# Patient Record
Sex: MALE | Race: WHITE | NOT HISPANIC OR LATINO | Employment: OTHER | URBAN - NONMETROPOLITAN AREA
[De-identification: names, ages, dates, MRNs, and addresses within clinical notes are randomized per-mention and may not be internally consistent; named-entity substitution may affect disease eponyms.]

---

## 2017-05-19 ENCOUNTER — OFFICE VISIT (OUTPATIENT)
Dept: SURGERY | Facility: CLINIC | Age: 57
End: 2017-05-19

## 2017-05-19 VITALS
HEIGHT: 68 IN | SYSTOLIC BLOOD PRESSURE: 160 MMHG | BODY MASS INDEX: 30.01 KG/M2 | DIASTOLIC BLOOD PRESSURE: 90 MMHG | WEIGHT: 198 LBS

## 2017-05-19 DIAGNOSIS — K43.2 INCISIONAL HERNIA, WITHOUT OBSTRUCTION OR GANGRENE: Primary | ICD-10-CM

## 2017-05-19 PROCEDURE — 99204 OFFICE O/P NEW MOD 45 MIN: CPT | Performed by: SURGERY

## 2017-05-19 RX ORDER — ATORVASTATIN CALCIUM 20 MG/1
20 TABLET, FILM COATED ORAL NIGHTLY
Status: ON HOLD | COMMUNITY
End: 2021-02-16

## 2017-05-19 RX ORDER — ONDANSETRON 4 MG/1
4 TABLET, ORALLY DISINTEGRATING ORAL EVERY 8 HOURS
COMMUNITY
Start: 2017-03-03 | End: 2017-06-21 | Stop reason: SDUPTHER

## 2017-05-19 RX ORDER — LISINOPRIL 5 MG/1
5 TABLET ORAL DAILY
Status: ON HOLD | COMMUNITY
End: 2021-02-16

## 2017-05-19 RX ORDER — BLOOD-GLUCOSE METER
KIT MISCELLANEOUS
Refills: 0 | COMMUNITY
Start: 2017-02-21 | End: 2017-05-26

## 2017-05-19 RX ORDER — ASPIRIN 81 MG/1
81 TABLET, CHEWABLE ORAL DAILY
Status: ON HOLD | COMMUNITY
End: 2021-02-16

## 2017-05-24 ENCOUNTER — OFFICE VISIT (OUTPATIENT)
Dept: SURGERY | Facility: CLINIC | Age: 57
End: 2017-05-24

## 2017-05-24 VITALS
WEIGHT: 203 LBS | BODY MASS INDEX: 30.77 KG/M2 | SYSTOLIC BLOOD PRESSURE: 130 MMHG | HEIGHT: 68 IN | DIASTOLIC BLOOD PRESSURE: 80 MMHG

## 2017-05-24 DIAGNOSIS — Z79.4 TYPE 2 DIABETES MELLITUS WITHOUT COMPLICATION, WITH LONG-TERM CURRENT USE OF INSULIN (HCC): ICD-10-CM

## 2017-05-24 DIAGNOSIS — K43.9 VENTRAL HERNIA WITHOUT OBSTRUCTION OR GANGRENE: Primary | ICD-10-CM

## 2017-05-24 DIAGNOSIS — E11.9 TYPE 2 DIABETES MELLITUS WITHOUT COMPLICATION, WITH LONG-TERM CURRENT USE OF INSULIN (HCC): ICD-10-CM

## 2017-05-24 PROCEDURE — 99212 OFFICE O/P EST SF 10 MIN: CPT | Performed by: SURGERY

## 2017-05-26 ENCOUNTER — APPOINTMENT (OUTPATIENT)
Dept: PREADMISSION TESTING | Facility: HOSPITAL | Age: 57
End: 2017-05-26

## 2017-05-26 VITALS
HEIGHT: 68 IN | OXYGEN SATURATION: 97 % | BODY MASS INDEX: 31.07 KG/M2 | HEART RATE: 82 BPM | RESPIRATION RATE: 20 BRPM | DIASTOLIC BLOOD PRESSURE: 88 MMHG | SYSTOLIC BLOOD PRESSURE: 126 MMHG | WEIGHT: 205 LBS

## 2017-05-26 DIAGNOSIS — K43.9 VENTRAL HERNIA WITHOUT OBSTRUCTION OR GANGRENE: ICD-10-CM

## 2017-05-26 LAB
ALBUMIN SERPL-MCNC: 4.2 G/DL (ref 3.4–4.8)
ALBUMIN/GLOB SERPL: 1.6 G/DL (ref 1.1–1.8)
ALP SERPL-CCNC: 118 U/L (ref 38–126)
ALT SERPL W P-5'-P-CCNC: 34 U/L (ref 21–72)
ANION GAP SERPL CALCULATED.3IONS-SCNC: 11 MMOL/L (ref 5–15)
AST SERPL-CCNC: 19 U/L (ref 17–59)
BASOPHILS # BLD AUTO: 0.05 10*3/MM3 (ref 0–0.2)
BASOPHILS NFR BLD AUTO: 0.5 % (ref 0–2)
BILIRUB SERPL-MCNC: 0.5 MG/DL (ref 0.2–1.3)
BUN BLD-MCNC: 9 MG/DL (ref 7–21)
BUN/CREAT SERPL: 14.3 (ref 7–25)
CALCIUM SPEC-SCNC: 9.4 MG/DL (ref 8.4–10.2)
CHLORIDE SERPL-SCNC: 98 MMOL/L (ref 95–110)
CO2 SERPL-SCNC: 25 MMOL/L (ref 22–31)
CREAT BLD-MCNC: 0.63 MG/DL (ref 0.7–1.3)
DEPRECATED RDW RBC AUTO: 37.2 FL (ref 35.1–43.9)
EOSINOPHIL # BLD AUTO: 0.13 10*3/MM3 (ref 0–0.7)
EOSINOPHIL NFR BLD AUTO: 1.4 % (ref 0–7)
ERYTHROCYTE [DISTWIDTH] IN BLOOD BY AUTOMATED COUNT: 12.3 % (ref 11.5–14.5)
GFR SERPL CREATININE-BSD FRML MDRD: 133 ML/MIN/1.73 (ref 60–130)
GLOBULIN UR ELPH-MCNC: 2.7 GM/DL (ref 2.3–3.5)
GLUCOSE BLD-MCNC: 348 MG/DL (ref 60–100)
HCT VFR BLD AUTO: 39.2 % (ref 39–49)
HGB BLD-MCNC: 14.4 G/DL (ref 13.7–17.3)
IMM GRANULOCYTES # BLD: 0.05 10*3/MM3 (ref 0–0.02)
IMM GRANULOCYTES NFR BLD: 0.5 % (ref 0–0.5)
LYMPHOCYTES # BLD AUTO: 1.45 10*3/MM3 (ref 0.6–4.2)
LYMPHOCYTES NFR BLD AUTO: 15.8 % (ref 10–50)
MCH RBC QN AUTO: 30.7 PG (ref 26.5–34)
MCHC RBC AUTO-ENTMCNC: 36.7 G/DL (ref 31.5–36.3)
MCV RBC AUTO: 83.6 FL (ref 80–98)
MONOCYTES # BLD AUTO: 0.8 10*3/MM3 (ref 0–0.9)
MONOCYTES NFR BLD AUTO: 8.7 % (ref 0–12)
MRSA DNA SPEC QL NAA+PROBE: POSITIVE
NEUTROPHILS # BLD AUTO: 6.67 10*3/MM3 (ref 2–8.6)
NEUTROPHILS NFR BLD AUTO: 73.1 % (ref 37–80)
PLATELET # BLD AUTO: 209 10*3/MM3 (ref 150–450)
PMV BLD AUTO: 11.9 FL (ref 8–12)
POTASSIUM BLD-SCNC: 4.2 MMOL/L (ref 3.5–5.1)
PROT SERPL-MCNC: 6.9 G/DL (ref 6.3–8.6)
RBC # BLD AUTO: 4.69 10*6/MM3 (ref 4.37–5.74)
SODIUM BLD-SCNC: 134 MMOL/L (ref 137–145)
WBC NRBC COR # BLD: 9.15 10*3/MM3 (ref 3.2–9.8)

## 2017-05-26 PROCEDURE — 36415 COLL VENOUS BLD VENIPUNCTURE: CPT

## 2017-05-26 PROCEDURE — 93005 ELECTROCARDIOGRAM TRACING: CPT

## 2017-05-26 PROCEDURE — 85025 COMPLETE CBC W/AUTO DIFF WBC: CPT | Performed by: SURGERY

## 2017-05-26 PROCEDURE — 80053 COMPREHEN METABOLIC PANEL: CPT | Performed by: SURGERY

## 2017-05-26 PROCEDURE — 87641 MR-STAPH DNA AMP PROBE: CPT | Performed by: SURGERY

## 2017-05-26 PROCEDURE — 93010 ELECTROCARDIOGRAM REPORT: CPT | Performed by: INTERNAL MEDICINE

## 2017-05-26 RX ORDER — SODIUM CHLORIDE 9 MG/ML
1000 INJECTION, SOLUTION INTRAVENOUS CONTINUOUS PRN
Status: CANCELLED | OUTPATIENT
Start: 2017-05-31

## 2017-05-31 ENCOUNTER — ANESTHESIA EVENT (OUTPATIENT)
Dept: PERIOP | Facility: HOSPITAL | Age: 57
End: 2017-05-31

## 2017-05-31 ENCOUNTER — ANESTHESIA (OUTPATIENT)
Dept: PERIOP | Facility: HOSPITAL | Age: 57
End: 2017-05-31

## 2017-05-31 ENCOUNTER — HOSPITAL ENCOUNTER (INPATIENT)
Facility: HOSPITAL | Age: 57
LOS: 8 days | Discharge: HOME | End: 2017-06-08
Attending: SURGERY | Admitting: SURGERY

## 2017-05-31 DIAGNOSIS — K43.9 VENTRAL HERNIA WITHOUT OBSTRUCTION OR GANGRENE: ICD-10-CM

## 2017-05-31 DIAGNOSIS — Z74.09 IMPAIRED MOBILITY AND ADLS: ICD-10-CM

## 2017-05-31 DIAGNOSIS — Z74.09 IMPAIRED PHYSICAL MOBILITY: ICD-10-CM

## 2017-05-31 DIAGNOSIS — Z78.9 IMPAIRED MOBILITY AND ADLS: ICD-10-CM

## 2017-05-31 LAB
ANION GAP SERPL CALCULATED.3IONS-SCNC: 10 MMOL/L (ref 5–15)
ARTERIAL PATENCY WRIST A: ABNORMAL
ATMOSPHERIC PRESS: ABNORMAL MMHG
BASE EXCESS BLDA CALC-SCNC: -0.3 MMOL/L (ref -2.4–2.4)
BASOPHILS # BLD AUTO: 0.02 10*3/MM3 (ref 0–0.2)
BASOPHILS NFR BLD AUTO: 0.1 % (ref 0–2)
BDY SITE: ABNORMAL
BUN BLD-MCNC: 8 MG/DL (ref 7–21)
BUN/CREAT SERPL: 11.4 (ref 7–25)
CA-I BLD-MCNC: 4.4 MG/DL (ref 4.5–4.9)
CALCIUM SPEC-SCNC: 8 MG/DL (ref 8.4–10.2)
CHLORIDE SERPL-SCNC: 98 MMOL/L (ref 95–110)
CO2 BLDA-SCNC: 24.5 MMOL/L (ref 23–27)
CO2 SERPL-SCNC: 25 MMOL/L (ref 22–31)
CREAT BLD-MCNC: 0.7 MG/DL (ref 0.7–1.3)
DEPRECATED RDW RBC AUTO: 38.5 FL (ref 35.1–43.9)
EOSINOPHIL # BLD AUTO: 0.03 10*3/MM3 (ref 0–0.7)
EOSINOPHIL NFR BLD AUTO: 0.2 % (ref 0–7)
ERYTHROCYTE [DISTWIDTH] IN BLOOD BY AUTOMATED COUNT: 12.6 % (ref 11.5–14.5)
GFR SERPL CREATININE-BSD FRML MDRD: 118 ML/MIN/1.73 (ref 60–130)
GLUCOSE BLD-MCNC: 298 MG/DL (ref 60–100)
GLUCOSE BLDA-MCNC: 235 MMOL/L
GLUCOSE BLDC GLUCOMTR-MCNC: 261 MG/DL (ref 70–130)
GLUCOSE BLDC GLUCOMTR-MCNC: 272 MG/DL (ref 70–130)
GLUCOSE BLDC GLUCOMTR-MCNC: 309 MG/DL (ref 70–130)
GLUCOSE BLDC GLUCOMTR-MCNC: 356 MG/DL (ref 70–130)
HCO3 BLDA-SCNC: 23.4 MMOL/L (ref 22–26)
HCT VFR BLD AUTO: 39.6 % (ref 39–49)
HCT VFR BLD CALC: 45 % (ref 40–54)
HGB BLD-MCNC: 14.5 G/DL (ref 13.7–17.3)
HGB BLDA-MCNC: 15.2 G/DL (ref 14–18)
IMM GRANULOCYTES # BLD: 0.11 10*3/MM3 (ref 0–0.02)
IMM GRANULOCYTES NFR BLD: 0.6 % (ref 0–0.5)
LYMPHOCYTES # BLD AUTO: 1.19 10*3/MM3 (ref 0.6–4.2)
LYMPHOCYTES NFR BLD AUTO: 6 % (ref 10–50)
MAGNESIUM SERPL-MCNC: 1.8 MG/DL (ref 1.6–2.3)
MCH RBC QN AUTO: 30.9 PG (ref 26.5–34)
MCHC RBC AUTO-ENTMCNC: 36.6 G/DL (ref 31.5–36.3)
MCV RBC AUTO: 84.3 FL (ref 80–98)
MODALITY: ABNORMAL
MONOCYTES # BLD AUTO: 1.25 10*3/MM3 (ref 0–0.9)
MONOCYTES NFR BLD AUTO: 6.3 % (ref 0–12)
NEUTROPHILS # BLD AUTO: 17.33 10*3/MM3 (ref 2–8.6)
NEUTROPHILS NFR BLD AUTO: 86.8 % (ref 37–80)
PCO2 BLDA: 35.7 MM HG (ref 35–45)
PH BLDA: 7.43 PH UNITS (ref 7.35–7.45)
PHOSPHATE SERPL-MCNC: 2.7 MG/DL (ref 2.4–4.4)
PLATELET # BLD AUTO: 300 10*3/MM3 (ref 150–450)
PMV BLD AUTO: 10.4 FL (ref 8–12)
PO2 BLDA: 130.7 MM HG (ref 80–105)
POTASSIUM BLD-SCNC: 3.7 MMOL/L (ref 3.5–5.1)
POTASSIUM BLDA-SCNC: 3.08 MMOL/L (ref 3.6–4.9)
RBC # BLD AUTO: 4.7 10*6/MM3 (ref 4.37–5.74)
SAO2 % BLDCOA: 98.8 %
SODIUM BLD-SCNC: 133 MMOL/L (ref 137–145)
SODIUM BLDA-SCNC: 134.7 MMOL/L (ref 138–146)
WBC NRBC COR # BLD: 19.93 10*3/MM3 (ref 3.2–9.8)

## 2017-05-31 PROCEDURE — 25010000002 MIDAZOLAM PER 1 MG: Performed by: NURSE ANESTHETIST, CERTIFIED REGISTERED

## 2017-05-31 PROCEDURE — 0WUF0JZ SUPPLEMENT ABDOMINAL WALL WITH SYNTHETIC SUBSTITUTE, OPEN APPROACH: ICD-10-PCS | Performed by: SURGERY

## 2017-05-31 PROCEDURE — 25010000002 CEFOXITIN: Performed by: SURGERY

## 2017-05-31 PROCEDURE — 85025 COMPLETE CBC W/AUTO DIFF WBC: CPT | Performed by: SURGERY

## 2017-05-31 PROCEDURE — 83735 ASSAY OF MAGNESIUM: CPT | Performed by: SURGERY

## 2017-05-31 PROCEDURE — 25010000002 ONDANSETRON PER 1 MG: Performed by: NURSE ANESTHETIST, CERTIFIED REGISTERED

## 2017-05-31 PROCEDURE — 88304 TISSUE EXAM BY PATHOLOGIST: CPT | Performed by: PATHOLOGY

## 2017-05-31 PROCEDURE — 0WJF4ZZ INSPECTION OF ABDOMINAL WALL, PERCUTANEOUS ENDOSCOPIC APPROACH: ICD-10-PCS | Performed by: SURGERY

## 2017-05-31 PROCEDURE — 25010000002 NEOSTIGMINE PER 0.5 MG: Performed by: NURSE ANESTHETIST, CERTIFIED REGISTERED

## 2017-05-31 PROCEDURE — 25010000002 HYDROMORPHONE PER 4 MG: Performed by: NURSE ANESTHETIST, CERTIFIED REGISTERED

## 2017-05-31 PROCEDURE — 25010000003 CEFAZOLIN PER 500 MG: Performed by: SURGERY

## 2017-05-31 PROCEDURE — 84100 ASSAY OF PHOSPHORUS: CPT | Performed by: SURGERY

## 2017-05-31 PROCEDURE — 25010000002 FENTANYL CITRATE (PF) 100 MCG/2ML SOLUTION: Performed by: NURSE ANESTHETIST, CERTIFIED REGISTERED

## 2017-05-31 PROCEDURE — 25010000002 HYDROMORPHONE PER 4 MG: Performed by: SURGERY

## 2017-05-31 PROCEDURE — 25010000002 PROPOFOL 10 MG/ML EMULSION: Performed by: NURSE ANESTHETIST, CERTIFIED REGISTERED

## 2017-05-31 PROCEDURE — 82962 GLUCOSE BLOOD TEST: CPT

## 2017-05-31 PROCEDURE — 80048 BASIC METABOLIC PNL TOTAL CA: CPT | Performed by: SURGERY

## 2017-05-31 PROCEDURE — 0DNE0ZZ RELEASE LARGE INTESTINE, OPEN APPROACH: ICD-10-PCS | Performed by: SURGERY

## 2017-05-31 PROCEDURE — 25010000002 ONDANSETRON PER 1 MG: Performed by: SURGERY

## 2017-05-31 PROCEDURE — 63710000001 INSULIN ASPART PER 5 UNITS: Performed by: ANESTHESIOLOGY

## 2017-05-31 PROCEDURE — 25010000002 PHENYLEPHRINE PER 1 ML: Performed by: NURSE ANESTHETIST, CERTIFIED REGISTERED

## 2017-05-31 PROCEDURE — C1781 MESH (IMPLANTABLE): HCPCS | Performed by: SURGERY

## 2017-05-31 PROCEDURE — 88304 TISSUE EXAM BY PATHOLOGIST: CPT | Performed by: SURGERY

## 2017-05-31 PROCEDURE — 49568 PR IMPLANT MESH HERNIA REPAIR/DEBRIDEMENT CLOSURE: CPT | Performed by: SURGERY

## 2017-05-31 PROCEDURE — 82803 BLOOD GASES ANY COMBINATION: CPT | Performed by: NURSE ANESTHETIST, CERTIFIED REGISTERED

## 2017-05-31 PROCEDURE — 49560 PR REPAIR INCISIONAL HERNIA,REDUCIBLE: CPT | Performed by: SURGERY

## 2017-05-31 PROCEDURE — 25010000002 HYDROMORPHONE PCA 0.2 MG/ML PCA 30 ML (BHMAD/RIC): Performed by: SURGERY

## 2017-05-31 PROCEDURE — 63710000001 INSULIN ASPART PER 5 UNITS: Performed by: SURGERY

## 2017-05-31 DEVICE — PHASIX MESH, 6" X 8" (15.2 CM X 20.3 CM), RECTANGLE
Type: IMPLANTABLE DEVICE | Site: ABDOMEN | Status: FUNCTIONAL
Brand: PHASIX

## 2017-05-31 RX ORDER — PROPOFOL 10 MG/ML
VIAL (ML) INTRAVENOUS AS NEEDED
Status: DISCONTINUED | OUTPATIENT
Start: 2017-05-31 | End: 2017-05-31 | Stop reason: SURG

## 2017-05-31 RX ORDER — GLYCOPYRROLATE 0.2 MG/ML
INJECTION INTRAMUSCULAR; INTRAVENOUS AS NEEDED
Status: DISCONTINUED | OUTPATIENT
Start: 2017-05-31 | End: 2017-05-31 | Stop reason: SURG

## 2017-05-31 RX ORDER — ACETAMINOPHEN 650 MG/1
650 SUPPOSITORY RECTAL ONCE AS NEEDED
Status: DISCONTINUED | OUTPATIENT
Start: 2017-05-31 | End: 2017-05-31 | Stop reason: HOSPADM

## 2017-05-31 RX ORDER — ATORVASTATIN CALCIUM 20 MG/1
20 TABLET, FILM COATED ORAL NIGHTLY
Status: DISCONTINUED | OUTPATIENT
Start: 2017-05-31 | End: 2017-06-08 | Stop reason: HOSPADM

## 2017-05-31 RX ORDER — LIDOCAINE HYDROCHLORIDE 20 MG/ML
INJECTION, SOLUTION INFILTRATION; PERINEURAL AS NEEDED
Status: DISCONTINUED | OUTPATIENT
Start: 2017-05-31 | End: 2017-05-31 | Stop reason: SURG

## 2017-05-31 RX ORDER — SODIUM CHLORIDE 9 MG/ML
1000 INJECTION, SOLUTION INTRAVENOUS CONTINUOUS PRN
Status: DISCONTINUED | OUTPATIENT
Start: 2017-05-31 | End: 2017-05-31 | Stop reason: HOSPADM

## 2017-05-31 RX ORDER — SODIUM CHLORIDE, SODIUM LACTATE, POTASSIUM CHLORIDE, CALCIUM CHLORIDE 600; 310; 30; 20 MG/100ML; MG/100ML; MG/100ML; MG/100ML
100 INJECTION, SOLUTION INTRAVENOUS CONTINUOUS
Status: DISCONTINUED | OUTPATIENT
Start: 2017-05-31 | End: 2017-06-07

## 2017-05-31 RX ORDER — ONDANSETRON 2 MG/ML
4 INJECTION INTRAMUSCULAR; INTRAVENOUS ONCE AS NEEDED
Status: COMPLETED | OUTPATIENT
Start: 2017-05-31 | End: 2017-05-31

## 2017-05-31 RX ORDER — FLUMAZENIL 0.1 MG/ML
0.2 INJECTION INTRAVENOUS AS NEEDED
Status: DISCONTINUED | OUTPATIENT
Start: 2017-05-31 | End: 2017-05-31 | Stop reason: HOSPADM

## 2017-05-31 RX ORDER — NICOTINE POLACRILEX 4 MG
15 LOZENGE BUCCAL
Status: DISCONTINUED | OUTPATIENT
Start: 2017-05-31 | End: 2017-06-03 | Stop reason: SDUPTHER

## 2017-05-31 RX ORDER — ONDANSETRON 2 MG/ML
4 INJECTION INTRAMUSCULAR; INTRAVENOUS EVERY 6 HOURS PRN
Status: DISCONTINUED | OUTPATIENT
Start: 2017-05-31 | End: 2017-06-08 | Stop reason: HOSPADM

## 2017-05-31 RX ORDER — DIPHENHYDRAMINE HYDROCHLORIDE 50 MG/ML
12.5 INJECTION INTRAMUSCULAR; INTRAVENOUS
Status: DISCONTINUED | OUTPATIENT
Start: 2017-05-31 | End: 2017-05-31 | Stop reason: HOSPADM

## 2017-05-31 RX ORDER — DEXTROSE MONOHYDRATE 25 G/50ML
25 INJECTION, SOLUTION INTRAVENOUS
Status: DISCONTINUED | OUTPATIENT
Start: 2017-05-31 | End: 2017-06-03 | Stop reason: SDUPTHER

## 2017-05-31 RX ORDER — HYDROMORPHONE HCL 110MG/55ML
PATIENT CONTROLLED ANALGESIA SYRINGE INTRAVENOUS AS NEEDED
Status: DISCONTINUED | OUTPATIENT
Start: 2017-05-31 | End: 2017-05-31 | Stop reason: SURG

## 2017-05-31 RX ORDER — FENTANYL CITRATE 50 UG/ML
INJECTION, SOLUTION INTRAMUSCULAR; INTRAVENOUS AS NEEDED
Status: DISCONTINUED | OUTPATIENT
Start: 2017-05-31 | End: 2017-05-31 | Stop reason: SURG

## 2017-05-31 RX ORDER — ACETAMINOPHEN 325 MG/1
650 TABLET ORAL ONCE AS NEEDED
Status: DISCONTINUED | OUTPATIENT
Start: 2017-05-31 | End: 2017-05-31 | Stop reason: HOSPADM

## 2017-05-31 RX ORDER — PANTOPRAZOLE SODIUM 40 MG/10ML
40 INJECTION, POWDER, LYOPHILIZED, FOR SOLUTION INTRAVENOUS
Status: DISCONTINUED | OUTPATIENT
Start: 2017-06-01 | End: 2017-06-08 | Stop reason: HOSPADM

## 2017-05-31 RX ORDER — ONDANSETRON 2 MG/ML
INJECTION INTRAMUSCULAR; INTRAVENOUS AS NEEDED
Status: DISCONTINUED | OUTPATIENT
Start: 2017-05-31 | End: 2017-05-31 | Stop reason: SURG

## 2017-05-31 RX ORDER — BUPIVACAINE HYDROCHLORIDE AND EPINEPHRINE 5; 5 MG/ML; UG/ML
INJECTION, SOLUTION EPIDURAL; INTRACAUDAL; PERINEURAL AS NEEDED
Status: DISCONTINUED | OUTPATIENT
Start: 2017-05-31 | End: 2017-06-08 | Stop reason: HOSPADM

## 2017-05-31 RX ORDER — MIDAZOLAM HYDROCHLORIDE 1 MG/ML
INJECTION INTRAMUSCULAR; INTRAVENOUS AS NEEDED
Status: DISCONTINUED | OUTPATIENT
Start: 2017-05-31 | End: 2017-05-31 | Stop reason: SURG

## 2017-05-31 RX ORDER — LABETALOL HYDROCHLORIDE 5 MG/ML
5 INJECTION, SOLUTION INTRAVENOUS
Status: DISCONTINUED | OUTPATIENT
Start: 2017-05-31 | End: 2017-05-31 | Stop reason: HOSPADM

## 2017-05-31 RX ORDER — SODIUM CHLORIDE, SODIUM GLUCONATE, SODIUM ACETATE, POTASSIUM CHLORIDE, AND MAGNESIUM CHLORIDE 526; 502; 368; 37; 30 MG/100ML; MG/100ML; MG/100ML; MG/100ML; MG/100ML
INJECTION, SOLUTION INTRAVENOUS CONTINUOUS PRN
Status: DISCONTINUED | OUTPATIENT
Start: 2017-05-31 | End: 2017-05-31 | Stop reason: SURG

## 2017-05-31 RX ORDER — NALOXONE HCL 0.4 MG/ML
0.1 VIAL (ML) INJECTION
Status: DISCONTINUED | OUTPATIENT
Start: 2017-05-31 | End: 2017-06-07

## 2017-05-31 RX ORDER — NALOXONE HCL 0.4 MG/ML
0.1 VIAL (ML) INJECTION
Status: DISCONTINUED | OUTPATIENT
Start: 2017-05-31 | End: 2017-05-31 | Stop reason: SDUPTHER

## 2017-05-31 RX ORDER — ROCURONIUM BROMIDE 10 MG/ML
INJECTION, SOLUTION INTRAVENOUS AS NEEDED
Status: DISCONTINUED | OUTPATIENT
Start: 2017-05-31 | End: 2017-05-31 | Stop reason: SURG

## 2017-05-31 RX ORDER — NALOXONE HCL 0.4 MG/ML
0.2 VIAL (ML) INJECTION AS NEEDED
Status: DISCONTINUED | OUTPATIENT
Start: 2017-05-31 | End: 2017-05-31 | Stop reason: HOSPADM

## 2017-05-31 RX ADMIN — NEOSTIGMINE METHYLSULFATE 4 MG: 1 INJECTION, SOLUTION INTRAMUSCULAR; INTRAVENOUS; SUBCUTANEOUS at 19:15

## 2017-05-31 RX ADMIN — FENTANYL CITRATE 100 MCG: 50 INJECTION, SOLUTION INTRAMUSCULAR; INTRAVENOUS at 17:03

## 2017-05-31 RX ADMIN — PHENYLEPHRINE HYDROCHLORIDE 100 MCG: 10 INJECTION INTRAVENOUS at 15:47

## 2017-05-31 RX ADMIN — ROCURONIUM BROMIDE 40 MG: 10 INJECTION INTRAVENOUS at 15:34

## 2017-05-31 RX ADMIN — MUPIROCIN: 20 OINTMENT TOPICAL at 21:19

## 2017-05-31 RX ADMIN — INSULIN ASPART 12 UNITS: 100 INJECTION, SOLUTION INTRAVENOUS; SUBCUTANEOUS at 12:59

## 2017-05-31 RX ADMIN — HYDROMORPHONE HYDROCHLORIDE 0.5 MG: 2 INJECTION, SOLUTION INTRAMUSCULAR; INTRAVENOUS; SUBCUTANEOUS at 20:15

## 2017-05-31 RX ADMIN — HYDROMORPHONE HYDROCHLORIDE 0.2 MG: 2 INJECTION, SOLUTION INTRAMUSCULAR; INTRAVENOUS; SUBCUTANEOUS at 19:35

## 2017-05-31 RX ADMIN — ROCURONIUM BROMIDE 20 MG: 10 INJECTION INTRAVENOUS at 17:55

## 2017-05-31 RX ADMIN — ATORVASTATIN CALCIUM 20 MG: 20 TABLET, FILM COATED ORAL at 21:19

## 2017-05-31 RX ADMIN — INSULIN ASPART 7 UNITS: 100 INJECTION, SOLUTION INTRAVENOUS; SUBCUTANEOUS at 21:41

## 2017-05-31 RX ADMIN — PHENYLEPHRINE HYDROCHLORIDE 100 MCG: 10 INJECTION INTRAVENOUS at 15:51

## 2017-05-31 RX ADMIN — ONDANSETRON 4 MG: 2 INJECTION INTRAMUSCULAR; INTRAVENOUS at 21:16

## 2017-05-31 RX ADMIN — GLYCOPYRROLATE 0.8 MG: 0.2 INJECTION, SOLUTION INTRAMUSCULAR; INTRAVENOUS at 19:15

## 2017-05-31 RX ADMIN — SODIUM CHLORIDE, SODIUM GLUCONATE, SODIUM ACETATE, POTASSIUM CHLORIDE, AND MAGNESIUM CHLORIDE: 526; 502; 368; 37; 30 INJECTION, SOLUTION INTRAVENOUS at 18:10

## 2017-05-31 RX ADMIN — PROPOFOL 160 MG: 10 INJECTION, EMULSION INTRAVENOUS at 15:34

## 2017-05-31 RX ADMIN — SODIUM CHLORIDE, SODIUM GLUCONATE, SODIUM ACETATE, POTASSIUM CHLORIDE, AND MAGNESIUM CHLORIDE: 526; 502; 368; 37; 30 INJECTION, SOLUTION INTRAVENOUS at 16:51

## 2017-05-31 RX ADMIN — PHENYLEPHRINE HYDROCHLORIDE 100 MCG: 10 INJECTION INTRAVENOUS at 15:56

## 2017-05-31 RX ADMIN — FENTANYL CITRATE 50 MCG: 50 INJECTION, SOLUTION INTRAMUSCULAR; INTRAVENOUS at 16:17

## 2017-05-31 RX ADMIN — HYDROMORPHONE HYDROCHLORIDE 0.6 MG: 2 INJECTION, SOLUTION INTRAMUSCULAR; INTRAVENOUS; SUBCUTANEOUS at 19:40

## 2017-05-31 RX ADMIN — CEFAZOLIN SODIUM 2 G: 1 INJECTION, POWDER, FOR SOLUTION INTRAMUSCULAR; INTRAVENOUS at 15:40

## 2017-05-31 RX ADMIN — FENTANYL CITRATE 100 MCG: 50 INJECTION, SOLUTION INTRAMUSCULAR; INTRAVENOUS at 15:34

## 2017-05-31 RX ADMIN — HYDROMORPHONE HYDROCHLORIDE 0.4 MG: 2 INJECTION, SOLUTION INTRAMUSCULAR; INTRAVENOUS; SUBCUTANEOUS at 18:49

## 2017-05-31 RX ADMIN — Medication: at 20:38

## 2017-05-31 RX ADMIN — FENTANYL CITRATE 100 MCG: 50 INJECTION, SOLUTION INTRAMUSCULAR; INTRAVENOUS at 17:35

## 2017-05-31 RX ADMIN — NEOSTIGMINE METHYLSULFATE 1 MG: 1 INJECTION, SOLUTION INTRAMUSCULAR; INTRAVENOUS; SUBCUTANEOUS at 19:25

## 2017-05-31 RX ADMIN — HYDROMORPHONE HYDROCHLORIDE 0.5 MG: 2 INJECTION, SOLUTION INTRAMUSCULAR; INTRAVENOUS; SUBCUTANEOUS at 20:10

## 2017-05-31 RX ADMIN — MIDAZOLAM 2 MG: 1 INJECTION INTRAMUSCULAR; INTRAVENOUS at 15:27

## 2017-05-31 RX ADMIN — MUPIROCIN: 20 OINTMENT TOPICAL at 14:57

## 2017-05-31 RX ADMIN — ONDANSETRON 4 MG: 2 INJECTION INTRAMUSCULAR; INTRAVENOUS at 19:00

## 2017-05-31 RX ADMIN — PHENYLEPHRINE HYDROCHLORIDE 100 MCG: 10 INJECTION INTRAVENOUS at 15:52

## 2017-05-31 RX ADMIN — HYDROMORPHONE HYDROCHLORIDE 0.5 MG: 2 INJECTION, SOLUTION INTRAMUSCULAR; INTRAVENOUS; SUBCUTANEOUS at 20:00

## 2017-05-31 RX ADMIN — SODIUM CHLORIDE 1000 ML: 900 INJECTION, SOLUTION INTRAVENOUS at 12:55

## 2017-05-31 RX ADMIN — HYDROMORPHONE HYDROCHLORIDE 0.8 MG: 2 INJECTION, SOLUTION INTRAMUSCULAR; INTRAVENOUS; SUBCUTANEOUS at 19:48

## 2017-05-31 RX ADMIN — ONDANSETRON 4 MG: 2 INJECTION INTRAMUSCULAR; INTRAVENOUS at 20:00

## 2017-05-31 RX ADMIN — HYDROMORPHONE HYDROCHLORIDE 1 MG: 1 INJECTION, SOLUTION INTRAMUSCULAR; INTRAVENOUS; SUBCUTANEOUS at 21:01

## 2017-05-31 RX ADMIN — CEFOXITIN 2 G: 2 INJECTION, POWDER, FOR SOLUTION INTRAVENOUS at 21:41

## 2017-05-31 RX ADMIN — ROCURONIUM BROMIDE 20 MG: 10 INJECTION INTRAVENOUS at 18:35

## 2017-05-31 RX ADMIN — HYDROMORPHONE HYDROCHLORIDE 0.5 MG: 2 INJECTION, SOLUTION INTRAMUSCULAR; INTRAVENOUS; SUBCUTANEOUS at 20:05

## 2017-05-31 RX ADMIN — SODIUM CHLORIDE, POTASSIUM CHLORIDE, SODIUM LACTATE AND CALCIUM CHLORIDE 100 ML/HR: 600; 310; 30; 20 INJECTION, SOLUTION INTRAVENOUS at 20:58

## 2017-05-31 RX ADMIN — GLYCOPYRROLATE 0.2 MG: 0.2 INJECTION, SOLUTION INTRAMUSCULAR; INTRAVENOUS at 19:25

## 2017-05-31 RX ADMIN — FENTANYL CITRATE 100 MCG: 50 INJECTION, SOLUTION INTRAMUSCULAR; INTRAVENOUS at 16:20

## 2017-05-31 RX ADMIN — ROCURONIUM BROMIDE 30 MG: 10 INJECTION INTRAVENOUS at 16:16

## 2017-05-31 RX ADMIN — PHENYLEPHRINE HYDROCHLORIDE 100 MCG: 10 INJECTION INTRAVENOUS at 15:49

## 2017-05-31 RX ADMIN — LIDOCAINE HYDROCHLORIDE 100 MG: 20 INJECTION, SOLUTION INFILTRATION; PERINEURAL at 15:32

## 2017-06-01 PROBLEM — Z79.4 CONTROLLED TYPE 2 DIABETES MELLITUS WITHOUT COMPLICATION, WITH LONG-TERM CURRENT USE OF INSULIN (HCC): Status: ACTIVE | Noted: 2017-05-24

## 2017-06-01 LAB
ANION GAP SERPL CALCULATED.3IONS-SCNC: 11 MMOL/L (ref 5–15)
ANION GAP SERPL CALCULATED.3IONS-SCNC: 11 MMOL/L (ref 5–15)
ANION GAP SERPL CALCULATED.3IONS-SCNC: 6 MMOL/L (ref 5–15)
BASOPHILS # BLD AUTO: 0.01 10*3/MM3 (ref 0–0.2)
BASOPHILS NFR BLD AUTO: 0.1 % (ref 0–2)
BUN BLD-MCNC: 10 MG/DL (ref 7–21)
BUN BLD-MCNC: 11 MG/DL (ref 7–21)
BUN BLD-MCNC: 12 MG/DL (ref 7–21)
BUN/CREAT SERPL: 14.1 (ref 7–25)
BUN/CREAT SERPL: 14.3 (ref 7–25)
BUN/CREAT SERPL: 16.2 (ref 7–25)
CALCIUM SPEC-SCNC: 8 MG/DL (ref 8.4–10.2)
CALCIUM SPEC-SCNC: 8.1 MG/DL (ref 8.4–10.2)
CALCIUM SPEC-SCNC: 8.2 MG/DL (ref 8.4–10.2)
CHLORIDE SERPL-SCNC: 100 MMOL/L (ref 95–110)
CHLORIDE SERPL-SCNC: 100 MMOL/L (ref 95–110)
CHLORIDE SERPL-SCNC: 99 MMOL/L (ref 95–110)
CO2 SERPL-SCNC: 26 MMOL/L (ref 22–31)
CO2 SERPL-SCNC: 27 MMOL/L (ref 22–31)
CO2 SERPL-SCNC: 28 MMOL/L (ref 22–31)
CREAT BLD-MCNC: 0.71 MG/DL (ref 0.7–1.3)
CREAT BLD-MCNC: 0.74 MG/DL (ref 0.7–1.3)
CREAT BLD-MCNC: 0.77 MG/DL (ref 0.7–1.3)
DEPRECATED RDW RBC AUTO: 40.8 FL (ref 35.1–43.9)
EOSINOPHIL # BLD AUTO: 0.12 10*3/MM3 (ref 0–0.7)
EOSINOPHIL NFR BLD AUTO: 0.7 % (ref 0–7)
ERYTHROCYTE [DISTWIDTH] IN BLOOD BY AUTOMATED COUNT: 12.8 % (ref 11.5–14.5)
GFR SERPL CREATININE-BSD FRML MDRD: 105 ML/MIN/1.73 (ref 56–130)
GFR SERPL CREATININE-BSD FRML MDRD: 110 ML/MIN/1.73 (ref 56–130)
GFR SERPL CREATININE-BSD FRML MDRD: 116 ML/MIN/1.73 (ref 60–130)
GLUCOSE BLD-MCNC: 212 MG/DL (ref 60–100)
GLUCOSE BLD-MCNC: 217 MG/DL (ref 60–100)
GLUCOSE BLD-MCNC: 244 MG/DL (ref 60–100)
GLUCOSE BLDC GLUCOMTR-MCNC: 110 MG/DL (ref 70–130)
GLUCOSE BLDC GLUCOMTR-MCNC: 120 MG/DL (ref 70–130)
GLUCOSE BLDC GLUCOMTR-MCNC: 139 MG/DL (ref 70–130)
GLUCOSE BLDC GLUCOMTR-MCNC: 142 MG/DL (ref 70–130)
GLUCOSE BLDC GLUCOMTR-MCNC: 150 MG/DL (ref 70–130)
GLUCOSE BLDC GLUCOMTR-MCNC: 165 MG/DL (ref 70–130)
GLUCOSE BLDC GLUCOMTR-MCNC: 169 MG/DL (ref 70–130)
GLUCOSE BLDC GLUCOMTR-MCNC: 180 MG/DL (ref 70–130)
GLUCOSE BLDC GLUCOMTR-MCNC: 197 MG/DL (ref 70–130)
GLUCOSE BLDC GLUCOMTR-MCNC: 199 MG/DL (ref 70–130)
GLUCOSE BLDC GLUCOMTR-MCNC: 200 MG/DL (ref 70–130)
GLUCOSE BLDC GLUCOMTR-MCNC: 220 MG/DL (ref 70–130)
GLUCOSE BLDC GLUCOMTR-MCNC: 225 MG/DL (ref 70–130)
GLUCOSE BLDC GLUCOMTR-MCNC: 277 MG/DL (ref 70–130)
GLUCOSE BLDC GLUCOMTR-MCNC: 95 MG/DL (ref 70–130)
HCT VFR BLD AUTO: 39.7 % (ref 39–49)
HGB BLD-MCNC: 14 G/DL (ref 13.7–17.3)
IMM GRANULOCYTES # BLD: 0.07 10*3/MM3 (ref 0–0.02)
IMM GRANULOCYTES NFR BLD: 0.4 % (ref 0–0.5)
LYMPHOCYTES # BLD AUTO: 1.15 10*3/MM3 (ref 0.6–4.2)
LYMPHOCYTES NFR BLD AUTO: 6.7 % (ref 10–50)
MAGNESIUM SERPL-MCNC: 2 MG/DL (ref 1.6–2.3)
MCH RBC QN AUTO: 30.8 PG (ref 26.5–34)
MCHC RBC AUTO-ENTMCNC: 35.3 G/DL (ref 31.5–36.3)
MCV RBC AUTO: 87.3 FL (ref 80–98)
MONOCYTES # BLD AUTO: 0.79 10*3/MM3 (ref 0–0.9)
MONOCYTES NFR BLD AUTO: 4.6 % (ref 0–12)
NEUTROPHILS # BLD AUTO: 15.03 10*3/MM3 (ref 2–8.6)
NEUTROPHILS NFR BLD AUTO: 87.5 % (ref 37–80)
PHOSPHATE SERPL-MCNC: 2.5 MG/DL (ref 2.4–4.4)
PLATELET # BLD AUTO: 276 10*3/MM3 (ref 150–450)
PMV BLD AUTO: 11.3 FL (ref 8–12)
POTASSIUM BLD-SCNC: 4.1 MMOL/L (ref 3.5–5.1)
POTASSIUM BLD-SCNC: 4.2 MMOL/L (ref 3.5–5.1)
POTASSIUM BLD-SCNC: 4.3 MMOL/L (ref 3.5–5.1)
RBC # BLD AUTO: 4.55 10*6/MM3 (ref 4.37–5.74)
SODIUM BLD-SCNC: 134 MMOL/L (ref 137–145)
SODIUM BLD-SCNC: 137 MMOL/L (ref 137–145)
SODIUM BLD-SCNC: 137 MMOL/L (ref 137–145)
WBC NRBC COR # BLD: 17.17 10*3/MM3 (ref 3.2–9.8)

## 2017-06-01 PROCEDURE — 83735 ASSAY OF MAGNESIUM: CPT | Performed by: SURGERY

## 2017-06-01 PROCEDURE — 25010000002 CEFOXITIN: Performed by: SURGERY

## 2017-06-01 PROCEDURE — 25010000002 INSULIN REGULAR HUMAN PER 5 UNITS: Performed by: SURGERY

## 2017-06-01 PROCEDURE — 85025 COMPLETE CBC W/AUTO DIFF WBC: CPT | Performed by: SURGERY

## 2017-06-01 PROCEDURE — 99024 POSTOP FOLLOW-UP VISIT: CPT | Performed by: FAMILY MEDICINE

## 2017-06-01 PROCEDURE — 63710000001 INSULIN ASPART PER 5 UNITS: Performed by: SURGERY

## 2017-06-01 PROCEDURE — 94799 UNLISTED PULMONARY SVC/PX: CPT

## 2017-06-01 PROCEDURE — 97116 GAIT TRAINING THERAPY: CPT

## 2017-06-01 PROCEDURE — 25010000002 HYDROMORPHONE PCA 0.2 MG/ML PCA 30 ML (BHMAD/RIC): Performed by: SURGERY

## 2017-06-01 PROCEDURE — G8978 MOBILITY CURRENT STATUS: HCPCS

## 2017-06-01 PROCEDURE — 82962 GLUCOSE BLOOD TEST: CPT

## 2017-06-01 PROCEDURE — 80048 BASIC METABOLIC PNL TOTAL CA: CPT | Performed by: SURGERY

## 2017-06-01 PROCEDURE — G8979 MOBILITY GOAL STATUS: HCPCS

## 2017-06-01 PROCEDURE — 94760 N-INVAS EAR/PLS OXIMETRY 1: CPT

## 2017-06-01 PROCEDURE — 97162 PT EVAL MOD COMPLEX 30 MIN: CPT

## 2017-06-01 PROCEDURE — 25010000002 ONDANSETRON PER 1 MG: Performed by: SURGERY

## 2017-06-01 PROCEDURE — 25010000002 HYDROMORPHONE PER 4 MG: Performed by: SURGERY

## 2017-06-01 PROCEDURE — 84100 ASSAY OF PHOSPHORUS: CPT | Performed by: SURGERY

## 2017-06-01 PROCEDURE — 25010000002 ENOXAPARIN PER 10 MG: Performed by: SURGERY

## 2017-06-01 RX ORDER — SODIUM CHLORIDE, SODIUM LACTATE, POTASSIUM CHLORIDE, CALCIUM CHLORIDE 600; 310; 30; 20 MG/100ML; MG/100ML; MG/100ML; MG/100ML
2000 INJECTION, SOLUTION INTRAVENOUS ONCE
Status: COMPLETED | OUTPATIENT
Start: 2017-06-01 | End: 2017-06-01

## 2017-06-01 RX ORDER — LABETALOL HYDROCHLORIDE 5 MG/ML
20 INJECTION, SOLUTION INTRAVENOUS EVERY 6 HOURS PRN
Status: DISCONTINUED | OUTPATIENT
Start: 2017-06-01 | End: 2017-06-08 | Stop reason: HOSPADM

## 2017-06-01 RX ORDER — HYDROMORPHONE HCL 110MG/55ML
1.5 PATIENT CONTROLLED ANALGESIA SYRINGE INTRAVENOUS
Status: DISCONTINUED | OUTPATIENT
Start: 2017-06-01 | End: 2017-06-07

## 2017-06-01 RX ADMIN — LABETALOL HYDROCHLORIDE 20 MG: 5 INJECTION, SOLUTION INTRAVENOUS at 16:50

## 2017-06-01 RX ADMIN — MUPIROCIN: 20 OINTMENT TOPICAL at 15:19

## 2017-06-01 RX ADMIN — SODIUM CHLORIDE, POTASSIUM CHLORIDE, SODIUM LACTATE AND CALCIUM CHLORIDE 2000 ML/HR: 600; 310; 30; 20 INJECTION, SOLUTION INTRAVENOUS at 09:25

## 2017-06-01 RX ADMIN — HYDROMORPHONE HYDROCHLORIDE 1.5 MG: 2 INJECTION, SOLUTION INTRAMUSCULAR; INTRAVENOUS; SUBCUTANEOUS at 15:39

## 2017-06-01 RX ADMIN — INSULIN ASPART 6 UNITS: 100 INJECTION, SOLUTION INTRAVENOUS; SUBCUTANEOUS at 06:49

## 2017-06-01 RX ADMIN — ATORVASTATIN CALCIUM 20 MG: 20 TABLET, FILM COATED ORAL at 20:53

## 2017-06-01 RX ADMIN — HYDROMORPHONE HYDROCHLORIDE 1.5 MG: 2 INJECTION, SOLUTION INTRAMUSCULAR; INTRAVENOUS; SUBCUTANEOUS at 12:35

## 2017-06-01 RX ADMIN — HYDROMORPHONE HYDROCHLORIDE 1 MG: 1 INJECTION, SOLUTION INTRAMUSCULAR; INTRAVENOUS; SUBCUTANEOUS at 06:26

## 2017-06-01 RX ADMIN — SODIUM CHLORIDE, POTASSIUM CHLORIDE, SODIUM LACTATE AND CALCIUM CHLORIDE 100 ML/HR: 600; 310; 30; 20 INJECTION, SOLUTION INTRAVENOUS at 14:35

## 2017-06-01 RX ADMIN — SODIUM CHLORIDE 2 UNITS/HR: 9 INJECTION, SOLUTION INTRAVENOUS at 10:29

## 2017-06-01 RX ADMIN — HYDROMORPHONE HYDROCHLORIDE 1.5 MG: 2 INJECTION, SOLUTION INTRAMUSCULAR; INTRAVENOUS; SUBCUTANEOUS at 20:54

## 2017-06-01 RX ADMIN — ONDANSETRON 4 MG: 2 INJECTION INTRAMUSCULAR; INTRAVENOUS at 06:42

## 2017-06-01 RX ADMIN — Medication: at 08:09

## 2017-06-01 RX ADMIN — HYDROMORPHONE HYDROCHLORIDE 1.5 MG: 2 INJECTION, SOLUTION INTRAMUSCULAR; INTRAVENOUS; SUBCUTANEOUS at 23:48

## 2017-06-01 RX ADMIN — CEFOXITIN 2 G: 2 INJECTION, POWDER, FOR SOLUTION INTRAVENOUS at 08:32

## 2017-06-01 RX ADMIN — HYDROMORPHONE HYDROCHLORIDE 1 MG: 1 INJECTION, SOLUTION INTRAMUSCULAR; INTRAVENOUS; SUBCUTANEOUS at 00:34

## 2017-06-01 RX ADMIN — HYDROMORPHONE HYDROCHLORIDE 1.5 MG: 2 INJECTION, SOLUTION INTRAMUSCULAR; INTRAVENOUS; SUBCUTANEOUS at 09:25

## 2017-06-01 RX ADMIN — MUPIROCIN 1 APPLICATION: 20 OINTMENT TOPICAL at 21:36

## 2017-06-01 RX ADMIN — PANTOPRAZOLE SODIUM 40 MG: 40 INJECTION, POWDER, FOR SOLUTION INTRAVENOUS at 05:50

## 2017-06-01 RX ADMIN — CEFOXITIN 2 G: 2 INJECTION, POWDER, FOR SOLUTION INTRAVENOUS at 04:36

## 2017-06-01 RX ADMIN — ENOXAPARIN SODIUM 40 MG: 40 INJECTION SUBCUTANEOUS at 08:17

## 2017-06-01 RX ADMIN — MUPIROCIN 1 APPLICATION: 20 OINTMENT TOPICAL at 05:50

## 2017-06-01 RX ADMIN — HYDROMORPHONE HYDROCHLORIDE 1 MG: 1 INJECTION, SOLUTION INTRAMUSCULAR; INTRAVENOUS; SUBCUTANEOUS at 03:38

## 2017-06-01 RX ADMIN — ONDANSETRON 4 MG: 2 INJECTION INTRAMUSCULAR; INTRAVENOUS at 14:43

## 2017-06-01 NOTE — SIGNIFICANT NOTE
06/01/17 1550   Rehab Treatment   Discipline occupational therapist   Rehab Evaluation   Evaluation Not Performed patient/family decline, not feeling well  (RN reported just replacing arevalo & pt c/o pain, requested OT check back tomorrow.)

## 2017-06-01 NOTE — THERAPY EVALUATION
Acute Care - Physical Therapy Initial Evaluation  [facility]     Patient Name: Ventura Boggs  : 1960  MRN: [id number]  Today's Date: 2017   Onset of Illness/Injury or Date of Surgery Date: 17  Date of Referral to PT: 07  Referring Physician: Dr. Rui Shaver      Admit Date: 2017     Visit Dx:    ICD-10-CM ICD-9-CM   1. Ventral hernia without obstruction or gangrene K43.9 553.20   2. Impaired physical mobility Z74.09 781.99     Patient Active Problem List   Diagnosis   • Incisional hernia, without obstruction or gangrene   • Controlled type 2 diabetes mellitus without complication, with long-term current use of insulin   • Ventral hernia without obstruction or gangrene     Past Medical History:   Diagnosis Date   • Arthritis    • Carpal tunnel syndrome    • Depressive disorder    • GERD (gastroesophageal reflux disease)    • Hernia    • History of urinary system disease    • Hypertension    • Migraine    • Rotator cuff syndrome    • Type 2 diabetes mellitus    • Ureteric stone      Past Surgical History:   Procedure Laterality Date   • ABDOMINAL SURGERY  2016   • CHOLECYSTECTOMY     • CIRCUMCISION     • CYSTOSCOPY  2003    Cystoscopy and removal of J stent. Right ureteral stent.   • CYSTOSCOPY  2003    Cystoscopy and right stone extraction and placement of 26x 6 J-stent.   • VENTRAL/INCISIONAL HERNIA REPAIR N/A 2017    Procedure: LAPAROSCOPIC POSSIBLE OPEN ADHESIOLYSIS AND VENTRAL/INCISIONAL HERNIA REPAIR ;  Surgeon: Rui Shaver MD;  Location: [facility];  Service:           PT ASSESSMENT (last 72 hours)      PT Evaluation       17 1007 17 0553    Rehab Evaluation    Document Type evaluation  -GAL     Subjective Information agree to therapy;complains of;pain  -GAL     Patient Effort, Rehab Treatment good  -GAL     General Information    Patient Profile Review yes  -GAL     Onset of Illness/Injury or Date of Surgery Date 17  -GAL     Referring  Physician Dr. Rui Shaver  -GAL     General Observations Pt sitting EOB dtr, grandson present;noted O2, IV/PCA, telemetry  -GAL     Pertinent History Of Current Problem Pt admitted to [facility]  5/31/2017 and underwent open adhesiolyis/ventral hernia repair  -GAL     Precautions/Limitations other (see comments)   contact precautions, VS, watch gait belt placement  -GAL     Prior Level of Function independent:;all household mobility;community mobility;ADL's;work;driving   assist in/out of tubshower, but did bathing without assist  -GAL     Equipment Currently Used at Home none   has QC, tripod cane and SW  -GAL none  -CB    Plans/Goals Discussed With patient and family  -GAL     Risks Reviewed patient and family:;increased discomfort;change in vital signs  -GAL     Benefits Reviewed patient and family:;improve function;increase independence  -GAL     Barriers to Rehab none identified  -GAL     Living Environment    Lives With child(gertrudis), adult;significant other   pt lives with girlfriend,dtr, grandson  -GAL     Living Arrangements mobile home  -GAL     Home Accessibility stairs to enter home;stairs (1 railing present)  -GAL     Number of Stairs to Enter Home 5  -GAL     Stair Railings at Home outside, present on right side  -GAL     Transportation Available family or friend will provide  -GAL family or friend will provide  -CB    Living Environment Comment family shares IADL's  -GAL     Clinical Impression    Date of Referral to PT 05/31/07  -GAL     PT Diagnosis deconditioning, s/p open adhesiolysis/ventral hernia repair  -GAL     Prognosis per MD  -GAL     Patient/Family Goals Statement return to PLOF  -GAL     Criteria for Skilled Therapeutic Interventions Met yes  -GAL     Rehab Potential good, to achieve stated therapy goals  -GAL     Predicted Duration of Therapy Intervention (days/wks) 1-2 weeks  -GAL     Vital Signs    Pre Systolic BP Rehab 123  -GAL     Pre Treatment Diastolic BP 97  -GAL     Intra Systolic BP Rehab 129  -GAL     Intra  Treatment Diastolic   -GAL     Post Systolic BP Rehab 125  -GAL     Post Treatment Diastolic BP 86  -GAL     Pretreatment Heart Rate (beats/min) 127  -GAL     Intratreatment Heart Rate (beats/min) 130  -GAL     Posttreatment Heart Rate (beats/min) 126  -GAL     Pre SpO2 (%) 95  -GAL     O2 Delivery Pre Treatment supplemental O2  -GAL     Intra SpO2 (%) 95  -GAL     O2 Delivery Intra Treatment supplemental O2  -GAL     Post SpO2 (%) 95  -GAL     O2 Delivery Post Treatment supplemental O2  -GAL     Pre Patient Position Sitting  -GAL     Intra Patient Position Sitting  -GAL     Post Patient Position Sitting  -GAL     Pain Assessment    Pain Assessment 0-10  -GAL     Pain Score 8  -GAL     Post Pain Score 7  -GAL     Pain Type Surgical pain  -     Pain Location Abdomen  -     Pain Intervention(s) Medication (See MAR)   pt has PCA and also reports RN recently gave pain med  -     Vision Assessment/Intervention    Visual Impairment WFL with corrective lenses  -     Cognitive Assessment/Intervention    Current Cognitive/Communication Assessment functional  -     Orientation Status oriented x 4  -     Follows Commands/Answers Questions 100% of the time;able to follow multi-step instructions  -     Personal Safety decreased awareness, need for safety  -     Personal Safety Interventions nonskid shoes/slippers when out of bed;supervised activity  -     ROM (Range of Motion)    General ROM no range of motion deficits identified  -     MMT (Manual Muscle Testing)    General MMT Assessment no strength deficits identified  -     General MMT Assessment Detail 5/5 BUE's, 5/5 BLE's  -     Transfer Assessment/Treatment    Transfers, Bed-Chair [place] contact guard assist;1 person + 1 person to manage equipment  -GAL     Transfers, Chair-Bed [place] contact guard assist;1 person + 1 person to manage equipment  -GAL     Transfers, Sit-Stand [place] contact guard assist  -     Transfers, Stand-Sit [place]  contact guard assist  -GAL     Gait Assessment/Treatment    Gait, [place] Level contact guard assist;1 person + 1 person to manage equipment  -GAL     Gait, Distance (Feet) 20  -GAL     Sensory Assessment/Intervention    Light Touch LUE;RUE;LLE;RLE  -GAL     LUE Light Touch WNL  -GAL     RUE Light Touch WNL  -GAL     LLE Light Touch WNL  -GAL     RLE Light Touch WNL  -GAL     Edema Management    Edema Amount none  -GAL     Positioning and Restraints    Pre-Treatment Position in bed  -GAL     Post Treatment Position bed  -GAL     In Bed sitting EOB;call light within reach;encouraged to call for assist;patient within staff view;with family/caregiver  -GAL       06/01/17 0400 06/01/17 0000    Muscle Tone Assessment    Muscle Tone Assessment Left-Side Extremities;Right-Side Extremities  -CB Left-Side Extremities;Right-Side Extremities  -CB    Left-Side Extremities Muscle Tone Assessment associated movements noted  -CB associated movements noted  -CB    Right-Side Extremities Muscle Tone Assessment associated movements noted  -CB associated movements noted  -CB      05/31/17 2126 05/31/17 2123    General Information    Equipment Currently Used at Home  none  -CB    Living Environment    Lives With significant other  -CB     Living Arrangements house  -CB     Home Accessibility no concerns  -CB     Stair Railings at Home outside, present on right side;outside, present on left side  -CB     Type of Financial/Environmental Concern none  -CB     Transportation Available family or friend will provide  -CB       05/31/17 2100       Muscle Tone Assessment    Muscle Tone Assessment Left-Side Extremities;Right-Side Extremities  -CB     Left-Side Extremities Muscle Tone Assessment associated movements noted  -CB     Right-Side Extremities Muscle Tone Assessment associated movements noted  -CB       User Key  (r) = Recorded By, (t) = Taken By, (c) = Cosigned By    Initials Name Provider Type    GAL Dumont, PT Physical Therapist     CB Jimenez Church, RN Registered Nurse          Physical Therapy Education     Title: PT OT SLP Therapies (Active)     Topic: Physical Therapy (Active)     Point: Mobility training (Active)    Learning Progress Summary    Learner Readiness Method Response Comment Documented by Status   Patient Acceptance E NR   06/01/17 1107 Active   Family Acceptance E NR   06/01/17 1107 Active               Point: Precautions (Active)    Learning Progress Summary    Learner Readiness Method Response Comment Documented by Status   Patient Acceptance E NR   06/01/17 1107 Active   Family Acceptance E NR   06/01/17 1107 Active                      User Key     Initials Effective Dates Name Provider Type Discipline     10/17/16 -  Shilpa Dumont, PT Physical Therapist PT                PT Recommendation and Plan  Anticipated Discharge Disposition: home with assist  Planned Therapy Interventions: balance training, bed mobility training, gait training, patient/family education, stair training, transfer training  PT Frequency: other (see comments) (5-14 times per week)  Plan of Care Review  Plan Of Care Reviewed With: patient, daughter  Outcome Summary/Follow up Plan: PT evaluation completed. Pt transferred and ambulated 20 feet with CGA. Diastolic BP increased with movement, but trending down once pt resting again.Function limited primarily by decreased tolerance for functional mobility and activities following abdominal surgery. Pt will benefit from skilled PT to regain lost function.           IP PT Goals       06/01/17 1108          Transfer Training PT LTG    Transfer Training PT LTG, Date Established 06/01/17  -      Transfer Training PT LTG, Time to Achieve by discharge  -      Transfer Training PT LTG, [place] Level independent  -      Transfer Training PT LTG, Outcome goal ongoing  -      Gait Training PT LTG    Gait Training Goal PT LTG, Date Established 06/01/17  -      Gait Training Goal PT LTG,  Time to Achieve by discharge  -      Gait Training Goal PT LTG, [place] Level independent  -      Gait Training Goal PT LTG, Distance to Achieve 1000ft  -      Gait Training Goal PT LTG, Additional Goal Pt will complete 6 minute walk test  -      Gait Training Goal PT LTG, Outcome goal ongoing  -      Stair Training PT LTG    Stair Training Goal PT LTG, Date Established 06/01/17  -      Stair Training Goal PT LTG, Time to Achieve by discharge  -      Stair Training Goal PT LTG, Number of Steps 5  -      Stair Training Goal PT LTG, [place] Level conditional independence  -      Stair Training Goal PT LTG, Assist Device 1 handrail  -      Stair Training Goal PT LTG, Outcome goal ongoing  -      Patient Education PT LTG    Patient Education PT LTG, Date Established 06/01/17  -      Patient Education PT LTG, Time to Achieve by discharge  -      Patient Education PT LTG, Education Type gait;transfers;home safety  -      Patient Education PT LTG Outcome goal ongoing  -        User Key  (r) = Recorded By, (t) = Taken By, (c) = Cosigned By    Initials Name Provider Type    GAL Dumont, PT Physical Therapist                Outcome Measures       06/01/17 1007          How much help from another person do you currently need...    Turning from your back to your side while in flat bed without using bedrails? 3  -GAL      Moving from lying on back to sitting on the side of a flat bed without bedrails? 3  -GAL      Moving to and from a bed to a chair (including a wheelchair)? 3  -GAL      Standing up from a chair using your arms (e.g., wheelchair, bedside chair)? 3  -GAL      Climbing 3-5 steps with a railing? 3  -GAL      To walk in hospital room? 3  -GAL      AM-PAC 6 Clicks Score 18  -      Functional Assessment    Outcome Measure Options AM-PAC 6 Clicks Basic Mobility (PT)  -        User Key  (r) = Recorded By, (t) = Taken By, (c) = Cosigned By    Initials Name Provider Type    GAL  Shilpa Dumont, PT Physical Therapist           Time Calculation:         PT Charges       06/01/17 1116          Time Calculation    Start Time 1007  -GAL      Stop Time 1035  -GAL      Time Calculation (min) 28 min  -GAL      PT Received On 06/01/17  -GAL      PT Goal Re-Cert Due Date 06/14/17  -GAL      Time Calculation- PT    Total Timed Code Minutes- PT 13 minute(s)  -GAL        User Key  (r) = Recorded By, (t) = Taken By, (c) = Cosigned By    Initials Name Provider Type    GAL Shilpa Dumont PT Physical Therapist          Therapy Charges for Today     Code Description Service Date Service Provider Modifiers Qty    25317404102 HC PT MOBILITY CURRENT 6/1/2017 Shilpa Dumont, PT GP, CK 1    53333623176 HC PT MOBILITY PROJECTED 6/1/2017 Shilpa Dumont, PT GP, CH 1    44686523836 HC PT EVAL MOD COMPLEXITY 1 6/1/2017 Shilpa Dumont, PT GP 1    93966062456 HC GAIT TRAINING EA 15 MIN 6/1/2017 Shilpa Dumont, PT GP 1          PT G-Codes  PT Professional Judgement Used?: Yes  Outcome Measure Options: AM-PAC 6 Clicks Basic Mobility (PT)  Score: 18  Functional Limitation: Mobility: Walking and moving around  Mobility: Walking and Moving Around Current Status (): At least 40 percent but less than 60 percent impaired, limited or restricted  Mobility: Walking and Moving Around Goal Status (): 0 percent impaired, limited or restricted      Shilpa Dumont PT  6/1/2017

## 2017-06-01 NOTE — PAYOR COMM NOTE
"Ventura Boggs (56 y.o. Male)     Date of Birth Social Security Number Address Home Phone MRN    1960  [id number] 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                 Active Insurance as of 5/31/2017     Primary Coverage     Payor Plan Insurance Group Employer/Plan Group    [employer] MEDICAID [employer] [facility]      Payor Plan Address Payor Plan Phone Number Effective From Effective To    PO  [phone] 5/19/2017     [place] [address]       Subscriber Name Subscriber Birth Date Member ID       VENTURA BOGGS 1960 [id number]                 Emergency Contacts      (Rel.) Home Phone Work Phone Mobile Phone    Sonia Boggs (Significant Other) [phone] -- --               History & Physical      Rui Shaver MD at 5/19/2017  9:10 AM          Chief Complaint   Patient presents with   • Abdominal Pain     Abdominal pain possible ventral hernia.        Hernia   This is a new problem. The current episode started more than 1 month ago. The problem occurs constantly. The problem has been gradually worsening. Associated symptoms include abdominal pain. Pertinent negatives include no anorexia, arthralgias, change in bowel habit, chest pain, chills, fever, headaches, myalgias, nausea, neck pain, numbness, rash, vomiting or weakness. " "Nothing aggravates the symptoms. Treatments tried: Wearing an abdominal binder. The treatment provided no relief.     This man is 56 years old and underwent unknown operation in [place] for a \" bowel blockage\", perhaps a lysis of adhesions.he had had no operation previous to this. Began having pain in the left side of the abdomen several months ago.  Now has a ventral hernia that is becoming more tender. No history of incarceration or obstruction.        1. Indeterminate low-attenuation area within the subcapsular region medial aspect of the spleen with similar finding reported on prior exam of 2/23/2017 but not identifiable on recent prior exam of 12/11/2016. As previously reported this may simply be   physiologic due to differences in timing of contrast bolus. Other possibilities might include a hemangioma or in the appropriate clinical setting splenic infarction. There is however no adjacent edema or inflammatory change. Neoplastic etiology is   considered less likely due to finding not being identifiable on recent prior exam of 12/11/2016.  2. Postsurgical changes anterior abdominal wall with what likely represents postsurgical scarring and fat containing ventral hernia.. There are noted to be loops of small bowel adjacent to the undersurface of the abdominal wall in this region which could   be indicative of adhesions but with no associated bowel obstruction or dilatation or significant change compared to prior exams..  3. Fatty infiltration liver.  4. Other chronic nonemergent findings as described above.   Result Narrative   EXAM:  CT ABDOMEN AND PELVIS WITH CONTRAST      TECHNIQUE: A multislice scan was obtained of the abdomen and pelvis in the axial plane with IV contrast. Reformatted images were generated in the sagittal and coronal planes. Additional delayed images were obtained through the kidneys    HISTORY:ABDOMINAL PAIN  FLANK PAIN chest tightness. Dyspnea. Left-sided upper abdominal pain into " chest    COMPARISON: 2/23/2017, 12/11/2016, 11/2/2016      FINDINGS:There is minimal atelectatic change the lung bases. Is no pleural effusion.    The liver is of decreased CT attenuation compatible with fatty infiltration. Gallbladder surgically absent. No hepatic lesions are identified. There is no evidence of significant intrahepatic or extra hepatic biliary ductal dilatation. Pancreas is   atrophic in size with no pancreatic lesions or peripancreatic inflammatory changes identified.    There is again demonstrated to be a site of decreased CT attenuation in the subcapsular region medial aspect of the spleen measuring up to 2.8 x 1.8 in AP and transverse dimensions by 3.5 cm craniocaudal dimension with similar finding reported on the   recent CT exam of 2/23/2017. This is not identifiable on the postcontrast CT scan of 12/11/2016 or the unenhanced scan of 11/2/2016. As previously reported this may be physiologic due to differences in timing of contrast bolus. Other possibilities could   be an underlying hemangioma in the liver perhaps better demonstrated on this exam due to timing of contrast bolus or as previously reported could be on the basis of splenic infarction. A neoplastic etiology is probably less likely due to no lesion being   identifiable on recent prior exam of 12/11/2016. There is no evidence of adjacent edema or inflammatory change. Atherosclerotic calcifications are noted within splenic artery. Splenic vein is normal caliber without evidence of splenic or portal vein   thrombosis.    Abdominal aorta is of normal caliber. Is no significant periaortic adenopathy. No significant plaque or stenosis is identified the origin of the renal or mesenteric arteries.    There is no evidence of a renal mass or hydronephrosis. No ureteral calculi are identified.    No pelvic mass or significant lymphadenopathy is identified. Is no ascites. No significantly abnormally distended loops of bowel are identified. The  appendix is normal caliber with no periappendiceal inflammatory changes identified. No extraluminal air   is identified.    There are postsurgical changes anterior abdominal wall in the supraumbilical and periumbilical region with what likely represents postsurgical scarring. There are loops of small bowel adjacent to the undersurface of the anterior abdominal wall at that   level which could be indicative of underlying adhesions but without associated bowel obstruction or distention or herniation. There are small fat-containing areas of ventral herniation or eventration as previously reported unchanged significantly   compared to prior exams.    Sagittal reformatted images demonstrate the lumbar vertebral bodies all be well aligned with no acute appearing osseous abnormalities identified.         History reviewed.  1. Diabetes  2. Coronary artery disease  3. Hypertension    Previous surgery  1. Cholecystectomy  2. CABG x 4  3. Exploratory laparotomy      Current Outpatient Prescriptions:     •  aspirin 81 MG chewable tablet, Chew 81 mg Daily., Disp: , Rfl:   •  atorvastatin (LIPITOR) 20 MG tablet, Take 20 mg by mouth Daily., Disp: , Rfl:   •  FREESTYLE LITE test strip, USE ONE STRIP BID, Disp: , Rfl: 0  •  insulin lispro (humaLOG) 100 UNIT/ML injection, Inject  under the skin 3 (Three) Times a Day Before Meals., Disp: , Rfl:   •  metFORMIN (GLUCOPHAGE) 1000 MG tablet, Take 1,000 mg by mouth 2 (Two) Times a Day With Meals., Disp: , Rfl:   •  metFORMIN (GLUCOPHAGE) 500 MG tablet, Take 500 mg by mouth., Disp: , Rfl:   •  ondansetron ODT (ZOFRAN-ODT) 4 MG disintegrating tablet, Take 4 mg by mouth Every 8 (Eight) Hours., Disp: , Rfl:   •  SITagliptin (JANUVIA) 25 MG tablet, Take 50 mg by mouth 2 (Two) Times a Day., Disp: , Rfl:   •  metFORMIN (GLUCOPHAGE) 500 MG tablet, TK 1 T PO BID, Disp: , Rfl: 5    Allergies   Allergen Reactions   • Naproxen Other (See Comments) and Rash     Blisters in mouth   • Tramadol Rash      Patient states he gets a rash in his mouth.        Family history  1. Diabetes  2. Hypertension  3. Ovarian cancer    Social History     Social History   • Marital status: Unknown     Spouse name: N/A   • Number of children: N/A   • Years of education: N/A     Occupational History   • Not on file.     Social History Main Topics   • Smoking status: Never Smoker   • Smokeless tobacco: Not on file   • Alcohol use No   • Drug use: Not on file   • Sexual activity: Not on file     Other Topics Concern   • Not on file     Social History Narrative   • No narrative on file       Review of Systems   Constitutional: Negative for activity change, appetite change, chills and fever.   HENT: Negative for hearing loss, nosebleeds and trouble swallowing.    Respiratory: Positive for shortness of breath.    Cardiovascular: Negative for chest pain, palpitations and leg swelling.   Gastrointestinal: Positive for abdominal pain. Negative for abdominal distention, anal bleeding, anorexia, blood in stool, change in bowel habit, constipation, diarrhea, nausea, rectal pain and vomiting.   Endocrine: Negative for cold intolerance, heat intolerance, polydipsia and polyuria.   Genitourinary: Positive for difficulty urinating. Negative for decreased urine volume, dysuria, enuresis, frequency, hematuria and urgency.   Musculoskeletal: Negative for arthralgias, back pain, gait problem, myalgias and neck pain.   Skin: Negative for pallor, rash and wound.   Allergic/Immunologic: Negative for immunocompromised state.   Neurological: Negative for dizziness, seizures, weakness, light-headedness, numbness and headaches.   Psychiatric/Behavioral: Negative for agitation and behavioral problems. The patient is not nervous/anxious.        Physical Exam   Constitutional: He appears well-developed and well-nourished. No distress.   HENT:   Head: Normocephalic and atraumatic.   Eyes: Conjunctivae and EOM are normal. Pupils are equal, round, and reactive to  light.   Neck: Normal range of motion. Neck supple. No JVD present. No tracheal deviation present. No thyromegaly present.   Cardiovascular: Normal rate, regular rhythm and normal heart sounds.  Exam reveals no friction rub.    No murmur heard.  Pulmonary/Chest: Effort normal and breath sounds normal. No stridor. No respiratory distress. He has no wheezes. He has no rales.   Abdominal: Soft. Bowel sounds are normal. He exhibits no distension and no mass. There is no tenderness. There is no rebound and no guarding. A hernia is present.       Lymphadenopathy:     He has no cervical adenopathy.     He has no axillary adenopathy.   Skin: Skin is warm, dry and intact. No lesion noted. He is not diaphoretic. No erythema.   Psychiatric: He has a normal mood and affect. His speech is normal and behavior is normal. Judgment and thought content normal. Cognition and memory are normal.   Vitals reviewed.        ASSESSMENT    Ventura was seen today for abdominal pain.    Diagnoses and all orders for this visit:    Incisional hernia, without obstruction or gangrene      PLAN    1. Records from [place]  2. CT scan from [place]  3. Recheck in 1 week          This document has been electronically signed by Rui Shaver MD on May 19, 2017 9:12 AM         Electronically signed by Riu Shavre MD at 5/20/2017  5:32 PM      Rui Shaver MD at 5/24/2017  9:00 AM          Chief Complaint   Patient presents with   • Follow-up     Recheck abdominal pain        HPI    I have reviewed the old reports and xrays. He has a broad bases fat filled hernia of the abdominal wall.      Current Outpatient Prescriptions:   •  aspirin 81 MG chewable tablet, Chew 81 mg Daily., Disp: , Rfl:   •  atorvastatin (LIPITOR) 20 MG tablet, Take 20 mg by mouth Daily., Disp: , Rfl:   •  FREESTYLE LITE test strip, USE ONE STRIP BID, Disp: , Rfl: 0  •  insulin lispro (humaLOG) 100 UNIT/ML injection, Inject  under the skin 3 (Three) Times a Day Before Meals., Disp:  , Rfl:   •  lisinopril (PRINIVIL,ZESTRIL) 5 MG tablet, Take 5 mg by mouth Daily., Disp: , Rfl:   •  metFORMIN (GLUCOPHAGE) 1000 MG tablet, Take 1,000 mg by mouth 2 (Two) Times a Day With Meals., Disp: , Rfl:   •  ondansetron ODT (ZOFRAN-ODT) 4 MG disintegrating tablet, Take 4 mg by mouth Every 8 (Eight) Hours., Disp: , Rfl:   •  SITagliptin (JANUVIA) 25 MG tablet, Take 50 mg by mouth 2 (Two) Times a Day., Disp: , Rfl:   •  metFORMIN (GLUCOPHAGE) 500 MG tablet, TK 1 T PO BID, Disp: , Rfl: 5  •  metFORMIN (GLUCOPHAGE) 500 MG tablet, Take 500 mg by mouth., Disp: , Rfl:     Allergies   Allergen Reactions   • Naproxen Other (See Comments) and Rash     Blisters in mouth   • Tramadol Rash     Patient states he gets a rash in his mouth.    .    Social History     Social History   • Marital status: Unknown     Spouse name: N/A   • Number of children: N/A   • Years of education: N/A     Occupational History   • Not on file.     Social History Main Topics   • Smoking status: Never Smoker   • Smokeless tobacco: Not on file   • Alcohol use No   • Drug use: Not on file   • Sexual activity: Not on file     Other Topics Concern   • Not on file     Social History Narrative       Review of Systems  Nothing to add  Physical Exam   Constitutional: He appears well-developed and well-nourished.   HENT:   Head: Normocephalic and atraumatic.   Neck: Normal range of motion. Neck supple. No tracheal deviation present. No thyromegaly present.   Cardiovascular: Normal rate and regular rhythm.    Pulmonary/Chest: Effort normal and breath sounds normal.   Abdominal: Soft. Bowel sounds are normal. There is no hepatosplenomegaly, splenomegaly or hepatomegaly. A hernia is present. Hernia confirmed positive in the ventral area.       Lymphadenopathy:     He has no cervical adenopathy.     He has no axillary adenopathy.   Psychiatric: He has a normal mood and affect. His speech is normal and behavior is normal. Judgment and thought content normal.  Cognition and memory are normal.   Vitals reviewed.        ASSESSMENT    Ventura was seen today for follow-up.    Diagnoses and all orders for this visit:    Ventral hernia without obstruction or gangrene  -     ceFAZolin (ANCEF) 2 g in sodium chloride 0.9 % 100 mL IVPB; Infuse 2 g into a venous catheter 1 (One) Time.  -     Case Request; Standing  -     CBC & Differential; Future  -     Comprehensive Metabolic Panel; Future  -     Case Request    Type 2 diabetes mellitus without complication, with long-term current use of insulin    Other orders  -     Follow anesthesia standing orders.; Standing  -     Inpatient Admission; Standing  -     Follow anesthesia standing orders.  -     Provide instructions to patient on NPO status  -     JAILENE hose- To be placed on patient in pre-op; Standing  -     SCD (sequential compression device)- to be placed on patient in Pre-op; Standing  -     Obtain Informed Consent; Standing        PLAN    1. Laparoscopic possible open ventral hernia repair ventral repair with mesh    The procedure has been explained to the patient, and the following were discussed:  There are risks of bleeding, infection requiring antibiotics and possibly further surgery, injury to nearby structures, nerve injury, wound complications, recurrence of hernia. The risk of chronic pain may be as high as 10%, although the risk debilitating pain is approximately 2%. Urinary retention requiring catheterization may occur due to spasm of the bladder muscles in 1 in 100, and is more common in elderly males.  Events such as severe bleeding, the need for blood transfusion(s), heart irregularity or stoppage may occur, but are uncommon.  Bleeding is more common if  blood thinning drugs (such as Warfarin, Asprin, Clopidogrel or Dipyridamole)  are taken. Blood clot in the leg (DVT) causing pain and swelling is possible. In rare cases part of the clot may break off and go to the lungs.   Any complication may require a prolonged  "hospitalization, a modified incision or additional surgery.  Alternatives to surgery have been explained including watchful waiting, noting that patients who are asymptomatic or \"minimally symptomatic\" may be managed without surgical intervention.  The patient was given the opportunity to ask questions and raise concerns with the doctor about the proposed  procedure and its risks, and treatment options. All questions were answered.  The patient agrees to operation.           This document has been electronically signed by Rui Shaver MD on May 24, 2017 9:23 AM         Electronically signed by Rui Shaver MD at 5/24/2017  9:23 AM      Rui Shaver MD at 5/31/2017  2:47 PM            H&P reviewed. The patient was examined and there are no changes to the H&P.         Electronically signed by Rui Shaver MD at 5/31/2017  2:47 PM    Source Note             Chief Complaint   Patient presents with   • Follow-up     Recheck abdominal pain        HPI    I have reviewed the old reports and xrays. He has a broad bases fat filled hernia of the abdominal wall.      Current Outpatient Prescriptions:   •  aspirin 81 MG chewable tablet, Chew 81 mg Daily., Disp: , Rfl:   •  atorvastatin (LIPITOR) 20 MG tablet, Take 20 mg by mouth Daily., Disp: , Rfl:   •  FREESTYLE LITE test strip, USE ONE STRIP BID, Disp: , Rfl: 0  •  insulin lispro (humaLOG) 100 UNIT/ML injection, Inject  under the skin 3 (Three) Times a Day Before Meals., Disp: , Rfl:   •  lisinopril (PRINIVIL,ZESTRIL) 5 MG tablet, Take 5 mg by mouth Daily., Disp: , Rfl:   •  metFORMIN (GLUCOPHAGE) 1000 MG tablet, Take 1,000 mg by mouth 2 (Two) Times a Day With Meals., Disp: , Rfl:   •  ondansetron ODT (ZOFRAN-ODT) 4 MG disintegrating tablet, Take 4 mg by mouth Every 8 (Eight) Hours., Disp: , Rfl:   •  SITagliptin (JANUVIA) 25 MG tablet, Take 50 mg by mouth 2 (Two) Times a Day., Disp: , Rfl:   •  metFORMIN (GLUCOPHAGE) 500 MG tablet, TK 1 T PO BID, Disp: , Rfl: 5  •  " metFORMIN (GLUCOPHAGE) 500 MG tablet, Take 500 mg by mouth., Disp: , Rfl:     Allergies   Allergen Reactions   • Naproxen Other (See Comments) and Rash     Blisters in mouth   • Tramadol Rash     Patient states he gets a rash in his mouth.    .    Social History     Social History   • Marital status: Unknown     Spouse name: N/A   • Number of children: N/A   • Years of education: N/A     Occupational History   • Not on file.     Social History Main Topics   • Smoking status: Never Smoker   • Smokeless tobacco: Not on file   • Alcohol use No   • Drug use: Not on file   • Sexual activity: Not on file     Other Topics Concern   • Not on file     Social History Narrative       Review of Systems  Nothing to add  Physical Exam   Constitutional: He appears well-developed and well-nourished.   HENT:   Head: Normocephalic and atraumatic.   Neck: Normal range of motion. Neck supple. No tracheal deviation present. No thyromegaly present.   Cardiovascular: Normal rate and regular rhythm.    Pulmonary/Chest: Effort normal and breath sounds normal.   Abdominal: Soft. Bowel sounds are normal. There is no hepatosplenomegaly, splenomegaly or hepatomegaly. A hernia is present. Hernia confirmed positive in the ventral area.       Lymphadenopathy:     He has no cervical adenopathy.     He has no axillary adenopathy.   Psychiatric: He has a normal mood and affect. His speech is normal and behavior is normal. Judgment and thought content normal. Cognition and memory are normal.   Vitals reviewed.        ASSESSMENT    Ventura was seen today for follow-up.    Diagnoses and all orders for this visit:    Ventral hernia without obstruction or gangrene  -     ceFAZolin (ANCEF) 2 g in sodium chloride 0.9 % 100 mL IVPB; Infuse 2 g into a venous catheter 1 (One) Time.  -     Case Request; Standing  -     CBC & Differential; Future  -     Comprehensive Metabolic Panel; Future  -     Case Request    Type 2 diabetes mellitus without complication, with  "long-term current use of insulin    Other orders  -     Follow anesthesia standing orders.; Standing  -     Inpatient Admission; Standing  -     Follow anesthesia standing orders.  -     Provide instructions to patient on NPO status  -     JAILENE hose- To be placed on patient in pre-op; Standing  -     SCD (sequential compression device)- to be placed on patient in Pre-op; Standing  -     Obtain Informed Consent; Standing        PLAN    1. Laparoscopic possible open ventral hernia repair ventral repair with mesh    The procedure has been explained to the patient, and the following were discussed:  There are risks of bleeding, infection requiring antibiotics and possibly further surgery, injury to nearby structures, nerve injury, wound complications, recurrence of hernia. The risk of chronic pain may be as high as 10%, although the risk debilitating pain is approximately 2%. Urinary retention requiring catheterization may occur due to spasm of the bladder muscles in 1 in 100, and is more common in elderly males.  Events such as severe bleeding, the need for blood transfusion(s), heart irregularity or stoppage may occur, but are uncommon.  Bleeding is more common if  blood thinning drugs (such as Warfarin, Asprin, Clopidogrel or Dipyridamole)  are taken. Blood clot in the leg (DVT) causing pain and swelling is possible. In rare cases part of the clot may break off and go to the lungs.   Any complication may require a prolonged hospitalization, a modified incision or additional surgery.  Alternatives to surgery have been explained including watchful waiting, noting that patients who are asymptomatic or \"minimally symptomatic\" may be managed without surgical intervention.  The patient was given the opportunity to ask questions and raise concerns with the doctor about the proposed  procedure and its risks, and treatment options. All questions were answered.  The patient agrees to operation.           This document has been " "electronically signed by Rui Shaver MD on May 24, 2017 9:23 AM          Electronically signed by Rui Shaver MD at 5/24/2017  9:23 AM            Rui Shaver MD at 5/31/2017  2:47 PM            H&P reviewed. The patient was examined and there are no changes to the H&P.         Electronically signed by Rui Shaver MD at 5/31/2017  2:47 PM    Source Note             Chief Complaint   Patient presents with   • Abdominal Pain     Abdominal pain possible ventral hernia.        Hernia   This is a new problem. The current episode started more than 1 month ago. The problem occurs constantly. The problem has been gradually worsening. Associated symptoms include abdominal pain. Pertinent negatives include no anorexia, arthralgias, change in bowel habit, chest pain, chills, fever, headaches, myalgias, nausea, neck pain, numbness, rash, vomiting or weakness. Nothing aggravates the symptoms. Treatments tried: Wearing an abdominal binder. The treatment provided no relief.     This man is 56 years old and underwent unknown operation in [place] for a \" bowel blockage\", perhaps a lysis of adhesions.he had had no operation previous to this. Began having pain in the left side of the abdomen several months ago.  Now has a ventral hernia that is becoming more tender. No history of incarceration or obstruction.        1. Indeterminate low-attenuation area within the subcapsular region medial aspect of the spleen with similar finding reported on prior exam of 2/23/2017 but not identifiable on recent prior exam of 12/11/2016. As previously reported this may simply be   physiologic due to differences in timing of contrast bolus. Other possibilities might include a hemangioma or in the appropriate clinical setting splenic infarction. There is however no adjacent edema or inflammatory change. Neoplastic etiology is   considered less likely due to finding not being identifiable on recent prior exam of 12/11/2016.  2. Postsurgical " changes anterior abdominal wall with what likely represents postsurgical scarring and fat containing ventral hernia.. There are noted to be loops of small bowel adjacent to the undersurface of the abdominal wall in this region which could   be indicative of adhesions but with no associated bowel obstruction or dilatation or significant change compared to prior exams..  3. Fatty infiltration liver.  4. Other chronic nonemergent findings as described above.   Result Narrative   EXAM:  CT ABDOMEN AND PELVIS WITH CONTRAST      TECHNIQUE: A multislice scan was obtained of the abdomen and pelvis in the axial plane with IV contrast. Reformatted images were generated in the sagittal and coronal planes. Additional delayed images were obtained through the kidneys    HISTORY:ABDOMINAL PAIN  FLANK PAIN chest tightness. Dyspnea. Left-sided upper abdominal pain into chest    COMPARISON: 2/23/2017, 12/11/2016, 11/2/2016      FINDINGS:There is minimal atelectatic change the lung bases. Is no pleural effusion.    The liver is of decreased CT attenuation compatible with fatty infiltration. Gallbladder surgically absent. No hepatic lesions are identified. There is no evidence of significant intrahepatic or extra hepatic biliary ductal dilatation. Pancreas is   atrophic in size with no pancreatic lesions or peripancreatic inflammatory changes identified.    There is again demonstrated to be a site of decreased CT attenuation in the subcapsular region medial aspect of the spleen measuring up to 2.8 x 1.8 in AP and transverse dimensions by 3.5 cm craniocaudal dimension with similar finding reported on the   recent CT exam of 2/23/2017. This is not identifiable on the postcontrast CT scan of 12/11/2016 or the unenhanced scan of 11/2/2016. As previously reported this may be physiologic due to differences in timing of contrast bolus. Other possibilities could   be an underlying hemangioma in the liver perhaps better demonstrated on this  exam due to timing of contrast bolus or as previously reported could be on the basis of splenic infarction. A neoplastic etiology is probably less likely due to no lesion being   identifiable on recent prior exam of 12/11/2016. There is no evidence of adjacent edema or inflammatory change. Atherosclerotic calcifications are noted within splenic artery. Splenic vein is normal caliber without evidence of splenic or portal vein   thrombosis.    Abdominal aorta is of normal caliber. Is no significant periaortic adenopathy. No significant plaque or stenosis is identified the origin of the renal or mesenteric arteries.    There is no evidence of a renal mass or hydronephrosis. No ureteral calculi are identified.    No pelvic mass or significant lymphadenopathy is identified. Is no ascites. No significantly abnormally distended loops of bowel are identified. The appendix is normal caliber with no periappendiceal inflammatory changes identified. No extraluminal air   is identified.    There are postsurgical changes anterior abdominal wall in the supraumbilical and periumbilical region with what likely represents postsurgical scarring. There are loops of small bowel adjacent to the undersurface of the anterior abdominal wall at that   level which could be indicative of underlying adhesions but without associated bowel obstruction or distention or herniation. There are small fat-containing areas of ventral herniation or eventration as previously reported unchanged significantly   compared to prior exams.    Sagittal reformatted images demonstrate the lumbar vertebral bodies all be well aligned with no acute appearing osseous abnormalities identified.         History reviewed.  1. Diabetes  2. Coronary artery disease  3. Hypertension    Previous surgery  1. Cholecystectomy  2. CABG x 4  3. Exploratory laparotomy      Current Outpatient Prescriptions:     •  aspirin 81 MG chewable tablet, Chew 81 mg Daily., Disp: , Rfl:   •   atorvastatin (LIPITOR) 20 MG tablet, Take 20 mg by mouth Daily., Disp: , Rfl:   •  FREESTYLE LITE test strip, USE ONE STRIP BID, Disp: , Rfl: 0  •  insulin lispro (humaLOG) 100 UNIT/ML injection, Inject  under the skin 3 (Three) Times a Day Before Meals., Disp: , Rfl:   •  metFORMIN (GLUCOPHAGE) 1000 MG tablet, Take 1,000 mg by mouth 2 (Two) Times a Day With Meals., Disp: , Rfl:   •  metFORMIN (GLUCOPHAGE) 500 MG tablet, Take 500 mg by mouth., Disp: , Rfl:   •  ondansetron ODT (ZOFRAN-ODT) 4 MG disintegrating tablet, Take 4 mg by mouth Every 8 (Eight) Hours., Disp: , Rfl:   •  SITagliptin (JANUVIA) 25 MG tablet, Take 50 mg by mouth 2 (Two) Times a Day., Disp: , Rfl:   •  metFORMIN (GLUCOPHAGE) 500 MG tablet, TK 1 T PO BID, Disp: , Rfl: 5    Allergies   Allergen Reactions   • Naproxen Other (See Comments) and Rash     Blisters in mouth   • Tramadol Rash     Patient states he gets a rash in his mouth.        Family history  1. Diabetes  2. Hypertension  3. Ovarian cancer    Social History     Social History   • Marital status: Unknown     Spouse name: N/A   • Number of children: N/A   • Years of education: N/A     Occupational History   • Not on file.     Social History Main Topics   • Smoking status: Never Smoker   • Smokeless tobacco: Not on file   • Alcohol use No   • Drug use: Not on file   • Sexual activity: Not on file     Other Topics Concern   • Not on file     Social History Narrative   • No narrative on file       Review of Systems   Constitutional: Negative for activity change, appetite change, chills and fever.   HENT: Negative for hearing loss, nosebleeds and trouble swallowing.    Respiratory: Positive for shortness of breath.    Cardiovascular: Negative for chest pain, palpitations and leg swelling.   Gastrointestinal: Positive for abdominal pain. Negative for abdominal distention, anal bleeding, anorexia, blood in stool, change in bowel habit, constipation, diarrhea, nausea, rectal pain and vomiting.    Endocrine: Negative for cold intolerance, heat intolerance, polydipsia and polyuria.   Genitourinary: Positive for difficulty urinating. Negative for decreased urine volume, dysuria, enuresis, frequency, hematuria and urgency.   Musculoskeletal: Negative for arthralgias, back pain, gait problem, myalgias and neck pain.   Skin: Negative for pallor, rash and wound.   Allergic/Immunologic: Negative for immunocompromised state.   Neurological: Negative for dizziness, seizures, weakness, light-headedness, numbness and headaches.   Psychiatric/Behavioral: Negative for agitation and behavioral problems. The patient is not nervous/anxious.        Physical Exam   Constitutional: He appears well-developed and well-nourished. No distress.   HENT:   Head: Normocephalic and atraumatic.   Eyes: Conjunctivae and EOM are normal. Pupils are equal, round, and reactive to light.   Neck: Normal range of motion. Neck supple. No JVD present. No tracheal deviation present. No thyromegaly present.   Cardiovascular: Normal rate, regular rhythm and normal heart sounds.  Exam reveals no friction rub.    No murmur heard.  Pulmonary/Chest: Effort normal and breath sounds normal. No stridor. No respiratory distress. He has no wheezes. He has no rales.   Abdominal: Soft. Bowel sounds are normal. He exhibits no distension and no mass. There is no tenderness. There is no rebound and no guarding. A hernia is present.       Lymphadenopathy:     He has no cervical adenopathy.     He has no axillary adenopathy.   Skin: Skin is warm, dry and intact. No lesion noted. He is not diaphoretic. No erythema.   Psychiatric: He has a normal mood and affect. His speech is normal and behavior is normal. Judgment and thought content normal. Cognition and memory are normal.   Vitals reviewed.        ASSESSMENT    [place] was seen today for abdominal pain.    Diagnoses and all orders for this visit:    Incisional hernia, without obstruction or gangrene      PLAN    1.  Records from [facility]  2. CT scan from [place]  3. Recheck in 1 week          This document has been electronically signed by Rui Shvaer MD on May 19, 2017 9:12 AM          Electronically signed by Rui Shaver MD at 5/20/2017  5:32 PM              Vital Signs (last 24 hours)       05/31 0700  -  06/01 0659 06/01 0700  -  06/01 0758   Most Recent    Temp (°F) 97 -  99.3       98.4 (36.9)    Heart Rate 86 -  119       112    Resp 16 -  20       16    /96 -  (!) 160/102       148/92    SpO2 (%) 93 -  97       95          Hospital Medications (all)       Dose Frequency Start End    atorvastatin (LIPITOR) tablet 20 mg 20 mg Nightly 5/31/2017     Sig - Route: Take 1 tablet by mouth Every Night. - Oral    bupivacaine-EPINEPHrine PF (MARCAINE w/EPI) 0.5% -[id number] injection  As Needed 5/31/2017     Sig: As Needed.    ceFAZolin (ANCEF) 2 g in sodium chloride 0.9 % 100 mL IVPB 2 g Once 5/31/2017 5/31/2017    Sig - Route: Infuse 2 g into a venous catheter 1 (One) Time. - Intravenous    cefOXitin (MEFOXIN) 2 g/100 mL 0.9% NS VTB (mbp) 2 g Every 6 Hours 5/31/2017 6/1/2017    Sig - Route: Infuse 100 mL into a venous catheter Every 6 (Six) Hours. - Intravenous    dextrose (D50W) solution 25 g 25 g Every 15 Minutes PRN 5/31/2017     Sig - Route: Infuse 50 mL into a venous catheter Every 15 (Fifteen) Minutes As Needed for Low Blood Sugar (Blood Sugar Less Than 70, Patient Has IV Access - Unresponsive, NPO or Unable To Safely Swallow). - Intravenous    dextrose (GLUTOSE) oral gel 15 g 15 g Every 15 Minutes PRN 5/31/2017     Sig - Route: Take 15 g by mouth Every 15 (Fifteen) Minutes As Needed for Low Blood Sugar (Blood Sugar Less Than 70, Patient Alert, Is Not NPO & Can Safely Swallow). - Oral    enoxaparin (LOVENOX) syringe 40 mg 40 mg Daily 6/1/2017     Sig - Route: Inject 0.4 mL under the skin Daily. - Subcutaneous    glucagon (human recombinant) (GLUCAGEN DIAGNOSTIC) injection 1 mg 1 mg Every 15 Minutes PRN 5/31/2017   "   Sig - Route: Inject 1 mg under the skin Every 15 (Fifteen) Minutes As Needed (Blood Glucose Less Than 70 - Patient Without IV Access - Unresponsive, NPO or Unable To Safely Swallow). - Subcutaneous    HYDROmorphone (DILAUDID) injection 1 mg 1 mg Every 3 Hours PRN 5/31/2017 6/10/2017    Sig - Route: Infuse 1 mg into a venous catheter Every 3 (Three) Hours As Needed for Severe Pain (7-10). - Intravenous    Linked Group 1:  \"And\" Linked Group Details        HYDROmorphone (DILAUDID) PCA 0.2 mg/mL 30 mL syringe  Continuous 5/31/2017 6/10/2017    Sig - Route: Infuse  into a venous catheter Continuous. - Intravenous    insulin aspart (novoLOG) injection 0-9 Units 0-9 Units 4 Times Daily Before Meals & Nightly 5/31/2017     Sig - Route: Inject 0-9 Units under the skin 4 (Four) Times a Day Before Meals & at Bedtime. - Subcutaneous    insulin aspart (novoLOG) injection 12 Units 12 Units Once 5/31/2017 5/31/2017    Sig - Route: Inject 12 Units under the skin 1 (One) Time. - Subcutaneous    lactated ringers infusion 100 mL/hr Continuous 5/31/2017     Sig - Route: Infuse 100 mL/hr into a venous catheter Continuous. - Intravenous    LIDOCAINE 1/6% WITH EPINEPHRINE SOLUTION  As Needed 5/31/2017     Sig: As Needed.    mupirocin (BACTROBAN) 2 % ointment  Every 8 Hours Scheduled 5/31/2017     Sig - Route: Apply  topically Every 8 (Eight) Hours. - Topical    naloxone (NARCAN) injection 0.1 mg 0.1 mg Every 5 Minutes PRN 5/31/2017     Sig - Route: Infuse 0.25 mL into a venous catheter Every 5 (Five) Minutes As Needed for Respiratory Depression. - Intravenous    ondansetron (ZOFRAN) injection 4 mg 4 mg Once As Needed 5/31/2017 5/31/2017    Sig - Route: Infuse 2 mL into a venous catheter 1 (One) Time As Needed for Nausea or Vomiting. - Intravenous    ondansetron (ZOFRAN) injection 4 mg 4 mg Every 6 Hours PRN 5/31/2017     Sig - Route: Infuse 2 mL into a venous catheter Every 6 (Six) Hours As Needed for Nausea. - Intravenous    " "pantoprazole (PROTONIX) injection 40 mg 40 mg Every Early Morning 6/1/2017     Sig - Route: Infuse 10 mL into a venous catheter Every Morning. - Intravenous    acetaminophen (TYLENOL) suppository 650 mg (Discontinued) 650 mg Once As Needed 5/31/2017 5/31/2017    Sig - Route: Insert 1 suppository into the rectum 1 (One) Time As Needed for Mild Pain (1-3). - Rectal    Reason for Discontinue: Patient Transfer    Linked Group 2:  \"Or\" Linked Group Details        acetaminophen (TYLENOL) tablet 650 mg (Discontinued) 650 mg Once As Needed 5/31/2017 5/31/2017    Sig - Route: Take 2 tablets by mouth 1 (One) Time As Needed for Mild Pain (1-3). - Oral    Reason for Discontinue: Patient Transfer    Linked Group 2:  \"Or\" Linked Group Details        diphenhydrAMINE (BENADRYL) injection 12.5 mg (Discontinued) 12.5 mg Every 15 Minutes PRN 5/31/2017 5/31/2017    Sig - Route: Infuse 0.25 mL into a venous catheter Every 15 (Fifteen) Minutes As Needed for Itching (May repeat x 1). - Intravenous    Reason for Discontinue: Patient Transfer    ePHEDrine injection 5 mg (Discontinued) 5 mg Once As Needed 5/31/2017 5/31/2017    Sig - Route: Infuse 0.1 mL into a venous catheter 1 (One) Time As Needed (symptomatic hypotension - Notify attending anesthesiologist). - Intravenous    Reason for Discontinue: Patient Transfer    flumazenil (ROMAZICON) injection 0.2 mg (Discontinued) 0.2 mg As Needed 5/31/2017 5/31/2017    Sig - Route: Infuse 2 mL into a venous catheter As Needed (unresponsiveness). - Intravenous    Reason for Discontinue: Patient Transfer    HYDROmorphone (DILAUDID) injection 0.5 mg (Discontinued) 0.5 mg Every 5 Minutes PRN 5/31/2017 5/31/2017    Sig - Route: Infuse 0.5 mg into a venous catheter Every 5 (Five) Minutes As Needed for Moderate Pain (4-6) or Severe Pain (7-10). - Intravenous    Reason for Discontinue: Patient Transfer    labetalol (NORMODYNE,TRANDATE) injection 5 mg (Discontinued) 5 mg Every 5 Minutes PRN 5/31/2017 " "5/31/2017    Sig - Route: Infuse 1 mL into a venous catheter Every 5 (Five) Minutes As Needed for High Blood Pressure (for systolic blood pressure greater than 180 mmHg or diastolic blood pressure greater than 105 mmHg). - Intravenous    Reason for Discontinue: Patient Transfer    meperidine (DEMEROL) injection 12.5 mg (Discontinued) 12.5 mg Every 5 Minutes PRN 5/31/2017 5/31/2017    Sig - Route: Infuse 0.25 mL into a venous catheter Every 5 (Five) Minutes As Needed for Shivering (May repeat x 8). - Intravenous    Reason for Discontinue: Patient Transfer    naloxone (NARCAN) injection 0.1 mg (Discontinued) 0.1 mg Every 5 Minutes PRN 5/31/2017 5/31/2017    Sig - Route: Infuse 0.25 mL into a venous catheter Every 5 (Five) Minutes As Needed for Respiratory Depression. - Intravenous    Reason for Discontinue: Duplicate order    Linked Group 1:  \"And\" Linked Group Details        naloxone (NARCAN) injection 0.2 mg (Discontinued) 0.2 mg As Needed 5/31/2017 5/31/2017    Sig - Route: Infuse 0.5 mL into a venous catheter As Needed for Opioid Reversal or Respiratory Depression (unresponsiveness, decrease oxygen saturation). - Intravenous    Reason for Discontinue: Patient Transfer    sodium chloride 0.9 % infusion 1,000 mL (Discontinued) 1,000 mL Continuous PRN 5/31/2017 5/31/2017    Sig - Route: Infuse 1,000 mL into a venous catheter Continuous As Needed (Start Prior to Surgery). - Intravenous    Reason for Discontinue: Patient Transfer          Orders (last 24 hrs)     Start     Ordered    06/01/17 0900  enoxaparin (LOVENOX) syringe 40 mg  Daily      05/31/17 2053 06/01/17 0657  POC Glucose Fingerstick  Once      06/01/17 0656    06/01/17 0600  Discontinue Indwelling Urinary Catheter in AM  Once      05/31/17 2053 06/01/17 0600  Basic Metabolic Panel  Morning Draw      05/31/17 2053 06/01/17 0600  CBC & Differential  Morning Draw      05/31/17 2053 06/01/17 0600  Magnesium  Morning Draw      05/31/17 2053    " 06/01/17 0600  Phosphorus  Morning Draw      05/31/17 2053 06/01/17 0600  pantoprazole (PROTONIX) injection 40 mg  Every Early Morning      05/31/17 2053 06/01/17 0600  CBC Auto Differential  PROCEDURE ONCE      06/01/17 0001    06/01/17 0000  Strict Intake and Output  Every 4 Hours      05/31/17 2053 05/31/17 2225  POC Glucose Fingerstick  Once      05/31/17 2224 05/31/17 2130  atorvastatin (LIPITOR) tablet 20 mg  Nightly      05/31/17 2053 05/31/17 2130  lactated ringers infusion  Continuous      05/31/17 2053 05/31/17 2130  cefOXitin (MEFOXIN) 2 g/100 mL 0.9% NS VTB (mbp)  Every 6 Hours      05/31/17 2053 05/31/17 2130  insulin aspart (novoLOG) injection 0-9 Units  4 Times Daily Before Meals & Nightly      05/31/17 2053 05/31/17 2100  Turn Cough & Deep Breathe  Every Hour      05/31/17 2053 05/31/17 2100  POC Glucose Fingerstick  4 Times Daily Before Meals & at Bedtime      05/31/17 2053 05/31/17 2054  naloxone (NARCAN) injection 0.1 mg  Every 5 Minutes PRN      05/31/17 2054 05/31/17 2054  Remove & Replace Compression Stockings (TEDS) Daily  Daily      05/31/17 2053 05/31/17 2053  Full Code  Continuous      05/31/17 2053 05/31/17 2053  VTE Risk Assessment - Moderate Risk  Once      05/31/17 2053 05/31/17 2053  Place Sequential Compression Device  Once      05/31/17 2053 05/31/17 2053  Maintain Sequential Compression Device  Continuous      05/31/17 2053 05/31/17 2053  Turn, Cough & Deep Breathe  Once      05/31/17 2053 05/31/17 2053  Ambulate Patient  Every Shift      05/31/17 2053 05/31/17 2053  Dangle Feet Off Bed Tonight  Continuous      05/31/17 2053 05/31/17 2053  Elevate HOB 30 Degrees  Continuous      05/31/17 2053 05/31/17 2053  Place sequential compression device  Once      05/31/17 2053 05/31/17 2053  Do NOT Hold Basal Insulin When Patient is NPO, Hold Bolus Dose if NPO  Continuous      05/31/17 2053 05/31/17 2053  Follow [facility]  Hypoglycemia Protocol For Blood Glucose Less Than 70 mg/dL  Until Discontinued      05/31/17 2053 05/31/17 2053  Hypoglycemia Treatment - Alert Patient That is Not NPO and Can Safely Swallow  Until Discontinued     Comments:  Administer 4 oz Fruit Juice OR 4 oz Regular Soda OR 8 oz Milk OR 15-30 grams (1 tube) of Glucose Gel  Recheck Blood Glucose 15 Minutes After Ingestion, Repeat Treatment & Continue to Recheck Blood Sugar Every 15 Minutes Until Blood Glucose is 70 or Higher  Once Blood Glucose is 70 or Higher, Give Snack (Peanut Butter & Crackers) if Next Meal Will Be Given in More Than 60 Minutes, Provide Meal Tray As Soon As Possible    05/31/17 2053 05/31/17 2053  Hypoglycemia Treatment - Patient Has IV Access - Unresponsive, NPO or Unable To Safely Swallow  Until Discontinued     Comments:  Administer 25g D50W IV Push (1 Whole Vial)  Recheck Blood Glucose 15 Minutes After Administration, if Blood Glucose Remains Less Than 70, Repeat Treatment   Recheck Blood Glucose 15 Minutes After 2nd Administration, if Blood Glucose Remains Less Than 70 After 2nd Dose of D50W Contact Provider for Further Treatment Orders & Consider Adding IVF With D5 for Maintenance    05/31/17 2053 05/31/17 2053  Hypoglycemia Treatment - Patient Without IV Access - Unresponsive, NPO or Unable To Safely Swallow  Until Discontinued     Comments:  Administer 1mg Glucagon SQ & Establish IV Access  Turn Patient on Side - Nausea / Vomiting May Occur  Recheck Blood Glucose 15 Minutes After Administration  If IV Access Has Not Been Established & Blood Glucose Remains Less Than 70, Repeat Treatment With Glucagon  If IV Access Established, Administer 25g D50W IV Push (1 Whole Vial)  Recheck Blood Glucose 15 Minutes After Administration of 2nd Medication, if Blood Glucose Remains Less Than 70, Contact Provider for Further Treatment Orders & Consider Adding IVF With D5 for Maintenance    05/31/17 2053 05/31/17 2053  Continue Indwelling  Urinary Catheter  Once      05/31/17 2053 05/31/17 2053  Assess Need for Indwelling Urinary Catheter - Follow Removal Protocol  Continuous     Comments:  Indwelling Urinary Catheter Removal Criteria  Discontinue Indwelling Urinary Catheter Unless One of the Following is Present  Urinary Retention or Obstruction  Chronic Wiley Catheter Use  End of Life  Critical Illness with Strict I/O   Tract or Abdominal Surgery  Stage 3/4 Sacral / Perineal Wound  Required Activity Restriction: Trauma  Required Activity Restriction: Spine Surgery  If Patient is Being Followed by Urology Contact Them PRIOR to Removal  Do Not Remove Indwelling Urinary Catheter Order is Present with a CLINICAL REASON to Maintain the Catheter. Provider is Required to Include a Clinical Reason to Maintain a Urinary Catheter    Chronic Wiley Catheter Use (Present on Admission)  Assess for Continued Need & Document Medical Necessity  If Infection is Suspected, Contact the Provider        05/31/17 2053 05/31/17 2053  Catheter Care  Every Shift      05/31/17 2053 05/31/17 2053  Continue Indwelling Urinary Catheter Already in Place  Once     Comments:  If Patient Alert & Urine Output Greater Than 25 mL/hr    05/31/17 2053 05/31/17 2053  Notify Provider if Bladder Distention Continues  Until Discontinued      05/31/17 2053 05/31/17 2053  Catheter Care  Every Shift      05/31/17 2053 05/31/17 2053  Incentive Spirometry  Every Hour While Awake      05/31/17 2053 05/31/17 2053  Oxygen Therapy- Nasal Cannula; Titrate for SPO2: 92%  Continuous      05/31/17 2053 05/31/17 2053  NPO Diet Ice Chips, Sips With Meds  Diet Effective Now      05/31/17 2053 05/31/17 2053  PT Consult: Eval & Treat  Once      05/31/17 2053 05/31/17 2053  OT Consult: Eval & Treat Deconditioning  Once      05/31/17 2053 05/31/17 2053  Place Compression Stockings (TEDs)  Once      05/31/17 2053 05/31/17 2052  HYDROmorphone (DILAUDID) injection 1 mg  Every  3 Hours PRN      05/31/17 2053 05/31/17 2052  naloxone (NARCAN) injection 0.1 mg  Every 5 Minutes PRN,   Status:  Discontinued      05/31/17 2053 05/31/17 2052  ondansetron (ZOFRAN) injection 4 mg  Every 6 Hours PRN      05/31/17 2053 05/31/17 2052  dextrose (GLUTOSE) oral gel 15 g  Every 15 Minutes PRN      05/31/17 2053 05/31/17 2052  dextrose (D50W) solution 25 g  Every 15 Minutes PRN      05/31/17 2053 05/31/17 2052  glucagon (human recombinant) (GLUCAGEN DIAGNOSTIC) injection 1 mg  Every 15 Minutes PRN      05/31/17 2053 05/31/17 2045  HYDROmorphone (DILAUDID) PCA 0.2 mg/mL 30 mL syringe  Continuous      05/31/17 2013 05/31/17 2017  POC Glucose Fingerstick  Once      05/31/17 2016 05/31/17 2009  Basic Metabolic Panel  STAT      05/31/17 2008 05/31/17 2009  CBC & Differential  STAT      05/31/17 2008 05/31/17 2009  Magnesium  STAT      05/31/17 2008 05/31/17 2009  Phosphorus  STAT      05/31/17 2008 05/31/17 2009  CBC Auto Differential  PROCEDURE ONCE      05/31/17 2008 05/31/17 1959  Vital signs every 5 minutes for 15 minutes, every 15 minutes thereafter.  Once,   Status:  Canceled      05/31/17 1958 05/31/17 1959  Continue OR fluids  Until Discontinued,   Status:  Canceled      05/31/17 1958 05/31/17 1959  Call Anesthesiologist for additional IV Fluid bolus for Hypotension/Tachycardia  Continuous,   Status:  Canceled      05/31/17 1958 05/31/17 1959  Notify Anesthesia of Any Acute Changes in Patient Condition  Until Discontinued,   Status:  Canceled      05/31/17 1958 05/31/17 1959  Notify Anesthesia for Unrelieved Pain  Until Discontinued,   Status:  Canceled      05/31/17 1958 05/31/17 1959  Oxygen Therapy- Blow by - Humidified; Titrate for SPO2: equal to or greater than, 96%, per policy  Continuous,   Status:  Canceled      05/31/17 1958 05/31/17 1959  Pulse Oximetry, Continuous  Continuous,   Status:  Canceled      05/31/17 1958 05/31/17 1959  POC  Glucose Fingerstick  STAT,   Status:  Canceled     Comments:  On all diabetic patients  Notify Anesthesia if blood sugar is less than 80 mg/dL or greater than 180mg/dL    05/31/17 1958 05/31/17 1959  Once DC criteria to floor met, follow surgeon's orders.  Continuous,   Status:  Canceled      05/31/17 1958 05/31/17 1959  Discharge patient from PACU when discharge criteria is met.  Continuous,   Status:  Canceled      05/31/17 1958 05/31/17 1958  acetaminophen (TYLENOL) tablet 650 mg  Once As Needed,   Status:  Discontinued      05/31/17 1958 05/31/17 1958  acetaminophen (TYLENOL) suppository 650 mg  Once As Needed,   Status:  Discontinued      05/31/17 1958 05/31/17 1958  HYDROmorphone (DILAUDID) injection 0.5 mg  Every 5 Minutes PRN,   Status:  Discontinued      05/31/17 1958 05/31/17 1958  labetalol (NORMODYNE,TRANDATE) injection 5 mg  Every 5 Minutes PRN,   Status:  Discontinued      05/31/17 1958 05/31/17 1958  ePHEDrine injection 5 mg  Once As Needed,   Status:  Discontinued      05/31/17 1958 05/31/17 1958  naloxone (NARCAN) injection 0.2 mg  As Needed,   Status:  Discontinued      05/31/17 1958 05/31/17 1958  flumazenil (ROMAZICON) injection 0.2 mg  As Needed,   Status:  Discontinued      05/31/17 1958 05/31/17 1958  ondansetron (ZOFRAN) injection 4 mg  Once As Needed      05/31/17 1958 05/31/17 1958  diphenhydrAMINE (BENADRYL) injection 12.5 mg  Every 15 Minutes PRN,   Status:  Discontinued      05/31/17 1958 05/31/17 1958  meperidine (DEMEROL) injection 12.5 mg  Every 5 Minutes PRN,   Status:  Discontinued      05/31/17 1958 05/31/17 1935  Inpatient Admission  Once      05/31/17 1935 05/31/17 1935  Nursing Communication TAINA to gregabrielde x 2 - empty and record amount of drainage twice daily  Continuous     Comments:  TAINA to grenade x 2 - empty and record amount of drainage twice daily    05/31/17 1935 05/31/17 1924  Tissue Exam      05/31/17 1924 05/31/17 1755   Blood Gas, Arterial  Once      05/31/17 1751    05/31/17 1530  mupirocin (BACTROBAN) 2 % ointment  Every 8 Hours Scheduled      05/31/17 1449    05/31/17 1525  bupivacaine-EPINEPHrine PF (MARCAINE w/EPI) 0.5% -1:924110 injection  As Needed      05/31/17 1619    05/31/17 1525  LIDOCAINE 1/6% WITH EPINEPHRINE SOLUTION  As Needed      05/31/17 1619    05/31/17 1416  POC Glucose Fingerstick  Once      05/31/17 1415    05/31/17 1400  POC Glucose Fingerstick  Once,   Status:  Canceled      05/31/17 1411    05/31/17 1330  insulin aspart (novoLOG) injection 12 Units  Once      05/31/17 1254    05/31/17 1300  ceFAZolin (ANCEF) 2 g in sodium chloride 0.9 % 100 mL IVPB  Once      05/31/17 1220    05/31/17 1259  POC Glucose Fingerstick  Once      05/31/17 1258    05/31/17 1221  Obtain Informed Consent  Once,   Status:  Canceled      05/31/17 1220    05/31/17 1221  Inpatient Admission  Once      05/31/17 1220    05/31/17 1221  Follow anesthesia standing orders.  Once,   Status:  Canceled      05/31/17 1220    05/31/17 1221  SCD (sequential compression device)- to be placed on patient in Pre-op  Once,   Status:  Canceled      05/31/17 1220    05/31/17 1221  JAILENE hose- To be placed on patient in pre-op  Once,   Status:  Canceled      05/31/17 1220    05/31/17 1221  Insert Peripheral IV  Once,   Status:  Canceled      05/31/17 1220    05/31/17 1221  Maintain IV Access  Continuous,   Status:  Canceled      05/31/17 1220    05/31/17 1221  Notify Anesthesia if Blood Sugar Greater Than 180  Once,   Status:  Canceled      05/31/17 1220    05/31/17 1221  POC Glucose Fingerstick  STAT,   Status:  Canceled      05/31/17 1220    05/31/17 1220  sodium chloride 0.9 % infusion 1,000 mL  Continuous PRN,   Status:  Discontinued      05/31/17 1220    Unscheduled  Vital Signs  As Needed      05/31/17 2053    Unscheduled  Up in Chair  As Needed      05/31/17 2053    Unscheduled  Change Dressing  As Needed      05/31/17 2053    Unscheduled  Bladder  Scan if Patient Unable to Void 4-6 Hours After Catheter Removal  As Needed      05/31/17 2053    Unscheduled  Straight Cath Every 4-6 Hours As Needed If Patient is Unable to Void After 4-6 Hours, Bladder Scan Volume is Greater Than 350-500mL & Patient Has Symptoms of Bladder Discomfort / Distention  As Needed      05/31/17 2053    Unscheduled  Consult Pharmacist For Review of Medications That May Cause Urinary Retention - RN To Place Order for Consult it Needed  As Needed      05/31/17 2053    Unscheduled  Schedule / Prompt Voiding For Patients With Urinary Incontinence  As Needed      05/31/17 2053             Operative/Procedure Notes (last 24 hours) (Notes from 5/31/2017  7:58 AM through 6/1/2017  7:58 AM)      Rui Shaver MD at 5/31/2017  7:36 PM  Version 1 of 1         VENTRAL/INCISIONAL HERNIA REPAIR LAPAROSCOPIC  Procedure Note    Ventura Bob Ambrose  5/31/2017    Pre-op Diagnosis:   Ventral hernia without obstruction or gangrene [K43.9]    Post-op Diagnosis:     Post-Op Diagnosis Codes:     * Ventral hernia without obstruction or gangrene [K43.9]      Procedure(s):  OPEN ADHESIOLYSIS AND VENTRAL/INCISIONAL HERNIA REPAIR WITH PHASIX MESH      Surgeon(s):  MD Rusty Leonard MD    Anesthesia: General    Staff:   Circulator: Shavonne Mcgowan RN; Chapo Flores RN  Scrub Person: Dayanna Carrillo  Assistant: Cammie Ash CSA    Estimated Blood Loss: 100 mL    Specimens:                  ID Type Source Tests Collected by Time Destination   A : Hernia sac and adominal debridement  Tissue Hernia, Sac TISSUE EXAM Rui Shaver MD 5/31/2017 1924          Drains:   Y Collapsible Closed Device 1 and 2 05/31/17 1900 Left abdomen Right abdomen (Active)       Urethral Catheter 05/31/17 1536 100% silicone 16 5 10 (Active)           Findings: Multiple abdominal wall hernias with incarcerated loops of densely adhesed small bowel     Complications: None    Indications for procedure: 56-year-old man with  laparotomies performed at [facility] in [facility].  He presented today for repair of multiple abdominal wall hernias demonstrated clinically and with CT scanning.  By history, seems that he was having intermittent obstruction.    Description of procedure: The patient was brought to the operating room and placed supine on the operating table.  After adequate general endotracheal anesthesia the abdominal area was prepped and draped in a sterile manner.  Briefing and timeout were performed and all parties were in agreement.    I attempted to perform this procedure initially laparoscopically.  Small incision was made at the tip of the 12th rib on the left side and a 5 mm XCEL trocar was uneventfully passed into the abdomen under direct vision with no visceral injury.  The abdomen was insufflated to a total of 15 mmHg 4.1 L of CO2.  A 5 mm laparoscope revealed an excellent view of the abdominal cavity.  A second 5 mm trocar was placed in the left lower quadrant.  I attempted to take down the dense adhesions in the midline of the abdomen.  This proved impossible as there were numerous densely adhesed loops of small bowel within multiple abdominal wall hernias.    I therefore felt safest option was to try to free up these loops through an open procedure.  Midline laparotomy was performed and carried subcutaneous tissue.  Linea alba was divided and the abdominal cavity was easily entered with no visceral injury.  In the process of taking down numerous densely adhered loops of small intestine, 2 loops became very thin.  The antimesenteric side was so thin that it was opened for a small distance.  This was closed with 2 fires of a TA-30 stapler 3.5 height staples to staple line oversewn with interrupted 3-0 Vicryl's.  Numerous other adhesions were taken down mostly the entire abdominal wall was free of its attached intestine.  The only way to successfully close this midline wound was to dissected  the posterior and anterior sheaths away from their surrounding scar tissue.  The posterior sheath was dissected free of the rectus muscle easily was brought to the midline.  This was accomplished bilaterally.  This allowed the posterior sheath to be closed in the midline in a tension-free manner with running 2-0 Vicryl suture.  Because of poorly of contamination, I chose to use a piece of Phasix mesh for the repair.  The mesh was cut to the proper configuration and sutured with trans-fascial interrupted stitches of 0 PDS suture.  Ultimately the anterior sheath was  from running scar and could be easily brought to the midline for closure.  2 19 round Michael-Leon drains were placed-the  left sided drain came to lie over the mesh.  Following closure of the anterior sheath with running #1 PDS sutures, a right-sided 19 round Michael-Leon drain was left under the skin.  Both drains were sutured in place with 2-0 silk suture.    The skin was brought together with running segments of 2-0 Vicryl subcutaneously, and running 4-0 subcuticular Vicryl suture on the skin.    Procedure was terminated.  Tolerated it well.  Sponge and needle counts were correct.  He was returned to recovery in satisfactory condition.            This document has been electronically signed by Rui Shaver MD on May 31, 2017 7:36 PM      Date: 2017  Time: 7:36 PM         Electronically signed by Rui Shaver MD at 2017  7:49 PM           Physician Progress Notes (last 24 hours) (Notes from 2017  7:58 AM through 2017  7:58 AM)      Mary Dickson MD at 2017  6:18 AM  Version 1 of 1         CRITICAL CARE DAILY PROGRESS NOTE  NAME: Ventura Boggs  : 1960  MRN: [id number]     LOS: 1 day     PROVIDER OF SERVICE: Mary Dickson MD    Chief Complaint: Ventral hernia without obstruction or gangrene    Subjective:     Interval History:  s/p Open adhesiolysis and ventral/incisional hernia repair with  Phasix mesh POD#1    Patient complains of some nausea this morning, no vomiting.  He has incisional pain mostly controlled with dilaudid but is still having some break through pain.  He has flatus, no bowel movement, denies shortness of breath or chest pain.   RN reports no issues overnight.    Review of Systems:   Review of Systems   Constitutional: Negative for activity change, chills and fever.   HENT: Negative for congestion, sinus pressure and sore throat.    Eyes: Negative for photophobia and visual disturbance.   Respiratory: Negative for cough, shortness of breath and wheezing.    Cardiovascular: Negative for chest pain, palpitations and leg swelling.   Gastrointestinal: Positive for abdominal pain (incisional) and nausea. Negative for abdominal distention, blood in stool and diarrhea.   Endocrine: Negative for polydipsia, polyphagia and polyuria.   Genitourinary: Negative for decreased urine volume, difficulty urinating and dysuria.   Musculoskeletal: Negative for arthralgias and myalgias.   Skin: Negative for color change and rash.   Neurological: Negative for dizziness and light-headedness.   Psychiatric/Behavioral: Negative for confusion and decreased concentration.       Objective:     Vital Signs  Temp:  [97 °F (36.1 °C)-99.3 °F (37.4 °C)] 98.4 °F (36.9 °C)  Heart Rate:  [] 112  Resp:  [16-20] 16  BP: (113-160)/() 148/92  Arterial Line BP: (101-192)/(77-91) 127/77   Body mass index is 32.72 kg/(m^2).      Intake/Output Summary (Last 24 hours) at 06/01/17 0628  Last data filed at 06/01/17 0612   Gross per 24 hour   Intake          4651.67 ml   Output             2240 ml   Net          2411.67 ml     I/O last 3 completed shifts:  In: 3500 [I.V.:2000; IV Piggyback:1500]  Out: 765 [Urine:665; Blood:100]  I/O this shift:  In: 1151.7 [I.V.:851.7; IV Piggyback:300]  Out: 1475 [Urine:1325; Other:150]    Physical Exam  Physical Exam   Constitutional: He is oriented to person, place, and time. He  appears well-developed and well-nourished. No distress.   HENT:   Head: Normocephalic and atraumatic.   Nose: Nose normal.   Mouth/Throat: Oropharynx is clear and moist.   Eyes: Conjunctivae and EOM are normal. Pupils are equal, round, and reactive to light.   Neck: Normal range of motion. Neck supple. No tracheal deviation present. No thyromegaly present.   Cardiovascular: Normal rate, regular rhythm, normal heart sounds and intact distal pulses.    Pulmonary/Chest: Effort normal and breath sounds normal. No respiratory distress. He has no wheezes.   Abdominal: Soft. Bowel sounds are normal. He exhibits no distension. There is tenderness (near incision site; appropriate). There is no rebound and no guarding.       Musculoskeletal: Normal range of motion. He exhibits no edema or tenderness.   Neurological: He is alert and oriented to person, place, and time. He has normal strength. GCS eye subscore is 4. GCS verbal subscore is 5. GCS motor subscore is 6.   Skin: Skin is warm and dry.   Psychiatric: He has a normal mood and affect. His behavior is normal.   Vitals reviewed.      Medication Review    Current Facility-Administered Medications:   •  atorvastatin (LIPITOR) tablet 20 mg, 20 mg, Oral, Nightly, Rui Shaver MD, 20 mg at 05/31/17 2119  •  bupivacaine-EPINEPHrine PF (MARCAINE w/EPI) 0.5% -1:397084 injection, , , PRN, Rui Shaver MD, 30 mL at 05/31/17 1525  •  cefOXitin (MEFOXIN) 2 g/100 mL 0.9% NS VTB (mbp), 2 g, Intravenous, Q6H, Rui Shaver MD, 2 g at 06/01/17 0436  •  dextrose (D50W) solution 25 g, 25 g, Intravenous, Q15 Min PRN, Rui Shaver MD  •  dextrose (GLUTOSE) oral gel 15 g, 15 g, Oral, Q15 Min PRN, Rui Shaver MD  •  enoxaparin (LOVENOX) syringe 40 mg, 40 mg, Subcutaneous, Daily, Rui Shaver MD  •  glucagon (human recombinant) (GLUCAGEN DIAGNOSTIC) injection 1 mg, 1 mg, Subcutaneous, Q15 Min PRN, Rui Shaver MD  •  HYDROmorphone (DILAUDID) injection 1 mg, 1 mg, Intravenous, Q3H PRN, 1 mg at  06/01/17 0338 **AND** [DISCONTINUED] naloxone (NARCAN) injection 0.1 mg, 0.1 mg, Intravenous, Q5 Min PRN, Rui Shaver MD  •  HYDROmorphone (DILAUDID) PCA 0.2 mg/mL 30 mL syringe, , Intravenous, Continuous, Rui Shaver MD  •  insulin aspart (novoLOG) injection 0-9 Units, 0-9 Units, Subcutaneous, 4x Daily AC & at Bedtime, Rui Shaver MD, 7 Units at 05/31/17 2141  •  lactated ringers infusion, 100 mL/hr, Intravenous, Continuous, Rui Shaver MD, Last Rate: 100 mL/hr at 05/31/17 2058, 100 mL/hr at 05/31/17 2058  •  LIDOCAINE 1/6% WITH EPINEPHRINE SOLUTION, , , PRN, Rui Shaver MD, 150 mL at 05/31/17 1525  •  mupirocin (BACTROBAN) 2 % ointment, , Topical, Q8H, Rui Shaver MD, 1 application at 06/01/17 0550  •  naloxone (NARCAN) injection 0.1 mg, 0.1 mg, Intravenous, Q5 Min PRN, Rui Shaver MD  •  ondansetron (ZOFRAN) injection 4 mg, 4 mg, Intravenous, Q6H PRN, Rui Shaver MD, 4 mg at 05/31/17 2116  •  pantoprazole (PROTONIX) injection 40 mg, 40 mg, Intravenous, Q AM, Rui Shaver MD, 40 mg at 06/01/17 0550     Diagnostic Data    Lab Results (last 24 hours)     Procedure Component Value Units Date/Time    POC Glucose Fingerstick [[id number]]  (Abnormal) Collected:  05/31/17 1247    Specimen:  Blood Updated:  05/31/17 1258     Glucose 356 (H) mg/dL       RN NotifiedMeter: [id number]: [id number] SOPHIA ARELLANO       POC Glucose Fingerstick [[id number]]  (Abnormal) Collected:  05/31/17 1403    Specimen:  Blood Updated:  05/31/17 1415     Glucose 272 (H) mg/dL       RN NotifiedMeter: [id number]: [id number] ARNEL CRUZ       Blood Gas, Arterial [[id number]]  (Abnormal) Collected:  05/31/17 1752    Specimen:  Arterial Blood Updated:  05/31/17 1758     Site --      Not performed at this site.        Vlad's Test --      Not performed at this site.        pH, Arterial 7.434 pH units      pCO2, Arterial 35.7 mm Hg      pO2, Arterial 130.7 (H) mm Hg      HCO3, Arterial 23.4 mmol/L      Base Excess,  Arterial -0.3 mmol/L      O2 Saturation, Arterial 98.8 %      Hemoglobin, Blood Gas 15.2 g/dL      Hematocrit, Blood Gas 45.0 %      CO2 Content 24.5     Sodium, Arterial 134.7 (L) mmol/L      Potassium, Arterial 3.08 (L) mmol/L      Glucose, Arterial 235 mmol/L      Barometric Pressure for Blood Gas -- mmHg       Not performed at this site.        Modality --      Not performed at this site.        Ionized Calcium 4.4 (L) mg/dL     POC Glucose Fingerstick [[id number]]  (Abnormal) Collected:  05/31/17 2005    Specimen:  Blood Updated:  05/31/17 2016     Glucose 261 (H) mg/dL       RN NotifiedMeter: [id number]: [id number] BAO BOTELLO       CBC & Differential [[id number]] Collected:  05/31/17 2013    Specimen:  Blood Updated:  05/31/17 2020    Narrative:       The following orders were created for panel order CBC & Differential.  Procedure                               Abnormality         Status                     ---------                               -----------         ------                     CBC Auto Differential[[id number]]        Abnormal            Final result                 Please view results for these tests on the individual orders.    CBC Auto Differential [[id number]]  (Abnormal) Collected:  05/31/17 2013    Specimen:  Blood Updated:  05/31/17 2020     WBC 19.93 (H) 10*3/mm3      RBC 4.70 10*6/mm3      Hemoglobin 14.5 g/dL      Hematocrit 39.6 %      MCV 84.3 fL      MCH 30.9 pg      MCHC 36.6 (H) g/dL      RDW 12.6 %      RDW-SD 38.5 fl      MPV 10.4 fL      Platelets 300 10*3/mm3      Neutrophil % 86.8 (H) %      Lymphocyte % 6.0 (L) %      Monocyte % 6.3 %      Eosinophil % 0.2 %      Basophil % 0.1 %      Immature Grans % 0.6 (H) %      Neutrophils, Absolute 17.33 (H) 10*3/mm3      Lymphocytes, Absolute 1.19 10*3/mm3      Monocytes, Absolute 1.25 (H) 10*3/mm3      Eosinophils, Absolute 0.03 10*3/mm3      Basophils, Absolute 0.02 10*3/mm3      Immature Grans, Absolute 0.11 (H) 10*3/mm3      Basic Metabolic Panel [[id number]]  (Abnormal) Collected:  05/31/17 2013    Specimen:  Blood Updated:  05/31/17 2035     Glucose 298 (H) mg/dL      BUN 8 mg/dL      Creatinine 0.70 mg/dL      Sodium 133 (L) mmol/L      Potassium 3.7 mmol/L      Chloride 98 mmol/L      CO2 25.0 mmol/L      Calcium 8.0 (L) mg/dL      eGFR Non African Amer 118 mL/min/1.73      BUN/Creatinine Ratio 11.4     Anion Gap 10.0 mmol/L     Magnesium [[id number]]  (Normal) Collected:  05/31/17 2013    Specimen:  Blood Updated:  05/31/17 2035     Magnesium 1.8 mg/dL     Phosphorus [[id number]]  (Normal) Collected:  05/31/17 2013    Specimen:  Blood Updated:  05/31/17 2035     Phosphorus 2.7 mg/dL     POC Glucose Fingerstick [[id number]]  (Abnormal) Collected:  05/31/17 2141    Specimen:  Blood Updated:  05/31/17 2224     Glucose 309 (H) mg/dL       Sliding Scale AdminMeter: [id number]: [id number] [facility]       CBC & Differential [[id number]] Collected:  06/01/17 0452    Specimen:  Blood Updated:  06/01/17 0457    Narrative:       The following orders were created for panel order CBC & Differential.  Procedure                               Abnormality         Status                     ---------                               -----------         ------                     CBC Auto Differential[[id number]]        Abnormal            Final result                 Please view results for these tests on the individual orders.    CBC Auto Differential [[id number]]  (Abnormal) Collected:  06/01/17 0452    Specimen:  Blood Updated:  06/01/17 0457     WBC 17.17 (H) 10*3/mm3      RBC 4.55 10*6/mm3      Hemoglobin 14.0 g/dL      Hematocrit 39.7 %      MCV 87.3 fL      MCH 30.8 pg      MCHC 35.3 g/dL      RDW 12.8 %      RDW-SD 40.8 fl      MPV 11.3 fL      Platelets 276 10*3/mm3      Neutrophil % 87.5 (H) %      Lymphocyte % 6.7 (L) %      Monocyte % 4.6 %      Eosinophil % 0.7 %      Basophil % 0.1 %      Immature Grans % 0.4 %      Neutrophils,  Absolute 15.03 (H) 10*3/mm3      Lymphocytes, Absolute 1.15 10*3/mm3      Monocytes, Absolute 0.79 10*3/mm3      Eosinophils, Absolute 0.12 10*3/mm3      Basophils, Absolute 0.01 10*3/mm3      Immature Grans, Absolute 0.07 (H) 10*3/mm3     Basic Metabolic Panel [[id number]]  (Abnormal) Collected:  06/01/17 0452    Specimen:  Blood Updated:  06/01/17 0513     Glucose 244 (H) mg/dL      BUN 10 mg/dL      Creatinine 0.71 mg/dL      Sodium 134 (L) mmol/L      Potassium 4.3 mmol/L      Chloride 100 mmol/L      CO2 28.0 mmol/L      Calcium 8.0 (L) mg/dL      eGFR Non African Amer 116 mL/min/1.73      BUN/Creatinine Ratio 14.1     Anion Gap 6.0 mmol/L     Magnesium [[id number]]  (Normal) Collected:  06/01/17 0452    Specimen:  Blood Updated:  06/01/17 0513     Magnesium 2.0 mg/dL     Phosphorus [[id number]]  (Normal) Collected:  06/01/17 0452    Specimen:  Blood Updated:  06/01/17 0513     Phosphorus 2.5 mg/dL         I reviewed the patient's new clinical results.    Assessment/Plan:     Active Hospital Problems (** Indicates Principal Problem)    Diagnosis Date Noted   • **Ventral hernia without obstruction or gangrene [K43.9] 05/31/2017      POD#1: Open adhesiolysis and ventral/incisional hernia repair with Phasix mesh  - NPO except ice chips  - OOB to chair today  - Monitor drain output  - Continue Cefoxitin 2g IV q6H  - Pain control: Dilaudid  - Continue with incentive spirometry     • Controlled type 2 diabetes mellitus without complication, with long-term current use of insulin [E11.9, Z79.4] 05/24/2017     - currently NPO  - fingersticks, SSI  - recommend restarting basal insulin  - hold metformin during hospital stay  - Continue lipitor        Resolved Hospital Problems    Diagnosis Date Noted Date Resolved   No resolved problems to display.     DVT prophylaxis: Lovenox  GI Prophylaxis: Protonix  Code status is Full Code    Plan for disposition:Home when medically stable    Case d/w Dr. Shaver who is aware of the patient  status and agrees with the above note.     Signature  Mary Dickson MD PGY2  Family Practice Residency  [address], [place] [address]  Office: [phone]    This document has been electronically signed by Mary Dickson MD on June 1, 2017 6:35 AM         Electronically signed by Mary Dickson MD at 6/1/2017  6:53 AM        Nat Madrigal RN [facility]  [phone]  Fax [phone]

## 2017-06-01 NOTE — OP NOTE
VENTRAL/INCISIONAL HERNIA REPAIR LAPAROSCOPIC  Procedure Note    Ventura Boggs  5/31/2017    Pre-op Diagnosis:   Ventral hernia without obstruction or gangrene [K43.9]    Post-op Diagnosis:     Post-Op Diagnosis Codes:     * Ventral hernia without obstruction or gangrene [K43.9]      Procedure(s):  OPEN ADHESIOLYSIS AND VENTRAL/INCISIONAL HERNIA REPAIR WITH PHASIX MESH      Surgeon(s):  MD Rusty Leonard MD    Anesthesia: General    Staff:   Circulator: Shavonne Mcgowan RN; Chapo Flores RN  Scrub Person: Dayanna Carrillo  Assistant: Cammie Ash CSA    Estimated Blood Loss: 100 mL    Specimens:                  ID Type Source Tests Collected by Time Destination   A : Hernia sac and adominal debridement  Tissue Hernia, Sac TISSUE EXAM Rui Shaver MD 5/31/2017 1924          Drains:   Y Collapsible Closed Device 1 and 2 05/31/17 1900 Left abdomen Right abdomen (Active)       Urethral Catheter 05/31/17 1536 100% silicone 16 5 10 (Active)           Findings: Multiple abdominal wall hernias with incarcerated loops of densely adhesed small bowel     Complications: None    Indications for procedure: 56-year-old man with laparotomies performed at [facility] in [facility].  He presented today for repair of multiple abdominal wall hernias demonstrated clinically and with CT scanning.  By history, seems that he was having intermittent obstruction.    Description of procedure: The patient was brought to the operating room and placed supine on the operating table.  After adequate general endotracheal anesthesia the abdominal area was prepped and draped in a sterile manner.  Briefing and timeout were performed and all parties were in agreement.    I attempted to perform this procedure initially laparoscopically.  Small incision was made at the tip of the 12th rib on the left side and a 5 mm XCEL trocar was uneventfully passed into the abdomen under direct vision with no  visceral injury.  The abdomen was insufflated to a total of 15 mmHg 4.1 L of CO2.  A 5 mm laparoscope revealed an excellent view of the abdominal cavity.  A second 5 mm trocar was placed in the left lower quadrant.  I attempted to take down the dense adhesions in the midline of the abdomen.  This proved impossible as there were numerous densely adhesed loops of small bowel within multiple abdominal wall hernias.    I therefore felt safest option was to try to free up these loops through an open procedure.  Midline laparotomy was performed and carried subcutaneous tissue.  Linea alba was divided and the abdominal cavity was easily entered with no visceral injury.  In the process of taking down numerous densely adhered loops of small intestine, 2 loops became very thin.  The antimesenteric side was so thin that it was opened for a small distance.  This was closed with 2 fires of a TA-30 stapler 3.5 height staples to staple line oversewn with interrupted 3-0 Vicryl's.  Numerous other adhesions were taken down mostly the entire abdominal wall was free of its attached intestine.  The only way to successfully close this midline wound was to dissected the posterior and anterior sheaths away from their surrounding scar tissue.  The posterior sheath was dissected free of the rectus muscle easily was brought to the midline.  This was accomplished bilaterally.  This allowed the posterior sheath to be closed in the midline in a tension-free manner with running 2-0 Vicryl suture.  Because of poorly of contamination, I chose to use a piece of Phasix mesh for the repair.  The mesh was cut to the proper configuration and sutured with trans-fascial interrupted stitches of 0 PDS suture.  Ultimately the anterior sheath was  from running scar and could be easily brought to the midline for closure.  2 19 round Michael-Leon drains were placed-the  left sided drain came to lie over the mesh.  Following closure of the anterior  sheath with running #1 PDS sutures, a right-sided 19 round Michael-Leon drain was left under the skin.  Both drains were sutured in place with 2-0 silk suture.    The skin was brought together with running segments of 2-0 Vicryl subcutaneously, and running 4-0 subcuticular Vicryl suture on the skin.    Procedure was terminated.  Tolerated it well.  Sponge and needle counts were correct.  He was returned to recovery in satisfactory condition.            This document has been electronically signed by Rui Shaver MD on May 31, 2017 7:36 PM      Date: 5/31/2017  Time: 7:36 PM

## 2017-06-01 NOTE — PLAN OF CARE
Problem: Patient Care Overview (Adult)  Goal: Plan of Care Review  Outcome: Ongoing (interventions implemented as appropriate)  Goal: Adult Individualization and Mutuality  Outcome: Ongoing (interventions implemented as appropriate)    Problem: Perioperative Period (Adult)  Goal: Signs and Symptoms of Listed Potential Problems Will be Absent or Manageable (Perioperative Period)  Outcome: Ongoing (interventions implemented as appropriate)    Problem: Fall Risk (Adult)  Goal: Identify Related Risk Factors and Signs and Symptoms  Outcome: Ongoing (interventions implemented as appropriate)  Goal: Absence of Falls  Outcome: Ongoing (interventions implemented as appropriate)    Problem: Skin Integrity Impairment, Risk/Actual (Adult)  Goal: Identify Related Risk Factors and Signs and Symptoms  Outcome: Ongoing (interventions implemented as appropriate)  Goal: Skin Integrity/Wound Healing  Outcome: Ongoing (interventions implemented as appropriate)

## 2017-06-01 NOTE — PLAN OF CARE
Problem: Patient Care Overview (Adult)  Goal: Plan of Care Review  Outcome: Ongoing (interventions implemented as appropriate)    05/31/17 2051   Coping/Psychosocial Response Interventions   Plan Of Care Reviewed With patient   Patient Care Overview   Progress progress toward functional goals as expected   Outcome Evaluation   Outcome Summary/Follow up Plan patient drowsy; vitals stable; pain control improving; ready for transfer to ICU 7         Problem: Perioperative Period (Adult)  Goal: Signs and Symptoms of Listed Potential Problems Will be Absent or Manageable (Perioperative Period)  Outcome: Ongoing (interventions implemented as appropriate)    05/31/17 2040   Perioperative Period   Problems Assessed (Perioperative Period) all   Problems Present (Perioperative Period) pain

## 2017-06-01 NOTE — PLAN OF CARE
Problem: Patient Care Overview (Adult)  Goal: Plan of Care Review  Outcome: Ongoing (interventions implemented as appropriate)    06/01/17 1108   Coping/Psychosocial Response Interventions   Plan Of Care Reviewed With patient;daughter   Outcome Evaluation   Outcome Summary/Follow up Plan PT evaluation completed. Pt transferred and ambulated 20 feet with CGA. Diastolic BP increased with movement, but trending down once pt resting again.Function limited primarily by decreased tolerance for functional mobility and activities following abdominal surgery. Pt will benefit from skilled PT to regain lost function.          Problem: Inpatient Physical Therapy  Goal: Transfer Training Goal 1 LTG- PT  Outcome: Ongoing (interventions implemented as appropriate)    06/01/17 1108   Transfer Training PT LTG   Transfer Training PT LTG, Date Established 06/01/17   Transfer Training PT LTG, Time to Achieve by discharge   Transfer Training PT LTG, [place] Level independent   Transfer Training PT LTG, Outcome goal ongoing       Goal: Gait Training Goal LTG- PT  Outcome: Ongoing (interventions implemented as appropriate)    06/01/17 1108   Gait Training PT LTG   Gait Training Goal PT LTG, Date Established 06/01/17   Gait Training Goal PT LTG, Time to Achieve by discharge   Gait Training Goal PT LTG, [place] Level independent   Gait Training Goal PT LTG, Distance to Achieve 1000ft   Gait Training Goal PT LTG, Additional Goal Pt will complete 6 minute walk test   Gait Training Goal PT LTG, Outcome goal ongoing       Goal: Stair Training Goal LTG- PT  Outcome: Ongoing (interventions implemented as appropriate)    06/01/17 1108   Stair Training PT LTG   Stair Training Goal PT LTG, Date Established 06/01/17   Stair Training Goal PT LTG, Time to Achieve by discharge   Stair Training Goal PT LTG, Number of Steps 5   Stair Training Goal PT LTG, [place] Level conditional independence   Stair Training Goal PT LTG, Assist Device 1  handrail   Stair Training Goal PT LTG, Outcome goal ongoing       Goal: Patient Education Goal LTG- PT  Outcome: Ongoing (interventions implemented as appropriate)    06/01/17 1108   Patient Education PT LTG   Patient Education PT LTG, Date Established 06/01/17   Patient Education PT LTG, Time to Achieve by discharge   Patient Education PT LTG, Education Type gait;transfers;home safety   Patient Education PT LTG Outcome goal ongoing

## 2017-06-01 NOTE — PLAN OF CARE
Problem: Patient Care Overview (Adult)  Goal: Plan of Care Review  Outcome: Ongoing (interventions implemented as appropriate)    05/31/17 2051 06/01/17 0553   Coping/Psychosocial Response Interventions   Plan Of Care Reviewed With patient --    Patient Care Overview   Progress --  improving   Outcome Evaluation   Outcome Summary/Follow up Plan --  pt's VS have remained stable throughout the night. The patient's pain has been able to be controlled with the PRN doses of Diladed as well as the PCA       Goal: Adult Individualization and Mutuality  Outcome: Ongoing (interventions implemented as appropriate)  Goal: Discharge Needs Assessment  Outcome: Ongoing (interventions implemented as appropriate)    Problem: Perioperative Period (Adult)  Goal: Signs and Symptoms of Listed Potential Problems Will be Absent or Manageable (Perioperative Period)  Outcome: Ongoing (interventions implemented as appropriate)    Problem: Fall Risk (Adult)  Goal: Identify Related Risk Factors and Signs and Symptoms  Outcome: Ongoing (interventions implemented as appropriate)  Goal: Absence of Falls  Outcome: Ongoing (interventions implemented as appropriate)    Problem: Skin Integrity Impairment, Risk/Actual (Adult)  Goal: Identify Related Risk Factors and Signs and Symptoms  Outcome: Ongoing (interventions implemented as appropriate)  Goal: Skin Integrity/Wound Healing  Outcome: Ongoing (interventions implemented as appropriate)

## 2017-06-01 NOTE — CONSULTS
"Adult Nutrition  Assessment    Patient Name:  Ventura Boggs  YOB: 1960  MRN: [id number]  Admit Date:  5/31/2017    Assessment Date:  6/1/2017          Reason for Assessment       06/01/17 1454    Reason for Assessment    Reason For Assessment/Visit identified at risk by screening criteria    Identified At Risk By Screening Criteria MST SCORE 2+                Nutrition/Diet History       06/01/17 1455    Nutrition/Diet History    Typical Food/Fluid Intake Pt claims that he was eating well pta.  He does report that he has lost some wt but mostly it was intentionally.  He has some chewing difficulties.                Labs/Tests/Procedures/Meds       06/01/17 1455    Labs/Tests/Procedures/Meds    Labs/Tests Review Reviewed    Medication Review Reviewed, pertinent            Physical Findings       06/01/17 1456    Physical Findings/Assessment    Additional Documentation Physical Appearance (Group)    Physical Appearance    Overall Physical Appearance on oxygen therapy            Estimated/Assessed Needs       06/01/17 1456    Calculation Measurements    Weight Used For Calculations 69.9 kg (154 lb)    Height Used for Calculations 1.727 m (5' 8\")    Estimated/Assessed Energy Needs    Energy Need Method Kcal/kg    kcal/kg 30    30 Kcal/Kg (kcal) 2095.62    Estimated Kcal Range  2100    Estimated/Assessed Protein Needs    Weight Used for Protein Calculation 69.9 kg (154 lb)    Protein (gm/kg) 1.2    1.2 Gm Protein (gm) 83.82    Estimated Protein Range 84    Estimated/Assessed Fluid Needs    Fluid Need Method RDA method    RDA Method (mL)  2100            Nutrition Prescription Ordered       06/01/17 1457    Nutrition Prescription PO    Current PO Diet NPO              Comments:  Pt is s/p surgery for Ventral Hernia Repair.  Currently NPO awaiting the return of bowel function.  Pt reports a good appetite pta and notes that his wt loss mostly intentionally with more exercising and changing food habits.  I " did encouraged him to eat well when po is started--discussed the importance of nutrient dense foods and protein for healing and strengthening.  Pt will need a mechanically altered diet when po is started due to being edentulous.  RD will monitor diet initiation.          Electronically signed by:  Jenna Olivo RD  06/01/17 3:01 PM

## 2017-06-01 NOTE — PROGRESS NOTES
CRITICAL CARE DAILY PROGRESS NOTE  NAME: Ventura Boggs  : 1960  MRN: [id number]     LOS: 1 day     PROVIDER OF SERVICE: Mary Dickson MD    Chief Complaint: Ventral hernia without obstruction or gangrene    Subjective:     Interval History:  s/p Open adhesiolysis and ventral/incisional hernia repair with Phasix mesh POD#1    Patient complains of some nausea this morning, no vomiting.  He has incisional pain mostly controlled with dilaudid but is still having some break through pain.  He has flatus, no bowel movement, denies shortness of breath or chest pain.   RN reports no issues overnight.    Review of Systems:   Review of Systems   Constitutional: Negative for activity change, chills and fever.   HENT: Negative for congestion, sinus pressure and sore throat.    Eyes: Negative for photophobia and visual disturbance.   Respiratory: Negative for cough, shortness of breath and wheezing.    Cardiovascular: Negative for chest pain, palpitations and leg swelling.   Gastrointestinal: Positive for abdominal pain (incisional) and nausea. Negative for abdominal distention, blood in stool and diarrhea.   Endocrine: Negative for polydipsia, polyphagia and polyuria.   Genitourinary: Negative for decreased urine volume, difficulty urinating and dysuria.   Musculoskeletal: Negative for arthralgias and myalgias.   Skin: Negative for color change and rash.   Neurological: Negative for dizziness and light-headedness.   Psychiatric/Behavioral: Negative for confusion and decreased concentration.       Objective:     Vital Signs  Temp:  [97 °F (36.1 °C)-99.3 °F (37.4 °C)] 98.4 °F (36.9 °C)  Heart Rate:  [] 112  Resp:  [16-20] 16  BP: (113-160)/() 148/92  Arterial Line BP: (101-192)/(77-91) 127/77   Body mass index is 32.72 kg/(m^2).      Intake/Output Summary (Last 24 hours) at 17  Last data filed at 17 06   Gross per 24 hour   Intake          4651.67 ml   Output             2240 ml    Net          2411.67 ml     I/O last 3 completed shifts:  In: 3500 [I.V.:2000; IV Piggyback:1500]  Out: 765 [Urine:665; Blood:100]  I/O this shift:  In: 1151.7 [I.V.:851.7; IV Piggyback:300]  Out: 1475 [Urine:1325; Other:150]    Physical Exam  Physical Exam   Constitutional: He is oriented to person, place, and time. He appears well-developed and well-nourished. No distress.   HENT:   Head: Normocephalic and atraumatic.   Nose: Nose normal.   Mouth/Throat: Oropharynx is clear and moist.   Eyes: Conjunctivae and EOM are normal. Pupils are equal, round, and reactive to light.   Neck: Normal range of motion. Neck supple. No tracheal deviation present. No thyromegaly present.   Cardiovascular: Normal rate, regular rhythm, normal heart sounds and intact distal pulses.    Pulmonary/Chest: Effort normal and breath sounds normal. No respiratory distress. He has no wheezes.   Abdominal: Soft. Bowel sounds are normal. He exhibits no distension. There is tenderness (near incision site; appropriate). There is no rebound and no guarding.       Musculoskeletal: Normal range of motion. He exhibits no edema or tenderness.   Neurological: He is alert and oriented to person, place, and time. He has normal strength. GCS eye subscore is 4. GCS verbal subscore is 5. GCS motor subscore is 6.   Skin: Skin is warm and dry.   Psychiatric: He has a normal mood and affect. His behavior is normal.   Vitals reviewed.      Medication Review    Current Facility-Administered Medications:   •  atorvastatin (LIPITOR) tablet 20 mg, 20 mg, Oral, Nightly, Rui Shaver MD, 20 mg at 05/31/17 2119  •  bupivacaine-EPINEPHrine PF (MARCAINE w/EPI) 0.5% -1:057205 injection, , , PRN, Rui Shaver MD, 30 mL at 05/31/17 1525  •  cefOXitin (MEFOXIN) 2 g/100 mL 0.9% NS VTB (mbp), 2 g, Intravenous, Q6H, Rui Shaver MD, 2 g at 06/01/17 0436  •  dextrose (D50W) solution 25 g, 25 g, Intravenous, Q15 Min PRN, Rui Shaver MD  •  dextrose (GLUTOSE) oral gel 15 g, 15  g, Oral, Q15 Min PRN, Rui Shaver MD  •  enoxaparin (LOVENOX) syringe 40 mg, 40 mg, Subcutaneous, Daily, Rui Shaver MD  •  glucagon (human recombinant) (GLUCAGEN DIAGNOSTIC) injection 1 mg, 1 mg, Subcutaneous, Q15 Min PRN, Rui Shaver MD  •  HYDROmorphone (DILAUDID) injection 1 mg, 1 mg, Intravenous, Q3H PRN, 1 mg at 06/01/17 0338 **AND** [DISCONTINUED] naloxone (NARCAN) injection 0.1 mg, 0.1 mg, Intravenous, Q5 Min PRN, Rui Shaver MD  •  HYDROmorphone (DILAUDID) PCA 0.2 mg/mL 30 mL syringe, , Intravenous, Continuous, Rui Shaver MD  •  insulin aspart (novoLOG) injection 0-9 Units, 0-9 Units, Subcutaneous, 4x Daily AC & at Bedtime, Rui Shaver MD, 7 Units at 05/31/17 2141  •  lactated ringers infusion, 100 mL/hr, Intravenous, Continuous, Rui Shaver MD, Last Rate: 100 mL/hr at 05/31/17 2058, 100 mL/hr at 05/31/17 2058  •  LIDOCAINE 1/6% WITH EPINEPHRINE SOLUTION, , , PRN, Rui Shaver MD, 150 mL at 05/31/17 1525  •  mupirocin (BACTROBAN) 2 % ointment, , Topical, Q8H, Rui Shaver MD, 1 application at 06/01/17 0550  •  naloxone (NARCAN) injection 0.1 mg, 0.1 mg, Intravenous, Q5 Min PRN, Rui Shaver MD  •  ondansetron (ZOFRAN) injection 4 mg, 4 mg, Intravenous, Q6H PRN, Rui Shaver MD, 4 mg at 05/31/17 2116  •  pantoprazole (PROTONIX) injection 40 mg, 40 mg, Intravenous, Q AM, Rui Shaver MD, 40 mg at 06/01/17 0550     Diagnostic Data    Lab Results (last 24 hours)     Procedure Component Value Units Date/Time    POC Glucose Fingerstick [[id number]]  (Abnormal) Collected:  05/31/17 1247    Specimen:  Blood Updated:  05/31/17 1258     Glucose 356 (H) mg/dL       RN NotifiedMeter: [id number]: [id number] SOPHIA ARELLANO       POC Glucose Fingerstick [[id number]]  (Abnormal) Collected:  05/31/17 1403    Specimen:  Blood Updated:  05/31/17 1415     Glucose 272 (H) mg/dL       RN NotifiedMeter: [id number]: [id number] ARNEL CRUZ       Blood Gas, Arterial [[id number]]  (Abnormal) Collected:   05/31/17 1752    Specimen:  Arterial Blood Updated:  05/31/17 1758     Site --      Not performed at this site.        Vlad's Test --      Not performed at this site.        pH, Arterial 7.434 pH units      pCO2, Arterial 35.7 mm Hg      pO2, Arterial 130.7 (H) mm Hg      HCO3, Arterial 23.4 mmol/L      Base Excess, Arterial -0.3 mmol/L      O2 Saturation, Arterial 98.8 %      Hemoglobin, Blood Gas 15.2 g/dL      Hematocrit, Blood Gas 45.0 %      CO2 Content 24.5     Sodium, Arterial 134.7 (L) mmol/L      Potassium, Arterial 3.08 (L) mmol/L      Glucose, Arterial 235 mmol/L      Barometric Pressure for Blood Gas -- mmHg       Not performed at this site.        Modality --      Not performed at this site.        Ionized Calcium 4.4 (L) mg/dL     POC Glucose Fingerstick [[id number]]  (Abnormal) Collected:  05/31/17 2005    Specimen:  Blood Updated:  05/31/17 2016     Glucose 261 (H) mg/dL       RN NotifiedMeter: [id number]: [id number] BAO BOTELLO       CBC & Differential [[id number]] Collected:  05/31/17 2013    Specimen:  Blood Updated:  05/31/17 2020    Narrative:       The following orders were created for panel order CBC & Differential.  Procedure                               Abnormality         Status                     ---------                               -----------         ------                     CBC Auto Differential[[id number]]        Abnormal            Final result                 Please view results for these tests on the individual orders.    CBC Auto Differential [[id number]]  (Abnormal) Collected:  05/31/17 2013    Specimen:  Blood Updated:  05/31/17 2020     WBC 19.93 (H) 10*3/mm3      RBC 4.70 10*6/mm3      Hemoglobin 14.5 g/dL      Hematocrit 39.6 %      MCV 84.3 fL      MCH 30.9 pg      MCHC 36.6 (H) g/dL      RDW 12.6 %      RDW-SD 38.5 fl      MPV 10.4 fL      Platelets 300 10*3/mm3      Neutrophil % 86.8 (H) %      Lymphocyte % 6.0 (L) %      Monocyte % 6.3 %      Eosinophil %  0.2 %      Basophil % 0.1 %      Immature Grans % 0.6 (H) %      Neutrophils, Absolute 17.33 (H) 10*3/mm3      Lymphocytes, Absolute 1.19 10*3/mm3      Monocytes, Absolute 1.25 (H) 10*3/mm3      Eosinophils, Absolute 0.03 10*3/mm3      Basophils, Absolute 0.02 10*3/mm3      Immature Grans, Absolute 0.11 (H) 10*3/mm3     Basic Metabolic Panel [[id number]]  (Abnormal) Collected:  05/31/17 2013    Specimen:  Blood Updated:  05/31/17 2035     Glucose 298 (H) mg/dL      BUN 8 mg/dL      Creatinine 0.70 mg/dL      Sodium 133 (L) mmol/L      Potassium 3.7 mmol/L      Chloride 98 mmol/L      CO2 25.0 mmol/L      Calcium 8.0 (L) mg/dL      eGFR Non African Amer 118 mL/min/1.73      BUN/Creatinine Ratio 11.4     Anion Gap 10.0 mmol/L     Magnesium [[id number]]  (Normal) Collected:  05/31/17 2013    Specimen:  Blood Updated:  05/31/17 2035     Magnesium 1.8 mg/dL     Phosphorus [[id number]]  (Normal) Collected:  05/31/17 2013    Specimen:  Blood Updated:  05/31/17 2035     Phosphorus 2.7 mg/dL     POC Glucose Fingerstick [[id number]]  (Abnormal) Collected:  05/31/17 2141    Specimen:  Blood Updated:  05/31/17 2224     Glucose 309 (H) mg/dL       Sliding Scale AdminMeter: [id number]: [id number] GRIS STEVENSON       CBC & Differential [[id number]] Collected:  06/01/17 0452    Specimen:  Blood Updated:  06/01/17 0457    Narrative:       The following orders were created for panel order CBC & Differential.  Procedure                               Abnormality         Status                     ---------                               -----------         ------                     CBC Auto Differential[[id number]]        Abnormal            Final result                 Please view results for these tests on the individual orders.    CBC Auto Differential [[id number]]  (Abnormal) Collected:  06/01/17 0452    Specimen:  Blood Updated:  06/01/17 0457     WBC 17.17 (H) 10*3/mm3      RBC 4.55 10*6/mm3      Hemoglobin 14.0 g/dL       Hematocrit 39.7 %      MCV 87.3 fL      MCH 30.8 pg      MCHC 35.3 g/dL      RDW 12.8 %      RDW-SD 40.8 fl      MPV 11.3 fL      Platelets 276 10*3/mm3      Neutrophil % 87.5 (H) %      Lymphocyte % 6.7 (L) %      Monocyte % 4.6 %      Eosinophil % 0.7 %      Basophil % 0.1 %      Immature Grans % 0.4 %      Neutrophils, Absolute 15.03 (H) 10*3/mm3      Lymphocytes, Absolute 1.15 10*3/mm3      Monocytes, Absolute 0.79 10*3/mm3      Eosinophils, Absolute 0.12 10*3/mm3      Basophils, Absolute 0.01 10*3/mm3      Immature Grans, Absolute 0.07 (H) 10*3/mm3     Basic Metabolic Panel [[id number]]  (Abnormal) Collected:  06/01/17 0452    Specimen:  Blood Updated:  06/01/17 0513     Glucose 244 (H) mg/dL      BUN 10 mg/dL      Creatinine 0.71 mg/dL      Sodium 134 (L) mmol/L      Potassium 4.3 mmol/L      Chloride 100 mmol/L      CO2 28.0 mmol/L      Calcium 8.0 (L) mg/dL      eGFR Non African Amer 116 mL/min/1.73      BUN/Creatinine Ratio 14.1     Anion Gap 6.0 mmol/L     Magnesium [[id number]]  (Normal) Collected:  06/01/17 0452    Specimen:  Blood Updated:  06/01/17 0513     Magnesium 2.0 mg/dL     Phosphorus [[id number]]  (Normal) Collected:  06/01/17 0452    Specimen:  Blood Updated:  06/01/17 0513     Phosphorus 2.5 mg/dL         I reviewed the patient's new clinical results.    Assessment/Plan:     Active Hospital Problems (** Indicates Principal Problem)    Diagnosis Date Noted   • **Ventral hernia without obstruction or gangrene [K43.9] 05/31/2017      POD#1: Open adhesiolysis and ventral/incisional hernia repair with Phasix mesh  - NPO except ice chips  - OOB to chair today  - Monitor drain output  - Continue Cefoxitin 2g IV q6H  - Pain control: Dilaudid  - Continue with incentive spirometry     • Controlled type 2 diabetes mellitus without complication, with long-term current use of insulin [E11.9, Z79.4] 05/24/2017     - currently NPO  - fingersticks, SSI  - recommend restarting basal insulin  - hold metformin  during hospital stay  - Continue lipitor        Resolved Hospital Problems    Diagnosis Date Noted Date Resolved   No resolved problems to display.     DVT prophylaxis: Lovenox  GI Prophylaxis: Protonix  Code status is Full Code    Plan for disposition:Home when medically stable    Case d/w Dr. Shaver who is aware of the patient status and agrees with the above note.     Signature  Mary Dickson MD PGY2  [place] [address]  Office: [phone]    This document has been electronically signed by Mary Dickson MD on June 1, 2017 6:35 AM

## 2017-06-01 NOTE — MEDICAL STUDENT
"Rui Shaver MD [facility]  General Surgery    6/1/2017    Chief Complaint:     Subjective      LOS: 1 day      Patient is 56 y.o. male presented with ventral hernia and intermittent obstruction diagnosed clinically is POD#1 for  OPEN ADHESIOLYSIS AND VENTRAL/INCISIONAL HERNIA REPAIR WITH PHASIX MESH.    Has no complaints other than pain which is mostly controlled on his current regime but would like \"something more\" for break through pain which he rates as a 10. He has passed flatus. No blood per rectum, leg pain,dizziness, trouble breathing, palpitations.  Some nausea and no vomiting.           Current Medications:     Current Facility-Administered Medications   Medication Dose Route Frequency Provider Last Rate Last Dose   • atorvastatin (LIPITOR) tablet 20 mg  20 mg Oral Nightly Rui Shaver MD   20 mg at 05/31/17 2119   • bupivacaine-EPINEPHrine PF (MARCAINE w/EPI) 0.5% -1:928916 injection    PRN Rui Shaver MD   30 mL at 05/31/17 1525   • cefOXitin (MEFOXIN) 2 g/100 mL 0.9% NS VTB (mbp)  2 g Intravenous Q6H Rui Shaver MD   2 g at 05/31/17 2141   • dextrose (D50W) solution 25 g  25 g Intravenous Q15 Min PRN uRi Shaver MD       • dextrose (GLUTOSE) oral gel 15 g  15 g Oral Q15 Min PRN Rui Shaver MD       • enoxaparin (LOVENOX) syringe 40 mg  40 mg Subcutaneous Daily Rui Shaver MD       • glucagon (human recombinant) (GLUCAGEN DIAGNOSTIC) injection 1 mg  1 mg Subcutaneous Q15 Min PRN Rui Shaver MD       • HYDROmorphone (DILAUDID) injection 1 mg  1 mg Intravenous Q3H PRN Rui Shaver MD   1 mg at 06/01/17 0338   • HYDROmorphone (DILAUDID) PCA 0.2 mg/mL 30 mL syringe   Intravenous Continuous Rui Shaver MD       • insulin aspart (novoLOG) injection 0-9 Units  0-9 Units Subcutaneous 4x Daily AC & at Bedtime Rui Shaver MD   7 Units at 05/31/17 2141   • lactated ringers infusion  100 mL/hr Intravenous Continuous Rui Shaver  mL/hr at 05/31/17 2058 100 mL/hr at 05/31/17 2058   • LIDOCAINE 1/6% WITH " EPINEPHRINE SOLUTION    PRN Rui Shaver MD   150 mL at 05/31/17 1525   • mupirocin (BACTROBAN) 2 % ointment   Topical Q8H Rui Shaver MD       • naloxone (NARCAN) injection 0.1 mg  0.1 mg Intravenous Q5 Min PRN Rui Shaver MD       • ondansetron (ZOFRAN) injection 4 mg  4 mg Intravenous Q6H PRN Rui Shaver MD   4 mg at 05/31/17 2116   • pantoprazole (PROTONIX) injection 40 mg  40 mg Intravenous Q AM Rui Shaver MD           Objective     Physical Exam:   Temp:  [97 °F (36.1 °C)-99.3 °F (37.4 °C)] 98.4 °F (36.9 °C)  Heart Rate:  [] 116  Resp:  [16-20] 16  BP: (113-160)/() 157/91  Arterial Line BP: (101-192)/(80-91) 123/90     Intake/Output       05/31/17 0700 - 06/01/17 0659    Intake (ml) 3700    Output (ml) 955    Net (ml) 2745    Last Weight 214 lb 8.1 oz (97.3 kg)          Physical Exam   Constitutional: He is oriented to person, place, and time. He appears well-developed and well-nourished.   Cardiovascular: Regular rhythm, normal heart sounds and intact distal pulses.  Exam reveals no gallop and no friction rub.    No murmur heard.  Slightly tachycardic at 116.    Pulmonary/Chest: Effort normal and breath sounds normal. No respiratory distress. He has no wheezes. He has no rales.   Abdominal: There is tenderness.   Bowel sounds present but distant   Musculoskeletal:   Calves not tender to palpation bilaterally  '   Neurological: He is alert and oriented to person, place, and time.   Skin: Skin is warm. No rash noted. No erythema.        Dressing is dry and intact   Vitals reviewed.        Labs:  Lab Results (last 24 hours)     Procedure Component Value Units Date/Time    POC Glucose Fingerstick [[id number]]  (Abnormal) Collected:  05/31/17 1247    Specimen:  Blood Updated:  05/31/17 1258     Glucose 356 (H) mg/dL       RN NotifiedMeter: [id number]: [id number] SOPHIA ARELLANO       POC Glucose Fingerstick [[id number]]  (Abnormal) Collected:  05/31/17 1403    Specimen:  Blood Updated:   05/31/17 1415     Glucose 272 (H) mg/dL       RN NotifiedMeter: [id number]: [id number] ARNEL CRUZ       Blood Gas, Arterial [[id number]]  (Abnormal) Collected:  05/31/17 1752    Specimen:  Arterial Blood Updated:  05/31/17 1758     Site --      Not performed at this site.        Vlad's Test --      Not performed at this site.        pH, Arterial 7.434 pH units      pCO2, Arterial 35.7 mm Hg      pO2, Arterial 130.7 (H) mm Hg      HCO3, Arterial 23.4 mmol/L      Base Excess, Arterial -0.3 mmol/L      O2 Saturation, Arterial 98.8 %      Hemoglobin, Blood Gas 15.2 g/dL      Hematocrit, Blood Gas 45.0 %      CO2 Content 24.5     Sodium, Arterial 134.7 (L) mmol/L      Potassium, Arterial 3.08 (L) mmol/L      Glucose, Arterial 235 mmol/L      Barometric Pressure for Blood Gas -- mmHg       Not performed at this site.        Modality --      Not performed at this site.        Ionized Calcium 4.4 (L) mg/dL     POC Glucose Fingerstick [[id number]]  (Abnormal) Collected:  05/31/17 2005    Specimen:  Blood Updated:  05/31/17 2016     Glucose 261 (H) mg/dL       RN NotifiedMeter: [id number]: [id number] BAO BOTELLO       CBC & Differential [[id number]] Collected:  05/31/17 2013    Specimen:  Blood Updated:  05/31/17 2020    Narrative:       The following orders were created for panel order CBC & Differential.  Procedure                               Abnormality         Status                     ---------                               -----------         ------                     CBC Auto Differential[[id number]]        Abnormal            Final result                 Please view results for these tests on the individual orders.    CBC Auto Differential [[id number]]  (Abnormal) Collected:  05/31/17 2013    Specimen:  Blood Updated:  05/31/17 2020     WBC 19.93 (H) 10*3/mm3      RBC 4.70 10*6/mm3      Hemoglobin 14.5 g/dL      Hematocrit 39.6 %      MCV 84.3 fL      MCH 30.9 pg      MCHC 36.6 (H) g/dL       RDW 12.6 %      RDW-SD 38.5 fl      MPV 10.4 fL      Platelets 300 10*3/mm3      Neutrophil % 86.8 (H) %      Lymphocyte % 6.0 (L) %      Monocyte % 6.3 %      Eosinophil % 0.2 %      Basophil % 0.1 %      Immature Grans % 0.6 (H) %      Neutrophils, Absolute 17.33 (H) 10*3/mm3      Lymphocytes, Absolute 1.19 10*3/mm3      Monocytes, Absolute 1.25 (H) 10*3/mm3      Eosinophils, Absolute 0.03 10*3/mm3      Basophils, Absolute 0.02 10*3/mm3      Immature Grans, Absolute 0.11 (H) 10*3/mm3     Basic Metabolic Panel [[id number]]  (Abnormal) Collected:  05/31/17 2013    Specimen:  Blood Updated:  05/31/17 2035     Glucose 298 (H) mg/dL      BUN 8 mg/dL      Creatinine 0.70 mg/dL      Sodium 133 (L) mmol/L      Potassium 3.7 mmol/L      Chloride 98 mmol/L      CO2 25.0 mmol/L      Calcium 8.0 (L) mg/dL      eGFR Non African Amer 118 mL/min/1.73      BUN/Creatinine Ratio 11.4     Anion Gap 10.0 mmol/L     Magnesium [[id number]]  (Normal) Collected:  05/31/17 2013    Specimen:  Blood Updated:  05/31/17 2035     Magnesium 1.8 mg/dL     Phosphorus [[id number]]  (Normal) Collected:  05/31/17 2013    Specimen:  Blood Updated:  05/31/17 2035     Phosphorus 2.7 mg/dL     POC Glucose Fingerstick [[id number]]  (Abnormal) Collected:  05/31/17 2141    Specimen:  Blood Updated:  05/31/17 2224     Glucose 309 (H) mg/dL       Sliding Scale AdminMeter: [id number]: [id number] GRIS ELVA             Images:   Imaging Results (last 24 hours)     ** No results found for the last 24 hours. **          (I reviewed the patient's results and images.)                                                                                    ASSESSMENT/PLAN    56 year old male POD#1 s/p OPEN ADHESIOLYSIS AND VENTRAL/INCISIONAL HERNIA REPAIR WITH PHASIX MESH.    1.dvt prophylaxis-lovenox  2.arevalo out today  3.arterial line out today  4.consider increasing PRN pain dose  5.OOB today to chair  6.NPO until return of bowel function  7.incentive  spirometry  8. Tachycardia- likely fluid related- will give RL bolus  9. Hyperglycemia- will start and insulin drip      Kirt Villarreal, Medical Student           This document has been electronically signed by Rui Shaver MD on June 1, 2017 8:52 AM

## 2017-06-02 ENCOUNTER — APPOINTMENT (OUTPATIENT)
Dept: GENERAL RADIOLOGY | Facility: HOSPITAL | Age: 57
End: 2017-06-02

## 2017-06-02 PROBLEM — R33.8 POSTOPERATIVE URINARY RETENTION: Status: ACTIVE | Noted: 2017-06-02

## 2017-06-02 PROBLEM — N99.89 POSTOPERATIVE URINARY RETENTION: Status: ACTIVE | Noted: 2017-06-02

## 2017-06-02 LAB
ALBUMIN SERPL-MCNC: 3.2 G/DL (ref 3.4–4.8)
ALBUMIN/GLOB SERPL: 1.1 G/DL (ref 1.1–1.8)
ALP SERPL-CCNC: 86 U/L (ref 38–126)
ALT SERPL W P-5'-P-CCNC: 63 U/L (ref 21–72)
ANION GAP SERPL CALCULATED.3IONS-SCNC: 7 MMOL/L (ref 5–15)
ANION GAP SERPL CALCULATED.3IONS-SCNC: 8 MMOL/L (ref 5–15)
AST SERPL-CCNC: 35 U/L (ref 17–59)
BACTERIA UR QL AUTO: ABNORMAL /HPF
BASOPHILS # BLD AUTO: 0.04 10*3/MM3 (ref 0–0.2)
BASOPHILS NFR BLD AUTO: 0.2 % (ref 0–2)
BILIRUB SERPL-MCNC: 1 MG/DL (ref 0.2–1.3)
BILIRUB UR QL STRIP: ABNORMAL
BUN BLD-MCNC: 17 MG/DL (ref 7–21)
BUN BLD-MCNC: 17 MG/DL (ref 7–21)
BUN/CREAT SERPL: 23.3 (ref 7–25)
BUN/CREAT SERPL: 24.6 (ref 7–25)
CALCIUM SPEC-SCNC: 8.1 MG/DL (ref 8.4–10.2)
CALCIUM SPEC-SCNC: 8.1 MG/DL (ref 8.4–10.2)
CHLORIDE SERPL-SCNC: 98 MMOL/L (ref 95–110)
CHLORIDE SERPL-SCNC: 99 MMOL/L (ref 95–110)
CLARITY UR: ABNORMAL
CO2 SERPL-SCNC: 28 MMOL/L (ref 22–31)
CO2 SERPL-SCNC: 28 MMOL/L (ref 22–31)
COLOR UR: ABNORMAL
CREAT BLD-MCNC: 0.69 MG/DL (ref 0.7–1.3)
CREAT BLD-MCNC: 0.73 MG/DL (ref 0.7–1.3)
DEPRECATED RDW RBC AUTO: 43.8 FL (ref 35.1–43.9)
EOSINOPHIL # BLD AUTO: 0.11 10*3/MM3 (ref 0–0.7)
EOSINOPHIL NFR BLD AUTO: 0.5 % (ref 0–7)
ERYTHROCYTE [DISTWIDTH] IN BLOOD BY AUTOMATED COUNT: 13.3 % (ref 11.5–14.5)
GFR SERPL CREATININE-BSD FRML MDRD: 112 ML/MIN/1.73 (ref 60–130)
GFR SERPL CREATININE-BSD FRML MDRD: 119 ML/MIN/1.73 (ref 60–130)
GLOBULIN UR ELPH-MCNC: 2.8 GM/DL (ref 2.3–3.5)
GLUCOSE BLD-MCNC: 135 MG/DL (ref 60–100)
GLUCOSE BLD-MCNC: 136 MG/DL (ref 60–100)
GLUCOSE BLDC GLUCOMTR-MCNC: 101 MG/DL (ref 70–130)
GLUCOSE BLDC GLUCOMTR-MCNC: 101 MG/DL (ref 70–130)
GLUCOSE BLDC GLUCOMTR-MCNC: 112 MG/DL (ref 70–130)
GLUCOSE BLDC GLUCOMTR-MCNC: 112 MG/DL (ref 70–130)
GLUCOSE BLDC GLUCOMTR-MCNC: 121 MG/DL (ref 70–130)
GLUCOSE BLDC GLUCOMTR-MCNC: 126 MG/DL (ref 70–130)
GLUCOSE BLDC GLUCOMTR-MCNC: 126 MG/DL (ref 70–130)
GLUCOSE BLDC GLUCOMTR-MCNC: 129 MG/DL (ref 70–130)
GLUCOSE BLDC GLUCOMTR-MCNC: 131 MG/DL (ref 70–130)
GLUCOSE BLDC GLUCOMTR-MCNC: 134 MG/DL (ref 70–130)
GLUCOSE BLDC GLUCOMTR-MCNC: 140 MG/DL (ref 70–130)
GLUCOSE BLDC GLUCOMTR-MCNC: 159 MG/DL (ref 70–130)
GLUCOSE BLDC GLUCOMTR-MCNC: 161 MG/DL (ref 70–130)
GLUCOSE BLDC GLUCOMTR-MCNC: 163 MG/DL (ref 70–130)
GLUCOSE BLDC GLUCOMTR-MCNC: 98 MG/DL (ref 70–130)
GLUCOSE UR STRIP-MCNC: ABNORMAL MG/DL
HCT VFR BLD AUTO: 32.9 % (ref 39–49)
HGB BLD-MCNC: 11.2 G/DL (ref 13.7–17.3)
HGB UR QL STRIP.AUTO: ABNORMAL
HYALINE CASTS UR QL AUTO: ABNORMAL /LPF
IMM GRANULOCYTES # BLD: 0.08 10*3/MM3 (ref 0–0.02)
IMM GRANULOCYTES NFR BLD: 0.4 % (ref 0–0.5)
KETONES UR QL STRIP: ABNORMAL
LEUKOCYTE ESTERASE UR QL STRIP.AUTO: NEGATIVE
LYMPHOCYTES # BLD AUTO: 1.13 10*3/MM3 (ref 0.6–4.2)
LYMPHOCYTES NFR BLD AUTO: 5.3 % (ref 10–50)
MCH RBC QN AUTO: 30.2 PG (ref 26.5–34)
MCHC RBC AUTO-ENTMCNC: 34 G/DL (ref 31.5–36.3)
MCV RBC AUTO: 88.7 FL (ref 80–98)
MONOCYTES # BLD AUTO: 2.8 10*3/MM3 (ref 0–0.9)
MONOCYTES NFR BLD AUTO: 13.2 % (ref 0–12)
NEUTROPHILS # BLD AUTO: 17.12 10*3/MM3 (ref 2–8.6)
NEUTROPHILS NFR BLD AUTO: 80.4 % (ref 37–80)
NITRITE UR QL STRIP: NEGATIVE
NRBC BLD MANUAL-RTO: 0 /100 WBC (ref 0–0)
PH UR STRIP.AUTO: 5.5 [PH] (ref 5–9)
PLATELET # BLD AUTO: 275 10*3/MM3 (ref 150–450)
PMV BLD AUTO: 11.5 FL (ref 8–12)
POTASSIUM BLD-SCNC: 4.1 MMOL/L (ref 3.5–5.1)
POTASSIUM BLD-SCNC: 4.2 MMOL/L (ref 3.5–5.1)
PROT SERPL-MCNC: 6 G/DL (ref 6.3–8.6)
PROT UR QL STRIP: ABNORMAL
RBC # BLD AUTO: 3.71 10*6/MM3 (ref 4.37–5.74)
RBC # UR: ABNORMAL /HPF
REF LAB TEST METHOD: ABNORMAL
SODIUM BLD-SCNC: 134 MMOL/L (ref 137–145)
SODIUM BLD-SCNC: 134 MMOL/L (ref 137–145)
SP GR UR STRIP: 1.03 (ref 1–1.03)
SQUAMOUS #/AREA URNS HPF: ABNORMAL /HPF
UROBILINOGEN UR QL STRIP: ABNORMAL
WBC NRBC COR # BLD: 21.28 10*3/MM3 (ref 3.2–9.8)
WBC UR QL AUTO: ABNORMAL /HPF

## 2017-06-02 PROCEDURE — G8988 SELF CARE GOAL STATUS: HCPCS

## 2017-06-02 PROCEDURE — 85025 COMPLETE CBC W/AUTO DIFF WBC: CPT | Performed by: SURGERY

## 2017-06-02 PROCEDURE — 25010000002 ONDANSETRON PER 1 MG: Performed by: SURGERY

## 2017-06-02 PROCEDURE — 82962 GLUCOSE BLOOD TEST: CPT

## 2017-06-02 PROCEDURE — 80053 COMPREHEN METABOLIC PANEL: CPT | Performed by: SURGERY

## 2017-06-02 PROCEDURE — 87086 URINE CULTURE/COLONY COUNT: CPT | Performed by: FAMILY MEDICINE

## 2017-06-02 PROCEDURE — 97530 THERAPEUTIC ACTIVITIES: CPT

## 2017-06-02 PROCEDURE — G8987 SELF CARE CURRENT STATUS: HCPCS

## 2017-06-02 PROCEDURE — 25010000002 HYDROMORPHONE PCA 0.2 MG/ML PCA 30 ML (BHMAD/RIC): Performed by: SURGERY

## 2017-06-02 PROCEDURE — 25010000002 HYDROMORPHONE PER 4 MG: Performed by: SURGERY

## 2017-06-02 PROCEDURE — 97535 SELF CARE MNGMENT TRAINING: CPT

## 2017-06-02 PROCEDURE — 71020 HC CHEST PA AND LATERAL: CPT

## 2017-06-02 PROCEDURE — 99024 POSTOP FOLLOW-UP VISIT: CPT | Performed by: SURGERY

## 2017-06-02 PROCEDURE — 81001 URINALYSIS AUTO W/SCOPE: CPT | Performed by: FAMILY MEDICINE

## 2017-06-02 PROCEDURE — 97110 THERAPEUTIC EXERCISES: CPT

## 2017-06-02 PROCEDURE — 25010000002 ENOXAPARIN PER 10 MG: Performed by: SURGERY

## 2017-06-02 PROCEDURE — 97166 OT EVAL MOD COMPLEX 45 MIN: CPT

## 2017-06-02 RX ADMIN — PANTOPRAZOLE SODIUM 40 MG: 40 INJECTION, POWDER, FOR SOLUTION INTRAVENOUS at 05:46

## 2017-06-02 RX ADMIN — MUPIROCIN: 20 OINTMENT TOPICAL at 14:00

## 2017-06-02 RX ADMIN — HYDROMORPHONE HYDROCHLORIDE 1 MG: 1 INJECTION, SOLUTION INTRAMUSCULAR; INTRAVENOUS; SUBCUTANEOUS at 23:55

## 2017-06-02 RX ADMIN — ONDANSETRON 4 MG: 2 INJECTION INTRAMUSCULAR; INTRAVENOUS at 05:47

## 2017-06-02 RX ADMIN — ATORVASTATIN CALCIUM 20 MG: 20 TABLET, FILM COATED ORAL at 20:37

## 2017-06-02 RX ADMIN — HYDROMORPHONE HYDROCHLORIDE 1.5 MG: 2 INJECTION, SOLUTION INTRAMUSCULAR; INTRAVENOUS; SUBCUTANEOUS at 05:47

## 2017-06-02 RX ADMIN — SODIUM CHLORIDE, POTASSIUM CHLORIDE, SODIUM LACTATE AND CALCIUM CHLORIDE 100 ML/HR: 600; 310; 30; 20 INJECTION, SOLUTION INTRAVENOUS at 11:00

## 2017-06-02 RX ADMIN — MUPIROCIN: 20 OINTMENT TOPICAL at 05:47

## 2017-06-02 RX ADMIN — MUPIROCIN: 20 OINTMENT TOPICAL at 22:00

## 2017-06-02 RX ADMIN — SODIUM CHLORIDE, POTASSIUM CHLORIDE, SODIUM LACTATE AND CALCIUM CHLORIDE 100 ML/HR: 600; 310; 30; 20 INJECTION, SOLUTION INTRAVENOUS at 21:25

## 2017-06-02 RX ADMIN — Medication: at 06:50

## 2017-06-02 RX ADMIN — SODIUM CHLORIDE, POTASSIUM CHLORIDE, SODIUM LACTATE AND CALCIUM CHLORIDE 100 ML/HR: 600; 310; 30; 20 INJECTION, SOLUTION INTRAVENOUS at 01:04

## 2017-06-02 RX ADMIN — HYDROMORPHONE HYDROCHLORIDE 1 MG: 1 INJECTION, SOLUTION INTRAMUSCULAR; INTRAVENOUS; SUBCUTANEOUS at 20:38

## 2017-06-02 RX ADMIN — ENOXAPARIN SODIUM 40 MG: 40 INJECTION SUBCUTANEOUS at 09:57

## 2017-06-02 RX ADMIN — HYDROMORPHONE HYDROCHLORIDE 1.5 MG: 2 INJECTION, SOLUTION INTRAMUSCULAR; INTRAVENOUS; SUBCUTANEOUS at 13:25

## 2017-06-02 NOTE — PROGRESS NOTES
CRITICAL CARE DAILY PROGRESS NOTE  NAME: Ventura Boggs  : 1960  MRN: [id number]     LOS: 2 days     PROVIDER OF SERVICE: Mary Dickson MD    Chief Complaint: Ventral hernia without obstruction or gangrene    Subjective:     Interval History:  History obtained from patient and RN  POD#2: Open adhesiolysis and ventral/incisional hernia repair with Phasix mesh    Patient complains of generalized abdominal pain, worse in right lower quadrant.  He has had some flatus but no bowel movement.  He reports some nausea but no vomiting. He denies chest pain, shortness of breath. RN reports he has been tachycardic overnight, abdomen appears distended and he is complaining of more pain.  Wiley back in place due to urinary retention. He is febrile to 100.7 degrees.     Review of Systems:   Review of Systems   Constitutional: Negative for activity change, chills and fever.   HENT: Negative for congestion, sinus pressure and sore throat.    Eyes: Negative for photophobia and visual disturbance.   Respiratory: Negative for cough, shortness of breath and wheezing.    Cardiovascular: Negative for chest pain, palpitations and leg swelling.   Gastrointestinal: Positive for abdominal distention, abdominal pain (generalized but worse in right lower quadrant) and nausea. Negative for vomiting.   Endocrine: Negative for polydipsia, polyphagia and polyuria.   Genitourinary: Positive for difficulty urinating and hematuria.        Wiley in place   Musculoskeletal: Negative for arthralgias and myalgias.   Skin: Positive for wound (surgical).   Neurological: Negative for dizziness, facial asymmetry, speech difficulty and light-headedness.   Psychiatric/Behavioral: Negative for agitation, confusion and decreased concentration.       Objective:     Vital Signs  Temp:  [98.1 °F (36.7 °C)-100.7 °F (38.2 °C)] 100.7 °F (38.2 °C)  Heart Rate:  [104-139] 125  Resp:  [12-20] 16  BP: ()/() 147/81  Body mass index is 32.72  kg/(m^2).      Intake/Output Summary (Last 24 hours) at 06/02/17 0613  Last data filed at 06/02/17 0536   Gross per 24 hour   Intake           4168.8 ml   Output             1685 ml   Net           2483.8 ml       I/O last 3 completed shifts:  In: 7982.7 [I.V.:6182.7; IV Piggyback:1800]  Out: 3080 [Urine:2590; Other:390; Blood:100]  I/O this shift:  In: 837.8 [I.V.:837.8]  Out: 845 [Urine:750; Other:95]    Physical Exam  Physical Exam   Constitutional: He is oriented to person, place, and time. He appears well-developed and well-nourished.   HENT:   Head: Normocephalic and atraumatic.   Nose: Nose normal.   Mouth/Throat: Oropharynx is clear and moist.   Eyes: Conjunctivae and EOM are normal. Pupils are equal, round, and reactive to light. No scleral icterus.   Neck: Normal range of motion. Neck supple. No JVD present. No tracheal deviation present.   Cardiovascular: Regular rhythm, normal heart sounds and intact distal pulses.  Tachycardia present.  Exam reveals no gallop and no friction rub.    No murmur heard.  Pulmonary/Chest: Effort normal and breath sounds normal. No respiratory distress. He has no wheezes. He has no rales.   Abdominal: Soft. He exhibits distension. Bowel sounds are decreased. There is generalized tenderness. There is guarding (voluntary). There is no rigidity.       Musculoskeletal: Normal range of motion. He exhibits no edema or tenderness (no calf tenderness bilaterally).   Neurological: He is alert and oriented to person, place, and time. No cranial nerve deficit or sensory deficit. GCS eye subscore is 4. GCS verbal subscore is 5. GCS motor subscore is 6.   Skin: Skin is warm. There is erythema (right lower quadrant with increased warmth).   Psychiatric: He has a normal mood and affect.   Vitals reviewed.      Medication Review    Current Facility-Administered Medications:   •  atorvastatin (LIPITOR) tablet 20 mg, 20 mg, Oral, Nightly, Rui Shaver MD, 20 mg at 06/01/17 5423  •   bupivacaine-EPINEPHrine PF (MARCAINE w/EPI) 0.5% -1:[id number] injection, , , PRN, Rui Shaver MD, 30 mL at 05/31/17 1525  •  dextrose (D50W) solution 25 g, 25 g, Intravenous, Q15 Min PRN, Rui Shaver MD  •  dextrose (GLUTOSE) oral gel 15 g, 15 g, Oral, Q15 Min PRN, Rui Shaver MD  •  enoxaparin (LOVENOX) syringe 40 mg, 40 mg, Subcutaneous, Daily, Rui Shaver MD, 40 mg at 06/01/17 0817  •  glucagon (human recombinant) (GLUCAGEN DIAGNOSTIC) injection 1 mg, 1 mg, Subcutaneous, Q15 Min PRN, Rui Shaver MD  •  HYDROmorphone (DILAUDID) injection 1 mg, 1 mg, Intravenous, Q3H PRN, 1 mg at 06/01/17 0626 **AND** [DISCONTINUED] naloxone (NARCAN) injection 0.1 mg, 0.1 mg, Intravenous, Q5 Min PRN, Rui Shaver MD  •  HYDROmorphone (DILAUDID) injection 1.5 mg, 1.5 mg, Intravenous, Q3H PRN, Rui Shaver MD, 1.5 mg at 06/02/17 0547  •  HYDROmorphone (DILAUDID) PCA 0.2 mg/mL 30 mL syringe, , Intravenous, Continuous, Rui Shaver MD  •  insulin aspart (novoLOG) injection 0-9 Units, 0-9 Units, Subcutaneous, 4x Daily AC & at Bedtime, Rui Shaver MD, 6 Units at 06/01/17 0649  •  insulin regular (HumuLIN R,NovoLIN R) 100 Units in sodium chloride 0.9 % 100 mL (1 Units/mL) infusion, 0-50 Units/hr, Intravenous, Titrated, Rui Shaver MD, Last Rate: 0.7 mL/hr at 06/02/17 0500, 0.7 Units/hr at 06/02/17 0500  •  labetalol (NORMODYNE,TRANDATE) injection 20 mg, 20 mg, Intravenous, Q6H PRN, Rui Shaver MD, 20 mg at 06/01/17 1650  •  lactated ringers infusion, 100 mL/hr, Intravenous, Continuous, Rui Shaver MD, Last Rate: 100 mL/hr at 06/02/17 0104, 100 mL/hr at 06/02/17 0104  •  LIDOCAINE 1/6% WITH EPINEPHRINE SOLUTION, , , PRN, Rui Shaver MD, 150 mL at 05/31/17 1525  •  mupirocin (BACTROBAN) 2 % ointment, , Topical, Q8H, Rui Shaver MD  •  naloxone (NARCAN) injection 0.1 mg, 0.1 mg, Intravenous, Q5 Min PRN, Rui Shaver MD  •  ondansetron (ZOFRAN) injection 4 mg, 4 mg, Intravenous, Q6H PRN, Rui Shaver MD, 4 mg at 06/02/17 0505  •   pantoprazole (PROTONIX) injection 40 mg, 40 mg, Intravenous, Q AM, Rui Shaver MD, 40 mg at 06/02/17 0546     Diagnostic Data    Lab Results (last 24 hours)     Procedure Component Value Units Date/Time    POC Glucose Fingerstick [[id number]]  (Abnormal) Collected:  06/01/17 0644    Specimen:  Blood Updated:  06/01/17 0656     Glucose 277 (H) mg/dL       Sliding Scale AdminMeter: [id number]: [id number] [facility]       Tissue Exam [[id number]] Collected:  05/31/17 1924    Specimen:  Tissue from Hernia, Sac Updated:  06/01/17 0828    Basic Metabolic Panel [[id number]]  (Abnormal) Collected:  06/01/17 0902    Specimen:  Blood Updated:  06/01/17 0936     Glucose 217 (H) mg/dL      BUN 11 mg/dL      Creatinine 0.77 mg/dL      Sodium 137 mmol/L      Potassium 4.2 mmol/L      Chloride 99 mmol/L      CO2 27.0 mmol/L      Calcium 8.1 (L) mg/dL      eGFR Non African Amer 105 mL/min/1.73      BUN/Creatinine Ratio 14.3     Anion Gap 11.0 mmol/L     POC Glucose Fingerstick [[id number]]  (Abnormal) Collected:  06/01/17 0940    Specimen:  Blood Updated:  06/01/17 0951     Glucose 225 (H) mg/dL       Sliding Scale AdminMeter: [id number]: [id number] JOHN STEVEN       Basic Metabolic Panel [[id number]]  (Abnormal) Collected:  06/01/17 1142    Specimen:  Blood Updated:  06/01/17 1201     Glucose 212 (H) mg/dL      BUN 12 mg/dL      Creatinine 0.74 mg/dL      Sodium 137 mmol/L      Potassium 4.1 mmol/L      Chloride 100 mmol/L      CO2 26.0 mmol/L      Calcium 8.2 (L) mg/dL      eGFR Non African Amer 110 mL/min/1.73      BUN/Creatinine Ratio 16.2     Anion Gap 11.0 mmol/L     POC Glucose Fingerstick [[id number]]  (Abnormal) Collected:  06/01/17 1124    Specimen:  Blood Updated:  06/01/17 1627     Glucose 220 (H) mg/dL       Sliding Scale AdminMeter: [id number]: [id number] JOHN STEVEN       POC Glucose Fingerstick [[id number]]  (Abnormal) Collected:  06/01/17 1226    Specimen:  Blood Updated:   06/01/17 1627     Glucose 197 (H) mg/dL       Meter: [id number]: [id number] JOHN TENISHA       POC Glucose Fingerstick [[id number]]  (Abnormal) Collected:  06/01/17 1331    Specimen:  Blood Updated:  06/01/17 1627     Glucose 180 (H) mg/dL       Sliding Scale AdminMeter: [id number]: [id number] JOHN TENISHA       POC Glucose Fingerstick [[id number]]  (Abnormal) Collected:  06/01/17 1437    Specimen:  Blood Updated:  06/01/17 1627     Glucose 169 (H) mg/dL       Sliding Scale AdminMeter: [id number]: [id number] JOHN TENISHA       POC Glucose Fingerstick [[id number]]  (Abnormal) Collected:  06/01/17 1542    Specimen:  Blood Updated:  06/01/17 1628     Glucose 199 (H) mg/dL       Sliding Scale AdminMeter: [id number]: [id number] JOHN TENISHA       POC Glucose Fingerstick [[id number]]  (Abnormal) Collected:  06/01/17 1638    Specimen:  Blood Updated:  06/01/17 1651     Glucose 200 (H) mg/dL       Sliding Scale AdminMeter: [id number]: [id number] JOHN TENISHA       POC Glucose Fingerstick [[id number]]  (Abnormal) Collected:  06/01/17 1746    Specimen:  Blood Updated:  06/01/17 1808     Glucose 150 (H) mg/dL       Sliding Scale AdminMeter: [id number]: [id number] JOHN TENISHA       POC Glucose Fingerstick [[id number]]  (Abnormal) Collected:  06/01/17 1824    Specimen:  Blood Updated:  06/01/17 1835     Glucose 165 (H) mg/dL       Sliding Scale AdminMeter: [id number]: [id number] JOHN TENISHA       POC Glucose Fingerstick [[id number]]  (Abnormal) Collected:  06/01/17 1934    Specimen:  Blood Updated:  06/01/17 2108     Glucose 142 (H) mg/dL       Meter: [id number]: [id number] GRIS STEVENSON       POC Glucose Fingerstick [[id number]]  (Normal) Collected:  06/01/17 2034    Specimen:  Blood Updated:  06/01/17 2108     Glucose 120 mg/dL       Meter: [id number]: [id number] GRIS STEVENSON       POC Glucose Fingerstick [[id number]]   (Abnormal) Collected:  06/01/17 2133    Specimen:  Blood Updated:  06/01/17 2148     Glucose 139 (H) mg/dL       Meter: [id number]: [id number] GRIS CAHLE       POC Glucose Fingerstick [[id number]]  (Normal) Collected:  06/01/17 2241    Specimen:  Blood Updated:  06/01/17 2252     Glucose 110 mg/dL       Meter: [id number]: [id number] GRIS CAHLE       POC Glucose Fingerstick [[id number]]  (Normal) Collected:  06/01/17 2345    Specimen:  Blood Updated:  06/01/17 2356     Glucose 95 mg/dL       Meter: [id number]: [id number] GRIS CAHLE       POC Glucose Fingerstick [[id number]]  (Normal) Collected:  06/02/17 0039    Specimen:  Blood Updated:  06/02/17 0052     Glucose 121 mg/dL       Meter: [id number]: [id number] GRIS CAHLE       POC Glucose Fingerstick [[id number]]  (Normal) Collected:  06/02/17 0134    Specimen:  Blood Updated:  06/02/17 0453     Glucose 112 mg/dL       Meter: [id number]: [id number] GRIS CAHLE       POC Glucose Fingerstick [[id number]]  (Normal) Collected:  06/02/17 0231    Specimen:  Blood Updated:  06/02/17 0453     Glucose 101 mg/dL       Meter: [id number]: [id number] GRIS CAHLE       POC Glucose Fingerstick [[id number]]  (Normal) Collected:  06/02/17 0333    Specimen:  Blood Updated:  06/02/17 0454     Glucose 101 mg/dL       Meter: [id number]: [id number] GRIS CAHLE       POC Glucose Fingerstick [[id number]]  (Normal) Collected:  06/02/17 0438    Specimen:  Blood Updated:  06/02/17 0454     Glucose 98 mg/dL       Meter: [id number]: [id number] GRIS CAHLE       Basic Metabolic Panel [[id number]] Collected:  06/02/17 0621    Specimen:  Blood Updated:  06/02/17 0625    CBC & Differential [[id number]] Collected:  06/02/17 0621    Specimen:  Blood Updated:  06/02/17 0628    Narrative:       The following orders were created for panel order CBC & Differential.  Procedure                                Abnormality         Status                     ---------                               -----------         ------                     CBC Auto Differential[[id number]]        Abnormal            Final result                 Please view results for these tests on the individual orders.    CBC Auto Differential [[id number]]  (Abnormal) Collected:  06/02/17 0621    Specimen:  Blood Updated:  06/02/17 0628     WBC 21.28 (H) 10*3/mm3      RBC 3.71 (L) 10*6/mm3      Hemoglobin 11.2 (L) g/dL      Hematocrit 32.9 (L) %      MCV 88.7 fL      MCH 30.2 pg      MCHC 34.0 g/dL      RDW 13.3 %      RDW-SD 43.8 fl      MPV 11.5 fL      Platelets 275 10*3/mm3      Neutrophil % 80.4 (H) %      Lymphocyte % 5.3 (L) %      Monocyte % 13.2 (H) %      Eosinophil % 0.5 %      Basophil % 0.2 %      Immature Grans % 0.4 %      Neutrophils, Absolute 17.12 (H) 10*3/mm3      Lymphocytes, Absolute 1.13 10*3/mm3      Monocytes, Absolute 2.80 (H) 10*3/mm3      Eosinophils, Absolute 0.11 10*3/mm3      Basophils, Absolute 0.04 10*3/mm3      Immature Grans, Absolute 0.08 (H) 10*3/mm3      nRBC 0.0 /100 WBC         I reviewed the patient's new clinical results.    Assessment/Plan:     Active Hospital Problems (** Indicates Principal Problem)    Diagnosis Date Noted   • **Ventral hernia without obstruction or gangrene [K43.9] 05/31/2017     Priority: High      POD#2: Open adhesiolysis and ventral/incisional hernia repair with Phasix mesh  - NPO except ice chips  - OOB to chair   - Monitor drain output  - Pain control: Dilaudid  - Continue with incentive spirometry  - H/H stable     • Postoperative urinary retention [N99.89, R33.8] 06/02/2017     - arevalo removed post-op but patient unable to void  - arevalo was replaced 6/1/17, blood tinged urine in arevalo bag   - recommend urinalysis  - bladder training  - monitor urine output     • Controlled type 2 diabetes mellitus without complication, with long-term current use of insulin [E11.9, Z79.4]  05/24/2017     - currently NPO  - fingersticks, SSI, on insulin drip at 0.8h/hr titrated  - hold metformin during hospital stay  - Continue lipitor  - blood glucose at goal        Resolved Hospital Problems    Diagnosis Date Noted Date Resolved   No resolved problems to display.     Fever:  recommend urinalysis with culture and sensitivities if indicated  Tachycardia: likely due to pain      DVT prophylaxis: Lovenox  GI prophylaxis: Protonix    Code status is Full Code    Plan for disposition:Home when medically stable    Signature  Mary Dickson MD PGY2  [place] [address]  Office: [phone]    This document has been electronically signed by Mary Dickson MD on June 2, 2017 6:38 AM      Agree with above  Will check a cxr and UA today. Continue ICU care    .      This document has been electronically signed by Rui Shaver MD on June 2, 2017 9:01 AM

## 2017-06-02 NOTE — PLAN OF CARE
Problem: Patient Care Overview (Adult)  Goal: Plan of Care Review  Outcome: Ongoing (interventions implemented as appropriate)    06/01/17 1751 06/02/17 0000 06/02/17 0516   Coping/Psychosocial Response Interventions   Plan Of Care Reviewed With --  patient --    Patient Care Overview   Progress progress toward functional goals as expected --  --    Outcome Evaluation   Outcome Summary/Follow up Plan --  --  Pt has been stable throughout the night. HR continues to be high. Urine is cloudy and blood tinged but with good output       Goal: Adult Individualization and Mutuality  Outcome: Ongoing (interventions implemented as appropriate)  Goal: Discharge Needs Assessment  Outcome: Ongoing (interventions implemented as appropriate)    Problem: Perioperative Period (Adult)  Goal: Signs and Symptoms of Listed Potential Problems Will be Absent or Manageable (Perioperative Period)  Outcome: Ongoing (interventions implemented as appropriate)    Problem: Fall Risk (Adult)  Goal: Identify Related Risk Factors and Signs and Symptoms  Outcome: Ongoing (interventions implemented as appropriate)  Goal: Absence of Falls  Outcome: Ongoing (interventions implemented as appropriate)    Problem: Skin Integrity Impairment, Risk/Actual (Adult)  Goal: Identify Related Risk Factors and Signs and Symptoms  Outcome: Ongoing (interventions implemented as appropriate)  Goal: Skin Integrity/Wound Healing  Outcome: Ongoing (interventions implemented as appropriate)

## 2017-06-02 NOTE — THERAPY TREATMENT NOTE
Acute Care - Physical Therapy Treatment Note  [facility]     Patient Name: Ventura Boggs  : 1960  MRN: [id number]  Today's Date: 2017  Onset of Illness/Injury or Date of Surgery Date: 17  Date of Referral to PT: 07  Referring Physician: Dr. Shaver    Admit Date: 2017    Visit Dx:    ICD-10-CM ICD-9-CM   1. Ventral hernia without obstruction or gangrene K43.9 553.20   2. Impaired physical mobility Z74.09 781.99     Patient Active Problem List   Diagnosis   • Incisional hernia, without obstruction or gangrene   • Controlled type 2 diabetes mellitus without complication, with long-term current use of insulin   • Ventral hernia without obstruction or gangrene   • Postoperative urinary retention               Adult Rehabilitation Note       17 1000          Rehab Assessment/Intervention    Discipline physical therapy assistant  -TW      Document Type therapy note (daily note)  -TW      Subjective Information agree to therapy  -TW      Patient Effort, Rehab Treatment good  -TW      Precautions/Limitations fall precautions  -TW      Recorded by [TW] Brant Palacios PTA      Vital Signs    Pre Systolic BP Rehab 111  -TW      Pre Treatment Diastolic BP 90  -TW      Post Systolic BP Rehab 124  -TW      Post Treatment Diastolic BP 87  -TW      Pretreatment Heart Rate (beats/min) 128  -TW      Intratreatment Heart Rate (beats/min) 136  -TW      Posttreatment Heart Rate (beats/min) 128  -TW      Pre SpO2 (%) 95  -TW      O2 Delivery Pre Treatment supplemental O2  -TW      Post SpO2 (%) 95  -TW      Pre Patient Position Sitting  -TW      Intra Patient Position Standing  -TW      Post Patient Position Sitting  -TW      Recorded by [TW] Brant Palacios PTA      Pain Assessment    Pain Assessment 0-10  -TW      Pain Score 7  -TW      Post Pain Score 8  -TW      Pain Type Acute pain;Surgical pain  -TW      Pain Location Incision  -TW      Recorded by [TW] Brant Palacios PTA       Cognitive Assessment/Intervention    Current Cognitive/Communication Assessment functional  -TW      Orientation Status oriented x 4  -TW      Follows Commands/Answers Questions 100% of the time  -TW      Personal Safety Interventions nonskid shoes/slippers when out of bed  -TW      Recorded by [TW] Brant Palacios PTA      Transfer Assessment/Treatment    Transfers, Sit-Stand [place] contact guard assist  -TW      Transfers, Stand-Sit [place] contact guard assist  -TW      Recorded by [TW] Brant Palacios PTA      Gait Assessment/Treatment    Gait, [place] Level contact guard assist  -TW      Gait, Assistive Device rolling walker  -TW      Gait, Distance (Feet) 3   fwd and bwd  -TW      Recorded by [TW] Brant Palacios PTA      Therapy Exercises    Bilateral Lower Extremities AROM:;15 reps;sitting;ankle pumps/circles;glut sets;hip abduction/adduction;hip flexion;LAQ   x2 sets  -TW      Recorded by [TW] Brant Palacios PTA      Positioning and Restraints    Pre-Treatment Position sitting in chair/recliner  -TW      Post Treatment Position chair  -TW      In Chair reclined  -TW      Recorded by [TW] Brant Palacios PTA        User Key  (r) = Recorded By, (t) = Taken By, (c) = Cosigned By    Initials Name Effective Dates    TW Brant Palacios PTA 10/17/16 -                 IP PT Goals       06/02/17 1000 06/01/17 1108       Transfer Training PT LTG    Transfer Training PT LTG, Date Established  06/01/17  -GAL     Transfer Training PT LTG, Time to Achieve  by discharge  -GAL     Transfer Training PT LTG, [place] Level  independent  -GAL     Transfer Training PT  LTG, Date Goal Reviewed 06/02/17  -TW      Transfer Training PT LTG, Outcome goal ongoing  -TW goal ongoing  -GAL     Gait Training PT LTG    Gait Training Goal PT LTG, Date Established  06/01/17  -GAL     Gait Training Goal PT LTG, Time to Achieve  by discharge  -GAL     Gait Training Goal PT LTG, [place] Level   independent  -     Gait Training Goal PT LTG, Distance to Achieve  1000ft  -     Gait Training Goal PT LTG, Additional Goal  Pt will complete 6 minute walk test  -     Gait Training Goal PT LTG, Date Goal Reviewed 06/02/17  -      Gait Training Goal PT LTG, Outcome goal ongoing  - goal ongoing  -     Stair Training PT LTG    Stair Training Goal PT LTG, Date Established  06/01/17  -     Stair Training Goal PT LTG, Time to Achieve  by discharge  -     Stair Training Goal PT LTG, Number of Steps  5  -     Stair Training Goal PT LTG, [place] Level  conditional independence  -     Stair Training Goal PT LTG, Assist Device  1 handrail  -     Stair Training Goal PT LTG, Date Goal Reviewed 06/02/17  -      Stair Training Goal PT LTG, Outcome goal ongoing  - goal ongoing  -     Patient Education PT LTG    Patient Education PT LTG, Date Established  06/01/17  -     Patient Education PT LTG, Time to Achieve  by discharge  -     Patient Education PT LTG, Education Type  gait;transfers;home safety  -     Patient Education PT LTG, Date Goal Reviewed 06/02/17  -      Patient Education PT LTG Outcome goal ongoing  - goal ongoing  -       User Key  (r) = Recorded By, (t) = Taken By, (c) = Cosigned By    Initials Name Provider Type    GAL Dumont, PT Physical Therapist    DERREK Palacios, PTA Physical Therapy Assistant          Physical Therapy Education     Title: PT OT SLP Therapies (Active)     Topic: Physical Therapy (Active)     Point: Mobility training (Active)    Learning Progress Summary    Learner Readiness Method Response Comment Documented by Status   Patient Acceptance E NR  GAL 06/01/17 1107 Active   Family Acceptance E NR  GAL 06/01/17 1107 Active               Point: Precautions (Active)    Learning Progress Summary    Learner Readiness Method Response Comment Documented by Status   Patient Acceptance E NR  GAL 06/01/17 1107 Active   Family Acceptance E NR  GAL  06/01/17 1107 Active                      User Key     Initials Effective Dates Name Provider Type Discipline     10/17/16 -  Shilpa Dumont, PT Physical Therapist PT                    PT Recommendation and Plan  Anticipated Discharge Disposition: home with assist  Planned Therapy Interventions: balance training, bed mobility training, gait training, patient/family education, stair training, transfer training  PT Frequency: other (see comments) (5-14 times per week)  Plan of Care Review  Plan Of Care Reviewed With: patient  Progress: improving  Outcome Summary/Follow up Plan: Pt with elevated HR but nsg okayed tx. Pt able to tolerate short distance gait and LE ther ex without difficulty.          Outcome Measures       06/02/17 1000 06/01/17 1007       How much help from another person do you currently need...    Turning from your back to your side while in flat bed without using bedrails? 3  -TW 3  -GAL     Moving from lying on back to sitting on the side of a flat bed without bedrails? 3  -TW 3  -GAL     Moving to and from a bed to a chair (including a wheelchair)? 3  -TW 3  -GAL     Standing up from a chair using your arms (e.g., wheelchair, bedside chair)? 3  -TW 3  -GAL     Climbing 3-5 steps with a railing? 3  -TW 3  -GAL     To walk in hospital room? 3  -TW 3  -GAL     AM-PAC 6 Clicks Score 18  -TW 18  -GAL     Functional Assessment    Outcome Measure Options AM-PAC 6 Clicks Basic Mobility (PT)  -TW AM-PAC 6 Clicks Basic Mobility (PT)  -GAL       User Key  (r) = Recorded By, (t) = Taken By, (c) = Cosigned By    Initials Name Provider Type     Shilpa Dumont, PT Physical Therapist    TW Brant Palacios PTA Physical Therapy Assistant           Time Calculation:         PT Charges       06/02/17 1315          Time Calculation    Start Time 1000  -TW      Stop Time 1038  -TW      Time Calculation (min) 38 min  -TW      PT Received On 06/02/17  -TW      Time Calculation- PT    Total Timed Code Minutes- PT 38  minute(s)  -TW        User Key  (r) = Recorded By, (t) = Taken By, (c) = Cosigned By    Initials Name Provider Type    TW Brant Palacios PTA Physical Therapy Assistant          Therapy Charges for Today     Code Description Service Date Service Provider Modifiers Qty    [id number]  PT THER PROC EA 15 MIN 6/2/2017 Brant Palacios PTA GP 2    [id number] HC PT THERAPEUTIC ACT EA 15 MIN 6/2/2017 Brant Palacios PTA GP 1          PT G-Codes  PT Professional Judgement Used?: Yes  Outcome Measure Options: AM-PAC 6 Clicks Basic Mobility (PT)  Score: 18  Functional Limitation: Mobility: Walking and moving around  Mobility: Walking and Moving Around Current Status (): At least 40 percent but less than 60 percent impaired, limited or restricted  Mobility: Walking and Moving Around Goal Status (): 0 percent impaired, limited or restricted    Brant Palacios PTA  6/2/2017

## 2017-06-02 NOTE — THERAPY EVALUATION
Acute Care - Occupational Therapy Initial Evaluation  [facility]     Patient Name: Ventura Boggs  : 1960  MRN: [id number]  Today's Date: 2017  Onset of Illness/Injury or Date of Surgery Date: 17  Date of Referral to OT: 17  Referring Physician: Dr. Shaver    Admit Date: 2017       ICD-10-CM ICD-9-CM   1. Ventral hernia without obstruction or gangrene K43.9 553.20   2. Impaired physical mobility Z74.09 781.99   3. Impaired mobility and ADLs Z74.09 799.89     Patient Active Problem List   Diagnosis   • Incisional hernia, without obstruction or gangrene   • Controlled type 2 diabetes mellitus without complication, with long-term current use of insulin   • Ventral hernia without obstruction or gangrene   • Postoperative urinary retention     Past Medical History:   Diagnosis Date   • Arthritis    • Carpal tunnel syndrome    • Depressive disorder    • GERD (gastroesophageal reflux disease)    • Hernia    • History of urinary system disease    • Hypertension    • Migraine    • Rotator cuff syndrome    • Type 2 diabetes mellitus    • Ureteric stone      Past Surgical History:   Procedure Laterality Date   • ABDOMINAL SURGERY  2016   • CHOLECYSTECTOMY     • CIRCUMCISION     • CYSTOSCOPY  2003    Cystoscopy and removal of J stent. Right ureteral stent.   • CYSTOSCOPY  2003    Cystoscopy and right stone extraction and placement of 26x 6 J-stent.   • VENTRAL/INCISIONAL HERNIA REPAIR N/A 2017    Procedure: LAPAROSCOPIC POSSIBLE OPEN ADHESIOLYSIS AND VENTRAL/INCISIONAL HERNIA REPAIR ;  Surgeon: Rui Shaver MD;  Location: [facility] OR;  Service:           OT ASSESSMENT FLOWSHEET (last 72 hours)      OT Evaluation       17 1053 17 1000 17 1550 17 1007 17 0553    Rehab Evaluation    Document Type evaluation  -RW therapy note (daily note)  -TW  evaluation  -GAL     Subjective Information agree to therapy  -RW agree to therapy  -TW  agree to  therapy;complains of;pain  -GAL     Evaluation Not Performed   patient/family decline, not feeling well   RN reported just replacing arevalo & pt c/o pain, requested OT check back tomorrow.  -RW      Patient Effort, Rehab Treatment good  -RW good  -TW  good  -GLA     Symptoms Noted During/After Treatment none  -RW        General Information    Patient Profile Review yes  -RW   yes  -GAL     Onset of Illness/Injury or Date of Surgery Date 05/31/17  -RW   05/31/17  -GAL     Referring Physician Dr. Shaver  -RW   Dr. Rui Shaver  -GAL     General Observations    Pt sitting EOB dtr, grandson present;noted O2, IV/PCA, telemetry  -GAL     Pertinent History Of Current Problem    Pt admitted to [facility]  5/31/2017 and underwent open adhesiolyis/ventral hernia repair  -GAL     Precautions/Limitations fall precautions  -RW fall precautions  -TW  other (see comments)   contact precautions, VS, watch gait belt placement  -GAL     Prior Level of Function independent:;ADL's;all household mobility;community mobility;home management;driving;shopping;yard work  -RW   independent:;all household mobility;community mobility;ADL's;work;driving   assist in/out of tubshower, but did bathing without assist  -GAL     Equipment Currently Used at Home none  -RW   none   has QC, tripod cane and SW  -GAL none  -CB    Plans/Goals Discussed With patient  -RW   patient and family  -GAL     Risks Reviewed patient:;increased discomfort;change in vital signs  -RW   patient and family:;increased discomfort;change in vital signs  -GAL     Benefits Reviewed patient:;increase independence  -RW   patient and family:;improve function;increase independence  -GAL     Barriers to Rehab none identified  -RW   none identified  -GAL     Living Environment    Lives With    child(gertrudis), adult;significant other   pt lives with girlfriend,dtr, grandson  -GAL     Living Arrangements    mobile home  -GAL     Home Accessibility stairs to enter home;tub/shower is not walk in  -RW   stairs to enter  home;stairs (1 railing present)  -GAL     Number of Stairs to Enter Home 3  -RW   5  -GAL     Stair Railings at Home outside, present at both sides  -RW   outside, present on right side  -GAL     Transportation Available    family or friend will provide  -GAL family or friend will provide  -CB    Living Environment Comment    family shares IADL's  -GAL     Clinical Impression    Date of Referral to OT 05/31/17  -RW        OT Diagnosis impaired mobility and ADLs  -RW        Impairments Found (describe specific impairments) aerobic capacity/endurance;gait, locomotion, and balance;other (see comments)   ADLs, functional mobility/transfers, safety, ax tolerance  -RW        Patient/Family Goals Statement home  -RW        Criteria for Skilled Therapeutic Interventions Met yes;treatment indicated  -RW        Rehab Potential good, to achieve stated therapy goals  -RW        Therapy Frequency other (see comments)   3-14 times/wk  -RW        Predicted Duration of Therapy Intervention (days/wks) until discharge  -RW        Anticipated Discharge Disposition home with / care;home with home health  -RW        Vital Signs    Pre Systolic BP Rehab 124  -  -TW  123  -GAL     Pre Treatment Diastolic BP 87  -RW 90  -TW  97  -GAL     Intra Systolic BP Rehab    129  -GAL     Intra Treatment Diastolic BP    100  -GAL     Post Systolic BP Rehab 112  -  -TW  125  -GAL     Post Treatment Diastolic BP 82  -RW 87  -TW  86  -GAL     Pretreatment Heart Rate (beats/min) 127  -  -TW  127  -GAL     Intratreatment Heart Rate (beats/min) 140  -  -TW  130  -GAL     Posttreatment Heart Rate (beats/min) 128  -  -TW  126  -GAL     Pre SpO2 (%) 95  -RW 95  -TW  95  -GAL     O2 Delivery Pre Treatment supplemental O2  -RW supplemental O2  -TW  supplemental O2  -GAL     Intra SpO2 (%) 90  -RW   95  -GAL     O2 Delivery Intra Treatment supplemental O2  -RW   supplemental O2  -GAL     Post SpO2 (%) 92  -RW 95  -TW  95  -GAL     O2 Delivery Post  Treatment supplemental O2  -RW   supplemental O2  -GAL     Pre Patient Position Sitting  -RW Sitting  -TW  Sitting  -GAL     Intra Patient Position Sitting  -RW Standing  -TW  Sitting  -GAL     Post Patient Position Supine  -RW Sitting  -TW  Sitting  -GAL     Pain Assessment    Pain Assessment 0-10  -RW 0-10  -TW  0-10  -GAL     Pain Score 8  -RW 7  -TW  8  -GAL     Post Pain Score 6  -RW 8  -TW  7  -GAL     Pain Type Acute pain;Surgical pain  -RW Acute pain;Surgical pain  -TW  Surgical pain  -GAL     Pain Location Incision  -RW Incision  -TW  Abdomen  -GAL     Pain Intervention(s) Repositioned;Rest   PCA per pt  -RW   Medication (See MAR)   pt has PCA and also reports RN recently gave pain med  -GAL     Vision Assessment/Intervention    Visual Impairment    WFL with corrective lenses  -     Cognitive Assessment/Intervention    Current Cognitive/Communication Assessment functional  -RW functional  -TW  functional  -GAL     Orientation Status oriented x 4  -RW oriented x 4  -TW  oriented x 4  -GAL     Follows Commands/Answers Questions 100% of the time  -% of the time  -TW  100% of the time;able to follow multi-step instructions  -     Personal Safety mild impairment;decreased awareness, need for assist;decreased awareness, need for safety  -RW   decreased awareness, need for safety  -     Personal Safety Interventions gait belt;nonskid shoes/slippers when out of bed;supervised activity  -RW nonskid shoes/slippers when out of bed  -TW  nonskid shoes/slippers when out of bed;supervised activity  -GAL     ROM (Range of Motion)    General ROM no range of motion deficits identified  -RW   no range of motion deficits identified  -GAL     MMT (Manual Muscle Testing)    General MMT Assessment no strength deficits identified  -RW   no strength deficits identified  -GAL     General MMT Assessment Detail    5/5 BUE's, 5/5 BLE's  -     Bed Mobility, Assessment/Treatment    Bed Mobility, Assistive Device bed rails;head of bed  elevated  -RW        Bed Mob, Sit to Supine, [place] contact guard assist  -RW        Transfer Assessment/Treatment    Transfers, Bed-Chair [place]    contact guard assist;1 person + 1 person to manage equipment  -GAL     Transfers, Chair-Bed [place] contact guard assist  -RW   contact guard assist;1 person + 1 person to manage equipment  -GAL     Transfers, Bed-Chair-Bed, Assist Device rolling walker  -RW        Transfers, Sit-Stand [place] contact guard assist  -RW contact guard assist  -TW  contact guard assist  -GAL     Transfers, Stand-Sit [place] contact guard assist  -RW contact guard assist  -TW  contact guard assist  -GAL     Transfers, Sit-Stand-Sit, Assist Device rolling walker  -RW        Transfer, Impairments impaired balance;pain  -RW        Functional Mobility    Functional Mobility- Ind. Level contact guard assist  -RW        Functional Mobility- Device rolling walker  -RW        Functional Mobility-Distance (Feet) 5  -RW        Functional Mobility- Safety Issues supplemental O2  -RW        Upper Body Bathing Assessment/Training    UB Bathing Assess/Train, Position sitting  -RW        UB Bathing Assess/Train, [place] Level minimum assist (75% patient effort)  -RW        UB Bathing Assess/Train, Impairments pain  -RW        Lower Body Bathing Assessment/Training    LB Bathing Assess/Train, Position sitting;standing  -RW        LB Bathing Assess/Train, [place] Level minimum assist (75% patient effort)  -RW        LB Bathing Assess/Train, Impairments impaired balance;pain  -RW        Upper Body Dressing Assessment/Training    UB Dressing Assess/Train, Clothing Type doffing:;donning:;hospital gown   abdominal binder  -RW        UB Dressing Assess/Train, Position sitting  -RW        UB Dressing Assess/Train, [place] minimum assist (75% patient effort)  -RW        UB Dressing Assess/Train, Impairments pain  -RW        Grooming Assessment/Training    Grooming  Assess/Train, Position sitting  -RW        Grooming Assess/Train, Indepen Level set up required  -RW        Grooming Assess/Train, Comment washed hair with shower cap & combed hair, washed face & hands  -RW        Therapy Exercises    Bilateral Lower Extremities  AROM:;15 reps;sitting;ankle pumps/circles;glut sets;hip abduction/adduction;hip flexion;LAQ   x2 sets  -TW       Sensory Assessment/Intervention    Light Touch LUE;RUE  -RW   LUE;RUE;LLE;RLE  -GAL     LUE Light Touch WNL  -RW   WNL  -GAL     RUE Light Touch WNL  -RW   WNL  -GAL     LLE Light Touch    WNL  -GAL     RLE Light Touch    WNL  -GAL     Edema Management    Edema Amount none  -RW   none  -GAL     General Therapy Interventions    Planned Therapy Interventions activity intolerance;adaptive equipment training;ADL retraining;balance training;bed mobility training;energy conservation;home exercise program;strengthening;transfer training  -RW        Positioning and Restraints    Pre-Treatment Position sitting in chair/recliner  -RW sitting in chair/recliner  -TW  in bed  -GAL     Post Treatment Position bed  -RW chair  -TW  bed  -GAL     In Bed notified nsg;supine;call light within reach;encouraged to call for assist  -RW   sitting EOB;call light within reach;encouraged to call for assist;patient within staff view;with family/caregiver  -GAL     In Chair  reclined  -TW         06/01/17 0400 06/01/17 0000 05/31/17 2130 05/31/17 2126 05/31/17 2123    General Information    Equipment Currently Used at Home     none  -CB    Living Environment    Lives With    significant other  -CB     Living Arrangements    house  -CB     Home Accessibility    no concerns  -CB     Stair Railings at Home    outside, present on right side;outside, present on left side  -CB     Type of Financial/Environmental Concern    none  -CB     Transportation Available    family or friend will provide  -CB     Functional Level Prior    Ambulation   0-->independent  -CB  0-->independent  -CB     Transferring   0-->independent  -CB  0-->independent  -CB    Toileting   0-->independent  -CB  0-->independent  -CB    Bathing   0-->independent  -CB  0-->independent  -CB    Dressing   0-->independent  -CB  0-->independent  -CB    Eating   0-->independent  -CB  0-->independent  -CB    Communication   0-->understands/communicates without difficulty  -CB  0-->understands/communicates without difficulty  -CB    Swallowing   0-->swallows foods/liquids without difficulty  -CB  0-->swallows foods/liquids without difficulty  -CB    Muscle Tone Assessment    Muscle Tone Assessment Left-Side Extremities;Right-Side Extremities  -CB Left-Side Extremities;Right-Side Extremities  -CB       Left-Side Extremities Muscle Tone Assessment associated movements noted  -CB associated movements noted  -CB       Right-Side Extremities Muscle Tone Assessment associated movements noted  -CB associated movements noted  -CB         05/31/17 2100                Muscle Tone Assessment    Muscle Tone Assessment Left-Side Extremities;Right-Side Extremities  -CB        Left-Side Extremities Muscle Tone Assessment associated movements noted  -CB        Right-Side Extremities Muscle Tone Assessment associated movements noted  -CB          User Key  (r) = Recorded By, (t) = Taken By, (c) = Cosigned By    Initials Name Effective Dates    RW Nicky Blanchard, OTR/L 10/17/16 -     GAL Shilpa Dumont, PT 10/17/16 -     CULLEN Church, RN 10/17/16 -     TW Brant Palacios, PTA 10/17/16 -            Occupational Therapy Education     Title: PT OT SLP Therapies (Active)     Topic: Occupational Therapy (Active)     Point: ADL training (Active)    Description: Instruct learner(s) on proper safety adaptation and remediation techniques during self care or transfers.   Instruct in proper use of assistive devices.    Learning Progress Summary    Learner Readiness Method Response Comment Documented by Status   Patient Acceptance E NR fall precautions,  transfer safety, ADL safety  06/02/17 1349 Active               Point: Precautions (Active)    Description: Instruct learner(s) on prescribed precautions during self-care and functional transfers.    Learning Progress Summary    Learner Readiness Method Response Comment Documented by Status   Patient Acceptance E NR fall precautions, transfer safety, ADL safety  06/02/17 1349 Active                      User Key     Initials Effective Dates Name Provider Type Discipline     10/17/16 -  Nicky Blanchard, OTR/L Occupational Therapist OT                  OT Recommendation and Plan  Anticipated Discharge Disposition: home with /7 care, home with home health  Planned Therapy Interventions: activity intolerance, adaptive equipment training, ADL retraining, balance training, bed mobility training, energy conservation, home exercise program, strengthening, transfer training  Therapy Frequency: other (see comments) (3-14 times/wk)  Plan of Care Review  Plan Of Care Reviewed With: patient  Outcome Summary/Follow up Plan: OT evaluation completed. Pt required CGA for sit-stand, fxl mobility from recliner-chair with RW, & sit-supine. He performed UB/LB bathing with min(A) & grooming with set up. Pt with elevated HR 130s & up to 140 during ADL but returned to 120s post tx. RN made aware. Pt would benefit from skilled OT services to increase independence & safety prior to discharge.           OT Goals       06/02/17 1351          Transfer Training OT LTG    Transfer Training OT LTG, Date Established 06/02/17  -RW      Transfer Training OT LTG, Time to Achieve 2 wks  -RW      Transfer Training OT LTG, Activity Type toilet;walk-in shower  -RW      Transfer Training OT LTG, [place] Level supervision required  -RW      ADL OT LTG    ADL OT LTG, Date Established 06/02/17  -RW      ADL OT LTG, Time to Achieve 2 wks  -RW      ADL OT LTG, Activity Type ADL skills  -RW      ADL OT LTG, [place] Level standby assist  -RW       Functional Mobility OT LTG    Functional Mobility Goal OT LTG, Date Established 06/02/17  -RW      Functional Mobility Goal OT LTG, Time to Achieve 2 wks  -RW      Functional Mobility Goal OT LTG, [place] Level supervision  -RW      Functional Mobility Goal OT LTG, Distance to Achieve to the bathroom  -RW        User Key  (r) = Recorded By, (t) = Taken By, (c) = Cosigned By    Initials Name Provider Type    RW Nicky Blanchard OTR/L Occupational Therapist                Outcome Measures       06/02/17 1053 06/02/17 1000 06/01/17 1007    How much help from another person do you currently need...    Turning from your back to your side while in flat bed without using bedrails?  3  -TW 3  -GAL    Moving from lying on back to sitting on the side of a flat bed without bedrails?  3  -TW 3  -GAL    Moving to and from a bed to a chair (including a wheelchair)?  3  -TW 3  -GAL    Standing up from a chair using your arms (e.g., wheelchair, bedside chair)?  3  -TW 3  -GAL    Climbing 3-5 steps with a railing?  3  -TW 3  -GAL    To walk in hospital room?  3  -TW 3  -GAL    AM-PAC 6 Clicks Score  18  -TW 18  -GAL    How much help from another is currently needed...    Putting on and taking off regular lower body clothing? 2  -RW      Bathing (including washing, rinsing, and drying) 3  -RW      Toileting (which includes using toilet bed pan or urinal) 3  -RW      Putting on and taking off regular upper body clothing 3  -RW      Taking care of personal grooming (such as brushing teeth) 3  -RW      Eating meals 4  -RW      Score 18  -RW      Functional Assessment    Outcome Measure Options AM-PAC 6 Clicks Daily Activity (OT)  -RW AM-PAC 6 Clicks Basic Mobility (PT)  -TW AM-PAC 6 Clicks Basic Mobility (PT)  -GAL      User Key  (r) = Recorded By, (t) = Taken By, (c) = Cosigned By    Initials Name Provider Type    KATHI Blanchard OTR/L Occupational Therapist    GAL Dumont, PT Physical Therapist    DERREK HURLEY  KAREN Palacios Physical Therapy Assistant          Time Calculation:   OT Start Time: 1053  OT Stop Time: 1157  OT Time Calculation (min): 64 min    Therapy Charges for Today     Code Description Service Date Service Provider Modifiers Qty    [id number]  OT SELFCARE CURRENT 6/2/2017 Nicky E Wieling, OTR/L GO, CK 1    [id number]  OT SELFCARE PROJECTED 6/2/2017 Nicky E Wieling, OTR/L GO, CJ 1    [id number]  OT EVAL MOD COMPLEXITY 1 6/2/2017 Nicky E Wieling, OTR/L GO 1    [id number]  OT SELF CARE/MGMT/TRAIN EA 15 MIN 6/2/2017 Nicky E Wieling, OTR/L GO 3          OT G-codes  OT Professional Judgement Used?: Yes  OT Functional Scales Options: AM-PAC 6 Clicks Daily Activity (OT)  Score: 18  Functional Limitation: Self care  Self Care Current Status (): At least 40 percent but less than 60 percent impaired, limited or restricted  Self Care Goal Status (): At least 20 percent but less than 40 percent impaired, limited or restricted    Nicky E Wieling, OTR/L  6/2/2017

## 2017-06-02 NOTE — MEDICAL STUDENT
Rui Shaver MD [facility]  General Surgery    6/2/2017    Chief Complaint:     Subjective      LOS: 2 days       Patient is 56 y.o. male presented with ventral hernia and intermittent obstruction diagnosed clinically is POD#2  for  OPEN ADHESIOLYSIS AND VENTRAL/INCISIONAL HERNIA REPAIR WITH PHASIX MESH.    Complains of nausea. Still has pain. Moderately controlled on medication. No calf pain. Has passed some flatus. No trouble breathing. Catheter back in place.         Current Medications:     Current Facility-Administered Medications   Medication Dose Route Frequency Provider Last Rate Last Dose   • atorvastatin (LIPITOR) tablet 20 mg  20 mg Oral Nightly Rui Shaver MD   20 mg at 06/01/17 2053   • bupivacaine-EPINEPHrine PF (MARCAINE w/EPI) 0.5% -1:[id number] injection    PRN Rui Shaver MD   30 mL at 05/31/17 1525   • dextrose (D50W) solution 25 g  25 g Intravenous Q15 Min PRN Rui Shaver MD       • dextrose (GLUTOSE) oral gel 15 g  15 g Oral Q15 Min PRN Rui Shaver MD       • enoxaparin (LOVENOX) syringe 40 mg  40 mg Subcutaneous Daily Rui Shaver MD   40 mg at 06/01/17 0817   • glucagon (human recombinant) (GLUCAGEN DIAGNOSTIC) injection 1 mg  1 mg Subcutaneous Q15 Min PRN Rui Shaver MD       • HYDROmorphone (DILAUDID) injection 1 mg  1 mg Intravenous Q3H PRN Rui Shaver MD   1 mg at 06/01/17 0626   • HYDROmorphone (DILAUDID) injection 1.5 mg  1.5 mg Intravenous Q3H PRN Rui Shaver MD   1.5 mg at 06/01/17 2348   • HYDROmorphone (DILAUDID) PCA 0.2 mg/mL 30 mL syringe   Intravenous Continuous Rui Shaver MD       • insulin aspart (novoLOG) injection 0-9 Units  0-9 Units Subcutaneous 4x Daily AC & at Bedtime Rui Shaver MD   6 Units at 06/01/17 0649   • insulin regular (HumuLIN R,NovoLIN R) 100 Units in sodium chloride 0.9 % 100 mL (1 Units/mL) infusion  0-50 Units/hr Intravenous Titrated Rui Shaver MD 0.9 mL/hr at 06/02/17 0334 0.9 Units/hr at 06/02/17 0334   • labetalol (NORMODYNE,TRANDATE) injection 20 mg   20 mg Intravenous Q6H PRN Rui Shaver MD   20 mg at 06/01/17 1650   • lactated ringers infusion  100 mL/hr Intravenous Continuous Rui Shaver  mL/hr at 06/02/17 0104 100 mL/hr at 06/02/17 0104   • LIDOCAINE 1/6% WITH EPINEPHRINE SOLUTION    PRN Rui Shaver MD   150 mL at 05/31/17 1525   • mupirocin (BACTROBAN) 2 % ointment   Topical Q8H Rui Shaver MD   1 application at 06/01/17 2136   • naloxone (NARCAN) injection 0.1 mg  0.1 mg Intravenous Q5 Min PRN Rui Shaver MD       • ondansetron (ZOFRAN) injection 4 mg  4 mg Intravenous Q6H PRN Rui Shaver MD   4 mg at 06/01/17 1443   • pantoprazole (PROTONIX) injection 40 mg  40 mg Intravenous Q AM Rui Shaver MD   40 mg at 06/01/17 0550       Objective     Physical Exam:   Temp:  [98.1 °F (36.7 °C)-98.5 °F (36.9 °C)] 98.5 °F (36.9 °C)  Heart Rate:  [104-139] 121  Resp:  [12-20] 14  BP: ()/() 132/86  Arterial Line BP: (112-127)/(77-85) 127/77     Intake/Output       06/01/17 0700 - 06/02/17 0659    Intake (ml) 4131    Output (ml) 840    Net (ml) 3291          Physical Exam   Constitutional: He is oriented to person, place, and time. He appears well-developed.   Cardiovascular: Regular rhythm and intact distal pulses.  Exam reveals no gallop and no friction rub.    No murmur heard.  Tachycardic    Pulmonary/Chest: Effort normal and breath sounds normal. No respiratory distress. He has no wheezes.   Abdominal: He exhibits distension. There is tenderness.   No bowel sound audible. Exam limited by abdominal fullness.   Genitourinary:   Genitourinary Comments: Urinary catheter back in place. Urinary sediment present with blood tinged urine in bag.   Neurological: He is alert and oriented to person, place, and time.   Skin: Skin is warm. No rash noted.   Incision sites dry and no drainage, purulent drainage, or signs of dehiscence. Wound drains in place.     Vitals reviewed.        Labs:  Lab Results (last 24 hours)     Procedure Component Value Units  Date/Time    CBC & Differential [[id number]] Collected:  06/01/17 0452    Specimen:  Blood Updated:  06/01/17 0457    Narrative:       The following orders were created for panel order CBC & Differential.  Procedure                               Abnormality         Status                     ---------                               -----------         ------                     CBC Auto Differential[[id number]]        Abnormal            Final result                 Please view results for these tests on the individual orders.    CBC Auto Differential [[id number]]  (Abnormal) Collected:  06/01/17 0452    Specimen:  Blood Updated:  06/01/17 0457     WBC 17.17 (H) 10*3/mm3      RBC 4.55 10*6/mm3      Hemoglobin 14.0 g/dL      Hematocrit 39.7 %      MCV 87.3 fL      MCH 30.8 pg      MCHC 35.3 g/dL      RDW 12.8 %      RDW-SD 40.8 fl      MPV 11.3 fL      Platelets 276 10*3/mm3      Neutrophil % 87.5 (H) %      Lymphocyte % 6.7 (L) %      Monocyte % 4.6 %      Eosinophil % 0.7 %      Basophil % 0.1 %      Immature Grans % 0.4 %      Neutrophils, Absolute 15.03 (H) 10*3/mm3      Lymphocytes, Absolute 1.15 10*3/mm3      Monocytes, Absolute 0.79 10*3/mm3      Eosinophils, Absolute 0.12 10*3/mm3      Basophils, Absolute 0.01 10*3/mm3      Immature Grans, Absolute 0.07 (H) 10*3/mm3     Basic Metabolic Panel [[id number]]  (Abnormal) Collected:  06/01/17 0452    Specimen:  Blood Updated:  06/01/17 0513     Glucose 244 (H) mg/dL      BUN 10 mg/dL      Creatinine 0.71 mg/dL      Sodium 134 (L) mmol/L      Potassium 4.3 mmol/L      Chloride 100 mmol/L      CO2 28.0 mmol/L      Calcium 8.0 (L) mg/dL      eGFR Non African Amer 116 mL/min/1.73      BUN/Creatinine Ratio 14.1     Anion Gap 6.0 mmol/L     Magnesium [[id number]]  (Normal) Collected:  06/01/17 0452    Specimen:  Blood Updated:  06/01/17 0513     Magnesium 2.0 mg/dL     Phosphorus [[id number]]  (Normal) Collected:  06/01/17 0452    Specimen:  Blood Updated:  06/01/17 0513      Phosphorus 2.5 mg/dL     POC Glucose Fingerstick [[id number]]  (Abnormal) Collected:  06/01/17 0644    Specimen:  Blood Updated:  06/01/17 0656     Glucose 277 (H) mg/dL       Sliding Scale AdminMeter: [id number]: [id number] [facility]       Tissue Exam [[id number]] Collected:  05/31/17 1924    Specimen:  Tissue from Hernia, Sac Updated:  06/01/17 0828    Basic Metabolic Panel [[id number]]  (Abnormal) Collected:  06/01/17 0902    Specimen:  Blood Updated:  06/01/17 0936     Glucose 217 (H) mg/dL      BUN 11 mg/dL      Creatinine 0.77 mg/dL      Sodium 137 mmol/L      Potassium 4.2 mmol/L      Chloride 99 mmol/L      CO2 27.0 mmol/L      Calcium 8.1 (L) mg/dL      eGFR Non African Amer 105 mL/min/1.73      BUN/Creatinine Ratio 14.3     Anion Gap 11.0 mmol/L     POC Glucose Fingerstick [[id number]]  (Abnormal) Collected:  06/01/17 0940    Specimen:  Blood Updated:  06/01/17 0951     Glucose 225 (H) mg/dL       Sliding Scale AdminMeter: [id number]: [id number] JOHN STEVEN       Basic Metabolic Panel [[id number]]  (Abnormal) Collected:  06/01/17 1142    Specimen:  Blood Updated:  06/01/17 1201     Glucose 212 (H) mg/dL      BUN 12 mg/dL      Creatinine 0.74 mg/dL      Sodium 137 mmol/L      Potassium 4.1 mmol/L      Chloride 100 mmol/L      CO2 26.0 mmol/L      Calcium 8.2 (L) mg/dL      eGFR Non African Amer 110 mL/min/1.73      BUN/Creatinine Ratio 16.2     Anion Gap 11.0 mmol/L     POC Glucose Fingerstick [[id number]]  (Abnormal) Collected:  06/01/17 1124    Specimen:  Blood Updated:  06/01/17 1627     Glucose 220 (H) mg/dL       Sliding Scale AdminMeter: [id number]: [id number] JOHN STEVEN       POC Glucose Fingerstick [[id number]]  (Abnormal) Collected:  06/01/17 1226    Specimen:  Blood Updated:  06/01/17 1627     Glucose 197 (H) mg/dL       Meter: [id number]: [id number] JOHN STEVEN       POC Glucose Fingerstick [[id number]]  (Abnormal) Collected:  06/01/17 1331     Specimen:  Blood Updated:  06/01/17 1627     Glucose 180 (H) mg/dL       Sliding Scale AdminMeter: [id number]: [id number] JOHN TENISHA       POC Glucose Fingerstick [[id number]]  (Abnormal) Collected:  06/01/17 1437    Specimen:  Blood Updated:  06/01/17 1627     Glucose 169 (H) mg/dL       Sliding Scale AdminMeter: [id number]: [id number] JOHN TENISHA       POC Glucose Fingerstick [[id number]]  (Abnormal) Collected:  06/01/17 1542    Specimen:  Blood Updated:  06/01/17 1628     Glucose 199 (H) mg/dL       Sliding Scale AdminMeter: [id number]: [id number] JOHN TENISHA       POC Glucose Fingerstick [[id number]]  (Abnormal) Collected:  06/01/17 1638    Specimen:  Blood Updated:  06/01/17 1651     Glucose 200 (H) mg/dL       Sliding Scale AdminMeter: [id number]: [id number] JOHN TENISHA       POC Glucose Fingerstick [[id number]]  (Abnormal) Collected:  06/01/17 1746    Specimen:  Blood Updated:  06/01/17 1808     Glucose 150 (H) mg/dL       Sliding Scale AdminMeter: [id number]: [id number] JOHN TENISHA       POC Glucose Fingerstick [[id number]]  (Abnormal) Collected:  06/01/17 1824    Specimen:  Blood Updated:  06/01/17 1835     Glucose 165 (H) mg/dL       Sliding Scale AdminMeter: [id number]: [id number] JOHN TENISHA       POC Glucose Fingerstick [[id number]]  (Abnormal) Collected:  06/01/17 1934    Specimen:  Blood Updated:  06/01/17 2108     Glucose 142 (H) mg/dL       Meter: [id number]: [id number] GRIS CAHLE       POC Glucose Fingerstick [[id number]]  (Normal) Collected:  06/01/17 2034    Specimen:  Blood Updated:  06/01/17 2108     Glucose 120 mg/dL       Meter: [id number]: [id number] GRIS CAHLE       POC Glucose Fingerstick [[id number]]  (Abnormal) Collected:  06/01/17 2133    Specimen:  Blood Updated:  06/01/17 2148     Glucose 139 (H) mg/dL       Meter: [id number]: [id number] [facility]       [facility] Glucose  Fingerstick [[id number]]  (Normal) Collected:  06/01/17 2241    Specimen:  Blood Updated:  06/01/17 2252     Glucose 110 mg/dL       Meter: [id number]: [id number] [facility] [employer]       POC Glucose Fingerstick [[id number]]  (Normal) Collected:  06/01/17 2345    Specimen:  Blood Updated:  06/01/17 2356     Glucose 95 mg/dL       Meter: [id number]: [id number] [facility]       POC Glucose Fingerstick [[id number]]  (Normal) Collected:  06/02/17 0039    Specimen:  Blood Updated:  06/02/17 0052     Glucose 121 mg/dL       Meter: [id number]: [id number] [facility] [employer]             Images:   Imaging Results (last 24 hours)     ** No results found for the last 24 hours. **          (I reviewed the patient's results and images.)                                                                                    ASSESSMENT/PLAN    56 year old male POD#2 s/p OPEN ADHESIOLYSIS AND VENTRAL/INCISIONAL HERNIA REPAIR WITH PHASIX MESH.  1.abdomen slightly more distending than yesterday with pain increased and radiating to the back.  2.dvt prophylaxis-lovenox  3.arevalo back in place-blood tinged contents with sediment in bag-likely due to re-introduction of catheter. 750 out over last 10 hours  4.pain moderately controlled.continue current regime.  5.OOB today to chair yesterday. Try for short walk today as tolerated.   6.NPO until return of bowel function  7.incentive spirometry  8. Tachycardia- still present. Likely due to pain but monitor for progression and consider ekg tomorrow if not resolved to assess conduction status. No new murmur on exam.  9. Hyperglycemia- blood sugar within target today at 121 today. Insulin drip at 0.9.  10.nausea-zofran.     Kirt Villarreal, Medical Student    Agree  CXR and UA today          This document has been electronically signed by Rui Shaver MD on June 2, 2017 9:02 AM

## 2017-06-02 NOTE — PLAN OF CARE
Problem: Patient Care Overview (Adult)  Goal: Plan of Care Review  Outcome: Ongoing (interventions implemented as appropriate)    06/02/17 1351   Coping/Psychosocial Response Interventions   Plan Of Care Reviewed With patient   Outcome Evaluation   Outcome Summary/Follow up Plan OT evaluation completed. Pt required CGA for sit-stand, fxl mobility from recliner-chair with RW, & sit-supine. He performed UB/LB bathing with min(A) & grooming with set up. Pt with elevated HR 130s & up to 140 during ADL but returned to 120s post tx. RN made aware. Pt would benefit from skilled OT services to increase independence & safety prior to discharge.          Problem: Inpatient Occupational Therapy  Goal: Transfer Training Goal 1 LTG- OT  Outcome: Ongoing (interventions implemented as appropriate)    06/02/17 1351   Transfer Training OT LTG   Transfer Training OT LTG, Date Established 06/02/17   Transfer Training OT LTG, Time to Achieve 2 wks   Transfer Training OT LTG, Activity Type toilet;walk-in shower   Transfer Training OT LTG, [place] Level supervision required       Goal: ADL Goal LTG- OT  Outcome: Ongoing (interventions implemented as appropriate)    06/02/17 1351   ADL OT LTG   ADL OT LTG, Date Established 06/02/17   ADL OT LTG, Time to Achieve 2 wks   ADL OT LTG, Activity Type ADL skills   ADL OT LTG, [place] Level standby assist       Goal: Functional Mobility Goal LTG- OT  Outcome: Ongoing (interventions implemented as appropriate)    06/02/17 1351   Functional Mobility OT LTG   Functional Mobility Goal OT LTG, Date Established 06/02/17   Functional Mobility Goal OT LTG, Time to Achieve 2 wks   Functional Mobility Goal OT LTG, [place] Level supervision   Functional Mobility Goal OT LTG, Distance to Achieve to the bathroom

## 2017-06-02 NOTE — PLAN OF CARE
Problem: Patient Care Overview (Adult)  Goal: Plan of Care Review  Outcome: Ongoing (interventions implemented as appropriate)    06/02/17 1000   Coping/Psychosocial Response Interventions   Plan Of Care Reviewed With patient   Patient Care Overview   Progress improving   Outcome Evaluation   Outcome Summary/Follow up Plan Pt with elevated HR but nsg okayed tx. Pt able to tolerate short distance gait and LE ther ex without difficulty.       Goal: Discharge Needs Assessment  Outcome: Ongoing (interventions implemented as appropriate)    06/01/17 0553 06/01/17 1007 06/02/17 1053   Discharge Needs Assessment   Concerns To Be Addressed no discharge needs identified --  --    Readmission Within The Last 30 Days no previous admission in last 30 days --  --    Equipment Needed After Discharge none --  --    Current Health   Anticipated Changes Related to Illness none --  --    Self-Care   Equipment Currently Used at Home --  --  none   Living Environment   Transportation Available --  family or friend will provide --          Problem: Inpatient Physical Therapy  Goal: Transfer Training Goal 1 LTG- PT  Outcome: Ongoing (interventions implemented as appropriate)    06/01/17 1108 06/02/17 1000   Transfer Training PT LTG   Transfer Training PT LTG, Date Established 06/01/17 --    Transfer Training PT LTG, Time to Achieve by discharge --    Transfer Training PT LTG, [place] Level independent --    Transfer Training PT LTG, Date Goal Reviewed --  06/02/17   Transfer Training PT LTG, Outcome --  goal ongoing       Goal: Gait Training Goal LTG- PT  Outcome: Ongoing (interventions implemented as appropriate)    06/01/17 1108 06/02/17 1000   Gait Training PT LTG   Gait Training Goal PT LTG, Date Established 06/01/17 --    Gait Training Goal PT LTG, Time to Achieve by discharge --    Gait Training Goal PT LTG, [place] Level independent --    Gait Training Goal PT LTG, Distance to Achieve 1000ft --    Gait Training Goal PT  LTG, Additional Goal Pt will complete 6 minute walk test --    Gait Training Goal PT LTG, Date Goal Reviewed --  06/02/17   Gait Training Goal PT LTG, Outcome --  goal ongoing       Goal: Stair Training Goal LTG- PT  Outcome: Ongoing (interventions implemented as appropriate)    06/01/17 1108 06/02/17 1000   Stair Training PT LTG   Stair Training Goal PT LTG, Date Established 06/01/17 --    Stair Training Goal PT LTG, Time to Achieve by discharge --    Stair Training Goal PT LTG, Number of Steps 5 --    Stair Training Goal PT LTG, [place] Level conditional independence --    Stair Training Goal PT LTG, Assist Device 1 handrail --    Stair Training Goal PT LTG, Date Goal Reviewed --  06/02/17   Stair Training Goal PT LTG, Outcome --  goal ongoing       Goal: Patient Education Goal LTG- PT  Outcome: Ongoing (interventions implemented as appropriate)    06/01/17 1108 06/02/17 1000   Patient Education PT LTG   Patient Education PT LTG, Date Established 06/01/17 --    Patient Education PT LTG, Time to Achieve by discharge --    Patient Education PT LTG, Education Type gait;transfers;home safety --    Patient Education PT LTG, Date Goal Reviewed --  06/02/17   Patient Education PT LTG Outcome --  goal ongoing

## 2017-06-03 LAB
ALBUMIN SERPL-MCNC: 2.8 G/DL (ref 3.4–4.8)
ALBUMIN/GLOB SERPL: 1 G/DL (ref 1.1–1.8)
ALP SERPL-CCNC: 73 U/L (ref 38–126)
ALT SERPL W P-5'-P-CCNC: 44 U/L (ref 21–72)
ANION GAP SERPL CALCULATED.3IONS-SCNC: 6 MMOL/L (ref 5–15)
AST SERPL-CCNC: 24 U/L (ref 17–59)
BACTERIA SPEC AEROBE CULT: NORMAL
BASOPHILS # BLD AUTO: 0.03 10*3/MM3 (ref 0–0.2)
BASOPHILS NFR BLD AUTO: 0.2 % (ref 0–2)
BILIRUB SERPL-MCNC: 0.6 MG/DL (ref 0.2–1.3)
BUN BLD-MCNC: 19 MG/DL (ref 7–21)
BUN/CREAT SERPL: 27.5 (ref 7–25)
CALCIUM SPEC-SCNC: 7.9 MG/DL (ref 8.4–10.2)
CHLORIDE SERPL-SCNC: 97 MMOL/L (ref 95–110)
CO2 SERPL-SCNC: 30 MMOL/L (ref 22–31)
CREAT BLD-MCNC: 0.69 MG/DL (ref 0.7–1.3)
DEPRECATED RDW RBC AUTO: 42.6 FL (ref 35.1–43.9)
EOSINOPHIL # BLD AUTO: 0.29 10*3/MM3 (ref 0–0.7)
EOSINOPHIL NFR BLD AUTO: 2.1 % (ref 0–7)
ERYTHROCYTE [DISTWIDTH] IN BLOOD BY AUTOMATED COUNT: 13 % (ref 11.5–14.5)
GFR SERPL CREATININE-BSD FRML MDRD: 119 ML/MIN/1.73 (ref 60–130)
GLOBULIN UR ELPH-MCNC: 2.8 GM/DL (ref 2.3–3.5)
GLUCOSE BLD-MCNC: 100 MG/DL (ref 60–100)
GLUCOSE BLDC GLUCOMTR-MCNC: 107 MG/DL (ref 70–130)
GLUCOSE BLDC GLUCOMTR-MCNC: 109 MG/DL (ref 70–130)
GLUCOSE BLDC GLUCOMTR-MCNC: 113 MG/DL (ref 70–130)
GLUCOSE BLDC GLUCOMTR-MCNC: 116 MG/DL (ref 70–130)
GLUCOSE BLDC GLUCOMTR-MCNC: 133 MG/DL (ref 70–130)
GLUCOSE BLDC GLUCOMTR-MCNC: 137 MG/DL (ref 70–130)
GLUCOSE BLDC GLUCOMTR-MCNC: 142 MG/DL (ref 70–130)
HCT VFR BLD AUTO: 26.9 % (ref 39–49)
HGB BLD-MCNC: 9 G/DL (ref 13.7–17.3)
IMM GRANULOCYTES # BLD: 0.03 10*3/MM3 (ref 0–0.02)
IMM GRANULOCYTES NFR BLD: 0.2 % (ref 0–0.5)
LYMPHOCYTES # BLD AUTO: 1.41 10*3/MM3 (ref 0.6–4.2)
LYMPHOCYTES NFR BLD AUTO: 10.2 % (ref 10–50)
MCH RBC QN AUTO: 30.1 PG (ref 26.5–34)
MCHC RBC AUTO-ENTMCNC: 33.5 G/DL (ref 31.5–36.3)
MCV RBC AUTO: 90 FL (ref 80–98)
MONOCYTES # BLD AUTO: 1.92 10*3/MM3 (ref 0–0.9)
MONOCYTES NFR BLD AUTO: 13.9 % (ref 0–12)
NEUTROPHILS # BLD AUTO: 10.11 10*3/MM3 (ref 2–8.6)
NEUTROPHILS NFR BLD AUTO: 73.4 % (ref 37–80)
PLATELET # BLD AUTO: 227 10*3/MM3 (ref 150–450)
PMV BLD AUTO: 11.5 FL (ref 8–12)
POTASSIUM BLD-SCNC: 3.8 MMOL/L (ref 3.5–5.1)
PROT SERPL-MCNC: 5.6 G/DL (ref 6.3–8.6)
RBC # BLD AUTO: 2.99 10*6/MM3 (ref 4.37–5.74)
SODIUM BLD-SCNC: 133 MMOL/L (ref 137–145)
WBC NRBC COR # BLD: 13.79 10*3/MM3 (ref 3.2–9.8)

## 2017-06-03 PROCEDURE — 82962 GLUCOSE BLOOD TEST: CPT

## 2017-06-03 PROCEDURE — 25010000002 HYDROMORPHONE PCA 0.2 MG/ML PCA 30 ML (BHMAD/RIC): Performed by: SURGERY

## 2017-06-03 PROCEDURE — 80053 COMPREHEN METABOLIC PANEL: CPT | Performed by: SURGERY

## 2017-06-03 PROCEDURE — 97535 SELF CARE MNGMENT TRAINING: CPT

## 2017-06-03 PROCEDURE — 25010000002 ENOXAPARIN PER 10 MG: Performed by: SURGERY

## 2017-06-03 PROCEDURE — 97110 THERAPEUTIC EXERCISES: CPT

## 2017-06-03 PROCEDURE — 25010000002 HYDROMORPHONE PER 4 MG: Performed by: SURGERY

## 2017-06-03 PROCEDURE — 85025 COMPLETE CBC W/AUTO DIFF WBC: CPT | Performed by: SURGERY

## 2017-06-03 PROCEDURE — 97150 GROUP THERAPEUTIC PROCEDURES: CPT

## 2017-06-03 PROCEDURE — 97116 GAIT TRAINING THERAPY: CPT

## 2017-06-03 PROCEDURE — 25010000002 ONDANSETRON PER 1 MG: Performed by: SURGERY

## 2017-06-03 RX ORDER — DEXTROSE MONOHYDRATE 25 G/50ML
25 INJECTION, SOLUTION INTRAVENOUS
Status: DISCONTINUED | OUTPATIENT
Start: 2017-06-03 | End: 2017-06-08 | Stop reason: HOSPADM

## 2017-06-03 RX ORDER — NICOTINE POLACRILEX 4 MG
15 LOZENGE BUCCAL
Status: DISCONTINUED | OUTPATIENT
Start: 2017-06-03 | End: 2017-06-08 | Stop reason: HOSPADM

## 2017-06-03 RX ADMIN — HYDROMORPHONE HYDROCHLORIDE 1 MG: 1 INJECTION, SOLUTION INTRAMUSCULAR; INTRAVENOUS; SUBCUTANEOUS at 18:02

## 2017-06-03 RX ADMIN — MUPIROCIN: 20 OINTMENT TOPICAL at 20:49

## 2017-06-03 RX ADMIN — HYDROMORPHONE HYDROCHLORIDE 1 MG: 1 INJECTION, SOLUTION INTRAMUSCULAR; INTRAVENOUS; SUBCUTANEOUS at 06:02

## 2017-06-03 RX ADMIN — PANTOPRAZOLE SODIUM 40 MG: 40 INJECTION, POWDER, FOR SOLUTION INTRAVENOUS at 06:06

## 2017-06-03 RX ADMIN — ONDANSETRON 4 MG: 2 INJECTION INTRAMUSCULAR; INTRAVENOUS at 06:03

## 2017-06-03 RX ADMIN — HYDROMORPHONE HYDROCHLORIDE 1.5 MG: 2 INJECTION, SOLUTION INTRAMUSCULAR; INTRAVENOUS; SUBCUTANEOUS at 09:41

## 2017-06-03 RX ADMIN — HYDROMORPHONE HYDROCHLORIDE 1 MG: 1 INJECTION, SOLUTION INTRAMUSCULAR; INTRAVENOUS; SUBCUTANEOUS at 20:44

## 2017-06-03 RX ADMIN — SODIUM CHLORIDE, POTASSIUM CHLORIDE, SODIUM LACTATE AND CALCIUM CHLORIDE 100 ML/HR: 600; 310; 30; 20 INJECTION, SOLUTION INTRAVENOUS at 19:14

## 2017-06-03 RX ADMIN — HYDROMORPHONE HYDROCHLORIDE 1 MG: 1 INJECTION, SOLUTION INTRAMUSCULAR; INTRAVENOUS; SUBCUTANEOUS at 13:18

## 2017-06-03 RX ADMIN — MUPIROCIN: 20 OINTMENT TOPICAL at 15:32

## 2017-06-03 RX ADMIN — MUPIROCIN: 20 OINTMENT TOPICAL at 06:06

## 2017-06-03 RX ADMIN — ATORVASTATIN CALCIUM 20 MG: 20 TABLET, FILM COATED ORAL at 20:44

## 2017-06-03 RX ADMIN — SODIUM CHLORIDE, POTASSIUM CHLORIDE, SODIUM LACTATE AND CALCIUM CHLORIDE 100 ML/HR: 600; 310; 30; 20 INJECTION, SOLUTION INTRAVENOUS at 09:42

## 2017-06-03 RX ADMIN — ONDANSETRON 4 MG: 2 INJECTION INTRAMUSCULAR; INTRAVENOUS at 18:02

## 2017-06-03 RX ADMIN — ENOXAPARIN SODIUM 40 MG: 40 INJECTION SUBCUTANEOUS at 09:48

## 2017-06-03 RX ADMIN — Medication: at 15:32

## 2017-06-03 RX ADMIN — Medication: at 03:02

## 2017-06-03 NOTE — PLAN OF CARE
Problem: Patient Care Overview (Adult)  Goal: Plan of Care Review  Outcome: Ongoing (interventions implemented as appropriate)    06/03/17 0855   Coping/Psychosocial Response Interventions   Plan Of Care Reviewed With patient   Patient Care Overview   Progress progress toward functional goals as expected   Outcome Evaluation   Outcome Summary/Follow up Plan Pt did not meet any PT goals this tx. Pt able to complete ther ex - AROM. Pt able to amb 300 ft w/RW CGA. Pt would benefit from HH PT once discharged from this facility.        Goal: Discharge Needs Assessment  Outcome: Ongoing (interventions implemented as appropriate)    06/03/17 0855   Discharge Needs Assessment   Concerns To Be Addressed discharge planning concerns   Readmission Within The Last 30 Days no previous admission in last 30 days   Equipment Needed After Discharge none   Discharge Facility/Level Of Care Needs home with home health   Current Discharge Risk physical impairment   Current Health   Anticipated Changes Related to Illness inability to care for self   Self-Care   Equipment Currently Used at Home walker, standard;cane, straight;cane, quad   Living Environment   Transportation Available family or friend will provide         Problem: Inpatient Physical Therapy  Goal: Transfer Training Goal 1 LTG- PT  Outcome: Ongoing (interventions implemented as appropriate)    06/01/17 1108 06/03/17 0855   Transfer Training PT LTG   Transfer Training PT LTG, Date Established 06/01/17 --    Transfer Training PT LTG, Time to Achieve by discharge --    Transfer Training PT LTG, [place] Level independent --    Transfer Training PT LTG, Date Goal Reviewed --  06/03/17   Transfer Training PT LTG, Outcome --  goal not met       Goal: Gait Training Goal LTG- PT  Outcome: Ongoing (interventions implemented as appropriate)    06/01/17 1108 06/03/17 0855   Gait Training PT LTG   Gait Training Goal PT LTG, Date Established 06/01/17 --    Gait Training Goal PT LTG,  Time to Achieve by discharge --    Gait Training Goal PT LTG, [place] Level independent --    Gait Training Goal PT LTG, Distance to Achieve 1000ft --    Gait Training Goal PT LTG, Additional Goal Pt will complete 6 minute walk test --    Gait Training Goal PT LTG, Date Goal Reviewed --  06/03/17   Gait Training Goal PT LTG, Outcome --  goal not met       Goal: Stair Training Goal LTG- PT  Outcome: Ongoing (interventions implemented as appropriate)    06/01/17 1108 06/03/17 0855   Stair Training PT LTG   Stair Training Goal PT LTG, Date Established 06/01/17 --    Stair Training Goal PT LTG, Time to Achieve by discharge --    Stair Training Goal PT LTG, Number of Steps 5 --    Stair Training Goal PT LTG, [place] Level conditional independence --    Stair Training Goal PT LTG, Assist Device 1 handrail --    Stair Training Goal PT LTG, Date Goal Reviewed --  06/03/17   Stair Training Goal PT LTG, Outcome --  goal not met       Goal: Patient Education Goal LTG- PT  Outcome: Ongoing (interventions implemented as appropriate)    06/01/17 1108 06/03/17 0855   Patient Education PT LTG   Patient Education PT LTG, Date Established 06/01/17 --    Patient Education PT LTG, Time to Achieve by discharge --    Patient Education PT LTG, Education Type gait;transfers;home safety --    Patient Education PT LTG, Date Goal Reviewed --  06/03/17   Patient Education PT LTG Outcome --  goal not met

## 2017-06-03 NOTE — THERAPY TREATMENT NOTE
Acute Care - Physical Therapy Treatment Note  [facility]     Patient Name: Ventura Boggs  : 1960  MRN: [id number]  Today's Date: 6/3/2017  Onset of Illness/Injury or Date of Surgery Date: 17  Date of Referral to PT: 07  Referring Physician: Dr. Shaver    Admit Date: 2017    Visit Dx:    ICD-10-CM ICD-9-CM   1. Ventral hernia without obstruction or gangrene K43.9 553.20   2. Impaired physical mobility Z74.09 781.99   3. Impaired mobility and ADLs Z74.09 799.89     Patient Active Problem List   Diagnosis   • Incisional hernia, without obstruction or gangrene   • Controlled type 2 diabetes mellitus without complication, with long-term current use of insulin   • Ventral hernia without obstruction or gangrene   • Postoperative urinary retention               Adult Rehabilitation Note       17 0855 17 1000       Rehab Assessment/Intervention    Discipline physical therapy assistant  -AM physical therapy assistant  -TW     Document Type therapy note (daily note)  -AM therapy note (daily note)  -TW     Subjective Information agree to therapy;complains of;pain  -AM agree to therapy  -TW     Patient Effort, Rehab Treatment good  -AM good  -TW     Symptoms Noted During/After Treatment fatigue  -AM      Precautions/Limitations fall precautions;oxygen therapy device and L/min  -AM fall precautions  -TW     Equipment Issued to Patient gait belt  -AM      Recorded by [AM] Ulysses Gardner PTA [TW] Brant Palacios PTA     Vital Signs    Pre Systolic BP Rehab 108  -  -TW     Pre Treatment Diastolic BP 82  -AM 90  -TW     Post Systolic BP Rehab 145  -  -TW     Post Treatment Diastolic BP 85  -AM 87  -TW     Pretreatment Heart Rate (beats/min) 113  -  -TW     Intratreatment Heart Rate (beats/min)  136  -TW     Posttreatment Heart Rate (beats/min) 118  -  -TW     Pre SpO2 (%) 97  -AM 95  -TW     O2 Delivery Pre Treatment supplemental O2   3L  -AM supplemental O2  -TW      Post SpO2 (%) 97  -AM 95  -TW     O2 Delivery Post Treatment supplemental O2   3L  -AM      Pre Patient Position Supine  -AM Sitting  -TW     Intra Patient Position Standing  -AM Standing  -TW     Post Patient Position Supine  -AM Sitting  -TW     Recorded by [AM] Ulysses Gardner PTA [TW] Brant Palacios PTA     Pain Assessment    Pain Assessment 0-10  -AM 0-10  -TW     Pain Score 9  -AM 7  -TW     Post Pain Score 9  -AM 8  -TW     Pain Type Acute pain;Surgical pain  -AM Acute pain;Surgical pain  -TW     Pain Location Abdomen  -AM Incision  -TW     Pain Descriptors Sore  -AM      Pain Frequency Constant/continuous  -AM      Date Pain First Started 05/31/17  -AM      Clinical Progression Not changed  -AM      Patient's Stated Pain Goal No pain  -AM      Pain Intervention(s) Medication (See MAR);Ambulation/increased activity  -AM      Result of Injury No  -AM      Work-Related Injury No  -AM      Multiple Pain Sites No  -AM      Recorded by [AM] Ulysses Gardner PTA [TW] Brant Palacios PTA     Cognitive Assessment/Intervention    Current Cognitive/Communication Assessment functional  -AM functional  -TW     Orientation Status oriented x 4  -AM oriented x 4  -TW     Follows Commands/Answers Questions 100% of the time  -% of the time  -TW     Personal Safety Interventions gait belt;nonskid shoes/slippers when out of bed;supervised activity  -AM nonskid shoes/slippers when out of bed  -TW     Recorded by [AM] Ulysses Gardner PTA [TW] Brant Palacios PTA     ROM (Range of Motion)    General ROM no range of motion deficits identified  -AM      Recorded by [AM] Ulysses Gardner PTA      Bed Mobility, Assessment/Treatment    Bed Mobility, Assistive Device bed rails;head of bed elevated  -AM      Bed Mobility, Roll Left, [place] not tested  -AM      Bed Mobility, Roll Right, [place] not tested  -AM      Bed Mobility, Scoot/Bridge, [place] not tested  -AM      Bed Mob, Supine to Sit,  [place] supervision required  -AM      Bed Mob, Sit to Supine, [place] supervision required  -AM      Bed Mob, Sidelying to Sit, [place] not tested  -AM      Bed Mob, Sit to Sidelying, [place] not tested  -AM      Bed Mobility, Safety Issues decreased use of arms for pushing/pulling;decreased use of legs for bridging/pushing  -AM      Bed Mobility, Impairments strength decreased;pain  -AM      Recorded by [AM] Ulysses Gardner PTA      Transfer Assessment/Treatment    Transfers, Bed-Chair [place] contact guard assist  -AM      Transfers, Chair-Bed [place] contact guard assist  -AM      Transfers, Bed-Chair-Bed, Assist Device rolling walker  -AM      Transfers, Sit-Stand [place] contact guard assist  -AM contact guard assist  -TW     Transfers, Stand-Sit [place] contact guard assist  -AM contact guard assist  -TW     Transfers, Sit-Stand-Sit, Assist Device rolling walker  -AM      Toilet Transfer, [place] not tested  -AM      Walk-In Shower Transfer, [place] not tested  -AM      Bathtub Transfer, [place] not tested  -AM      Transfer, Maintain Weight Bearing Status able to maintain weight bearing status  -AM      Transfer, Safety Issues step length decreased  -AM      Transfer, Impairments strength decreased;pain  -AM      Recorded by [AM] Ulysses Gardner PTA [TW] Brant Palacios PTA     Gait Assessment/Treatment    Gait, [place] Level contact guard assist  -AM contact guard assist  -TW     Gait, Assistive Device rolling walker  -AM rolling walker  -TW     Gait, Distance (Feet) 300  -AM 3   fwd and bwd  -TW     Gait, Gait Pattern Analysis swing-through gait  -AM      Gait, Gait Deviations bilateral:;[place] decreased  -AM      Gait, Maintain Weight Bearing Status able to maintain weight bearing status  -AM      Gait, Safety Issues step length decreased;supplemental O2  -AM      Gait, Impairments strength decreased;pain  -AM      Recorded by [AM] Ulysses  CHRIS Gardner PTA [TW] Brant Palacios PTA     Stairs Assessment/Treatment    Stairs, [place] Level not tested  -AM      Recorded by [AM] Ulysses Gardner PTA      Therapy Exercises    Bilateral Lower Extremities AROM:;20 reps;supine;ankle pumps/circles;glut sets;heel slides;quad sets;SLR  -AM AROM:;15 reps;sitting;ankle pumps/circles;glut sets;hip abduction/adduction;hip flexion;LAQ   x2 sets  -TW     Recorded by [AM] Ulysses Gardner PTA [TW] Brant Palacios PTA     Positioning and Restraints    Pre-Treatment Position in bed  -AM sitting in chair/recliner  -TW     Post Treatment Position bed  -AM chair  -TW     In Bed supine;call light within reach;encouraged to call for assist;with nsg  -AM      In Chair  reclined  -TW     Recorded by [AM] Ulysses Gardner PTA [TW] Brant Palacios PTA       User Key  (r) = Recorded By, (t) = Taken By, (c) = Cosigned By    Initials Name Effective Dates    AM Ulysses Gardner PTA 10/17/16 -     TW Brant Palacios PTA 10/17/16 -                 IP PT Goals       06/03/17 0855 06/02/17 1000 06/01/17 1108    Transfer Training PT LTG    Transfer Training PT LTG, Date Established   06/01/17  -    Transfer Training PT LTG, Time to Achieve   by discharge  -    Transfer Training PT LTG, [place] Level   independent  -GAL    Transfer Training PT  LTG, Date Goal Reviewed 06/03/17  -AM 06/02/17  -TW     Transfer Training PT LTG, Outcome goal not met  -AM goal ongoing  -TW goal ongoing  -GAL    Gait Training PT LTG    Gait Training Goal PT LTG, Date Established   06/01/17  -GAL    Gait Training Goal PT LTG, Time to Achieve   by discharge  -GAL    Gait Training Goal PT LTG, [place] Level   independent  -GAL    Gait Training Goal PT LTG, Distance to Achieve   1000ft  -AGL    Gait Training Goal PT LTG, Additional Goal   Pt will complete 6 minute walk test  -GAL    Gait Training Goal PT LTG, Date Goal Reviewed 06/03/17  -AM 06/02/17  -TW     Gait Training Goal PT LTG,  Outcome goal not met  -AM goal ongoing  - goal ongoing  -    Stair Training PT LTG    Stair Training Goal PT LTG, Date Established   06/01/17  -    Stair Training Goal PT LTG, Time to Achieve   by discharge  -    Stair Training Goal PT LTG, Number of Steps   5  -    Stair Training Goal PT LTG, [place] Level   conditional independence  -    Stair Training Goal PT LTG, Assist Device   1 handrail  -    Stair Training Goal PT LTG, Date Goal Reviewed 06/03/17  -AM 06/02/17  -     Stair Training Goal PT LTG, Outcome goal not met  -AM goal ongoing  - goal ongoing  -    Patient Education PT LTG    Patient Education PT LTG, Date Established   06/01/17  -    Patient Education PT LTG, Time to Achieve   by discharge  -    Patient Education PT LTG, Education Type   gait;transfers;home safety  -    Patient Education PT LTG, Date Goal Reviewed 06/03/17  -AM 06/02/17  -     Patient Education PT LTG Outcome goal not met  -AM goal ongoing  - goal ongoing  -      User Key  (r) = Recorded By, (t) = Taken By, (c) = Cosigned By    Initials Name Provider Type    GAL Dumont, PT Physical Therapist    AM Ulysses Gardner, PTA Physical Therapy Assistant    TW Brant Palacios, PTA Physical Therapy Assistant          Physical Therapy Education     Title: PT OT SLP Therapies (Active)     Topic: Physical Therapy (Active)     Point: Mobility training (Active)    Learning Progress Summary    Learner Readiness Method Response Comment Documented by Status   Patient Acceptance E NR  GAL 06/01/17 1107 Active   Family Acceptance E NR  GAL 06/01/17 1107 Active               Point: Precautions (Active)    Learning Progress Summary    Learner Readiness Method Response Comment Documented by Status   Patient Acceptance E NR  GAL 06/01/17 1107 Active   Family Acceptance E NR  GAL 06/01/17 1107 Active                      User Key     Initials Effective Dates Name Provider Type Annabella SANTO 10/17/16 -  Shilpa RITCHIE  Tim PT Physical Therapist PT                    PT Recommendation and Plan  Anticipated Discharge Disposition: home with home health  Planned Therapy Interventions: balance training, bed mobility training, gait training, patient/family education, stair training, transfer training  PT Frequency: other (see comments) (5-14 times per week)  Plan of Care Review  Plan Of Care Reviewed With: patient  Progress: progress toward functional goals as expected  Outcome Summary/Follow up Plan: Pt did not meet any PT goals this tx. Pt able to complete ther ex - AROM. Pt able to amb 300 ft w/RW CGA. Pt would benefit from HH PT once discharged from this facility.           Outcome Measures       06/03/17 0855 06/02/17 1053 06/02/17 1000    How much help from another person do you currently need...    Turning from your back to your side while in flat bed without using bedrails? 3  -AM  3  -TW    Moving from lying on back to sitting on the side of a flat bed without bedrails? 3  -AM  3  -TW    Moving to and from a bed to a chair (including a wheelchair)? 3  -AM  3  -TW    Standing up from a chair using your arms (e.g., wheelchair, bedside chair)? 3  -AM  3  -TW    Climbing 3-5 steps with a railing? 1  -AM  3  -TW    To walk in hospital room? 3  -AM  3  -TW    AM-PAC 6 Clicks Score 16  -AM  18  -TW    How much help from another is currently needed...    Putting on and taking off regular lower body clothing?  2  -RW     Bathing (including washing, rinsing, and drying)  3  -RW     Toileting (which includes using toilet bed pan or urinal)  3  -RW     Putting on and taking off regular upper body clothing  3  -RW     Taking care of personal grooming (such as brushing teeth)  3  -RW     Eating meals  4  -RW     Score  18  -RW     Functional Assessment    Outcome Measure Options AM-PAC 6 Clicks Basic Mobility (PT)  -AM AM-PAC 6 Clicks Daily Activity (OT)  -RW AM-PAC 6 Clicks Basic Mobility (PT)  -TW      06/01/17 1007          How much  help from another person do you currently need...    Turning from your back to your side while in flat bed without using bedrails? 3  -GAL      Moving from lying on back to sitting on the side of a flat bed without bedrails? 3  -GAL      Moving to and from a bed to a chair (including a wheelchair)? 3  -GAL      Standing up from a chair using your arms (e.g., wheelchair, bedside chair)? 3  -GAL      Climbing 3-5 steps with a railing? 3  -GAL      To walk in hospital room? 3  -GAL      AM-PAC 6 Clicks Score 18  -GAL      Functional Assessment    Outcome Measure Options AM-PAC 6 Clicks Basic Mobility (PT)  -GAL        User Key  (r) = Recorded By, (t) = Taken By, (c) = Cosigned By    Initials Name Provider Type    RW Nicky Blanchard, OTR/L Occupational Therapist    GAL Shilpa Dumont, PT Physical Therapist    AM Ulysses Gardner PTA Physical Therapy Assistant     Brant Palacios PTA Physical Therapy Assistant           Time Calculation:         PT Charges       06/03/17 0855          Time Calculation    Start Time 0855  -AM      Stop Time 0950  -AM      Time Calculation (min) 55 min  -AM      PT Received On 06/03/17  -AM      PT - Next Appointment 06/05/17  -AM      Time Calculation- PT    Total Timed Code Minutes- PT 55 minute(s)  -AM        User Key  (r) = Recorded By, (t) = Taken By, (c) = Cosigned By    Initials Name Provider Type    AM Ulysses Gardner PTA Physical Therapy Assistant          Therapy Charges for Today     Code Description Service Date Service Provider Modifiers Qty    35103510157 HC GAIT TRAINING EA 15 MIN 6/3/2017 Ulysses Gardner PTA GP 1    86892121013 HC PT THER PROC GROUP 6/3/2017 Ulysses Gardner PTA GP 2    04532594504 HC PT SELF CARE/MGMT/TRAIN EA 15 MIN 6/3/2017 Ulysses Gardner PTA GP 1          PT G-Codes  PT Professional Judgement Used?: Yes  Outcome Measure Options: AM-PAC 6 Clicks Basic Mobility (PT)  Score: 18  Functional Limitation: Mobility: Walking and moving around  Mobility:  Walking and Moving Around Current Status (): At least 40 percent but less than 60 percent impaired, limited or restricted  Mobility: Walking and Moving Around Goal Status (): 0 percent impaired, limited or restricted    Ulysses Gardner, PTA  6/3/2017

## 2017-06-03 NOTE — PLAN OF CARE
Problem: Patient Care Overview (Adult)  Goal: Plan of Care Review  Outcome: Ongoing (interventions implemented as appropriate)    06/03/17 1543   Coping/Psychosocial Response Interventions   Plan Of Care Reviewed With patient   Patient Care Overview   Progress no change   Outcome Evaluation   Outcome Summary/Follow up Plan pt vss. Pt BUE are swollen. Working on pain management       Goal: Adult Individualization and Mutuality  Outcome: Ongoing (interventions implemented as appropriate)  Goal: Discharge Needs Assessment  Outcome: Ongoing (interventions implemented as appropriate)    Problem: Perioperative Period (Adult)  Goal: Signs and Symptoms of Listed Potential Problems Will be Absent or Manageable (Perioperative Period)  Outcome: Ongoing (interventions implemented as appropriate)    Problem: Fall Risk (Adult)  Goal: Identify Related Risk Factors and Signs and Symptoms  Outcome: Ongoing (interventions implemented as appropriate)  Goal: Absence of Falls  Outcome: Ongoing (interventions implemented as appropriate)    Problem: Skin Integrity Impairment, Risk/Actual (Adult)  Goal: Identify Related Risk Factors and Signs and Symptoms  Outcome: Outcome(s) achieved Date Met:  06/03/17  Goal: Skin Integrity/Wound Healing  Outcome: Ongoing (interventions implemented as appropriate)

## 2017-06-03 NOTE — PLAN OF CARE
Problem: Patient Care Overview (Adult)  Goal: Plan of Care Review  Outcome: Ongoing (interventions implemented as appropriate)    06/03/17 0636   Coping/Psychosocial Response Interventions   Plan Of Care Reviewed With patient   Patient Care Overview   Progress improving   Outcome Evaluation   Outcome Summary/Follow up Plan Pt done well this shift. Was able to get out of bed this am with minimal assistance.       Goal: Adult Individualization and Mutuality  Outcome: Ongoing (interventions implemented as appropriate)    Problem: Perioperative Period (Adult)  Goal: Signs and Symptoms of Listed Potential Problems Will be Absent or Manageable (Perioperative Period)  Outcome: Ongoing (interventions implemented as appropriate)    Problem: Fall Risk (Adult)  Goal: Identify Related Risk Factors and Signs and Symptoms  Outcome: Ongoing (interventions implemented as appropriate)  Goal: Absence of Falls  Outcome: Ongoing (interventions implemented as appropriate)    Problem: Skin Integrity Impairment, Risk/Actual (Adult)  Goal: Identify Related Risk Factors and Signs and Symptoms  Outcome: Ongoing (interventions implemented as appropriate)  Goal: Skin Integrity/Wound Healing  Outcome: Ongoing (interventions implemented as appropriate)

## 2017-06-04 LAB
ANION GAP SERPL CALCULATED.3IONS-SCNC: 16 MMOL/L (ref 5–15)
BUN BLD-MCNC: 17 MG/DL (ref 7–21)
BUN/CREAT SERPL: 26.6 (ref 7–25)
CALCIUM SPEC-SCNC: 8.2 MG/DL (ref 8.4–10.2)
CHLORIDE SERPL-SCNC: 94 MMOL/L (ref 95–110)
CO2 SERPL-SCNC: 25 MMOL/L (ref 22–31)
CREAT BLD-MCNC: 0.64 MG/DL (ref 0.7–1.3)
DEPRECATED RDW RBC AUTO: 42.1 FL (ref 35.1–43.9)
ERYTHROCYTE [DISTWIDTH] IN BLOOD BY AUTOMATED COUNT: 12.8 % (ref 11.5–14.5)
GFR SERPL CREATININE-BSD FRML MDRD: 130 ML/MIN/1.73 (ref 56–130)
GLUCOSE BLD-MCNC: 149 MG/DL (ref 60–100)
GLUCOSE BLDC GLUCOMTR-MCNC: 153 MG/DL (ref 70–130)
GLUCOSE BLDC GLUCOMTR-MCNC: 154 MG/DL (ref 70–130)
GLUCOSE BLDC GLUCOMTR-MCNC: 169 MG/DL (ref 70–130)
HCT VFR BLD AUTO: 28 % (ref 39–49)
HGB BLD-MCNC: 9.2 G/DL (ref 13.7–17.3)
MCH RBC QN AUTO: 29.8 PG (ref 26.5–34)
MCHC RBC AUTO-ENTMCNC: 32.9 G/DL (ref 31.5–36.3)
MCV RBC AUTO: 90.6 FL (ref 80–98)
PLATELET # BLD AUTO: 298 10*3/MM3 (ref 150–450)
PMV BLD AUTO: 11.7 FL (ref 8–12)
POTASSIUM BLD-SCNC: 4 MMOL/L (ref 3.5–5.1)
RBC # BLD AUTO: 3.09 10*6/MM3 (ref 4.37–5.74)
SODIUM BLD-SCNC: 135 MMOL/L (ref 137–145)
WBC NRBC COR # BLD: 11.25 10*3/MM3 (ref 3.2–9.8)

## 2017-06-04 PROCEDURE — 97110 THERAPEUTIC EXERCISES: CPT

## 2017-06-04 PROCEDURE — 97116 GAIT TRAINING THERAPY: CPT

## 2017-06-04 PROCEDURE — 85027 COMPLETE CBC AUTOMATED: CPT | Performed by: SURGERY

## 2017-06-04 PROCEDURE — 25010000002 HYDROMORPHONE PCA 0.2 MG/ML PCA 30 ML (BHMAD/RIC): Performed by: SURGERY

## 2017-06-04 PROCEDURE — 94760 N-INVAS EAR/PLS OXIMETRY 1: CPT

## 2017-06-04 PROCEDURE — 82962 GLUCOSE BLOOD TEST: CPT

## 2017-06-04 PROCEDURE — 97150 GROUP THERAPEUTIC PROCEDURES: CPT

## 2017-06-04 PROCEDURE — 25010000002 HYDROMORPHONE PER 4 MG: Performed by: SURGERY

## 2017-06-04 PROCEDURE — 25010000002 ENOXAPARIN PER 10 MG: Performed by: SURGERY

## 2017-06-04 PROCEDURE — 94799 UNLISTED PULMONARY SVC/PX: CPT

## 2017-06-04 PROCEDURE — 80048 BASIC METABOLIC PNL TOTAL CA: CPT | Performed by: SURGERY

## 2017-06-04 RX ADMIN — Medication: at 09:52

## 2017-06-04 RX ADMIN — ENOXAPARIN SODIUM 40 MG: 40 INJECTION SUBCUTANEOUS at 10:42

## 2017-06-04 RX ADMIN — HYDROMORPHONE HYDROCHLORIDE 1 MG: 1 INJECTION, SOLUTION INTRAMUSCULAR; INTRAVENOUS; SUBCUTANEOUS at 07:43

## 2017-06-04 RX ADMIN — MUPIROCIN: 20 OINTMENT TOPICAL at 05:39

## 2017-06-04 RX ADMIN — SODIUM CHLORIDE, POTASSIUM CHLORIDE, SODIUM LACTATE AND CALCIUM CHLORIDE 100 ML/HR: 600; 310; 30; 20 INJECTION, SOLUTION INTRAVENOUS at 17:45

## 2017-06-04 RX ADMIN — HYDROMORPHONE HYDROCHLORIDE 1 MG: 1 INJECTION, SOLUTION INTRAMUSCULAR; INTRAVENOUS; SUBCUTANEOUS at 01:21

## 2017-06-04 RX ADMIN — SODIUM CHLORIDE, POTASSIUM CHLORIDE, SODIUM LACTATE AND CALCIUM CHLORIDE 100 ML/HR: 600; 310; 30; 20 INJECTION, SOLUTION INTRAVENOUS at 05:39

## 2017-06-04 RX ADMIN — MUPIROCIN: 20 OINTMENT TOPICAL at 14:00

## 2017-06-04 RX ADMIN — HYDROMORPHONE HYDROCHLORIDE 1 MG: 1 INJECTION, SOLUTION INTRAMUSCULAR; INTRAVENOUS; SUBCUTANEOUS at 21:24

## 2017-06-04 RX ADMIN — HYDROMORPHONE HYDROCHLORIDE 1 MG: 1 INJECTION, SOLUTION INTRAMUSCULAR; INTRAVENOUS; SUBCUTANEOUS at 04:31

## 2017-06-04 RX ADMIN — Medication: at 21:53

## 2017-06-04 RX ADMIN — MUPIROCIN: 20 OINTMENT TOPICAL at 21:24

## 2017-06-04 RX ADMIN — ATORVASTATIN CALCIUM 20 MG: 20 TABLET, FILM COATED ORAL at 21:24

## 2017-06-04 RX ADMIN — PANTOPRAZOLE SODIUM 40 MG: 40 INJECTION, POWDER, FOR SOLUTION INTRAVENOUS at 05:39

## 2017-06-04 NOTE — PLAN OF CARE
Problem: Patient Care Overview (Adult)  Goal: Plan of Care Review  Outcome: Ongoing (interventions implemented as appropriate)    06/04/17 1330   Coping/Psychosocial Response Interventions   Plan Of Care Reviewed With patient   Patient Care Overview   Progress progress toward functional goals as expected   Outcome Evaluation   Outcome Summary/Follow up Plan Pt did not meet any PT goals this tx. Pt able to get in/out bed SBA. Pt able to amb 400 ft w/RW CGA. Pt would benefit from HH PT once discharged from this facility.        Goal: Discharge Needs Assessment  Outcome: Ongoing (interventions implemented as appropriate)    06/04/17 1330   Discharge Needs Assessment   Concerns To Be Addressed discharge planning concerns   Readmission Within The Last 30 Days no previous admission in last 30 days   Equipment Needed After Discharge none   Discharge Facility/Level Of Care Needs home with home health   Current Discharge Risk physical impairment   Current Health   Anticipated Changes Related to Illness inability to care for self   Self-Care   Equipment Currently Used at Home walker, standard;cane, straight;cane, quad   Living Environment   Transportation Available family or friend will provide         Problem: Inpatient Physical Therapy  Goal: Transfer Training Goal 1 LTG- PT  Outcome: Ongoing (interventions implemented as appropriate)    06/01/17 1108 06/04/17 1330   Transfer Training PT LTG   Transfer Training PT LTG, Date Established 06/01/17 --    Transfer Training PT LTG, Time to Achieve by discharge --    Transfer Training PT LTG, [place] Level independent --    Transfer Training PT LTG, Date Goal Reviewed --  06/04/17   Transfer Training PT LTG, Outcome --  goal not met       Goal: Gait Training Goal LTG- PT  Outcome: Ongoing (interventions implemented as appropriate)    06/01/17 1108 06/04/17 1330   Gait Training PT LTG   Gait Training Goal PT LTG, Date Established 06/01/17 --    Gait Training Goal PT LTG, Time  to Achieve by discharge --    Gait Training Goal PT LTG, [place] Level independent --    Gait Training Goal PT LTG, Distance to Achieve 1000ft --    Gait Training Goal PT LTG, Additional Goal Pt will complete 6 minute walk test --    Gait Training Goal PT LTG, Date Goal Reviewed --  06/04/17   Gait Training Goal PT LTG, Outcome --  goal not met       Goal: Stair Training Goal LTG- PT  Outcome: Ongoing (interventions implemented as appropriate)    06/01/17 1108 06/04/17 1330   Stair Training PT LTG   Stair Training Goal PT LTG, Date Established 06/01/17 --    Stair Training Goal PT LTG, Time to Achieve by discharge --    Stair Training Goal PT LTG, Number of Steps 5 --    Stair Training Goal PT LTG, [place] Level conditional independence --    Stair Training Goal PT LTG, Assist Device 1 handrail --    Stair Training Goal PT LTG, Date Goal Reviewed --  06/04/17   Stair Training Goal PT LTG, Outcome --  goal not met       Goal: Patient Education Goal LTG- PT  Outcome: Ongoing (interventions implemented as appropriate)    06/01/17 1108 06/04/17 1330   Patient Education PT LTG   Patient Education PT LTG, Date Established 06/01/17 --    Patient Education PT LTG, Time to Achieve by discharge --    Patient Education PT LTG, Education Type gait;transfers;home safety --    Patient Education PT LTG, Date Goal Reviewed --  06/04/17   Patient Education PT LTG Outcome --  goal not met

## 2017-06-04 NOTE — THERAPY TREATMENT NOTE
Inpatient Rehabilitation - Occupational Therapy Treatment Note  [facility]     Patient Name: Ventura Boggs  : 1960  MRN: [id number]  Today's Date: 2017  Onset of Illness/Injury or Date of Surgery Date: 17  Date of Referral to OT: 17  Referring Physician: Dr. Shaver      Admit Date: 2017    Visit Dx:     ICD-10-CM ICD-9-CM   1. Ventral hernia without obstruction or gangrene K43.9 553.20   2. Impaired physical mobility Z74.09 781.99   3. Impaired mobility and ADLs Z74.09 799.89     Patient Active Problem List   Diagnosis   • Incisional hernia, without obstruction or gangrene   • Controlled type 2 diabetes mellitus without complication, with long-term current use of insulin   • Ventral hernia without obstruction or gangrene   • Postoperative urinary retention             Adult Rehabilitation Note       17 1045 17 0855 17 1000    Rehab Assessment/Intervention    Discipline occupational therapy assistant  -LM physical therapy assistant  -AM physical therapy assistant  -TW    Document Type therapy note (daily note)  -LM therapy note (daily note)  -AM therapy note (daily note)  -TW    Subjective Information agree to therapy  -LM agree to therapy;complains of;pain  -AM agree to therapy  -TW    Patient Effort, Rehab Treatment  good  -AM good  -TW    Symptoms Noted During/After Treatment  fatigue  -AM     Precautions/Limitations  fall precautions;oxygen therapy device and L/min  -AM fall precautions  -TW    Equipment Issued to Patient  gait belt  -AM     Recorded by [LM] MARYLIN Novak/MEI [AM] Ulysses Gardner PTA [TW] Brant Palacios PTA    Vital Signs    Pre Systolic BP Rehab 135  -  -  -TW    Pre Treatment Diastolic BP 90  -LM 82  -AM 90  -TW    Post Systolic BP Rehab  145  -  -TW    Post Treatment Diastolic BP  85  -AM 87  -TW    Pretreatment Heart Rate (beats/min) 116  -  -  -TW    Intratreatment Heart Rate (beats/min)   136   -TW    Posttreatment Heart Rate (beats/min) 117  -  -  -TW    Pre SpO2 (%) 94  -LM 97  -AM 95  -TW    O2 Delivery Pre Treatment  supplemental O2   3L  -AM supplemental O2  -TW    Intra SpO2 (%) 95  -LM      Post SpO2 (%)  97  -AM 95  -TW    O2 Delivery Post Treatment  supplemental O2   3L  -AM     Pre Patient Position  Supine  -AM Sitting  -TW    Intra Patient Position  Standing  -AM Standing  -TW    Post Patient Position  Supine  -AM Sitting  -TW    Recorded by [LM] MARYLIN Novak/MEI [AM] Ulysses Gardner PTA [TW] Brant Palacios PTA    Pain Assessment    Pain Assessment --  -LM 0-10  -AM 0-10  -TW    Pain Score 8  -LM 9  -AM 7  -TW    Post Pain Score 7  -LM 9  -AM 8  -TW    Pain Type  Acute pain;Surgical pain  -AM Acute pain;Surgical pain  -TW    Pain Location  Abdomen  -AM Incision  -TW    Pain Descriptors  Sore  -AM     Pain Frequency  Constant/continuous  -AM     Date Pain First Started  05/31/17  -AM     Clinical Progression  Not changed  -AM     Patient's Stated Pain Goal  No pain  -AM     Pain Intervention(s)  Medication (See MAR);Ambulation/increased activity  -AM     Result of Injury  No  -AM     Work-Related Injury  No  -AM     Multiple Pain Sites  No  -AM     Recorded by [LM] MARYLIN Novak/MEI [AM] Ulysses Gardner PTA [TW] Brant Palacios PTA    Cognitive Assessment/Intervention    Current Cognitive/Communication Assessment functional  -LM functional  -AM functional  -TW    Orientation Status oriented x 4  -LM oriented x 4  -AM oriented x 4  -TW    Follows Commands/Answers Questions 100% of the time  -% of the time  -% of the time  -TW    Personal Safety Interventions  gait belt;nonskid shoes/slippers when out of bed;supervised activity  -AM nonskid shoes/slippers when out of bed  -TW    Recorded by [LM] MARYLIN Novak/MEI [AM] Ulysses Gardner PTA [TW] Brant Palacios PTA    ROM (Range of Motion)    General ROM  no range of motion deficits  identified  -AM     Recorded by  [AM] Ulysses Gardner PTA     Bed Mobility, Assessment/Treatment    Bed Mobility, Assistive Device  bed rails;head of bed elevated  -AM     Bed Mobility, Roll Left, [place]  not tested  -AM     Bed Mobility, Roll Right, [place]  not tested  -AM     Bed Mobility, Scoot/Bridge, [place]  not tested  -AM     Bed Mob, Supine to Sit, [place] minimum assist (75% patient effort)  -LM supervision required  -AM     Bed Mob, Sit to Supine, [place]  supervision required  -AM     Bed Mob, Sidelying to Sit, [place]  not tested  -AM     Bed Mob, Sit to Sidelying, [place]  not tested  -AM     Bed Mobility, Safety Issues  decreased use of arms for pushing/pulling;decreased use of legs for bridging/pushing  -AM     Bed Mobility, Impairments  strength decreased;pain  -AM     Recorded by [LM] MARYLIN Novak/L [AM] Ulysses Gardner PTA     Transfer Assessment/Treatment    Transfers, Bed-Chair [place] minimum assist (75% patient effort)  -LM contact guard assist  -AM     Transfers, Chair-Bed [place]  contact guard assist  -AM     Transfers, Bed-Chair-Bed, Assist Device  rolling walker  -AM     Transfers, Sit-Stand [place] minimum assist (75% patient effort)  -LM contact guard assist  -AM contact guard assist  -TW    Transfers, Stand-Sit [place] minimum assist (75% patient effort)  -LM contact guard assist  -AM contact guard assist  -TW    Transfers, Sit-Stand-Sit, Assist Device  rolling walker  -AM     Toilet Transfer, [place]  not tested  -AM     Walk-In Shower Transfer, [place]  not tested  -AM     Bathtub Transfer, [place]  not tested  -AM     Transfer, Maintain Weight Bearing Status  able to maintain weight bearing status  -AM     Transfer, Safety Issues  step length decreased  -AM     Transfer, Impairments  strength decreased;pain  -AM     Recorded by [LM] MARYLIN Novak/MEI [AM] Ulsyses Gardner PTA [TW] Brant HURLEY  KAREN Palacios    Gait Assessment/Treatment    Gait, [place] Level  contact guard assist  -AM contact guard assist  -TW    Gait, Assistive Device  rolling walker  -AM rolling walker  -TW    Gait, Distance (Feet)  300  -AM 3   fwd and bwd  -TW    Gait, Gait Pattern Analysis  swing-through gait  -AM     Gait, Gait Deviations  bilateral:;carolyne decreased  -AM     Gait, Maintain Weight Bearing Status  able to maintain weight bearing status  -AM     Gait, Safety Issues  step length decreased;supplemental O2  -AM     Gait, Impairments  strength decreased;pain  -AM     Recorded by  [AM] Ulysses Gardner PTA [TW] Brant Palacios PTA    Stairs Assessment/Treatment    Stairs, [place] Level  not tested  -AM     Recorded by  [AM] Ulysses Gardner PTA     Motor Skills/Interventions    Motor Response Observations --   sitting balance chandrika x 10 min eob  -LM      Recorded by [LM] JOLIE Novak      Therapy Exercises    Bilateral Lower Extremities  AROM:;20 reps;supine;ankle pumps/circles;glut sets;heel slides;quad sets;SLR  -AM AROM:;15 reps;sitting;ankle pumps/circles;glut sets;hip abduction/adduction;hip flexion;LAQ   x2 sets  -TW    Recorded by  [AM] Ulysses Gardner PTA [TW] Brant Palacios PTA    Positioning and Restraints    Pre-Treatment Position in bed  -LM in bed  -AM sitting in chair/recliner  -TW    Post Treatment Position --   ortho chair  -LM bed  -AM chair  -TW    In Bed  supine;call light within reach;encouraged to call for assist;with nsg  -AM     In Chair   reclined  -TW    Recorded by [LM] JOLIE Novak [AM] Ulysses Gardner PTA [TW] Brant Palacios PTA      User Key  (r) = Recorded By, (t) = Taken By, (c) = Cosigned By    Initials Name Effective Dates    AM Ulysses Gardner PTA 10/17/16 -     TW Brant Palacios PTA 10/17/16 -     LM JOLIE Novak 10/17/16 -                 OT Goals       06/04/17 1220 06/02/17 1351       Transfer Training OT LTG     Transfer Training OT LTG, Date Established  06/02/17  -RW     Transfer Training OT LTG, Time to Achieve  2 wks  -RW     Transfer Training OT LTG, Activity Type  toilet;walk-in shower  -RW     Transfer Training OT LTG, [place] Level  supervision required  -RW     Transfer Training OT LTG, Date Goal Reviewed 06/04/17  -LM      Transfer Training OT LTG, Outcome goal ongoing  -LM      ADL OT LTG    ADL OT LTG, Date Established  06/02/17  -RW     ADL OT LTG, Time to Achieve  2 wks  -RW     ADL OT LTG, Activity Type  ADL skills  -RW     ADL OT LTG, [place] Level  standby assist  -RW     ADL OT LTG, Date Goal Reviewed 06/04/17  -LM      ADL OT LTG, Outcome goal ongoing  -LM      Functional Mobility OT LTG    Functional Mobility Goal OT LTG, Date Established  06/02/17  -RW     Functional Mobility Goal OT LTG, Time to Achieve  2 wks  -RW     Functional Mobility Goal OT LTG, [place] Level  supervision  -RW     Functional Mobility Goal OT LTG, Distance to Achieve  to the bathroom  -RW     Functional Mobility Goal OT LTG, Date Goal Reviewed 06/04/17  -LM      Functional Mobility Goal OT LTG, Outcome goal ongoing  -LM        User Key  (r) = Recorded By, (t) = Taken By, (c) = Cosigned By    Initials Name Provider Type    RW Nicky Blanchard OTR/L Occupational Therapist    MARYLIN Calixto/L Occupational Therapy Assistant          Occupational Therapy Education     Title: PT OT SLP Therapies (Active)     Topic: Occupational Therapy (Done)     Point: ADL training (Done)    Description: Instruct learner(s) on proper safety adaptation and remediation techniques during self care or transfers.   Instruct in proper use of assistive devices.    Learning Progress Summary    Learner Readiness Method Response Comment Documented by Status   Patient Acceptance E VU  LM 06/04/17 1221 Done    Acceptance E NR fall precautions, transfer safety, ADL safety RW 06/02/17 1349 Active               Point: Home exercise  program (Done)    Description: Instruct learner(s) on appropriate technique for monitoring, assisting and/or progressing therapeutic exercises/activities.    Learning Progress Summary    Learner Readiness Method Response Comment Documented by Status   Patient Acceptance E [facility] 06/04/17 1221 Done               Point: Precautions (Done)    Description: Instruct learner(s) on prescribed precautions during self-care and functional transfers.    Learning Progress Summary    Learner Readiness Method Response Comment Documented by Status   Patient Acceptance E VU   06/04/17 1221 Done    Acceptance E NR fall precautions, transfer safety, ADL safety  06/02/17 1349 Active               Point: Body mechanics (Done)    Description: Instruct learner(s) on proper positioning and spine alignment during self-care, functional mobility activities and/or exercises.    Learning Progress Summary    Learner Readiness Method Response Comment Documented by Status   Patient Acceptance E [facility] 06/04/17 1221 Done                      User Key     Initials Effective Dates Name Provider Type Discipline     10/17/16 -  Nicky Blanchard OTR/L Occupational Therapist OT     10/17/16 -  Ese Dodson COULTER/L Occupational Therapy Assistant OT                  OT Recommendation and Plan  Anticipated Discharge Disposition: home with 24/7 care, home with home health  Planned Therapy Interventions: activity intolerance, adaptive equipment training, ADL retraining, balance training, bed mobility training, energy conservation, home exercise program, strengthening, transfer training  Therapy Frequency: other (see comments) (3-14 times/wk)  Plan of Care Review  Plan Of Care Reviewed With: patient  Progress: improving  Outcome Summary/Follow up Plan: improving toward all goals        Outcome Measures       06/04/17 1200 06/03/17 0855 06/02/17 1053    How much help from another person do you currently need...    Turning from your back to your  side while in flat bed without using bedrails?  3  -AM     Moving from lying on back to sitting on the side of a flat bed without bedrails?  3  -AM     Moving to and from a bed to a chair (including a wheelchair)?  3  -AM     Standing up from a chair using your arms (e.g., wheelchair, bedside chair)?  3  -AM     Climbing 3-5 steps with a railing?  1  -AM     To walk in hospital room?  3  -AM     AM-PAC 6 Clicks Score  16  -AM     How much help from another is currently needed...    Putting on and taking off regular lower body clothing? 2  -LM  2  -RW    Bathing (including washing, rinsing, and drying) 3  -LM  3  -RW    Toileting (which includes using toilet bed pan or urinal) 3  -LM  3  -RW    Putting on and taking off regular upper body clothing 3  -LM  3  -RW    Taking care of personal grooming (such as brushing teeth) 3  -LM  3  -RW    Eating meals 4  -LM  4  -RW    Score 18  -LM  18  -RW    Functional Assessment    Outcome Measure Options  AM-PAC 6 Clicks Basic Mobility (PT)  -AM AM-PAC 6 Clicks Daily Activity (OT)  -RW      06/02/17 1000          How much help from another person do you currently need...    Turning from your back to your side while in flat bed without using bedrails? 3  -TW      Moving from lying on back to sitting on the side of a flat bed without bedrails? 3  -TW      Moving to and from a bed to a chair (including a wheelchair)? 3  -TW      Standing up from a chair using your arms (e.g., wheelchair, bedside chair)? 3  -TW      Climbing 3-5 steps with a railing? 3  -TW      To walk in hospital room? 3  -TW      AM-PAC 6 Clicks Score 18  -TW      Functional Assessment    Outcome Measure Options AM-PAC 6 Clicks Basic Mobility (PT)  -TW        User Key  (r) = Recorded By, (t) = Taken By, (c) = Cosigned By    Initials Name Provider Type    KATHI Blanchard, OTR/L Occupational Therapist    AM Ulysses Gardner, PTA Physical Therapy Assistant    TW Brant Palacios, PTA Physical Therapy  Assistant    LM JOLIE Novak Occupational Therapy Assistant           Time Calculation:         Time Calculation- OT       06/04/17 1222          Time Calculation- OT    OT Start Time 1045  -LM      OT Stop Time 1125  -LM      OT Time Calculation (min) 40 min  -LM      Total Timed Code Minutes- OT 40 minute(s)  -LM        User Key  (r) = Recorded By, (t) = Taken By, (c) = Cosigned By    Initials Name Provider Type     JOLIE Novak Occupational Therapy Assistant           Therapy Charges for Today     Code Description Service Date Service Provider Modifiers Qty    [id number]  OT THER PROC EA 15 MIN 6/4/2017 JOLIE Novak GO 3          OT G-codes  OT Professional Judgement Used?: Yes  OT Functional Scales Options: AM-PAC 6 Clicks Daily Activity (OT)  Score: 18  Functional Limitation: Self care  Self Care Current Status (): At least 40 percent but less than 60 percent impaired, limited or restricted  Self Care Goal Status (): At least 20 percent but less than 40 percent impaired, limited or restricted    JOLIE Novak  6/4/2017

## 2017-06-04 NOTE — PLAN OF CARE
Problem: Patient Care Overview (Adult)  Goal: Plan of Care Review  Outcome: Ongoing (interventions implemented as appropriate)    06/04/17 1220   Coping/Psychosocial Response Interventions   Plan Of Care Reviewed With patient   Patient Care Overview   Progress improving   Outcome Evaluation   Outcome Summary/Follow up Plan improving toward all goals         Problem: Inpatient Occupational Therapy  Goal: Transfer Training Goal 1 LTG- OT  Outcome: Ongoing (interventions implemented as appropriate)    06/04/17 1220   Transfer Training OT LTG   Transfer Training OT LTG, Date Goal Reviewed 06/04/17   Transfer Training OT LTG, Outcome goal ongoing       Goal: ADL Goal LTG- OT  Outcome: Ongoing (interventions implemented as appropriate)    06/04/17 1220   ADL OT LTG   ADL OT LTG, Date Goal Reviewed 06/04/17   ADL OT LTG, Outcome goal ongoing       Goal: Functional Mobility Goal LTG- OT  Outcome: Ongoing (interventions implemented as appropriate)    06/04/17 1220   Functional Mobility OT LTG   Functional Mobility Goal OT LTG, Date Goal Reviewed 06/04/17   Functional Mobility Goal OT LTG, Outcome goal ongoing

## 2017-06-04 NOTE — THERAPY TREATMENT NOTE
Acute Care - Physical Therapy Treatment Note  [facility]     Patient Name: Ventura Boggs  : 1960  MRN: [id number]  Today's Date: 2017  Onset of Illness/Injury or Date of Surgery Date: 17  Date of Referral to PT: 07  Referring Physician: Dr. Shaver    Admit Date: 2017    Visit Dx:    ICD-10-CM ICD-9-CM   1. Ventral hernia without obstruction or gangrene K43.9 553.20   2. Impaired physical mobility Z74.09 781.99   3. Impaired mobility and ADLs Z74.09 799.89     Patient Active Problem List   Diagnosis   • Incisional hernia, without obstruction or gangrene   • Controlled type 2 diabetes mellitus without complication, with long-term current use of insulin   • Ventral hernia without obstruction or gangrene   • Postoperative urinary retention               Adult Rehabilitation Note       17 1330 17 1045 17 0855    Rehab Assessment/Intervention    Discipline physical therapy assistant  -AM occupational therapy assistant  -LM physical therapy assistant  -AM    Document Type therapy note (daily note)  -AM therapy note (daily note)  -LM therapy note (daily note)  -AM    Subjective Information agree to therapy;complains of;pain  -AM agree to therapy  -LM agree to therapy;complains of;pain  -AM    Patient Effort, Rehab Treatment good  -AM  good  -AM    Symptoms Noted During/After Treatment fatigue;increased pain  -AM  fatigue  -AM    Precautions/Limitations oxygen therapy device and L/min  -AM  fall precautions;oxygen therapy device and L/min  -AM    Equipment Issued to Patient gait belt  -AM  gait belt  -AM    Recorded by [AM] Ulysses Gardner PTA [LM] MARYLIN Novak/MEI [AM] Ulysses Gardner PTA    Vital Signs    Pre Systolic BP Rehab 138  -  -  -AM    Pre Treatment Diastolic BP 88  -AM 90  -LM 82  -AM    Post Systolic BP Rehab 140  -AM  145  -AM    Post Treatment Diastolic BP 88  -AM  85  -AM    Pretreatment Heart Rate (beats/min) 112  -  -   -AM    Posttreatment Heart Rate (beats/min) 115  -  -  -AM    Pre SpO2 (%) 94  -AM 94  -LM 97  -AM    O2 Delivery Pre Treatment supplemental O2  -AM  supplemental O2   3L  -AM    Intra SpO2 (%)  95  -LM     Post SpO2 (%) 95  -AM  97  -AM    O2 Delivery Post Treatment supplemental O2  -AM  supplemental O2   3L  -AM    Pre Patient Position   Supine  -AM    Intra Patient Position   Standing  -AM    Post Patient Position   Supine  -AM    Recorded by [AM] Ulysses Gardner PTA [LM] MARYLIN Novak/MEI [AM] Ulysses Gardner PTA    Pain Assessment    Pain Assessment 0-10  -AM --  -LM 0-10  -AM    Pain Score 6  -AM 8  -LM 9  -AM    Post Pain Score 8  -AM 7  -LM 9  -AM    Pain Type Acute pain;Surgical pain  -AM  Acute pain;Surgical pain  -AM    Pain Location Abdomen  -AM  Abdomen  -AM    Pain Orientation Mid  -AM      Pain Descriptors Sore  -AM  Sore  -AM    Pain Frequency Constant/continuous  -AM  Constant/continuous  -AM    Date Pain First Started 05/31/17  -AM  05/31/17  -AM    Clinical Progression Not changed  -AM  Not changed  -AM    Patient's Stated Pain Goal No pain  -AM  No pain  -AM    Pain Intervention(s) Medication (See MAR);Ambulation/increased activity  -AM  Medication (See MAR);Ambulation/increased activity  -AM    Result of Injury No  -AM  No  -AM    Work-Related Injury No  -AM  No  -AM    Multiple Pain Sites No  -AM  No  -AM    Recorded by [AM] Ulysses Gardner PTA [LM] MARYLIN Novak/MEI [AM] Ulysses Gardner PTA    Cognitive Assessment/Intervention    Current Cognitive/Communication Assessment functional  -AM functional  -LM functional  -AM    Orientation Status oriented x 4  -AM oriented x 4  -LM oriented x 4  -AM    Follows Commands/Answers Questions 100% of the time  -% of the time  -% of the time  -AM    Personal Safety Interventions gait belt;nonskid shoes/slippers when out of bed;supervised activity  -AM  gait belt;nonskid shoes/slippers when out of bed;supervised  activity  -AM    Recorded by [AM] Ulysses Gardner PTA [LM] MARYLIN Novak/MEI [AM] Ulysses Gardner PTA    ROM (Range of Motion)    General ROM no range of motion deficits identified  -AM  no range of motion deficits identified  -AM    Recorded by [AM] Ulysses Gardner PTA  [AM] Ulysses Gardner PTA    Bed Mobility, Assessment/Treatment    Bed Mobility, Assistive Device bed rails;head of bed elevated  -AM  bed rails;head of bed elevated  -AM    Bed Mobility, Roll Left, [place] not tested  -AM  not tested  -AM    Bed Mobility, Roll Right, [place] not tested  -AM  not tested  -AM    Bed Mobility, Scoot/Bridge, [place] not tested  -AM  not tested  -AM    Bed Mob, Supine to Sit, [place] supervision required  -AM minimum assist (75% patient effort)  -LM supervision required  -AM    Bed Mob, Sit to Supine, [place] supervision required  -AM  supervision required  -AM    Bed Mob, Sidelying to Sit, [place] not tested  -AM  not tested  -AM    Bed Mob, Sit to Sidelying, [place] not tested  -AM  not tested  -AM    Bed Mobility, Safety Issues decreased use of arms for pushing/pulling;decreased use of legs for bridging/pushing  -AM  decreased use of arms for pushing/pulling;decreased use of legs for bridging/pushing  -AM    Bed Mobility, Impairments strength decreased;pain  -AM  strength decreased;pain  -AM    Recorded by [AM] Ulysses Gardner PTA [LM] MARYLIN Novak/L [AM] Ulysses Gardner PTA    Transfer Assessment/Treatment    Transfers, Bed-Chair [place] contact guard assist  -AM minimum assist (75% patient effort)  -LM contact guard assist  -AM    Transfers, Chair-Bed [place] contact guard assist  -AM  contact guard assist  -AM    Transfers, Bed-Chair-Bed, Assist Device rolling walker  -AM  rolling walker  -AM    Transfers, Sit-Stand [place] contact guard assist  -AM minimum assist (75% patient effort)  -LM contact guard assist  -AM    Transfers, Stand-Sit  [place] contact guard assist  -AM minimum assist (75% patient effort)  -LM contact guard assist  -AM    Transfers, Sit-Stand-Sit, Assist Device rolling walker  -AM  rolling walker  -AM    Toilet Transfer, [place] not tested  -AM  not tested  -AM    Walk-In Shower Transfer, [place] not tested  -AM  not tested  -AM    Bathtub Transfer, [place] not tested  -AM  not tested  -AM    Transfer, Maintain Weight Bearing Status able to maintain weight bearing status  -AM  able to maintain weight bearing status  -AM    Transfer, Safety Issues step length decreased  -AM  step length decreased  -AM    Transfer, Impairments strength decreased;pain  -AM  strength decreased;pain  -AM    Recorded by [AM] Ulysses Gardner PTA [LM] MARYLIN Novak/MEI [AM] Ulysses Gardner PTA    Gait Assessment/Treatment    Gait, [place] Level contact guard assist  -AM  contact guard assist  -AM    Gait, Assistive Device rolling walker  -AM  rolling walker  -AM    Gait, Distance (Feet) 400  -AM  300  -AM    Gait, Gait Pattern Analysis swing-through gait  -AM  swing-through gait  -AM    Gait, Gait Deviations bilateral:;[place] decreased  -AM  bilateral:;[place] decreased  -AM    Gait, Maintain Weight Bearing Status able to maintain weight bearing status  -AM  able to maintain weight bearing status  -AM    Gait, Safety Issues step length decreased;supplemental O2  -AM  step length decreased;supplemental O2  -AM    Gait, Impairments strength decreased;pain  -AM  strength decreased;pain  -AM    Recorded by [AM] Ulysses Gardner PTA  [AM] Ulysses Gardner PTA    Stairs Assessment/Treatment    Stairs, [place] Level not tested  -AM  not tested  -AM    Recorded by [AM] Ulysses Gardner PTA  [AM] Ulysses Gardner PTA    Motor Skills/Interventions    Motor Response Observations  --   sitting balance chandrika x 10 min eob  -LM     Recorded by  [LM] SACHI NovakA/L     Therapy Exercises    Bilateral Lower Extremities --   -AM  AROM:;20 reps;supine;ankle pumps/circles;glut sets;heel slides;quad sets;SLR  -AM    Recorded by [AM] Ulysses Gardner PTA  [AM] Ulysses Gardner PTA    Positioning and Restraints    Pre-Treatment Position in bed  -AM in bed  -LM in bed  -AM    Post Treatment Position bed  -AM --   ortho chair  -LM bed  -AM    In Bed supine;call light within reach;encouraged to call for assist;exit alarm on  -AM  supine;call light within reach;encouraged to call for assist;with nsg  -AM    Recorded by [AM] Ulysses Gardner PTA [LM] MARYLIN Novak/MEI [AM] Ulysses Gardner PTA      06/02/17 1000          Rehab Assessment/Intervention    Discipline physical therapy assistant  -TW      Document Type therapy note (daily note)  -TW      Subjective Information agree to therapy  -TW      Patient Effort, Rehab Treatment good  -TW      Precautions/Limitations fall precautions  -TW      Recorded by [TW] Brant Palacios PTA      Vital Signs    Pre Systolic BP Rehab 111  -TW      Pre Treatment Diastolic BP 90  -TW      Post Systolic BP Rehab 124  -TW      Post Treatment Diastolic BP 87  -TW      Pretreatment Heart Rate (beats/min) 128  -TW      Intratreatment Heart Rate (beats/min) 136  -TW      Posttreatment Heart Rate (beats/min) 128  -TW      Pre SpO2 (%) 95  -TW      O2 Delivery Pre Treatment supplemental O2  -TW      Post SpO2 (%) 95  -TW      Pre Patient Position Sitting  -TW      Intra Patient Position Standing  -TW      Post Patient Position Sitting  -TW      Recorded by [TW] Brant Palacios PTA      Pain Assessment    Pain Assessment 0-10  -TW      Pain Score 7  -TW      Post Pain Score 8  -TW      Pain Type Acute pain;Surgical pain  -TW      Pain Location Incision  -TW      Recorded by [TW] Brant Palacios PTA      Cognitive Assessment/Intervention    Current Cognitive/Communication Assessment functional  -TW      Orientation Status oriented x 4  -TW      Follows Commands/Answers Questions 100% of the time  -TW       Personal Safety Interventions nonskid shoes/slippers when out of bed  -TW      Recorded by [TW] Brant Palacios PTA      Transfer Assessment/Treatment    Transfers, Sit-Stand [place] contact guard assist  -TW      Transfers, Stand-Sit [place] contact guard assist  -TW      Recorded by [TW] Brant Palacios PTA      Gait Assessment/Treatment    Gait, [place] Level contact guard assist  -TW      Gait, Assistive Device rolling walker  -TW      Gait, Distance (Feet) 3   fwd and bwd  -TW      Recorded by [TW] Brant Palacios PTA      Therapy Exercises    Bilateral Lower Extremities AROM:;15 reps;sitting;ankle pumps/circles;glut sets;hip abduction/adduction;hip flexion;LAQ   x2 sets  -TW      Recorded by [TW] Brant Palacios PTA      Positioning and Restraints    Pre-Treatment Position sitting in chair/recliner  -TW      Post Treatment Position chair  -TW      In Chair reclined  -TW      Recorded by [TW] Brant Palacios PTA        User Key  (r) = Recorded By, (t) = Taken By, (c) = Cosigned By    Initials Name Effective Dates    AM Ulysses Gardner, PTA 10/17/16 -     TW Brant Palacios, KAREN 10/17/16 -     LM Ese Dodson, COULTER/MEI 10/17/16 -                 IP PT Goals       06/04/17 1330 06/03/17 0855 06/02/17 1000    Transfer Training PT LTG    Transfer Training PT  LTG, Date Goal Reviewed 06/04/17  -AM 06/03/17  -AM 06/02/17  -TW    Transfer Training PT LTG, Outcome goal not met  -AM goal not met  -AM goal ongoing  -TW    Gait Training PT LTG    Gait Training Goal PT LTG, Date Goal Reviewed 06/04/17  -AM 06/03/17  -AM 06/02/17  -TW    Gait Training Goal PT LTG, Outcome goal not met  -AM goal not met  -AM goal ongoing  -TW    Stair Training PT LTG    Stair Training Goal PT LTG, Date Goal Reviewed 06/04/17  -AM 06/03/17  -AM 06/02/17  -TW    Stair Training Goal PT LTG, Outcome goal not met  -AM goal not met  -AM goal ongoing  -TW    Patient Education PT LTG    Patient Education  PT LTG, Date Goal Reviewed 06/04/17  -AM 06/03/17  -AM 06/02/17  -    Patient Education PT LTG Outcome goal not met  -AM goal not met  -AM goal ongoing  -      06/01/17 1108          Transfer Training PT LTG    Transfer Training PT LTG, Date Established 06/01/17  -      Transfer Training PT LTG, Time to Achieve by discharge  -      Transfer Training PT LTG, [place] Level independent  -      Transfer Training PT LTG, Outcome goal ongoing  -      Gait Training PT LTG    Gait Training Goal PT LTG, Date Established 06/01/17  -      Gait Training Goal PT LTG, Time to Achieve by discharge  -      Gait Training Goal PT LTG, [place] Level independent  -      Gait Training Goal PT LTG, Distance to Achieve 1000ft  -      Gait Training Goal PT LTG, Additional Goal Pt will complete 6 minute walk test  -      Gait Training Goal PT LTG, Outcome goal ongoing  -      Stair Training PT LTG    Stair Training Goal PT LTG, Date Established 06/01/17  -      Stair Training Goal PT LTG, Time to Achieve by discharge  -      Stair Training Goal PT LTG, Number of Steps 5  -      Stair Training Goal PT LTG, [place] Level conditional independence  -      Stair Training Goal PT LTG, Assist Device 1 handrail  -      Stair Training Goal PT LTG, Outcome goal ongoing  -      Patient Education PT LTG    Patient Education PT LTG, Date Established 06/01/17  -      Patient Education PT LTG, Time to Achieve by discharge  -      Patient Education PT LTG, Education Type gait;transfers;home safety  -      Patient Education PT LTG Outcome goal ongoing  -        User Key  (r) = Recorded By, (t) = Taken By, (c) = Cosigned By    Initials Name Provider Type    GAL Dumont, PT Physical Therapist    ERNIE Gardner, PTA Physical Therapy Assistant    DERREK Palacios, PTA Physical Therapy Assistant          Physical Therapy Education     Title: PT OT SLP Therapies (Active)     Topic: Physical  Therapy (Active)     Point: Mobility training (Active)    Learning Progress Summary    Learner Readiness Method Response Comment Documented by Status   Patient Acceptance E NR  GAL 06/01/17 1107 Active   Family Acceptance E NR   06/01/17 1107 Active               Point: Precautions (Active)    Learning Progress Summary    Learner Readiness Method Response Comment Documented by Status   Patient Acceptance E NR  GAL 06/01/17 1107 Active   Family Acceptance E NR   06/01/17 1107 Active                      User Key     Initials Effective Dates Name Provider Type Discipline     10/17/16 -  Shilpa Dumont, PT Physical Therapist PT                    PT Recommendation and Plan  Anticipated Discharge Disposition: home with home health  Planned Therapy Interventions: balance training, bed mobility training, gait training, patient/family education, stair training, transfer training  PT Frequency: other (see comments) (5-14 times per week)  Plan of Care Review  Plan Of Care Reviewed With: patient  Progress: progress toward functional goals as expected  Outcome Summary/Follow up Plan: Pt did not meet any PT goals this tx. Pt able to get in/out bed SBA. Pt able to amb 400 ft w/RW CGA. Pt would benefit from  PT once discharged from this facility.           Outcome Measures       06/04/17 1330 06/04/17 1200 06/03/17 0855    How much help from another person do you currently need...    Turning from your back to your side while in flat bed without using bedrails? 3  -AM  3  -AM    Moving from lying on back to sitting on the side of a flat bed without bedrails? 3  -AM  3  -AM    Moving to and from a bed to a chair (including a wheelchair)? 3  -AM  3  -AM    Standing up from a chair using your arms (e.g., wheelchair, bedside chair)? 3  -AM  3  -AM    Climbing 3-5 steps with a railing? 1  -AM  1  -AM    To walk in hospital room? 3  -AM  3  -AM    AM-PAC 6 Clicks Score 16  -AM  16  -AM    How much help from another is currently  needed...    Putting on and taking off regular lower body clothing?  2  -LM     Bathing (including washing, rinsing, and drying)  3  -LM     Toileting (which includes using toilet bed pan or urinal)  3  -LM     Putting on and taking off regular upper body clothing  3  -LM     Taking care of personal grooming (such as brushing teeth)  3  -LM     Eating meals  4  -LM     Score  18  -LM     Functional Assessment    Outcome Measure Options AM-PAC 6 Clicks Basic Mobility (PT)  -AM  AM-PAC 6 Clicks Basic Mobility (PT)  -AM      06/02/17 1053 06/02/17 1000       How much help from another person do you currently need...    Turning from your back to your side while in flat bed without using bedrails?  3  -TW     Moving from lying on back to sitting on the side of a flat bed without bedrails?  3  -TW     Moving to and from a bed to a chair (including a wheelchair)?  3  -TW     Standing up from a chair using your arms (e.g., wheelchair, bedside chair)?  3  -TW     Climbing 3-5 steps with a railing?  3  -TW     To walk in hospital room?  3  -TW     AM-PAC 6 Clicks Score  18  -TW     How much help from another is currently needed...    Putting on and taking off regular lower body clothing? 2  -RW      Bathing (including washing, rinsing, and drying) 3  -RW      Toileting (which includes using toilet bed pan or urinal) 3  -RW      Putting on and taking off regular upper body clothing 3  -RW      Taking care of personal grooming (such as brushing teeth) 3  -RW      Eating meals 4  -RW      Score 18  -RW      Functional Assessment    Outcome Measure Options AM-PAC 6 Clicks Daily Activity (OT)  -RW AM-PAC 6 Clicks Basic Mobility (PT)  -TW       User Key  (r) = Recorded By, (t) = Taken By, (c) = Cosigned By    Initials Name Provider Type    RW Nicky Blanchard, OTR/L Occupational Therapist    AM Ulysses Gardner, PTA Physical Therapy Assistant    DERREK Palacios, KAREN Physical Therapy Assistant    GWYN Dodson COULTER/L  Occupational Therapy Assistant           Time Calculation:         PT Charges       06/04/17 1330          Time Calculation    Start Time 1330  -AM      Stop Time 1410  -AM      Time Calculation (min) 40 min  -AM      PT Received On 06/04/17  -AM      PT - Next Appointment 06/05/17  -AM      Time Calculation- PT    Total Timed Code Minutes- PT 40 minute(s)  -AM        User Key  (r) = Recorded By, (t) = Taken By, (c) = Cosigned By    Initials Name Provider Type    AM Ulysses Gardner PTA Physical Therapy Assistant          Therapy Charges for Today     Code Description Service Date Service Provider Modifiers Qty    70286190460 HC GAIT TRAINING EA 15 MIN 6/3/2017 Ulysses Gardner PTA GP 1    39093623237 HC PT THER PROC GROUP 6/3/2017 Ulysses Gardner PTA GP 2    33694563810 HC PT SELF CARE/MGMT/TRAIN EA 15 MIN 6/3/2017 Ulysses Gardner PTA GP 1    56875140831 HC GAIT TRAINING EA 15 MIN 6/4/2017 Ulysses Gardner PTA GP 2    27950144867 HC PT THER PROC GROUP 6/4/2017 Ulysses Gardner PTA GP 1          PT G-Codes  PT Professional Judgement Used?: Yes  Outcome Measure Options: AM-PAC 6 Clicks Basic Mobility (PT)  Score: 18  Functional Limitation: Mobility: Walking and moving around  Mobility: Walking and Moving Around Current Status (): At least 40 percent but less than 60 percent impaired, limited or restricted  Mobility: Walking and Moving Around Goal Status (): 0 percent impaired, limited or restricted    Ulysses Gardner PTA  6/4/2017

## 2017-06-05 LAB
ANION GAP SERPL CALCULATED.3IONS-SCNC: 11 MMOL/L (ref 5–15)
BUN BLD-MCNC: 12 MG/DL (ref 7–21)
BUN/CREAT SERPL: 19 (ref 7–25)
CALCIUM SPEC-SCNC: 8.2 MG/DL (ref 8.4–10.2)
CHLORIDE SERPL-SCNC: 95 MMOL/L (ref 95–110)
CO2 SERPL-SCNC: 28 MMOL/L (ref 22–31)
CREAT BLD-MCNC: 0.63 MG/DL (ref 0.7–1.3)
GFR SERPL CREATININE-BSD FRML MDRD: 133 ML/MIN/1.73 (ref 60–130)
GLUCOSE BLD-MCNC: 154 MG/DL (ref 60–100)
GLUCOSE BLDC GLUCOMTR-MCNC: 137 MG/DL (ref 70–130)
GLUCOSE BLDC GLUCOMTR-MCNC: 148 MG/DL (ref 70–130)
GLUCOSE BLDC GLUCOMTR-MCNC: 155 MG/DL (ref 70–130)
GLUCOSE BLDC GLUCOMTR-MCNC: 157 MG/DL (ref 70–130)
LAB AP CASE REPORT: NORMAL
Lab: NORMAL
PATH REPORT.FINAL DX SPEC: NORMAL
PATH REPORT.GROSS SPEC: NORMAL
POTASSIUM BLD-SCNC: 4.1 MMOL/L (ref 3.5–5.1)
SODIUM BLD-SCNC: 134 MMOL/L (ref 137–145)

## 2017-06-05 PROCEDURE — 25010000002 HYDROMORPHONE PER 4 MG: Performed by: SURGERY

## 2017-06-05 PROCEDURE — 97530 THERAPEUTIC ACTIVITIES: CPT

## 2017-06-05 PROCEDURE — 63710000001 INSULIN ASPART PER 5 UNITS: Performed by: SURGERY

## 2017-06-05 PROCEDURE — 82962 GLUCOSE BLOOD TEST: CPT

## 2017-06-05 PROCEDURE — 97535 SELF CARE MNGMENT TRAINING: CPT

## 2017-06-05 PROCEDURE — 25010000002 ENOXAPARIN PER 10 MG: Performed by: SURGERY

## 2017-06-05 PROCEDURE — 97110 THERAPEUTIC EXERCISES: CPT

## 2017-06-05 PROCEDURE — 80048 BASIC METABOLIC PNL TOTAL CA: CPT | Performed by: SURGERY

## 2017-06-05 PROCEDURE — 94760 N-INVAS EAR/PLS OXIMETRY 1: CPT

## 2017-06-05 PROCEDURE — 25010000002 ONDANSETRON PER 1 MG: Performed by: SURGERY

## 2017-06-05 PROCEDURE — 25010000002 HYDROMORPHONE PCA 0.2 MG/ML PCA 30 ML (BHMAD/RIC): Performed by: SURGERY

## 2017-06-05 PROCEDURE — 94799 UNLISTED PULMONARY SVC/PX: CPT

## 2017-06-05 PROCEDURE — 97116 GAIT TRAINING THERAPY: CPT

## 2017-06-05 RX ORDER — BISACODYL 10 MG
10 SUPPOSITORY, RECTAL RECTAL DAILY
Status: DISCONTINUED | OUTPATIENT
Start: 2017-06-05 | End: 2017-06-08 | Stop reason: HOSPADM

## 2017-06-05 RX ORDER — TAMSULOSIN HYDROCHLORIDE 0.4 MG/1
0.4 CAPSULE ORAL DAILY
Status: DISCONTINUED | OUTPATIENT
Start: 2017-06-05 | End: 2017-06-08 | Stop reason: HOSPADM

## 2017-06-05 RX ADMIN — BISACODYL 10 MG: 10 SUPPOSITORY RECTAL at 09:35

## 2017-06-05 RX ADMIN — SODIUM CHLORIDE, POTASSIUM CHLORIDE, SODIUM LACTATE AND CALCIUM CHLORIDE 100 ML/HR: 600; 310; 30; 20 INJECTION, SOLUTION INTRAVENOUS at 05:01

## 2017-06-05 RX ADMIN — Medication: at 12:11

## 2017-06-05 RX ADMIN — HYDROMORPHONE HYDROCHLORIDE 1.5 MG: 2 INJECTION, SOLUTION INTRAMUSCULAR; INTRAVENOUS; SUBCUTANEOUS at 03:02

## 2017-06-05 RX ADMIN — TAMSULOSIN HYDROCHLORIDE 0.4 MG: 0.4 CAPSULE ORAL at 12:13

## 2017-06-05 RX ADMIN — ATORVASTATIN CALCIUM 20 MG: 20 TABLET, FILM COATED ORAL at 21:06

## 2017-06-05 RX ADMIN — SODIUM CHLORIDE, POTASSIUM CHLORIDE, SODIUM LACTATE AND CALCIUM CHLORIDE 100 ML/HR: 600; 310; 30; 20 INJECTION, SOLUTION INTRAVENOUS at 15:05

## 2017-06-05 RX ADMIN — INSULIN ASPART 3 UNITS: 100 INJECTION, SOLUTION INTRAVENOUS; SUBCUTANEOUS at 08:43

## 2017-06-05 RX ADMIN — HYDROMORPHONE HYDROCHLORIDE 1.5 MG: 2 INJECTION, SOLUTION INTRAMUSCULAR; INTRAVENOUS; SUBCUTANEOUS at 23:40

## 2017-06-05 RX ADMIN — MUPIROCIN: 20 OINTMENT TOPICAL at 21:06

## 2017-06-05 RX ADMIN — INSULIN ASPART 3 UNITS: 100 INJECTION, SOLUTION INTRAVENOUS; SUBCUTANEOUS at 17:12

## 2017-06-05 RX ADMIN — PANTOPRAZOLE SODIUM 40 MG: 40 INJECTION, POWDER, FOR SOLUTION INTRAVENOUS at 05:53

## 2017-06-05 RX ADMIN — MUPIROCIN: 20 OINTMENT TOPICAL at 05:54

## 2017-06-05 RX ADMIN — MUPIROCIN: 20 OINTMENT TOPICAL at 14:00

## 2017-06-05 RX ADMIN — ONDANSETRON 4 MG: 2 INJECTION INTRAMUSCULAR; INTRAVENOUS at 23:13

## 2017-06-05 RX ADMIN — ENOXAPARIN SODIUM 40 MG: 40 INJECTION SUBCUTANEOUS at 08:42

## 2017-06-05 NOTE — MEDICAL STUDENT
Rui Shaver MD [facility]  General Surgery    6/5/2017    Chief Complaint:     Subjective      LOS: 5 days      Patient is 56 y.o. male presented with ventral hernia and intermittent obstruction diagnosed clinically is POD#5  for OPEN ADHESIOLYSIS AND VENTRAL/INCISIONAL HERNIA REPAIR WITH PHASIX MESH.    Still having abdominal pain and back pain moderately controlled on his medication. Has had no flatus or bowel movement. No trouble breathing. Some leg pain.         Current Medications:     Current Facility-Administered Medications   Medication Dose Route Frequency Provider Last Rate Last Dose   • atorvastatin (LIPITOR) tablet 20 mg  20 mg Oral Nightly Rui Shaver MD   20 mg at 06/04/17 2124   • bupivacaine-EPINEPHrine PF (MARCAINE w/EPI) 0.5% -[id number] injection    PRN Rui Shaver MD   30 mL at 05/31/17 1525   • dextrose (D50W) solution 25 g  25 g Intravenous Q15 Min PRN Rui Shaver MD       • dextrose (GLUTOSE) oral gel 15 g  15 g Oral Q15 Min PRN Rui Shaver MD       • enoxaparin (LOVENOX) syringe 40 mg  40 mg Subcutaneous Daily Rui Shaver MD   40 mg at 06/04/17 1042   • glucagon (human recombinant) (GLUCAGEN DIAGNOSTIC) injection 1 mg  1 mg Subcutaneous Q15 Min PRN Rui Shaver MD       • glucagon (human recombinant) (GLUCAGEN DIAGNOSTIC) injection 1 mg  1 mg Subcutaneous Q15 Min PRN Rui Shaver MD       • HYDROmorphone (DILAUDID) injection 1 mg  1 mg Intravenous Q3H PRN Rui Shaver MD   1 mg at 06/04/17 2124   • HYDROmorphone (DILAUDID) injection 1.5 mg  1.5 mg Intravenous Q3H PRN Rui Shaver MD   1.5 mg at 06/05/17 0302   • HYDROmorphone (DILAUDID) PCA 0.2 mg/mL 30 mL syringe   Intravenous Continuous Rui Shaver MD       • insulin aspart (novoLOG) injection 0-14 Units  0-14 Units Subcutaneous 4x Daily AC & at Bedtime Rui Shaver MD       • labetalol (NORMODYNE,TRANDATE) injection 20 mg  20 mg Intravenous Q6H PRN Rui Shaver MD   20 mg at 06/01/17 1650   • lactated ringers infusion  100 mL/hr Intravenous  Continuous Rui Shaver  mL/hr at 06/04/17 1745 100 mL/hr at 06/04/17 1745   • LIDOCAINE 1/6% WITH EPINEPHRINE SOLUTION    PRN Rui Shaver MD   150 mL at 05/31/17 1525   • mupirocin (BACTROBAN) 2 % ointment   Topical Q8H Rui Shaver MD       • naloxone (NARCAN) injection 0.1 mg  0.1 mg Intravenous Q5 Min PRN Rui Shaver MD       • ondansetron (ZOFRAN) injection 4 mg  4 mg Intravenous Q6H PRN Rui Shaver MD   4 mg at 06/03/17 1802   • pantoprazole (PROTONIX) injection 40 mg  40 mg Intravenous Q AM Rui Shaver MD   40 mg at 06/04/17 0539       Objective     Physical Exam:   Temp:  [97.1 °F (36.2 °C)-99.4 °F (37.4 °C)] 98.9 °F (37.2 °C)  Heart Rate:  [] 109  Resp:  [18-20] 18  BP: (126-156)/(66-88) 132/69     Intake/Output       06/04/17 0700 - 06/05/17 0659    Intake (ml) 1060    Output (ml) 3600    Net (ml) -2540          Physical Exam   Constitutional: He is oriented to person, place, and time.   Abdominal: Bowel sounds are normal. He exhibits distension.   Genitourinary:   Genitourinary Comments: Catheter in place   Musculoskeletal:   No calf tenderness or swelling or erythema   Neurological: He is oriented to person, place, and time.   Skin: Skin is warm.   Incision site is dry with no signs of infection or dehiscence. Right drain is in place with 25 cc of partially coagulated blood.   superficially ecchymosis spreading from drain sites around the back and superior thigh bilaterally with left spreading further posteriorly     Vitals reviewed.        Labs:  Lab Results (last 24 hours)     Procedure Component Value Units Date/Time    POC Glucose Fingerstick [[id number]]  (Abnormal) Collected:  06/04/17 0620    Specimen:  Blood Updated:  06/04/17 0634     Glucose 169 (H) mg/dL       RN NotifiedMeter: [id number]: [id number] KINSEY WHELAN       Basic Metabolic Panel [[id number]]  (Abnormal) Collected:  06/04/17 0648    Specimen:  Blood Updated:  06/04/17 0826     Glucose 149 (H) mg/dL      BUN  17 mg/dL      Creatinine 0.64 (L) mg/dL      Sodium 135 (L) mmol/L      Potassium 4.0 mmol/L      Chloride 94 (L) mmol/L      CO2 25.0 mmol/L      Calcium 8.2 (L) mg/dL      eGFR Non African Amer 130 mL/min/1.73      BUN/Creatinine Ratio 26.6 (H)     Anion Gap 16.0 (H) mmol/L     CBC (No Diff) [[id number]]  (Abnormal) Collected:  06/04/17 0648    Specimen:  Blood Updated:  06/04/17 0831     WBC 11.25 (H) 10*3/mm3      RBC 3.09 (L) 10*6/mm3      Hemoglobin 9.2 (L) g/dL      Hematocrit 28.0 (L) %      MCV 90.6 fL      MCH 29.8 pg      MCHC 32.9 g/dL      RDW 12.8 %      RDW-SD 42.1 fl      MPV 11.7 fL      Platelets 298 10*3/mm3     POC Glucose Fingerstick [[id number]]  (Abnormal) Collected:  06/04/17 1116    Specimen:  Blood Updated:  06/04/17 1128     Glucose 154 (H) mg/dL       RN NotifiedMeter: [id number]: [id number] SHAWN PARKER       POC Glucose Fingerstick [[id number]]  (Abnormal) Collected:  06/04/17 1648    Specimen:  Blood Updated:  06/04/17 1704     Glucose 153 (H) mg/dL       RN NotifiedMeter: [id number]: [id number] [facility]             Images:   Imaging Results (last 24 hours)     ** No results found for the last 24 hours. **          (I reviewed the patient's results and images.)                                                                                    ASSESSMENT/PLAN    POD#5  for OPEN ADHESIOLYSIS AND VENTRAL/INCISIONAL HERNIA REPAIR WITH PHASIX MESH.      Kirt Villarreal, Medical Student  1.tachycardia better at 109  2.keep NPO until resumption of bowel function   2.dvt prophylaxis-lovenox  3.arevalo in place   4.pain moderately controlled.continue current regime.  5.continue ambulating  6 incentive spirometry  7. Consult urology for arevalo assistance  8. Dulcolax

## 2017-06-05 NOTE — CONSULTS
"Inpatient Consult to Urology  Consult performed by: FABIOLA NOVA  Consult ordered by: PINO THOMPSON          Patient Care Team:  GIA Salamanca as PCP - General (Family Medicine)    Chief complaint: retention of urine    Subjective     HPI Comments: Mr. Boggs is a 56-year-old  male consulted to  for retention of urine postoperative (5/31/17) open adhesiolysis, ventral hernia repair with mesh placement. His retention of urine presented after surgery and a Wiley was anchored on POD #1.  He states he had the same problem after his last surgery. He also complains of nocturia and hesitancy for the last year and is aggravated by constipation, he is due for Dulcolax today to help stimulate a bowel movement. States he recently had a  work up in [place] with his PCP and \"his blood test and exam was fine.\" He takes no  medications at home. His father had prostate problems and his grandfather had prostate cancer.    Urinary Retention   This is a new problem. The current episode started in the past 7 days. The problem occurs constantly. Progression since onset: Wiley in place. Associated symptoms include abdominal pain, a change in bowel habit and urinary symptoms. Pertinent negatives include no chills, fever, nausea, vomiting or weakness. Exacerbated by: postoperative. Treatments tried: Wiley anchored.   Benign Prostatic Hypertrophy   This is a chronic problem. The current episode started more than 1 year ago. The problem is unchanged. Irritative symptoms include nocturia. Irritative symptoms do not include frequency or urgency. Obstructive symptoms do not include dribbling, incomplete emptying, a slower stream, straining or a weak stream. Associated symptoms include hesitancy. Pertinent negatives include no chills, dysuria, hematuria, nausea or vomiting. The symptoms are aggravated by constipation. Past treatments include nothing.       Review of Systems   Constitutional: Negative for chills and " fever.   Gastrointestinal: Positive for abdominal distention, abdominal pain, change in bowel habit and constipation. Negative for diarrhea, nausea and vomiting.   Genitourinary: Positive for difficulty urinating, hesitancy and nocturia. Negative for decreased urine volume, dysuria, frequency, hematuria, incomplete emptying, penile pain, penile swelling, scrotal swelling, testicular pain and urgency.   Skin: Negative for color change.   Neurological: Negative for weakness.   All other systems reviewed and are negative.       Past Medical History:   Diagnosis Date   • Arthritis    • Carpal tunnel syndrome    • Depressive disorder    • GERD (gastroesophageal reflux disease)    • Hernia    • History of urinary system disease    • Hypertension    • Migraine    • Rotator cuff syndrome    • Type 2 diabetes mellitus    • Ureteric stone    ,   Past Surgical History:   Procedure Laterality Date   • ABDOMINAL SURGERY  08/2016   • CHOLECYSTECTOMY     • CIRCUMCISION     • CYSTOSCOPY  08/13/2003    Cystoscopy and removal of J stent. Right ureteral stent.   • CYSTOSCOPY  08/13/2003    Cystoscopy and right stone extraction and placement of 26x 6 J-stent.   • VENTRAL/INCISIONAL HERNIA REPAIR N/A 5/31/2017    Procedure: LAPAROSCOPIC POSSIBLE OPEN ADHESIOLYSIS AND VENTRAL/INCISIONAL HERNIA REPAIR ;  Surgeon: Rui Shaver MD;  Location: [facility];  Service:    , History reviewed. No pertinent family history.,   Social History   Substance Use Topics   • Smoking status: Never Smoker   • Smokeless tobacco: Never Used   • Alcohol use No   ,   Prescriptions Prior to Admission   Medication Sig Dispense Refill Last Dose   • insulin lispro (humaLOG) 100 UNIT/ML injection Inject 8-12 Units under the skin 3 (Three) Times a Day As Needed (sliding scale). As needed per sliding scale   5/30/2017 at 1200   • aspirin 81 MG chewable tablet Chew 81 mg Daily.   More than a month at Unknown time   • atorvastatin (LIPITOR) 20 MG tablet Take 20 mg by  mouth Every Night.   5/29/2017   • lisinopril (PRINIVIL,ZESTRIL) 5 MG tablet Take 5 mg by mouth Daily.   5/29/2017   • metFORMIN (GLUCOPHAGE) 500 MG tablet Take 500 mg by mouth 2 (Two) Times a Day With Meals.   5/29/2017   • ondansetron ODT (ZOFRAN-ODT) 4 MG disintegrating tablet Take 4 mg by mouth Every 8 (Eight) Hours.   More than a month at Unknown time   • SITagliptin (JANUVIA) 50 MG tablet Take 50 mg by mouth 2 (Two) Times a Day.   not started        ALLERGIES: Naproxen and Tramadol    Objective      Vital Signs  Temp:  [97.1 °F (36.2 °C)-98.9 °F (37.2 °C)] 98.5 °F (36.9 °C)  Heart Rate:  [] 106  Resp:  [18-20] 18  BP: (126-141)/(66-88) 141/70    Physical Exam   Constitutional: Vital signs are normal. He appears well-developed and well-nourished. He does not appear ill.   HENT:   Head: Normocephalic and atraumatic.   Eyes: Lids are normal. Pupils are equal, round, and reactive to light.   Neck: No edema and no erythema present.   Cardiovascular: S1 normal and S2 normal.  Tachycardia present.  Exam reveals no gallop and no friction rub.    No murmur heard.  Pulmonary/Chest: Effort normal and breath sounds normal. No respiratory distress.   Abdominal: Soft. Bowel sounds are decreased. There is generalized tenderness.   Abdominal binder, TAINA drain, bruising   Genitourinary: Testes normal and penis normal. Circumcised. No penile tenderness.   Neurological: He is alert. GCS eye subscore is 4. GCS verbal subscore is 5. GCS motor subscore is 6.   Skin: Skin is warm, dry and intact. No cyanosis or erythema. No pallor. Nails show no clubbing.   Psychiatric: He has a normal mood and affect. His speech is normal and behavior is normal. He is attentive.   Vitals reviewed.      Results Review:   Lab Results (last 24 hours)     Procedure Component Value Units Date/Time    POC Glucose Fingerstick [[id number]]  (Abnormal) Collected:  06/04/17 1116    Specimen:  Blood Updated:  06/04/17 1128     Glucose 154 (H) mg/dL        "RN NotifiedMeter: [id number]: [id number] SHAWN PARKER       POC Glucose Fingerstick [[id number]]  (Abnormal) Collected:  06/04/17 1648    Specimen:  Blood Updated:  06/04/17 1704     Glucose 153 (H) mg/dL       RN NotifiedMeter: [id number]: [id number] ANN MARIE BLAKE       Basic Metabolic Panel [[id number]]  (Abnormal) Collected:  06/05/17 0548    Specimen:  Blood Updated:  06/05/17 0640     Glucose 154 (H) mg/dL      BUN 12 mg/dL      Creatinine 0.63 (L) mg/dL      Sodium 134 (L) mmol/L      Potassium 4.1 mmol/L      Chloride 95 mmol/L      CO2 28.0 mmol/L      Calcium 8.2 (L) mg/dL      eGFR Non African Amer 133 mL/min/1.73      BUN/Creatinine Ratio 19.0     Anion Gap 11.0 mmol/L     POC Glucose Fingerstick [[id number]]  (Abnormal) Collected:  06/05/17 0642    Specimen:  Blood Updated:  06/05/17 0700     Glucose 155 (H) mg/dL       RN NotifiedMeter: [id number]: [id number] CY NUNEZ       Tissue Exam [[id number]] Collected:  05/31/17 1924    Specimen:  Tissue from Hernia, Sac Updated:  06/05/17 0949     Case Report --     Surgical Pathology Report                         Case: [id number]                                  Authorizing Provider:  Rui Shaver MD            Collected:           05/31/2017 07:24 PM          Ordering Location:     [facility]             Received:            06/01/2017 08:28 AM                                 [place]                                                              Pathologist:           Daniel Powell MD                                                            Specimen:    Hernia, Sac, Hernia sac and adominal debridement                                            Final Diagnosis --     SKIN AND SOFT TISSUE, ABDOMINAL WALL:  HERNIA SAC.  SKIN, CONNECTIVE TISSUE, AND SKELETAL MUSCLE FROM ABDOMINAL WALL.       Gross Description --     The specimen is labeled \"hernia sac and abdominal debridement\".  Multiple fragments of skin and soft " tissue measure 14.0 x 15.0 x 3.0 cm together.  The skin surface measures 13.0 x 3.5 cm.  A well healed scar measures 12.5 cm in length.  Skeletal muscle is included with the specimen.  Sections are submitted in three blocks.        Embedded Images --         Imaging Results (last 24 hours)     ** No results found for the last 24 hours. **           I reviewed the patient's new clinical results.  I reviewed the patient's new imaging results and agree with the interpretation.  I reviewed the patient's other test results and agree with the interpretation        Assessment/Plan     Principal Problem:    Ventral hernia without obstruction or gangrene  Active Problems:    Controlled type 2 diabetes mellitus without complication, with long-term current use of insulin    Postoperative urinary retention      Assessment:  (Retention of urine postoperatively, Wiley anchored 4 days ago, 6/2/17 urine culture negative, complains of obstructive urinary symptoms but no diagnosis of BPH, no home  meds. ).     Plan:   (Start Flomax, anticipate voiding trial in the next 1-2 days.     Thank you Dr. Shaver for allowing [facility] to participate in the care of your patient.).       I discussed the patients findings and my recommendations with patient and Dr. GIA Rubio  06/05/17  11:16 AM

## 2017-06-05 NOTE — PLAN OF CARE
Problem: Perioperative Period (Adult)  Goal: Signs and Symptoms of Listed Potential Problems Will be Absent or Manageable (Perioperative Period)  Outcome: Ongoing (interventions implemented as appropriate)    Problem: Fall Risk (Adult)  Goal: Identify Related Risk Factors and Signs and Symptoms  Outcome: Outcome(s) achieved Date Met:  06/05/17  Goal: Absence of Falls  Outcome: Ongoing (interventions implemented as appropriate)    Problem: Skin Integrity Impairment, Risk/Actual (Adult)  Goal: Skin Integrity/Wound Healing  Outcome: Ongoing (interventions implemented as appropriate)

## 2017-06-05 NOTE — PLAN OF CARE
Problem: Patient Care Overview (Adult)  Goal: Plan of Care Review  Outcome: Ongoing (interventions implemented as appropriate)    06/05/17 0929   Coping/Psychosocial Response Interventions   Plan Of Care Reviewed With patient   Patient Care Overview   Progress progress toward functional goals as expected   Outcome Evaluation   Outcome Summary/Follow up Plan No change in goals this tx. Pt SBA for bed mobility. Pt amb 350ft w/ RW and SBA. Pt also completed step training SBA. using 2 HR for ascending and 1 HR when descending.  PT recommended once discharged home.          Problem: Inpatient Physical Therapy  Goal: Transfer Training Goal 1 LTG- PT  Outcome: Ongoing (interventions implemented as appropriate)    06/01/17 1108 06/05/17 0929   Transfer Training PT LTG   Transfer Training PT LTG, Date Established 06/01/17 --    Transfer Training PT LTG, Time to Achieve by discharge --    Transfer Training PT LTG, [place] Level independent --    Transfer Training PT LTG, Date Goal Reviewed --  06/05/17   Transfer Training PT LTG, Outcome --  goal ongoing       Goal: Gait Training Goal LTG- PT  Outcome: Ongoing (interventions implemented as appropriate)    06/01/17 1108 06/05/17 0929   Gait Training PT LTG   Gait Training Goal PT LTG, Date Established 06/01/17 --    Gait Training Goal PT LTG, Time to Achieve by discharge --    Gait Training Goal PT LTG, [place] Level independent --    Gait Training Goal PT LTG, Distance to Achieve 1000ft --    Gait Training Goal PT LTG, Additional Goal Pt will complete 6 minute walk test --    Gait Training Goal PT LTG, Date Goal Reviewed --  06/05/17   Gait Training Goal PT LTG, Outcome --  goal ongoing       Goal: Stair Training Goal LTG- PT  Outcome: Ongoing (interventions implemented as appropriate)    06/01/17 1108 06/05/17 0929   Stair Training PT LTG   Stair Training Goal PT LTG, Date Established 06/01/17 --    Stair Training Goal PT LTG, Time to Achieve by discharge --     Stair Training Goal PT LTG, Number of Steps 5 --    Stair Training Goal PT LTG, [place] Level conditional independence --    Stair Training Goal PT LTG, Assist Device 1 handrail --    Stair Training Goal PT LTG, Date Goal Reviewed --  06/05/17   Stair Training Goal PT LTG, Outcome --  goal ongoing       Goal: Patient Education Goal LTG- PT  Outcome: Ongoing (interventions implemented as appropriate)    06/01/17 1108 06/05/17 0929   Patient Education PT LTG   Patient Education PT LTG, Date Established 06/01/17 --    Patient Education PT LTG, Time to Achieve by discharge --    Patient Education PT LTG, Education Type gait;transfers;home safety --    Patient Education PT LTG, Date Goal Reviewed --  06/05/17   Patient Education PT LTG Outcome --  goal ongoing

## 2017-06-05 NOTE — PLAN OF CARE
Problem: Patient Care Overview (Adult)  Goal: Plan of Care Review  Outcome: Ongoing (interventions implemented as appropriate)    06/05/17 0929 06/05/17 1310   Coping/Psychosocial Response Interventions   Plan Of Care Reviewed With patient --    Patient Care Overview   Progress progress toward functional goals as expected --    Outcome Evaluation   Outcome Summary/Follow up Plan --  Pt tolerated OT well, making steady progress toward goals, advise pt to obtain reacher /hip kit , hh shower head and shower chair        Goal: Discharge Needs Assessment  Outcome: Ongoing (interventions implemented as appropriate)    06/04/17 1330   Discharge Needs Assessment   Concerns To Be Addressed discharge planning concerns   Readmission Within The Last 30 Days no previous admission in last 30 days   Equipment Needed After Discharge none   Discharge Facility/Level Of Care Needs home with home health   Current Discharge Risk physical impairment   Current Health   Anticipated Changes Related to Illness inability to care for self   Self-Care   Equipment Currently Used at Home walker, standard;cane, straight;cane, quad   Living Environment   Transportation Available family or friend will provide         Problem: Inpatient Occupational Therapy  Goal: Transfer Training Goal 1 LTG- OT  Outcome: Ongoing (interventions implemented as appropriate)    06/02/17 1351 06/04/17 1220 06/05/17 1310   Transfer Training OT LTG   Transfer Training OT LTG, Date Established 06/02/17 --  --    Transfer Training OT LTG, Time to Achieve 2 wks --  --    Transfer Training OT LTG, Activity Type toilet;walk-in shower --  --    Transfer Training OT LTG, [place] Level supervision required --  --    Transfer Training OT LTG, Date Goal Reviewed --  --  06/05/17   Transfer Training OT LTG, Outcome --  goal ongoing --        Goal: ADL Goal LTG- OT  Outcome: Ongoing (interventions implemented as appropriate)    06/02/17 1351 06/04/17 1220 06/05/17 1310   ADL OT  LTG   ADL OT LTG, Date Established 06/02/17 --  --    ADL OT LTG, Time to Achieve 2 wks --  --    ADL OT LTG, Activity Type ADL skills --  --    ADL OT LTG, [place] Level standby assist --  --    ADL OT LTG, Date Goal Reviewed --  --  06/05/17   ADL OT LTG, Outcome --  goal ongoing --        Goal: Functional Mobility Goal LTG- OT  Outcome: Ongoing (interventions implemented as appropriate)    06/02/17 1351 06/04/17 1220 06/05/17 1310   Functional Mobility OT LTG   Functional Mobility Goal OT LTG, Date Established 06/02/17 --  --    Functional Mobility Goal OT LTG, Time to Achieve 2 wks --  --    Functional Mobility Goal OT LTG, [place] Level supervision --  --    Functional Mobility Goal OT LTG, Distance to Achieve to the bathroom --  --    Functional Mobility Goal OT LTG, Date Goal Reviewed --  --  06/05/17   Functional Mobility Goal OT LTG, Outcome --  goal ongoing --

## 2017-06-05 NOTE — THERAPY TREATMENT NOTE
Acute Care - Physical Therapy Treatment Note  [facility]     Patient Name: Ventura Boggs  : 1960  MRN: [id number]  Today's Date: 2017  Onset of Illness/Injury or Date of Surgery Date: 17  Date of Referral to PT: 07  Referring Physician: Dr. Shaver    Admit Date: 2017    Visit Dx:    ICD-10-CM ICD-9-CM   1. Ventral hernia without obstruction or gangrene K43.9 553.20   2. Impaired physical mobility Z74.09 781.99   3. Impaired mobility and ADLs Z74.09 799.89     Patient Active Problem List   Diagnosis   • Incisional hernia, without obstruction or gangrene   • Controlled type 2 diabetes mellitus without complication, with long-term current use of insulin   • Ventral hernia without obstruction or gangrene   • Postoperative urinary retention               Adult Rehabilitation Note       17 0850 17 1330 17 1045    Rehab Assessment/Intervention    Discipline physical therapy assistant  -SS physical therapy assistant  -AM occupational therapy assistant  -LM    Document Type therapy note (daily note)  -SS therapy note (daily note)  -AM therapy note (daily note)  -LM    Subjective Information agree to therapy;complains of;pain  -SS agree to therapy;complains of;pain  -AM agree to therapy  -LM    Patient Effort, Rehab Treatment good  -SS good  -AM     Symptoms Noted During/After Treatment none  -SS fatigue;increased pain  -AM     Precautions/Limitations oxygen therapy device and L/min;other (see comments)   contact preautions, gait belt placement  -SS oxygen therapy device and L/min  -AM     Equipment Issued to Patient  gait belt  -AM     Recorded by [SS] Fartun Angela PTA [AM] Ulysses Gardner PTA [LM] SACHI NovakA/L    Vital Signs    Pre Systolic BP Rehab 167  -  -  -LM    Pre Treatment Diastolic    RN made aware  -SS 88  -AM 90  -LM    Post Systolic BP Rehab 164  -  -AM     Post Treatment Diastolic   -SS 88  -AM     Pretreatment  Heart Rate (beats/min) 122  -  -  -LM    Intratreatment Heart Rate (beats/min) 125  -SS      Posttreatment Heart Rate (beats/min) 1200  -  -  -LM    Pre SpO2 (%) 98  -SS 94  -AM 94  -LM    O2 Delivery Pre Treatment supplemental O2  -SS supplemental O2  -AM     Intra SpO2 (%) 97  -SS  95  -LM    O2 Delivery Intra Treatment supplemental O2  -SS      Post SpO2 (%) 99  -SS 95  -AM     O2 Delivery Post Treatment supplemental O2  -SS supplemental O2  -AM     Pre Patient Position Supine  -SS      Intra Patient Position Standing  -SS      Post Patient Position Sitting  -SS      Recorded by [SS] Fartun Angela PTA [AM] Ulysses Gardner PTA [LM] MARYLIN Novak/L    Pain Assessment    Pain Assessment 0-10  -SS 0-10  -AM --  -LM    Pain Score 7  -SS 6  -AM 8  -LM    Post Pain Score 6  -SS 8  -AM 7  -LM    Pain Type Acute pain;Surgical pain  -SS Acute pain;Surgical pain  -AM     Pain Location Abdomen  -SS Abdomen  -AM     Pain Orientation Mid  -SS Mid  -AM     Pain Descriptors  Sore  -AM     Pain Frequency  Constant/continuous  -AM     Date Pain First Started  05/31/17  -AM     Clinical Progression  Not changed  -AM     Patient's Stated Pain Goal No pain  -SS No pain  -AM     Pain Intervention(s) Medication (See MAR);Ambulation/increased activity  -SS Medication (See MAR);Ambulation/increased activity  -AM     Result of Injury  No  -AM     Work-Related Injury  No  -AM     Multiple Pain Sites  No  -AM     Recorded by [SS] Fartun Angela PTA [AM] Ulysses Gardner PTA [LM] SACHI NovakA/L    Cognitive Assessment/Intervention    Current Cognitive/Communication Assessment functional  -SS functional  -AM functional  -LM    Orientation Status oriented x 4  -SS oriented x 4  -AM oriented x 4  -LM    Follows Commands/Answers Questions 100% of the time  -% of the time  -% of the time  -LM    Personal Safety Interventions  gait belt;nonskid shoes/slippers when out of  bed;supervised activity  -AM     Recorded by [SS] Fartun Angela PTA [AM] Ulysses Gardner PTA [LM] MARYLIN Novak/L    ROM (Range of Motion)    General ROM  no range of motion deficits identified  -AM     Recorded by  [AM] Ulysses Gardner PTA     Bed Mobility, Assessment/Treatment    Bed Mobility, Assistive Device bed rails;head of bed elevated  -SS bed rails;head of bed elevated  -AM     Bed Mobility, Roll Left, [place] not tested  -SS not tested  -AM     Bed Mobility, Roll Right, [place] not tested  -SS not tested  -AM     Bed Mobility, Scoot/Bridge, [place] not tested  -SS not tested  -AM     Bed Mob, Supine to Sit, [place] supervision required  -SS supervision required  -AM minimum assist (75% patient effort)  -LM    Bed Mob, Sit to Supine, [place] not tested  -SS supervision required  -AM     Bed Mob, Sidelying to Sit, [place]  not tested  -AM     Bed Mob, Sit to Sidelying, [place]  not tested  -AM     Bed Mobility, Safety Issues  decreased use of arms for pushing/pulling;decreased use of legs for bridging/pushing  -AM     Bed Mobility, Impairments pain  -SS strength decreased;pain  -AM     Recorded by [SS] Fartun Angela PTA [AM] Ulysses Gardner PTA [LM] MARYLIN Novak/L    Transfer Assessment/Treatment    Transfers, Bed-Chair [place] stand by assist  - contact guard assist  -AM minimum assist (75% patient effort)  -LM    Transfers, Chair-Bed [place] stand by assist  - contact guard assist  -AM     Transfers, Bed-Chair-Bed, Assist Device rolling walker  -SS rolling walker  -AM     Transfers, Sit-Stand [place] stand by assist  - contact guard assist  -AM minimum assist (75% patient effort)  -LM    Transfers, Stand-Sit [place] stand by assist  -SS contact guard assist  -AM minimum assist (75% patient effort)  -LM    Transfers, Sit-Stand-Sit, Assist Device rolling walker  -SS rolling walker  -AM     Toilet Transfer,  [place]  not tested  -AM     Walk-In Shower Transfer, [place]  not tested  -AM     Bathtub Transfer, [place]  not tested  -AM     Transfer, Maintain Weight Bearing Status  able to maintain weight bearing status  -AM     Transfer, Safety Issues step length decreased  -SS step length decreased  -AM     Transfer, Impairments pain  -SS strength decreased;pain  -AM     Transfer, Comment pt able to static stand ~ 2 min with UE support and without LOB  -SS      Recorded by [SS] Fartun Angela PTA [AM] Ulysses Gardner PTA [LM] Ese Dodson, COULTER/L    Gait Assessment/Treatment    Gait, [place] Level stand by assist  -SS contact guard assist  -AM     Gait, Assistive Device rolling walker  -SS rolling walker  -AM     Gait, Distance (Feet) 350  -  -AM     Gait, Gait Pattern Analysis  swing-through gait  -AM     Gait, Gait Deviations bilateral:;carolyne decreased  -SS bilateral:;carolyne decreased  -AM     Gait, Maintain Weight Bearing Status  able to maintain weight bearing status  -AM     Gait, Safety Issues supplemental O2;step length decreased  -SS step length decreased;supplemental O2  -AM     Gait, Impairments pain  -SS strength decreased;pain  -AM     Recorded by [SS] Fartun Angela PTA [AM] Ulysses Gardner PTA     Stairs Assessment/Treatment    Number of Stairs 4  -SS      Stairs, Handrail Location other (see comments)   Both rails used when ascending, left side used for descendin  -SS      Stairs, [place] Level contact guard assist;supervision required  -SS not tested  -AM     Stairs, Comment pt steady throughout step training, no LOB occured  -SS      Recorded by [SS] Fartun Angela PTA [AM] Ulysses Gardner PTA     Motor Skills/Interventions    Motor Response Observations   --   sitting balance chandrika x 10 min eob  -LM    Recorded by   [LM] SACHI NovakA/L    Therapy Exercises    Bilateral Lower Extremities  --  -AM     Recorded by  [AM] Ulysses Gardner PTA      Positioning and Restraints    Pre-Treatment Position in bed  -SS in bed  -AM in bed  -LM    Post Treatment Position chair  -SS bed  -AM --   ortho chair  -LM    In Bed  supine;call light within reach;encouraged to call for assist;exit alarm on  -AM     In Chair notified nsg;reclined;call light within reach;encouraged to call for assist  -SS      Recorded by [SS] Fartun Angela PTA [AM] Ulysses Gardner PTA [LM] MARYLIN Novak/MEI      06/03/17 0855 06/02/17 1000       Rehab Assessment/Intervention    Discipline physical therapy assistant  -AM physical therapy assistant  -TW     Document Type therapy note (daily note)  -AM therapy note (daily note)  -TW     Subjective Information agree to therapy;complains of;pain  -AM agree to therapy  -TW     Patient Effort, Rehab Treatment good  -AM good  -TW     Symptoms Noted During/After Treatment fatigue  -AM      Precautions/Limitations fall precautions;oxygen therapy device and L/min  -AM fall precautions  -TW     Equipment Issued to Patient gait belt  -AM      Recorded by [AM] Ulysses Gardner PTA [TW] Brant Palacios PTA     Vital Signs    Pre Systolic BP Rehab 108  -  -TW     Pre Treatment Diastolic BP 82  -AM 90  -TW     Post Systolic BP Rehab 145  -  -TW     Post Treatment Diastolic BP 85  -AM 87  -TW     Pretreatment Heart Rate (beats/min) 113  -  -TW     Intratreatment Heart Rate (beats/min)  136  -TW     Posttreatment Heart Rate (beats/min) 118  -  -TW     Pre SpO2 (%) 97  -AM 95  -TW     O2 Delivery Pre Treatment supplemental O2   3L  -AM supplemental O2  -TW     Post SpO2 (%) 97  -AM 95  -TW     O2 Delivery Post Treatment supplemental O2   3L  -AM      Pre Patient Position Supine  -AM Sitting  -TW     Intra Patient Position Standing  -AM Standing  -TW     Post Patient Position Supine  -AM Sitting  -TW     Recorded by [AM] Ulysses Gardner PTA [TW] Brant Palacios PTA     Pain Assessment    Pain Assessment 0-10  -AM 0-10   -TW     Pain Score 9  -AM 7  -TW     Post Pain Score 9  -AM 8  -TW     Pain Type Acute pain;Surgical pain  -AM Acute pain;Surgical pain  -TW     Pain Location Abdomen  -AM Incision  -TW     Pain Descriptors Sore  -AM      Pain Frequency Constant/continuous  -AM      Date Pain First Started 05/31/17  -AM      Clinical Progression Not changed  -AM      Patient's Stated Pain Goal No pain  -AM      Pain Intervention(s) Medication (See MAR);Ambulation/increased activity  -AM      Result of Injury No  -AM      Work-Related Injury No  -AM      Multiple Pain Sites No  -AM      Recorded by [AM] Ulysses Gardner PTA [TW] Brant Palacios PTA     Cognitive Assessment/Intervention    Current Cognitive/Communication Assessment functional  -AM functional  -TW     Orientation Status oriented x 4  -AM oriented x 4  -TW     Follows Commands/Answers Questions 100% of the time  -% of the time  -TW     Personal Safety Interventions gait belt;nonskid shoes/slippers when out of bed;supervised activity  -AM nonskid shoes/slippers when out of bed  -TW     Recorded by [AM] Ulysses Gardner PTA [TW] Brant Palacios PTA     ROM (Range of Motion)    General ROM no range of motion deficits identified  -AM      Recorded by [AM] Ulysses Gardner PTA      Bed Mobility, Assessment/Treatment    Bed Mobility, Assistive Device bed rails;head of bed elevated  -AM      Bed Mobility, Roll Left, [place] not tested  -AM      Bed Mobility, Roll Right, [place] not tested  -AM      Bed Mobility, Scoot/Bridge, [place] not tested  -AM      Bed Mob, Supine to Sit, [place] supervision required  -AM      Bed Mob, Sit to Supine, [place] supervision required  -AM      Bed Mob, Sidelying to Sit, [place] not tested  -AM      Bed Mob, Sit to Sidelying, [place] not tested  -AM      Bed Mobility, Safety Issues decreased use of arms for pushing/pulling;decreased use of legs for bridging/pushing  -AM      Bed Mobility,  Impairments strength decreased;pain  -AM      Recorded by [AM] Ulysses Gardner PTA      Transfer Assessment/Treatment    Transfers, Bed-Chair [place] contact guard assist  -AM      Transfers, Chair-Bed [place] contact guard assist  -AM      Transfers, Bed-Chair-Bed, Assist Device rolling walker  -AM      Transfers, Sit-Stand [place] contact guard assist  -AM contact guard assist  -TW     Transfers, Stand-Sit [place] contact guard assist  -AM contact guard assist  -TW     Transfers, Sit-Stand-Sit, Assist Device rolling walker  -AM      Toilet Transfer, [place] not tested  -AM      Walk-In Shower Transfer, [place] not tested  -AM      Bathtub Transfer, [place] not tested  -AM      Transfer, Maintain Weight Bearing Status able to maintain weight bearing status  -AM      Transfer, Safety Issues step length decreased  -AM      Transfer, Impairments strength decreased;pain  -AM      Recorded by [AM] Ulysses Gardner PTA [TW] Brant Palacios PTA     Gait Assessment/Treatment    Gait, [place] Level contact guard assist  -AM contact guard assist  -TW     Gait, Assistive Device rolling walker  -AM rolling walker  -TW     Gait, Distance (Feet) 300  -AM 3   fwd and bwd  -TW     Gait, Gait Pattern Analysis swing-through gait  -AM      Gait, Gait Deviations bilateral:;carolyne decreased  -AM      Gait, Maintain Weight Bearing Status able to maintain weight bearing status  -AM      Gait, Safety Issues step length decreased;supplemental O2  -AM      Gait, Impairments strength decreased;pain  -AM      Recorded by [AM] Ulysses Gardner PTA [TW] Brant Palacios PTA     Stairs Assessment/Treatment    Stairs, [place] Level not tested  -AM      Recorded by [AM] Ulysses Gardner PTA      Therapy Exercises    Bilateral Lower Extremities AROM:;20 reps;supine;ankle pumps/circles;glut sets;heel slides;quad sets;SLR  -AM AROM:;15 reps;sitting;ankle pumps/circles;glut sets;hip  abduction/adduction;hip flexion;LAQ   x2 sets  -TW     Recorded by [AM] Ulysses Gardner, PTA [TW] Brant Palacios PTA     Positioning and Restraints    Pre-Treatment Position in bed  -AM sitting in chair/recliner  -TW     Post Treatment Position bed  -AM chair  -TW     In Bed supine;call light within reach;encouraged to call for assist;with nsg  -AM      In Chair  reclined  -TW     Recorded by [AM] Ulysses Gardner PTA [TW] Brant Palacios PTA       User Key  (r) = Recorded By, (t) = Taken By, (c) = Cosigned By    Initials Name Effective Dates    AM Ulysess Gardner, PTA 10/17/16 -     SS Fartun Angela, PTA 10/17/16 -     TW Brant Palacios, PTA 10/17/16 -     LM Ese Dodson, COULTER/L 10/17/16 -                 IP PT Goals       06/05/17 0929 06/04/17 1330 06/03/17 0855    Transfer Training PT LTG    Transfer Training PT  LTG, Date Goal Reviewed 06/05/17  -SS 06/04/17  -AM 06/03/17  -AM    Transfer Training PT LTG, Outcome goal ongoing  -SS goal not met  -AM goal not met  -AM    Gait Training PT LTG    Gait Training Goal PT LTG, Date Goal Reviewed 06/05/17  -SS 06/04/17  -AM 06/03/17  -AM    Gait Training Goal PT LTG, Outcome goal ongoing  -SS goal not met  -AM goal not met  -AM    Stair Training PT LTG    Stair Training Goal PT LTG, Date Goal Reviewed 06/05/17  -SS 06/04/17  -AM 06/03/17  -AM    Stair Training Goal PT LTG, Outcome goal ongoing  -SS goal not met  -AM goal not met  -AM    Patient Education PT LTG    Patient Education PT LTG, Date Goal Reviewed 06/05/17  -SS 06/04/17  -AM 06/03/17  -AM    Patient Education PT LTG Outcome goal ongoing  -SS goal not met  -AM goal not met  -AM      06/02/17 1000 06/01/17 1108       Transfer Training PT LTG    Transfer Training PT LTG, Date Established  06/01/17  -GAL     Transfer Training PT LTG, Time to Achieve  by discharge  -GAL     Transfer Training PT LTG, [place] Level  independent  -GAL     Transfer Training PT  LTG, Date Goal Reviewed 06/02/17   -      Transfer Training PT LTG, Outcome goal ongoing  - goal ongoing  -     Gait Training PT LTG    Gait Training Goal PT LTG, Date Established  06/01/17  -     Gait Training Goal PT LTG, Time to Achieve  by discharge  -     Gait Training Goal PT LTG, [place] Level  independent  -     Gait Training Goal PT LTG, Distance to Achieve  1000ft  -     Gait Training Goal PT LTG, Additional Goal  Pt will complete 6 minute walk test  -     Gait Training Goal PT LTG, Date Goal Reviewed 06/02/17  -      Gait Training Goal PT LTG, Outcome goal ongoing  - goal ongoing  -     Stair Training PT LTG    Stair Training Goal PT LTG, Date Established  06/01/17  -     Stair Training Goal PT LTG, Time to Achieve  by discharge  -     Stair Training Goal PT LTG, Number of Steps  5  -     Stair Training Goal PT LTG, [place] Level  conditional independence  -     Stair Training Goal PT LTG, Assist Device  1 handrail  -     Stair Training Goal PT LTG, Date Goal Reviewed 06/02/17  -      Stair Training Goal PT LTG, Outcome goal ongoing - goal ongoing  -     Patient Education PT LTG    Patient Education PT LTG, Date Established  06/01/17  -     Patient Education PT LTG, Time to Achieve  by discharge  -     Patient Education PT LTG, Education Type  gait;transfers;home safety  -     Patient Education PT LTG, Date Goal Reviewed 06/02/17  -      Patient Education PT LTG Outcome goal ongoing - goal ongoing  [facility]       User Key  (r) = Recorded By, (t) = Taken By, (c) = Cosigned By    Initials Name Provider Type     Shilpa Dumont, PT Physical Therapist    ERNIE Gardner, PTA Physical Therapy Assistant    SS Fartun Angela, PTA Physical Therapy Assistant    TW Brant Palacios, PTA Physical Therapy Assistant          Physical Therapy Education     Title: PT OT SLP Therapies (Active)     Topic: Physical Therapy (Active)     Point: Mobility training (Active)    Learning Progress  Summary    Learner Readiness Method Response Comment Documented by Status   Patient Acceptance E,D,TB NR   06/05/17 0942 Active    Acceptance E NR   06/01/17 1107 Active   Family Acceptance E NR   06/01/17 1107 Active               Point: Precautions (Active)    Learning Progress Summary    Learner Readiness Method Response Comment Documented by Status   Patient Acceptance E,D,TB NR   06/05/17 0942 Active    Acceptance E NR   06/01/17 1107 Active   Family Acceptance E NR   06/01/17 1107 Active                      User Key     Initials Effective Dates Name Provider Type Discipline     10/17/16 -  Shilpa Dumont, PT Physical Therapist PT     10/17/16 -  Fartun Angela, PTA Physical Therapy Assistant PT                    PT Recommendation and Plan  Anticipated Discharge Disposition: home with home health  Planned Therapy Interventions: balance training, bed mobility training, gait training, patient/family education, stair training, transfer training  PT Frequency: other (see comments) (5-14 times per week)  Plan of Care Review  Plan Of Care Reviewed With: patient  Progress: progress toward functional goals as expected  Outcome Summary/Follow up Plan: No change in goals this tx. Pt SBA for bed mobility. Pt amb 350ft w/ RW and SBA. Pt also completed step training SBA. using 2 HR for ascending and 1 HR when descending.  PT recommended once discharged home.           Outcome Measures       06/05/17 0850 06/04/17 1330 06/04/17 1200    How much help from another person do you currently need...    Turning from your back to your side while in flat bed without using bedrails? 3  -SS 3  -AM     Moving from lying on back to sitting on the side of a flat bed without bedrails? 3  -SS 3  -AM     Moving to and from a bed to a chair (including a wheelchair)? 3  -SS 3  -AM     Standing up from a chair using your arms (e.g., wheelchair, bedside chair)? 3  -SS 3  -AM     Climbing 3-5 steps with a railing? 3  -SS 1   -AM     To walk in hospital room? 3  -SS 3  -AM     AM-PAC 6 Clicks Score 18  -SS 16  -AM     How much help from another is currently needed...    Putting on and taking off regular lower body clothing?   2  -LM    Bathing (including washing, rinsing, and drying)   3  -LM    Toileting (which includes using toilet bed pan or urinal)   3  -LM    Putting on and taking off regular upper body clothing   3  -LM    Taking care of personal grooming (such as brushing teeth)   3  -LM    Eating meals   4  -LM    Score   18  -LM    Functional Assessment    Outcome Measure Options AM-PAC 6 Clicks Basic Mobility (PT)  -SS AM-PAC 6 Clicks Basic Mobility (PT)  -AM       06/03/17 0855 06/02/17 1053 06/02/17 1000    How much help from another person do you currently need...    Turning from your back to your side while in flat bed without using bedrails? 3  -AM  3  -TW    Moving from lying on back to sitting on the side of a flat bed without bedrails? 3  -AM  3  -TW    Moving to and from a bed to a chair (including a wheelchair)? 3  -AM  3  -TW    Standing up from a chair using your arms (e.g., wheelchair, bedside chair)? 3  -AM  3  -TW    Climbing 3-5 steps with a railing? 1  -AM  3  -TW    To walk in hospital room? 3  -AM  3  -TW    AM-PAC 6 Clicks Score 16  -AM  18  -TW    How much help from another is currently needed...    Putting on and taking off regular lower body clothing?  2  -RW     Bathing (including washing, rinsing, and drying)  3  -RW     Toileting (which includes using toilet bed pan or urinal)  3  -RW     Putting on and taking off regular upper body clothing  3  -RW     Taking care of personal grooming (such as brushing teeth)  3  -RW     Eating meals  4  -RW     Score  18  -RW     Functional Assessment    Outcome Measure Options AM-PAC 6 Clicks Basic Mobility (PT)  -AM AM-PAC 6 Clicks Daily Activity (OT)  -RW AM-PAC 6 Clicks Basic Mobility (PT)  -TW      User Key  (r) = Recorded By, (t) = Taken By, (c) = Cosigned By     Initials Name Provider Type    RW Nicky Blanchard, OTR/L Occupational Therapist    AM Ulysses Gardner, KAREN Physical Therapy Assistant    SS Fartun Angela, KAREN Physical Therapy Assistant    TW Brant Palacios, KAREN Physical Therapy Assistant    GWYN Dodson, COULTER/L Occupational Therapy Assistant           Time Calculation:         PT Charges       06/05/17 0929          Time Calculation    Start Time 0850  -SS      Stop Time 0929  -SS      Time Calculation (min) 39 min  -SS      PT Received On 06/05/17  -      Time Calculation- PT    Total Timed Code Minutes- PT 39 minute(s)  -SS        User Key  (r) = Recorded By, (t) = Taken By, (c) = Cosigned By    Initials Name Provider Type     Fartun Angela PTA Physical Therapy Assistant          Therapy Charges for Today     Code Description Service Date Service Provider Modifiers Qty    22946691797 HC GAIT TRAINING EA 15 MIN 6/5/2017 Fartun Angela PTA GP 2    11190566543 HC PT THERAPEUTIC ACT EA 15 MIN 6/5/2017 Fartun Angela PTA GP 1          PT G-Codes  PT Professional Judgement Used?: Yes  Outcome Measure Options: AM-PAC 6 Clicks Basic Mobility (PT)  Score: 18  Functional Limitation: Mobility: Walking and moving around  Mobility: Walking and Moving Around Current Status (): At least 40 percent but less than 60 percent impaired, limited or restricted  Mobility: Walking and Moving Around Goal Status (): 0 percent impaired, limited or restricted    Fartun Angela PTA  6/5/2017

## 2017-06-05 NOTE — PLAN OF CARE
Problem: Fall Risk (Adult)  Goal: Identify Related Risk Factors and Signs and Symptoms  Outcome: Ongoing (interventions implemented as appropriate)  Goal: Absence of Falls  Outcome: Ongoing (interventions implemented as appropriate)    Problem: Skin Integrity Impairment, Risk/Actual (Adult)  Goal: Skin Integrity/Wound Healing  Outcome: Ongoing (interventions implemented as appropriate)

## 2017-06-05 NOTE — THERAPY TREATMENT NOTE
Acute Care - Occupational Therapy Treatment Note  [facility]     Patient Name: Ventura Boggs  : 1960  MRN: [id number]  Today's Date: 2017  Onset of Illness/Injury or Date of Surgery Date: 17  Date of Referral to OT: 17  Referring Physician: Dr. Shaver      Admit Date: 2017    Visit Dx:     ICD-10-CM ICD-9-CM   1. Ventral hernia without obstruction or gangrene K43.9 553.20   2. Impaired physical mobility Z74.09 781.99   3. Impaired mobility and ADLs Z74.09 799.89     Patient Active Problem List   Diagnosis   • Incisional hernia, without obstruction or gangrene   • Controlled type 2 diabetes mellitus without complication, with long-term current use of insulin   • Ventral hernia without obstruction or gangrene   • Postoperative urinary retention             Adult Rehabilitation Note       17 1310 17 0850 17 1330    Rehab Assessment/Intervention    Discipline occupational therapy assistant  - physical therapy assistant  -SS physical therapy assistant  -AM    Document Type therapy note (daily note)  - therapy note (daily note)  - therapy note (daily note)  -AM    Subjective Information agree to therapy  - agree to therapy;complains of;pain  - agree to therapy;complains of;pain  -AM    Patient Effort, Rehab Treatment good  - good  - good  -AM    Symptoms Noted During/After Treatment  none  -SS fatigue;increased pain  -AM    Precautions/Limitations  oxygen therapy device and L/min;other (see comments)   contact preautions, gait belt placement  - oxygen therapy device and L/min  -AM    Equipment Issued to Patient   gait belt  -AM    Recorded by [] MARYLIN Mauricio/MEI [] Fartun Angela PTA [AM] Ulysses Gardner, KAREN    Vital Signs    Pre Systolic BP Rehab  167  -  -AM    Pre Treatment Diastolic BP  112   RN made aware  -SS 88  -AM    Post Systolic BP Rehab  164  -  -AM    Post Treatment Diastolic BP  104  -SS 88  -AM    Pretreatment  Heart Rate (beats/min)  122  -  -AM    Intratreatment Heart Rate (beats/min)  125  -SS     Posttreatment Heart Rate (beats/min)  1200  -  -AM    Pre SpO2 (%) 96  -JH 98  -SS 94  -AM    O2 Delivery Pre Treatment room air   pt's NC off upon entering pt room,sats stable but pt reapply  -JH supplemental O2  -SS supplemental O2  -AM    Intra SpO2 (%)  97  -SS     O2 Delivery Intra Treatment  supplemental O2  -SS     Post SpO2 (%)  99  -SS 95  -AM    O2 Delivery Post Treatment  supplemental O2  -SS supplemental O2  -AM    Pre Patient Position  Supine  -SS     Intra Patient Position  Standing  -SS     Post Patient Position  Sitting  -SS     Recorded by [] MARYLIN Mauricio/MEI [SS] Fartun Angela PTA [AM] Ulysses Gardner PTA    Pain Assessment    Pain Assessment No/denies pain  - 0-10  -SS 0-10  -AM    Pain Score  7  -SS 6  -AM    Post Pain Score  6  -SS 8  -AM    Pain Type  Acute pain;Surgical pain  -SS Acute pain;Surgical pain  -AM    Pain Location  Abdomen  -SS Abdomen  -AM    Pain Orientation  Mid  -SS Mid  -AM    Pain Descriptors   Sore  -AM    Pain Frequency   Constant/continuous  -AM    Date Pain First Started   05/31/17  -AM    Clinical Progression   Not changed  -AM    Patient's Stated Pain Goal  No pain  -SS No pain  -AM    Pain Intervention(s)  Medication (See MAR);Ambulation/increased activity  -SS Medication (See MAR);Ambulation/increased activity  -AM    Result of Injury   No  -AM    Work-Related Injury   No  -AM    Multiple Pain Sites   No  -AM    Recorded by [] MARYLIN Mauricio/MEI [SS] Fartun Angela PTA [AM] Ulysses Gardner PTA    Cognitive Assessment/Intervention    Current Cognitive/Communication Assessment functional  -JH functional  -SS functional  -AM    Orientation Status oriented x 4  -JH oriented x 4  -SS oriented x 4  -AM    Follows Commands/Answers Questions 100% of the time  -% of the time  -% of the time  -AM    Personal Safety Interventions   gait  belt;nonskid shoes/slippers when out of bed;supervised activity  -AM    Recorded by [JH] MARYLIN Mauricio/MEI [SS] Fartun Angela, PTA [AM] Ulysses Gardner PTA    ROM (Range of Motion)    General ROM   no range of motion deficits identified  -AM    Recorded by   [AM] Ulysses Gardner PTA    Bed Mobility, Assessment/Treatment    Bed Mobility, Assistive Device  bed rails;head of bed elevated  -SS bed rails;head of bed elevated  -AM    Bed Mobility, Roll Left, [place]  not tested  -SS not tested  -AM    Bed Mobility, Roll Right, [place]  not tested  -SS not tested  -AM    Bed Mobility, Scoot/Bridge, [place]  not tested  -SS not tested  -AM    Bed Mob, Supine to Sit, [place]  supervision required  - supervision required  -AM    Bed Mob, Sit to Supine, [place]  not tested  -SS supervision required  -AM    Bed Mob, Sidelying to Sit, [place]   not tested  -AM    Bed Mob, Sit to Sidelying, [place]   not tested  -AM    Bed Mobility, Safety Issues   decreased use of arms for pushing/pulling;decreased use of legs for bridging/pushing  -AM    Bed Mobility, Impairments  pain  -SS strength decreased;pain  -AM    Recorded by  [SS] Fartun Angela, PTA [AM] Ulysses Gardner PTA    Transfer Assessment/Treatment    Transfers, Bed-Chair [place]  stand by assist  - contact guard assist  -AM    Transfers, Chair-Bed [place]  stand by assist  - contact guard assist  -AM    Transfers, Bed-Chair-Bed, Assist Device  rolling walker  -SS rolling walker  -AM    Transfers, Sit-Stand [place]  stand by assist  - contact guard assist  -AM    Transfers, Stand-Sit [place]  stand by assist  -SS contact guard assist  -AM    Transfers, Sit-Stand-Sit, Assist Device  rolling walker  -SS rolling walker  -AM    Toilet Transfer, [place]   not tested  -AM    Walk-In Shower Transfer, [place]   not tested  -AM    Bathtub Transfer, [place]   not tested  -AM    Transfer,  Maintain Weight Bearing Status   able to maintain weight bearing status  -AM    Transfer, Safety Issues  step length decreased  -SS step length decreased  -AM    Transfer, Impairments  pain  -SS strength decreased;pain  -AM    Transfer, Comment  pt able to static stand ~ 2 min with UE support and without LOB  -SS     Recorded by  [SS] Fartun Angela PTA [AM] Ulysses Gardner PTA    Gait Assessment/Treatment    Gait, [place] Level  stand by assist  -SS contact guard assist  -AM    Gait, Assistive Device  rolling walker  -SS rolling walker  -AM    Gait, Distance (Feet)  350  -  -AM    Gait, Gait Pattern Analysis   swing-through gait  -AM    Gait, Gait Deviations  bilateral:;[place] decreased  -SS bilateral:;[place] decreased  -AM    Gait, Maintain Weight Bearing Status   able to maintain weight bearing status  -AM    Gait, Safety Issues  supplemental O2;step length decreased  -SS step length decreased;supplemental O2  -AM    Gait, Impairments  pain  -SS strength decreased;pain  -AM    Recorded by  [SS] Fartun Angela PTA [AM] Ulysses Gardner PTA    Stairs Assessment/Treatment    Number of Stairs  4  -SS     Stairs, Handrail Location  other (see comments)   Both rails used when ascending, left side used for descendin  -SS     Stairs, [place] Level  contact guard assist;supervision required  -SS not tested  -AM    Stairs, Comment  pt steady throughout step training, no LOB occured  -SS     Recorded by  [SS] Fartun Angela PTA [AM] Ulysses Gardner PTA    Therapy Exercises    Bilateral Lower Extremities   --  -AM    Bilateral Upper Extremity AROM:;20 reps;25 reps;supine;elbow flexion/extension;hand pumps;pronation/supination;shoulder abduction/adduction;shoulder extension/flexion;shoulder ER/IR  -      BUE Resistance manual resistance- minimal  -      Recorded by [JH] MARYLIN Mauricio/MEI  [AM] Ulysses Gardner PTA    Positioning and Restraints    Pre-Treatment Position in bed  - in  bed  -SS in bed  -AM    Post Treatment Position chair  - chair  -SS bed  -AM    In Bed supine;call light within reach;encouraged to call for assist  -  supine;call light within reach;encouraged to call for assist;exit alarm on  -AM    In Chair  notified nsg;reclined;call light within reach;encouraged to call for assist  -SS     Recorded by [JH] MARYLIN Mauricio/MEI [SS] Fartun Angela PTA [AM] Ulysses Gardner PTA      06/04/17 1045 06/03/17 0855       Rehab Assessment/Intervention    Discipline occupational therapy assistant  - physical therapy assistant  -AM     Document Type therapy note (daily note)  -LM therapy note (daily note)  -AM     Subjective Information agree to therapy  -LM agree to therapy;complains of;pain  -AM     Patient Effort, Rehab Treatment  good  -AM     Symptoms Noted During/After Treatment  fatigue  -AM     Precautions/Limitations  fall precautions;oxygen therapy device and L/min  -AM     Equipment Issued to Patient  gait belt  -AM     Recorded by [LM] MARYLIN Novak/MEI [AM] Ulysses Gardner PTA     Vital Signs    Pre Systolic BP Rehab 135  -  -AM     Pre Treatment Diastolic BP 90  -LM 82  -AM     Post Systolic BP Rehab  145  -AM     Post Treatment Diastolic BP  85  -AM     Pretreatment Heart Rate (beats/min) 116  -  -AM     Posttreatment Heart Rate (beats/min) 117  -  -AM     Pre SpO2 (%) 94  -LM 97  -AM     O2 Delivery Pre Treatment  supplemental O2   3L  -AM     Intra SpO2 (%) 95  -LM      Post SpO2 (%)  97  -AM     O2 Delivery Post Treatment  supplemental O2   3L  -AM     Pre Patient Position  Supine  -AM     Intra Patient Position  Standing  -AM     Post Patient Position  Supine  -AM     Recorded by [LM] MARYLIN Novak/MEI [AM] Ulysses Gardner PTA     Pain Assessment    Pain Assessment --  -LM 0-10  -AM     Pain Score 8  -LM 9  -AM     Post Pain Score 7  -LM 9  -AM     Pain Type  Acute pain;Surgical pain  -AM     Pain Location  Abdomen  -AM      Pain Descriptors  Sore  -AM     Pain Frequency  Constant/continuous  -AM     Date Pain First Started  05/31/17  -AM     Clinical Progression  Not changed  -AM     Patient's Stated Pain Goal  No pain  -AM     Pain Intervention(s)  Medication (See MAR);Ambulation/increased activity  -AM     Result of Injury  No  -AM     Work-Related Injury  No  -AM     Multiple Pain Sites  No  -AM     Recorded by [LM] SACHI NovakA/L [AM] Ulysses aGrdner PTA     Cognitive Assessment/Intervention    Current Cognitive/Communication Assessment functional  -LM functional  -AM     Orientation Status oriented x 4  -LM oriented x 4  -AM     Follows Commands/Answers Questions 100% of the time  -% of the time  -AM     Personal Safety Interventions  gait belt;nonskid shoes/slippers when out of bed;supervised activity  -AM     Recorded by [LM] MARYLIN Novak/L [AM] Ulysses Gardner PTA     ROM (Range of Motion)    General ROM  no range of motion deficits identified  -AM     Recorded by  [AM] Ulysses Gardner PTA     Bed Mobility, Assessment/Treatment    Bed Mobility, Assistive Device  bed rails;head of bed elevated  -AM     Bed Mobility, Roll Left, [place]  not tested  -AM     Bed Mobility, Roll Right, [place]  not tested  -AM     Bed Mobility, Scoot/Bridge, [place]  not tested  -AM     Bed Mob, Supine to Sit, [place] minimum assist (75% patient effort)  -LM supervision required  -AM     Bed Mob, Sit to Supine, [place]  supervision required  -AM     Bed Mob, Sidelying to Sit, [place]  not tested  -AM     Bed Mob, Sit to Sidelying, [place]  not tested  -AM     Bed Mobility, Safety Issues  decreased use of arms for pushing/pulling;decreased use of legs for bridging/pushing  -AM     Bed Mobility, Impairments  strength decreased;pain  -AM     Recorded by [LM] MARYLIN Novak/L [AM] Ulysses Gardner PTA     Transfer Assessment/Treatment    Transfers, Bed-Chair [place]  minimum assist (75% patient effort)  -LM contact guard assist  -AM     Transfers, Chair-Bed [place]  contact guard assist  -AM     Transfers, Bed-Chair-Bed, Assist Device  rolling walker  -AM     Transfers, Sit-Stand [place] minimum assist (75% patient effort)  -LM contact guard assist  -AM     Transfers, Stand-Sit [place] minimum assist (75% patient effort)  -LM contact guard assist  -AM     Transfers, Sit-Stand-Sit, Assist Device  rolling walker  -AM     Toilet Transfer, [place]  not tested  -AM     Walk-In Shower Transfer, [place]  not tested  -AM     Bathtub Transfer, [place]  not tested  -AM     Transfer, Maintain Weight Bearing Status  able to maintain weight bearing status  -AM     Transfer, Safety Issues  step length decreased  -AM     Transfer, Impairments  strength decreased;pain  -AM     Recorded by [LM] MARYLIN Novak/MEI [AM] Ulysses Gardner PTA     Gait Assessment/Treatment    Gait, [place] Level  contact guard assist  -AM     Gait, Assistive Device  rolling walker  -AM     Gait, Distance (Feet)  300  -AM     Gait, Gait Pattern Analysis  swing-through gait  -AM     Gait, Gait Deviations  bilateral:;[place] decreased  -AM     Gait, Maintain Weight Bearing Status  able to maintain weight bearing status  -AM     Gait, Safety Issues  step length decreased;supplemental O2  -AM     Gait, Impairments  strength decreased;pain  -AM     Recorded by  [AM] Ulysses Gardner PTA     Stairs Assessment/Treatment    Stairs, [place] Level  not tested  -AM     Recorded by  [AM] Ulysses Gardner PTA     Motor Skills/Interventions    Motor Response Observations --   sitting balance chandrika x 10 min eob  -LM      Recorded by [LM] MARYLIN Novak/L      Therapy Exercises    Bilateral Lower Extremities  AROM:;20 reps;supine;ankle pumps/circles;glut sets;heel slides;quad sets;SLR  -AM     Recorded by  [AM] Ulysses Gardner PTA     Positioning and Restraints    Pre-Treatment  Position in bed  -LM in bed  -AM     Post Treatment Position --   ortho chair  -LM bed  -AM     In Bed  supine;call light within reach;encouraged to call for assist;with nsg  -AM     Recorded by [LM] Ese Dodson, COULTER/L [AM] Ulysses Gardner, PTA       User Key  (r) = Recorded By, (t) = Taken By, (c) = Cosigned By    Initials Name Effective Dates    AM Ulysses Gardner, PTA 10/17/16 -     SS Fartun Angela, PTA 10/17/16 -      Abril Pugh, COULTER/L 10/17/16 -     LM Ese Dodson, COULTER/L 10/17/16 -                 OT Goals       06/05/17 1310 06/04/17 1220 06/02/17 1351    Transfer Training OT LTG    Transfer Training OT LTG, Date Established   06/02/17  -RW    Transfer Training OT LTG, Time to Achieve   2 wks  -RW    Transfer Training OT LTG, Activity Type   toilet;walk-in shower  -RW    Transfer Training OT LTG, [place] Level   supervision required  -RW    Transfer Training OT LTG, Date Goal Reviewed 06/05/17  - 06/04/17  -LM     Transfer Training OT LTG, Outcome  goal ongoing  -LM     ADL OT LTG    ADL OT LTG, Date Established   06/02/17  -RW    ADL OT LTG, Time to Achieve   2 wks  -RW    ADL OT LTG, Activity Type   ADL skills  -RW    ADL OT LTG, [place] Level   standby assist  -RW    ADL OT LTG, Date Goal Reviewed 06/05/17  - 06/04/17  -LM     ADL OT LTG, Outcome  goal ongoing  -LM     Functional Mobility OT LTG    Functional Mobility Goal OT LTG, Date Established   06/02/17  -RW    Functional Mobility Goal OT LTG, Time to Achieve   2 wks  -RW    Functional Mobility Goal OT LTG, [place] Level   supervision  -RW    Functional Mobility Goal OT LTG, Distance to Achieve   to the bathroom  -RW    Functional Mobility Goal OT LTG, Date Goal Reviewed 06/05/17  - 06/04/17  -LM     Functional Mobility Goal OT LTG, Outcome  goal ongoing  -LM       User Key  (r) = Recorded By, (t) = Taken By, (c) = Cosigned By    Initials Name Provider Type    RW Nicky Blanchard, OTR/L Occupational  Therapist    MARYLIN Urias/L Occupational Therapy Assistant    MARYLIN Calixto/L Occupational Therapy Assistant          Occupational Therapy Education     Title: PT OT SLP Therapies (Active)     Topic: Occupational Therapy (Done)     Point: ADL training (Done)    Description: Instruct learner(s) on proper safety adaptation and remediation techniques during self care or transfers.   Instruct in proper use of assistive devices.    Learning Progress Summary    Learner Readiness Method Response Comment Documented by Status   Patient Acceptance E [facility] 06/05/17 1508 Done    Acceptance E [facility] 06/04/17 1221 Done    Acceptance E NR fall precautions, transfer safety, ADL safety RW 06/02/17 1349 Active               Point: Home exercise program (Done)    Description: Instruct learner(s) on appropriate technique for monitoring, assisting and/or progressing therapeutic exercises/activities.    Learning Progress Summary    Learner Readiness Method Response Comment Documented by Status   Patient Acceptance E [facility] 06/05/17 1508 Done    Acceptance E [facility] 06/04/17 1221 Done               Point: Precautions (Done)    Description: Instruct learner(s) on prescribed precautions during self-care and functional transfers.    Learning Progress Summary    Learner Readiness Method Response Comment Documented by Status   Patient Acceptance E [facility] 06/05/17 1508 Done    Acceptance E [facility] 06/04/17 1221 Done    Acceptance E NR fall precautions, transfer safety, ADL safety RW 06/02/17 1349 Active               Point: Body mechanics (Done)    Description: Instruct learner(s) on proper positioning and spine alignment during self-care, functional mobility activities and/or exercises.    Learning Progress Summary    Learner Readiness Method Response Comment Documented by Status   Patient Acceptance E [facility] 06/05/17 1508 Done    Acceptance E [facility] 06/04/17 1221 Done                      User Key     Initials Effective  Dates Name Provider Type Discipline     10/17/16 -  LINDSEY Rivera/L Occupational Therapist OT    JH 10/17/16 -  JOLIE Mauricio Occupational Therapy Assistant BONNIE PASCAL 10/17/16 -  MARYLIN Novak/L Occupational Therapy Assistant BONNIE NICOLE Recommendation and Plan  Anticipated Discharge Disposition: home with 24/7 care, home with home health  Planned Therapy Interventions: activity intolerance, adaptive equipment training, ADL retraining, balance training, bed mobility training, energy conservation, home exercise program, strengthening, transfer training  Therapy Frequency: other (see comments) (3-14 times/wk)  Plan of Care Review  Outcome Summary/Follow up Plan: Pt tolerated OT well, making steady progress toward goals, advise pt to obtain reacher /hip kit ,  shower head and shower chair         Outcome Measures       06/05/17 1310 06/05/17 0850 06/04/17 1330    How much help from another person do you currently need...    Turning from your back to your side while in flat bed without using bedrails?  3  -SS 3  -AM    Moving from lying on back to sitting on the side of a flat bed without bedrails?  3  -SS 3  -AM    Moving to and from a bed to a chair (including a wheelchair)?  3  -SS 3  -AM    Standing up from a chair using your arms (e.g., wheelchair, bedside chair)?  3  -SS 3  -AM    Climbing 3-5 steps with a railing?  3  -SS 1  -AM    To walk in hospital room?  3  -SS 3  -AM    AM-PAC 6 Clicks Score  18  -SS 16  -AM    How much help from another is currently needed...    Putting on and taking off regular lower body clothing? 2  -JH      Bathing (including washing, rinsing, and drying) 3  -JH      Toileting (which includes using toilet bed pan or urinal) 3  -JH      Putting on and taking off regular upper body clothing 3  -JH      Taking care of personal grooming (such as brushing teeth) 3  -JH      Eating meals 4  -JH      Score 18  -JH      Functional Assessment    Outcome  Measure Options  AM-PAC 6 Clicks Basic Mobility (PT)  - AM-PAC 6 Clicks Basic Mobility (PT)  -AM      06/04/17 1200 06/03/17 0855       How much help from another person do you currently need...    Turning from your back to your side while in flat bed without using bedrails?  3  -AM     Moving from lying on back to sitting on the side of a flat bed without bedrails?  3  -AM     Moving to and from a bed to a chair (including a wheelchair)?  3  -AM     Standing up from a chair using your arms (e.g., wheelchair, bedside chair)?  3  -AM     Climbing 3-5 steps with a railing?  1  -AM     To walk in hospital room?  3  -AM     AM-PAC 6 Clicks Score  16  -AM     How much help from another is currently needed...    Putting on and taking off regular lower body clothing? 2  -LM      Bathing (including washing, rinsing, and drying) 3  -LM      Toileting (which includes using toilet bed pan or urinal) 3  -LM      Putting on and taking off regular upper body clothing 3  -LM      Taking care of personal grooming (such as brushing teeth) 3  -LM      Eating meals 4  -LM      Score 18  -LM      Functional Assessment    Outcome Measure Options  AM-PAC 6 Clicks Basic Mobility (PT)  -AM       User Key  (r) = Recorded By, (t) = Taken By, (c) = Cosigned By    Initials Name Provider Type    AM Ulysses Gardner, KAREN Physical Therapy Assistant     Fartun Angela PTA Physical Therapy Assistant     MARYLIN Mauricio/L Occupational Therapy Assistant     MARYLIN Novak/L Occupational Therapy Assistant           Time Calculation:         Time Calculation- OT       06/05/17 1513          Time Calculation- OT    OT Start Time 1310  -      OT Stop Time 1340  -      OT Time Calculation (min) 30 min  -      Total Timed Code Minutes- OT 40 minute(s)  -      OT Received On 06/05/17  -        User Key  (r) = Recorded By, (t) = Taken By, (c) = Cosigned By    Initials Name Provider Type     Abril Pugh, MARYLIN/L  Occupational Therapy Assistant           Therapy Charges for Today     Code Description Service Date Service Provider Modifiers Qty    96856396179 HC OT SELF CARE/MGMT/TRAIN EA 15 MIN 6/5/2017 JOLIE Mauricio GO 2    [id number] HC OT THER PROC EA 15 MIN 6/5/2017 JOLIE Mauricio GO 1          OT G-codes  OT Professional Judgement Used?: Yes  OT Functional Scales Options: AM-PAC 6 Clicks Daily Activity (OT)  Score: 18  Functional Limitation: Self care  Self Care Current Status (): At least 40 percent but less than 60 percent impaired, limited or restricted  Self Care Goal Status (): At least 20 percent but less than 40 percent impaired, limited or restricted    JOLIE Mauricio  6/5/2017

## 2017-06-05 NOTE — CONSULTS
Adult Nutrition  Assessment    Patient Name:  Ventura Boggs  YOB: 1960  MRN: [id number]  Admit Date:  5/31/2017    Assessment Date:  6/5/2017          Reason for Assessment       06/05/17 1237    Reason for Assessment    Reason For Assessment/Visit follow up protocol              Nutrition/Diet History       06/05/17 1237    Nutrition/Diet History    Typical Food/Fluid Intake Pt denies any Gi distress.  He just had a BM.  Nsg reports that diet may be started today              Labs/Tests/Procedures/Meds       06/05/17 1238    Labs/Tests/Procedures/Meds    Labs/Tests Review Reviewed    Medication Review Reviewed, pertinent            Physical Findings       06/05/17 1238    Physical Appearance    Overall Physical Appearance on oxygen therapy              Nutrition Prescription Ordered       06/05/17 1238    Nutrition Prescription PO    Current PO Diet Sips/ice chips              Comments:  Pt remains NPO awaiting bowel funciton--but he just had a BM.  Staff reports that po should begin today.  NO Gi distress.  RD will continue to monitor po tolerance.          Electronically signed by:  Jenna Olivo RD  06/05/17 12:40 PM

## 2017-06-06 LAB
ANION GAP SERPL CALCULATED.3IONS-SCNC: 14 MMOL/L (ref 5–15)
BUN BLD-MCNC: 10 MG/DL (ref 7–21)
BUN/CREAT SERPL: 16.4 (ref 7–25)
CALCIUM SPEC-SCNC: 8.4 MG/DL (ref 8.4–10.2)
CHLORIDE SERPL-SCNC: 95 MMOL/L (ref 95–110)
CO2 SERPL-SCNC: 25 MMOL/L (ref 22–31)
CREAT BLD-MCNC: 0.61 MG/DL (ref 0.7–1.3)
GFR SERPL CREATININE-BSD FRML MDRD: 138 ML/MIN/1.73 (ref 56–130)
GLUCOSE BLD-MCNC: 142 MG/DL (ref 60–100)
GLUCOSE BLDC GLUCOMTR-MCNC: 133 MG/DL (ref 70–130)
GLUCOSE BLDC GLUCOMTR-MCNC: 153 MG/DL (ref 70–130)
GLUCOSE BLDC GLUCOMTR-MCNC: 156 MG/DL (ref 70–130)
GLUCOSE BLDC GLUCOMTR-MCNC: 161 MG/DL (ref 70–130)
GLUCOSE BLDC GLUCOMTR-MCNC: 169 MG/DL (ref 70–130)
POTASSIUM BLD-SCNC: 3.4 MMOL/L (ref 3.5–5.1)
SODIUM BLD-SCNC: 134 MMOL/L (ref 137–145)

## 2017-06-06 PROCEDURE — 25010000002 ENOXAPARIN PER 10 MG: Performed by: SURGERY

## 2017-06-06 PROCEDURE — 97110 THERAPEUTIC EXERCISES: CPT

## 2017-06-06 PROCEDURE — 25010000002 HYDROMORPHONE PER 4 MG: Performed by: SURGERY

## 2017-06-06 PROCEDURE — 25010000002 ONDANSETRON PER 1 MG: Performed by: SURGERY

## 2017-06-06 PROCEDURE — 82962 GLUCOSE BLOOD TEST: CPT

## 2017-06-06 PROCEDURE — 97116 GAIT TRAINING THERAPY: CPT

## 2017-06-06 PROCEDURE — 97535 SELF CARE MNGMENT TRAINING: CPT

## 2017-06-06 PROCEDURE — 25010000002 HYDROMORPHONE PCA 0.2 MG/ML PCA 30 ML (BHMAD/RIC): Performed by: SURGERY

## 2017-06-06 PROCEDURE — 94799 UNLISTED PULMONARY SVC/PX: CPT

## 2017-06-06 PROCEDURE — 63710000001 INSULIN ASPART PER 5 UNITS: Performed by: SURGERY

## 2017-06-06 PROCEDURE — 80048 BASIC METABOLIC PNL TOTAL CA: CPT | Performed by: SURGERY

## 2017-06-06 PROCEDURE — 97150 GROUP THERAPEUTIC PROCEDURES: CPT

## 2017-06-06 RX ADMIN — Medication: at 03:49

## 2017-06-06 RX ADMIN — INSULIN ASPART 3 UNITS: 100 INJECTION, SOLUTION INTRAVENOUS; SUBCUTANEOUS at 08:28

## 2017-06-06 RX ADMIN — ENOXAPARIN SODIUM 40 MG: 40 INJECTION SUBCUTANEOUS at 08:28

## 2017-06-06 RX ADMIN — PANTOPRAZOLE SODIUM 40 MG: 40 INJECTION, POWDER, FOR SOLUTION INTRAVENOUS at 05:42

## 2017-06-06 RX ADMIN — SODIUM CHLORIDE, POTASSIUM CHLORIDE, SODIUM LACTATE AND CALCIUM CHLORIDE 100 ML/HR: 600; 310; 30; 20 INJECTION, SOLUTION INTRAVENOUS at 00:01

## 2017-06-06 RX ADMIN — ONDANSETRON 4 MG: 2 INJECTION INTRAMUSCULAR; INTRAVENOUS at 10:24

## 2017-06-06 RX ADMIN — MUPIROCIN: 20 OINTMENT TOPICAL at 05:42

## 2017-06-06 RX ADMIN — ONDANSETRON 4 MG: 2 INJECTION INTRAMUSCULAR; INTRAVENOUS at 20:57

## 2017-06-06 RX ADMIN — TAMSULOSIN HYDROCHLORIDE 0.4 MG: 0.4 CAPSULE ORAL at 08:28

## 2017-06-06 RX ADMIN — SODIUM CHLORIDE, POTASSIUM CHLORIDE, SODIUM LACTATE AND CALCIUM CHLORIDE 100 ML/HR: 600; 310; 30; 20 INJECTION, SOLUTION INTRAVENOUS at 19:27

## 2017-06-06 RX ADMIN — INSULIN ASPART 3 UNITS: 100 INJECTION, SOLUTION INTRAVENOUS; SUBCUTANEOUS at 11:10

## 2017-06-06 RX ADMIN — Medication: at 19:07

## 2017-06-06 RX ADMIN — MUPIROCIN: 20 OINTMENT TOPICAL at 16:08

## 2017-06-06 RX ADMIN — MUPIROCIN: 20 OINTMENT TOPICAL at 21:40

## 2017-06-06 RX ADMIN — ATORVASTATIN CALCIUM 20 MG: 20 TABLET, FILM COATED ORAL at 21:40

## 2017-06-06 RX ADMIN — HYDROMORPHONE HYDROCHLORIDE 1.5 MG: 2 INJECTION, SOLUTION INTRAMUSCULAR; INTRAVENOUS; SUBCUTANEOUS at 21:40

## 2017-06-06 RX ADMIN — INSULIN ASPART 3 UNITS: 100 INJECTION, SOLUTION INTRAVENOUS; SUBCUTANEOUS at 21:41

## 2017-06-06 RX ADMIN — SODIUM CHLORIDE, POTASSIUM CHLORIDE, SODIUM LACTATE AND CALCIUM CHLORIDE 100 ML/HR: 600; 310; 30; 20 INJECTION, SOLUTION INTRAVENOUS at 10:09

## 2017-06-06 NOTE — PLAN OF CARE
Problem: Patient Care Overview (Adult)  Goal: Plan of Care Review  Outcome: Ongoing (interventions implemented as appropriate)    06/06/17 0417   Coping/Psychosocial Response Interventions   Plan Of Care Reviewed With patient   Patient Care Overview   Progress no change   Outcome Evaluation   Outcome Summary/Follow up Plan Pt working on pain control.       Goal: Adult Individualization and Mutuality  Outcome: Ongoing (interventions implemented as appropriate)  Goal: Discharge Needs Assessment  Outcome: Ongoing (interventions implemented as appropriate)    Problem: Perioperative Period (Adult)  Goal: Signs and Symptoms of Listed Potential Problems Will be Absent or Manageable (Perioperative Period)  Outcome: Ongoing (interventions implemented as appropriate)    Problem: Fall Risk (Adult)  Goal: Absence of Falls  Outcome: Ongoing (interventions implemented as appropriate)    Problem: Skin Integrity Impairment, Risk/Actual (Adult)  Goal: Skin Integrity/Wound Healing  Outcome: Ongoing (interventions implemented as appropriate)

## 2017-06-06 NOTE — THERAPY TREATMENT NOTE
Acute Care - Physical Therapy Treatment Note  [facility]     Patient Name: Ventura Boggs  : 1960  MRN: [id number]  Today's Date: 2017  Onset of Illness/Injury or Date of Surgery Date: 17  Date of Referral to PT: 07  Referring Physician: Dr. Shaver    Admit Date: 2017    Visit Dx:    ICD-10-CM ICD-9-CM   1. Ventral hernia without obstruction or gangrene K43.9 553.20   2. Impaired physical mobility Z74.09 781.99   3. Impaired mobility and ADLs Z74.09 799.89     Patient Active Problem List   Diagnosis   • Incisional hernia, without obstruction or gangrene   • Controlled type 2 diabetes mellitus without complication, with long-term current use of insulin   • Ventral hernia without obstruction or gangrene   • Postoperative urinary retention               Adult Rehabilitation Note       17 1135 17 1040 17 1310    Rehab Assessment/Intervention    Discipline physical therapy assistant  -AM occupational therapy assistant  -CS occupational therapy assistant  -    Document Type therapy note (daily note)  -AM therapy note (daily note)  -CS therapy note (daily note)  -    Subjective Information agree to therapy;complains of;pain  -AM agree to therapy  -CS agree to therapy  -    Patient Effort, Rehab Treatment good  -AM  good  -    Symptoms Noted During/After Treatment none  -AM      Precautions/Limitations no known precautions/limitations  -AM      Equipment Issued to Patient gait belt  -AM      Recorded by [AM] Ulysses Gardner PTA [CS] MARYLIN Rosado/MEI [] MARYLIN Mauricio/L    Vital Signs    Pre Systolic BP Rehab 141  -AM      Pre Treatment Diastolic BP 85  -AM      Post Systolic BP Rehab 162  -AM      Post Treatment Diastolic BP 97  -AM      Pretreatment Heart Rate (beats/min) 114  -AM      Posttreatment Heart Rate (beats/min) 91  -  -CS     Pre SpO2 (%) 90  -AM  96  -JH    O2 Delivery Pre Treatment room air  -AM  room air   pt's NC off upon  entering pt room,sats stable but pt reapply  -    Post SpO2 (%) 90  -AM 94  -CS     O2 Delivery Post Treatment room air  -AM room air  -CS     Pre Patient Position Supine  -AM Supine  -CS     Intra Patient Position Standing  -AM Sitting  -CS     Post Patient Position Supine  -AM Sitting  -CS     Recorded by [AM] Ulysses Gardner PTA [CS] MARYLIN Rosado/MEI [JH] SACHI MauricioA/L    Pain Assessment    Pain Assessment 0-10  -AM 0-10  -CS No/denies pain  -    Pain Score 7  -AM 7  -CS     Post Pain Score 7  -AM 7  -CS     Pain Type Acute pain;Surgical pain  -AM Surgical pain  -CS     Pain Location Abdomen  -AM Abdomen  -CS     Pain Orientation Mid  -AM      Pain Descriptors Sore  -AM      Pain Frequency Constant/continuous  -AM      Date Pain First Started 05/31/17  -AM      Clinical Progression Not changed  -AM      Patient's Stated Pain Goal No pain  -AM      Pain Intervention(s) Medication (See MAR);Ambulation/increased activity  -AM      Result of Injury No  -AM      Work-Related Injury No  -AM      Multiple Pain Sites No  -AM      Recorded by [AM] Ulysses Gardner PTA [CS] MARYLIN Rosado/MEI [JH] SACHI MauricioA/L    Cognitive Assessment/Intervention    Current Cognitive/Communication Assessment functional  -AM functional  -CS functional  -    Orientation Status oriented x 4  -AM oriented x 4  -CS oriented x 4  -JH    Follows Commands/Answers Questions 100% of the time  -% of the time  -% of the time  -JH    Personal Safety Interventions gait belt;nonskid shoes/slippers when out of bed;supervised activity  -AM      Recorded by [AM] Ulysses Gardner PTA [CS] MARYLIN Rosado/EMI [JH] SACHI MauricioA/L    ROM (Range of Motion)    General ROM no range of motion deficits identified  -AM      Recorded by [AM] Ulysses Gardner PTA      Bed Mobility, Assessment/Treatment    Bed Mobility, Assistive Device bed rails;head of bed elevated  -AM      Bed Mobility, Roll Left,  [place] not tested  -AM      Bed Mobility, Roll Right, [place] not tested  -AM      Bed Mobility, Scoot/Bridge, [place] not tested  -AM      Bed Mob, Supine to Sit, [place] supervision required  -AM      Bed Mob, Sit to Supine, [place] supervision required  -AM      Bed Mob, Sidelying to Sit, [place] not tested  -AM      Bed Mob, Sit to Sidelying, [place] not tested  -AM      Bed Mobility, Safety Issues decreased use of arms for pushing/pulling;decreased use of legs for bridging/pushing  -AM      Bed Mobility, Impairments pain  -AM      Recorded by [AM] Ulysses Gardner PTA      Transfer Assessment/Treatment    Transfers, Bed-Chair [place] stand by assist  -AM      Transfers, Chair-Bed [place] stand by assist  -AM      Transfers, Bed-Chair-Bed, Assist Device rolling walker  -AM      Transfers, Sit-Stand [place] stand by assist  -AM      Transfers, Stand-Sit [place] stand by assist  -AM      Transfers, Sit-Stand-Sit, Assist Device rolling walker  -AM      Toilet Transfer, [place] not tested  -AM      Walk-In Shower Transfer, [place] not tested  -AM      Bathtub Transfer, [place] not tested  -AM      Transfer, Maintain Weight Bearing Status able to maintain weight bearing status  -AM      Transfer, Safety Issues step length decreased  -AM      Transfer, Impairments pain  -AM      Recorded by [AM] Ulysses Gardner PTA      Gait Assessment/Treatment    Gait, [place] Level stand by assist  -AM      Gait, Assistive Device rolling walker  -AM      Gait, Distance (Feet) 700  -AM      Gait, Gait Pattern Analysis swing-through gait  -AM      Gait, Gait Deviations bilateral:;carolyne decreased  -AM      Gait, Maintain Weight Bearing Status able to maintain weight bearing status  -AM      Gait, Safety Issues step length decreased  -AM      Gait, Impairments pain  -AM      Recorded by [AM] Ulysses Gardner PTA      Stairs Assessment/Treatment    Number of  Stairs 3  -AM      Stairs, Handrail Location both sides  -AM      Stairs, [place] Level supervision required;contact guard assist  -AM      Stairs, Technique Used step over step (ascending);step over step (descending)  -AM      Stairs, Maintain Weight Bearing Status able to maintain weight bearing status  -AM      Stairs, Impairments strength decreased  -AM      Recorded by [AM] Ulysses Gardner PTA      Grooming Assessment/Training    Grooming Assess/Train, Position  long sitting  -     Grooming Assess/Train, Indepen Level  set up required  -CS     Grooming Assess/Train, Comment  oral care, wash face/hands, comb hair  -CS     Recorded by  [CS] JOLIE Rosado     Therapy Exercises    Bilateral Upper Extremity  AROM:;20 reps;sitting;elbow flexion/extension;hand pumps;pronation/supination;shoulder extension/flexion;shoulder ER/IR  -CS AROM:;20 reps;25 reps;supine;elbow flexion/extension;hand pumps;pronation/supination;shoulder abduction/adduction;shoulder extension/flexion;shoulder ER/IR  -JH    BUE Resistance  manual resistance- minimal  -CS manual resistance- minimal  -JH    Recorded by  [CS] JOLIE Rosado [JH] MARYLIN Mauricio/L    Positioning and Restraints    Pre-Treatment Position in bed  -AM in bed  -CS in bed  -JH    Post Treatment Position bed  -AM bed  -CS chair  -    In Bed supine;call light within reach;encouraged to call for assist;exit alarm on  -AM sitting;call light within reach  -CS supine;call light within reach;encouraged to call for assist  -    Recorded by [AM] Ulysses Gardner PTA [CS] JOLIE Rosado [JH] JOLIE Mauricio      06/05/17 0850 06/04/17 1330 06/04/17 1045    Rehab Assessment/Intervention    Discipline physical therapy assistant  -SS physical therapy assistant  -AM occupational therapy assistant  -LM    Document Type therapy note (daily note)  -SS therapy note (daily note)  -AM therapy note (daily note)  -LM    Subjective Information  agree to therapy;complains of;pain  -SS agree to therapy;complains of;pain  -AM agree to therapy  -LM    Patient Effort, Rehab Treatment good  -SS good  -AM     Symptoms Noted During/After Treatment none  -SS fatigue;increased pain  -AM     Precautions/Limitations oxygen therapy device and L/min;other (see comments)   contact preautions, gait belt placement  -SS oxygen therapy device and L/min  -AM     Equipment Issued to Patient  gait belt  -AM     Recorded by [SS] Fartun Angela PTA [AM] Ulysses Gardner PTA [LM] SACHI NovakA/L    Vital Signs    Pre Systolic BP Rehab 167  -  -  -LM    Pre Treatment Diastolic    RN made aware  -SS 88  -AM 90  -LM    Post Systolic BP Rehab 164  -  -AM     Post Treatment Diastolic   -SS 88  -AM     Pretreatment Heart Rate (beats/min) 122  -  -  -LM    Intratreatment Heart Rate (beats/min) 125  -SS      Posttreatment Heart Rate (beats/min) 1200  -  -  -LM    Pre SpO2 (%) 98  -SS 94  -AM 94  -LM    O2 Delivery Pre Treatment supplemental O2  -SS supplemental O2  -AM     Intra SpO2 (%) 97  -SS  95  -LM    O2 Delivery Intra Treatment supplemental O2  -SS      Post SpO2 (%) 99  -SS 95  -AM     O2 Delivery Post Treatment supplemental O2  -SS supplemental O2  -AM     Pre Patient Position Supine  -SS      Intra Patient Position Standing  -SS      Post Patient Position Sitting  -SS      Recorded by [SS] Fartun Angela PTA [AM] Ulysses Gardner PTA [LM] SACHI NovakA/L    Pain Assessment    Pain Assessment 0-10  -SS 0-10  -AM --  -LM    Pain Score 7  -SS 6  -AM 8  -LM    Post Pain Score 6  -SS 8  -AM 7  -LM    Pain Type Acute pain;Surgical pain  -SS Acute pain;Surgical pain  -AM     Pain Location Abdomen  -SS Abdomen  -AM     Pain Orientation Mid  -SS Mid  -AM     Pain Descriptors  Sore  -AM     Pain Frequency  Constant/continuous  -AM     Date Pain First Started  05/31/17  -AM     Clinical Progression  Not  changed  -AM     Patient's Stated Pain Goal No pain  -SS No pain  -AM     Pain Intervention(s) Medication (See MAR);Ambulation/increased activity  -SS Medication (See MAR);Ambulation/increased activity  -AM     Result of Injury  No  -AM     Work-Related Injury  No  -AM     Multiple Pain Sites  No  -AM     Recorded by [SS] Fartun Angela PTA [AM] Ulysses Gardner PTA [LM] Ese Dodson, COULTER/L    Cognitive Assessment/Intervention    Current Cognitive/Communication Assessment functional  -SS functional  -AM functional  -LM    Orientation Status oriented x 4  -SS oriented x 4  -AM oriented x 4  -LM    Follows Commands/Answers Questions 100% of the time  -% of the time  -% of the time  -LM    Personal Safety Interventions  gait belt;nonskid shoes/slippers when out of bed;supervised activity  -AM     Recorded by [SS] Fartun Angela PTA [AM] Ulysses Gardner PTA [LM] Ese Dodson, COULTER/L    ROM (Range of Motion)    General ROM  no range of motion deficits identified  -AM     Recorded by  [AM] Ulysses Gardner PTA     Bed Mobility, Assessment/Treatment    Bed Mobility, Assistive Device bed rails;head of bed elevated  -SS bed rails;head of bed elevated  -AM     Bed Mobility, Roll Left, [place] not tested  -SS not tested  -AM     Bed Mobility, Roll Right, [place] not tested  -SS not tested  -AM     Bed Mobility, Scoot/Bridge, [place] not tested  -SS not tested  -AM     Bed Mob, Supine to Sit, [place] supervision required  -SS supervision required  -AM minimum assist (75% patient effort)  -LM    Bed Mob, Sit to Supine, [place] not tested  -SS supervision required  -AM     Bed Mob, Sidelying to Sit, [place]  not tested  -AM     Bed Mob, Sit to Sidelying, [place]  not tested  -AM     Bed Mobility, Safety Issues  decreased use of arms for pushing/pulling;decreased use of legs for bridging/pushing  -AM     Bed Mobility, Impairments pain  -SS strength decreased;pain   -AM     Recorded by [SS] Fartun Angela, PTA [AM] Ulysses Gardner PTA [LM] SACHI NovakA/L    Transfer Assessment/Treatment    Transfers, Bed-Chair [place] stand by assist  -SS contact guard assist  -AM minimum assist (75% patient effort)  -LM    Transfers, Chair-Bed [place] stand by assist  -SS contact guard assist  -AM     Transfers, Bed-Chair-Bed, Assist Device rolling walker  -SS rolling walker  -AM     Transfers, Sit-Stand [place] stand by assist  -SS contact guard assist  -AM minimum assist (75% patient effort)  -LM    Transfers, Stand-Sit [place] stand by assist  -SS contact guard assist  -AM minimum assist (75% patient effort)  -LM    Transfers, Sit-Stand-Sit, Assist Device rolling walker  -SS rolling walker  -AM     Toilet Transfer, [place]  not tested  -AM     Walk-In Shower Transfer, [place]  not tested  -AM     Bathtub Transfer, [place]  not tested  -AM     Transfer, Maintain Weight Bearing Status  able to maintain weight bearing status  -AM     Transfer, Safety Issues step length decreased  -SS step length decreased  -AM     Transfer, Impairments pain  -SS strength decreased;pain  -AM     Transfer, Comment pt able to static stand ~ 2 min with UE support and without LOB  -SS      Recorded by [SS] Fartun Angela PTA [AM] Ulysses Gardner PTA [LM] SACHI NovakA/L    Gait Assessment/Treatment    Gait, [place] Level stand by assist  -SS contact guard assist  -AM     Gait, Assistive Device rolling walker  -SS rolling walker  -AM     Gait, Distance (Feet) 350  -  -AM     Gait, Gait Pattern Analysis  swing-through gait  -AM     Gait, Gait Deviations bilateral:;carolyne decreased  -SS bilateral:;carolyne decreased  -AM     Gait, Maintain Weight Bearing Status  able to maintain weight bearing status  -AM     Gait, Safety Issues supplemental O2;step length decreased  -SS step length decreased;supplemental O2  -AM     Gait, Impairments pain   -SS strength decreased;pain  -AM     Recorded by [SS] Fartun Angela PTA [AM] Ulysses Gardner PTA     Stairs Assessment/Treatment    Number of Stairs 4  -SS      Stairs, Handrail Location other (see comments)   Both rails used when ascending, left side used for descendin  -SS      Stairs, [place] Level contact guard assist;supervision required  -SS not tested  -AM     Stairs, Comment pt steady throughout step training, no LOB occured  -SS      Recorded by [SS] Fartun Angela PTA [AM] Ulysses Gardner PTA     Motor Skills/Interventions    Motor Response Observations   --   sitting balance chandrika x 10 min eob  -LM    Recorded by   [LM] SACHI NovakA/L    Therapy Exercises    Bilateral Lower Extremities  --  -AM     Recorded by  [AM] Ulysses Gardner PTA     Positioning and Restraints    Pre-Treatment Position in bed  -SS in bed  -AM in bed  -LM    Post Treatment Position chair  -SS bed  -AM --   ortho chair  -LM    In Bed  supine;call light within reach;encouraged to call for assist;exit alarm on  -AM     In Chair notified nsg;reclined;call light within reach;encouraged to call for assist  -SS      Recorded by [SS] Fartun Angela PTA [AM] Ulysses Gardner PTA [LM] Ese Dodson COULTER/L      User Key  (r) = Recorded By, (t) = Taken By, (c) = Cosigned By    Initials Name Effective Dates    AM Ulysses Gardner PTA 10/17/16 -      Fartun Angela PTA 10/17/16 -      Abril Pugh COULTER/L 10/17/16 -     LM Ese Dodson COULTER/L 10/17/16 -     CS Divya Ash COULTER/L 10/17/16 -                 IP PT Goals       06/06/17 1325 06/05/17 0929 06/04/17 1330    Transfer Training PT LTG    Transfer Training PT  LTG, Date Goal Reviewed 06/06/17  -AM 06/05/17  -SS 06/04/17  -AM    Transfer Training PT LTG, Outcome goal not met  -AM goal ongoing  -SS goal not met  -AM    Gait Training PT LTG    Gait Training Goal PT LTG, Date Goal Reviewed 06/06/17  -AM 06/05/17  -SS 06/04/17  -AM    Gait  Training Goal PT LTG, Outcome goal not met  -AM goal ongoing  -SS goal not met  -AM    Stair Training PT LTG    Stair Training Goal PT LTG, Date Goal Reviewed 06/06/17  -AM 06/05/17  -SS 06/04/17  -AM    Stair Training Goal PT LTG, Outcome goal not met  -AM goal ongoing  -SS goal not met  -AM    Patient Education PT LTG    Patient Education PT LTG, Date Goal Reviewed 06/06/17  -AM 06/05/17  -SS 06/04/17  -AM    Patient Education PT LTG Outcome goal met  -AM goal ongoing  -SS goal not met  -AM      06/03/17 0855 06/02/17 1000 06/01/17 1108    Transfer Training PT LTG    Transfer Training PT LTG, Date Established   06/01/17  -GAL    Transfer Training PT LTG, Time to Achieve   by discharge  -GAL    Transfer Training PT LTG, [place] Level   independent  -GAL    Transfer Training PT  LTG, Date Goal Reviewed 06/03/17  -AM 06/02/17  -TW     Transfer Training PT LTG, Outcome goal not met  -AM goal ongoing  -TW goal ongoing  -GAL    Gait Training PT LTG    Gait Training Goal PT LTG, Date Established   06/01/17  -GAL    Gait Training Goal PT LTG, Time to Achieve   by discharge  -GAL    Gait Training Goal PT LTG, [place] Level   independent  -GAL    Gait Training Goal PT LTG, Distance to Achieve   1000ft  -GAL    Gait Training Goal PT LTG, Additional Goal   Pt will complete 6 minute walk test  -GAL    Gait Training Goal PT LTG, Date Goal Reviewed 06/03/17  -AM 06/02/17  -TW     Gait Training Goal PT LTG, Outcome goal not met  -AM goal ongoing  -TW goal ongoing  -GAL    Stair Training PT LTG    Stair Training Goal PT LTG, Date Established   06/01/17  -GAL    Stair Training Goal PT LTG, Time to Achieve   by discharge  -GAL    Stair Training Goal PT LTG, Number of Steps   5  -GAL    Stair Training Goal PT LTG, [place] Level   conditional independence  -GAL    Stair Training Goal PT LTG, Assist Device   1 handrail  -GAL    Stair Training Goal PT LTG, Date Goal Reviewed 06/03/17  -AM 06/02/17  -TW     Stair Training Goal PT LTG,  Outcome goal not met  -AM goal ongoing  - goal ongoing  -    Patient Education PT LTG    Patient Education PT LTG, Date Established   06/01/17  -    Patient Education PT LTG, Time to Achieve   by discharge  -    Patient Education PT LTG, Education Type   gait;transfers;home safety  -    Patient Education PT LTG, Date Goal Reviewed 06/03/17  -AM 06/02/17  -     Patient Education PT LTG Outcome goal not met  -AM goal ongoing  - goal ongoing  -      User Key  (r) = Recorded By, (t) = Taken By, (c) = Cosigned By    Initials Name Provider Type    GAL Shilpa Dumont, PT Physical Therapist    AM Ulysses Gardner, PTA Physical Therapy Assistant     Fartun Angela, PTA Physical Therapy Assistant     Brant Palacios, PTA Physical Therapy Assistant          Physical Therapy Education     Title: PT OT SLP Therapies (Active)     Topic: Physical Therapy (Active)     Point: Mobility training (Active)    Learning Progress Summary    Learner Readiness Method Response Comment Documented by Status   Patient Acceptance E,D,TB NR   06/05/17 0942 Active    Acceptance E NR   06/01/17 1107 Active   Family Acceptance E NR   06/01/17 1107 Active               Point: Precautions (Active)    Learning Progress Summary    Learner Readiness Method Response Comment Documented by Status   Patient Acceptance E,D,TB NR   06/05/17 0942 Active    Acceptance E NR   06/01/17 1107 Active   Family Acceptance E NR   06/01/17 1107 Active                      User Key     Initials Effective Dates Name Provider Type [facility] 10/17/16 -  Shilpa Dumont, PT Physical Therapist PT     10/17/16 -  Fartun Angela, [facility] [place] Physical Therapy Assistant PT                    PT Recommendation and Plan  Anticipated Discharge Disposition: home with home health  Planned Therapy Interventions: balance training, bed mobility training, gait training, patient/family education, stair training, transfer training  PT Frequency: other (see  comments) (5-14 times per week)  Plan of Care Review  Plan Of Care Reviewed With: patient  Progress: progress toward functional goals as expected  Outcome Summary/Follow up Plan: Pt met 1 PT goals this tx. Pt amb 700 ft w/RW SBA. Pt would benefit from HH PT Once discharged from this facility.           Outcome Measures       06/06/17 1135 06/06/17 1100 06/05/17 1310    How much help from another person do you currently need...    Turning from your back to your side while in flat bed without using bedrails? 3  -AM      Moving from lying on back to sitting on the side of a flat bed without bedrails? 3  -AM      Moving to and from a bed to a chair (including a wheelchair)? 3  -AM      Standing up from a chair using your arms (e.g., wheelchair, bedside chair)? 3  -AM      Climbing 3-5 steps with a railing? 3  -AM      To walk in hospital room? 3  -AM      AM-PAC 6 Clicks Score 18  -AM      How much help from another is currently needed...    Putting on and taking off regular lower body clothing?  2  -CS 2  -JH    Bathing (including washing, rinsing, and drying)  3  -CS 3  -JH    Toileting (which includes using toilet bed pan or urinal)  3  -CS 3  -JH    Putting on and taking off regular upper body clothing  3  -CS 3  -JH    Taking care of personal grooming (such as brushing teeth)  3  -CS 3  -JH    Eating meals  4  -CS 4  -JH    Score  18  -CS 18  -JH    Functional Assessment    Outcome Measure Options AM-PAC 6 Clicks Basic Mobility (PT)  -AM AM-PAC 6 Clicks Daily Activity (OT)  -CS       06/05/17 0850 06/04/17 1330 06/04/17 1200    How much help from another person do you currently need...    Turning from your back to your side while in flat bed without using bedrails? 3  -SS 3  -AM     Moving from lying on back to sitting on the side of a flat bed without bedrails? 3  -SS 3  -AM     Moving to and from a bed to a chair (including a wheelchair)? 3  -SS 3  -AM     Standing up from a chair using your arms (e.g.,  wheelchair, bedside chair)? 3  -SS 3  -AM     Climbing 3-5 steps with a railing? 3  -SS 1  -AM     To walk in hospital room? 3  -SS 3  -AM     AM-PAC 6 Clicks Score 18  -SS 16  -AM     How much help from another is currently needed...    Putting on and taking off regular lower body clothing?   2  -LM    Bathing (including washing, rinsing, and drying)   3  -LM    Toileting (which includes using toilet bed pan or urinal)   3  -LM    Putting on and taking off regular upper body clothing   3  -LM    Taking care of personal grooming (such as brushing teeth)   3  -LM    Eating meals   4  -LM    Score   18  -LM    Functional Assessment    Outcome Measure Options AM-PAC 6 Clicks Basic Mobility (PT)  -SS AM-PAC 6 Clicks Basic Mobility (PT)  -AM       User Key  (r) = Recorded By, (t) = Taken By, (c) = Cosigned By    Initials Name Provider Type    AM Ulysses Gardner PTA Physical Therapy Assistant     Fartun Angela PTA Physical Therapy Assistant    ДМИТРИЙ Pugh, COULTER/L Occupational Therapy Assistant     Ese Dodson, COULTER/L Occupational Therapy Assistant     Divya Ash, COULTER/L Occupational Therapy Assistant           Time Calculation:         PT Charges       06/06/17 1325          Time Calculation    Start Time 1325  -AM      Stop Time 1350  -AM      Time Calculation (min) 25 min  -AM      PT Received On 06/06/17  -AM      PT - Next Appointment 06/07/17  -AM      Time Calculation- PT    Total Timed Code Minutes- PT 25 minute(s)  -AM        User Key  (r) = Recorded By, (t) = Taken By, (c) = Cosigned By    Initials Name Provider Type    AM Ulysses Gardner PTA Physical Therapy Assistant          Therapy Charges for Today     Code Description Service Date Service Provider Modifiers Qty    42439940589 HC GAIT TRAINING EA 15 MIN 6/6/2017 Ulysses Gardner PTA GP 1    80940337983 HC PT THER PROC GROUP 6/6/2017 Ulysses Gardner PTA GP 1          PT G-Codes  PT Professional Judgement Used?: Yes  Outcome  Measure Options: AM-PAC 6 Clicks Basic Mobility (PT)  Score: 18  Functional Limitation: Mobility: Walking and moving around  Mobility: Walking and Moving Around Current Status (): At least 40 percent but less than 60 percent impaired, limited or restricted  Mobility: Walking and Moving Around Goal Status (): 0 percent impaired, limited or restricted    Ulysses Gardner, PTA  6/6/2017

## 2017-06-06 NOTE — PLAN OF CARE
Problem: Patient Care Overview (Adult)  Goal: Plan of Care Review  Outcome: Ongoing (interventions implemented as appropriate)    06/06/17 1139   Coping/Psychosocial Response Interventions   Plan Of Care Reviewed With patient   Patient Care Overview   Progress improving   Outcome Evaluation   Outcome Summary/Follow up Plan Pt tolerated tx well this date. Pt completed an grooming task. Pt gave good effort with UE strengthening. No goals met this tx. Continue POC.         Problem: Inpatient Occupational Therapy  Goal: Transfer Training Goal 1 LTG- OT  Outcome: Ongoing (interventions implemented as appropriate)    06/02/17 1351 06/04/17 1220 06/06/17 1139   Transfer Training OT LTG   Transfer Training OT LTG, Date Established 06/02/17 --  --    Transfer Training OT LTG, Time to Achieve 2 wks --  --    Transfer Training OT LTG, Activity Type toilet;walk-in shower --  --    Transfer Training OT LTG, Florence Level supervision required --  --    Transfer Training OT LTG, Date Goal Reviewed --  --  06/06/17   Transfer Training OT LTG, Outcome --  goal ongoing --        Goal: ADL Goal LTG- OT  Outcome: Ongoing (interventions implemented as appropriate)    06/02/17 1351 06/04/17 1220 06/06/17 1139   ADL OT LTG   ADL OT LTG, Date Established 06/02/17 --  --    ADL OT LTG, Time to Achieve 2 wks --  --    ADL OT LTG, Activity Type ADL skills --  --    ADL OT LTG, Florence Level standby assist --  --    ADL OT LTG, Date Goal Reviewed --  --  06/06/17   ADL OT LTG, Outcome --  goal ongoing --        Goal: Functional Mobility Goal LTG- OT  Outcome: Ongoing (interventions implemented as appropriate)    06/02/17 1351 06/04/17 1220 06/06/17 1139   Functional Mobility OT LTG   Functional Mobility Goal OT LTG, Date Established 06/02/17 --  --    Functional Mobility Goal OT LTG, Time to Achieve 2 wks --  --    Functional Mobility Goal OT LTG, Florence Level supervision --  --    Functional Mobility Goal OT LTG, Distance to  Achieve to the bathroom --  --    Functional Mobility Goal OT LTG, Date Goal Reviewed --  --  06/06/17   Functional Mobility Goal OT LTG, Outcome --  goal ongoing --

## 2017-06-06 NOTE — PLAN OF CARE
Problem: Patient Care Overview (Adult)  Goal: Plan of Care Review  Outcome: Ongoing (interventions implemented as appropriate)    06/06/17 1611   Coping/Psychosocial Response Interventions   Plan Of Care Reviewed With patient   Patient Care Overview   Progress improving   Outcome Evaluation   Outcome Summary/Follow up Plan up in chair today no problems noted resting quietly, no falls, no skin problems dressing changed noted small amount of drainage, resting quietly in bed pain control with medications notee       Goal: Adult Individualization and Mutuality  Outcome: Ongoing (interventions implemented as appropriate)    Problem: Perioperative Period (Adult)  Goal: Signs and Symptoms of Listed Potential Problems Will be Absent or Manageable (Perioperative Period)  Outcome: Ongoing (interventions implemented as appropriate)    Problem: Fall Risk (Adult)  Goal: Absence of Falls  Outcome: Ongoing (interventions implemented as appropriate)    Problem: Skin Integrity Impairment, Risk/Actual (Adult)  Goal: Skin Integrity/Wound Healing  Outcome: Ongoing (interventions implemented as appropriate)

## 2017-06-06 NOTE — PROGRESS NOTES
"   LOS: 6 days   Patient Care Team:  GIA Salamanca as PCP - General (Family Medicine)    Subjective     Subjective:  Symptoms:  No shortness of breath or chest pain.  (Wiley removed this morning and Mr. Boggs just voided 300 mL urine without hematuria, dysuria, and he feels he fully emptied. ).    Diet:  No nausea or vomiting.    Activity level: Normal.        History taken from: patient chart RN    Objective     Vital Signs  Temp:  [96.4 °F (35.8 °C)-98.8 °F (37.1 °C)] 97.3 °F (36.3 °C)  Heart Rate:  [108-122] 113  Resp:  [18-20] 20  BP: (130-146)/(62-86) 146/70    Objective:  General Appearance:  In no acute distress.    Vital signs: (most recent): Blood pressure 146/70, pulse 113, temperature 97.3 °F (36.3 °C), temperature source Oral, resp. rate 20, height 68\" (172.7 cm), weight 220 lb 10.9 oz (100 kg), SpO2 94 %.  (Noted).    Output: Producing urine and producing stool (BM 6/5/17).    HEENT: Normal HEENT exam.    Lungs:  Normal respiratory rate and normal effort.  He is not in respiratory distress.  Breath sounds clear to auscultation.    Heart: Tachycardia.  S1 normal and S2 normal.  No murmur.   Chest: Symmetric chest wall expansion.   Abdomen: Abdomen is soft and non-distended.  Bowel sounds are normal.   There is incisional tenderness.  (Dressing covering abdominal incision, TAINA x 2 with sanguinous drainage, abdominal binder).   Extremities: Normal range of motion.  There is no deformity.    Pulses: Distal pulses are intact.    Neurological: Patient is alert and oriented to person, place and time.  GCS score is 15.    Pupils:  Pupils are equal, round, and reactive to light.    Skin:  Warm and dry.  No ecchymosis or cyanosis.             Results Review:    Lab Results (last 24 hours)     Procedure Component Value Units Date/Time    POC Glucose Fingerstick [[id number]]  (Abnormal) Collected:  06/05/17 1711    Specimen:  Blood Updated:  06/05/17 1741     Glucose 157 (H) mg/dL       Sliding Scale " AdminMeter: [id number]: [id number] DARYN SAENZ       POC Glucose Fingerstick [[id number]]  (Abnormal) Collected:  06/05/17 2108    Specimen:  Blood Updated:  06/05/17 2140     Glucose 148 (H) mg/dL       Meter: [id number]: [id number] ELLEN JENNINGS       POC Glucose Fingerstick [[id number]]  (Abnormal) Collected:  06/06/17 0652    Specimen:  Blood Updated:  06/06/17 0703     Glucose 161 (H) mg/dL       RN NotifiedMeter: [id number]: [id number] MICHELLE MACHADO       POC Glucose Fingerstick [[id number]]  (Abnormal) Collected:  06/04/17 2127    Specimen:  Blood Updated:  06/06/17 0816     Glucose 156 (H) mg/dL       Sliding Scale AdminMeter: [id number]: [id number] [facility]       Basic Metabolic Panel [[id number]]  (Abnormal) Collected:  06/06/17 0749    Specimen:  Blood Updated:  06/06/17 0838     Glucose 142 (H) mg/dL      BUN 10 mg/dL      Creatinine 0.61 (L) mg/dL      Sodium 134 (L) mmol/L      Potassium 3.4 (L) mmol/L      Chloride 95 mmol/L      CO2 25.0 mmol/L      Calcium 8.4 mg/dL      eGFR Non African Amer 138 (H) mL/min/1.73      BUN/Creatinine Ratio 16.4     Anion Gap 14.0 mmol/L     POC Glucose Fingerstick [[id number]]  (Abnormal) Collected:  06/06/17 1025    Specimen:  Blood Updated:  06/06/17 1043     Glucose 153 (H) mg/dL       RN NotifiedMeter: [id number]: [id number] PLATA LACY            Imaging Results (last 24 hours)     ** No results found for the last 24 hours. **           I reviewed the patient's new clinical results.  I reviewed the patient's new imaging results and agree with the interpretation.  I reviewed the patient's other test results and agree with the interpretation      Assessment/Plan     Principal Problem:    Ventral hernia without obstruction or gangrene  Active Problems:    Controlled type 2 diabetes mellitus without complication, with long-term current use of insulin    Postoperative urinary  retention      Assessment:  (Postoperative urinary retention with successful voiding trial today after Wiley removed this morning (6/6/17). Tolerating Flomax. Reports history of LUTS prior to surgery. ).     Plan:   (Monitor for recurrence of urinary retention, symptoms of UTI. Keep Flomax upon discharge and follow up with Dr. Thrasher for outpatient workup of prostate.     Thank you Dr. Shaver for allowing [facility] to participate in Mr. Boggs's care.).       GIA Amaral  06/06/17  2:08 PM

## 2017-06-06 NOTE — PAYOR COMM NOTE
"[facility]  [facility] [facility]  [phone]  [phone]    auth number  [id number]    Ventura Boggs (56 y.o. 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                 Active Insurance as of 5/31/2017     Primary Coverage     Payor Plan Insurance Group Employer/Plan Group    [place] [facility] [facility] [place] [facility] [place]      [place]or Plan Address Payor Plan Phone Number Effective From Effective To    PO  [phone] 5/19/2017     [place] [address]       Subscriber Name Subscriber Birth Date Member ID       VENTURA BOGGS 1960 [id number]                 Emergency Contacts      (Rel.) Home Phone Work Phone Mobile Phone    Sonia Boggs (Significant Other) [phone] -- --            Physician Progress Notes (last 24 hours) (Notes from 6/5/2017 10:58 AM through 6/6/2017 10:58 AM)     No notes of this type exist for this encounter.        Physical Therapy Notes (last 24 hours) (Notes from 6/5/2017 10:58 AM through 6/6/2017 10:58 AM)     No notes of this type exist for this encounter.           Occupational Therapy Notes (last 24 hours) (Notes from 6/5/2017 10:58 AM through 6/6/2017 10:58 AM)      Abril E Keaton, COULTER/L at 6/5/2017  3:13 PM  " Version 1 of 1         Problem: Patient Care Overview (Adult)  Goal: Plan of Care Review  Outcome: Ongoing (interventions implemented as appropriate)    06/05/17 0929 06/05/17 1310   Coping/Psychosocial Response Interventions   Plan Of Care Reviewed With patient --    Patient Care Overview   Progress progress toward functional goals as expected --    Outcome Evaluation   Outcome Summary/Follow up Plan --  Pt tolerated OT well, making steady progress toward goals, advise pt to obtain reacher /hip kit , hh shower head and shower chair        Goal: Discharge Needs Assessment  Outcome: Ongoing (interventions implemented as appropriate)    06/04/17 1330   Discharge Needs Assessment   Concerns To Be Addressed discharge planning concerns   Readmission Within The Last 30 Days no previous admission in last 30 days   Equipment Needed After Discharge none   Discharge Facility/Level Of Care Needs home with home health   Current Discharge Risk physical impairment   Current Health   Anticipated Changes Related to Illness inability to care for self   Self-Care   Equipment Currently Used at Home walker, standard;cane, straight;cane, quad   Living Environment   Transportation Available family or friend will provide         Problem: Inpatient Occupational Therapy  Goal: Transfer Training Goal 1 LTG- OT  Outcome: Ongoing (interventions implemented as appropriate)    06/02/17 1351 06/04/17 1220 06/05/17 1310   Transfer Training OT LTG   Transfer Training OT LTG, Date Established 06/02/17 --  --    Transfer Training OT LTG, Time to Achieve 2 wks --  --    Transfer Training OT LTG, Activity Type toilet;walk-in shower --  --    Transfer Training OT LTG, [place] Level supervision required --  --    Transfer Training OT LTG, Date Goal Reviewed --  --  06/05/17   Transfer Training OT LTG, Outcome --  goal ongoing --        Goal: ADL Goal LTG- OT  Outcome: Ongoing (interventions implemented as appropriate)    06/02/17 1351 06/04/17 1220  17 1310   ADL OT LTG   ADL OT LTG, Date Established 17 --  --    ADL OT LTG, Time to Achieve 2 wks --  --    ADL OT LTG, Activity Type ADL skills --  --    ADL OT LTG, [place] Level standby assist --  --    ADL OT LTG, Date Goal Reviewed --  --  17   ADL OT LTG, Outcome --  goal ongoing --        Goal: Functional Mobility Goal LTG- OT  Outcome: Ongoing (interventions implemented as appropriate)    17 1351 17 1220 17 1310   Functional Mobility OT LTG   Functional Mobility Goal OT LTG, Date Established 17 --  --    Functional Mobility Goal OT LTG, Time to Achieve 2 wks --  --    Functional Mobility Goal OT LTG, [place] Level supervision --  --    Functional Mobility Goal OT LTG, Distance to Achieve to the bathroom --  --    Functional Mobility Goal OT LTG, Date Goal Reviewed --  --  17   Functional Mobility Goal OT LTG, Outcome --  goal ongoing --               Electronically signed by JOLIE Mauricio at 2017  3:13 PM      JOLIE Mauricio at 2017  3:15 PM  Version 1 of 1         Acute Care - Occupational Therapy Treatment Note  [facility]     Patient Name: Ventura Boggs  : 1960  MRN: [id number]  Today's Date: 2017  Onset of Illness/Injury or Date of Surgery Date: 17  Date of Referral to OT: 17  Referring Physician: Dr. Shaver      Admit Date: 2017    Visit Dx:     ICD-10-CM ICD-9-CM   1. Ventral hernia without obstruction or gangrene K43.9 553.20   2. Impaired physical mobility Z74.09 781.99   3. Impaired mobility and ADLs Z74.09 799.89     Patient Active Problem List   Diagnosis   • Incisional hernia, without obstruction or gangrene   • Controlled type 2 diabetes mellitus without complication, with long-term current use of insulin   • Ventral hernia without obstruction or gangrene   • Postoperative urinary retention             Adult Rehabilitation Note       17 1310 17 0850 17 1330     Rehab Assessment/Intervention    Discipline occupational therapy assistant  - physical therapy assistant  -SS physical therapy assistant  -AM    Document Type therapy note (daily note)  - therapy note (daily note)  - therapy note (daily note)  -AM    Subjective Information agree to therapy  - agree to therapy;complains of;pain  - agree to therapy;complains of;pain  -AM    Patient Effort, Rehab Treatment good  - good  - good  -AM    Symptoms Noted During/After Treatment  none  -SS fatigue;increased pain  -AM    Precautions/Limitations  oxygen therapy device and L/min;other (see comments)   contact preautions, gait belt placement  - oxygen therapy device and L/min  -AM    Equipment Issued to Patient   gait belt  -AM    Recorded by [] MARYLIN Mauricio/MEI [] Fartun Angela PTA [AM] Ulysses Gardner PTA    Vital Signs    Pre Systolic BP Rehab  167  -  -AM    Pre Treatment Diastolic BP  112   RN made aware  -SS 88  -AM    Post Systolic BP Rehab  164  -  -AM    Post Treatment Diastolic BP  104  -SS 88  -AM    Pretreatment Heart Rate (beats/min)  122  -  -AM    Intratreatment Heart Rate (beats/min)  125  -SS     Posttreatment Heart Rate (beats/min)  1200  -  -AM    Pre SpO2 (%) 96  - 98  -SS 94  -AM    O2 Delivery Pre Treatment room air   pt's NC off upon entering pt room,sats stable but pt reapply  - supplemental O2  -SS supplemental O2  -AM    Intra SpO2 (%)  97  -SS     O2 Delivery Intra Treatment  supplemental O2  -SS     Post SpO2 (%)  99  -SS 95  -AM    O2 Delivery Post Treatment  supplemental O2  -SS supplemental O2  -AM    Pre Patient Position  Supine  -SS     Intra Patient Position  Standing  -SS     Post Patient Position  Sitting  -SS     Recorded by [] MARYLIN Mauricio/MEI [] Fartun Angela PTA [AM] Ulysses Gardner PTA    Pain Assessment    Pain Assessment No/denies pain  - 0-10  -SS 0-10  -AM    Pain Score  7  -SS 6  -AM    Post Pain Score  6   -SS 8  -AM    Pain Type  Acute pain;Surgical pain  -SS Acute pain;Surgical pain  -AM    Pain Location  Abdomen  -SS Abdomen  -AM    Pain Orientation  Mid  -SS Mid  -AM    Pain Descriptors   Sore  -AM    Pain Frequency   Constant/continuous  -AM    Date Pain First Started   05/31/17  -AM    Clinical Progression   Not changed  -AM    Patient's Stated Pain Goal  No pain  -SS No pain  -AM    Pain Intervention(s)  Medication (See MAR);Ambulation/increased activity  -SS Medication (See MAR);Ambulation/increased activity  -AM    Result of Injury   No  -AM    Work-Related Injury   No  -AM    Multiple Pain Sites   No  -AM    Recorded by [JH] MARYLIN Mauricio/L [SS] Fartun Angela, KAREN [AM] Ulysses Gardner PTA    Cognitive Assessment/Intervention    Current Cognitive/Communication Assessment functional  - functional  -SS functional  -AM    Orientation Status oriented x 4  -JH oriented x 4  -SS oriented x 4  -AM    Follows Commands/Answers Questions 100% of the time  - 100% of the time  -% of the time  -AM    Personal Safety Interventions   gait belt;nonskid shoes/slippers when out of bed;supervised activity  -AM    Recorded by [JH] MARYLIN Mauricio/MEI [SS] Fartun Angela, KAREN [AM] Ulysses Gardner PTA    ROM (Range of Motion)    General ROM   no range of motion deficits identified  -AM    Recorded by   [AM] Ulysses Gardner PTA    Bed Mobility, Assessment/Treatment    Bed Mobility, Assistive Device  bed rails;head of bed elevated  -SS bed rails;head of bed elevated  -AM    Bed Mobility, Roll Left, [place]  not tested  -SS not tested  -AM    Bed Mobility, Roll Right, [place]  not tested  - not tested  -AM    Bed Mobility, Scoot/Bridge, [place]  not tested  -SS not tested  -AM    Bed Mob, Supine to Sit, [place]  supervision required  - supervision required  -AM    Bed Mob, Sit to Supine, [place]  not tested  - supervision required  -AM    Bed Mob, Sidelying to Sit,  [place]   not tested  -AM    Bed Mob, Sit to Sidelying, [place]   not tested  -AM    Bed Mobility, Safety Issues   decreased use of arms for pushing/pulling;decreased use of legs for bridging/pushing  -AM    Bed Mobility, Impairments  pain  -SS strength decreased;pain  -AM    Recorded by  [SS] Fartun Angela, PTA [AM] Ulysses Gardner PTA    Transfer Assessment/Treatment    Transfers, Bed-Chair [place]  stand by assist  -SS contact guard assist  -AM    Transfers, Chair-Bed [place]  stand by assist  -SS contact guard assist  -AM    Transfers, Bed-Chair-Bed, Assist Device  rolling walker  -SS rolling walker  -AM    Transfers, Sit-Stand [place]  stand by assist  -SS contact guard assist  -AM    Transfers, Stand-Sit [place]  stand by assist  -SS contact guard assist  -AM    Transfers, Sit-Stand-Sit, Assist Device  rolling walker  -SS rolling walker  -AM    Toilet Transfer, [place]   not tested  -AM    Walk-In Shower Transfer, [place]   not tested  -AM    Bathtub Transfer, [place]   not tested  -AM    Transfer, Maintain Weight Bearing Status   able to maintain weight bearing status  -AM    Transfer, Safety Issues  step length decreased  -SS step length decreased  -AM    Transfer, Impairments  pain  -SS strength decreased;pain  -AM    Transfer, Comment  pt able to static stand ~ 2 min with UE support and without LOB  -SS     Recorded by  [SS] Fartun Angela, PTA [AM] Ulysses Gardner PTA    Gait Assessment/Treatment    Gait, [place] Level  stand by assist  -SS contact guard assist  -AM    Gait, Assistive Device  rolling walker  -SS rolling walker  -AM    Gait, Distance (Feet)  350  -  -AM    Gait, Gait Pattern Analysis   swing-through gait  -AM    Gait, Gait Deviations  bilateral:;caroly[place] decreased  -SS bilateral:;car[place] decreased  -AM    Gait, Maintain Weight Bearing Status   able to maintain weight bearing status  -AM    Gait, Safety Issues  supplemental  O2;step length decreased  -SS step length decreased;supplemental O2  -AM    Gait, Impairments  pain  -SS strength decreased;pain  -AM    Recorded by  [SS] Fartun Angela PTA [AM] Ulysses Gardner PTA    Stairs Assessment/Treatment    Number of Stairs  4  -SS     Stairs, Handrail Location  other (see comments)   Both rails used when ascending, left side used for descendin  -SS     Stairs, [place] Level  contact guard assist;supervision required  -SS not tested  -AM    Stairs, Comment  pt steady throughout step training, no LOB occured  -SS     Recorded by  [SS] Fartun Angela PTA [AM] Ulysses Gardner PTA    Therapy Exercises    Bilateral Lower Extremities   --  -AM    Bilateral Upper Extremity AROM:;20 reps;25 reps;supine;elbow flexion/extension;hand pumps;pronation/supination;shoulder abduction/adduction;shoulder extension/flexion;shoulder ER/IR  -      BUE Resistance manual resistance- minimal  -      Recorded by [] MARYLIN Mauricio/MEI  [AM] Ulysses Gardner PTA    Positioning and Restraints    Pre-Treatment Position in bed  - in bed  - in bed  -AM    Post Treatment Position chair  - chair  - bed  -AM    In Bed supine;call light within reach;encouraged to call for assist  -  supine;call light within reach;encouraged to call for assist;exit alarm on  -AM    In Chair  notified nsg;reclined;call light within reach;encouraged to call for assist  -     Recorded by [] MARYLIN Mauricio/MEI [SS] Fartun Angela PTA [AM] Ulysses Gardner PTA      06/04/17 1045 06/03/17 0855       Rehab Assessment/Intervention    Discipline occupational therapy assistant  -LM physical therapy assistant  -AM     Document Type therapy note (daily note)  -LM therapy note (daily note)  -AM     Subjective Information agree to therapy  -LM agree to therapy;complains of;pain  -AM     Patient Effort, Rehab Treatment  good  -AM     Symptoms Noted During/After Treatment  fatigue  -AM      Precautions/Limitations  fall precautions;oxygen therapy device and L/min  -AM     Equipment Issued to Patient  gait belt  -AM     Recorded by [LM] MARYLIN Novak/L [AM] Ulysses Gardner PTA     Vital Signs    Pre Systolic BP Rehab 135  -  -AM     Pre Treatment Diastolic BP 90  -LM 82  -AM     Post Systolic BP Rehab  145  -AM     Post Treatment Diastolic BP  85  -AM     Pretreatment Heart Rate (beats/min) 116  -  -AM     Posttreatment Heart Rate (beats/min) 117  -  -AM     Pre SpO2 (%) 94  -LM 97  -AM     O2 Delivery Pre Treatment  supplemental O2   3L  -AM     Intra SpO2 (%) 95  -LM      Post SpO2 (%)  97  -AM     O2 Delivery Post Treatment  supplemental O2   3L  -AM     Pre Patient Position  Supine  -AM     Intra Patient Position  Standing  -AM     Post Patient Position  Supine  -AM     Recorded by [LM] MARYLIN Novak/L [AM] Ulysses Gardner PTA     Pain Assessment    Pain Assessment --  -LM 0-10  -AM     Pain Score 8  -LM 9  -AM     Post Pain Score 7  -LM 9  -AM     Pain Type  Acute pain;Surgical pain  -AM     Pain Location  Abdomen  -AM     Pain Descriptors  Sore  -AM     Pain Frequency  Constant/continuous  -AM     Date Pain First Started  05/31/17  -AM     Clinical Progression  Not changed  -AM     Patient's Stated Pain Goal  No pain  -AM     Pain Intervention(s)  Medication (See MAR);Ambulation/increased activity  -AM     Result of Injury  No  -AM     Work-Related Injury  No  -AM     Multiple Pain Sites  No  -AM     Recorded by [LM] MARYLIN Novak/L [AM] Ulysses Gardner PTA     Cognitive Assessment/Intervention    Current Cognitive/Communication Assessment functional  -LM functional  -AM     Orientation Status oriented x 4  -LM oriented x 4  -AM     Follows Commands/Answers Questions 100% of the time  -% of the time  -AM     Personal Safety Interventions  gait belt;nonskid shoes/slippers when out of bed;supervised activity  -AM     Recorded by [LM] Ese BOND  MARYLIN Dodson/MEI [AM] Ulysses Gardner PTA     ROM (Range of Motion)    General ROM  no range of motion deficits identified  -AM     Recorded by  [AM] Ulysses Gardner PTA     Bed Mobility, Assessment/Treatment    Bed Mobility, Assistive Device  bed rails;head of bed elevated  -AM     Bed Mobility, Roll Left, [place]  not tested  -AM     Bed Mobility, Roll Right, [place]  not tested  -AM     Bed Mobility, Scoot/Bridge, [place]  not tested  -AM     Bed Mob, Supine to Sit, [place] minimum assist (75% patient effort)  -LM supervision required  -AM     Bed Mob, Sit to Supine, [place]  supervision required  -AM     Bed Mob, Sidelying to Sit, [place]  not tested  -AM     Bed Mob, Sit to Sidelying, [place]  not tested  -AM     Bed Mobility, Safety Issues  decreased use of arms for pushing/pulling;decreased use of legs for bridging/pushing  -AM     Bed Mobility, Impairments  strength decreased;pain  -AM     Recorded by [LM] MARYLIN Novak/MEI [AM] Ulysses aGrdner PTA     Transfer Assessment/Treatment    Transfers, Bed-Chair [place] minimum assist (75% patient effort)  -LM contact guard assist  -AM     Transfers, Chair-Bed [place]  contact guard assist  -AM     Transfers, Bed-Chair-Bed, Assist Device  rolling walker  -AM     Transfers, Sit-Stand [place] minimum assist (75% patient effort)  -LM contact guard assist  -AM     Transfers, Stand-Sit [place] minimum assist (75% patient effort)  -LM contact guard assist  -AM     Transfers, Sit-Stand-Sit, Assist Device  rolling walker  -AM     Toilet Transfer, [place]  not tested  -AM     Walk-In Shower Transfer, [place]  not tested  -AM     Bathtub Transfer, [place]  not tested  -AM     Transfer, Maintain Weight Bearing Status  able to maintain weight bearing status  -AM     Transfer, Safety Issues  step length decreased  -AM     Transfer, Impairments  strength decreased;pain  -AM     Recorded by [LM]  MARYLIN Novak/MEI [AM] Ulysses Gardner PTA     Gait Assessment/Treatment    Gait, [place] Level  contact guard assist  -AM     Gait, Assistive Device  rolling walker  -AM     Gait, Distance (Feet)  300  -AM     Gait, Gait Pattern Analysis  swing-through gait  -AM     Gait, Gait Deviations  bilateral:;[place] decreased  -AM     Gait, Maintain Weight Bearing Status  able to maintain weight bearing status  -AM     Gait, Safety Issues  step length decreased;supplemental O2  -AM     Gait, Impairments  strength decreased;pain  -AM     Recorded by  [AM] Ulysses Gardner PTA     Stairs Assessment/Treatment    Stairs, [place] Level  not tested  -AM     Recorded by  [AM] Ulysses Gardner PTA     Motor Skills/Interventions    Motor Response Observations --   sitting balance chandrika x 10 min eob  -LM      Recorded by [LM] MARYLIN Novak/L      Therapy Exercises    Bilateral Lower Extremities  AROM:;20 reps;supine;ankle pumps/circles;glut sets;heel slides;quad sets;SLR  -AM     Recorded by  [AM] Ulysses Gardner PTA     Positioning and Restraints    Pre-Treatment Position in bed  -LM in bed  -AM     Post Treatment Position --   ortho chair  -LM bed  -AM     In Bed  supine;call light within reach;encouraged to call for assist;with nsg  -AM     Recorded by [LM] MARYLIN Novak/MEI [AM] Ulysses Gardner PTA       User Key  (r) = Recorded By, (t) = Taken By, (c) = Cosigned By    Initials Name Effective Dates    AM Ulysses Gardner PTA 10/17/16 -     SS Fartun Angela PTA 10/17/16 -      SACHI MauricioA/L 10/17/16 -     LM MARYLIN Novak/MEI 10/17/16 -                 OT Goals       06/05/17 1310 06/04/17 1220 06/02/17 1351    Transfer Training OT LTG    Transfer Training OT LTG, Date Established   06/02/17  -RW    Transfer Training OT LTG, Time to Achieve   2 wks  -RW    Transfer Training OT LTG, Activity Type   toilet;walk-in shower  -RW    Transfer Training OT LTG, [place] Level    supervision required  -RW    Transfer Training OT LTG, Date Goal Reviewed 06/05/17  -JH 06/04/17  -LM     Transfer Training OT LTG, Outcome  goal ongoing  -LM     ADL OT LTG    ADL OT LTG, Date Established   06/02/17  -RW    ADL OT LTG, Time to Achieve   2 wks  -RW    ADL OT LTG, Activity Type   ADL skills  -RW    ADL OT LTG, [place] Level   standby assist  -RW    ADL OT LTG, Date Goal Reviewed 06/05/17  -JH 06/04/17  -LM     ADL OT LTG, Outcome  goal ongoing  -LM     Functional Mobility OT LTG    Functional Mobility Goal OT LTG, Date Established   06/02/17  -RW    Functional Mobility Goal OT LTG, Time to Achieve   2 wks  -RW    Functional Mobility Goal OT LTG, [place] Level   supervision  -RW    Functional Mobility Goal OT LTG, Distance to Achieve   to the bathroom  -RW    Functional Mobility Goal OT LTG, Date Goal Reviewed 06/05/17  -JH 06/04/17  -LM     Functional Mobility Goal OT LTG, Outcome  goal ongoing  -LM       User Key  (r) = Recorded By, (t) = Taken By, (c) = Cosigned By    Initials Name Provider Type    RW Nicky Blanchard OTR/L Occupational Therapist     Abril Pugh COULTER/L Occupational Therapy Assistant     Ese Dodson COULTER/L Occupational Therapy Assistant          Occupational Therapy Education     Title: PT OT SLP Therapies (Active)     Topic: Occupational Therapy (Done)     Point: ADL training (Done)    Description: Instruct learner(s) on proper safety adaptation and remediation techniques during self care or transfers.   Instruct in proper use of assistive devices.    Learning Progress Summary    Learner Readiness Method Response Comment Documented by Status   Patient Acceptance E VU  JH 06/05/17 1508 Done    Acceptance E VU  LM 06/04/17 1221 Done    Acceptance E NR fall precautions, transfer safety, ADL safety RW 06/02/17 1349 Active               Point: Home exercise program (Done)    Description: Instruct learner(s) on appropriate technique for monitoring, assisting  and/or progressing therapeutic exercises/activities.    Learning Progress Summary    Learner Readiness Method Response Comment Documented by Status   Patient Acceptance E VU   06/05/17 1508 Done    Acceptance E VU   06/04/17 1221 Done               Point: Precautions (Done)    Description: Instruct learner(s) on prescribed precautions during self-care and functional transfers.    Learning Progress Summary    Learner Readiness Method Response Comment Documented by Status   Patient Acceptance E [facility] 06/05/17 1508 Done    Acceptance E VU   06/04/17 1221 Done    Acceptance E NR fall precautions, transfer safety, ADL safety  06/02/17 1349 Active               Point: Body mechanics (Done)    Description: Instruct learner(s) on proper positioning and spine alignment during self-care, functional mobility activities and/or exercises.    Learning Progress Summary    Learner Readiness Method Response Comment Documented by Status   Patient Acceptance E [facility] 06/05/17 1508 Done    Acceptance E VU   06/04/17 1221 Done                      User Key     Initials Effective Dates Name Provider Type Discipline     10/17/16 -  Nicky Blanchard OTR/L Occupational Therapist OT     10/17/16 -  Abril Pugh COULTER/L Occupational Therapy Assistant OT     10/17/16 -  Ese Dodson COULTER/L Occupational Therapy Assistant OT                  OT Recommendation and Plan  Anticipated Discharge Disposition: home with /7 care, home with home health  Planned Therapy Interventions: activity intolerance, adaptive equipment training, ADL retraining, balance training, bed mobility training, energy conservation, home exercise program, strengthening, transfer training  Therapy Frequency: other (see comments) (3-14 times/wk)  Plan of Care Review  Outcome Summary/Follow up Plan: Pt tolerated OT well, making steady progress toward goals, advise pt to obtain reacher /hip kit , hh shower head and shower chair         Outcome  Measures       06/05/17 1310 06/05/17 0850 06/04/17 1330    How much help from another person do you currently need...    Turning from your back to your side while in flat bed without using bedrails?  3  -SS 3  -AM    Moving from lying on back to sitting on the side of a flat bed without bedrails?  3  -SS 3  -AM    Moving to and from a bed to a chair (including a wheelchair)?  3  -SS 3  -AM    Standing up from a chair using your arms (e.g., wheelchair, bedside chair)?  3  -SS 3  -AM    Climbing 3-5 steps with a railing?  3  -SS 1  -AM    To walk in hospital room?  3  -SS 3  -AM    AM-PAC 6 Clicks Score  18  -SS 16  -AM    How much help from another is currently needed...    Putting on and taking off regular lower body clothing? 2  -JH      Bathing (including washing, rinsing, and drying) 3  -JH      Toileting (which includes using toilet bed pan or urinal) 3  -JH      Putting on and taking off regular upper body clothing 3  -JH      Taking care of personal grooming (such as brushing teeth) 3  -JH      Eating meals 4  -JH      Score 18  -JH      Functional Assessment    Outcome Measure Options  AM-PAC 6 Clicks Basic Mobility (PT)  -SS AM-PAC 6 Clicks Basic Mobility (PT)  -AM      06/04/17 1200 06/03/17 0855       How much help from another person do you currently need...    Turning from your back to your side while in flat bed without using bedrails?  3  -AM     Moving from lying on back to sitting on the side of a flat bed without bedrails?  3  -AM     Moving to and from a bed to a chair (including a wheelchair)?  3  -AM     Standing up from a chair using your arms (e.g., wheelchair, bedside chair)?  3  -AM     Climbing 3-5 steps with a railing?  1  -AM     To walk in hospital room?  3  -AM     AM-PAC 6 Clicks Score  16  -AM     How much help from another is currently needed...    Putting on and taking off regular lower body clothing? 2  -LM      Bathing (including washing, rinsing, and drying) 3  -LM      Toileting  (which includes using toilet bed pan or urinal) 3  -LM      Putting on and taking off regular upper body clothing 3  -LM      Taking care of personal grooming (such as brushing teeth) 3  -LM      Eating meals 4  -LM      Score 18  -LM      Functional Assessment    Outcome Measure Options  AM-PAC 6 Clicks Basic Mobility (PT)  -AM       User Key  (r) = Recorded By, (t) = Taken By, (c) = Cosigned By    Initials Name Provider Type    AM Ulysses Gardner, PTA Physical Therapy Assistant     Fartun Angela, KAREN Physical Therapy Assistant     MARYLIN Mauricio/L Occupational Therapy Assistant     MARYLIN Novak/L Occupational Therapy Assistant           Time Calculation:         Time Calculation- OT       06/05/17 1513          Time Calculation-     OT Start Time 1310  -      OT Stop Time 1340  -      OT Time Calculation (min) 30 min  -      Total Timed Code Minutes- OT 40 minute(s)  -      OT Received On 06/05/17  -        User Key  (r) = Recorded By, (t) = Taken By, (c) = Cosigned By    Initials Name Provider Type     JOLIE Mauricio Occupational Therapy Assistant           Therapy Charges for Today     Code Description Service Date Service Provider Modifiers Qty    62218347713 HC OT SELF CARE/MGMT/TRAIN EA 15 MIN 6/5/2017 JOLIE Mauricio GO 2    26667657145 HC OT THER PROC EA 15 MIN 6/5/2017 JOLIE Mauricio GO 1          OT G-codes  OT Professional Judgement Used?: Yes  OT Functional Scales Options: AM-PAC 6 Clicks Daily Activity (OT)  Score: 18  Functional Limitation: Self care  Self Care Current Status (): At least 40 percent but less than 60 percent impaired, limited or restricted  Self Care Goal Status (): At least 20 percent but less than 40 percent impaired, limited or restricted    JOLIE Mauricio  6/5/2017     Electronically signed by JOLIE Mauricio at 6/5/2017  3:15 PM

## 2017-06-06 NOTE — PLAN OF CARE
Problem: Patient Care Overview (Adult)  Goal: Plan of Care Review  Outcome: Ongoing (interventions implemented as appropriate)    06/06/17 1325   Coping/Psychosocial Response Interventions   Plan Of Care Reviewed With patient   Patient Care Overview   Progress progress toward functional goals as expected   Outcome Evaluation   Outcome Summary/Follow up Plan Pt met 1 PT goals this tx. Pt amb 700 ft w/RW SBA. Pt would benefit from HH PT Once discharged from this facility.        Goal: Discharge Needs Assessment  Outcome: Ongoing (interventions implemented as appropriate)    06/06/17 1325   Discharge Needs Assessment   Concerns To Be Addressed discharge planning concerns   Readmission Within The Last 30 Days no previous admission in last 30 days   Equipment Needed After Discharge walker, rolling   Discharge Facility/Level Of Care Needs home with home health   Current Discharge Risk physical impairment   Current Health   Anticipated Changes Related to Illness inability to care for self   Self-Care   Equipment Currently Used at Home cane, quad;cane, straight;walker, standard   Living Environment   Transportation Available family or friend will provide         Problem: Inpatient Physical Therapy  Goal: Transfer Training Goal 1 LTG- PT  Outcome: Ongoing (interventions implemented as appropriate)    06/01/17 1108 06/06/17 1325   Transfer Training PT LTG   Transfer Training PT LTG, Date Established 06/01/17 --    Transfer Training PT LTG, Time to Achieve by discharge --    Transfer Training PT LTG, [place] Level independent --    Transfer Training PT LTG, Date Goal Reviewed --  06/06/17   Transfer Training PT LTG, Outcome --  goal not met       Goal: Gait Training Goal LTG- PT  Outcome: Ongoing (interventions implemented as appropriate)    06/01/17 1108 06/06/17 1325   Gait Training PT LTG   Gait Training Goal PT LTG, Date Established 06/01/17 --    Gait Training Goal PT LTG, Time to Achieve by discharge --    Gait  Training Goal PT LTG, [place] Level independent --    Gait Training Goal PT LTG, Distance to Achieve 1000ft --    Gait Training Goal PT LTG, Additional Goal Pt will complete 6 minute walk test --    Gait Training Goal PT LTG, Date Goal Reviewed --  06/06/17   Gait Training Goal PT LTG, Outcome --  goal not met       Goal: Stair Training Goal LTG- PT  Outcome: Ongoing (interventions implemented as appropriate)    06/01/17 1108 06/06/17 1325   Stair Training PT LTG   Stair Training Goal PT LTG, Date Established 06/01/17 --    Stair Training Goal PT LTG, Time to Achieve by discharge --    Stair Training Goal PT LTG, Number of Steps 5 --    Stair Training Goal PT LTG, [place] Level conditional independence --    Stair Training Goal PT LTG, Assist Device 1 handrail --    Stair Training Goal PT LTG, Date Goal Reviewed --  06/06/17   Stair Training Goal PT LTG, Outcome --  goal not met       Goal: Patient Education Goal LTG- PT  Outcome: Outcome(s) achieved Date Met:  06/06/17 06/01/17 1108 06/06/17 1325   Patient Education PT LTG   Patient Education PT LTG, Date Established 06/01/17 --    Patient Education PT LTG, Time to Achieve by discharge --    Patient Education PT LTG, Education Type gait;transfers;home safety --    Patient Education PT LTG, Date Goal Reviewed --  06/06/17   Patient Education PT LTG Outcome --  goal met

## 2017-06-06 NOTE — MEDICAL STUDENT
Rui Shaver MD [facility]  General Surgery    6/6/2017    Chief Complaint:     Subjective      LOS: 6 days      Patient is 56 y.o. male presented with ventral hernia and intermittent obstruction diagnosed clinically is POD#6 for OPEN ADHESIOLYSIS AND VENTRAL/INCISIONAL HERNIA REPAIR WITH PHASIX MESH.    Still sharp pain in RUQ intermittent in nature. Pain controlled on medication. Ambulating well. Catheter still in. Has had a bowel movement and flatus and non bloody. Some nausea no vomiting. No calf pain. No problem breathing or palpitations.           Current Medications:     Current Facility-Administered Medications   Medication Dose Route Frequency Provider Last Rate Last Dose   • atorvastatin (LIPITOR) tablet 20 mg  20 mg Oral Nightly Rui Shaver MD   20 mg at 06/05/17 2106   • bisacodyl (DULCOLAX) suppository 10 mg  10 mg Rectal Daily Rui Shaver MD   10 mg at 06/05/17 0935   • bupivacaine-EPINEPHrine PF (MARCAINE w/EPI) 0.5% -1:[id number] injection    PRN Rui Shaver MD   30 mL at 05/31/17 1525   • dextrose (D50W) solution 25 g  25 g Intravenous Q15 Min PRN Rui Shaver MD       • dextrose (GLUTOSE) oral gel 15 g  15 g Oral Q15 Min PRN Rui Shaver MD       • enoxaparin (LOVENOX) syringe 40 mg  40 mg Subcutaneous Daily Rui Shaver MD   40 mg at 06/05/17 0842   • glucagon (human recombinant) (GLUCAGEN DIAGNOSTIC) injection 1 mg  1 mg Subcutaneous Q15 Min PRN Rui Shaver MD       • glucagon (human recombinant) (GLUCAGEN DIAGNOSTIC) injection 1 mg  1 mg Subcutaneous Q15 Min PRN Rui Shaver MD       • HYDROmorphone (DILAUDID) injection 1 mg  1 mg Intravenous Q3H PRN Rui Shaver MD   1 mg at 06/04/17 2124   • HYDROmorphone (DILAUDID) injection 1.5 mg  1.5 mg Intravenous Q3H PRN Rui Shaver MD   1.5 mg at 06/05/17 2340   • HYDROmorphone (DILAUDID) PCA 0.2 mg/mL 30 mL syringe   Intravenous Continuous Rui Shaver MD       • insulin aspart (novoLOG) injection 0-14 Units  0-14 Units Subcutaneous 4x Daily AC & at Bedtime  Rui Shaver MD   3 Units at 06/05/17 1712   • labetalol (NORMODYNE,TRANDATE) injection 20 mg  20 mg Intravenous Q6H PRN Rui Shaver MD   20 mg at 06/01/17 1650   • lactated ringers infusion  100 mL/hr Intravenous Continuous Rui Shaver  mL/hr at 06/06/17 0001 100 mL/hr at 06/06/17 0001   • LIDOCAINE 1/6% WITH EPINEPHRINE SOLUTION    PRN Rui Shaver MD   150 mL at 05/31/17 1525   • mupirocin (BACTROBAN) 2 % ointment   Topical Q8H Rui Shaver MD       • naloxone (NARCAN) injection 0.1 mg  0.1 mg Intravenous Q5 Min PRN Rui Shaver MD       • ondansetron (ZOFRAN) injection 4 mg  4 mg Intravenous Q6H PRN Rui Shaver MD   4 mg at 06/05/17 2313   • pantoprazole (PROTONIX) injection 40 mg  40 mg Intravenous Q AM Rui Shaver MD   40 mg at 06/05/17 0553   • tamsulosin (FLOMAX) 24 hr capsule 0.4 mg  0.4 mg Oral Daily Cydney GIA Pena   0.4 mg at 06/05/17 1213       Objective     Physical Exam:   Temp:  [96.4 °F (35.8 °C)-98.8 °F (37.1 °C)] 98.4 °F (36.9 °C)  Heart Rate:  [106-122] 122  Resp:  [18] 18  BP: (130-146)/(62-86) 134/86     Intake/Output       06/05/17 0700 - 06/06/17 0659    Intake (ml) 30    Output (ml) 1936    Net (ml) -1906          Physical Exam      Labs:  Lab Results (last 24 hours)     Procedure Component Value Units Date/Time    Basic Metabolic Panel [[id number]]  (Abnormal) Collected:  06/05/17 0548    Specimen:  Blood Updated:  06/05/17 0640     Glucose 154 (H) mg/dL      BUN 12 mg/dL      Creatinine 0.63 (L) mg/dL      Sodium 134 (L) mmol/L      Potassium 4.1 mmol/L      Chloride 95 mmol/L      CO2 28.0 mmol/L      Calcium 8.2 (L) mg/dL      eGFR Non African Amer 133 mL/min/1.73      BUN/Creatinine Ratio 19.0     Anion Gap 11.0 mmol/L     POC Glucose Fingerstick [[id number]]  (Abnormal) Collected:  06/05/17 0642    Specimen:  Blood Updated:  06/05/17 0700     Glucose 155 (H) mg/dL       RN NotifiedMeter: [id number]: [id number] CY NUNEZ       Tissue Exam [[id number]] Collected:   "05/31/17 1924    Specimen:  Tissue from Hernia, Sac Updated:  06/05/17 0949     Case Report --     Surgical Pathology Report                         Case: [id number]                                  Authorizing Provider:  Rui Shaver MD            Collected:           05/31/2017 07:24 PM          Ordering Location:     [facility]             Received:            06/01/2017 08:28 AM                                 [place]                                                              Pathologist:           Daniel Powell MD                                                            Specimen:    Hernia, Sac, Hernia sac and adominal debridement                                            Final Diagnosis --     SKIN AND SOFT TISSUE, ABDOMINAL WALL:  HERNIA SAC.  SKIN, CONNECTIVE TISSUE, AND SKELETAL MUSCLE FROM ABDOMINAL WALL.       Gross Description --     The specimen is labeled \"hernia sac and abdominal debridement\".  Multiple fragments of skin and soft tissue measure 14.0 x 15.0 x 3.0 cm together.  The skin surface measures 13.0 x 3.5 cm.  A well healed scar measures 12.5 cm in length.  Skeletal muscle is included with the specimen.  Sections are submitted in three blocks.        Embedded Images --    POC Glucose Fingerstick [[id number]]  (Abnormal) Collected:  06/05/17 1138    Specimen:  Blood Updated:  06/05/17 1150     Glucose 137 (H) mg/dL       Meter: [id number]: [id number] DARYN KING       POC Glucose Fingerstick [[id number]]  (Abnormal) Collected:  06/05/17 1711    Specimen:  Blood Updated:  06/05/17 1741     Glucose 157 (H) mg/dL       Sliding Scale AdminMeter: [id number]: [id number] DARYN SAENZ       POC Glucose Fingerstick [[id number]]  (Abnormal) Collected:  06/05/17 2108    Specimen:  Blood Updated:  06/05/17 2140     Glucose 148 (H) mg/dL       Meter: [id number]: [id number] ELLEN JENNINGS             Images:   Imaging Results (last 24 hours)     ** No " results found for the last 24 hours. **          (I reviewed the patient's results and images.)                                                                                    ASSESSMENT/PLAN  POD#6 for OPEN ADHESIOLYSIS AND VENTRAL/INCISIONAL HERNIA REPAIR WITH PHASIX MESH.        Kirt Villarreal, Medical Student  1.tachycardia mid 120s  2.full liquid diet since resumption of bowel function  2.dvt prophylaxis-lovenox  3.arevalo in place- remove today for trial of spontaneous void per urology consult   4.pain controlled-begin deescalating   5.continue ambulating  6 incentive spirometry  7. Dulcolax-continue for today        Kirt Villarreal, Medical Student

## 2017-06-06 NOTE — THERAPY TREATMENT NOTE
Acute Care - Occupational Therapy Treatment Note  [facility]     Patient Name: Ventura Boggs  : 1960  MRN: [id number]  Today's Date: 2017  Onset of Illness/Injury or Date of Surgery Date: 17  Date of Referral to OT: 17  Referring Physician: Dr. Shaver      Admit Date: 2017    Visit Dx:     ICD-10-CM ICD-9-CM   1. Ventral hernia without obstruction or gangrene K43.9 553.20   2. Impaired physical mobility Z74.09 781.99   3. Impaired mobility and ADLs Z74.09 799.89     Patient Active Problem List   Diagnosis   • Incisional hernia, without obstruction or gangrene   • Controlled type 2 diabetes mellitus without complication, with long-term current use of insulin   • Ventral hernia without obstruction or gangrene   • Postoperative urinary retention             Adult Rehabilitation Note       17 1040 17 1310 17 0850    Rehab Assessment/Intervention    Discipline occupational therapy assistant  -CS occupational therapy assistant  -JH physical therapy assistant  -SS    Document Type therapy note (daily note)  -CS therapy note (daily note)  - therapy note (daily note)  -    Subjective Information agree to therapy  -CS agree to therapy  - agree to therapy;complains of;pain  -SS    Patient Effort, Rehab Treatment  good  - good  -    Symptoms Noted During/After Treatment   none  -SS    Precautions/Limitations   oxygen therapy device and L/min;other (see comments)   contact preautions, gait belt placement  -SS    Recorded by [CS] MARYLIN Rosado/MEI [] JOLIE Mauricio [] Fartun Angela, KAREN    Vital Signs    Pre Systolic BP Rehab   167  -SS    Pre Treatment Diastolic BP   112   RN made aware  -SS    Post Systolic BP Rehab   164  -SS    Post Treatment Diastolic BP   104  -SS    Pretreatment Heart Rate (beats/min)   122  -SS    Intratreatment Heart Rate (beats/min)   125  -SS    Posttreatment Heart Rate (beats/min) 113  -CS  1200  -SS    Pre SpO2 (%)   96  -JH 98  -SS    O2 Delivery Pre Treatment  room air   pt's NC off upon entering pt room,sats stable but pt reapply  -JH supplemental O2  -SS    Intra SpO2 (%)   97  -SS    O2 Delivery Intra Treatment   supplemental O2  -SS    Post SpO2 (%) 94  -CS  99  -SS    O2 Delivery Post Treatment room air  -CS  supplemental O2  -SS    Pre Patient Position Supine  -CS  Supine  -SS    Intra Patient Position Sitting  -CS  Standing  -SS    Post Patient Position Sitting  -CS  Sitting  -SS    Recorded by [CS] MARYLIN Rosado/MEI [] MARYLIN Mauricio/MEI [SS] Fartun Angela PTA    Pain Assessment    Pain Assessment 0-10  -CS No/denies pain  - 0-10  -SS    Pain Score 7  -CS  7  -SS    Post Pain Score 7  -CS  6  -SS    Pain Type Surgical pain  -CS  Acute pain;Surgical pain  -SS    Pain Location Abdomen  -CS  Abdomen  -SS    Pain Orientation   Mid  -SS    Patient's Stated Pain Goal   No pain  -SS    Pain Intervention(s)   Medication (See MAR);Ambulation/increased activity  -SS    Recorded by [CS] MARYLIN Rosado/MEI [] MARYLIN Mauricio/MEI [SS] Fartun Angela PTA    Cognitive Assessment/Intervention    Current Cognitive/Communication Assessment functional  -CS functional  -JH functional  -SS    Orientation Status oriented x 4  -CS oriented x 4  -JH oriented x 4  -SS    Follows Commands/Answers Questions 100% of the time  -% of the time  -% of the time  -SS    Recorded by [CS] JOLIE Rosado [JH] MARYLIN Mauricio/MEI [] Fartun Angela PTA    Bed Mobility, Assessment/Treatment    Bed Mobility, Assistive Device   bed rails;head of bed elevated  -SS    Bed Mobility, Roll Left, [place]   not tested  -SS    Bed Mobility, Roll Right, [place]   not tested  -SS    Bed Mobility, Scoot/Bridge, [place]   not tested  -SS    Bed Mob, Supine to Sit, [place]   supervision required  -SS    Bed Mob, Sit to Supine, [place]   not tested  -SS    Bed Mobility, Impairments    pain  -SS    Recorded by   [SS] Fartun Angela PTA    Transfer Assessment/Treatment    Transfers, Bed-Chair [place]   stand by assist  -SS    Transfers, Chair-Bed [place]   stand by assist  -SS    Transfers, Bed-Chair-Bed, Assist Device   rolling walker  -SS    Transfers, Sit-Stand [place]   stand by assist  -SS    Transfers, Stand-Sit [place]   stand by assist  -SS    Transfers, Sit-Stand-Sit, Assist Device   rolling walker  -SS    Transfer, Safety Issues   step length decreased  -SS    Transfer, Impairments   pain  -SS    Transfer, Comment   pt able to static stand ~ 2 min with UE support and without LOB  -SS    Recorded by   [SS] Fartun Angela PTA    Gait Assessment/Treatment    Gait, [place] Level   stand by assist  -SS    Gait, Assistive Device   rolling walker  -SS    Gait, Distance (Feet)   350  -SS    Gait, Gait Deviations   bilateral:;car[place] decreased  -SS    Gait, Safety Issues   supplemental O2;step length decreased  -SS    Gait, Impairments   pain  -SS    Recorded by   [SS] Fartun Angela PTA    Stairs Assessment/Treatment    Number of Stairs   4  -SS    Stairs, Handrail Location   other (see comments)   Both rails used when ascending, left side used for descendin  -SS    Stairs, [place] Level   contact guard assist;supervision required  -SS    Stairs, Comment   pt steady throughout step training, no LOB occured  -SS    Recorded by   [SS] Fartun Angela PTA    Grooming Assessment/Training    Grooming Assess/Train, Position long sitting  -CS      Grooming Assess/Train, Indepen Level set up required  -CS      Grooming Assess/Train, Comment oral care, wash face/hands, comb hair  -CS      Recorded by [CS] MARYLIN Rosado/L      Therapy Exercises    Bilateral Upper Extremity AROM:;20 reps;sitting;elbow flexion/extension;hand pumps;pronation/supination;shoulder extension/flexion;shoulder ER/IR  -CS AROM:;20 reps;25 reps;supine;elbow flexion/extension;hand  pumps;pronation/supination;shoulder abduction/adduction;shoulder extension/flexion;shoulder ER/IR  -JH     BUE Resistance manual resistance- minimal  -CS manual resistance- minimal  -JH     Recorded by [CS] JOLIE Rosado [JH] JOLIE Mauricio     Positioning and Restraints    Pre-Treatment Position in bed  -CS in bed  -JH in bed  -SS    Post Treatment Position bed  -CS chair  -JH chair  -SS    In Bed sitting;call light within reach  -CS supine;call light within reach;encouraged to call for assist  -JH     In Chair   notified nsg;reclined;call light within reach;encouraged to call for assist  -SS    Recorded by [CS] JOLIE Rosado [JH] JOLIE Mauricio [SS] Fartun Angela PTA      06/04/17 1330 06/04/17 1045       Rehab Assessment/Intervention    Discipline physical therapy assistant  -AM occupational therapy assistant  -LM     Document Type therapy note (daily note)  -AM therapy note (daily note)  -LM     Subjective Information agree to therapy;complains of;pain  -AM agree to therapy  -LM     Patient Effort, Rehab Treatment good  -AM      Symptoms Noted During/After Treatment fatigue;increased pain  -AM      Precautions/Limitations oxygen therapy device and L/min  -AM      Equipment Issued to Patient gait belt  -AM      Recorded by [AM] Ulysses Gardner PTA [LM] MARYLIN Novak/L     Vital Signs    Pre Systolic BP Rehab 138  -  -LM     Pre Treatment Diastolic BP 88  -AM 90  -LM     Post Systolic BP Rehab 140  -AM      Post Treatment Diastolic BP 88  -AM      Pretreatment Heart Rate (beats/min) 112  -  -LM     Posttreatment Heart Rate (beats/min) 115  -  -LM     Pre SpO2 (%) 94  -AM 94  -LM     O2 Delivery Pre Treatment supplemental O2  -AM      Intra SpO2 (%)  95  -LM     Post SpO2 (%) 95  -AM      O2 Delivery Post Treatment supplemental O2  -AM      Recorded by [AM] Ulysses Gardner PTA [LM] MARYLIN Novak/L     Pain Assessment    Pain  Assessment 0-10  -AM --  -LM     Pain Score 6  -AM 8  -LM     Post Pain Score 8  -AM 7  -LM     Pain Type Acute pain;Surgical pain  -AM      Pain Location Abdomen  -AM      Pain Orientation Mid  -AM      Pain Descriptors Sore  -AM      Pain Frequency Constant/continuous  -AM      Date Pain First Started 05/31/17  -AM      Clinical Progression Not changed  -AM      Patient's Stated Pain Goal No pain  -AM      Pain Intervention(s) Medication (See MAR);Ambulation/increased activity  -AM      Result of Injury No  -AM      Work-Related Injury No  -AM      Multiple Pain Sites No  -AM      Recorded by [AM] Ulysses Gardner PTA [LM] MARYLIN Novak/L     Cognitive Assessment/Intervention    Current Cognitive/Communication Assessment functional  -AM functional  -LM     Orientation Status oriented x 4  -AM oriented x 4  -LM     Follows Commands/Answers Questions 100% of the time  -% of the time  -LM     Personal Safety Interventions gait belt;nonskid shoes/slippers when out of bed;supervised activity  -AM      Recorded by [AM] Ulysses Gardner PTA [LM] SACHI NovakA/L     ROM (Range of Motion)    General ROM no range of motion deficits identified  -AM      Recorded by [AM] Ulysses Gardner PTA      Bed Mobility, Assessment/Treatment    Bed Mobility, Assistive Device bed rails;head of bed elevated  -AM      Bed Mobility, Roll Left, [place] not tested  -AM      Bed Mobility, Roll Right, [place] not tested  -AM      Bed Mobility, Scoot/Bridge, [place] not tested  -AM      Bed Mob, Supine to Sit, [place] supervision required  -AM minimum assist (75% patient effort)  -LM     Bed Mob, Sit to Supine, [place] supervision required  -AM      Bed Mob, Sidelying to Sit, [place] not tested  -AM      Bed Mob, Sit to Sidelying, [place] not tested  -AM      Bed Mobility, Safety Issues decreased use of arms for pushing/pulling;decreased use of legs for bridging/pushing  -AM      Bed  Mobility, Impairments strength decreased;pain  -AM      Recorded by [AM] Ulysses Gardner PTA [LM] MARYLIN Novak/L     Transfer Assessment/Treatment    Transfers, Bed-Chair [place] contact guard assist  -AM minimum assist (75% patient effort)  -LM     Transfers, Chair-Bed [place] contact guard assist  -AM      Transfers, Bed-Chair-Bed, Assist Device rolling walker  -AM      Transfers, Sit-Stand [place] contact guard assist  -AM minimum assist (75% patient effort)  -LM     Transfers, Stand-Sit [place] contact guard assist  -AM minimum assist (75% patient effort)  -LM     Transfers, Sit-Stand-Sit, Assist Device rolling walker  -AM      Toilet Transfer, [place] not tested  -AM      Walk-In Shower Transfer, [place] not tested  -AM      Bathtub Transfer, [place] not tested  -AM      Transfer, Maintain Weight Bearing Status able to maintain weight bearing status  -AM      Transfer, Safety Issues step length decreased  -AM      Transfer, Impairments strength decreased;pain  -AM      Recorded by [AM] Ulysses Gardner PTA [LM] MARYLIN Novak/L     Gait Assessment/Treatment    Gait, [place] Level contact guard assist  -AM      Gait, Assistive Device rolling walker  -AM      Gait, Distance (Feet) 400  -AM      Gait, Gait Pattern Analysis swing-through gait  -AM      Gait, Gait Deviations bilateral:;car[place] decreased  -AM      Gait, Maintain Weight Bearing Status able to maintain weight bearing status  -AM      Gait, Safety Issues step length decreased;supplemental O2  -AM      Gait, Impairments strength decreased;pain  -AM      Recorded by [AM] Ulysses Gardner PTA      Stairs Assessment/Treatment    Stairs, [place] Level not tested  -AM      Recorded by [AM] Ulysses Gardner PTA      Motor Skills/Interventions    Motor Response Observations  --   sitting balance chandrika x 10 min eob  -LM     Recorded by  [LM] MARYLIN Novak/L     Therapy Exercises     Bilateral Lower Extremities --  -AM      Recorded by [AM] Ulysses Gardner PTA      Positioning and Restraints    Pre-Treatment Position in bed  -AM in bed  -LM     Post Treatment Position bed  -AM --   ortho chair  -LM     In Bed supine;call light within reach;encouraged to call for assist;exit alarm on  -AM      Recorded by [AM] Ulysses Gardner PTA [LM] Ese Dodson, COULTER/L       User Key  (r) = Recorded By, (t) = Taken By, (c) = Cosigned By    Initials Name Effective Dates    AM Ulysses Gardner, PTA 10/17/16 -     SS Fartun Angela, PTA 10/17/16 -     JH Abril Pugh, COULTER/L 10/17/16 -     LM Ese Dodson, COULTER/L 10/17/16 -     CS Divya Ash, COULTER/L 10/17/16 -                 OT Goals       06/06/17 1139 06/05/17 1310 06/04/17 1220    Transfer Training OT LTG    Transfer Training OT LTG, Date Goal Reviewed 06/06/17  -CS 06/05/17  - 06/04/17  -LM    Transfer Training OT LTG, Outcome   goal ongoing  -LM    ADL OT LTG    ADL OT LTG, Date Goal Reviewed 06/06/17  -CS 06/05/17  - 06/04/17  -LM    ADL OT LTG, Outcome   goal ongoing  -LM    Functional Mobility OT LTG    Functional Mobility Goal OT LTG, Date Goal Reviewed 06/06/17  -CS 06/05/17  - 06/04/17  -LM    Functional Mobility Goal OT LTG, Outcome   goal ongoing  -LM      06/02/17 1351          Transfer Training OT LTG    Transfer Training OT LTG, Date Established 06/02/17  -RW      Transfer Training OT LTG, Time to Achieve 2 wks  -RW      Transfer Training OT LTG, Activity Type toilet;walk-in shower  -RW      Transfer Training OT LTG, [place] Level supervision required  -RW      ADL OT LTG    ADL OT LTG, Date Established 06/02/17  -RW      ADL OT LTG, Time to Achieve 2 wks  -RW      ADL OT LTG, Activity Type ADL skills  -RW      ADL OT LTG, [place] Level standby assist  -RW      Functional Mobility OT LTG    Functional Mobility Goal OT LTG, Date Established 06/02/17  -RW      Functional Mobility Goal OT LTG, Time to Achieve 2  wks  -RW      Functional Mobility Goal OT LTG, [place] Level supervision  -RW      Functional Mobility Goal OT LTG, Distance to Achieve to the bathroom  -RW        User Key  (r) = Recorded By, (t) = Taken By, (c) = Cosigned By    Initials Name Provider Type    RW Nicky Blanchard, OTR/L Occupational Therapist     Abril Pugh, COULTER/L Occupational Therapy Assistant     Ese Dodson, COULTER/L Occupational Therapy Assistant     Divya Ash, COULTER/L Occupational Therapy Assistant          Occupational Therapy Education     Title: PT OT SLP Therapies (Active)     Topic: Occupational Therapy (Active)     Point: ADL training (Active)    Description: Instruct learner(s) on proper safety adaptation and remediation techniques during self care or transfers.   Instruct in proper use of assistive devices.    Learning Progress Summary    Learner Readiness Method Response Comment Documented by Status   Patient Acceptance E,TB,D NR   06/06/17 1139 Active    Acceptance E [facility] 06/05/17 1508 Done    Acceptance E [facility] 06/04/17 1221 Done    Acceptance E NR fall precautions, transfer safety, ADL safety  06/02/17 1349 Active               Point: Home exercise program (Active)    Description: Instruct learner(s) on appropriate technique for monitoring, assisting and/or progressing therapeutic exercises/activities.    Learning Progress Summary    Learner Readiness Method Response Comment Documented by Status   Patient Acceptance E,TB,D NR   06/06/17 1139 Active    Acceptance E [facility] 06/05/17 1508 Done    Acceptance E [facility] 06/04/17 1221 Done               Point: Precautions (Active)    Description: Instruct learner(s) on prescribed precautions during self-care and functional transfers.    Learning Progress Summary    Learner Readiness Method Response Comment Documented by Status   Patient Acceptance E,TB,D NR   06/06/17 1139 Active    Acceptance E [facility] 06/05/17 1508 Done    Acceptance E [facility] 06/04/17  1221 Done    Acceptance E NR fall precautions, transfer safety, ADL safety RW 06/02/17 1349 Active               Point: Body mechanics (Active)    Description: Instruct learner(s) on proper positioning and spine alignment during self-care, functional mobility activities and/or exercises.    Learning Progress Summary    Learner Readiness Method Response Comment Documented by Status   Patient Acceptance E,TB,D NR  CS 06/06/17 1139 Active    Acceptance E VU   06/05/17 1508 Done    Acceptance E VU   06/04/17 1221 Done                      User Key     Initials Effective Dates Name Provider Type Discipline     10/17/16 -  Nicky Blanchard OTVARUN/L Occupational Therapist OT     10/17/16 -  MARYLIN Mauricio/L Occupational Therapy Assistant OT     10/17/16 -  MARYLIN Novak/L Occupational Therapy Assistant OT     10/17/16 -  MARYLIN Rosado/MEI Occupational Therapy Assistant OT                  OT Recommendation and Plan  Anticipated Discharge Disposition: home with 24/7 care, home with home health  Planned Therapy Interventions: activity intolerance, adaptive equipment training, ADL retraining, balance training, bed mobility training, energy conservation, home exercise program, strengthening, transfer training  Therapy Frequency: other (see comments) (3-14 times/wk)  Plan of Care Review  Plan Of Care Reviewed With: patient  Progress: improving  Outcome Summary/Follow up Plan: Pt tolerated tx well this date. Pt completed an grooming task. Pt gave good effort with UE strengthening. No goals met this tx. Continue POC.        Outcome Measures       06/06/17 1100 06/05/17 1310 06/05/17 0850    How much help from another person do you currently need...    Turning from your back to your side while in flat bed without using bedrails?   3  -SS    Moving from lying on back to sitting on the side of a flat bed without bedrails?   3  -SS    Moving to and from a bed to a chair (including a wheelchair)?   3   -SS    Standing up from a chair using your arms (e.g., wheelchair, bedside chair)?   3  -SS    Climbing 3-5 steps with a railing?   3  -SS    To walk in hospital room?   3  -SS    AM-PAC 6 Clicks Score   18  -SS    How much help from another is currently needed...    Putting on and taking off regular lower body clothing? 2  -CS 2  -JH     Bathing (including washing, rinsing, and drying) 3  -CS 3  -JH     Toileting (which includes using toilet bed pan or urinal) 3  -CS 3  -JH     Putting on and taking off regular upper body clothing 3  -CS 3  -JH     Taking care of personal grooming (such as brushing teeth) 3  -CS 3  -JH     Eating meals 4  -CS 4  -JH     Score 18  -CS 18  -JH     Functional Assessment    Outcome Measure Options AM-PAC 6 Clicks Daily Activity (OT)  -CS  AM-PAC 6 Clicks Basic Mobility (PT)  -SS      06/04/17 1330 06/04/17 1200       How much help from another person do you currently need...    Turning from your back to your side while in flat bed without using bedrails? 3  -AM      Moving from lying on back to sitting on the side of a flat bed without bedrails? 3  -AM      Moving to and from a bed to a chair (including a wheelchair)? 3  -AM      Standing up from a chair using your arms (e.g., wheelchair, bedside chair)? 3  -AM      Climbing 3-5 steps with a railing? 1  -AM      To walk in hospital room? 3  -AM      AM-PAC 6 Clicks Score 16  -AM      How much help from another is currently needed...    Putting on and taking off regular lower body clothing?  2  -LM     Bathing (including washing, rinsing, and drying)  3  -LM     Toileting (which includes using toilet bed pan or urinal)  3  -LM     Putting on and taking off regular upper body clothing  3  -LM     Taking care of personal grooming (such as brushing teeth)  3  -LM     Eating meals  4  -LM     Score  18  -LM     Functional Assessment    Outcome Measure Options AM-PAC 6 Clicks Basic Mobility (PT)  -AM        User Key  (r) = Recorded By, (t)  = Taken By, (c) = Cosigned By    Initials Name Provider Type    AM Ulysses Gardner PTA Physical Therapy Assistant    SS Fartun Angeal PTA Physical Therapy Assistant    ДМИТРИЙ Pugh, COULTER/L Occupational Therapy Assistant    SACHI CalixtoA/L Occupational Therapy Assistant    MARYLIN Stewart/MEI Occupational Therapy Assistant           Time Calculation:         Time Calculation- OT       06/06/17 1141          Time Calculation- OT    OT Start Time 1040  -CS      OT Stop Time 1120  -CS      OT Time Calculation (min) 40 min  -CS      Total Timed Code Minutes- OT 40 minute(s)  -CS      OT Received On 06/06/17  -CS        User Key  (r) = Recorded By, (t) = Taken By, (c) = Cosigned By    Initials Name Provider Type    CS MARYLIN Rosado/MEI Occupational Therapy Assistant           Therapy Charges for Today     Code Description Service Date Service Provider Modifiers Qty    13222545880 HC OT SELF CARE/MGMT/TRAIN EA 15 MIN 6/6/2017 MARYLIN Rosado/L GO 1    02120980402 HC OT THER PROC EA 15 MIN 6/6/2017 MARYLIN Rosado/L GO 2          OT G-codes  OT Professional Judgement Used?: Yes  OT Functional Scales Options: AM-PAC 6 Clicks Daily Activity (OT)  Score: 18  Functional Limitation: Self care  Self Care Current Status (): At least 40 percent but less than 60 percent impaired, limited or restricted  Self Care Goal Status (): At least 20 percent but less than 40 percent impaired, limited or restricted    JOLIE Rosado  6/6/2017

## 2017-06-07 LAB
ANION GAP SERPL CALCULATED.3IONS-SCNC: 6 MMOL/L (ref 5–15)
BUN BLD-MCNC: 9 MG/DL (ref 7–21)
BUN/CREAT SERPL: 16.7 (ref 7–25)
CALCIUM SPEC-SCNC: 8.6 MG/DL (ref 8.4–10.2)
CHLORIDE SERPL-SCNC: 94 MMOL/L (ref 95–110)
CO2 SERPL-SCNC: 33 MMOL/L (ref 22–31)
CREAT BLD-MCNC: 0.54 MG/DL (ref 0.7–1.3)
GFR SERPL CREATININE-BSD FRML MDRD: >150 ML/MIN/1.73 (ref 60–130)
GLUCOSE BLD-MCNC: 154 MG/DL (ref 60–100)
GLUCOSE BLDC GLUCOMTR-MCNC: 168 MG/DL (ref 70–130)
GLUCOSE BLDC GLUCOMTR-MCNC: 174 MG/DL (ref 70–130)
GLUCOSE BLDC GLUCOMTR-MCNC: 175 MG/DL (ref 70–130)
POTASSIUM BLD-SCNC: 3.3 MMOL/L (ref 3.5–5.1)
SODIUM BLD-SCNC: 133 MMOL/L (ref 137–145)

## 2017-06-07 PROCEDURE — 97110 THERAPEUTIC EXERCISES: CPT

## 2017-06-07 PROCEDURE — 63710000001 INSULIN ASPART PER 5 UNITS: Performed by: SURGERY

## 2017-06-07 PROCEDURE — 82962 GLUCOSE BLOOD TEST: CPT

## 2017-06-07 PROCEDURE — 97530 THERAPEUTIC ACTIVITIES: CPT

## 2017-06-07 PROCEDURE — 97116 GAIT TRAINING THERAPY: CPT

## 2017-06-07 PROCEDURE — 25010000002 ONDANSETRON PER 1 MG: Performed by: SURGERY

## 2017-06-07 PROCEDURE — 80048 BASIC METABOLIC PNL TOTAL CA: CPT | Performed by: SURGERY

## 2017-06-07 PROCEDURE — 25010000002 ENOXAPARIN PER 10 MG: Performed by: SURGERY

## 2017-06-07 RX ORDER — OXYCODONE HYDROCHLORIDE AND ACETAMINOPHEN 5; 325 MG/1; MG/1
1 TABLET ORAL EVERY 4 HOURS PRN
Status: DISCONTINUED | OUTPATIENT
Start: 2017-06-07 | End: 2017-06-08 | Stop reason: HOSPADM

## 2017-06-07 RX ADMIN — ATORVASTATIN CALCIUM 20 MG: 20 TABLET, FILM COATED ORAL at 21:18

## 2017-06-07 RX ADMIN — OXYCODONE HYDROCHLORIDE AND ACETAMINOPHEN 1 TABLET: 5; 325 TABLET ORAL at 08:45

## 2017-06-07 RX ADMIN — INSULIN ASPART 3 UNITS: 100 INJECTION, SOLUTION INTRAVENOUS; SUBCUTANEOUS at 11:21

## 2017-06-07 RX ADMIN — PANTOPRAZOLE SODIUM 40 MG: 40 INJECTION, POWDER, FOR SOLUTION INTRAVENOUS at 05:55

## 2017-06-07 RX ADMIN — OXYCODONE HYDROCHLORIDE AND ACETAMINOPHEN 1 TABLET: 5; 325 TABLET ORAL at 12:52

## 2017-06-07 RX ADMIN — SODIUM CHLORIDE, POTASSIUM CHLORIDE, SODIUM LACTATE AND CALCIUM CHLORIDE 100 ML/HR: 600; 310; 30; 20 INJECTION, SOLUTION INTRAVENOUS at 05:55

## 2017-06-07 RX ADMIN — OXYCODONE HYDROCHLORIDE AND ACETAMINOPHEN 1 TABLET: 5; 325 TABLET ORAL at 16:50

## 2017-06-07 RX ADMIN — ENOXAPARIN SODIUM 40 MG: 40 INJECTION SUBCUTANEOUS at 08:38

## 2017-06-07 RX ADMIN — ONDANSETRON 4 MG: 2 INJECTION INTRAMUSCULAR; INTRAVENOUS at 10:25

## 2017-06-07 RX ADMIN — MUPIROCIN: 20 OINTMENT TOPICAL at 15:04

## 2017-06-07 RX ADMIN — MUPIROCIN: 20 OINTMENT TOPICAL at 05:55

## 2017-06-07 RX ADMIN — TAMSULOSIN HYDROCHLORIDE 0.4 MG: 0.4 CAPSULE ORAL at 08:38

## 2017-06-07 RX ADMIN — INSULIN ASPART 3 UNITS: 100 INJECTION, SOLUTION INTRAVENOUS; SUBCUTANEOUS at 08:36

## 2017-06-07 RX ADMIN — MUPIROCIN: 20 OINTMENT TOPICAL at 21:18

## 2017-06-07 RX ADMIN — INSULIN ASPART 8 UNITS: 100 INJECTION, SOLUTION INTRAVENOUS; SUBCUTANEOUS at 21:22

## 2017-06-07 RX ADMIN — OXYCODONE HYDROCHLORIDE AND ACETAMINOPHEN 1 TABLET: 5; 325 TABLET ORAL at 21:18

## 2017-06-07 RX ADMIN — INSULIN ASPART 3 UNITS: 100 INJECTION, SOLUTION INTRAVENOUS; SUBCUTANEOUS at 16:51

## 2017-06-07 NOTE — PLAN OF CARE
Problem: Patient Care Overview (Adult)  Goal: Plan of Care Review  Outcome: Ongoing (interventions implemented as appropriate)    06/07/17 1543   Coping/Psychosocial Response Interventions   Plan Of Care Reviewed With patient   Patient Care Overview   Progress improving   Outcome Evaluation   Outcome Summary/Follow up Plan Pt tolerated tx well this date. Pt was SBA with t/fs. Static standing x 5 mins with no LOB. Pt gave good effort with UE strengthening. Continue POC.         Problem: Inpatient Occupational Therapy  Goal: Transfer Training Goal 1 LTG- OT  Outcome: Ongoing (interventions implemented as appropriate)    06/02/17 1351 06/04/17 1220 06/07/17 1543   Transfer Training OT LTG   Transfer Training OT LTG, Date Established 06/02/17 --  --    Transfer Training OT LTG, Time to Achieve 2 wks --  --    Transfer Training OT LTG, Activity Type toilet;walk-in shower --  --    Transfer Training OT LTG, [place] Level supervision required --  --    Transfer Training OT LTG, Date Goal Reviewed --  --  06/07/17   Transfer Training OT LTG, Outcome --  goal ongoing --        Goal: ADL Goal LTG- OT  Outcome: Ongoing (interventions implemented as appropriate)    06/02/17 1351 06/04/17 1220 06/07/17 1543   ADL OT LTG   ADL OT LTG, Date Established 06/02/17 --  --    ADL OT LTG, Time to Achieve 2 wks --  --    ADL OT LTG, Activity Type ADL skills --  --    ADL OT LTG, [place] Level standby assist --  --    ADL OT LTG, Date Goal Reviewed --  --  06/07/17   ADL OT LTG, Outcome --  goal ongoing --        Goal: Functional Mobility Goal LTG- OT  Outcome: Ongoing (interventions implemented as appropriate)    06/02/17 1351 06/04/17 1220 06/07/17 1543   Functional Mobility OT LTG   Functional Mobility Goal OT LTG, Date Established 06/02/17 --  --    Functional Mobility Goal OT LTG, Time to Achieve 2 wks --  --    Functional Mobility Goal OT LTG, [place] Level supervision --  --    Functional Mobility Goal OT LTG,  Distance to Achieve to the bathroom --  --    Functional Mobility Goal OT LTG, Date Goal Reviewed --  --  06/07/17   Functional Mobility Goal OT LTG, Outcome --  goal ongoing --

## 2017-06-07 NOTE — PLAN OF CARE
Problem: Patient Care Overview (Adult)  Goal: Plan of Care Review  Outcome: Ongoing (interventions implemented as appropriate)    06/07/17 5077   Coping/Psychosocial Response Interventions   Plan Of Care Reviewed With patient   Patient Care Overview   Progress improving   Outcome Evaluation   Outcome Summary/Follow up Plan pain med changed to po up ambulating today and up in chair noted, no new skin issues noted, no falls this shift resting queitly with no problems       Goal: Adult Individualization and Mutuality  Outcome: Ongoing (interventions implemented as appropriate)  Goal: Discharge Needs Assessment  Outcome: Ongoing (interventions implemented as appropriate)    Problem: Perioperative Period (Adult)  Goal: Signs and Symptoms of Listed Potential Problems Will be Absent or Manageable (Perioperative Period)  Outcome: Unable to achieve outcome(s) by discharge Date Met:  06/07/17    Problem: Fall Risk (Adult)  Goal: Absence of Falls  Outcome: Ongoing (interventions implemented as appropriate)    Problem: Skin Integrity Impairment, Risk/Actual (Adult)  Goal: Skin Integrity/Wound Healing  Outcome: Ongoing (interventions implemented as appropriate)

## 2017-06-07 NOTE — PAYOR COMM NOTE
"[facility]   Lashay Gregorio [facility]  [phone]  Fax [phone]      auth number [id number]    Clinical update      Ventura Boggs (56 y.o. 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                 Active Insurance as of 5/31/2017     Primary Coverage     Payor Plan Insurance Group Employer/Plan Group    [employer] MEDICAID [employer]      Payor Plan Address Payor Plan Phone Number Effective From Effective To    PO  [phone] 5/19/2017     [place] [address]       Subscriber Name Subscriber Birth Date Member ID       VENTURA BOGGS 1960 [id number]                 Emergency Contacts      (Rel.) Home Phone Work Phone Mobile Phone    Sonia Boggs (Significant Other) [phone] -- --               Physician Progress Notes (last 24 hours) (Notes from 6/6/2017  9:49 AM through 6/7/2017  9:49 AM)      GIA Amaral at 6/6/2017 12:45 PM  Version 1 of 1    Attestation signed by Rusty Thrasher MD at 6/6/2017  7:40 PM        I have reviewed the documentation above and agree.                                    LOS: 6 days   Patient Care Team:  GIA Salamanca as PCP - General (Family Medicine)    Subjective " "    Subjective:  Symptoms:  No shortness of breath or chest pain.  (Wiley removed this morning and Mr. Boggs just voided 300 mL urine without hematuria, dysuria, and he feels he fully emptied. ).    Diet:  No nausea or vomiting.    Activity level: Normal.        History taken from: patient chart RN    Objective     Vital Signs  Temp:  [96.4 °F (35.8 °C)-98.8 °F (37.1 °C)] 97.3 °F (36.3 °C)  Heart Rate:  [108-122] 113  Resp:  [18-20] 20  BP: (130-146)/(62-86) 146/70    Objective:  General Appearance:  In no acute distress.    Vital signs: (most recent): Blood pressure 146/70, pulse 113, temperature 97.3 °F (36.3 °C), temperature source Oral, resp. rate 20, height 68\" (172.7 cm), weight 220 lb 10.9 oz (100 kg), SpO2 94 %.  (Noted).    Output: Producing urine and producing stool ( 6/5/17).    HEENT: Normal HEENT exam.    Lungs:  Normal respiratory rate and normal effort.  He is not in respiratory distress.  Breath sounds clear to auscultation.    Heart: Tachycardia.  S1 normal and S2 normal.  No murmur.   Chest: Symmetric chest wall expansion.   Abdomen: Abdomen is soft and non-distended.  Bowel sounds are normal.   There is incisional tenderness.  (Dressing covering abdominal incision, TAINA x 2 with sanguinous drainage, abdominal binder).   Extremities: Normal range of motion.  There is no deformity.    Pulses: Distal pulses are intact.    Neurological: Patient is alert and oriented to person, place and time.  GCS score is 15.    Pupils:  Pupils are equal, round, and reactive to light.    Skin:  Warm and dry.  No ecchymosis or cyanosis.             Results Review:    Lab Results (last 24 hours)     Procedure Component Value Units Date/Time    POC Glucose Fingerstick [[id number]]  (Abnormal) Collected:  06/05/17 1711    Specimen:  Blood Updated:  06/05/17 1741     Glucose 157 (H) mg/dL       Sliding Scale AdminMeter: [id number]: [id number] DARYNJB SALINAS LETTY       POC Glucose Fingerstick [[id number]]  " (Abnormal) Collected:  06/05/17 2108    Specimen:  Blood Updated:  06/05/17 2140     Glucose 148 (H) mg/dL       Meter: [id number]: [id number] ELLEN JENNINGS       POC Glucose Fingerstick [[id number]]  (Abnormal) Collected:  06/06/17 0652    Specimen:  Blood Updated:  06/06/17 0703     Glucose 161 (H) mg/dL       RN NotifiedMeter: [id number]: [id number] MICHELLE MACHADO       POC Glucose Fingerstick [[id number]]  (Abnormal) Collected:  06/04/17 2127    Specimen:  Blood Updated:  06/06/17 0816     Glucose 156 (H) mg/dL       Sliding Scale AdminMeter: [id number]: [id number] [facility]       Basic Metabolic Panel [[id number]]  (Abnormal) Collected:  06/06/17 0749    Specimen:  Blood Updated:  06/06/17 0838     Glucose 142 (H) mg/dL      BUN 10 mg/dL      Creatinine 0.61 (L) mg/dL      Sodium 134 (L) mmol/L      Potassium 3.4 (L) mmol/L      Chloride 95 mmol/L      CO2 25.0 mmol/L      Calcium 8.4 mg/dL      eGFR Non African Amer 138 (H) mL/min/1.73      BUN/Creatinine Ratio 16.4     Anion Gap 14.0 mmol/L     POC Glucose Fingerstick [[id number]]  (Abnormal) Collected:  06/06/17 1025    Specimen:  Blood Updated:  06/06/17 1043     Glucose 153 (H) mg/dL       RN NotifiedMeter: [id number]: [id number] PLATA LACY            Imaging Results (last 24 hours)     ** No results found for the last 24 hours. **           I reviewed the patient's new clinical results.  I reviewed the patient's new imaging results and agree with the interpretation.  I reviewed the patient's other test results and agree with the interpretation      Assessment/Plan     Principal Problem:    Ventral hernia without obstruction or gangrene  Active Problems:    Controlled type 2 diabetes mellitus without complication, with long-term current use of insulin    Postoperative urinary retention      Assessment:  (Postoperative urinary retention with successful voiding trial today after Wiley removed this morning  (6/6/17). Tolerating Flomax. Reports history of LUTS prior to surgery. ).     Plan:   (Monitor for recurrence of urinary retention, symptoms of UTI. Keep Flomax upon discharge and follow up with Dr. Thrasher for outpatient workup of prostate.     Thank you Dr. Shaver for allowing [facility] to participate in Mr. Boggs's care.).       GIA Amaral  06/06/17  2:08 PM         Electronically signed by Rusty Thrasher MD at 6/6/2017  7:40 PM

## 2017-06-07 NOTE — CONSULTS
Adult Nutrition  Assessment    Patient Name:  Ventura Boggs  YOB: 1960  MRN: [id number]  Admit Date:  5/31/2017    Assessment Date:  6/7/2017          Reason for Assessment       06/07/17 1213    Reason for Assessment    Reason For Assessment/Visit follow up protocol              Nutrition/Diet History       06/07/17 1213    Nutrition/Diet History    Typical Food/Fluid Intake Pt claims that he did eat some of his breakfast.  But he had a pain this morning in her abd and it hasn't gone away.  Nsg aware.  Tolerating the Soft diet with grd meats .  Drinking milk              Labs/Tests/Procedures/Meds       06/07/17 1214    Labs/Tests/Procedures/Meds    Labs/Tests Review Reviewed    Medication Review Reviewed, pertinent            Physical Findings       06/07/17 1215    Physical Appearance    Overall Physical Appearance on oxygen therapy    Gastrointestinal abdominal tenderness              Nutrition Prescription Ordered       06/07/17 1215    Nutrition Prescription PO    Current PO Diet Soft Texture    Texture Ground    Fluid Consistency Thin            Evaluation of Received Nutrient/Fluid Intake       06/07/17 1215    PO Evaluation    Number of Days PO Intake Evaluated Insufficient Data    Number of Meals 1    % PO Intake 25%            Comments:    Pt  started on Po diet but with abd pain and a decreased appetite at this time. RD will add milk and Glucerna to optizmize his intake. Good tolerance to mechanically altered diet. RD will follow.                Electronically signed by:  Jenna Olivo RD  06/07/17 12:23 PM

## 2017-06-07 NOTE — NURSING NOTE
notfied Dr THOMPSON of pain control patient states still hurting said continue meds as ordered no change at this time.  Have patient up and walking

## 2017-06-07 NOTE — PROGRESS NOTES
"   LOS: 7 days   Patient Care Team:  GIA Salamanca as PCP - General (Family Medicine)    Subjective     Subjective:  Symptoms:  Resolved.  No cough or weakness.  (Mr. Boggs states he is voiding without difficulty, no dysuria, no hematuria. Feels he fully empties his bladder and is satisfied with Flomax and its effects. States he is voiding \"stronger\" than he had in months. ).    Diet:  Adequate intake.  No nausea or vomiting.    Activity level: Returning to normal.    Pain:  He reports pain is improving.  Pain is requiring pain medication.        History taken from: patient chart    Objective     Vital Signs  Temp:  [96.7 °F (35.9 °C)-99.8 °F (37.7 °C)] 99.2 °F (37.3 °C)  Heart Rate:  [109-115] 111  Resp:  [16-18] 18  BP: (126-157)/(83-92) 126/83    Objective:  General Appearance:  In no acute distress.    Vital signs: (most recent): Blood pressure 126/83, pulse 111, temperature 99.2 °F (37.3 °C), temperature source Oral, resp. rate 18, height 68\" (172.7 cm), weight 220 lb 10.9 oz (100 kg), SpO2 93 %.  No fever.  (Noted).    Output: Producing urine.  Stool output assessment: last BM 2 days ago.    HEENT: Normal HEENT exam.    Lungs:  Normal respiratory rate and normal effort.  He is not in respiratory distress.  Breath sounds clear to auscultation.    Heart: Tachycardia.  S1 normal and S2 normal.  No murmur, gallop or friction rub.   Chest: Symmetric chest wall expansion.   Abdomen: Abdomen is soft and non-distended.  Bowel sounds are normal.   There is incisional tenderness.  (JPs with minimal sanguinous drainage, abdominal binder).   Extremities: Normal range of motion.  There is no dependent edema.    Pulses: Distal pulses are intact.    Neurological: Patient is alert and oriented to person, place and time.  GCS score is 15.    Pupils:  Pupils are equal, round, and reactive to light.    Skin:  Warm and dry.  No rash, ecchymosis or cyanosis.             Results Review:    Lab Results (last 24 hours)     " Procedure Component Value Units Date/Time    POC Glucose Fingerstick [[id number]]  (Abnormal) Collected:  06/06/17 1710    Specimen:  Blood Updated:  06/06/17 1821     Glucose 133 (H) mg/dL       RN NotifiedMeter: [id number]: [id number] ALESSANDRO ARECHIGA       POC Glucose Fingerstick [[id number]]  (Abnormal) Collected:  06/06/17 2056    Specimen:  Blood Updated:  06/06/17 2140     Glucose 169 (H) mg/dL       RN NotifiedMeter: [id number]: [id number] ETHAN [facility]       Basic Metabolic Panel [[id number]]  (Abnormal) Collected:  06/07/17 0559    Specimen:  Blood Updated:  06/07/17 0643     Glucose 154 (H) mg/dL      BUN 9 mg/dL      Creatinine 0.54 (L) mg/dL      Sodium 133 (L) mmol/L      Potassium 3.3 (L) mmol/L      Chloride 94 (L) mmol/L      CO2 33.0 (H) mmol/L      Calcium 8.6 mg/dL      eGFR Non African Amer >150 mL/min/1.73      BUN/Creatinine Ratio 16.7     Anion Gap 6.0 mmol/L     POC Glucose Fingerstick [[id number]]  (Abnormal) Collected:  06/07/17 0657    Specimen:  Blood Updated:  06/07/17 0716     Glucose 174 (H) mg/dL       RN NotifiedMeter: [id number]: [id number] HALIE BOSCH       POC Glucose Fingerstick [[id number]]  (Abnormal) Collected:  06/07/17 1119    Specimen:  Blood Updated:  06/07/17 1140     Glucose 175 (H) mg/dL       RN NotifiedMeter: [id number]: [id number] PLATA LACY            Imaging Results (last 24 hours)     ** No results found for the last 24 hours. **           I reviewed the patient's new clinical results.  I reviewed the patient's new imaging results and agree with the interpretation.  I reviewed the patient's other test results and agree with the interpretation      Assessment/Plan     Principal Problem:    Ventral hernia without obstruction or gangrene  Active Problems:    Controlled type 2 diabetes mellitus without complication, with long-term current use of insulin    Postoperative urinary retention      Assessment:  (Postoperative urinary  retention with successful voiding trial today after Wiley removed 6/6/17. Tolerating Flomax. Reports history of LUTS prior to surgery.).     Plan:   (Monitor for recurrence of urinary retention, symptoms of UTI. Keep Flomax upon discharge and follow up with Dr. Thrasher for outpatient workup of prostate.     Will sign off. ).       GIA Amaral  06/07/17  3:18 PM

## 2017-06-07 NOTE — PLAN OF CARE
Problem: Patient Care Overview (Adult)  Goal: Plan of Care Review  Outcome: Ongoing (interventions implemented as appropriate)    06/07/17 1221   Coping/Psychosocial Response Interventions   Plan Of Care Reviewed With patient;caregiver   Patient Care Overview   Progress improving   Outcome Evaluation   Outcome Summary/Follow up Plan Pt has been started on Po diet but with abd pain and a decreased appetite at this time. RD will add milk and Glucerna to optizmize his intake. Good tolerance to mechanically altered diet. RD will follow.

## 2017-06-07 NOTE — PLAN OF CARE
Problem: Patient Care Overview (Adult)  Goal: Plan of Care Review  Outcome: Ongoing (interventions implemented as appropriate)  Goal: Adult Individualization and Mutuality  Outcome: Ongoing (interventions implemented as appropriate)  Goal: Discharge Needs Assessment  Outcome: Ongoing (interventions implemented as appropriate)    Problem: Perioperative Period (Adult)  Goal: Signs and Symptoms of Listed Potential Problems Will be Absent or Manageable (Perioperative Period)  Outcome: Ongoing (interventions implemented as appropriate)    Problem: Fall Risk (Adult)  Goal: Absence of Falls  Outcome: Ongoing (interventions implemented as appropriate)    Problem: Skin Integrity Impairment, Risk/Actual (Adult)  Goal: Skin Integrity/Wound Healing  Outcome: Ongoing (interventions implemented as appropriate)

## 2017-06-07 NOTE — THERAPY TREATMENT NOTE
Acute Care - Physical Therapy Treatment Note  [facility]     Patient Name: Ventura Boggs  : 1960  MRN: [id number]  Today's Date: 2017  Onset of Illness/Injury or Date of Surgery Date: 17  Date of Referral to PT: 07  Referring Physician: Dr. Shaver    Admit Date: 2017    Visit Dx:    ICD-10-CM ICD-9-CM   1. Ventral hernia without obstruction or gangrene K43.9 553.20   2. Impaired physical mobility Z74.09 781.99   3. Impaired mobility and ADLs Z74.09 799.89     Patient Active Problem List   Diagnosis   • Incisional hernia, without obstruction or gangrene   • Controlled type 2 diabetes mellitus without complication, with long-term current use of insulin   • Ventral hernia without obstruction or gangrene   • Postoperative urinary retention               Adult Rehabilitation Note       17 1025 17 1135 17 1040    Rehab Assessment/Intervention    Discipline physical therapy assistant  -AM physical therapy assistant  -AM occupational therapy assistant  -CS    Document Type therapy note (daily note)  -AM therapy note (daily note)  -AM therapy note (daily note)  -CS    Subjective Information agree to therapy;complains of;pain  -AM agree to therapy;complains of;pain  -AM agree to therapy  -CS    Patient Effort, Rehab Treatment good  -AM good  -AM     Symptoms Noted During/After Treatment none  -AM none  -AM     Precautions/Limitations no known precautions/limitations  -AM no known precautions/limitations  -AM     Equipment Issued to Patient gait belt  -AM gait belt  -AM     Recorded by [AM] Ulysses Gardner PTA [AM] Ulysses Gardner PTA [CS] MARYLIN Rosado/L    Vital Signs    Pre Systolic BP Rehab 151  -  -AM     Pre Treatment Diastolic BP 82  -AM 85  -AM     Post Systolic BP Rehab 148  -  -AM     Post Treatment Diastolic BP 97  -AM 97  -AM     Pretreatment Heart Rate (beats/min) 116  -  -AM     Posttreatment Heart Rate (beats/min) 117  -AM 91  -AM  113  -CS    Pre SpO2 (%) 94  -AM 90  -AM     O2 Delivery Pre Treatment room air  -AM room air  -AM     Post SpO2 (%) 92  -AM 90  -AM 94  -CS    O2 Delivery Post Treatment room air  -AM room air  -AM room air  -CS    Pre Patient Position Sitting  -AM Supine  -AM Supine  -CS    Intra Patient Position Standing  -AM Standing  -AM Sitting  -CS    Post Patient Position Sitting  -AM Supine  -AM Sitting  -CS    Recorded by [AM] Ulysses Gardner PTA [AM] Ulysses Gardner PTA [CS] MARYLIN Rosado/MEI    Pain Assessment    Pain Assessment 0-10  -AM 0-10  -AM 0-10  -CS    Pain Score 8  -AM 7  -AM 7  -CS    Post Pain Score 8  -AM 7  -AM 7  -CS    Pain Type Acute pain;Surgical pain  -AM Acute pain;Surgical pain  -AM Surgical pain  -CS    Pain Location Abdomen  -AM Abdomen  -AM Abdomen  -CS    Pain Orientation Mid  -AM Mid  -AM     Pain Descriptors Sore  -AM Sore  -AM     Pain Frequency Constant/continuous  -AM Constant/continuous  -AM     Date Pain First Started 05/31/17  -AM 05/31/17  -AM     Clinical Progression Not changed  -AM Not changed  -AM     Patient's Stated Pain Goal No pain  -AM No pain  -AM     Pain Intervention(s) Medication (See MAR);Ambulation/increased activity  -AM Medication (See MAR);Ambulation/increased activity  -AM     Result of Injury No  -AM No  -AM     Work-Related Injury No  -AM No  -AM     Multiple Pain Sites No  -AM No  -AM     Recorded by [AM] Ulysses Gardner PTA [AM] Ulysses Gardner PTA [CS] MARYLIN Rosado/L    Cognitive Assessment/Intervention    Current Cognitive/Communication Assessment functional  -AM functional  -AM functional  -CS    Orientation Status oriented x 4  -AM oriented x 4  -AM oriented x 4  -CS    Follows Commands/Answers Questions 100% of the time  -% of the time  -% of the time  -CS    Personal Safety Interventions gait belt;nonskid shoes/slippers when out of bed;supervised activity  -AM gait belt;nonskid shoes/slippers when out of bed;supervised  activity  -AM     Recorded by [AM] Ulysses Gardner PTA [AM] Ulysses Gardner PTA [CS] Divya Ash, COULTER/L    ROM (Range of Motion)    General ROM no range of motion deficits identified  -AM no range of motion deficits identified  -AM     Recorded by [AM] Ulysses Gardner PTA [AM] Ulysses Gardner PTA     Bed Mobility, Assessment/Treatment    Bed Mobility, Assistive Device  bed rails;head of bed elevated  -AM     Bed Mobility, Roll Left, [place] not tested  -AM not tested  -AM     Bed Mobility, Roll Right, [place] not tested  -AM not tested  -AM     Bed Mobility, Scoot/Bridge, [place] not tested  -AM not tested  -AM     Bed Mob, Supine to Sit, [place] not tested  -AM supervision required  -AM     Bed Mob, Sit to Supine, [place] not tested  -AM supervision required  -AM     Bed Mob, Sidelying to Sit, [place] not tested  -AM not tested  -AM     Bed Mob, Sit to Sidelying, [place] not tested  -AM not tested  -AM     Bed Mobility, Safety Issues  decreased use of arms for pushing/pulling;decreased use of legs for bridging/pushing  -AM     Bed Mobility, Impairments  pain  -AM     Recorded by [AM] Ulysses Gardner PTA [AM] Ulysses Gardner PTA     Transfer Assessment/Treatment    Transfers, Bed-Chair [place] conditional independence  -AM stand by assist  -AM     Transfers, Chair-Bed [place] conditional independence  -AM stand by assist  -AM     Transfers, Bed-Chair-Bed, Assist Device rolling walker  -AM rolling walker  -AM     Transfers, Sit-Stand [place] independent  -AM stand by assist  -AM     Transfers, Stand-Sit [place] independent  -AM stand by assist  -AM     Transfers, Sit-Stand-Sit, Assist Device rolling walker  -AM rolling walker  -AM     Toilet Transfer, [place] not tested  -AM not tested  -AM     Walk-In Shower Transfer, [place] not tested  -AM not tested  -AM     Bathtub Transfer, [place] not tested  -AM not tested  -AM     Transfer,  Maintain Weight Bearing Status able to maintain weight bearing status  -AM able to maintain weight bearing status  -AM     Transfer, Safety Issues  step length decreased  -AM     Transfer, Impairments pain  -AM pain  -AM     Recorded by [AM] Ulysses Gardner PTA [AM] Ulysses Gardner PTA     Gait Assessment/Treatment    Gait, [place] Level conditional independence  -AM stand by assist  -AM     Gait, Assistive Device rolling walker  -AM rolling walker  -AM     Gait, Distance (Feet) 1440   700+740  -  -AM     Gait, Gait Pattern Analysis swing-through gait  -AM swing-through gait  -AM     Gait, Gait Deviations  bilateral:;carolyne decreased  -AM     Gait, Maintain Weight Bearing Status able to maintain weight bearing status  -AM able to maintain weight bearing status  -AM     Gait, Safety Issues  step length decreased  -AM     Gait, Impairments pain  -AM pain  -AM     Recorded by [AM] Ulyssse Gardner PTA [AM] Ulysses Gardner PTA     Stairs Assessment/Treatment    Number of Stairs 3  -AM 3  -AM     Stairs, Handrail Location right side (ascending)  -AM both sides  -AM     Stairs, [place] Level independent  -AM supervision required;contact guard assist  -AM     Stairs, Technique Used step over step (ascending);step over step (descending)  -AM step over step (ascending);step over step (descending)  -AM     Stairs, Maintain Weight Bearing Status able to maintain weight bearing status  -AM able to maintain weight bearing status  -AM     Stairs, Impairments pain  -AM strength decreased  -AM     Recorded by [AM] Ulysses Gardner PTA [AM] Ulysses Gardner PTA     Grooming Assessment/Training    Grooming Assess/Train, Position   long sitting  -CS    Grooming Assess/Train, Indepen Level   set up required  -CS    Grooming Assess/Train, Comment   oral care, wash face/hands, comb hair  -CS    Recorded by   [CS] MARYLIN Rosado/L    Therapy Exercises    Bilateral Upper Extremity   AROM:;20 reps;sitting;elbow  flexion/extension;hand pumps;pronation/supination;shoulder extension/flexion;shoulder ER/IR  -CS    BUE Resistance   manual resistance- minimal  -CS    Recorded by   [CS] JOLIE Rosado    Positioning and Restraints    Pre-Treatment Position sitting in chair/recliner  -AM in bed  -AM in bed  -CS    Post Treatment Position chair  -AM bed  -AM bed  -CS    In Bed  supine;call light within reach;encouraged to call for assist;exit alarm on  -AM sitting;call light within reach  -CS    In Chair reclined;call light within reach;encouraged to call for assist;legs elevated  -AM      Recorded by [AM] Ulysses Gardner PTA [AM] Ulysses Gardner PTA [CS] JOLIE Rosado      06/05/17 1310 06/05/17 0850 06/04/17 1330    Rehab Assessment/Intervention    Discipline occupational therapy assistant  - physical therapy assistant  -SS physical therapy assistant  -AM    Document Type therapy note (daily note)  - therapy note (daily note)  - therapy note (daily note)  -AM    Subjective Information agree to therapy  - agree to therapy;complains of;pain  - agree to therapy;complains of;pain  -AM    Patient Effort, Rehab Treatment good  - good  - good  -AM    Symptoms Noted During/After Treatment  none  -SS fatigue;increased pain  -AM    Precautions/Limitations  oxygen therapy device and L/min;other (see comments)   contact preautions, gait belt placement  - oxygen therapy device and L/min  -AM    Equipment Issued to Patient   gait belt  -AM    Recorded by [] JOLIE Mauricio [SS] Fartun Angela PTA [AM] Ulysses Gardner PTA    Vital Signs    Pre Systolic BP Rehab  167  -  -AM    Pre Treatment Diastolic BP  112   RN made aware  -SS 88  -AM    Post Systolic BP Rehab  164  -  -AM    Post Treatment Diastolic BP  104  -SS 88  -AM    Pretreatment Heart Rate (beats/min)  122  -  -AM    Intratreatment Heart Rate (beats/min)  125  -SS     Posttreatment Heart Rate (beats/min)  1200  -SS  115  -AM    Pre SpO2 (%) 96  -JH 98  -SS 94  -AM    O2 Delivery Pre Treatment room air   pt's NC off upon entering pt room,sats stable but pt reapply  -JH supplemental O2  -SS supplemental O2  -AM    Intra SpO2 (%)  97  -SS     O2 Delivery Intra Treatment  supplemental O2  -SS     Post SpO2 (%)  99  -SS 95  -AM    O2 Delivery Post Treatment  supplemental O2  -SS supplemental O2  -AM    Pre Patient Position  Supine  -SS     Intra Patient Position  Standing  -SS     Post Patient Position  Sitting  -SS     Recorded by [] MARYLIN Mauricio/MEI [SS] Fartun Angela, PTA [AM] Ulysses Gardner PTA    Pain Assessment    Pain Assessment No/denies pain  - 0-10  -SS 0-10  -AM    Pain Score  7  -SS 6  -AM    Post Pain Score  6  -SS 8  -AM    Pain Type  Acute pain;Surgical pain  -SS Acute pain;Surgical pain  -AM    Pain Location  Abdomen  -SS Abdomen  -AM    Pain Orientation  Mid  -SS Mid  -AM    Pain Descriptors   Sore  -AM    Pain Frequency   Constant/continuous  -AM    Date Pain First Started   05/31/17  -AM    Clinical Progression   Not changed  -AM    Patient's Stated Pain Goal  No pain  -SS No pain  -AM    Pain Intervention(s)  Medication (See MAR);Ambulation/increased activity  -SS Medication (See MAR);Ambulation/increased activity  -AM    Result of Injury   No  -AM    Work-Related Injury   No  -AM    Multiple Pain Sites   No  -AM    Recorded by [] JOLIE Mauricio [SS] Fartun Angela, PTA [AM] Ulysses Gardner PTA    Cognitive Assessment/Intervention    Current Cognitive/Communication Assessment functional  -JH functional  -SS functional  -AM    Orientation Status oriented x 4  -JH oriented x 4  -SS oriented x 4  -AM    Follows Commands/Answers Questions 100% of the time  -% of the time  -% of the time  -AM    Personal Safety Interventions   gait belt;nonskid shoes/slippers when out of bed;supervised activity  -AM    Recorded by [] JOLIE Mauricio [SS] Fartun Angela PTA [AM]  Ulysses Gardner PTA    ROM (Range of Motion)    General ROM   no range of motion deficits identified  -AM    Recorded by   [AM] Ulysses Gardner PTA    Bed Mobility, Assessment/Treatment    Bed Mobility, Assistive Device  bed rails;head of bed elevated  -SS bed rails;head of bed elevated  -AM    Bed Mobility, Roll Left, [place]  not tested  -SS not tested  -AM    Bed Mobility, Roll Right, [place]  not tested  -SS not tested  -AM    Bed Mobility, Scoot/Bridge, [place]  not tested  -SS not tested  -AM    Bed Mob, Supine to Sit, [place]  supervision required  -SS supervision required  -AM    Bed Mob, Sit to Supine, [place]  not tested  -SS supervision required  -AM    Bed Mob, Sidelying to Sit, [place]   not tested  -AM    Bed Mob, Sit to Sidelying, [place]   not tested  -AM    Bed Mobility, Safety Issues   decreased use of arms for pushing/pulling;decreased use of legs for bridging/pushing  -AM    Bed Mobility, Impairments  pain  -SS strength decreased;pain  -AM    Recorded by  [SS] Fartun Angela, PTA [AM] Ulysses Gardner PTA    Transfer Assessment/Treatment    Transfers, Bed-Chair [place]  stand by assist  -SS contact guard assist  -AM    Transfers, Chair-Bed [place]  stand by assist  -SS contact guard assist  -AM    Transfers, Bed-Chair-Bed, Assist Device  rolling walker  -SS rolling walker  -AM    Transfers, Sit-Stand [place]  stand by assist  -SS contact guard assist  -AM    Transfers, Stand-Sit [place]  stand by assist  -SS contact guard assist  -AM    Transfers, Sit-Stand-Sit, Assist Device  rolling walker  -SS rolling walker  -AM    Toilet Transfer, [place]   not tested  -AM    Walk-In Shower Transfer, [place]   not tested  -AM    Bathtub Transfer, [place]   not tested  -AM    Transfer, Maintain Weight Bearing Status   able to maintain weight bearing status  -AM    Transfer, Safety Issues  step length decreased  -SS step length  decreased  -AM    Transfer, Impairments  pain  -SS strength decreased;pain  -AM    Transfer, Comment  pt able to static stand ~ 2 min with UE support and without LOB  -SS     Recorded by  [SS] Fartun Angela PTA [AM] Ulysses Gardner PTA    Gait Assessment/Treatment    Gait, [place] Level  stand by assist  -SS contact guard assist  -AM    Gait, Assistive Device  rolling walker  -SS rolling walker  -AM    Gait, Distance (Feet)  350  -  -AM    Gait, Gait Pattern Analysis   swing-through gait  -AM    Gait, Gait Deviations  bilateral:;car[place] decreased  -SS bilateral:;car[place] decreased  -AM    Gait, Maintain Weight Bearing Status   able to maintain weight bearing status  -AM    Gait, Safety Issues  supplemental O2;step length decreased  -SS step length decreased;supplemental O2  -AM    Gait, Impairments  pain  -SS strength decreased;pain  -AM    Recorded by  [SS] Fartun Angela PTA [AM] Ulysses Gardner PTA    Stairs Assessment/Treatment    Number of Stairs  4  -SS     Stairs, Handrail Location  other (see comments)   Both rails used when ascending, left side used for descendin  -SS     Stairs, [place] Level  contact guard assist;supervision required  -SS not tested  -AM    Stairs, Comment  pt steady throughout step training, no LOB occured  -SS     Recorded by  [SS] Fartun Angela PTA [AM] Ulysses Gardner PTA    Therapy Exercises    Bilateral Lower Extremities   --  -AM    Bilateral Upper Extremity AROM:;20 reps;25 reps;supine;elbow flexion/extension;hand pumps;pronation/supination;shoulder abduction/adduction;shoulder extension/flexion;shoulder ER/IR  -      BUE Resistance manual resistance- minimal  -      Recorded by [JH] MARYLIN Mauricio/MEI  [AM] Ulysses Gardner PTA    Positioning and Restraints    Pre-Treatment Position in bed  -JH in bed  -SS in bed  -AM    Post Treatment Position chair  - chair  - bed  -AM    In Bed supine;call light within reach;encouraged to call for  assist  -  supine;call light within reach;encouraged to call for assist;exit alarm on  -AM    In Chair  notified nsg;reclined;call light within reach;encouraged to call for assist  -     Recorded by [JH] Abril Pugh, COULTER/L [SS] Fartun Angela, PTA [AM] Ulysses Gardner, KAREN      User Key  (r) = Recorded By, (t) = Taken By, (c) = Cosigned By    Initials Name Effective Dates    AM Ulysses Gardner, PTA 10/17/16 -     SS Fartun Angela, PTA 10/17/16 -     JH Abril Pugh, COULTER/L 10/17/16 -     CS Divya Ash, COULTER/L 10/17/16 -                 IP PT Goals       06/07/17 1025 06/06/17 1325 06/05/17 0929    Transfer Training PT LTG    Transfer Training PT  LTG, Date Goal Reviewed 06/07/17  -AM 06/06/17  -AM 06/05/17  -SS    Transfer Training PT LTG, Outcome goal not met  -AM goal not met  -AM goal ongoing  -SS    Gait Training PT LTG    Gait Training Goal PT LTG, Date Goal Reviewed 06/07/17  -AM 06/06/17  -AM 06/05/17  -SS    Gait Training Goal PT LTG, Outcome goal partially met  -AM goal not met  -AM goal ongoing  -SS    Stair Training PT LTG    Stair Training Goal PT LTG, Date Goal Reviewed 06/07/17  -AM 06/06/17  -AM 06/05/17  -SS    Stair Training Goal PT LTG, Outcome goal not met  -AM goal not met  -AM goal ongoing  -SS    Patient Education PT LTG    Patient Education PT LTG, Date Goal Reviewed  06/06/17  -AM 06/05/17  -SS    Patient Education PT LTG Outcome  goal met  -AM goal ongoing  -SS      06/04/17 1330 06/03/17 0855 06/02/17 1000    Transfer Training PT LTG    Transfer Training PT  LTG, Date Goal Reviewed 06/04/17  -AM 06/03/17  -AM 06/02/17  -TW    Transfer Training PT LTG, Outcome goal not met  -AM goal not met  -AM goal ongoing  -TW    Gait Training PT LTG    Gait Training Goal PT LTG, Date Goal Reviewed 06/04/17  -AM 06/03/17  -AM 06/02/17  -TW    Gait Training Goal PT LTG, Outcome goal not met  -AM goal not met  -AM goal ongoing  -TW    Stair Training PT LTG    Stair Training Goal PT  LTG, Date Goal Reviewed 06/04/17  -AM 06/03/17  -AM 06/02/17  -    Stair Training Goal PT LTG, Outcome goal not met  -AM goal not met  -AM goal ongoing  -    Patient Education PT LTG    Patient Education PT LTG, Date Goal Reviewed 06/04/17  -AM 06/03/17  -AM 06/02/17  -TW    Patient Education PT LTG Outcome goal not met  -AM goal not met  -AM goal ongoing  -      06/01/17 1108          Transfer Training PT LTG    Transfer Training PT LTG, Date Established 06/01/17  -      Transfer Training PT LTG, Time to Achieve by discharge  -      Transfer Training PT LTG, [place] Level independent  -      Transfer Training PT LTG, Outcome goal ongoing  -      Gait Training PT LTG    Gait Training Goal PT LTG, Date Established 06/01/17  -      Gait Training Goal PT LTG, Time to Achieve by discharge  -      Gait Training Goal PT LTG, [place] Level independent  -      Gait Training Goal PT LTG, Distance to Achieve 1000ft  -      Gait Training Goal PT LTG, Additional Goal Pt will complete 6 minute walk test  -      Gait Training Goal PT LTG, Outcome goal ongoing  -      Stair Training PT LTG    Stair Training Goal PT LTG, Date Established 06/01/17  -      Stair Training Goal PT LTG, Time to Achieve by discharge  -      Stair Training Goal PT LTG, Number of Steps 5  -      Stair Training Goal PT LTG, [place] Level conditional independence  -      Stair Training Goal PT LTG, Assist Device 1 handrail  -      Stair Training Goal PT LTG, Outcome goal ongoing  -      Patient Education PT LTG    Patient Education PT LTG, Date Established 06/01/17  -      Patient Education PT LTG, Time to Achieve by discharge  -      Patient Education PT LTG, Education Type gait;transfers;home safety  -      Patient Education PT LTG Outcome goal ongoing  [facility]        User Key  (r) = Recorded By, (t) = Taken By, (c) = Cosigned By    Initials Name Provider Type    GAL Dumont, PT Physical Therapist     AM Ulysses Gardner, PTA Physical Therapy Assistant     Fartun Angela, PTA Physical Therapy Assistant     Brant Palacios, PTA Physical Therapy Assistant          Physical Therapy Education     Title: PT OT SLP Therapies (Active)     Topic: Physical Therapy (Active)     Point: Mobility training (Active)    Learning Progress Summary    Learner Readiness Method Response Comment Documented by Status   Patient Acceptance E,D,TB NR   06/05/17 0942 Active    Acceptance E NR   06/01/17 1107 Active   Family Acceptance E NR   06/01/17 1107 Active               Point: Precautions (Active)    Learning Progress Summary    Learner Readiness Method Response Comment Documented by Status   Patient Acceptance E,D,TB NR   06/05/17 0942 Active    Acceptance E NR   06/01/17 1107 Active   Family Acceptance E NR   06/01/17 1107 Active                      User Key     Initials Effective Dates Name Provider Type Discipline     10/17/16 -  Shilpa Dumont, PT Physical Therapist PT     10/17/16 -  Fartun Angela, [facility] Physical Therapy Assistant PT                    PT Recommendation and Plan  Anticipated Discharge Disposition: home with home health  Planned Therapy Interventions: balance training, bed mobility training, gait training, patient/family education, stair training, transfer training  PT Frequency: other (see comments) (5-14 times per week)  Plan of Care Review  Plan Of Care Reviewed With: patient  Progress: progress toward functional goals as expected  Outcome Summary/Follow up Plan: Pt did not meet any PT goals this tx. Pt able to amb 700+740 ft w/RW Conditional [place]. DGI 19, TInetti 25 and 6 minute walk test 740 ft. Pt would benefit from  PT once discharged from this facility.           Outcome Measures       06/07/17 1025 06/06/17 1135 06/06/17 1100    6 Minute Walk Test    Distance 740  -AM      Device Used Yes;rolling walker  -AM      SpO2 92  -AM      Supplemental O2 Used? No  -AM    "   # of Rest Breaks 0  -AM      How much help from another person do you currently need...    Turning from your back to your side while in flat bed without using bedrails? 3  -AM 3  -AM     Moving from lying on back to sitting on the side of a flat bed without bedrails? 3  -AM 3  -AM     Moving to and from a bed to a chair (including a wheelchair)? 4  -AM 3  -AM     Standing up from a chair using your arms (e.g., wheelchair, bedside chair)? 4  -AM 3  -AM     Climbing 3-5 steps with a railing? 4  -AM 3  -AM     To walk in hospital room? 4  -AM 3  -AM     AM-PAC 6 Clicks Score 22  -AM 18  -AM     How much help from another is currently needed...    Putting on and taking off regular lower body clothing?   2  -CS    Bathing (including washing, rinsing, and drying)   3  -CS    Toileting (which includes using toilet bed pan or urinal)   3  -CS    Putting on and taking off regular upper body clothing   3  -CS    Taking care of personal grooming (such as brushing teeth)   3  -CS    Eating meals   4  -CS    Score   18  -CS    Dynamic Gait Index (DGI)    Gait Level Surface 2  -AM      Change in Gait Speed 2  -AM      Gait with Horizontal Head Turns 3  -AM      Gait with Vertical Head Turns 3  -AM      Gait and Pivot Turn 2  -AM      Step Over Obstacle 2  -AM      Step Around Obstacles 3  -AM      Steps 2  -AM      Dynamic Gait Index Score 19  -AM      T[place]etti Assessment    T[place]etti Assessment yes  -AM      Sitting Balance 1  -AM      Arises 2  -AM      Attempts to Rise 2  -AM      Immediate Standing Balance (first 5 sec) 2  -AM      Standing Balance 2  -AM      Sternal Nudge (feet close together) 2  -AM      Eyes Closed (feet close together) 1  -AM      Turning 360 Degrees- Steps 1  -AM      Turning 360 Degrees- Steadiness 1  -AM      Sitting Down 2  -AM      Tinetti Balance Score 16  -AM      Gait Initiation (immediate after told \"go\") 1  -AM      Step Length- Right Swing 1  -AM      Step Length- Left Swing 1  -AM      Foot " Clearance- Right Foot 1  -AM      Foot Clearance- Left Foot 1  -AM      Step Symmetry 1  -AM      Step Continuity 1  -AM      Path (excursion) 1  -AM      Trunk 0  -AM      Base of Support 1  -AM      Gait Score 9  -AM      Tinetti Total Score 25  -AM      Tinetti Assistive Device rolling walker  -AM      Functional Assessment    Outcome Measure Options 6 Minute Walk Test;AM-PAC 6 Clicks Basic Mobility (PT);Dynamic Gait Index;Tinetti  -AM AM-PAC 6 Clicks Basic Mobility (PT)  -AM AM-PAC 6 Clicks Daily Activity (OT)  -CS      06/05/17 1310 06/05/17 0850 06/04/17 1330    How much help from another person do you currently need...    Turning from your back to your side while in flat bed without using bedrails?  3  -SS 3  -AM    Moving from lying on back to sitting on the side of a flat bed without bedrails?  3  -SS 3  -AM    Moving to and from a bed to a chair (including a wheelchair)?  3  -SS 3  -AM    Standing up from a chair using your arms (e.g., wheelchair, bedside chair)?  3  -SS 3  -AM    Climbing 3-5 steps with a railing?  3  -SS 1  -AM    To walk in hospital room?  3  -SS 3  -AM    AM-PAC 6 Clicks Score  18  -SS 16  -AM    How much help from another is currently needed...    Putting on and taking off regular lower body clothing? 2  -JH      Bathing (including washing, rinsing, and drying) 3  -JH      Toileting (which includes using toilet bed pan or urinal) 3  -JH      Putting on and taking off regular upper body clothing 3  -JH      Taking care of personal grooming (such as brushing teeth) 3  -JH      Eating meals 4  -JH      Score 18  -JH      Functional Assessment    Outcome Measure Options  AM-PAC 6 Clicks Basic Mobility (PT)  -SS AM-PAC 6 Clicks Basic Mobility (PT)  -AM      06/04/17 1200          How much help from another is currently needed...    Putting on and taking off regular lower body clothing? 2  -LM      Bathing (including washing, rinsing, and drying) 3  -LM      Toileting (which includes using  toilet bed pan or urinal) 3  -LM      Putting on and taking off regular upper body clothing 3  -LM      Taking care of personal grooming (such as brushing teeth) 3  -LM      Eating meals 4  -LM      Score 18  -LM        User Key  (r) = Recorded By, (t) = Taken By, (c) = Cosigned By    Initials Name Provider Type    AM Ulysses Gardner PTA Physical Therapy Assistant    SS Fartun Angela PTA Physical Therapy Assistant    ДМИТРИЙ Pugh, COULTER/L Occupational Therapy Assistant    GWYN Dodson, COULTER/L Occupational Therapy Assistant    TRISTON Ash, COULTER/L Occupational Therapy Assistant           Time Calculation:         PT Charges       06/07/17 1025          Time Calculation    Start Time 1025  -AM      Stop Time 1050  -AM      Time Calculation (min) 25 min  -AM      PT Received On 06/07/17  -AM      PT - Next Appointment 06/08/17  -AM      Time Calculation- PT    Total Timed Code Minutes- PT 25 minute(s)  -AM        User Key  (r) = Recorded By, (t) = Taken By, (c) = Cosigned By    Initials Name Provider Type    AM Ulysses Gardner PTA Physical Therapy Assistant          Therapy Charges for Today     Code Description Service Date Service Provider Modifiers Qty    32472841199 HC GAIT TRAINING EA 15 MIN 6/6/2017 Ulysses Gardner PTA GP 1    05306446648 HC PT THER PROC GROUP 6/6/2017 Ulysses Gardner PTA GP 1    06577130864 HC GAIT TRAINING EA 15 MIN 6/7/2017 Ulysses Gardner PTA GP 1    50686129678 HC PT THER PROC EA 15 MIN 6/7/2017 Ulysses Gardner PTA GP 1          PT G-Codes  PT Professional Judgement Used?: Yes  Outcome Measure Options: 6 Minute Walk Test, AM-PAC 6 Clicks Basic Mobility (PT), Dynamic Gait Index, Tinetti  Score: 18  Functional Limitation: Mobility: Walking and moving around  Mobility: Walking and Moving Around Current Status (): At least 40 percent but less than 60 percent impaired, limited or restricted  Mobility: Walking and Moving Around Goal Status (): 0 percent  impaired, limited or restricted    Ulysses Gardner, PTA  6/7/2017

## 2017-06-07 NOTE — PLAN OF CARE
Problem: Patient Care Overview (Adult)  Goal: Plan of Care Review  Outcome: Ongoing (interventions implemented as appropriate)    06/07/17 1025   Coping/Psychosocial Response Interventions   Plan Of Care Reviewed With patient   Patient Care Overview   Progress progress toward functional goals as expected   Outcome Evaluation   Outcome Summary/Follow up Plan Pt did not meet any PT goals this tx. Pt able to amb 700+740 ft w/RW Conditional [place]. DGI 19, TInetti 25 and 6 minute walk test 740 ft. Pt would benefit from  PT once discharged from this facility.        Goal: Discharge Needs Assessment  Outcome: Ongoing (interventions implemented as appropriate)    06/07/17 1025   Discharge Needs Assessment   Concerns To Be Addressed discharge planning concerns   Readmission Within The Last 30 Days no previous admission in last 30 days   Equipment Needed After Discharge walker, rolling   Discharge Facility/Level Of Care Needs home with home health   Current Discharge Risk physical impairment   Current Health   Anticipated Changes Related to Illness inability to care for self   Self-Care   Equipment Currently Used at Home cane, quad;cane, straight;walker, standard   Living Environment   Transportation Available family or friend will provide         Problem: Inpatient Physical Therapy  Goal: Transfer Training Goal 1 LTG- PT  Outcome: Ongoing (interventions implemented as appropriate)    06/01/17 1108 06/07/17 1025   Transfer Training PT LTG   Transfer Training PT LTG, Date Established 06/01/17 --    Transfer Training PT LTG, Time to Achieve by discharge --    Transfer Training PT LTG, [place]itchie Level independent --    Transfer Training PT LTG, Date Goal Reviewed --  06/07/17   Transfer Training PT LTG, Outcome --  goal not met       Goal: Gait Training Goal LTG- PT  Outcome: Ongoing (interventions implemented as appropriate)    06/01/17 1108 06/07/17 1025   Gait Training PT LTG   Gait Training Goal PT LTG, Date  Established 06/01/17 --    Gait Training Goal PT LTG, Time to Achieve by discharge --    Gait Training Goal PT LTG, [place] Level independent --    Gait Training Goal PT LTG, Distance to Achieve 1000ft --    Gait Training Goal PT LTG, Additional Goal Pt will complete 6 minute walk test --    Gait Training Goal PT LTG, Date Goal Reviewed --  06/07/17   Gait Training Goal PT LTG, Outcome --  goal partially met       Goal: Stair Training Goal LTG- PT  Outcome: Ongoing (interventions implemented as appropriate)    06/01/17 1108 06/07/17 1025   Stair Training PT LTG   Stair Training Goal PT LTG, Date Established 06/01/17 --    Stair Training Goal PT LTG, Time to Achieve by discharge --    Stair Training Goal PT LTG, Number of Steps 5 --    Stair Training Goal PT LTG, [place] Level conditional independence --    Stair Training Goal PT LTG, Assist Device 1 handrail --    Stair Training Goal PT LTG, Date Goal Reviewed --  06/07/17   Stair Training Goal PT LTG, Outcome --  goal not met

## 2017-06-07 NOTE — THERAPY TREATMENT NOTE
Acute Care - Occupational Therapy Treatment Note  [facility]     Patient Name: Ventura Boggs  : 1960  MRN: [id number]  Today's Date: 2017  Onset of Illness/Injury or Date of Surgery Date: 17  Date of Referral to OT: 17  Referring Physician: Dr. Shaver      Admit Date: 2017    Visit Dx:     ICD-10-CM ICD-9-CM   1. Ventral hernia without obstruction or gangrene K43.9 553.20   2. Impaired physical mobility Z74.09 781.99   3. Impaired mobility and ADLs Z74.09 799.89     Patient Active Problem List   Diagnosis   • Incisional hernia, without obstruction or gangrene   • Controlled type 2 diabetes mellitus without complication, with long-term current use of insulin   • Ventral hernia without obstruction or gangrene   • Postoperative urinary retention             Adult Rehabilitation Note       17 1324 17 1025 17 1135    Rehab Assessment/Intervention    Discipline occupational therapy assistant  -CS physical therapy assistant  -AM physical therapy assistant  -AM    Document Type therapy note (daily note)  -CS therapy note (daily note)  -AM therapy note (daily note)  -AM    Subjective Information agree to therapy  -CS agree to therapy;complains of;pain  -AM agree to therapy;complains of;pain  -AM    Patient Effort, Rehab Treatment  good  -AM good  -AM    Symptoms Noted During/After Treatment  none  -AM none  -AM    Precautions/Limitations  no known precautions/limitations  -AM no known precautions/limitations  -AM    Equipment Issued to Patient  gait belt  -AM gait belt  -AM    Recorded by [CS] MARYLIN Rosado/MEI [AM] Ulysses Gardner PTA [AM] Ulysses Gardner PTA    Vital Signs    Pre Systolic BP Rehab 147  -  -  -AM    Pre Treatment Diastolic BP 81  -CS 82  -AM 85  -AM    Post Systolic BP Rehab  148  -  -AM    Post Treatment Diastolic BP  97  -AM 97  -AM    Pretreatment Heart Rate (beats/min) 111  -  -  -AM    Posttreatment Heart Rate  (beats/min)  117  -AM 91  -AM    Pre SpO2 (%) 91  -CS 94  -AM 90  -AM    O2 Delivery Pre Treatment room air  -CS room air  -AM room air  -AM    Post SpO2 (%)  92  -AM 90  -AM    O2 Delivery Post Treatment  room air  -AM room air  -AM    Pre Patient Position Sitting  -CS Sitting  -AM Supine  -AM    Intra Patient Position Standing  -CS Standing  -AM Standing  -AM    Post Patient Position Sitting  -CS Sitting  -AM Supine  -AM    Recorded by [CS] MARYLIN Rosado/MEI [AM] Ulysses Gardner PTA [AM] Ulysses Gardner PTA    Pain Assessment    Pain Assessment 0-10  -CS 0-10  -AM 0-10  -AM    Pain Score 6  -CS 8  -AM 7  -AM    Post Pain Score 6  -CS 8  -AM 7  -AM    Pain Type  Acute pain;Surgical pain  -AM Acute pain;Surgical pain  -AM    Pain Location Abdomen  -CS Abdomen  -AM Abdomen  -AM    Pain Orientation  Mid  -AM Mid  -AM    Pain Descriptors  Sore  -AM Sore  -AM    Pain Frequency  Constant/continuous  -AM Constant/continuous  -AM    Date Pain First Started  05/31/17  -AM 05/31/17  -AM    Clinical Progression  Not changed  -AM Not changed  -AM    Patient's Stated Pain Goal  No pain  -AM No pain  -AM    Pain Intervention(s)  Medication (See MAR);Ambulation/increased activity  -AM Medication (See MAR);Ambulation/increased activity  -AM    Result of Injury  No  -AM No  -AM    Work-Related Injury  No  -AM No  -AM    Multiple Pain Sites  No  -AM No  -AM    Recorded by [CS] MARYLIN Rosado/MEI [AM] Ulysses Gardner PTA [AM] Ulysses Gardner PTA    Cognitive Assessment/Intervention    Current Cognitive/Communication Assessment functional  -CS functional  -AM functional  -AM    Orientation Status oriented x 4  -CS oriented x 4  -AM oriented x 4  -AM    Follows Commands/Answers Questions 100% of the time  -% of the time  -% of the time  -AM    Personal Safety Interventions gait belt;nonskid shoes/slippers when out of bed  -CS gait belt;nonskid shoes/slippers when out of bed;supervised activity  -AM gait  belt;nonskid shoes/slippers when out of bed;supervised activity  -AM    Recorded by [CS] MARYLIN Rosado/MEI [AM] Ulysses Gardner PTA [AM] Ulysses Gardner PTA    ROM (Range of Motion)    General ROM  no range of motion deficits identified  -AM no range of motion deficits identified  -AM    Recorded by  [AM] Ulysses Gardner PTA [AM] Ulysses Gardner PTA    Bed Mobility, Assessment/Treatment    Bed Mobility, Assistive Device   bed rails;head of bed elevated  -AM    Bed Mobility, Roll Left, [place]  not tested  -AM not tested  -AM    Bed Mobility, Roll Right, [place]  not tested  -AM not tested  -AM    Bed Mobility, Scoot/Bridge, [place]  not tested  -AM not tested  -AM    Bed Mob, Supine to Sit, [place] supervision required  -CS not tested  -AM supervision required  -AM    Bed Mob, Sit to Supine, [place] supervision required  -CS not tested  -AM supervision required  -AM    Bed Mob, Sidelying to Sit, [place]  not tested  -AM not tested  -AM    Bed Mob, Sit to Sidelying, [place]  not tested  -AM not tested  -AM    Bed Mobility, Safety Issues   decreased use of arms for pushing/pulling;decreased use of legs for bridging/pushing  -AM    Bed Mobility, Impairments   pain  -AM    Recorded by [CS] MARYLIN Rosado/MEI [AM] Ulysses Gardner PTA [AM] Ulysses Gardner PTA    Transfer Assessment/Treatment    Transfers, Bed-Chair [place]  conditional independence  -AM stand by assist  -AM    Transfers, Chair-Bed [place]  conditional independence  -AM stand by assist  -AM    Transfers, Bed-Chair-Bed, Assist Device  rolling walker  -AM rolling walker  -AM    Transfers, Sit-Stand [place] supervision required  -CS independent  -AM stand by assist  -AM    Transfers, Stand-Sit [place] supervision required  -CS independent  -AM stand by assist  -AM    Transfers, Sit-Stand-Sit, Assist Device rolling walker  -CS rolling walker  -AM rolling walker  -AM    Toilet Transfer,  [place]  not tested  -AM not tested  -AM    Walk-In Shower Transfer, [place]  not tested  -AM not tested  -AM    Bathtub Transfer, [place]  not tested  -AM not tested  -AM    Transfer, Maintain Weight Bearing Status  able to maintain weight bearing status  -AM able to maintain weight bearing status  -AM    Transfer, Safety Issues   step length decreased  -AM    Transfer, Impairments  pain  -AM pain  -AM    Recorded by [CS] JOLIE Rosado [AM] Ulysses Gardner PTA [AM] Ulysses Gardner PTA    Gait Assessment/Treatment    Gait, [place] Level  conditional independence  -AM stand by assist  -AM    Gait, Assistive Device  rolling walker  -AM rolling walker  -AM    Gait, Distance (Feet)  1440   700+740  -  -AM    Gait, Gait Pattern Analysis  swing-through gait  -AM swing-through gait  -AM    Gait, Gait Deviations   bilateral:;carolyne decreased  -AM    Gait, Maintain Weight Bearing Status  able to maintain weight bearing status  -AM able to maintain weight bearing status  -AM    Gait, Safety Issues   step length decreased  -AM    Gait, Impairments  pain  -AM pain  -AM    Recorded by  [AM] Ulysses Gardner PTA [AM] Ulysses Gardner PTA    Stairs Assessment/Treatment    Number of Stairs  3  -AM 3  -AM    Stairs, Handrail Location  right side (ascending)  -AM both sides  -AM    Stairs, [place] Level  independent  -AM supervision required;contact guard assist  -AM    Stairs, Technique Used  step over step (ascending);step over step (descending)  -AM step over step (ascending);step over step (descending)  -AM    Stairs, Maintain Weight Bearing Status  able to maintain weight bearing status  -AM able to maintain weight bearing status  -AM    Stairs, Impairments  pain  -AM strength decreased  -AM    Recorded by  [AM] Ulysses Gardner PTA [AM] Ulysses Gardner PTA    Balance Skills Training    Standing-Level of Assistance Close supervision  -      Static Standing Balance Support assistive  device  -CS      Standing Balance # of Minutes 5  -CS      Recorded by [CS] JOLIE Rosado      Therapy Exercises    Bilateral Upper Extremity AROM:;20 reps;sitting;elbow flexion/extension;hand pumps;pronation/supination;shoulder extension/flexion;shoulder ER/IR  -CS      BUE Resistance manual resistance- minimal  -CS      Recorded by [CS] JOLIE Rosado      Positioning and Restraints    Pre-Treatment Position in bed  -CS sitting in chair/recliner  -AM in bed  -AM    Post Treatment Position bed  -CS chair  -AM bed  -AM    In Bed supine;call light within reach  -CS  supine;call light within reach;encouraged to call for assist;exit alarm on  -AM    In Chair  reclined;call light within reach;encouraged to call for assist;legs elevated  -AM     Recorded by [CS] JOLIE Rosado [AM] Ulysses Gardner PTA [AM] Ulysses Gardner PTA      06/06/17 1040 06/05/17 1310 06/05/17 0850    Rehab Assessment/Intervention    Discipline occupational therapy assistant  - occupational therapy assistant  - physical therapy assistant  -    Document Type therapy note (daily note)  - therapy note (daily note)  - therapy note (daily note)  -    Subjective Information agree to therapy  -CS agree to therapy  - agree to therapy;complains of;pain  -    Patient Effort, Rehab Treatment  good  - good  -    Symptoms Noted During/After Treatment   none  -SS    Precautions/Limitations   oxygen therapy device and L/min;other (see comments)   contact preautions, gait belt placement  -    Recorded by [CS] JOLIE Rosado [] JOLIE Mauricio [] Fartun Angela PTA    Vital Signs    Pre Systolic BP Rehab   167  -SS    Pre Treatment Diastolic BP   112   RN made aware  -SS    Post Systolic BP Rehab   164  -SS    Post Treatment Diastolic BP   104  -SS    Pretreatment Heart Rate (beats/min)   122  -SS    Intratreatment Heart Rate (beats/min)   125  -SS    Posttreatment Heart Rate  (beats/min) 113  -CS  1200  -SS    Pre SpO2 (%)  96  -JH 98  -SS    O2 Delivery Pre Treatment  room air   pt's NC off upon entering pt room,sats stable but pt reapply  -JH supplemental O2  -SS    Intra SpO2 (%)   97  -SS    O2 Delivery Intra Treatment   supplemental O2  -SS    Post SpO2 (%) 94  -CS  99  -SS    O2 Delivery Post Treatment room air  -CS  supplemental O2  -SS    Pre Patient Position Supine  -CS  Supine  -SS    Intra Patient Position Sitting  -CS  Standing  -SS    Post Patient Position Sitting  -CS  Sitting  -SS    Recorded by [CS] MARYLIN Rosado/MEI [JH] SACHI MauricioA/MEI [] Fartun Angela PTA    Pain Assessment    Pain Assessment 0-10  -CS No/denies pain  - 0-10  -SS    Pain Score 7  -CS  7  -SS    Post Pain Score 7  -CS  6  -SS    Pain Type Surgical pain  -CS  Acute pain;Surgical pain  -SS    Pain Location Abdomen  -CS  Abdomen  -SS    Pain Orientation   Mid  -SS    Patient's Stated Pain Goal   No pain  -SS    Pain Intervention(s)   Medication (See MAR);Ambulation/increased activity  -SS    Recorded by [CS] MARYLIN Rosado/MEI [JH] SACHI MuaricioA/MEI [] Fartun Angela PTA    Cognitive Assessment/Intervention    Current Cognitive/Communication Assessment functional  -CS functional  - functional  -SS    Orientation Status oriented x 4  -CS oriented x 4  -JH oriented x 4  -SS    Follows Commands/Answers Questions 100% of the time  -% of the time  -% of the time  -SS    Recorded by [CS] MARYLIN Rosado/MEI [] SACHI MauricioA/L [] Fartun Angela PTA    Bed Mobility, Assessment/Treatment    Bed Mobility, Assistive Device   bed rails;head of bed elevated  -SS    Bed Mobility, Roll Left, [place]   not tested  -SS    Bed Mobility, Roll Right, [place]   not tested  -SS    Bed Mobility, Scoot/Bridge, [place]   not tested  -SS    Bed Mob, Supine to Sit, [place]   supervision required  -SS    Bed Mob, Sit to Supine, [place]    not tested  -SS    Bed Mobility, Impairments   pain  -SS    Recorded by   [SS] Fartun Angela PTA    Transfer Assessment/Treatment    Transfers, Bed-Chair [place]   stand by assist  -SS    Transfers, Chair-Bed [place]   stand by assist  -SS    Transfers, Bed-Chair-Bed, Assist Device   rolling walker  -SS    Transfers, Sit-Stand [place]   stand by assist  -SS    Transfers, Stand-Sit [place]   stand by assist  -SS    Transfers, Sit-Stand-Sit, Assist Device   rolling walker  -SS    Transfer, Safety Issues   step length decreased  -SS    Transfer, Impairments   pain  -SS    Transfer, Comment   pt able to static stand ~ 2 min with UE support and without LOB  -SS    Recorded by   [SS] Fartun Angela PTA    Gait Assessment/Treatment    Gait, [place] Level   stand by assist  -SS    Gait, Assistive Device   rolling walker  -SS    Gait, Distance (Feet)   350  -SS    Gait, Gait Deviations   bilateral:;[place] decreased  -SS    Gait, Safety Issues   supplemental O2;step length decreased  -SS    Gait, Impairments   pain  -SS    Recorded by   [SS] Fartun Angela PTA    Stairs Assessment/Treatment    Number of Stairs   4  -SS    Stairs, Handrail Location   other (see comments)   Both rails used when ascending, left side used for descendin  -SS    Stairs, [place] Level   contact guard assist;supervision required  -SS    Stairs, Comment   pt steady throughout step training, no LOB occured  -SS    Recorded by   [SS] Fartun Angela PTA    Grooming Assessment/Training    Grooming Assess/Train, Position long sitting  -CS      Grooming Assess/Train, Indepen Level set up required  -CS      Grooming Assess/Train, Comment oral care, wash face/hands, comb hair  -CS      Recorded by [CS] MARYLIN Rosado/L      Therapy Exercises    Bilateral Upper Extremity AROM:;20 reps;sitting;elbow flexion/extension;hand pumps;pronation/supination;shoulder extension/flexion;shoulder ER/IR  -CS AROM:;20  reps;25 reps;supine;elbow flexion/extension;hand pumps;pronation/supination;shoulder abduction/adduction;shoulder extension/flexion;shoulder ER/IR  -JH     BUE Resistance manual resistance- minimal  -CS manual resistance- minimal  -JH     Recorded by [CS] MARYLIN Rosado/MEI [] SACHI MauricioA/L     Positioning and Restraints    Pre-Treatment Position in bed  -CS in bed  -JH in bed  -SS    Post Treatment Position bed  -CS chair  - chair  -SS    In Bed sitting;call light within reach  -CS supine;call light within reach;encouraged to call for assist  -JH     In Chair   notified nsg;reclined;call light within reach;encouraged to call for assist  -SS    Recorded by [CS] MARYLIN Rosado/MEI [JH] SACHI MauricioA/MEI [] Fartun Angela, PTA      User Key  (r) = Recorded By, (t) = Taken By, (c) = Cosigned By    Initials Name Effective Dates    AM Ulysses Gardner, PTA 10/17/16 -     SS Fartun Angela, PTA 10/17/16 -     JH Abril Pugh COULTER/L 10/17/16 -     CS Divya Ash COULTER/L 10/17/16 -                 OT Goals       06/07/17 1543 06/06/17 1139 06/05/17 1310    Transfer Training OT LTG    Transfer Training OT LTG, Date Goal Reviewed 06/07/17  -CS 06/06/17  -CS 06/05/17  -    ADL OT LTG    ADL OT LTG, Date Goal Reviewed 06/07/17  -CS 06/06/17  - 06/05/17  -    Functional Mobility OT LTG    Functional Mobility Goal OT LTG, Date Goal Reviewed 06/07/17  -CS 06/06/17  -CS 06/05/17  -JH      06/04/17 1220 06/02/17 1351       Transfer Training OT LTG    Transfer Training OT LTG, Date Established  06/02/17  -RW     Transfer Training OT LTG, Time to Achieve  2 wks  -RW     Transfer Training OT LTG, Activity Type  toilet;walk-in shower  -RW     Transfer Training OT LTG, [place] Level  supervision required  -RW     Transfer Training OT LTG, Date Goal Reviewed 06/04/17  -LM      Transfer Training OT LTG, Outcome goal ongoing  -LM      ADL OT LTG    ADL OT LTG, Date Established   06/02/17  -RW     ADL OT LTG, Time to Achieve  2 wks  -RW     ADL OT LTG, Activity Type  ADL skills  -RW     ADL OT LTG, [place] Level  standby assist  -RW     ADL OT LTG, Date Goal Reviewed 06/04/17  -LM      ADL OT LTG, Outcome goal ongoing  -LM      Functional Mobility OT LTG    Functional Mobility Goal OT LTG, Date Established  06/02/17  -RW     Functional Mobility Goal OT LTG, Time to Achieve  2 wks  -RW     Functional Mobility Goal OT LTG, [place] Level  supervision  -RW     Functional Mobility Goal OT LTG, Distance to Achieve  to the bathroom  -RW     Functional Mobility Goal OT LTG, Date Goal Reviewed 06/04/17  -LM      Functional Mobility Goal OT LTG, Outcome goal ongoing  -LM        User Key  (r) = Recorded By, (t) = Taken By, (c) = Cosigned By    Initials Name Provider Type    RW Nicky Blanchard, OTR/L Occupational Therapist     Abril Pugh, COULTER/L Occupational Therapy Assistant     Ese Dodson COULTER/L Occupational Therapy Assistant     Divya Ash COULTER/L Occupational Therapy Assistant          Occupational Therapy Education     Title: PT OT SLP Therapies (Active)     Topic: Occupational Therapy (Done)     Point: ADL training (Done)    Description: Instruct learner(s) on proper safety adaptation and remediation techniques during self care or transfers.   Instruct in proper use of assistive devices.    Learning Progress Summary    Learner Readiness Method Response Comment Documented by Status   Patient Acceptance E,TB,D VU,NR   06/07/17 1542 Done    Acceptance E,TB,D NR   06/06/17 1139 Active    Acceptance E VU   06/05/17 1508 Done    Acceptance E VU   06/04/17 1221 Done    Acceptance E NR fall precautions, transfer safety, ADL safety  06/02/17 1349 Active               Point: Home exercise program (Done)    Description: Instruct learner(s) on appropriate technique for monitoring, assisting and/or progressing therapeutic exercises/activities.    Learning Progress  Summary    Learner Readiness Method Response Comment Documented by Status   Patient Acceptance E,TB,D VU,NR   06/07/17 1542 Done    Acceptance E,TB,D NR   06/06/17 1139 Active    Acceptance E VU   06/05/17 1508 Done    Acceptance E VU   06/04/17 1221 Done               Point: Precautions (Done)    Description: Instruct learner(s) on prescribed precautions during self-care and functional transfers.    Learning Progress Summary    Learner Readiness Method Response Comment Documented by Status   Patient Acceptance E,TB,D VU,NR   06/07/17 1542 Done    Acceptance E,TB,D NR   06/06/17 1139 Active    Acceptance E VU   06/05/17 1508 Done    Acceptance E VU   06/04/17 1221 Done    Acceptance E NR fall precautions, transfer safety, ADL safety  06/02/17 1349 Active               Point: Body mechanics (Done)    Description: Instruct learner(s) on proper positioning and spine alignment during self-care, functional mobility activities and/or exercises.    Learning Progress Summary    Learner Readiness Method Response Comment Documented by Status   Patient Acceptance E,TB,D VU,NR   06/07/17 1542 Done    Acceptance E,TB,D NR   06/06/17 1139 Active    Acceptance E VU   06/05/17 1508 Done    Acceptance E VU   06/04/17 1221 Done                      User Key     Initials Effective Dates Name Provider Type Discipline     10/17/16 -  Nicky Blanchard OTR/L Occupational Therapist OT     10/17/16 -  SACHI MauricioA/L Occupational Therapy Assistant OT     10/17/16 -  SACHI NovakA/L Occupational Therapy Assistant OT     10/17/16 -  SACHI RosadoA/MEI Occupational Therapy Assistant OT                  OT Recommendation and Plan  Anticipated Discharge Disposition: home with 24/7 care, home with home health  Planned Therapy Interventions: activity intolerance, adaptive equipment training, ADL retraining, balance training, bed mobility training, energy conservation, home exercise  program, strengthening, transfer training  Therapy Frequency: other (see comments) (3-14 times/wk)  Plan of Care Review  Plan Of Care Reviewed With: patient  Progress: improving  Outcome Summary/Follow up Plan: Pt tolerated tx well this date. Pt was SBA with t/fs. Static standing x 5 mins with no LOB. Pt gave good effort with UE strengthening. Continue POC.        Outcome Measures       06/07/17 1500 06/07/17 1025 06/06/17 1135    6 Minute Walk Test    Distance  740  -AM     Device Used  Yes;rolling walker  -AM     SpO2  92  -AM     Supplemental O2 Used?  No  -AM     # of Rest Breaks  0  -AM     How much help from another person do you currently need...    Turning from your back to your side while in flat bed without using bedrails?  3  -AM 3  -AM    Moving from lying on back to sitting on the side of a flat bed without bedrails?  3  -AM 3  -AM    Moving to and from a bed to a chair (including a wheelchair)?  4  -AM 3  -AM    Standing up from a chair using your arms (e.g., wheelchair, bedside chair)?  4  -AM 3  -AM    Climbing 3-5 steps with a railing?  4  -AM 3  -AM    To walk in hospital room?  4  -AM 3  -AM    AM-PAC 6 Clicks Score  22  -AM 18  -AM    How much help from another is currently needed...    Putting on and taking off regular lower body clothing? 2  -CS      Bathing (including washing, rinsing, and drying) 3  -CS      Toileting (which includes using toilet bed pan or urinal) 3  -CS      Putting on and taking off regular upper body clothing 3  -CS      Taking care of personal grooming (such as brushing teeth) 3  -CS      Eating meals 4  -CS      Score 18  -CS      Dynamic Gait Index (DGI)    Gait Level Surface  2  -AM     Change in Gait Speed  2  -AM     Gait with Horizontal Head Turns  3  -AM     Gait with Vertical Head Turns  3  -AM     Gait and Pivot Turn  2  -AM     Step Over Obstacle  2  -AM     Step Around Obstacles  3  -AM     Steps  2  -AM     Dynamic Gait Index Score  19  -AM     Tinetti  "Assessment    Tinetti Assessment  yes  -AM     Sitting Balance  1  -AM     Arises  2  -AM     Attempts to Rise  2  -AM     Immediate Standing Balance (first 5 sec)  2  -AM     Standing Balance  2  -AM     Sternal Nudge (feet close together)  2  -AM     Eyes Closed (feet close together)  1  -AM     Turning 360 Degrees- Steps  1  -AM     Turning 360 Degrees- Steadiness  1  -AM     Sitting Down  2  -AM     Tinetti Balance Score  16  -AM     Gait Initiation (immediate after told \"go\")  1  -AM     Step Length- Right Swing  1  -AM     Step Length- Left Swing  1  -AM     Foot Clearance- Right Foot  1  -AM     Foot Clearance- Left Foot  1  -AM     Step Symmetry  1  -AM     Step Continuity  1  -AM     Path (excursion)  1  -AM     Trunk  0  -AM     Base of Support  1  -AM     Gait Score  9  -AM     Tinetti Total Score  25  -AM     Tinetti Assistive Device  rolling walker  -AM     Functional Assessment    Outcome Measure Options AM-PAC 6 Clicks Daily Activity (OT)  -CS 6 Minute Walk Test;AM-PAC 6 Clicks Basic Mobility (PT);Dynamic Gait Index;Tinetti  -AM AM-PAC 6 Clicks Basic Mobility (PT)  -AM      06/06/17 1100 06/05/17 1310 06/05/17 0850    How much help from another person do you currently need...    Turning from your back to your side while in flat bed without using bedrails?   3  -SS    Moving from lying on back to sitting on the side of a flat bed without bedrails?   3  -SS    Moving to and from a bed to a chair (including a wheelchair)?   3  -SS    Standing up from a chair using your arms (e.g., wheelchair, bedside chair)?   3  -SS    Climbing 3-5 steps with a railing?   3  -SS    To walk in hospital room?   3  -SS    AM-PAC 6 Clicks Score   18  -SS    How much help from another is currently needed...    Putting on and taking off regular lower body clothing? 2  -CS 2  -JH     Bathing (including washing, rinsing, and drying) 3  -CS 3  -JH     Toileting (which includes using toilet bed pan or urinal) 3  -CS 3  -JH     " Putting on and taking off regular upper body clothing 3  -CS 3  -JH     Taking care of personal grooming (such as brushing teeth) 3  -CS 3  -JH     Eating meals 4  -CS 4  -JH     Score 18  -CS 18  -JH     Functional Assessment    Outcome Measure Options AM-PAC 6 Clicks Daily Activity (OT)  -CS  AM-PAC 6 Clicks Basic Mobility (PT)  -SS      User Key  (r) = Recorded By, (t) = Taken By, (c) = Cosigned By    Initials Name Provider Type    AM Ulysses Gardner, PTA Physical Therapy Assistant     Fartun Angela, KAREN Physical Therapy Assistant     Abril Pugh, COULTER/L Occupational Therapy Assistant     MARYLIN Rosado/MEI Occupational Therapy Assistant           Time Calculation:         Time Calculation- OT       06/07/17 1546          Time Calculation- OT    OT Start Time 1324  -CS      OT Stop Time 1415  -CS      OT Time Calculation (min) 51 min  -CS      Total Timed Code Minutes- OT 51 minute(s)  -CS      OT Received On 06/07/17  -        User Key  (r) = Recorded By, (t) = Taken By, (c) = Cosigned By    Initials Name Provider Type     MARYLIN Rosado/MEI Occupational Therapy Assistant           Therapy Charges for Today     Code Description Service Date Service Provider Modifiers Qty    52305409620 HC OT SELF CARE/MGMT/TRAIN EA 15 MIN 6/6/2017 SACHI RosadoA/L GO 1    68023138324 HC OT THER PROC EA 15 MIN 6/6/2017 SACHI RosadoA/L GO 2    74168388146 HC OT THER PROC EA 15 MIN 6/7/2017 SACHI RosadoA/L GO 2    14414505255 HC OT THERAPEUTIC ACT EA 15 MIN 6/7/2017 SACHI RosadoA/L GO 1          OT G-codes  OT Professional Judgement Used?: Yes  OT Functional Scales Options: AM-PAC 6 Clicks Daily Activity (OT)  Score: 18  Functional Limitation: Self care  Self Care Current Status (): At least 40 percent but less than 60 percent impaired, limited or restricted  Self Care Goal Status (): At least 20 percent but less than 40 percent impaired, limited or  restricted    MARYLIN Rosado/MEI  6/7/2017

## 2017-06-07 NOTE — MEDICAL STUDENT
Rui Shaver MD [facility]  General Surgery    6/7/2017    Chief Complaint:     Subjective      LOS: 7 days      Patient is 56 y.o. male presented with ventral hernia and intermittent obstruction diagnosed clinically is POD#7 for OPEN ADHESIOLYSIS AND VENTRAL/INCISIONAL HERNIA REPAIR WITH PHASIX MESH.    Voiding without strain and frequently since catheter removal. No blood in urine. No nausea no vomiting. Abdominal pain is improving. Has had cough and that is when he uses his pain pump. Cough is not productive. Has been ambulatory with PT and increased walk time. No calf pain. No trouble breathing. No chest pain or palpitations,.        Current Medications:     Current Facility-Administered Medications   Medication Dose Route Frequency Provider Last Rate Last Dose   • atorvastatin (LIPITOR) tablet 20 mg  20 mg Oral Nightly Rui Shaver MD   20 mg at 06/06/17 2140   • bisacodyl (DULCOLAX) suppository 10 mg  10 mg Rectal Daily Rui Shaver MD   10 mg at 06/05/17 0935   • bupivacaine-EPINEPHrine PF (MARCAINE w/EPI) 0.5% -1:974189 injection    PRN Rui Shaver MD   30 mL at 05/31/17 1525   • dextrose (D50W) solution 25 g  25 g Intravenous Q15 Min PRN Rui Shaver MD       • dextrose (GLUTOSE) oral gel 15 g  15 g Oral Q15 Min PRN Rui Shaver MD       • enoxaparin (LOVENOX) syringe 40 mg  40 mg Subcutaneous Daily Rui Shaver MD   40 mg at 06/06/17 0828   • glucagon (human recombinant) (GLUCAGEN DIAGNOSTIC) injection 1 mg  1 mg Subcutaneous Q15 Min PRN Rui Shaver MD       • glucagon (human recombinant) (GLUCAGEN DIAGNOSTIC) injection 1 mg  1 mg Subcutaneous Q15 Min PRN Rui Shaver MD       • HYDROmorphone (DILAUDID) injection 1 mg  1 mg Intravenous Q3H PRN Rui Shaver MD   1 mg at 06/04/17 2124   • HYDROmorphone (DILAUDID) injection 1.5 mg  1.5 mg Intravenous Q3H PRN Rui Shaver MD   1.5 mg at 06/06/17 2140   • HYDROmorphone (DILAUDID) PCA 0.2 mg/mL 30 mL syringe   Intravenous Continuous Rui Shaver MD       • insulin  aspart (novoLOG) injection 0-14 Units  0-14 Units Subcutaneous 4x Daily AC & at Bedtime Rui Shaver MD   3 Units at 06/06/17 2141   • labetalol (NORMODYNE,TRANDATE) injection 20 mg  20 mg Intravenous Q6H PRN Rui Shaver MD   20 mg at 06/01/17 1650   • lactated ringers infusion  100 mL/hr Intravenous Continuous Rui Shaver  mL/hr at 06/06/17 1927 100 mL/hr at 06/06/17 1927   • LIDOCAINE 1/6% WITH EPINEPHRINE SOLUTION    PRN Rui Shaver MD   150 mL at 05/31/17 1525   • mupirocin (BACTROBAN) 2 % ointment   Topical Q8H Rui Shaver MD       • naloxone (NARCAN) injection 0.1 mg  0.1 mg Intravenous Q5 Min PRN Rui Shaver MD       • ondansetron (ZOFRAN) injection 4 mg  4 mg Intravenous Q6H PRN Rui Shaver MD   4 mg at 06/06/17 2057   • pantoprazole (PROTONIX) injection 40 mg  40 mg Intravenous Q AM Rui Shaver MD   40 mg at 06/06/17 0542   • tamsulosin (FLOMAX) 24 hr capsule 0.4 mg  0.4 mg Oral Daily Estradateri GIA Pena   0.4 mg at 06/06/17 0828       Objective     Physical Exam:   Temp:  [96.7 °F (35.9 °C)-99.8 °F (37.7 °C)] 99.8 °F (37.7 °C)  Heart Rate:  [108-115] 111  Resp:  [18-20] 18  BP: (133-149)/(70-92) 149/89     Intake/Output       06/06/17 0700 - 06/07/17 0659    Intake (ml) 988    Output (ml) 1630    Net (ml) -642          Physical Exam   Constitutional: He is oriented to person, place, and time. He appears well-developed.   Cardiovascular:   Mild tachycardia    Pulmonary/Chest: Effort normal. No respiratory distress.   Abdominal: He exhibits distension (minor). There is tenderness (minor).   Musculoskeletal: He exhibits no edema.   No calf pain or thigh pain to palpation    Neurological: He is alert and oriented to person, place, and time.   Skin: Skin is warm.   Incision is dry and shows no signs of infection or dehiscence. Drains in place with less than 10 cc in both together. Ecchymosis clearing at flanks and lower abdomen.     Vitals reviewed.        Labs:  Lab Results (last 24 hours)      Procedure Component Value Units Date/Time    POC Glucose Fingerstick [[id number]]  (Abnormal) Collected:  06/06/17 0652    Specimen:  Blood Updated:  06/06/17 0703     Glucose 161 (H) mg/dL       RN NotifiedMeter: [id number]: [id number] MICHELLE MACHADO       POC Glucose Fingerstick [[id number]]  (Abnormal) Collected:  06/04/17 2127    Specimen:  Blood Updated:  06/06/17 0816     Glucose 156 (H) mg/dL       Sliding Scale AdminMeter: [id number]: [id number] [facility]       Basic Metabolic Panel [[id number]]  (Abnormal) Collected:  06/06/17 0749    Specimen:  Blood Updated:  06/06/17 0838     Glucose 142 (H) mg/dL      BUN 10 mg/dL      Creatinine 0.61 (L) mg/dL      Sodium 134 (L) mmol/L      Potassium 3.4 (L) mmol/L      Chloride 95 mmol/L      CO2 25.0 mmol/L      Calcium 8.4 mg/dL      eGFR Non African Amer 138 (H) mL/min/1.73      BUN/Creatinine Ratio 16.4     Anion Gap 14.0 mmol/L     POC Glucose Fingerstick [[id number]]  (Abnormal) Collected:  06/06/17 1025    Specimen:  Blood Updated:  06/06/17 1043     Glucose 153 (H) mg/dL       RN NotifiedMeter: [id number]: [id number] PLATA LACY       POC Glucose Fingerstick [[id number]]  (Abnormal) Collected:  06/06/17 1710    Specimen:  Blood Updated:  06/06/17 1821     Glucose 133 (H) mg/dL       RN NotifiedMeter: [id number]: [id number] FRANCOIS GENI       POC Glucose Fingerstick [[id number]]  (Abnormal) Collected:  06/06/17 2056    Specimen:  Blood Updated:  06/06/17 2140     Glucose 169 (H) mg/dL       RN NotifiedMeter: [id number]: [id number] ETHAN ELENA             Images:   Imaging Results (last 24 hours)     ** No results found for the last 24 hours. **          (I reviewed the patient's results and images.)                                                                                    ASSESSMENT/PLAN    POD#7 for OPEN ADHESIOLYSIS AND VENTRAL/INCISIONAL HERNIA REPAIR WITH PHASIX MESH.  1.tachycardia  mild around 110  2.continue full liquid diet since resumption of bowel function  2.dvt prophylaxis-lovenox  3.arevalo removed and now voiding appropriately   4.pain controlled-begin de-escalating   5.continue ambulating  6 incentive spirometry-continue. This will help coughing spells  7. Dulcolax-continue for today    Kirt Villarreal, Medical Student    Will advance diet.          This document has been electronically signed by Rui Shaver MD on June 7, 2017 7:43 AM

## 2017-06-08 VITALS
RESPIRATION RATE: 20 BRPM | SYSTOLIC BLOOD PRESSURE: 124 MMHG | OXYGEN SATURATION: 94 % | HEIGHT: 68 IN | TEMPERATURE: 97.5 F | BODY MASS INDEX: 33.45 KG/M2 | WEIGHT: 220.68 LBS | HEART RATE: 100 BPM | DIASTOLIC BLOOD PRESSURE: 87 MMHG

## 2017-06-08 PROBLEM — K43.2 INCISIONAL HERNIA, WITHOUT OBSTRUCTION OR GANGRENE: Status: RESOLVED | Noted: 2017-05-19 | Resolved: 2017-06-08

## 2017-06-08 PROBLEM — K43.9 VENTRAL HERNIA WITHOUT OBSTRUCTION OR GANGRENE: Status: RESOLVED | Noted: 2017-05-31 | Resolved: 2017-06-08

## 2017-06-08 LAB
ANION GAP SERPL CALCULATED.3IONS-SCNC: 9 MMOL/L (ref 5–15)
BUN BLD-MCNC: 8 MG/DL (ref 7–21)
BUN/CREAT SERPL: 12.3 (ref 7–25)
CALCIUM SPEC-SCNC: 8.4 MG/DL (ref 8.4–10.2)
CHLORIDE SERPL-SCNC: 94 MMOL/L (ref 95–110)
CO2 SERPL-SCNC: 34 MMOL/L (ref 22–31)
CREAT BLD-MCNC: 0.65 MG/DL (ref 0.7–1.3)
GFR SERPL CREATININE-BSD FRML MDRD: 128 ML/MIN/1.73 (ref 60–130)
GLUCOSE BLD-MCNC: 186 MG/DL (ref 60–100)
GLUCOSE BLDC GLUCOMTR-MCNC: 196 MG/DL (ref 70–130)
GLUCOSE BLDC GLUCOMTR-MCNC: 212 MG/DL (ref 70–130)
GLUCOSE BLDC GLUCOMTR-MCNC: 255 MG/DL (ref 70–130)
POTASSIUM BLD-SCNC: 3 MMOL/L (ref 3.5–5.1)
SODIUM BLD-SCNC: 137 MMOL/L (ref 137–145)

## 2017-06-08 PROCEDURE — 97530 THERAPEUTIC ACTIVITIES: CPT

## 2017-06-08 PROCEDURE — 80048 BASIC METABOLIC PNL TOTAL CA: CPT | Performed by: SURGERY

## 2017-06-08 PROCEDURE — 25010000002 ENOXAPARIN PER 10 MG: Performed by: SURGERY

## 2017-06-08 PROCEDURE — 82962 GLUCOSE BLOOD TEST: CPT

## 2017-06-08 PROCEDURE — 97110 THERAPEUTIC EXERCISES: CPT

## 2017-06-08 PROCEDURE — 97535 SELF CARE MNGMENT TRAINING: CPT

## 2017-06-08 PROCEDURE — 63710000001 INSULIN ASPART PER 5 UNITS: Performed by: SURGERY

## 2017-06-08 PROCEDURE — 97116 GAIT TRAINING THERAPY: CPT

## 2017-06-08 RX ORDER — OXYCODONE HYDROCHLORIDE AND ACETAMINOPHEN 5; 325 MG/1; MG/1
1 TABLET ORAL EVERY 4 HOURS PRN
Qty: 20 TABLET | Refills: 0 | Status: SHIPPED | OUTPATIENT
Start: 2017-06-08 | End: 2017-06-17

## 2017-06-08 RX ADMIN — MUPIROCIN: 20 OINTMENT TOPICAL at 14:00

## 2017-06-08 RX ADMIN — INSULIN ASPART 3 UNITS: 100 INJECTION, SOLUTION INTRAVENOUS; SUBCUTANEOUS at 13:27

## 2017-06-08 RX ADMIN — OXYCODONE HYDROCHLORIDE AND ACETAMINOPHEN 1 TABLET: 5; 325 TABLET ORAL at 01:53

## 2017-06-08 RX ADMIN — TAMSULOSIN HYDROCHLORIDE 0.4 MG: 0.4 CAPSULE ORAL at 08:42

## 2017-06-08 RX ADMIN — PANTOPRAZOLE SODIUM 40 MG: 40 INJECTION, POWDER, FOR SOLUTION INTRAVENOUS at 06:10

## 2017-06-08 RX ADMIN — OXYCODONE HYDROCHLORIDE AND ACETAMINOPHEN 1 TABLET: 5; 325 TABLET ORAL at 06:18

## 2017-06-08 RX ADMIN — ENOXAPARIN SODIUM 40 MG: 40 INJECTION SUBCUTANEOUS at 08:42

## 2017-06-08 RX ADMIN — OXYCODONE HYDROCHLORIDE AND ACETAMINOPHEN 1 TABLET: 5; 325 TABLET ORAL at 11:09

## 2017-06-08 RX ADMIN — OXYCODONE HYDROCHLORIDE AND ACETAMINOPHEN 1 TABLET: 5; 325 TABLET ORAL at 15:38

## 2017-06-08 RX ADMIN — MUPIROCIN: 20 OINTMENT TOPICAL at 06:10

## 2017-06-08 RX ADMIN — INSULIN ASPART 5 UNITS: 100 INJECTION, SOLUTION INTRAVENOUS; SUBCUTANEOUS at 08:41

## 2017-06-08 NOTE — THERAPY DISCHARGE NOTE
Acute Care - Physical Therapy Discharge Summary  [facility]       Patient Name: Ventura Boggs  : 1960  MRN: [id number]    Today's Date: 2017  Onset of Illness/Injury or Date of Surgery Date: 17    Date of Referral to PT: 07  Referring Physician: Dr. Shaver      Admit Date: 2017      PT Recommendation and Plan    Visit Dx:    ICD-10-CM ICD-9-CM   1. Ventral hernia without obstruction or gangrene K43.9 553.20   2. Impaired physical mobility Z74.09 781.99   3. Impaired mobility and ADLs Z74.09 799.89             Outcome Measures       17 0936 17 0750 17 1500    6 Minute Walk Test    Distance  1000  -AM     Device Used  Yes;rolling walker  -AM     SpO2  92  -AM     Supplemental O2 Used?  No  -AM     # of Rest Breaks  0  -AM     How much help from another person do you currently need...    Turning from your back to your side while in flat bed without using bedrails?  4  -AM     Moving from lying on back to sitting on the side of a flat bed without bedrails?  4  -AM     Moving to and from a bed to a chair (including a wheelchair)?  4  -AM     Standing up from a chair using your arms (e.g., wheelchair, bedside chair)?  4  -AM     Climbing 3-5 steps with a railing?  4  -AM     To walk in hospital room?  4  -AM     AM-PAC 6 Clicks Score  24  -AM     How much help from another is currently needed...    Putting on and taking off regular lower body clothing? 4  -KD  2  -CS    Bathing (including washing, rinsing, and drying) 4  -KD  3  -CS    Toileting (which includes using toilet bed pan or urinal) 3  -KD  3  -CS    Putting on and taking off regular upper body clothing 4  -KD  3  -CS    Taking care of personal grooming (such as brushing teeth) 4  -KD  3  -CS    Eating meals 4  -KD  4  -CS    Score 23  -KD  18  -CS    Functional Assessment    Outcome Measure Options  6 Minute Walk Test;AM-PAC 6 Clicks Basic Mobility (PT)  -AM AM-PAC 6 Clicks Daily Activity (OT)  -CS       06/07/17 1025 06/06/17 1135 06/06/17 1100    6 Minute Walk Test    Distance 740  -AM      Device Used Yes;rolling walker  -AM      SpO2 92  -AM      Supplemental O2 Used? No  -AM      # of Rest Breaks 0  -AM      How much help from another person do you currently need...    Turning from your back to your side while in flat bed without using bedrails? 3  -AM 3  -AM     Moving from lying on back to sitting on the side of a flat bed without bedrails? 3  -AM 3  -AM     Moving to and from a bed to a chair (including a wheelchair)? 4  -AM 3  -AM     Standing up from a chair using your arms (e.g., wheelchair, bedside chair)? 4  -AM 3  -AM     Climbing 3-5 steps with a railing? 4  -AM 3  -AM     To walk in hospital room? 4  -AM 3  -AM     AM-PAC 6 Clicks Score 22  -AM 18  -AM     How much help from another is currently needed...    Putting on and taking off regular lower body clothing?   2  -CS    Bathing (including washing, rinsing, and drying)   3  -CS    Toileting (which includes using toilet bed pan or urinal)   3  -CS    Putting on and taking off regular upper body clothing   3  -CS    Taking care of personal grooming (such as brushing teeth)   3  -CS    Eating meals   4  -CS    Score   18  -CS    Dynamic Gait Index (DGI)    Gait Level Surface 2  -AM      Change in Gait Speed 2  -AM      Gait with Horizontal Head Turns 3  -AM      Gait with Vertical Head Turns 3  -AM      Gait and Pivot Turn 2  -AM      Step Over Obstacle 2  -AM      Step Around Obstacles 3  -AM      Steps 2  -AM      Dynamic Gait Index Score 19  -AM      Tinetti Assessment    Tinetti Assessment yes  -AM      Sitting Balance 1  -AM      Arises 2  -AM      Attempts to Rise 2  -AM      Immediate Standing Balance (first 5 sec) 2  -AM      Standing Balance 2  -AM      Sternal Nudge (feet close together) 2  -AM      Eyes Closed (feet close together) 1  -AM      Turning 360 Degrees- Steps 1  -AM      Turning 360 Degrees- Steadiness 1  -AM      Sitting Down 2  " -AM      Tinetti Balance Score 16  -AM      Gait Initiation (immediate after told \"go\") 1  -AM      Step Length- Right Swing 1  -AM      Step Length- Left Swing 1  -AM      Foot Clearance- Right Foot 1  -AM      Foot Clearance- Left Foot 1  -AM      Step Symmetry 1  -AM      Step Continuity 1  -AM      Path (excursion) 1  -AM      Trunk 0  -AM      Base of Support 1  -AM      Gait Score 9  -AM      Tinetti Total Score 25  -AM      Tinetti Assistive Device rolling walker  -AM      Functional Assessment    Outcome Measure Options 6 Minute Walk Test;AM-PAC 6 Clicks Basic Mobility (PT);Dynamic Gait Index;Tinetti  -AM AM-PAC 6 Clicks Basic Mobility (PT)  -AM AM-PAC 6 Clicks Daily Activity (OT)  -CS      User Key  (r) = Recorded By, (t) = Taken By, (c) = Cosigned By    Initials Name Provider Type    AM Ulysses Gardner PTA Physical Therapy Assistant    KD MARYLIN Jha/L Occupational Therapy Assistant    MARYLIN Stewart/MEI Occupational Therapy Assistant                PT Charges       06/08/17 0750          Time Calculation    Start Time 0750  -AM      Stop Time 0815  -AM      Time Calculation (min) 25 min  -AM      PT Received On 06/08/17  -AM      PT - Next Appointment 06/09/17  -AM      Time Calculation- PT    Total Timed Code Minutes- PT 25 minute(s)  -AM        User Key  (r) = Recorded By, (t) = Taken By, (c) = Cosigned By    Initials Name Provider Type    AM Ulysses Gardner PTA Physical Therapy Assistant                  IP PT Goals       06/08/17 0750 06/07/17 1025 06/06/17 1325    Transfer Training PT LTG    Transfer Training PT  LTG, Date Goal Reviewed 06/08/17  -AM 06/07/17  -AM 06/06/17  -AM    Transfer Training PT LTG, Outcome goal met  -AM goal not met  -AM goal not met  -AM    Gait Training PT LTG    Gait Training Goal PT LTG, Date Goal Reviewed 06/08/17  -AM 06/07/17  -AM 06/06/17  -AM    Gait Training Goal PT LTG, Outcome goal partially met  -AM goal partially met  -AM goal not met  -AM "    Stair Training PT LTG    Stair Training Goal PT LTG, Date Goal Reviewed 06/08/17  -AM 06/07/17  -AM 06/06/17  -AM    Stair Training Goal PT LTG, Outcome goal met  -AM goal not met  -AM goal not met  -AM    Patient Education PT LTG    Patient Education PT LTG, Date Goal Reviewed   06/06/17  -AM    Patient Education PT LTG Outcome   goal met  -AM      06/05/17 0929 06/04/17 1330 06/03/17 0855    Transfer Training PT LTG    Transfer Training PT  LTG, Date Goal Reviewed 06/05/17  -SS 06/04/17  -AM 06/03/17  -AM    Transfer Training PT LTG, Outcome goal ongoing  -SS goal not met  -AM goal not met  -AM    Gait Training PT LTG    Gait Training Goal PT LTG, Date Goal Reviewed 06/05/17  -SS 06/04/17  -AM 06/03/17  -AM    Gait Training Goal PT LTG, Outcome goal ongoing  -SS goal not met  -AM goal not met  -AM    Stair Training PT LTG    Stair Training Goal PT LTG, Date Goal Reviewed 06/05/17  -SS 06/04/17  -AM 06/03/17  -AM    Stair Training Goal PT LTG, Outcome goal ongoing  -SS goal not met  -AM goal not met  -AM    Patient Education PT LTG    Patient Education PT LTG, Date Goal Reviewed 06/05/17  -SS 06/04/17  -AM 06/03/17  -AM    Patient Education PT LTG Outcome goal ongoing  -SS goal not met  -AM goal not met  -AM      06/02/17 1000 06/01/17 1108       Transfer Training PT LTG    Transfer Training PT LTG, Date Established  06/01/17  -GAL     Transfer Training PT LTG, Time to Achieve  by discharge  -GAL     Transfer Training PT LTG, [place] Level  independent  -GAL     Transfer Training PT  LTG, Date Goal Reviewed 06/02/17  -TW      Transfer Training PT LTG, Outcome goal ongoing  -TW goal ongoing  -GAL     Gait Training PT LTG    Gait Training Goal PT LTG, Date Established  06/01/17  -GAL     Gait Training Goal PT LTG, Time to Achieve  by discharge  -GAL     Gait Training Goal PT LTG, [place] Level  independent  -GAL     Gait Training Goal PT LTG, Distance to Achieve  1000ft  -GAL     Gait Training Goal PT LTG,  Additional Goal  Pt will complete 6 minute walk test  -     Gait Training Goal PT LTG, Date Goal Reviewed 06/02/17  -      Gait Training Goal PT LTG, Outcome goal ongoing  - goal ongoing  -     Stair Training PT LTG    Stair Training Goal PT LTG, Date Established  06/01/17  -     Stair Training Goal PT LTG, Time to Achieve  by discharge  -     Stair Training Goal PT LTG, Number of Steps  5  -     Stair Training Goal PT LTG, [place] Level  conditional independence  -     Stair Training Goal PT LTG, Assist Device  1 handrail  -     Stair Training Goal PT LTG, Date Goal Reviewed 06/02/17  -      Stair Training Goal PT LTG, Outcome goal ongoing  - goal ongoing  -     Patient Education PT LTG    Patient Education PT LTG, Date Established  06/01/17  -     Patient Education PT LTG, Time to Achieve  by discharge  -     Patient Education PT LTG, Education Type  gait;transfers;home safety  -     Patient Education PT LTG, Date Goal Reviewed 06/02/17  -      Patient Education PT LTG Outcome goal ongoing  - goal ongoing  -       User Key  (r) = Recorded By, (t) = Taken By, (c) = Cosigned By    Initials Name Provider Type    GAL Shilpa Dumont, PT Physical Therapist    AM Ulysses Gardner, PTA Physical Therapy Assistant    SS Fartun Angela, PTA Physical Therapy Assistant    TW Brant Palacios PTA Physical Therapy Assistant          Therapy Charges for Today     Code Description Service Date Service Provider Modifiers Qty    96184779070 HC GAIT TRAINING EA 15 MIN 6/7/2017 Ulysses Gardner, PTA GP 1    35637256419 HC PT THER PROC EA 15 MIN 6/7/2017 Ulysses Gardner PTA GP 1    47680752222 HC GAIT TRAINING EA 15 MIN 6/8/2017 Ulysses Gardner, PTA GP 1    25504643342 HC PT THER PROC EA 15 MIN 6/8/2017 Ulysses Gardner, PTA GP 1          PT Discharge Summary  Anticipated Discharge Disposition: home with home health  Reason for Discharge: Discharge from facility, Per MD order  Outcomes  Achieved: Patient able to partially acheive established goals  Discharge Destination: Home with home health      Ulysses Gardner, PTA   6/8/2017

## 2017-06-08 NOTE — DISCHARGE PLACEMENT REQUEST
"Ventura Boggs (56 y.o. Male)     Date of Birth Social Security Number Address Home Phone MRN    1960  [address] [facility] [phone] [id number]    Denominational Marital Status          None        Admission Date Admission Type Admitting Provider Attending Provider Department, Room/Bed    5/31/17 Elective Rui Shaver MD Rao, Mohan K, MD [address], 364/1    Discharge Date Discharge Disposition Discharge Destination         Home or Self Care             Attending Provider: Rui Shaver MD     Allergies:  Naproxen, Tramadol    Isolation:  Contact   Infection:  None   Code Status:  FULL    Ht:  68\" (172.7 cm)   Wt:  220 lb 10.9 oz (100 kg)    Admission Cmt:  None   Principal Problem:  Ventral hernia without obstruction or gangrene [K43.9] More...                 Active Insurance as of 5/31/2017     Primary Coverage     Payor Plan Insurance Group Employer/Plan Group    [employer] MEDICAID [employer]      Payor Plan Address Payor Plan Phone Number Effective From Effective To    PO  [phone] 5/19/2017     [place] [address]       Subscriber Name Subscriber Birth Date Member ID       VENTURA BOGGS 1960 [id number]                 Emergency Contacts      (Rel.) Home Phone Work Phone Mobile Phone    Sonia Boggs (Significant Other) [phone] -- --            Insurance Information                [employer]ICA[employer] Phone: [phone]    Subscriber: Ventura Boggs Subscriber#: [id number]    Group#:  Precert#:              History & Physical      Rui Shaver MD at 5/19/2017  9:10 AM          Chief Complaint   Patient presents with   • Abdominal Pain     Abdominal pain possible ventral hernia.        Hernia   This is a new problem. The current episode started more than 1 month ago. The problem occurs constantly. The problem has been gradually worsening. Associated symptoms include abdominal pain. " "Pertinent negatives include no anorexia, arthralgias, change in bowel habit, chest pain, chills, fever, headaches, myalgias, nausea, neck pain, numbness, rash, vomiting or weakness. Nothing aggravates the symptoms. Treatments tried: Wearing an abdominal binder. The treatment provided no relief.     This man is 56 years old and underwent unknown operation in [place] for a \" bowel blockage\", perhaps a lysis of adhesions.he had had no operation previous to this. Began having pain in the left side of the abdomen several months ago.  Now has a ventral hernia that is becoming more tender. No history of incarceration or obstruction.        1. Indeterminate low-attenuation area within the subcapsular region medial aspect of the spleen with similar finding reported on prior exam of 2/23/2017 but not identifiable on recent prior exam of 12/11/2016. As previously reported this may simply be   physiologic due to differences in timing of contrast bolus. Other possibilities might include a hemangioma or in the appropriate clinical setting splenic infarction. There is however no adjacent edema or inflammatory change. Neoplastic etiology is   considered less likely due to finding not being identifiable on recent prior exam of 12/11/2016.  2. Postsurgical changes anterior abdominal wall with what likely represents postsurgical scarring and fat containing ventral hernia.. There are noted to be loops of small bowel adjacent to the undersurface of the abdominal wall in this region which could   be indicative of adhesions but with no associated bowel obstruction or dilatation or significant change compared to prior exams..  3. Fatty infiltration liver.  4. Other chronic nonemergent findings as described above.   Result Narrative   EXAM:  CT ABDOMEN AND PELVIS WITH CONTRAST      TECHNIQUE: A multislice scan was obtained of the abdomen and pelvis in the axial plane with IV contrast. Reformatted images were generated in the sagittal and " coronal planes. Additional delayed images were obtained through the kidneys    HISTORY:ABDOMINAL PAIN  FLANK PAIN chest tightness. Dyspnea. Left-sided upper abdominal pain into chest    COMPARISON: 2/23/2017, 12/11/2016, 11/2/2016      FINDINGS:There is minimal atelectatic change the lung bases. Is no pleural effusion.    The liver is of decreased CT attenuation compatible with fatty infiltration. Gallbladder surgically absent. No hepatic lesions are identified. There is no evidence of significant intrahepatic or extra hepatic biliary ductal dilatation. Pancreas is   atrophic in size with no pancreatic lesions or peripancreatic inflammatory changes identified.    There is again demonstrated to be a site of decreased CT attenuation in the subcapsular region medial aspect of the spleen measuring up to 2.8 x 1.8 in AP and transverse dimensions by 3.5 cm craniocaudal dimension with similar finding reported on the   recent CT exam of 2/23/2017. This is not identifiable on the postcontrast CT scan of 12/11/2016 or the unenhanced scan of 11/2/2016. As previously reported this may be physiologic due to differences in timing of contrast bolus. Other possibilities could   be an underlying hemangioma in the liver perhaps better demonstrated on this exam due to timing of contrast bolus or as previously reported could be on the basis of splenic infarction. A neoplastic etiology is probably less likely due to no lesion being   identifiable on recent prior exam of 12/11/2016. There is no evidence of adjacent edema or inflammatory change. Atherosclerotic calcifications are noted within splenic artery. Splenic vein is normal caliber without evidence of splenic or portal vein   thrombosis.    Abdominal aorta is of normal caliber. Is no significant periaortic adenopathy. No significant plaque or stenosis is identified the origin of the renal or mesenteric arteries.    There is no evidence of a renal mass or hydronephrosis. No ureteral  calculi are identified.    No pelvic mass or significant lymphadenopathy is identified. Is no ascites. No significantly abnormally distended loops of bowel are identified. The appendix is normal caliber with no periappendiceal inflammatory changes identified. No extraluminal air   is identified.    There are postsurgical changes anterior abdominal wall in the supraumbilical and periumbilical region with what likely represents postsurgical scarring. There are loops of small bowel adjacent to the undersurface of the anterior abdominal wall at that   level which could be indicative of underlying adhesions but without associated bowel obstruction or distention or herniation. There are small fat-containing areas of ventral herniation or eventration as previously reported unchanged significantly   compared to prior exams.    Sagittal reformatted images demonstrate the lumbar vertebral bodies all be well aligned with no acute appearing osseous abnormalities identified.         History reviewed.  1. Diabetes  2. Coronary artery disease  3. Hypertension    Previous surgery  1. Cholecystectomy  2. CABG x 4  3. Exploratory laparotomy      Current Outpatient Prescriptions:     •  aspirin 81 MG chewable tablet, Chew 81 mg Daily., Disp: , Rfl:   •  atorvastatin (LIPITOR) 20 MG tablet, Take 20 mg by mouth Daily., Disp: , Rfl:   •  FREESTYLE LITE test strip, USE ONE STRIP BID, Disp: , Rfl: 0  •  insulin lispro (humaLOG) 100 UNIT/ML injection, Inject  under the skin 3 (Three) Times a Day Before Meals., Disp: , Rfl:   •  metFORMIN (GLUCOPHAGE) 1000 MG tablet, Take 1,000 mg by mouth 2 (Two) Times a Day With Meals., Disp: , Rfl:   •  metFORMIN (GLUCOPHAGE) 500 MG tablet, Take 500 mg by mouth., Disp: , Rfl:   •  ondansetron ODT (ZOFRAN-ODT) 4 MG disintegrating tablet, Take 4 mg by mouth Every 8 (Eight) Hours., Disp: , Rfl:   •  SITagliptin (JANUVIA) 25 MG tablet, Take 50 mg by mouth 2 (Two) Times a Day., Disp: , Rfl:   •  metFORMIN  (GLUCOPHAGE) 500 MG tablet, TK 1 T PO BID, Disp: , Rfl: 5    Allergies   Allergen Reactions   • Naproxen Other (See Comments) and Rash     Blisters in mouth   • Tramadol Rash     Patient states he gets a rash in his mouth.        Family history  1. Diabetes  2. Hypertension  3. Ovarian cancer    Social History     Social History   • Marital status: Unknown     Spouse name: N/A   • Number of children: N/A   • Years of education: N/A     Occupational History   • Not on file.     Social History Main Topics   • Smoking status: Never Smoker   • Smokeless tobacco: Not on file   • Alcohol use No   • Drug use: Not on file   • Sexual activity: Not on file     Other Topics Concern   • Not on file     Social History Narrative   • No narrative on file       Review of Systems   Constitutional: Negative for activity change, appetite change, chills and fever.   HENT: Negative for hearing loss, nosebleeds and trouble swallowing.    Respiratory: Positive for shortness of breath.    Cardiovascular: Negative for chest pain, palpitations and leg swelling.   Gastrointestinal: Positive for abdominal pain. Negative for abdominal distention, anal bleeding, anorexia, blood in stool, change in bowel habit, constipation, diarrhea, nausea, rectal pain and vomiting.   Endocrine: Negative for cold intolerance, heat intolerance, polydipsia and polyuria.   Genitourinary: Positive for difficulty urinating. Negative for decreased urine volume, dysuria, enuresis, frequency, hematuria and urgency.   Musculoskeletal: Negative for arthralgias, back pain, gait problem, myalgias and neck pain.   Skin: Negative for pallor, rash and wound.   Allergic/Immunologic: Negative for immunocompromised state.   Neurological: Negative for dizziness, seizures, weakness, light-headedness, numbness and headaches.   Psychiatric/Behavioral: Negative for agitation and behavioral problems. The patient is not nervous/anxious.        Physical Exam   Constitutional: He appears  well-developed and well-nourished. No distress.   HENT:   Head: Normocephalic and atraumatic.   Eyes: Conjunctivae and EOM are normal. Pupils are equal, round, and reactive to light.   Neck: Normal range of motion. Neck supple. No JVD present. No tracheal deviation present. No thyromegaly present.   Cardiovascular: Normal rate, regular rhythm and normal heart sounds.  Exam reveals no friction rub.    No murmur heard.  Pulmonary/Chest: Effort normal and breath sounds normal. No stridor. No respiratory distress. He has no wheezes. He has no rales.   Abdominal: Soft. Bowel sounds are normal. He exhibits no distension and no mass. There is no tenderness. There is no rebound and no guarding. A hernia is present.       Lymphadenopathy:     He has no cervical adenopathy.     He has no axillary adenopathy.   Skin: Skin is warm, dry and intact. No lesion noted. He is not diaphoretic. No erythema.   Psychiatric: He has a normal mood and affect. His speech is normal and behavior is normal. Judgment and thought content normal. Cognition and memory are normal.   Vitals reviewed.        ASSESSMENT    Ventura was seen today for abdominal pain.    Diagnoses and all orders for this visit:    Incisional hernia, without obstruction or gangrene      PLAN    1. Records from [place]  2. CT scan from [place]  3. Recheck in 1 week          This document has been electronically signed by Rui Shaver MD on May 19, 2017 9:12 AM         Electronically signed by Rui Shaver MD at 5/20/2017  5:32 PM      Rui Shaver MD at 5/24/2017  9:00 AM          Chief Complaint   Patient presents with   • Follow-up     Recheck abdominal pain        HPI    I have reviewed the old reports and xrays. He has a broad bases fat filled hernia of the abdominal wall.      Current Outpatient Prescriptions:   •  aspirin 81 MG chewable tablet, Chew 81 mg Daily., Disp: , Rfl:   •  atorvastatin (LIPITOR) 20 MG tablet, Take 20 mg by mouth Daily., Disp: , Rfl:   •   FREESTYLE LITE test strip, USE ONE STRIP BID, Disp: , Rfl: 0  •  insulin lispro (humaLOG) 100 UNIT/ML injection, Inject  under the skin 3 (Three) Times a Day Before Meals., Disp: , Rfl:   •  lisinopril (PRINIVIL,ZESTRIL) 5 MG tablet, Take 5 mg by mouth Daily., Disp: , Rfl:   •  metFORMIN (GLUCOPHAGE) 1000 MG tablet, Take 1,000 mg by mouth 2 (Two) Times a Day With Meals., Disp: , Rfl:   •  ondansetron ODT (ZOFRAN-ODT) 4 MG disintegrating tablet, Take 4 mg by mouth Every 8 (Eight) Hours., Disp: , Rfl:   •  SITagliptin (JANUVIA) 25 MG tablet, Take 50 mg by mouth 2 (Two) Times a Day., Disp: , Rfl:   •  metFORMIN (GLUCOPHAGE) 500 MG tablet, TK 1 T PO BID, Disp: , Rfl: 5  •  metFORMIN (GLUCOPHAGE) 500 MG tablet, Take 500 mg by mouth., Disp: , Rfl:     Allergies   Allergen Reactions   • Naproxen Other (See Comments) and Rash     Blisters in mouth   • Tramadol Rash     Patient states he gets a rash in his mouth.    .    Social History     Social History   • Marital status: Unknown     Spouse name: N/A   • Number of children: N/A   • Years of education: N/A     Occupational History   • Not on file.     Social History Main Topics   • Smoking status: Never Smoker   • Smokeless tobacco: Not on file   • Alcohol use No   • Drug use: Not on file   • Sexual activity: Not on file     Other Topics Concern   • Not on file     Social History Narrative       Review of Systems  Nothing to add  Physical Exam   Constitutional: He appears well-developed and well-nourished.   HENT:   Head: Normocephalic and atraumatic.   Neck: Normal range of motion. Neck supple. No tracheal deviation present. No thyromegaly present.   Cardiovascular: Normal rate and regular rhythm.    Pulmonary/Chest: Effort normal and breath sounds normal.   Abdominal: Soft. Bowel sounds are normal. There is no hepatosplenomegaly, splenomegaly or hepatomegaly. A hernia is present. Hernia confirmed positive in the ventral area.       Lymphadenopathy:     He has no cervical  adenopathy.     He has no axillary adenopathy.   Psychiatric: He has a normal mood and affect. His speech is normal and behavior is normal. Judgment and thought content normal. Cognition and memory are normal.   Vitals reviewed.        ASSESSMENT    Ventura was seen today for follow-up.    Diagnoses and all orders for this visit:    Ventral hernia without obstruction or gangrene  -     ceFAZolin (ANCEF) 2 g in sodium chloride 0.9 % 100 mL IVPB; Infuse 2 g into a venous catheter 1 (One) Time.  -     Case Request; Standing  -     CBC & Differential; Future  -     Comprehensive Metabolic Panel; Future  -     Case Request    Type 2 diabetes mellitus without complication, with long-term current use of insulin    Other orders  -     Follow anesthesia standing orders.; Standing  -     Inpatient Admission; Standing  -     Follow anesthesia standing orders.  -     Provide instructions to patient on NPO status  -     JAILENE hose- To be placed on patient in pre-op; Standing  -     SCD (sequential compression device)- to be placed on patient in Pre-op; Standing  -     Obtain Informed Consent; Standing        PLAN    1. Laparoscopic possible open ventral hernia repair ventral repair with mesh    The procedure has been explained to the patient, and the following were discussed:  There are risks of bleeding, infection requiring antibiotics and possibly further surgery, injury to nearby structures, nerve injury, wound complications, recurrence of hernia. The risk of chronic pain may be as high as 10%, although the risk debilitating pain is approximately 2%. Urinary retention requiring catheterization may occur due to spasm of the bladder muscles in 1 in 100, and is more common in elderly males.  Events such as severe bleeding, the need for blood transfusion(s), heart irregularity or stoppage may occur, but are uncommon.  Bleeding is more common if  blood thinning drugs (such as Warfarin, Asprin, Clopidogrel or Dipyridamole)  are taken.  "Blood clot in the leg (DVT) causing pain and swelling is possible. In rare cases part of the clot may break off and go to the lungs.   Any complication may require a prolonged hospitalization, a modified incision or additional surgery.  Alternatives to surgery have been explained including watchful waiting, noting that patients who are asymptomatic or \"minimally symptomatic\" may be managed without surgical intervention.  The patient was given the opportunity to ask questions and raise concerns with the doctor about the proposed  procedure and its risks, and treatment options. All questions were answered.  The patient agrees to operation.           This document has been electronically signed by Rui Shaver MD on May 24, 2017 9:23 AM         Electronically signed by Rui Shaver MD at 5/24/2017  9:23 AM      Rui Shaver MD at 5/31/2017  2:47 PM            H&P reviewed. The patient was examined and there are no changes to the H&P.         Electronically signed by Rui Shaver MD at 5/31/2017  2:47 PM    Source Note             Chief Complaint   Patient presents with   • Follow-up     Recheck abdominal pain        HPI    I have reviewed the old reports and xrays. He has a broad bases fat filled hernia of the abdominal wall.      Current Outpatient Prescriptions:   •  aspirin 81 MG chewable tablet, Chew 81 mg Daily., Disp: , Rfl:   •  atorvastatin (LIPITOR) 20 MG tablet, Take 20 mg by mouth Daily., Disp: , Rfl:   •  FREESTYLE LITE test strip, USE ONE STRIP BID, Disp: , Rfl: 0  •  insulin lispro (humaLOG) 100 UNIT/ML injection, Inject  under the skin 3 (Three) Times a Day Before Meals., Disp: , Rfl:   •  lisinopril (PRINIVIL,ZESTRIL) 5 MG tablet, Take 5 mg by mouth Daily., Disp: , Rfl:   •  metFORMIN (GLUCOPHAGE) 1000 MG tablet, Take 1,000 mg by mouth 2 (Two) Times a Day With Meals., Disp: , Rfl:   •  ondansetron ODT (ZOFRAN-ODT) 4 MG disintegrating tablet, Take 4 mg by mouth Every 8 (Eight) Hours., Disp: , Rfl:   •  " SITagliptin (JANUVIA) 25 MG tablet, Take 50 mg by mouth 2 (Two) Times a Day., Disp: , Rfl:   •  metFORMIN (GLUCOPHAGE) 500 MG tablet, TK 1 T PO BID, Disp: , Rfl: 5  •  metFORMIN (GLUCOPHAGE) 500 MG tablet, Take 500 mg by mouth., Disp: , Rfl:     Allergies   Allergen Reactions   • Naproxen Other (See Comments) and Rash     Blisters in mouth   • Tramadol Rash     Patient states he gets a rash in his mouth.    .    Social History     Social History   • Marital status: Unknown     Spouse name: N/A   • Number of children: N/A   • Years of education: N/A     Occupational History   • Not on file.     Social History Main Topics   • Smoking status: Never Smoker   • Smokeless tobacco: Not on file   • Alcohol use No   • Drug use: Not on file   • Sexual activity: Not on file     Other Topics Concern   • Not on file     Social History Narrative       Review of Systems  Nothing to add  Physical Exam   Constitutional: He appears well-developed and well-nourished.   HENT:   Head: Normocephalic and atraumatic.   Neck: Normal range of motion. Neck supple. No tracheal deviation present. No thyromegaly present.   Cardiovascular: Normal rate and regular rhythm.    Pulmonary/Chest: Effort normal and breath sounds normal.   Abdominal: Soft. Bowel sounds are normal. There is no hepatosplenomegaly, splenomegaly or hepatomegaly. A hernia is present. Hernia confirmed positive in the ventral area.       Lymphadenopathy:     He has no cervical adenopathy.     He has no axillary adenopathy.   Psychiatric: He has a normal mood and affect. His speech is normal and behavior is normal. Judgment and thought content normal. Cognition and memory are normal.   Vitals reviewed.        ASSESSMENT    Ventura was seen today for follow-up.    Diagnoses and all orders for this visit:    Ventral hernia without obstruction or gangrene  -     ceFAZolin (ANCEF) 2 g in sodium chloride 0.9 % 100 mL IVPB; Infuse 2 g into a venous catheter 1 (One) Time.  -     Case  "Request; Standing  -     CBC & Differential; Future  -     Comprehensive Metabolic Panel; Future  -     Case Request    Type 2 diabetes mellitus without complication, with long-term current use of insulin    Other orders  -     Follow anesthesia standing orders.; Standing  -     Inpatient Admission; Standing  -     Follow anesthesia standing orders.  -     Provide instructions to patient on NPO status  -     JAILENE hose- To be placed on patient in pre-op; Standing  -     SCD (sequential compression device)- to be placed on patient in Pre-op; Standing  -     Obtain Informed Consent; Standing        PLAN    1. Laparoscopic possible open ventral hernia repair ventral repair with mesh    The procedure has been explained to the patient, and the following were discussed:  There are risks of bleeding, infection requiring antibiotics and possibly further surgery, injury to nearby structures, nerve injury, wound complications, recurrence of hernia. The risk of chronic pain may be as high as 10%, although the risk debilitating pain is approximately 2%. Urinary retention requiring catheterization may occur due to spasm of the bladder muscles in 1 in 100, and is more common in elderly males.  Events such as severe bleeding, the need for blood transfusion(s), heart irregularity or stoppage may occur, but are uncommon.  Bleeding is more common if  blood thinning drugs (such as Warfarin, Asprin, Clopidogrel or Dipyridamole)  are taken. Blood clot in the leg (DVT) causing pain and swelling is possible. In rare cases part of the clot may break off and go to the lungs.   Any complication may require a prolonged hospitalization, a modified incision or additional surgery.  Alternatives to surgery have been explained including watchful waiting, noting that patients who are asymptomatic or \"minimally symptomatic\" may be managed without surgical intervention.  The patient was given the opportunity to ask questions and raise concerns with the " "doctor about the proposed  procedure and its risks, and treatment options. All questions were answered.  The patient agrees to operation.           This document has been electronically signed by Rui Shaver MD on May 24, 2017 9:23 AM          Electronically signed by Rui Shaver MD at 5/24/2017  9:23 AM            Rui Shaver MD at 5/31/2017  2:47 PM            H&P reviewed. The patient was examined and there are no changes to the H&P.         Electronically signed by Rui Shaver MD at 5/31/2017  2:47 PM    Source Note             Chief Complaint   Patient presents with   • Abdominal Pain     Abdominal pain possible ventral hernia.        Hernia   This is a new problem. The current episode started more than 1 month ago. The problem occurs constantly. The problem has been gradually worsening. Associated symptoms include abdominal pain. Pertinent negatives include no anorexia, arthralgias, change in bowel habit, chest pain, chills, fever, headaches, myalgias, nausea, neck pain, numbness, rash, vomiting or weakness. Nothing aggravates the symptoms. Treatments tried: Wearing an abdominal binder. The treatment provided no relief.     This man is 56 years old and underwent unknown operation in [place] for a \" bowel blockage\", perhaps a lysis of adhesions.he had had no operation previous to this. Began having pain in the left side of the abdomen several months ago.  Now has a ventral hernia that is becoming more tender. No history of incarceration or obstruction.        1. Indeterminate low-attenuation area within the subcapsular region medial aspect of the spleen with similar finding reported on prior exam of 2/23/2017 but not identifiable on recent prior exam of 12/11/2016. As previously reported this may simply be   physiologic due to differences in timing of contrast bolus. Other possibilities might include a hemangioma or in the appropriate clinical setting splenic infarction. There is however no adjacent " edema or inflammatory change. Neoplastic etiology is   considered less likely due to finding not being identifiable on recent prior exam of 12/11/2016.  2. Postsurgical changes anterior abdominal wall with what likely represents postsurgical scarring and fat containing ventral hernia.. There are noted to be loops of small bowel adjacent to the undersurface of the abdominal wall in this region which could   be indicative of adhesions but with no associated bowel obstruction or dilatation or significant change compared to prior exams..  3. Fatty infiltration liver.  4. Other chronic nonemergent findings as described above.   Result Narrative   EXAM:  CT ABDOMEN AND PELVIS WITH CONTRAST      TECHNIQUE: A multislice scan was obtained of the abdomen and pelvis in the axial plane with IV contrast. Reformatted images were generated in the sagittal and coronal planes. Additional delayed images were obtained through the kidneys    HISTORY:ABDOMINAL PAIN  FLANK PAIN chest tightness. Dyspnea. Left-sided upper abdominal pain into chest    COMPARISON: 2/23/2017, 12/11/2016, 11/2/2016      FINDINGS:There is minimal atelectatic change the lung bases. Is no pleural effusion.    The liver is of decreased CT attenuation compatible with fatty infiltration. Gallbladder surgically absent. No hepatic lesions are identified. There is no evidence of significant intrahepatic or extra hepatic biliary ductal dilatation. Pancreas is   atrophic in size with no pancreatic lesions or peripancreatic inflammatory changes identified.    There is again demonstrated to be a site of decreased CT attenuation in the subcapsular region medial aspect of the spleen measuring up to 2.8 x 1.8 in AP and transverse dimensions by 3.5 cm craniocaudal dimension with similar finding reported on the   recent CT exam of 2/23/2017. This is not identifiable on the postcontrast CT scan of 12/11/2016 or the unenhanced scan of 11/2/2016. As previously reported this may be  physiologic due to differences in timing of contrast bolus. Other possibilities could   be an underlying hemangioma in the liver perhaps better demonstrated on this exam due to timing of contrast bolus or as previously reported could be on the basis of splenic infarction. A neoplastic etiology is probably less likely due to no lesion being   identifiable on recent prior exam of 12/11/2016. There is no evidence of adjacent edema or inflammatory change. Atherosclerotic calcifications are noted within splenic artery. Splenic vein is normal caliber without evidence of splenic or portal vein   thrombosis.    Abdominal aorta is of normal caliber. Is no significant periaortic adenopathy. No significant plaque or stenosis is identified the origin of the renal or mesenteric arteries.    There is no evidence of a renal mass or hydronephrosis. No ureteral calculi are identified.    No pelvic mass or significant lymphadenopathy is identified. Is no ascites. No significantly abnormally distended loops of bowel are identified. The appendix is normal caliber with no periappendiceal inflammatory changes identified. No extraluminal air   is identified.    There are postsurgical changes anterior abdominal wall in the supraumbilical and periumbilical region with what likely represents postsurgical scarring. There are loops of small bowel adjacent to the undersurface of the anterior abdominal wall at that   level which could be indicative of underlying adhesions but without associated bowel obstruction or distention or herniation. There are small fat-containing areas of ventral herniation or eventration as previously reported unchanged significantly   compared to prior exams.    Sagittal reformatted images demonstrate the lumbar vertebral bodies all be well aligned with no acute appearing osseous abnormalities identified.         History reviewed.  1. Diabetes  2. Coronary artery disease  3. Hypertension    Previous surgery  1.  Cholecystectomy  2. CABG x 4  3. Exploratory laparotomy      Current Outpatient Prescriptions:     •  aspirin 81 MG chewable tablet, Chew 81 mg Daily., Disp: , Rfl:   •  atorvastatin (LIPITOR) 20 MG tablet, Take 20 mg by mouth Daily., Disp: , Rfl:   •  FREESTYLE LITE test strip, USE ONE STRIP BID, Disp: , Rfl: 0  •  insulin lispro (humaLOG) 100 UNIT/ML injection, Inject  under the skin 3 (Three) Times a Day Before Meals., Disp: , Rfl:   •  metFORMIN (GLUCOPHAGE) 1000 MG tablet, Take 1,000 mg by mouth 2 (Two) Times a Day With Meals., Disp: , Rfl:   •  metFORMIN (GLUCOPHAGE) 500 MG tablet, Take 500 mg by mouth., Disp: , Rfl:   •  ondansetron ODT (ZOFRAN-ODT) 4 MG disintegrating tablet, Take 4 mg by mouth Every 8 (Eight) Hours., Disp: , Rfl:   •  SITagliptin (JANUVIA) 25 MG tablet, Take 50 mg by mouth 2 (Two) Times a Day., Disp: , Rfl:   •  metFORMIN (GLUCOPHAGE) 500 MG tablet, TK 1 T PO BID, Disp: , Rfl: 5    Allergies   Allergen Reactions   • Naproxen Other (See Comments) and Rash     Blisters in mouth   • Tramadol Rash     Patient states he gets a rash in his mouth.        Family history  1. Diabetes  2. Hypertension  3. Ovarian cancer    Social History     Social History   • Marital status: Unknown     Spouse name: N/A   • Number of children: N/A   • Years of education: N/A     Occupational History   • Not on file.     Social History Main Topics   • Smoking status: Never Smoker   • Smokeless tobacco: Not on file   • Alcohol use No   • Drug use: Not on file   • Sexual activity: Not on file     Other Topics Concern   • Not on file     Social History Narrative   • No narrative on file       Review of Systems   Constitutional: Negative for activity change, appetite change, chills and fever.   HENT: Negative for hearing loss, nosebleeds and trouble swallowing.    Respiratory: Positive for shortness of breath.    Cardiovascular: Negative for chest pain, palpitations and leg swelling.   Gastrointestinal: Positive for  abdominal pain. Negative for abdominal distention, anal bleeding, anorexia, blood in stool, change in bowel habit, constipation, diarrhea, nausea, rectal pain and vomiting.   Endocrine: Negative for cold intolerance, heat intolerance, polydipsia and polyuria.   Genitourinary: Positive for difficulty urinating. Negative for decreased urine volume, dysuria, enuresis, frequency, hematuria and urgency.   Musculoskeletal: Negative for arthralgias, back pain, gait problem, myalgias and neck pain.   Skin: Negative for pallor, rash and wound.   Allergic/Immunologic: Negative for immunocompromised state.   Neurological: Negative for dizziness, seizures, weakness, light-headedness, numbness and headaches.   Psychiatric/Behavioral: Negative for agitation and behavioral problems. The patient is not nervous/anxious.        Physical Exam   Constitutional: He appears well-developed and well-nourished. No distress.   HENT:   Head: Normocephalic and atraumatic.   Eyes: Conjunctivae and EOM are normal. Pupils are equal, round, and reactive to light.   Neck: Normal range of motion. Neck supple. No JVD present. No tracheal deviation present. No thyromegaly present.   Cardiovascular: Normal rate, regular rhythm and normal heart sounds.  Exam reveals no friction rub.    No murmur heard.  Pulmonary/Chest: Effort normal and breath sounds normal. No stridor. No respiratory distress. He has no wheezes. He has no rales.   Abdominal: Soft. Bowel sounds are normal. He exhibits no distension and no mass. There is no tenderness. There is no rebound and no guarding. A hernia is present.       Lymphadenopathy:     He has no cervical adenopathy.     He has no axillary adenopathy.   Skin: Skin is warm, dry and intact. No lesion noted. He is not diaphoretic. No erythema.   Psychiatric: He has a normal mood and affect. His speech is normal and behavior is normal. Judgment and thought content normal. Cognition and memory are normal.   Vitals  reviewed.        ASSESSMENT    Ventura was seen today for abdominal pain.    Diagnoses and all orders for this visit:    Incisional hernia, without obstruction or gangrene      PLAN    1. Records from [facility]  2. CT scan from [facility]  3. Recheck in 1 week          This document has been electronically signed by Rui Shaver MD on May 19, 2017 9:12 AM          Electronically signed by Rui Shaver MD at 2017  5:32 PM              Physician Progress Notes (last 24 hours) (Notes from 2017  4:38 PM through 2017  4:38 PM)     No notes of this type exist for this encounter.           Physical Therapy Notes (last 24 hours) (Notes from 2017  4:38 PM through 2017  4:38 PM)      Ulysses Gardner PTA at 2017  7:50 AM  Version 1 of 1         Acute Care - Physical Therapy Treatment Note  [facility]     Patient Name: Ventura Boggs  : 1960  MRN: [id number]  Today's Date: 2017  Onset of Illness/Injury or Date of Surgery Date: 17  Date of Referral to PT: 07  Referring Physician: Dr. Shaver    Admit Date: 2017    Visit Dx:    ICD-10-CM ICD-9-CM   1. Ventral hernia without obstruction or gangrene K43.9 553.20   2. Impaired physical mobility Z74.09 781.99   3. Impaired mobility and ADLs Z74.09 799.89     Patient Active Problem List   Diagnosis   • Incisional hernia, without obstruction or gangrene   • Controlled type 2 diabetes mellitus without complication, with long-term current use of insulin   • Ventral hernia without obstruction or gangrene   • Postoperative urinary retention               Adult Rehabilitation Note       17 [id number] 17 0750 17 1324    Rehab Assessment/Intervention    Discipline occupational therapy assistant  -KD physical therapy assistant  -AM occupational therapy assistant  -CS    Document Type therapy note (daily note)  -KD therapy note (daily note)  -AM therapy note (daily note)  -CS    Subjective Information agree to therapy  -SAMUEL agree  to therapy;complains of;pain  -AM agree to therapy  -CS    Patient Effort, Rehab Treatment  good  -AM     Symptoms Noted During/After Treatment  none  -AM     Precautions/Limitations  no known precautions/limitations  -AM     Equipment Issued to Patient  gait belt  -AM     Recorded by [KD] MARYLIN Jha/MEI [AM] Ulysses Gardner PTA [CS] MARYLIN Rosado/L    Vital Signs    Pre Systolic BP Rehab 119  -  -  -CS    Pre Treatment Diastolic BP 71  -KD 94  -AM 81  -CS    Post Systolic BP Rehab  144  -AM     Post Treatment Diastolic BP  97  -AM     Pretreatment Heart Rate (beats/min) 109  -  -  -CS    Posttreatment Heart Rate (beats/min) 111  -  -AM     Pre SpO2 (%) 95  -KD 95  -AM 91  -CS    O2 Delivery Pre Treatment room air  -KD room air  -AM room air  -CS    Post SpO2 (%) 93  -KD 92  -AM     O2 Delivery Post Treatment room air  -KD room air  -AM     Pre Patient Position Sitting  -KD Supine  -AM Sitting  -CS    Intra Patient Position Standing  -KD Standing  -AM Standing  -CS    Post Patient Position Sitting  -KD Supine  -AM Sitting  -CS    Recorded by [KD] MARYLIN Jha/MEI [AM] Ulysses Gardner PTA [CS] MARYLIN Rosado/MEI    Pain Assessment    Pain Assessment 0-10  -KD 0-10  -AM 0-10  -CS    Pain Score 6  -KD 6  -AM 6  -CS    Post Pain Score  6  -AM 6  -CS    Pain Type  Acute pain;Surgical pain  -AM     Pain Location --   TAINA tube site  -KD Abdomen  -AM Abdomen  -CS    Pain Orientation  Mid  -AM     Pain Descriptors  Sore  -AM     Pain Frequency  Constant/continuous  -AM     Date Pain First Started  05/31/17  -AM     Clinical Progression  Not changed  -AM     Patient's Stated Pain Goal  No pain  -AM     Pain Intervention(s)  Medication (See MAR);Ambulation/increased activity  -AM     Result of Injury  No  -AM     Work-Related Injury  No  -AM     Multiple Pain Sites  No  -AM     Recorded by [KD] MARYLIN Jha/MEI [AM] Ulysses Gardner PTA [CS] Divya Ash,  COULTER/L    Cognitive Assessment/Intervention    Current Cognitive/Communication Assessment functional  -KD functional  -AM functional  -CS    Orientation Status oriented x 4  -KD oriented x 4  -AM oriented x 4  -CS    Follows Commands/Answers Questions 100% of the time  -% of the time  -% of the time  -CS    Personal Safety Interventions gait belt;nonskid shoes/slippers when out of bed  -KD gait belt;nonskid shoes/slippers when out of bed;supervised activity  -AM gait belt;nonskid shoes/slippers when out of bed  -CS    Recorded by [KD] SACHI JhaA/L [AM] Ulysses Gardner PTA [CS] SAHCI RosadoA/L    ROM (Range of Motion)    General ROM  no range of motion deficits identified  -AM     Recorded by  [AM] Ulysses Gardner PTA     Bed Mobility, Assessment/Treatment    Bed Mobility, Roll Left, [place]  not tested  -AM     Bed Mobility, Roll Right, [place] independent  -KD not tested  -AM     Bed Mobility, Scoot/Bridge, [place]  not tested  -AM     Bed Mob, Supine to Sit, [place] independent  -KD conditional independence  -AM supervision required  -CS    Bed Mob, Sit to Supine, [place] independent  -KD conditional independence  -AM supervision required  -CS    Bed Mob, Sidelying to Sit, [place] independent  -KD not tested  -AM     Bed Mob, Sit to Sidelying, Energy  not tested  -AM     Recorded by [KD] SACHI JhaA/L [AM] Ulysses Gardner, PTA [CS] Divya Ash, COULTER/L    Transfer Assessment/Treatment    Transfers, Bed-Chair [place] independent  -KD conditional independence  -AM     Transfers, Chair-Bed [place]  conditional independence  -AM     Transfers, Bed-Chair-Bed, Assist Device --   No AD  -KD rolling walker  -AM     Transfers, Sit-Stand [place] independent  -KD independent  -AM supervision required  -CS    Transfers, Stand-Sit [place] independent  -KD independent  -AM supervision required  -CS    Transfers, Sit-Stand-Sit,  Assist Device  rolling walker  -AM rolling walker  -CS    Toilet Transfer, [place] not tested  -KD not tested  -AM     Walk-In Shower Transfer, [place] not tested  -KD not tested  -AM     Bathtub Transfer, [place] not tested  -KD not tested  -AM     Transfer, Impairments  pain  -AM     Recorded by [KD] MARYLIN Jha/MEI [AM] Ulysses Gardner PTA [CS] SACHI RosadoA/L    Gait Assessment/Treatment    Gait, [place] Level  conditional independence  -AM     Gait, Assistive Device  rolling walker  -AM     Gait, Distance (Feet)  --   1000+150  -AM     Gait, Gait Pattern Analysis  swing-through gait  -AM     Gait, Maintain Weight Bearing Status  able to maintain weight bearing status  -AM     Gait, Impairments  pain  -AM     Recorded by  [AM] Ulysses Gardner PTA     Stairs Assessment/Treatment    Number of Stairs  1 flight  -AM     Stairs, Handrail Location  right side (ascending)  -AM     Stairs, [place] Level  independent  -AM     Stairs, Technique Used  step over step (ascending);step over step (descending)  -AM     Stairs, Maintain Weight Bearing Status  able to maintain weight bearing status  -AM     Stairs, Impairments  pain  -AM     Recorded by  [AM] Ulysses Gardner PTA     Upper Body Bathing Assessment/Training    UB Bathing Assess/Train Assistive Device bath mitt  -KD      UB Bathing Assess/Train, Position edge of bed;sitting  -KD      UB Bathing Assess/Train, [place] Level set up required  -KD      Recorded by [KD] SACHI JhaA/L      Lower Body Bathing Assessment/Training    LB Bathing Assess/Train Assistive Device bath mitt  -KD      LB Bathing Assess/Train, Position edge of bed;sitting  -KD      LB Bathing Assess/Train, [place] Level set up required  -KD      Recorded by [KD] SACHI JhaA/L      Upper Body Dressing Assessment/Training    UB Dressing Assess/Train, Clothing Type doffing:;donning:;hospital gown  -KD      UB Dressing Assess/Train,  Position edge of bed;sitting  -KD      UB Dressing Assess/Train, [place] set up required  -KD      Recorded by [KD] MARYLIN Jha/L      Lower Body Dressing Assessment/Training    LB Dressing Assess/Train, Clothing Type doffing:;donning:;slipper socks  -KD      LB Dressing Assess/Train, Position edge of bed;sitting  -KD      LB Dressing Assess/Train, [place] set up required  -KD      Recorded by [KD] MARYLIN Jha/L      Grooming Assessment/Training    Grooming Assess/Train, Assistive Device --   comb hair  -KD      Grooming Assess/Train, Position edge of bed;sitting  -KD      Grooming Assess/Train, Indepen Level conditional independence  -KD      Recorded by [KD] MARYLIN Jha/L      Balance Skills Training    Standing-Level of Assistance Close supervision  -KD  Close supervision  -CS    Static Standing Balance Support assistive device  -KD  assistive device  -CS    Standing-Balance Activities --   static   -KD      Standing Balance # of Minutes --   5  -KD  5  -CS    Recorded by [KD] MARYLIN Jha/MEI  [CS] MARYLIN Rosado/L    Therapy Exercises    Bilateral Upper Extremity AROM:;20 reps;sitting;elbow flexion/extension;pronation/supination;shoulder abduction/adduction;shoulder extension/flexion;shoulder horizontal abd/add;shoulder ER/IR  -KD  AROM:;20 reps;sitting;elbow flexion/extension;hand pumps;pronation/supination;shoulder extension/flexion;shoulder ER/IR  -CS    BUE Resistance manual resistance- minimal  -KD  manual resistance- minimal  -CS    Recorded by [KD] MARYLIN Jha/MEI  [CS] MARYLIN Rosado/L    Positioning and Restraints    Pre-Treatment Position in bed  -KD in bed  -AM in bed  -CS    Post Treatment Position chair  -KD bed  -AM bed  -CS    In Bed  supine;call light within reach;encouraged to call for assist  -AM supine;call light within reach  -CS    In Chair call light within reach;encouraged to call for assist;sitting  -KD      Recorded by  [KD] JOLIE Jha [AM] Ulysses Gardner PTA [CS] JOLIE Rosado      06/07/17 1025 06/06/17 1135 06/06/17 1040    Rehab Assessment/Intervention    Discipline physical therapy assistant  -AM physical therapy assistant  -AM occupational therapy assistant  -CS    Document Type therapy note (daily note)  -AM therapy note (daily note)  -AM therapy note (daily note)  -CS    Subjective Information agree to therapy;complains of;pain  -AM agree to therapy;complains of;pain  -AM agree to therapy  -CS    Patient Effort, Rehab Treatment good  -AM good  -AM     Symptoms Noted During/After Treatment none  -AM none  -AM     Precautions/Limitations no known precautions/limitations  -AM no known precautions/limitations  -AM     Equipment Issued to Patient gait belt  -AM gait belt  -AM     Recorded by [AM] Ulysses Gardner PTA [AM] Ulysses Gardner PTA [CS] MARYLIN Rosado/L    Vital Signs    Pre Systolic BP Rehab 151  -  -AM     Pre Treatment Diastolic BP 82  -AM 85  -AM     Post Systolic BP Rehab 148  -  -AM     Post Treatment Diastolic BP 97  -AM 97  -AM     Pretreatment Heart Rate (beats/min) 116  -  -AM     Posttreatment Heart Rate (beats/min) 117  -AM 91  -  -CS    Pre SpO2 (%) 94  -AM 90  -AM     O2 Delivery Pre Treatment room air  -AM room air  -AM     Post SpO2 (%) 92  -AM 90  -AM 94  -CS    O2 Delivery Post Treatment room air  -AM room air  -AM room air  -CS    Pre Patient Position Sitting  -AM Supine  -AM Supine  -CS    Intra Patient Position Standing  -AM Standing  -AM Sitting  -CS    Post Patient Position Sitting  -AM Supine  -AM Sitting  -CS    Recorded by [AM] Ulysses Gardner PTA [AM] Ulysses Gardner PTA [CS] MARYLIN Rosado/MEI    Pain Assessment    Pain Assessment 0-10  -AM 0-10  -AM 0-10  -CS    Pain Score 8  -AM 7  -AM 7  -CS    Post Pain Score 8  -AM 7  -AM 7  -CS    Pain Type Acute pain;Surgical pain  -AM Acute pain;Surgical pain  -AM Surgical pain  -CS     Pain Location Abdomen  -AM Abdomen  -AM Abdomen  -CS    Pain Orientation Mid  -AM Mid  -AM     Pain Descriptors Sore  -AM Sore  -AM     Pain Frequency Constant/continuous  -AM Constant/continuous  -AM     Date Pain First Started 05/31/17  -AM 05/31/17  -AM     Clinical Progression Not changed  -AM Not changed  -AM     Patient's Stated Pain Goal No pain  -AM No pain  -AM     Pain Intervention(s) Medication (See MAR);Ambulation/increased activity  -AM Medication (See MAR);Ambulation/increased activity  -AM     Result of Injury No  -AM No  -AM     Work-Related Injury No  -AM No  -AM     Multiple Pain Sites No  -AM No  -AM     Recorded by [AM] Ulysses Gardner PTA [AM] Ulysses Gardner PTA [CS] MARYLIN Rosado/L    Cognitive Assessment/Intervention    Current Cognitive/Communication Assessment functional  -AM functional  -AM functional  -CS    Orientation Status oriented x 4  -AM oriented x 4  -AM oriented x 4  -CS    Follows Commands/Answers Questions 100% of the time  -% of the time  -% of the time  -CS    Personal Safety Interventions gait belt;nonskid shoes/slippers when out of bed;supervised activity  -AM gait belt;nonskid shoes/slippers when out of bed;supervised activity  -AM     Recorded by [AM] Ulysses Gardner PTA [AM] Ulysses Gardner PTA [CS] MARYLIN Rosado/L    ROM (Range of Motion)    General ROM no range of motion deficits identified  -AM no range of motion deficits identified  -AM     Recorded by [AM] Ulysses Gardner PTA [AM] Ulysses Gardner PTA     Bed Mobility, Assessment/Treatment    Bed Mobility, Assistive Device  bed rails;head of bed elevated  -AM     Bed Mobility, Roll Left, [place] not tested  -AM not tested  -AM     Bed Mobility, Roll Right, [place] not tested  -AM not tested  -AM     Bed Mobility, Scoot/Bridge, [place] not tested  -AM not tested  -AM     Bed Mob, Supine to Sit, [place] not tested  -AM supervision required  -AM     Bed Mob, Sit to  Supine, [place] not tested  -AM supervision required  -AM     Bed Mob, Sidelying to Sit, [place] not tested  -AM not tested  -AM     Bed Mob, Sit to Sidelying, [place] not tested  -AM not tested  -AM     Bed Mobility, Safety Issues  decreased use of arms for pushing/pulling;decreased use of legs for bridging/pushing  -AM     Bed Mobility, Impairments  pain  -AM     Recorded by [AM] Ulysses Gardner PTA [AM] Ulysses Gardner PTA     Transfer Assessment/Treatment    Transfers, Bed-Chair [place] conditional independence  -AM stand by assist  -AM     Transfers, Chair-Bed [place] conditional independence  -AM stand by assist  -AM     Transfers, Bed-Chair-Bed, Assist Device rolling walker  -AM rolling walker  -AM     Transfers, Sit-Stand [place] independent  -AM stand by assist  -AM     Transfers, Stand-Sit [place] independent  -AM stand by assist  -AM     Transfers, Sit-Stand-Sit, Assist Device rolling walker  -AM rolling walker  -AM     Toilet Transfer, [place] not tested  -AM not tested  -AM     Walk-In Shower Transfer, [place] not tested  -AM not tested  -AM     Bathtub Transfer, [place] not tested  -AM not tested  -AM     Transfer, Maintain Weight Bearing Status able to maintain weight bearing status  -AM able to maintain weight bearing status  -AM     Transfer, Safety Issues  step length decreased  -AM     Transfer, Impairments pain  -AM pain  -AM     Recorded by [AM] Ulysses Gardner PTA [AM] Ulysses Gardner PTA     Gait Assessment/Treatment    Gait, [place] Level conditional independence  -AM stand by assist  -AM     Gait, Assistive Device rolling walker  -AM rolling walker  -AM     Gait, Distance (Feet) 1440   700+740  -  -AM     Gait, Gait Pattern Analysis swing-through gait  -AM swing-through gait  -AM     Gait, Gait Deviations  bilateral:;[place] decreased  -AM     Gait, Maintain Weight Bearing Status able to maintain weight bearing status  -AM able  to maintain weight bearing status  -AM     Gait, Safety Issues  step length decreased  -AM     Gait, Impairments pain  -AM pain  -AM     Recorded by [AM] Ulysses Gardner PTA [AM] Ulysses Gardner PTA     Stairs Assessment/Treatment    Number of Stairs 3  -AM 3  -AM     Stairs, Handrail Location right side (ascending)  -AM both sides  -AM     Stairs, [place] Level independent  -AM supervision required;contact guard assist  -AM     Stairs, Technique Used step over step (ascending);step over step (descending)  -AM step over step (ascending);step over step (descending)  -AM     Stairs, Maintain Weight Bearing Status able to maintain weight bearing status  -AM able to maintain weight bearing status  -AM     Stairs, Impairments pain  -AM strength decreased  -AM     Recorded by [AM] Ulysses Gardner PTA [AM] Ulysses Gardner PTA     Grooming Assessment/Training    Grooming Assess/Train, Position   long sitting  -CS    Grooming Assess/Train, Indepen Level   set up required  -CS    Grooming Assess/Train, Comment   oral care, wash face/hands, comb hair  -CS    Recorded by   [CS] JOLIE Rosado    Therapy Exercises    Bilateral Upper Extremity   AROM:;20 reps;sitting;elbow flexion/extension;hand pumps;pronation/supination;shoulder extension/flexion;shoulder ER/IR  -CS    BUE Resistance   manual resistance- minimal  -CS    Recorded by   [CS] JOLIE Rosado    Positioning and Restraints    Pre-Treatment Position sitting in chair/recliner  -AM in bed  -AM in bed  -CS    Post Treatment Position chair  -AM bed  -AM bed  -CS    In Bed  supine;call light within reach;encouraged to call for assist;exit alarm on  -AM sitting;call light within reach  -CS    In Chair reclined;call light within reach;encouraged to call for assist;legs elevated  -AM      Recorded by [AM] Ulysses Gardner PTA [AM] Ulysses Gardner PTA [CS] JOLIE Rosado      User Key  (r) = Recorded By, (t) = Taken By, (c) = Cosigned By     Initials Name Effective Dates    AM Ulysses Gardner, PTA 10/17/16 -     KD Ele JOSE Avina, COULTER/L 10/17/16 -     CS Divya Ash, COULTER/L 10/17/16 -                 IP PT Goals       06/08/17 0750 06/07/17 1025 06/06/17 1325    Transfer Training PT LTG    Transfer Training PT  LTG, Date Goal Reviewed 06/08/17  -AM 06/07/17  -AM 06/06/17  -AM    Transfer Training PT LTG, Outcome goal met  -AM goal not met  -AM goal not met  -AM    Gait Training PT LTG    Gait Training Goal PT LTG, Date Goal Reviewed 06/08/17  -AM 06/07/17  -AM 06/06/17  -AM    Gait Training Goal PT LTG, Outcome goal partially met  -AM goal partially met  -AM goal not met  -AM    Stair Training PT LTG    Stair Training Goal PT LTG, Date Goal Reviewed 06/08/17  -AM 06/07/17  -AM 06/06/17  -AM    Stair Training Goal PT LTG, Outcome goal met  -AM goal not met  -AM goal not met  -AM    Patient Education PT LTG    Patient Education PT LTG, Date Goal Reviewed   06/06/17  -AM    Patient Education PT LTG Outcome   goal met  -AM      06/05/17 0929 06/04/17 1330 06/03/17 0855    Transfer Training PT LTG    Transfer Training PT  LTG, Date Goal Reviewed 06/05/17  -SS 06/04/17  -AM 06/03/17  -AM    Transfer Training PT LTG, Outcome goal ongoing  -SS goal not met  -AM goal not met  -AM    Gait Training PT LTG    Gait Training Goal PT LTG, Date Goal Reviewed 06/05/17  -SS 06/04/17  -AM 06/03/17  -AM    Gait Training Goal PT LTG, Outcome goal ongoing  -SS goal not met  -AM goal not met  -AM    Stair Training PT LTG    Stair Training Goal PT LTG, Date Goal Reviewed 06/05/17  -SS 06/04/17  -AM 06/03/17  -AM    Stair Training Goal PT LTG, Outcome goal ongoing  -SS goal not met  -AM goal not met  -AM    Patient Education PT LTG    Patient Education PT LTG, Date Goal Reviewed 06/05/17  -SS 06/04/17  -AM 06/03/17  -AM    Patient Education PT LTG Outcome goal ongoing  -SS goal not met  -AM goal not met  -AM      06/02/17 1000 06/01/17 1108       Transfer Training PT  LTG    Transfer Training PT LTG, Date Established  06/01/17  -     Transfer Training PT LTG, Time to Achieve  by discharge  -     Transfer Training PT LTG, [place] Level  independent  -     Transfer Training PT  LTG, Date Goal Reviewed 06/02/17  -TW      Transfer Training PT LTG, Outcome goal ongoing - goal ongoing  -     Gait Training PT LTG    Gait Training Goal PT LTG, Date Established  06/01/17  -     Gait Training Goal PT LTG, Time to Achieve  by discharge  -     Gait Training Goal PT LTG, [place] Level  independent  -     Gait Training Goal PT LTG, Distance to Achieve  1000ft  -     Gait Training Goal PT LTG, Additional Goal  Pt will complete 6 minute walk test  -     Gait Training Goal PT LTG, Date Goal Reviewed 06/02/17  -TW      Gait Training Goal PT LTG, Outcome goal ongoing -TW goal ongoing  -     Stair Training PT LTG    Stair Training Goal PT LTG, Date Established  06/01/17  -     Stair Training Goal PT LTG, Time to Achieve  by discharge  -     Stair Training Goal PT LTG, Number of Steps  5  -     Stair Training Goal PT LTG, [place] Level  conditional independence  -     Stair Training Goal PT LTG, Assist Device  1 handrail  -     Stair Training Goal PT LTG, Date Goal Reviewed 06/02/17  -TW      Stair Training Goal PT LTG, Outcome goal ongoing -TW goal ongoing  [facility]     Patient Education PT LTG    Patient Education PT LTG, Date Established  06/01/17  -     Patient Education PT LTG, Time to Achieve  by discharge  -     Patient Education PT LTG, Education Type  gait;transfers;home safety  -     Patient Education PT LTG, Date Goal Reviewed 06/02/17  -TW      Patient Education PT LTG Outcome goal ongoing - goal ongoing  [facility]       User Key  (r) = Recorded By, (t) = Taken By, (c) = Cosigned By    Initials Name Provider Type    GAL Dumont, PT Physical Therapist    ERNIE Gardner, PTA Physical Therapy Assistant    JUANITA Angela PTA  Physical Therapy Assistant    DERREK Palacios PTA Physical Therapy Assistant          Physical Therapy Education     Title: PT OT SLP Therapies (Active)     Topic: Physical Therapy (Active)     Point: Mobility training (Active)    Learning Progress Summary    Learner Readiness Method Response Comment Documented by Status   Patient Acceptance E,D,TB NR   06/05/17 0942 Active    Acceptance E NR   06/01/17 1107 Active   Family Acceptance E NR   06/01/17 1107 Active               Point: Precautions (Active)    Learning Progress Summary    Learner Readiness Method Response Comment Documented by Status   Patient Acceptance E,D,TB NR   06/05/17 0942 Active    Acceptance E NR   06/01/17 1107 Active   Family Acceptance E NR   06/01/17 1107 Active                      User Key     Initials Effective Dates Name Provider Type Discipline     10/17/16 -  Shilpa Dumont, PT Physical Therapist PT     10/17/16 -  Fartun Angela PTA Physical Therapy Assistant PT                    PT Recommendation and Plan  Anticipated Discharge Disposition: home with home health  Planned Therapy Interventions: balance training, bed mobility training, gait training, patient/family education, stair training, transfer training  PT Frequency: other (see comments) (5-14 times per week)  Plan of Care Review  Plan Of Care Reviewed With: patient  Progress: progress toward functional goals as expected  Outcome Summary/Follow up Plan: Pt met 2 PT goals this tx. Pt able to amb 1000+150 ft w/RW COnditional Indepdence. Pt would benefit from  PT once discharged from this facility.           Outcome Measures       06/08/17 0936 06/08/17 0750 06/07/17 1500    6 Minute Walk Test    Distance  1000  -AM     Device Used  Yes;rolling walker  -AM     SpO2  92  -AM     Supplemental O2 Used?  No  -AM     # of Rest Breaks  0  -AM     How much help from another person do you currently need...    Turning from your back to your side while in flat  bed without using bedrails?  4  -AM     Moving from lying on back to sitting on the side of a flat bed without bedrails?  4  -AM     Moving to and from a bed to a chair (including a wheelchair)?  4  -AM     Standing up from a chair using your arms (e.g., wheelchair, bedside chair)?  4  -AM     Climbing 3-5 steps with a railing?  4  -AM     To walk in hospital room?  4  -AM     AM-PAC 6 Clicks Score  24  -AM     How much help from another is currently needed...    Putting on and taking off regular lower body clothing? 4  -KD  2  -CS    Bathing (including washing, rinsing, and drying) 4  -KD  3  -CS    Toileting (which includes using toilet bed pan or urinal) 3  -KD  3  -CS    Putting on and taking off regular upper body clothing 4  -KD  3  -CS    Taking care of personal grooming (such as brushing teeth) 4  -KD  3  -CS    Eating meals 4  -KD  4  -CS    Score 23  -KD  18  -CS    Functional Assessment    Outcome Measure Options  6 Minute Walk Test;AM-PAC 6 Clicks Basic Mobility (PT)  -AM AM-PAC 6 Clicks Daily Activity (OT)  -CS      06/07/17 1025 06/06/17 1135 06/06/17 1100    6 Minute Walk Test    Distance 740  -AM      Device Used Yes;rolling walker  -AM      SpO2 92  -AM      Supplemental O2 Used? No  -AM      # of Rest Breaks 0  -AM      How much help from another person do you currently need...    Turning from your back to your side while in flat bed without using bedrails? 3  -AM 3  -AM     Moving from lying on back to sitting on the side of a flat bed without bedrails? 3  -AM 3  -AM     Moving to and from a bed to a chair (including a wheelchair)? 4  -AM 3  -AM     Standing up from a chair using your arms (e.g., wheelchair, bedside chair)? 4  -AM 3  -AM     Climbing 3-5 steps with a railing? 4  -AM 3  -AM     To walk in hospital room? 4  -AM 3  -AM     AM-PAC 6 Clicks Score 22  -AM 18  -AM     How much help from another is currently needed...    Putting on and taking off regular lower body clothing?   2  -CS     "Bathing (including washing, rinsing, and drying)   3  -CS    Toileting (which includes using toilet bed pan or urinal)   3  -CS    Putting on and taking off regular upper body clothing   3  -CS    Taking care of personal grooming (such as brushing teeth)   3  -CS    Eating meals   4  -CS    Score   18  -CS    Dynamic Gait Index (DGI)    Gait Level Surface 2  -AM      Change in Gait Speed 2  -AM      Gait with Horizontal Head Turns 3  -AM      Gait with Vertical Head Turns 3  -AM      Gait and Pivot Turn 2  -AM      Step Over Obstacle 2  -AM      Step Around Obstacles 3  -AM      Steps 2  -AM      Dynamic Gait Index Score 19  -AM      Tinetti Assessment    Tinetti Assessment yes  -AM      Sitting Balance 1  -AM      Arises 2  -AM      Attempts to Rise 2  -AM      Immediate Standing Balance (first 5 sec) 2  -AM      Standing Balance 2  -AM      Sternal Nudge (feet close together) 2  -AM      Eyes Closed (feet close together) 1  -AM      Turning 360 Degrees- Steps 1  -AM      Turning 360 Degrees- Steadiness 1  -AM      Sitting Down 2  -AM      Tinetti Balance Score 16  -AM      Gait Initiation (immediate after told \"go\") 1  -AM      Step Length- Right Swing 1  -AM      Step Length- Left Swing 1  -AM      Foot Clearance- Right Foot 1  -AM      Foot Clearance- Left Foot 1  -AM      Step Symmetry 1  -AM      Step Continuity 1  -AM      Path (excursion) 1  -AM      Trunk 0  -AM      Base of Support 1  -AM      Gait Score 9  -AM      Tinetti Total Score 25  -AM      Tinetti Assistive Device rolling walker  -AM      Functional Assessment    Outcome Measure Options 6 Minute Walk Test;AM-PAC 6 Clicks Basic Mobility (PT);Dynamic Gait Index;Tinetti  -AM AM-PAC 6 Clicks Basic Mobility (PT)  -AM AM-PAC 6 Clicks Daily Activity (OT)  -CS      User Key  (r) = Recorded By, (t) = Taken By, (c) = Cosigned By    Initials Name Provider Type    AM Ulysses Gardner PTA Physical Therapy Assistant    MARYLIN Andrew/MEI Occupational " Therapy Assistant    JOLIE Stewart Occupational Therapy Assistant           Time Calculation:         PT Charges       17 0750          Time Calculation    Start Time 0750  -AM      Stop Time 0815  -AM      Time Calculation (min) 25 min  -AM      PT Received On 17  -AM      PT - Next Appointment 17  -AM      Time Calculation- PT    Total Timed Code Minutes- PT 25 minute(s)  -AM        User Key  (r) = Recorded By, (t) = Taken By, (c) = Cosigned By    Initials Name Provider Type    AM Ulysses Gardner PTA Physical Therapy Assistant          Therapy Charges for Today     Code Description Service Date Service Provider Modifiers Qty    04207206052 HC GAIT TRAINING EA 15 MIN 2017 Ulysses Garnder PTA GP 1    07821339893 HC PT THER PROC EA 15 MIN 2017 Ulysses Gardner PTA GP 1    95841150755 HC GAIT TRAINING EA 15 MIN 2017 Ulysses Gardner PTA GP 1    81588427571 HC PT THER PROC EA 15 MIN 2017 Ulysses Gardner PTA GP 1          PT G-Codes  PT Professional Judgement Used?: Yes  Outcome Measure Options: 6 Minute Walk Test, AM-PAC 6 Clicks Basic Mobility (PT)  Score: 18  Functional Limitation: Mobility: Walking and moving around  Mobility: Walking and Moving Around Current Status (): At least 40 percent but less than 60 percent impaired, limited or restricted  Mobility: Walking and Moving Around Goal Status (): 0 percent impaired, limited or restricted    Ulysses Gardner PTA  2017          Electronically signed by Ulysses Gardner PTA at 2017  3:00 PM      Ulysses Gardner PTA at 2017  7:50 AM  Version 1 of 1         Acute Care - Physical Therapy Discharge Summary  [facility]       Patient Name: Ventura Boggs  : 1960  MRN: [id number]    Today's Date: 2017  Onset of Illness/Injury or Date of Surgery Date: 17    Date of Referral to PT: 07  Referring Physician: Dr. Shaver      Admit Date: 2017      PT Recommendation and  Plan    Visit Dx:    ICD-10-CM ICD-9-CM   1. Ventral hernia without obstruction or gangrene K43.9 553.20   2. Impaired physical mobility Z74.09 781.99   3. Impaired mobility and ADLs Z74.09 799.89             Outcome Measures       06/08/17 0936 06/08/17 0750 06/07/17 1500    6 Minute Walk Test    Distance  1000  -AM     Device Used  Yes;rolling walker  -AM     SpO2  92  -AM     Supplemental O2 Used?  No  -AM     # of Rest Breaks  0  -AM     How much help from another person do you currently need...    Turning from your back to your side while in flat bed without using bedrails?  4  -AM     Moving from lying on back to sitting on the side of a flat bed without bedrails?  4  -AM     Moving to and from a bed to a chair (including a wheelchair)?  4  -AM     Standing up from a chair using your arms (e.g., wheelchair, bedside chair)?  4  -AM     Climbing 3-5 steps with a railing?  4  -AM     To walk in hospital room?  4  -AM     AM-PAC 6 Clicks Score  24  -AM     How much help from another is currently needed...    Putting on and taking off regular lower body clothing? 4  -KD  2  -CS    Bathing (including washing, rinsing, and drying) 4  -KD  3  -CS    Toileting (which includes using toilet bed pan or urinal) 3  -KD  3  -CS    Putting on and taking off regular upper body clothing 4  -KD  3  -CS    Taking care of personal grooming (such as brushing teeth) 4  -KD  3  -CS    Eating meals 4  -KD  4  -CS    Score 23  -KD  18  -CS    Functional Assessment    Outcome Measure Options  6 Minute Walk Test;AM-PAC 6 Clicks Basic Mobility (PT)  -AM AM-PAC 6 Clicks Daily Activity (OT)  -CS      06/07/17 1025 06/06/17 1135 06/06/17 1100    6 Minute Walk Test    Distance 740  -AM      Device Used Yes;rolling walker  -AM      SpO2 92  -AM      Supplemental O2 Used? No  -AM      # of Rest Breaks 0  -AM      How much help from another person do you currently need...    Turning from your back to your side while in flat bed without using  "bedrails? 3  -AM 3  -AM     Moving from lying on back to sitting on the side of a flat bed without bedrails? 3  -AM 3  -AM     Moving to and from a bed to a chair (including a wheelchair)? 4  -AM 3  -AM     Standing up from a chair using your arms (e.g., wheelchair, bedside chair)? 4  -AM 3  -AM     Climbing 3-5 steps with a railing? 4  -AM 3  -AM     To walk in hospital room? 4  -AM 3  -AM     AM-PAC 6 Clicks Score 22  -AM 18  -AM     How much help from another is currently needed...    Putting on and taking off regular lower body clothing?   2  -CS    Bathing (including washing, rinsing, and drying)   3  -CS    Toileting (which includes using toilet bed pan or urinal)   3  -CS    Putting on and taking off regular upper body clothing   3  -CS    Taking care of personal grooming (such as brushing teeth)   3  -CS    Eating meals   4  -CS    Score   18  -CS    Dynamic Gait Index (DGI)    Gait Level Surface 2  -AM      Change in Gait Speed 2  -AM      Gait with Horizontal Head Turns 3  -AM      Gait with Vertical Head Turns 3  -AM      Gait and Pivot Turn 2  -AM      Step Over Obstacle 2  -AM      Step Around Obstacles 3  -AM      Steps 2  -AM      Dynamic Gait Index Score 19  -AM      Tinetti Assessment    Tinetti Assessment yes  -AM      Sitting Balance 1  -AM      Arises 2  -AM      Attempts to Rise 2  -AM      Immediate Standing Balance (first 5 sec) 2  -AM      Standing Balance 2  -AM      Sternal Nudge (feet close together) 2  -AM      Eyes Closed (feet close together) 1  -AM      Turning 360 Degrees- Steps 1  -AM      Turning 360 Degrees- Steadiness 1  -AM      Sitting Down 2  -AM      Tinetti Balance Score 16  -AM      Gait Initiation (immediate after told \"go\") 1  -AM      Step Length- Right Swing 1  -AM      Step Length- Left Swing 1  -AM      Foot Clearance- Right Foot 1  -AM      Foot Clearance- Left Foot 1  -AM      Step Symmetry 1  -AM      Step Continuity 1  -AM      Path (excursion) 1  -AM      Trunk 0  " -AM      Base of Support 1  -AM      Gait Score 9  -AM      Tinetti Total Score 25  -AM      Tinetti Assistive Device rolling walker  -AM      Functional Assessment    Outcome Measure Options 6 Minute Walk Test;AM-PAC 6 Clicks Basic Mobility (PT);Dynamic Gait Index;Tinetti  -AM AM-PAC 6 Clicks Basic Mobility (PT)  -AM AM-PAC 6 Clicks Daily Activity (OT)  -CS      User Key  (r) = Recorded By, (t) = Taken By, (c) = Cosigned By    Initials Name Provider Type    AM Ulysses Gardner PTA Physical Therapy Assistant    MARYLIN Andrew/L Occupational Therapy Assistant    SACHI StewartA/L Occupational Therapy Assistant                PT Charges       06/08/17 0750          Time Calculation    Start Time 0750  -AM      Stop Time 0815  -AM      Time Calculation (min) 25 min  -AM      PT Received On 06/08/17  -AM      PT - Next Appointment 06/09/17  -AM      Time Calculation- PT    Total Timed Code Minutes- PT 25 minute(s)  -AM        User Key  (r) = Recorded By, (t) = Taken By, (c) = Cosigned By    Initials Name Provider Type    ERNIE Gardner PTA Physical Therapy Assistant                  IP PT Goals       06/08/17 0750 06/07/17 1025 06/06/17 1325    Transfer Training PT LTG    Transfer Training PT  LTG, Date Goal Reviewed 06/08/17  -AM 06/07/17  -AM 06/06/17  -AM    Transfer Training PT LTG, Outcome goal met  -AM goal not met  -AM goal not met  -AM    Gait Training PT LTG    Gait Training Goal PT LTG, Date Goal Reviewed 06/08/17  -AM 06/07/17  -AM 06/06/17  -AM    Gait Training Goal PT LTG, Outcome goal partially met  -AM goal partially met  -AM goal not met  -AM    Stair Training PT LTG    Stair Training Goal PT LTG, Date Goal Reviewed 06/08/17  -AM 06/07/17  -AM 06/06/17  -AM    Stair Training Goal PT LTG, Outcome goal met  -AM goal not met  -AM goal not met  -AM    Patient Education PT LTG    Patient Education PT LTG, Date Goal Reviewed   06/06/17  -AM    Patient Education PT LTG Outcome   goal met   -AM      06/05/17 0929 06/04/17 1330 06/03/17 0855    Transfer Training PT LTG    Transfer Training PT  LTG, Date Goal Reviewed 06/05/17  -SS 06/04/17  -AM 06/03/17  -AM    Transfer Training PT LTG, Outcome goal ongoing  -SS goal not met  -AM goal not met  -AM    Gait Training PT LTG    Gait Training Goal PT LTG, Date Goal Reviewed 06/05/17  -SS 06/04/17  -AM 06/03/17  -AM    Gait Training Goal PT LTG, Outcome goal ongoing  -SS goal not met  -AM goal not met  -AM    Stair Training PT LTG    Stair Training Goal PT LTG, Date Goal Reviewed 06/05/17  -SS 06/04/17  -AM 06/03/17  -AM    Stair Training Goal PT LTG, Outcome goal ongoing  -SS goal not met  -AM goal not met  -AM    Patient Education PT LTG    Patient Education PT LTG, Date Goal Reviewed 06/05/17  -SS 06/04/17  -AM 06/03/17  -AM    Patient Education PT LTG Outcome goal ongoing  -SS goal not met  -AM goal not met  -AM      06/02/17 1000 06/01/17 1108       Transfer Training PT LTG    Transfer Training PT LTG, Date Established  06/01/17  -GAL     Transfer Training PT LTG, Time to Achieve  by discharge  -GAL     Transfer Training PT LTG, [place] Level  independent  -GAL     Transfer Training PT  LTG, Date Goal Reviewed 06/02/17  -TW      Transfer Training PT LTG, Outcome goal ongoing  -TW goal ongoing  -GAL     Gait Training PT LTG    Gait Training Goal PT LTG, Date Established  06/01/17  -GAL     Gait Training Goal PT LTG, Time to Achieve  by discharge  -AGL     Gait Training Goal PT LTG, [place] Level  independent  -GAL     Gait Training Goal PT LTG, Distance to Achieve  1000ft  -GAL     Gait Training Goal PT LTG, Additional Goal  Pt will complete 6 minute walk test  -GAL     Gait Training Goal PT LTG, Date Goal Reviewed 06/02/17  -TW      Gait Training Goal PT LTG, Outcome goal ongoing  -TW goal ongoing  -GAL     Stair Training PT LTG    Stair Training Goal PT LTG, Date Established  06/01/17  -GAL     Stair Training Goal PT LTG, Time to Achieve  by discharge   -     Stair Training Goal PT LTG, Number of Steps  5  -     Stair Training Goal PT LTG, [place] Level  conditional independence  -     Stair Training Goal PT LTG, Assist Device  1 handrail  -     Stair Training Goal PT LTG, Date Goal Reviewed 06/02/17  -      Stair Training Goal PT LTG, Outcome goal ongoing  - goal ongoing  -     Patient Education PT LTG    Patient Education PT LTG, Date Established  06/01/17  -     Patient Education PT LTG, Time to Achieve  by discharge  -     Patient Education PT LTG, Education Type  gait;transfers;home safety  -     Patient Education PT LTG, Date Goal Reviewed 06/02/17  -      Patient Education PT LTG Outcome goal ongoing  - goal ongoing  -       User Key  (r) = Recorded By, (t) = Taken By, (c) = Cosigned By    Initials Name Provider Type    GAL Dumont, PT Physical Therapist    AM Ulysses Gardner, PTA Physical Therapy Assistant    SS Fartun Angela, PTA Physical Therapy Assistant    TW Brant Palacios, PTA Physical Therapy Assistant          Therapy Charges for Today     Code Description Service Date Service Provider Modifiers Qty    02699928153 HC GAIT TRAINING EA 15 MIN 6/7/2017 Ulysses Gardner, PTA GP 1    79857571722 HC PT THER PROC EA 15 MIN 6/7/2017 Ulysses Gardner, PTA GP 1    14988784066 HC GAIT TRAINING EA 15 MIN 6/8/2017 Ulysses Gardner, PTA GP 1    29386874577 HC PT THER PROC EA 15 MIN 6/8/2017 Ulysses Gardner, PTA GP 1          PT Discharge Summary  Anticipated Discharge Disposition: home with home health  Reason for Discharge: Discharge from facility, Per MD order  Outcomes Achieved: Patient able to partially acheive established goals  Discharge Destination: Home with home health      Ulysses Gardner PTA   6/8/2017          Electronically signed by Shilpa Dumont, PT at 6/8/2017  4:36 PM      Ulysses Gardner PTA at 6/8/2017  2:59 PM  Version 1 of 1         Problem: Patient Care Overview (Adult)  Goal: Plan of Care  Review  Outcome: Ongoing (interventions implemented as appropriate)    06/08/17 0750   Coping/Psychosocial Response Interventions   Plan Of Care Reviewed With patient   Patient Care Overview   Progress progress toward functional goals as expected   Outcome Evaluation   Outcome Summary/Follow up Plan Pt met 2 PT goals this tx. Pt able to amb 1000+150 ft w/RW COnditional Indepdence. Pt would benefit from HH PT once discharged from this facility.        Goal: Discharge Needs Assessment  Outcome: Ongoing (interventions implemented as appropriate)    06/08/17 0750   Discharge Needs Assessment   Concerns To Be Addressed discharge planning concerns   Readmission Within The Last 30 Days no previous admission in last 30 days   Equipment Needed After Discharge walker, rolling   Discharge Facility/Level Of Care Needs home with home health   Current Discharge Risk physical impairment   Current Health   Anticipated Changes Related to Illness inability to care for self   Self-Care   Equipment Currently Used at Home cane, quad;cane, straight;walker, standard   Living Environment   Transportation Available family or friend will provide         Problem: Inpatient Physical Therapy  Goal: Transfer Training Goal 1 LTG- PT  Outcome: Outcome(s) achieved Date Met:  06/08/17 06/01/17 1108 06/08/17 0750   Transfer Training PT LTG   Transfer Training PT LTG, Date Established 06/01/17 --    Transfer Training PT LTG, Time to Achieve by discharge --    Transfer Training PT LTG, [place] Level independent --    Transfer Training PT LTG, Date Goal Reviewed --  06/08/17   Transfer Training PT LTG, Outcome --  goal met       Goal: Gait Training Goal LTG- PT  Outcome: Ongoing (interventions implemented as appropriate)    06/01/17 1108 06/08/17 0750   Gait Training PT LTG   Gait Training Goal PT LTG, Date Established 06/01/17 --    Gait Training Goal PT LTG, Time to Achieve by discharge --    Gait Training Goal PT LTG, [place] Level  "independent --    Gait Training Goal PT LTG, Distance to Achieve 1000ft --    Gait Training Goal PT LTG, Additional Goal Pt will complete 6 minute walk test --    Gait Training Goal PT LTG, Date Goal Reviewed --  17   Gait Training Goal PT LTG, Outcome --  goal partially met       Goal: Stair Training Goal LTG- PT  Outcome: Outcome(s) achieved Date Met:  17 1108 17 0750   Stair Training PT LTG   Stair Training Goal PT LTG, Date Established 17 --    Stair Training Goal PT LTG, Time to Achieve by discharge --    Stair Training Goal PT LTG, Number of Steps 5 --    Stair Training Goal PT LTG, [place] Level conditional independence --    Stair Training Goal PT LTG, Assist Device 1 handrail --    Stair Training Goal PT LTG, Date Goal Reviewed --  17   Stair Training Goal PT LTG, Outcome --  goal met          [address] [address]  Phone: [phone]  Fax:  Date Ordered: 2017         Patient: Ventura Boggs MRN: [id number]   [address] [place] [address] : 1960  SSN:    Phone: [phone] Sex: M     Weight: 220 lb 10.9 oz (100 kg)         Ht Readings from Last 1 Encounters:   17 68\" (172.7 cm)         Walker (Order ID: [id number])   Diagnosis: Impaired mobility and ADLs (Z74.09 [ICD-10-CM] 799.89 [ICD-9-CM])   Quantity: 1     Equipment:  Walker Folding with Wheels  Length of Need (99 Months = Lifetime): 99 Months = Lifetime            Verbal Order Mode: Telephone with readback   Authorizing Provider: Rui Shaver MD  Authorizing Provider's NPI: [id number]     Order Entered By: Jane Palafox RN 2017 4:19 PM     Electronically signed by: Rui Shaver MD 2017 4:25 PM              Electronically signed by Ulysses Gardner PTA at 2017  2:59 PM        "

## 2017-06-08 NOTE — THERAPY TREATMENT NOTE
Acute Care - Physical Therapy Treatment Note  [facility]     Patient Name: Ventura Boggs  : 1960  MRN: [id number]  Today's Date: 2017  Onset of Illness/Injury or Date of Surgery Date: 17  Date of Referral to PT: 07  Referring Physician: Dr. Shaver    Admit Date: 2017    Visit Dx:    ICD-10-CM ICD-9-CM   1. Ventral hernia without obstruction or gangrene K43.9 553.20   2. Impaired physical mobility Z74.09 781.99   3. Impaired mobility and ADLs Z74.09 799.89     Patient Active Problem List   Diagnosis   • Incisional hernia, without obstruction or gangrene   • Controlled type 2 diabetes mellitus without complication, with long-term current use of insulin   • Ventral hernia without obstruction or gangrene   • Postoperative urinary retention               Adult Rehabilitation Note       17 0936 17 0750 17 1324    Rehab Assessment/Intervention    Discipline occupational therapy assistant  -KD physical therapy assistant  -AM occupational therapy assistant  -CS    Document Type therapy note (daily note)  -KD therapy note (daily note)  -AM therapy note (daily note)  -CS    Subjective Information agree to therapy  -KD agree to therapy;complains of;pain  -AM agree to therapy  -CS    Patient Effort, Rehab Treatment  good  -AM     Symptoms Noted During/After Treatment  none  -AM     Precautions/Limitations  no known precautions/limitations  -AM     Equipment Issued to Patient  gait belt  -AM     Recorded by [KD] MARYLIN Jha/MEI [AM] Ulysses Gardner PTA [CS] MARYLIN Rosado/L    Vital Signs    Pre Systolic BP Rehab 119  -  -  -CS    Pre Treatment Diastolic BP 71  -KD 94  -AM 81  -CS    Post Systolic BP Rehab  144  -AM     Post Treatment Diastolic BP  97  -AM     Pretreatment Heart Rate (beats/min) 109  -  -  -CS    Posttreatment Heart Rate (beats/min) 111  -  -AM     Pre SpO2 (%) 95  -KD 95  -AM 91  -CS    O2 Delivery Pre Treatment  room air  -KD room air  -AM room air  -CS    Post SpO2 (%) 93  -KD 92  -AM     O2 Delivery Post Treatment room air  -KD room air  -AM     Pre Patient Position Sitting  -KD Supine  -AM Sitting  -CS    Intra Patient Position Standing  -KD Standing  -AM Standing  -CS    Post Patient Position Sitting  -KD Supine  -AM Sitting  -CS    Recorded by [KD] SACIH JhaA/L [AM] Ulysses Gardner PTA [CS] MARYLIN Rosado/MEI    Pain Assessment    Pain Assessment 0-10  -KD 0-10  -AM 0-10  -CS    Pain Score 6  -KD 6  -AM 6  -CS    Post Pain Score  6  -AM 6  -CS    Pain Type  Acute pain;Surgical pain  -AM     Pain Location --   TAINA tube site  -KD Abdomen  -AM Abdomen  -CS    Pain Orientation  Mid  -AM     Pain Descriptors  Sore  -AM     Pain Frequency  Constant/continuous  -AM     Date Pain First Started  05/31/17  -AM     Clinical Progression  Not changed  -AM     Patient's Stated Pain Goal  No pain  -AM     Pain Intervention(s)  Medication (See MAR);Ambulation/increased activity  -AM     Result of Injury  No  -AM     Work-Related Injury  No  -AM     Multiple Pain Sites  No  -AM     Recorded by [KD] SACHI JhaA/MEI [AM] Ulysses Gardner PTA [CS] MARYLIN Rosado/L    Cognitive Assessment/Intervention    Current Cognitive/Communication Assessment functional  -KD functional  -AM functional  -CS    Orientation Status oriented x 4  -KD oriented x 4  -AM oriented x 4  -CS    Follows Commands/Answers Questions 100% of the time  -% of the time  -% of the time  -CS    Personal Safety Interventions gait belt;nonskid shoes/slippers when out of bed  -KD gait belt;nonskid shoes/slippers when out of bed;supervised activity  -AM gait belt;nonskid shoes/slippers when out of bed  -CS    Recorded by [KD] MARYLIN Jha/MEI [AM] Ulysses Gardner PTA [CS] MARYLIN Rosado/L    ROM (Range of Motion)    General ROM  no range of motion deficits identified  -AM     Recorded by  [AM] Ulysses Gardner PTA      Bed Mobility, Assessment/Treatment    Bed Mobility, Roll Left, [place]  not tested  -AM     Bed Mobility, Roll Right, [place] independent  -KD not tested  -AM     Bed Mobility, Scoot/Bridge, [place]  not tested  -AM     Bed Mob, Supine to Sit, [place] independent  -KD conditional independence  -AM supervision required  -CS    Bed Mob, Sit to Supine, [place] independent  -KD conditional independence  -AM supervision required  -CS    Bed Mob, Sidelying to Sit, [place] independent  -KD not tested  -AM     Bed Mob, Sit to Sidelying, [place]  not tested  -AM     Recorded by [KD] MARYLIN Jha/MEI [AM] Ulysses Gardner PTA [CS] MARYLIN Rosado/L    Transfer Assessment/Treatment    Transfers, Bed-Chair [place] independent  -KD conditional independence  -AM     Transfers, Chair-Bed [place]  conditional independence  -AM     Transfers, Bed-Chair-Bed, Assist Device --   No AD  -KD rolling walker  -AM     Transfers, Sit-Stand [place] independent  -KD independent  -AM supervision required  -CS    Transfers, Stand-Sit [place] independent  -KD independent  -AM supervision required  -CS    Transfers, Sit-Stand-Sit, Assist Device  rolling walker  -AM rolling walker  -CS    Toilet Transfer, [place] not tested  -KD not tested  -AM     Walk-In Shower Transfer, [place] not tested  -KD not tested  -AM     Bathtub Transfer, [place] not tested  -KD not tested  -AM     Transfer, Impairments  pain  -AM     Recorded by [KD] MARYLIN Jha/MEI [AM] Ulysses Gardner PTA [CS] SACHI RosadoA/L    Gait Assessment/Treatment    Gait, [place] Level  conditional independence  -AM     Gait, Assistive Device  rolling walker  -AM     Gait, Distance (Feet)  --   1000+150  -AM     Gait, Gait Pattern Analysis  swing-through gait  -AM     Gait, Maintain Weight Bearing Status  able to maintain weight bearing status  -AM     Gait, Impairments  pain  -AM      Recorded by  [AM] Ulysses Gardner PTA     Stairs Assessment/Treatment    Number of Stairs  1 flight  -AM     Stairs, Handrail Location  right side (ascending)  -AM     Stairs, [place] Level  independent  -AM     Stairs, Technique Used  step over step (ascending);step over step (descending)  -AM     Stairs, Maintain Weight Bearing Status  able to maintain weight bearing status  -AM     Stairs, Impairments  pain  -AM     Recorded by  [AM] Ulysses Gardner PTA     Upper Body Bathing Assessment/Training    UB Bathing Assess/Train Assistive Device bath mitt  -KD      UB Bathing Assess/Train, Position edge of bed;sitting  -KD      UB Bathing Assess/Train, [place] Level set up required  -KD      Recorded by [KD] MARYLIN Jha/L      Lower Body Bathing Assessment/Training    LB Bathing Assess/Train Assistive Device bath mitt  -KD      LB Bathing Assess/Train, Position edge of bed;sitting  -KD      LB Bathing Assess/Train, [place] Level set up required  -KD      Recorded by [KD] MARYLIN Jha/L      Upper Body Dressing Assessment/Training    UB Dressing Assess/Train, Clothing Type doffing:;donning:;hospital gown  -KD      UB Dressing Assess/Train, Position edge of bed;sitting  -KD      UB Dressing Assess/Train, [place] set up required  -KD      Recorded by [KD] MARYLIN Jha/L      Lower Body Dressing Assessment/Training    LB Dressing Assess/Train, Clothing Type doffing:;donning:;slipper socks  -KD      LB Dressing Assess/Train, Position edge of bed;sitting  -KD      LB Dressing Assess/Train, [place] set up required  -KD      Recorded by [KD] MARYLIN Jha/L      Grooming Assessment/Training    Grooming Assess/Train, Assistive Device --   comb hair  -KD      Grooming Assess/Train, Position edge of bed;sitting  -KD      Grooming Assess/Train, Indepen Level conditional independence  -KD      Recorded by [KD] MARYLIN Jha/L      Balance Skills Training     Standing-Level of Assistance Close supervision  -KD  Close supervision  -CS    Static Standing Balance Support assistive device  -KD  assistive device  -CS    Standing-Balance Activities --   static   -KD      Standing Balance # of Minutes --   5  -KD  5  -CS    Recorded by [KD] JOLIE Jha  [CS] JOLIE Rosado    Therapy Exercises    Bilateral Upper Extremity AROM:;20 reps;sitting;elbow flexion/extension;pronation/supination;shoulder abduction/adduction;shoulder extension/flexion;shoulder horizontal abd/add;shoulder ER/IR  -KD  AROM:;20 reps;sitting;elbow flexion/extension;hand pumps;pronation/supination;shoulder extension/flexion;shoulder ER/IR  -CS    BUE Resistance manual resistance- minimal  -KD  manual resistance- minimal  -CS    Recorded by [KD] JOLIE Jha  [CS] JOLIE Rosado    Positioning and Restraints    Pre-Treatment Position in bed  -KD in bed  -AM in bed  -CS    Post Treatment Position chair  -KD bed  -AM bed  -CS    In Bed  supine;call light within reach;encouraged to call for assist  -AM supine;call light within reach  -CS    In Chair call light within reach;encouraged to call for assist;sitting  -KD      Recorded by [KD] JOLIE Jha [AM] Ulysses Gardner PTA [CS] JOLIE Rosado      06/07/17 1025 06/06/17 1135 06/06/17 1040    Rehab Assessment/Intervention    Discipline physical therapy assistant  -AM physical therapy assistant  -AM occupational therapy assistant  -CS    Document Type therapy note (daily note)  -AM therapy note (daily note)  -AM therapy note (daily note)  -CS    Subjective Information agree to therapy;complains of;pain  -AM agree to therapy;complains of;pain  -AM agree to therapy  -CS    Patient Effort, Rehab Treatment good  -AM good  -AM     Symptoms Noted During/After Treatment none  -AM none  -AM     Precautions/Limitations no known precautions/limitations  -AM no known precautions/limitations  -AM     Equipment  Issued to Patient gait belt  -AM gait belt  -AM     Recorded by [AM] Ulysses Gardner PTA [AM] Ulysses Gardner PTA [CS] MARYLIN Rosado/L    Vital Signs    Pre Systolic BP Rehab 151  -  -AM     Pre Treatment Diastolic BP 82  -AM 85  -AM     Post Systolic BP Rehab 148  -  -AM     Post Treatment Diastolic BP 97  -AM 97  -AM     Pretreatment Heart Rate (beats/min) 116  -  -AM     Posttreatment Heart Rate (beats/min) 117  -AM 91  -  -CS    Pre SpO2 (%) 94  -AM 90  -AM     O2 Delivery Pre Treatment room air  -AM room air  -AM     Post SpO2 (%) 92  -AM 90  -AM 94  -CS    O2 Delivery Post Treatment room air  -AM room air  -AM room air  -CS    Pre Patient Position Sitting  -AM Supine  -AM Supine  -CS    Intra Patient Position Standing  -AM Standing  -AM Sitting  -CS    Post Patient Position Sitting  -AM Supine  -AM Sitting  -CS    Recorded by [AM] Ulysses Gardner PTA [AM] Ulysses Gardner PTA [CS] MARYLIN Rosado/MEI    Pain Assessment    Pain Assessment 0-10  -AM 0-10  -AM 0-10  -CS    Pain Score 8  -AM 7  -AM 7  -CS    Post Pain Score 8  -AM 7  -AM 7  -CS    Pain Type Acute pain;Surgical pain  -AM Acute pain;Surgical pain  -AM Surgical pain  -CS    Pain Location Abdomen  -AM Abdomen  -AM Abdomen  -CS    Pain Orientation Mid  -AM Mid  -AM     Pain Descriptors Sore  -AM Sore  -AM     Pain Frequency Constant/continuous  -AM Constant/continuous  -AM     Date Pain First Started 05/31/17  -AM 05/31/17  -AM     Clinical Progression Not changed  -AM Not changed  -AM     Patient's Stated Pain Goal No pain  -AM No pain  -AM     Pain Intervention(s) Medication (See MAR);Ambulation/increased activity  -AM Medication (See MAR);Ambulation/increased activity  -AM     Result of Injury No  -AM No  -AM     Work-Related Injury No  -AM No  -AM     Multiple Pain Sites No  -AM No  -AM     Recorded by [AM] Ulysses Gardner PTA [AM] Ulysses Gardner PTA [CS] MARYLIN Rosado/MEI    Cognitive  Assessment/Intervention    Current Cognitive/Communication Assessment functional  -AM functional  -AM functional  -CS    Orientation Status oriented x 4  -AM oriented x 4  -AM oriented x 4  -CS    Follows Commands/Answers Questions 100% of the time  -% of the time  -% of the time  -CS    Personal Safety Interventions gait belt;nonskid shoes/slippers when out of bed;supervised activity  -AM gait belt;nonskid shoes/slippers when out of bed;supervised activity  -AM     Recorded by [AM] Ulysses Gardner PTA [AM] Ulysses Gardner PTA [CS] Divya Ash, COULTER/L    ROM (Range of Motion)    General ROM no range of motion deficits identified  -AM no range of motion deficits identified  -AM     Recorded by [AM] Ulysses Gardner PTA [AM] Ulysses Gardner PTA     Bed Mobility, Assessment/Treatment    Bed Mobility, Assistive Device  bed rails;head of bed elevated  -AM     Bed Mobility, Roll Left, [place] not tested  -AM not tested  -AM     Bed Mobility, Roll Right, [place] not tested  -AM not tested  -AM     Bed Mobility, Scoot/Bridge, [place] not tested  -AM not tested  -AM     Bed Mob, Supine to Sit, [place] not tested  -AM supervision required  -AM     Bed Mob, Sit to Supine, [place] not tested  -AM supervision required  -AM     Bed Mob, Sidelying to Sit, [place] not tested  -AM not tested  -AM     Bed Mob, Sit to Sidelying, [place] not tested  -AM not tested  -AM     Bed Mobility, Safety Issues  decreased use of arms for pushing/pulling;decreased use of legs for bridging/pushing  -AM     Bed Mobility, Impairments  pain  -AM     Recorded by [AM] Ulysses Gardner PTA [AM] Ulysses Gardner PTA     Transfer Assessment/Treatment    Transfers, Bed-Chair [place] conditional independence  -AM stand by assist  -AM     Transfers, Chair-Bed [place] conditional independence  -AM stand by assist  -AM     Transfers, Bed-Chair-Bed, Assist Device rolling walker  -AM rolling walker   -AM     Transfers, Sit-Stand [place] independent  -AM stand by assist  -AM     Transfers, Stand-Sit [place] independent  -AM stand by assist  -AM     Transfers, Sit-Stand-Sit, Assist Device rolling walker  -AM rolling walker  -AM     Toilet Transfer, [place] not tested  -AM not tested  -AM     Walk-In Shower Transfer, [place] not tested  -AM not tested  -AM     Bathtub Transfer, [place] not tested  -AM not tested  -AM     Transfer, Maintain Weight Bearing Status able to maintain weight bearing status  -AM able to maintain weight bearing status  -AM     Transfer, Safety Issues  step length decreased  -AM     Transfer, Impairments pain  -AM pain  -AM     Recorded by [AM] Ulysses Gardner PTA [AM] Ulysses Gardner PTA     Gait Assessment/Treatment    Gait, [place] Level conditional independence  -AM stand by assist  -AM     Gait, Assistive Device rolling walker  -AM rolling walker  -AM     Gait, Distance (Feet) 1440   700+740  -  -AM     Gait, Gait Pattern Analysis swing-through gait  -AM swing-through gait  -AM     Gait, Gait Deviations  bilateral:;[place] decreased  -AM     Gait, Maintain Weight Bearing Status able to maintain weight bearing status  -AM able to maintain weight bearing status  -AM     Gait, Safety Issues  step length decreased  -AM     Gait, Impairments pain  -AM pain  -AM     Recorded by [AM] Ulysses Gardner PTA [AM] Ulysses Gardner PTA     Stairs Assessment/Treatment    Number of Stairs 3  -AM 3  -AM     Stairs, Handrail Location right side (ascending)  -AM both sides  -AM     Stairs, [place] Level independent  -AM supervision required;contact guard assist  -AM     Stairs, Technique Used step over step (ascending);step over step (descending)  -AM step over step (ascending);step over step (descending)  -AM     Stairs, Maintain Weight Bearing Status able to maintain weight bearing status  -AM able to maintain weight bearing status  -AM     Stairs, Impairments  pain  -AM strength decreased  -AM     Recorded by [AM] Ulysses Gardner PTA [AM] Ulysses Gardner PTA     Grooming Assessment/Training    Grooming Assess/Train, Position   long sitting  -CS    Grooming Assess/Train, Indepen Level   set up required  -CS    Grooming Assess/Train, Comment   oral care, wash face/hands, comb hair  -CS    Recorded by   [CS] MARYLIN Rosado/L    Therapy Exercises    Bilateral Upper Extremity   AROM:;20 reps;sitting;elbow flexion/extension;hand pumps;pronation/supination;shoulder extension/flexion;shoulder ER/IR  -CS    BUE Resistance   manual resistance- minimal  -CS    Recorded by   [CS] JOLIE Rosado    Positioning and Restraints    Pre-Treatment Position sitting in chair/recliner  -AM in bed  -AM in bed  -CS    Post Treatment Position chair  -AM bed  -AM bed  -CS    In Bed  supine;call light within reach;encouraged to call for assist;exit alarm on  -AM sitting;call light within reach  -CS    In Chair reclined;call light within reach;encouraged to call for assist;legs elevated  -AM      Recorded by [AM] Ulysses Gardner PTA [AM] Ulysses Gardner PTA [CS] JOLIE Rosado      User Key  (r) = Recorded By, (t) = Taken By, (c) = Cosigned By    Initials Name Effective Dates    AM Ulysses Gardner PTA 10/17/16 -     KD MARYLIN Jha/L 10/17/16 -     CS MARYLIN Rosado/MEI 10/17/16 -                 IP PT Goals       06/08/17 0750 06/07/17 1025 06/06/17 1325    Transfer Training PT LTG    Transfer Training PT  LTG, Date Goal Reviewed 06/08/17  -AM 06/07/17  -AM 06/06/17  -AM    Transfer Training PT LTG, Outcome goal met  -AM goal not met  -AM goal not met  -AM    Gait Training PT LTG    Gait Training Goal PT LTG, Date Goal Reviewed 06/08/17  -AM 06/07/17  -AM 06/06/17  -AM    Gait Training Goal PT LTG, Outcome goal partially met  -AM goal partially met  -AM goal not met  -AM    Stair Training PT LTG    Stair Training Goal PT LTG, Date Goal Reviewed  06/08/17  -AM 06/07/17  -AM 06/06/17  -AM    Stair Training Goal PT LTG, Outcome goal met  -AM goal not met  -AM goal not met  -AM    Patient Education PT LTG    Patient Education PT LTG, Date Goal Reviewed   06/06/17  -AM    Patient Education PT LTG Outcome   goal met  -AM      06/05/17 0929 06/04/17 1330 06/03/17 0855    Transfer Training PT LTG    Transfer Training PT  LTG, Date Goal Reviewed 06/05/17  -SS 06/04/17  -AM 06/03/17  -AM    Transfer Training PT LTG, Outcome goal ongoing  -SS goal not met  -AM goal not met  -AM    Gait Training PT LTG    Gait Training Goal PT LTG, Date Goal Reviewed 06/05/17  -SS 06/04/17  -AM 06/03/17  -AM    Gait Training Goal PT LTG, Outcome goal ongoing  -SS goal not met  -AM goal not met  -AM    Stair Training PT LTG    Stair Training Goal PT LTG, Date Goal Reviewed 06/05/17  -SS 06/04/17  -AM 06/03/17  -AM    Stair Training Goal PT LTG, Outcome goal ongoing  -SS goal not met  -AM goal not met  -AM    Patient Education PT LTG    Patient Education PT LTG, Date Goal Reviewed 06/05/17  -SS 06/04/17  -AM 06/03/17  -AM    Patient Education PT LTG Outcome goal ongoing  -SS goal not met  -AM goal not met  -AM      06/02/17 1000 06/01/17 1108       Transfer Training PT LTG    Transfer Training PT LTG, Date Established  06/01/17  -GAL     Transfer Training PT LTG, Time to Achieve  by discharge  -GAL     Transfer Training PT LTG, [place] Level  independent  -GAL     Transfer Training PT  LTG, Date Goal Reviewed 06/02/17  -TW      Transfer Training PT LTG, Outcome goal ongoing  -TW goal ongoing  -GAL     Gait Training PT LTG    Gait Training Goal PT LTG, Date Established  06/01/17  -GAL     Gait Training Goal PT LTG, Time to Achieve  by discharge  -GAL     Gait Training Goal PT LTG, [place] Level  independent  -GAL     Gait Training Goal PT LTG, Distance to Achieve  1000ft  -GAL     Gait Training Goal PT LTG, Additional Goal  Pt will complete 6 minute walk test  -GAL     Gait Training Goal  PT LTG, Date Goal Reviewed 06/02/17  -      Gait Training Goal PT LTG, Outcome goal ongoing  - goal ongoing  -     Stair Training PT LTG    Stair Training Goal PT LTG, Date Established  06/01/17  -     Stair Training Goal PT LTG, Time to Achieve  by discharge  -     Stair Training Goal PT LTG, Number of Steps  5  -     Stair Training Goal PT LTG, [place] Level  conditional independence  -     Stair Training Goal PT LTG, Assist Device  1 handrail  -     Stair Training Goal PT LTG, Date Goal Reviewed 06/02/17  -      Stair Training Goal PT LTG, Outcome goal ongoing  - goal ongoing  -     Patient Education PT LTG    Patient Education PT LTG, Date Established  06/01/17  -     Patient Education PT LTG, Time to Achieve  by discharge  -     Patient Education PT LTG, Education Type  gait;transfers;home safety  -     Patient Education PT LTG, Date Goal Reviewed 06/02/17  -      Patient Education PT LTG Outcome goal ongoing  - goal ongoing  -       User Key  (r) = Recorded By, (t) = Taken By, (c) = Cosigned By    Initials Name Provider Type    GAL Shilpa Dumont, PT Physical Therapist    AM Ulysses Gardner, PTA Physical Therapy Assistant    SS Fartun Angela, PTA Physical Therapy Assistant    TW Brant Palacios, PTA Physical Therapy Assistant          Physical Therapy Education     Title: PT OT SLP Therapies (Active)     Topic: Physical Therapy (Active)     Point: Mobility training (Active)    Learning Progress Summary    Learner Readiness Method Response Comment Documented by Status   Patient Acceptance E,D,TB NR   06/05/17 0942 Active    Acceptance E NR   06/01/17 1107 Active   Family Acceptance E NR   06/01/17 1107 Active               Point: Precautions (Active)    Learning Progress Summary    Learner Readiness Method Response Comment Documented by Status   Patient Acceptance E,D,TB NR   06/05/17 0942 Active    Acceptance E NR   06/01/17 1107 Active   Family Acceptance  E NR   06/01/17 1107 Active                      User Key     Initials Effective Dates Name Provider Type Discipline     10/17/16 -  Shilpa Dumont, PT Physical Therapist PT    SS 10/17/16 -  Fartun Angela PTA Physical Therapy Assistant PT                    PT Recommendation and Plan  Anticipated Discharge Disposition: home with home health  Planned Therapy Interventions: balance training, bed mobility training, gait training, patient/family education, stair training, transfer training  PT Frequency: other (see comments) (5-14 times per week)  Plan of Care Review  Plan Of Care Reviewed With: patient  Progress: progress toward functional goals as expected  Outcome Summary/Follow up Plan: Pt met 2 PT goals this tx. Pt able to amb 1000+150 ft w/RW COnditional Indepdence. Pt would benefit from  PT once discharged from this facility.           Outcome Measures       06/08/17 0936 06/08/17 0750 06/07/17 1500    6 Minute Walk Test    Distance  1000  -AM     Device Used  Yes;rolling walker  -AM     SpO2  92  -AM     Supplemental O2 Used?  No  -AM     # of Rest Breaks  0  -AM     How much help from another person do you currently need...    Turning from your back to your side while in flat bed without using bedrails?  4  -AM     Moving from lying on back to sitting on the side of a flat bed without bedrails?  4  -AM     Moving to and from a bed to a chair (including a wheelchair)?  4  -AM     Standing up from a chair using your arms (e.g., wheelchair, bedside chair)?  4  -AM     Climbing 3-5 steps with a railing?  4  -AM     To walk in hospital room?  4  -AM     AM-PAC 6 Clicks Score  24  -AM     How much help from another is currently needed...    Putting on and taking off regular lower body clothing? 4  -KD  2  -CS    Bathing (including washing, rinsing, and drying) 4  -KD  3  -CS    Toileting (which includes using toilet bed pan or urinal) 3  -KD  3  -CS    Putting on and taking off regular upper body clothing  4  -KD  3  -CS    Taking care of personal grooming (such as brushing teeth) 4  -KD  3  -CS    Eating meals 4  -KD  4  -CS    Score 23  -KD  18  -CS    Functional Assessment    Outcome Measure Options  6 Minute Walk Test;AM-PAC 6 Clicks Basic Mobility (PT)  -AM AM-PAC 6 Clicks Daily Activity (OT)  -CS      06/07/17 1025 06/06/17 1135 06/06/17 1100    6 Minute Walk Test    Distance 740  -AM      Device Used Yes;rolling walker  -AM      SpO2 92  -AM      Supplemental O2 Used? No  -AM      # of Rest Breaks 0  -AM      How much help from another person do you currently need...    Turning from your back to your side while in flat bed without using bedrails? 3  -AM 3  -AM     Moving from lying on back to sitting on the side of a flat bed without bedrails? 3  -AM 3  -AM     Moving to and from a bed to a chair (including a wheelchair)? 4  -AM 3  -AM     Standing up from a chair using your arms (e.g., wheelchair, bedside chair)? 4  -AM 3  -AM     Climbing 3-5 steps with a railing? 4  -AM 3  -AM     To walk in hospital room? 4  -AM 3  -AM     AM-PAC 6 Clicks Score 22  -AM 18  -AM     How much help from another is currently needed...    Putting on and taking off regular lower body clothing?   2  -CS    Bathing (including washing, rinsing, and drying)   3  -CS    Toileting (which includes using toilet bed pan or urinal)   3  -CS    Putting on and taking off regular upper body clothing   3  -CS    Taking care of personal grooming (such as brushing teeth)   3  -CS    Eating meals   4  -CS    Score   18  -CS    Dynamic Gait Index (DGI)    Gait Level Surface 2  -AM      Change in Gait Speed 2  -AM      Gait with Horizontal Head Turns 3  -AM      Gait with Vertical Head Turns 3  -AM      Gait and Pivot Turn 2  -AM      Step Over Obstacle 2  -AM      Step Around Obstacles 3  -AM      Steps 2  -AM      Dynamic Gait Index Score 19  -AM      Tinetti Assessment    Tinetti Assessment yes  -AM      Sitting Balance 1  -AM      Arises 2  -AM   "    Attempts to Rise 2  -AM      Immediate Standing Balance (first 5 sec) 2  -AM      Standing Balance 2  -AM      Sternal Nudge (feet close together) 2  -AM      Eyes Closed (feet close together) 1  -AM      Turning 360 Degrees- Steps 1  -AM      Turning 360 Degrees- Steadiness 1  -AM      Sitting Down 2  -AM      Tinetti Balance Score 16  -AM      Gait Initiation (immediate after told \"go\") 1  -AM      Step Length- Right Swing 1  -AM      Step Length- Left Swing 1  -AM      Foot Clearance- Right Foot 1  -AM      Foot Clearance- Left Foot 1  -AM      Step Symmetry 1  -AM      Step Continuity 1  -AM      Path (excursion) 1  -AM      Trunk 0  -AM      Base of Support 1  -AM      Gait Score 9  -AM      Tinetti Total Score 25  -AM      Tinetti Assistive Device rolling walker  -AM      Functional Assessment    Outcome Measure Options 6 Minute Walk Test;AM-PAC 6 Clicks Basic Mobility (PT);Dynamic Gait Index;Tinetti  -AM AM-PAC 6 Clicks Basic Mobility (PT)  -AM AM-PAC 6 Clicks Daily Activity (OT)  -CS      User Key  (r) = Recorded By, (t) = Taken By, (c) = Cosigned By    Initials Name Provider Type    AM Ulysses Gardner PTA Physical Therapy Assistant    KD MARYLIN Jha/L Occupational Therapy Assistant    MARYLIN Stewart/L Occupational Therapy Assistant           Time Calculation:         PT Charges       06/08/17 0750          Time Calculation    Start Time 0750  -AM      Stop Time 0815  -AM      Time Calculation (min) 25 min  -AM      PT Received On 06/08/17  -AM      PT - Next Appointment 06/09/17  -AM      Time Calculation- PT    Total Timed Code Minutes- PT 25 minute(s)  -AM        User Key  (r) = Recorded By, (t) = Taken By, (c) = Cosigned By    Initials Name Provider Type    AM Ulysses Gardner PTA Physical Therapy Assistant          Therapy Charges for Today     Code Description Service Date Service Provider Modifiers Qty    13633730582 HC GAIT TRAINING EA 15 MIN 6/7/2017 Ulysses Gardner PTA GP " 1    33159273401 HC PT THER PROC EA 15 MIN 6/7/2017 Ulysses Gardner, PTA GP 1    21922610759 HC GAIT TRAINING EA 15 MIN 6/8/2017 Ulysses Gardner, PTA GP 1    09305650238 HC PT THER PROC EA 15 MIN 6/8/2017 Ulysses Gardner, PTA GP 1          PT G-Codes  PT Professional Judgement Used?: Yes  Outcome Measure Options: 6 Minute Walk Test, AM-PAC 6 Clicks Basic Mobility (PT)  Score: 18  Functional Limitation: Mobility: Walking and moving around  Mobility: Walking and Moving Around Current Status (): At least 40 percent but less than 60 percent impaired, limited or restricted  Mobility: Walking and Moving Around Goal Status (): 0 percent impaired, limited or restricted    Ulysses Gardner PTA  6/8/2017

## 2017-06-08 NOTE — PLAN OF CARE
Problem: Patient Care Overview (Adult)  Goal: Plan of Care Review  Outcome: Ongoing (interventions implemented as appropriate)    06/08/17 0311 06/08/17 0936   Coping/Psychosocial Response Interventions   Plan Of Care Reviewed With patient --    Patient Care Overview   Progress improving --    Outcome Evaluation   Outcome Summary/Follow up Plan --  P       Goal: Discharge Needs Assessment  Outcome: Ongoing (interventions implemented as appropriate)    06/05/17 1511 06/07/17 1025   Discharge Needs Assessment   Concerns To Be Addressed --  discharge planning concerns   Readmission Within The Last 30 Days --  no previous admission in last 30 days   Equipment Needed After Discharge --  walker, rolling   Discharge Facility/Level Of Care Needs --  home with home health   Current Discharge Risk --  physical impairment   Discharge Disposition still a patient --    Current Health   Anticipated Changes Related to Illness --  inability to care for self   Self-Care   Equipment Currently Used at Home --  cane, quad;cane, straight;walker, standard   Living Environment   Transportation Available --  family or friend will provide         Problem: Inpatient Occupational Therapy  Goal: Transfer Training Goal 1 LTG- OT  Outcome: Ongoing (interventions implemented as appropriate)    06/02/17 1351 06/08/17 0936   Transfer Training OT LTG   Transfer Training OT LTG, Date Established 06/02/17 --    Transfer Training OT LTG, Time to Achieve 2 wks --    Transfer Training OT LTG, Activity Type toilet;walk-in shower --    Transfer Training OT LTG, [place] Level supervision required --    Transfer Training OT LTG, Date Goal Reviewed --  06/08/17   Transfer Training OT LTG, Outcome --  goal met       Goal: ADL Goal LTG- OT  Outcome: Ongoing (interventions implemented as appropriate)    06/02/17 1351 06/08/17 0936   ADL OT LTG   ADL OT LTG, Date Established 06/02/17 --    ADL OT LTG, Time to Achieve 2 wks --    ADL OT LTG, Activity Type ADL  skills --    ADL OT LTG, [place] Level standby assist --    ADL OT LTG, Date Goal Reviewed --  06/08/17   ADL OT LTG, Outcome --  goal met       Goal: Functional Mobility Goal LTG- OT  Outcome: Ongoing (interventions implemented as appropriate)    06/02/17 1351 06/04/17 1220 06/07/17 1543   Functional Mobility OT LTG   Functional Mobility Goal OT LTG, Date Established --  --  --    Functional Mobility Goal OT LTG, Time to Achieve 2 wks --  --    Functional Mobility Goal OT LTG, [place] Level supervision --  --    Functional Mobility Goal OT LTG, Distance to Achieve to the bathroom --  --    Functional Mobility Goal OT LTG, Date Goal Reviewed --  --  06/07/17   Functional Mobility Goal OT LTG, Outcome --  goal ongoing --      06/08/17 0936   Functional Mobility OT LTG   Functional Mobility Goal OT LTG, Date Established 06/08/17   Functional Mobility Goal OT LTG, Time to Achieve --    Functional Mobility Goal OT LTG, [place] Level --    Functional Mobility Goal OT LTG, Distance to Achieve --    Functional Mobility Goal OT LTG, Date Goal Reviewed --    Functional Mobility Goal OT LTG, Outcome --

## 2017-06-08 NOTE — PLAN OF CARE
Problem: Patient Care Overview (Adult)  Goal: Plan of Care Review  Outcome: Ongoing (interventions implemented as appropriate)    06/08/17 0750   Coping/Psychosocial Response Interventions   Plan Of Care Reviewed With patient   Patient Care Overview   Progress progress toward functional goals as expected   Outcome Evaluation   Outcome Summary/Follow up Plan Pt met 2 PT goals this tx. Pt able to amb 1000+150 ft w/RW COnditional Indepdence. Pt would benefit from HH PT once discharged from this facility.        Goal: Discharge Needs Assessment  Outcome: Ongoing (interventions implemented as appropriate)    06/08/17 0750   Discharge Needs Assessment   Concerns To Be Addressed discharge planning concerns   Readmission Within The Last 30 Days no previous admission in last 30 days   Equipment Needed After Discharge walker, rolling   Discharge Facility/Level Of Care Needs home with home health   Current Discharge Risk physical impairment   Current Health   Anticipated Changes Related to Illness inability to care for self   Self-Care   Equipment Currently Used at Home cane, quad;cane, straight;walker, standard   Living Environment   Transportation Available family or friend will provide         Problem: Inpatient Physical Therapy  Goal: Transfer Training Goal 1 LTG- PT  Outcome: Outcome(s) achieved Date Met:  06/08/17 06/01/17 1108 06/08/17 0750   Transfer Training PT LTG   Transfer Training PT LTG, Date Established 06/01/17 --    Transfer Training PT LTG, Time to Achieve by discharge --    Transfer Training PT LTG, [place] Level independent --    Transfer Training PT LTG, Date Goal Reviewed --  06/08/17   Transfer Training PT LTG, Outcome --  goal met       Goal: Gait Training Goal LTG- PT  Outcome: Ongoing (interventions implemented as appropriate)    06/01/17 1108 06/08/17 0750   Gait Training PT LTG   Gait Training Goal PT LTG, Date Established 06/01/17 --    Gait Training Goal PT LTG, Time to Achieve by  discharge --    Gait Training Goal PT LTG, [place] Level independent --    Gait Training Goal PT LTG, Distance to Achieve 1000ft --    Gait Training Goal PT LTG, Additional Goal Pt will complete 6 minute walk test --    Gait Training Goal PT LTG, Date Goal Reviewed --  06/08/17   Gait Training Goal PT LTG, Outcome --  goal partially met       Goal: Stair Training Goal LTG- PT  Outcome: Outcome(s) achieved Date Met:  06/08/17 06/01/17 1108 06/08/17 0750   Stair Training PT LTG   Stair Training Goal PT LTG, Date Established 06/01/17 --    Stair Training Goal PT LTG, Time to Achieve by discharge --    Stair Training Goal PT LTG, Number of Steps 5 --    Stair Training Goal PT LTG, [place] Level conditional independence --    Stair Training Goal PT LTG, Assist Device 1 handrail --    Stair Training Goal PT LTG, Date Goal Reviewed --  06/08/17   Stair Training Goal PT LTG, Outcome --  goal met

## 2017-06-08 NOTE — DISCHARGE SUMMARY
Discharge Summary  Date: 06/08/17  Service: General Surgery  Attending: Rui Shaver MD    Procedures: Open repair of a large ventral hernia with component separation and biologic mesh placement  Consults: Urology for urinary retention  Discharge Diagnoses: Large ventral hernia, diabetes, urinary retention  Hospital Course: This gentleman underwent open repair of a very difficult ventral hernia.  This was repaired with biologic mesh.  Postoperatively, he did well although he had urinary retention.  He was advanced to a regular diet once he had resumption of bowel function.  He is discharged with drains in place and will be followed up in 5 days.  The following discharge instructions were given:    VENTRAL HERNIA  POST OP RECOMMENDATIONS  Dr. Rui Shaver  [address] [place] [address]  Phone: [phone] (office)  [phone] (hospital)  [phone] (cell)    ACTIVITIES:  1. Expect to rest most of the first week after discharge from hospital, but do get up several times daily to reduce the risk of getting a clot in your legs.  2. No strenuous activity or lifting over 10 lbs. for two weeks.  Add 5 lbs. a week to that restriction until 6 weeks out from surgery.  Try to avoid squatting and deep bends for about a week.  3. No driving until seen at your post operative appointment.  You must be off pain meds 24 hours before driving after that visit unless otherwise instructed.  4. You can climb stairs, but minimize this and initially do one step at a time (both feet on one step rather than going up with each step.)  SYMPTOMS:  1. Avoiding constipation is important after this surgery as it is common when taking pain medication.  Over the counter laxatives, such as Miralax, can be used temporarily to avoid this.   2. Fatigue and decreased stamina is not unusual for about a week or so after surgery due to anesthesia.  Try to take walks and some mild activity between resting.  3. It is not unusual for your  gastrointestinal system to take 1-2 months to get back to your normal routine and you may have long term changes towards looser bowel movements  WOUND SITE:         1. You may remove your outer dressings in 3 days.  The white tapes called steri-strips should stay in place.  They will fall off on their own in 1-2 weeks.  Do not worry if they come off sooner.     If you have a DRAIN(S):  · Sponge bath only. Once removed, and the skin is covered, you may shower.                      Empty drains and record twice daily    MEALS:  1. A soft diet is recommended for the first 1-2 days after discharge from the hospital.  A normal diet may then be started as tolerated after that.  2. Expect to eat less after this procedure and fill up somewhat faster.   3. Do NOT take pain pills on an empty stomach.  WORK:  1. In general if you have a sedentary job, you can return to work in 3 weeks if done laparoscopically.  If lifting or exertion is required it may be 4-6 weeks depending on your recovery.    2. Use discretion and remember that your stamina will be decreased post operatively.  3. Return to work notes can be provided at the time of your post-operative appointment.  FOLLOW UP:  1. If no appointment is given, please call and make a post-operative appointment for approximately 7-10 days after the procedure      Discharge Medications:     Your medication list      START taking these medications       Instructions Last Dose Given Next Dose Due    oxyCODONE-acetaminophen 5-325 MG per tablet   Commonly known as:  PERCOCET        Take 1 tablet by mouth Every 4 (Four) Hours As Needed for Moderate Pain (4-6) or Severe Pain (7-10) for up to 9 days.           CONTINUE taking these medications       Instructions Last Dose Given Next Dose Due    aspirin 81 MG chewable tablet              atorvastatin 20 MG tablet   Commonly known as:  LIPITOR              insulin lispro 100 UNIT/ML injection   Commonly known as:  humaLOG               lisinopril 5 MG tablet   Commonly known as:  PRINIVIL,ZESTRIL              metFORMIN 500 MG tablet   Commonly known as:  GLUCOPHAGE              ondansetron ODT 4 MG disintegrating tablet   Commonly known as:  ZOFRAN-ODT              SITagliptin 50 MG tablet   Commonly known as:  JANUVIA                   Where to Get Your Medications      You can get these medications from any pharmacy     Bring a paper prescription for each of these medications    • oxyCODONE-acetaminophen 5-325 MG per tablet                             This document has been electronically signed by Rui Shaver MD on June 8, 2017 3:06 PM

## 2017-06-08 NOTE — MEDICAL STUDENT
Rui Shaver MD [facility]  General Surgery    6/8/2017    Chief Complaint:     Subjective      LOS: 8 days        Patient is 56 y.o. male presented with ventral hernia and intermittent obstruction diagnosed clinically is POD#8 for OPEN ADHESIOLYSIS AND VENTRAL/INCISIONAL HERNIA REPAIR WITH PHASIX MESH.  Less pain. Voiding without problem and having bowel movements. Ambulating more. Tolerating solid food without problem.        Current Medications:     Current Facility-Administered Medications   Medication Dose Route Frequency Provider Last Rate Last Dose   • atorvastatin (LIPITOR) tablet 20 mg  20 mg Oral Nightly Rui Shaver MD   20 mg at 06/07/17 2118   • bisacodyl (DULCOLAX) suppository 10 mg  10 mg Rectal Daily Rui Shaver MD   10 mg at 06/05/17 0935   • bupivacaine-EPINEPHrine PF (MARCAINE w/EPI) 0.5% -[id number] injection    PRN Rui Shaver MD   30 mL at 05/31/17 1525   • dextrose (D50W) solution 25 g  25 g Intravenous Q15 Min PRN Rui Shaver MD       • dextrose (GLUTOSE) oral gel 15 g  15 g Oral Q15 Min PRN Rui Shaver MD       • enoxaparin (LOVENOX) syringe 40 mg  40 mg Subcutaneous Daily Rui Shaver MD   40 mg at 06/07/17 0838   • glucagon (human recombinant) (GLUCAGEN DIAGNOSTIC) injection 1 mg  1 mg Subcutaneous Q15 Min PRN Rui Shaver MD       • glucagon (human recombinant) (GLUCAGEN DIAGNOSTIC) injection 1 mg  1 mg Subcutaneous Q15 Min PRN Rui Shaver MD       • insulin aspart (novoLOG) injection 0-14 Units  0-14 Units Subcutaneous 4x Daily AC & at Bedtime Rui Shaver MD   8 Units at 06/07/17 2122   • labetalol (NORMODYNE,TRANDATE) injection 20 mg  20 mg Intravenous Q6H PRN Rui Shaver MD   20 mg at 06/01/17 1650   • LIDOCAINE 1/6% WITH EPINEPHRINE SOLUTION    PRN Rui Shaver MD   150 mL at 05/31/17 1525   • mupirocin (BACTROBAN) 2 % ointment   Topical Q8H Rui Shaver MD       • ondansetron (ZOFRAN) injection 4 mg  4 mg Intravenous Q6H PRN Rui Shaver MD   4 mg at 06/07/17 1025   •  oxyCODONE-acetaminophen (PERCOCET) 5-325 MG per tablet 1 tablet  1 tablet Oral Q4H PRN Rui Shaver MD   1 tablet at 06/08/17 0153   • pantoprazole (PROTONIX) injection 40 mg  40 mg Intravenous Q AM Rui Shaver MD   40 mg at 06/07/17 0555   • tamsulosin (FLOMAX) 24 hr capsule 0.4 mg  0.4 mg Oral Daily GIA Amaral   0.4 mg at 06/07/17 0838       Objective     Physical Exam:   Temp:  [96.8 °F (36 °C)-99.2 °F (37.3 °C)] 96.9 °F (36.1 °C)  Heart Rate:  [100-117] 100  Resp:  [16-18] 17  BP: (116-161)/(72-90) 116/75     Intake/Output       06/07/17 0700 - 06/08/17 0659    Intake (ml) 360    Output (ml) 1321    Net (ml) -961          Physical Exam   Constitutional: He is oriented to person, place, and time.   Cardiovascular: Normal rate.    Abdominal: Soft. He exhibits distension (LESS THAN PREVIOUS DAYS). There is tenderness (mild and improving).   Neurological: He is alert and oriented to person, place, and time.   Skin: Skin is warm.   Drains in place. Incision in midline still dry and shows no signs of infection or dehiscence. Less than 10cc in both TAINA drains combined. Ecchymosis subsiding on abdomen.    Vitals reviewed.        Labs:  Lab Results (last 24 hours)     Procedure Component Value Units Date/Time    Basic Metabolic Panel [[id number]]  (Abnormal) Collected:  06/07/17 0559    Specimen:  Blood Updated:  06/07/17 0643     Glucose 154 (H) mg/dL      BUN 9 mg/dL      Creatinine 0.54 (L) mg/dL      Sodium 133 (L) mmol/L      Potassium 3.3 (L) mmol/L      Chloride 94 (L) mmol/L      CO2 33.0 (H) mmol/L      Calcium 8.6 mg/dL      eGFR Non African Amer >150 mL/min/1.73      BUN/Creatinine Ratio 16.7     Anion Gap 6.0 mmol/L     POC Glucose Fingerstick [[id number]]  (Abnormal) Collected:  06/07/17 0657    Specimen:  Blood Updated:  06/07/17 0716     Glucose 174 (H) mg/dL       RN NotifiedMeter: [id number]: [id number] HALIE BOSCH       POC Glucose Fingerstick [[id number]]  (Abnormal) Collected:  06/07/17  1119    Specimen:  Blood Updated:  06/07/17 1140     Glucose 175 (H) mg/dL       RN NotifiedMeter: [id number]: [id number] [facility]       POC Glucose Fingerstick [[id number]]  (Abnormal) Collected:  06/07/17 1642    Specimen:  Blood Updated:  06/07/17 1715     Glucose 168 (H) mg/dL       RN NotifiedMeter: [id number]: [id number] [facility]             Images:   Imaging Results (last 24 hours)     ** No results found for the last 24 hours. **          (I reviewed the patient's results and images.)                                                                                    ASSESSMENT/PLAN     POD#8 s/p OPEN ADHESIOLYSIS AND VENTRAL/INCISIONAL HERNIA REPAIR WITH PHASIX MESH.    1.tachycardia improved  2.full diet- continue  3.dvt prophylaxis-lovenox  4.IV and pain pump D/c yesterday. Doing well on only oral pain Rx  5.remove drains this evening if less than 5cc since this morning.   6. Home tomorrow morning.   Kirt Villarreal, Medical Student          This document has been electronically signed by Rui Shaver MD on June 8, 2017 4:25 PM

## 2017-06-08 NOTE — DISCHARGE PLACEMENT REQUEST
"[facility]  [facility] [place] [facility]  [phone]  Fax [phone]    Please deliver to room 364      Weight 220   Height 68 in    Ventura Boggs (56 y.o. Male)     Date of Birth Social Security Number Address Home Phone MRN    1960  [address] [facility] [address] [phone] [id number]    Bahai Marital Status          None        Admission Date Admission Type Admitting Provider Attending Provider Department, Room/Bed    5/31/17 Elective Rui Shaver MD Rao, Mohan K, MD [facility] 3 EAST, 364/1    Discharge Date Discharge Disposition Discharge Destination         Home or Self Care             Attending Provider: Rui Shaver MD     Allergies:  Naproxen, Tramadol    Isolation:  Contact   Infection:  None   Code Status:  FULL    Ht:  68\" (172.7 cm)   Wt:  220 lb 10.9 oz (100 kg)    Admission Cmt:  None   Principal Problem:  Ventral hernia without obstruction or gangrene [K43.9] More...                 Active Insurance as of 5/31/2017     Primary Coverage     Payor Plan Insurance Group Employer/Plan Group    [employer] MEDICAID [employer]      Payor Plan Address Payor Plan Phone Number Effective From Effective To    PO  [phone] 5/19/2017     [place] [address]       Subscriber Name Subscriber Birth Date Member ID       VENTURA BOGGS 1960 [id number]                 Emergency Contacts      (Rel.) Home Phone Work Phone Mobile Phone    Sonia Boggs (Significant Other) [phone] -- --            Insurance Information                [employer]ICA[employer] Phone: [phone]    Subscriber: Ventura Boggs Subscriber#: [id number]    Group#:  Precert#:              History & Physical      Rui Shaver MD at 5/19/2017  9:10 AM          Chief Complaint   Patient presents with   • Abdominal Pain     Abdominal pain possible ventral hernia.        Hernia   This is a new problem. The current episode " "started more than 1 month ago. The problem occurs constantly. The problem has been gradually worsening. Associated symptoms include abdominal pain. Pertinent negatives include no anorexia, arthralgias, change in bowel habit, chest pain, chills, fever, headaches, myalgias, nausea, neck pain, numbness, rash, vomiting or weakness. Nothing aggravates the symptoms. Treatments tried: Wearing an abdominal binder. The treatment provided no relief.     This man is 56 years old and underwent unknown operation in [place] for a \" bowel blockage\", perhaps a lysis of adhesions.he had had no operation previous to this. Began having pain in the left side of the abdomen several months ago.  Now has a ventral hernia that is becoming more tender. No history of incarceration or obstruction.        1. Indeterminate low-attenuation area within the subcapsular region medial aspect of the spleen with similar finding reported on prior exam of 2/23/2017 but not identifiable on recent prior exam of 12/11/2016. As previously reported this may simply be   physiologic due to differences in timing of contrast bolus. Other possibilities might include a hemangioma or in the appropriate clinical setting splenic infarction. There is however no adjacent edema or inflammatory change. Neoplastic etiology is   considered less likely due to finding not being identifiable on recent prior exam of 12/11/2016.  2. Postsurgical changes anterior abdominal wall with what likely represents postsurgical scarring and fat containing ventral hernia.. There are noted to be loops of small bowel adjacent to the undersurface of the abdominal wall in this region which could   be indicative of adhesions but with no associated bowel obstruction or dilatation or significant change compared to prior exams..  3. Fatty infiltration liver.  4. Other chronic nonemergent findings as described above.   Result Narrative   EXAM:  CT ABDOMEN AND PELVIS WITH CONTRAST      TECHNIQUE: " A multislice scan was obtained of the abdomen and pelvis in the axial plane with IV contrast. Reformatted images were generated in the sagittal and coronal planes. Additional delayed images were obtained through the kidneys    HISTORY:ABDOMINAL PAIN  FLANK PAIN chest tightness. Dyspnea. Left-sided upper abdominal pain into chest    COMPARISON: 2/23/2017, 12/11/2016, 11/2/2016      FINDINGS:There is minimal atelectatic change the lung bases. Is no pleural effusion.    The liver is of decreased CT attenuation compatible with fatty infiltration. Gallbladder surgically absent. No hepatic lesions are identified. There is no evidence of significant intrahepatic or extra hepatic biliary ductal dilatation. Pancreas is   atrophic in size with no pancreatic lesions or peripancreatic inflammatory changes identified.    There is again demonstrated to be a site of decreased CT attenuation in the subcapsular region medial aspect of the spleen measuring up to 2.8 x 1.8 in AP and transverse dimensions by 3.5 cm craniocaudal dimension with similar finding reported on the   recent CT exam of 2/23/2017. This is not identifiable on the postcontrast CT scan of 12/11/2016 or the unenhanced scan of 11/2/2016. As previously reported this may be physiologic due to differences in timing of contrast bolus. Other possibilities could   be an underlying hemangioma in the liver perhaps better demonstrated on this exam due to timing of contrast bolus or as previously reported could be on the basis of splenic infarction. A neoplastic etiology is probably less likely due to no lesion being   identifiable on recent prior exam of 12/11/2016. There is no evidence of adjacent edema or inflammatory change. Atherosclerotic calcifications are noted within splenic artery. Splenic vein is normal caliber without evidence of splenic or portal vein   thrombosis.    Abdominal aorta is of normal caliber. Is no significant periaortic adenopathy. No significant  plaque or stenosis is identified the origin of the renal or mesenteric arteries.    There is no evidence of a renal mass or hydronephrosis. No ureteral calculi are identified.    No pelvic mass or significant lymphadenopathy is identified. Is no ascites. No significantly abnormally distended loops of bowel are identified. The appendix is normal caliber with no periappendiceal inflammatory changes identified. No extraluminal air   is identified.    There are postsurgical changes anterior abdominal wall in the supraumbilical and periumbilical region with what likely represents postsurgical scarring. There are loops of small bowel adjacent to the undersurface of the anterior abdominal wall at that   level which could be indicative of underlying adhesions but without associated bowel obstruction or distention or herniation. There are small fat-containing areas of ventral herniation or eventration as previously reported unchanged significantly   compared to prior exams.    Sagittal reformatted images demonstrate the lumbar vertebral bodies all be well aligned with no acute appearing osseous abnormalities identified.         History reviewed.  1. Diabetes  2. Coronary artery disease  3. Hypertension    Previous surgery  1. Cholecystectomy  2. CABG x 4  3. Exploratory laparotomy      Current Outpatient Prescriptions:     •  aspirin 81 MG chewable tablet, Chew 81 mg Daily., Disp: , Rfl:   •  atorvastatin (LIPITOR) 20 MG tablet, Take 20 mg by mouth Daily., Disp: , Rfl:   •  FREESTYLE LITE test strip, USE ONE STRIP BID, Disp: , Rfl: 0  •  insulin lispro (humaLOG) 100 UNIT/ML injection, Inject  under the skin 3 (Three) Times a Day Before Meals., Disp: , Rfl:   •  metFORMIN (GLUCOPHAGE) 1000 MG tablet, Take 1,000 mg by mouth 2 (Two) Times a Day With Meals., Disp: , Rfl:   •  metFORMIN (GLUCOPHAGE) 500 MG tablet, Take 500 mg by mouth., Disp: , Rfl:   •  ondansetron ODT (ZOFRAN-ODT) 4 MG disintegrating tablet, Take 4 mg by mouth  Every 8 (Eight) Hours., Disp: , Rfl:   •  SITagliptin (JANUVIA) 25 MG tablet, Take 50 mg by mouth 2 (Two) Times a Day., Disp: , Rfl:   •  metFORMIN (GLUCOPHAGE) 500 MG tablet, TK 1 T PO BID, Disp: , Rfl: 5    Allergies   Allergen Reactions   • Naproxen Other (See Comments) and Rash     Blisters in mouth   • Tramadol Rash     Patient states he gets a rash in his mouth.        Family history  1. Diabetes  2. Hypertension  3. Ovarian cancer    Social History     Social History   • Marital status: Unknown     Spouse name: N/A   • Number of children: N/A   • Years of education: N/A     Occupational History   • Not on file.     Social History Main Topics   • Smoking status: Never Smoker   • Smokeless tobacco: Not on file   • Alcohol use No   • Drug use: Not on file   • Sexual activity: Not on file     Other Topics Concern   • Not on file     Social History Narrative   • No narrative on file       Review of Systems   Constitutional: Negative for activity change, appetite change, chills and fever.   HENT: Negative for hearing loss, nosebleeds and trouble swallowing.    Respiratory: Positive for shortness of breath.    Cardiovascular: Negative for chest pain, palpitations and leg swelling.   Gastrointestinal: Positive for abdominal pain. Negative for abdominal distention, anal bleeding, anorexia, blood in stool, change in bowel habit, constipation, diarrhea, nausea, rectal pain and vomiting.   Endocrine: Negative for cold intolerance, heat intolerance, polydipsia and polyuria.   Genitourinary: Positive for difficulty urinating. Negative for decreased urine volume, dysuria, enuresis, frequency, hematuria and urgency.   Musculoskeletal: Negative for arthralgias, back pain, gait problem, myalgias and neck pain.   Skin: Negative for pallor, rash and wound.   Allergic/Immunologic: Negative for immunocompromised state.   Neurological: Negative for dizziness, seizures, weakness, light-headedness, numbness and headaches.    Psychiatric/Behavioral: Negative for agitation and behavioral problems. The patient is not nervous/anxious.        Physical Exam   Constitutional: He appears well-developed and well-nourished. No distress.   HENT:   Head: Normocephalic and atraumatic.   Eyes: Conjunctivae and EOM are normal. Pupils are equal, round, and reactive to light.   Neck: Normal range of motion. Neck supple. No JVD present. No tracheal deviation present. No thyromegaly present.   Cardiovascular: Normal rate, regular rhythm and normal heart sounds.  Exam reveals no friction rub.    No murmur heard.  Pulmonary/Chest: Effort normal and breath sounds normal. No stridor. No respiratory distress. He has no wheezes. He has no rales.   Abdominal: Soft. Bowel sounds are normal. He exhibits no distension and no mass. There is no tenderness. There is no rebound and no guarding. A hernia is present.       Lymphadenopathy:     He has no cervical adenopathy.     He has no axillary adenopathy.   Skin: Skin is warm, dry and intact. No lesion noted. He is not diaphoretic. No erythema.   Psychiatric: He has a normal mood and affect. His speech is normal and behavior is normal. Judgment and thought content normal. Cognition and memory are normal.   Vitals reviewed.        ASSESSMENT    Ventura was seen today for abdominal pain.    Diagnoses and all orders for this visit:    Incisional hernia, without obstruction or gangrene      PLAN    1. Records from [place]  2. CT scan from [place]  3. Recheck in 1 week          This document has been electronically signed by Rui Shaver MD on May 19, 2017 9:12 AM         Electronically signed by Rui Shaver MD at 5/20/2017  5:32 PM      Rui Shaver MD at 5/24/2017  9:00 AM          Chief Complaint   Patient presents with   • Follow-up     Recheck abdominal pain        HPI    I have reviewed the old reports and xrays. He has a broad bases fat filled hernia of the abdominal wall.      Current Outpatient Prescriptions:    •  aspirin 81 MG chewable tablet, Chew 81 mg Daily., Disp: , Rfl:   •  atorvastatin (LIPITOR) 20 MG tablet, Take 20 mg by mouth Daily., Disp: , Rfl:   •  FREESTYLE LITE test strip, USE ONE STRIP BID, Disp: , Rfl: 0  •  insulin lispro (humaLOG) 100 UNIT/ML injection, Inject  under the skin 3 (Three) Times a Day Before Meals., Disp: , Rfl:   •  lisinopril (PRINIVIL,ZESTRIL) 5 MG tablet, Take 5 mg by mouth Daily., Disp: , Rfl:   •  metFORMIN (GLUCOPHAGE) 1000 MG tablet, Take 1,000 mg by mouth 2 (Two) Times a Day With Meals., Disp: , Rfl:   •  ondansetron ODT (ZOFRAN-ODT) 4 MG disintegrating tablet, Take 4 mg by mouth Every 8 (Eight) Hours., Disp: , Rfl:   •  SITagliptin (JANUVIA) 25 MG tablet, Take 50 mg by mouth 2 (Two) Times a Day., Disp: , Rfl:   •  metFORMIN (GLUCOPHAGE) 500 MG tablet, TK 1 T PO BID, Disp: , Rfl: 5  •  metFORMIN (GLUCOPHAGE) 500 MG tablet, Take 500 mg by mouth., Disp: , Rfl:     Allergies   Allergen Reactions   • Naproxen Other (See Comments) and Rash     Blisters in mouth   • Tramadol Rash     Patient states he gets a rash in his mouth.    .    Social History     Social History   • Marital status: Unknown     Spouse name: N/A   • Number of children: N/A   • Years of education: N/A     Occupational History   • Not on file.     Social History Main Topics   • Smoking status: Never Smoker   • Smokeless tobacco: Not on file   • Alcohol use No   • Drug use: Not on file   • Sexual activity: Not on file     Other Topics Concern   • Not on file     Social History Narrative       Review of Systems  Nothing to add  Physical Exam   Constitutional: He appears well-developed and well-nourished.   HENT:   Head: Normocephalic and atraumatic.   Neck: Normal range of motion. Neck supple. No tracheal deviation present. No thyromegaly present.   Cardiovascular: Normal rate and regular rhythm.    Pulmonary/Chest: Effort normal and breath sounds normal.   Abdominal: Soft. Bowel sounds are normal. There is no  hepatosplenomegaly, splenomegaly or hepatomegaly. A hernia is present. Hernia confirmed positive in the ventral area.       Lymphadenopathy:     He has no cervical adenopathy.     He has no axillary adenopathy.   Psychiatric: He has a normal mood and affect. His speech is normal and behavior is normal. Judgment and thought content normal. Cognition and memory are normal.   Vitals reviewed.        ASSESSMENT    Ventura was seen today for follow-up.    Diagnoses and all orders for this visit:    Ventral hernia without obstruction or gangrene  -     ceFAZolin (ANCEF) 2 g in sodium chloride 0.9 % 100 mL IVPB; Infuse 2 g into a venous catheter 1 (One) Time.  -     Case Request; Standing  -     CBC & Differential; Future  -     Comprehensive Metabolic Panel; Future  -     Case Request    Type 2 diabetes mellitus without complication, with long-term current use of insulin    Other orders  -     Follow anesthesia standing orders.; Standing  -     Inpatient Admission; Standing  -     Follow anesthesia standing orders.  -     Provide instructions to patient on NPO status  -     JAILENE hose- To be placed on patient in pre-op; Standing  -     SCD (sequential compression device)- to be placed on patient in Pre-op; Standing  -     Obtain Informed Consent; Standing        PLAN    1. Laparoscopic possible open ventral hernia repair ventral repair with mesh    The procedure has been explained to the patient, and the following were discussed:  There are risks of bleeding, infection requiring antibiotics and possibly further surgery, injury to nearby structures, nerve injury, wound complications, recurrence of hernia. The risk of chronic pain may be as high as 10%, although the risk debilitating pain is approximately 2%. Urinary retention requiring catheterization may occur due to spasm of the bladder muscles in 1 in 100, and is more common in elderly males.  Events such as severe bleeding, the need for blood transfusion(s), heart  "irregularity or stoppage may occur, but are uncommon.  Bleeding is more common if  blood thinning drugs (such as Warfarin, Asprin, Clopidogrel or Dipyridamole)  are taken. Blood clot in the leg (DVT) causing pain and swelling is possible. In rare cases part of the clot may break off and go to the lungs.   Any complication may require a prolonged hospitalization, a modified incision or additional surgery.  Alternatives to surgery have been explained including watchful waiting, noting that patients who are asymptomatic or \"minimally symptomatic\" may be managed without surgical intervention.  The patient was given the opportunity to ask questions and raise concerns with the doctor about the proposed  procedure and its risks, and treatment options. All questions were answered.  The patient agrees to operation.           This document has been electronically signed by Rui Shaver MD on May 24, 2017 9:23 AM         Electronically signed by Rui Shaver MD at 5/24/2017  9:23 AM      Rui Shaver MD at 5/31/2017  2:47 PM            H&P reviewed. The patient was examined and there are no changes to the H&P.         Electronically signed by Rui Shaver MD at 5/31/2017  2:47 PM    Source Note             Chief Complaint   Patient presents with   • Follow-up     Recheck abdominal pain        HPI    I have reviewed the old reports and xrays. He has a broad bases fat filled hernia of the abdominal wall.      Current Outpatient Prescriptions:   •  aspirin 81 MG chewable tablet, Chew 81 mg Daily., Disp: , Rfl:   •  atorvastatin (LIPITOR) 20 MG tablet, Take 20 mg by mouth Daily., Disp: , Rfl:   •  FREESTYLE LITE test strip, USE ONE STRIP BID, Disp: , Rfl: 0  •  insulin lispro (humaLOG) 100 UNIT/ML injection, Inject  under the skin 3 (Three) Times a Day Before Meals., Disp: , Rfl:   •  lisinopril (PRINIVIL,ZESTRIL) 5 MG tablet, Take 5 mg by mouth Daily., Disp: , Rfl:   •  metFORMIN (GLUCOPHAGE) 1000 MG tablet, Take 1,000 mg by " mouth 2 (Two) Times a Day With Meals., Disp: , Rfl:   •  ondansetron ODT (ZOFRAN-ODT) 4 MG disintegrating tablet, Take 4 mg by mouth Every 8 (Eight) Hours., Disp: , Rfl:   •  SITagliptin (JANUVIA) 25 MG tablet, Take 50 mg by mouth 2 (Two) Times a Day., Disp: , Rfl:   •  metFORMIN (GLUCOPHAGE) 500 MG tablet, TK 1 T PO BID, Disp: , Rfl: 5  •  metFORMIN (GLUCOPHAGE) 500 MG tablet, Take 500 mg by mouth., Disp: , Rfl:     Allergies   Allergen Reactions   • Naproxen Other (See Comments) and Rash     Blisters in mouth   • Tramadol Rash     Patient states he gets a rash in his mouth.    .    Social History     Social History   • Marital status: Unknown     Spouse name: N/A   • Number of children: N/A   • Years of education: N/A     Occupational History   • Not on file.     Social History Main Topics   • Smoking status: Never Smoker   • Smokeless tobacco: Not on file   • Alcohol use No   • Drug use: Not on file   • Sexual activity: Not on file     Other Topics Concern   • Not on file     Social History Narrative       Review of Systems  Nothing to add  Physical Exam   Constitutional: He appears well-developed and well-nourished.   HENT:   Head: Normocephalic and atraumatic.   Neck: Normal range of motion. Neck supple. No tracheal deviation present. No thyromegaly present.   Cardiovascular: Normal rate and regular rhythm.    Pulmonary/Chest: Effort normal and breath sounds normal.   Abdominal: Soft. Bowel sounds are normal. There is no hepatosplenomegaly, splenomegaly or hepatomegaly. A hernia is present. Hernia confirmed positive in the ventral area.       Lymphadenopathy:     He has no cervical adenopathy.     He has no axillary adenopathy.   Psychiatric: He has a normal mood and affect. His speech is normal and behavior is normal. Judgment and thought content normal. Cognition and memory are normal.   Vitals reviewed.        ASSESSMENT    Ventura was seen today for follow-up.    Diagnoses and all orders for this  visit:    Ventral hernia without obstruction or gangrene  -     ceFAZolin (ANCEF) 2 g in sodium chloride 0.9 % 100 mL IVPB; Infuse 2 g into a venous catheter 1 (One) Time.  -     Case Request; Standing  -     CBC & Differential; Future  -     Comprehensive Metabolic Panel; Future  -     Case Request    Type 2 diabetes mellitus without complication, with long-term current use of insulin    Other orders  -     Follow anesthesia standing orders.; Standing  -     Inpatient Admission; Standing  -     Follow anesthesia standing orders.  -     Provide instructions to patient on NPO status  -     JAILENE hose- To be placed on patient in pre-op; Standing  -     SCD (sequential compression device)- to be placed on patient in Pre-op; Standing  -     Obtain Informed Consent; Standing        PLAN    1. Laparoscopic possible open ventral hernia repair ventral repair with mesh    The procedure has been explained to the patient, and the following were discussed:  There are risks of bleeding, infection requiring antibiotics and possibly further surgery, injury to nearby structures, nerve injury, wound complications, recurrence of hernia. The risk of chronic pain may be as high as 10%, although the risk debilitating pain is approximately 2%. Urinary retention requiring catheterization may occur due to spasm of the bladder muscles in 1 in 100, and is more common in elderly males.  Events such as severe bleeding, the need for blood transfusion(s), heart irregularity or stoppage may occur, but are uncommon.  Bleeding is more common if  blood thinning drugs (such as Warfarin, Asprin, Clopidogrel or Dipyridamole)  are taken. Blood clot in the leg (DVT) causing pain and swelling is possible. In rare cases part of the clot may break off and go to the lungs.   Any complication may require a prolonged hospitalization, a modified incision or additional surgery.  Alternatives to surgery have been explained including watchful waiting, noting that  "patients who are asymptomatic or \"minimally symptomatic\" may be managed without surgical intervention.  The patient was given the opportunity to ask questions and raise concerns with the doctor about the proposed  procedure and its risks, and treatment options. All questions were answered.  The patient agrees to operation.           This document has been electronically signed by Rui Shaver MD on May 24, 2017 9:23 AM          Electronically signed by Rui Shaver MD at 5/24/2017  9:23 AM            Rui Shaver MD at 5/31/2017  2:47 PM            H&P reviewed. The patient was examined and there are no changes to the H&P.         Electronically signed by Rui Shaver MD at 5/31/2017  2:47 PM    Source Note             Chief Complaint   Patient presents with   • Abdominal Pain     Abdominal pain possible ventral hernia.        Hernia   This is a new problem. The current episode started more than 1 month ago. The problem occurs constantly. The problem has been gradually worsening. Associated symptoms include abdominal pain. Pertinent negatives include no anorexia, arthralgias, change in bowel habit, chest pain, chills, fever, headaches, myalgias, nausea, neck pain, numbness, rash, vomiting or weakness. Nothing aggravates the symptoms. Treatments tried: Wearing an abdominal binder. The treatment provided no relief.     This man is 56 years old and underwent unknown operation in [place] for a \" bowel blockage\", perhaps a lysis of adhesions.he had had no operation previous to this. Began having pain in the left side of the abdomen several months ago.  Now has a ventral hernia that is becoming more tender. No history of incarceration or obstruction.        1. Indeterminate low-attenuation area within the subcapsular region medial aspect of the spleen with similar finding reported on prior exam of 2/23/2017 but not identifiable on recent prior exam of 12/11/2016. As previously reported this may simply be   physiologic " due to differences in timing of contrast bolus. Other possibilities might include a hemangioma or in the appropriate clinical setting splenic infarction. There is however no adjacent edema or inflammatory change. Neoplastic etiology is   considered less likely due to finding not being identifiable on recent prior exam of 12/11/2016.  2. Postsurgical changes anterior abdominal wall with what likely represents postsurgical scarring and fat containing ventral hernia.. There are noted to be loops of small bowel adjacent to the undersurface of the abdominal wall in this region which could   be indicative of adhesions but with no associated bowel obstruction or dilatation or significant change compared to prior exams..  3. Fatty infiltration liver.  4. Other chronic nonemergent findings as described above.   Result Narrative   EXAM:  CT ABDOMEN AND PELVIS WITH CONTRAST      TECHNIQUE: A multislice scan was obtained of the abdomen and pelvis in the axial plane with IV contrast. Reformatted images were generated in the sagittal and coronal planes. Additional delayed images were obtained through the kidneys    HISTORY:ABDOMINAL PAIN  FLANK PAIN chest tightness. Dyspnea. Left-sided upper abdominal pain into chest    COMPARISON: 2/23/2017, 12/11/2016, 11/2/2016      FINDINGS:There is minimal atelectatic change the lung bases. Is no pleural effusion.    The liver is of decreased CT attenuation compatible with fatty infiltration. Gallbladder surgically absent. No hepatic lesions are identified. There is no evidence of significant intrahepatic or extra hepatic biliary ductal dilatation. Pancreas is   atrophic in size with no pancreatic lesions or peripancreatic inflammatory changes identified.    There is again demonstrated to be a site of decreased CT attenuation in the subcapsular region medial aspect of the spleen measuring up to 2.8 x 1.8 in AP and transverse dimensions by 3.5 cm craniocaudal dimension with similar finding  reported on the   recent CT exam of 2/23/2017. This is not identifiable on the postcontrast CT scan of 12/11/2016 or the unenhanced scan of 11/2/2016. As previously reported this may be physiologic due to differences in timing of contrast bolus. Other possibilities could   be an underlying hemangioma in the liver perhaps better demonstrated on this exam due to timing of contrast bolus or as previously reported could be on the basis of splenic infarction. A neoplastic etiology is probably less likely due to no lesion being   identifiable on recent prior exam of 12/11/2016. There is no evidence of adjacent edema or inflammatory change. Atherosclerotic calcifications are noted within splenic artery. Splenic vein is normal caliber without evidence of splenic or portal vein   thrombosis.    Abdominal aorta is of normal caliber. Is no significant periaortic adenopathy. No significant plaque or stenosis is identified the origin of the renal or mesenteric arteries.    There is no evidence of a renal mass or hydronephrosis. No ureteral calculi are identified.    No pelvic mass or significant lymphadenopathy is identified. Is no ascites. No significantly abnormally distended loops of bowel are identified. The appendix is normal caliber with no periappendiceal inflammatory changes identified. No extraluminal air   is identified.    There are postsurgical changes anterior abdominal wall in the supraumbilical and periumbilical region with what likely represents postsurgical scarring. There are loops of small bowel adjacent to the undersurface of the anterior abdominal wall at that   level which could be indicative of underlying adhesions but without associated bowel obstruction or distention or herniation. There are small fat-containing areas of ventral herniation or eventration as previously reported unchanged significantly   compared to prior exams.    Sagittal reformatted images demonstrate the lumbar vertebral bodies all be  well aligned with no acute appearing osseous abnormalities identified.         History reviewed.  1. Diabetes  2. Coronary artery disease  3. Hypertension    Previous surgery  1. Cholecystectomy  2. CABG x 4  3. Exploratory laparotomy      Current Outpatient Prescriptions:     •  aspirin 81 MG chewable tablet, Chew 81 mg Daily., Disp: , Rfl:   •  atorvastatin (LIPITOR) 20 MG tablet, Take 20 mg by mouth Daily., Disp: , Rfl:   •  FREESTYLE LITE test strip, USE ONE STRIP BID, Disp: , Rfl: 0  •  insulin lispro (humaLOG) 100 UNIT/ML injection, Inject  under the skin 3 (Three) Times a Day Before Meals., Disp: , Rfl:   •  metFORMIN (GLUCOPHAGE) 1000 MG tablet, Take 1,000 mg by mouth 2 (Two) Times a Day With Meals., Disp: , Rfl:   •  metFORMIN (GLUCOPHAGE) 500 MG tablet, Take 500 mg by mouth., Disp: , Rfl:   •  ondansetron ODT (ZOFRAN-ODT) 4 MG disintegrating tablet, Take 4 mg by mouth Every 8 (Eight) Hours., Disp: , Rfl:   •  SITagliptin (JANUVIA) 25 MG tablet, Take 50 mg by mouth 2 (Two) Times a Day., Disp: , Rfl:   •  metFORMIN (GLUCOPHAGE) 500 MG tablet, TK 1 T PO BID, Disp: , Rfl: 5    Allergies   Allergen Reactions   • Naproxen Other (See Comments) and Rash     Blisters in mouth   • Tramadol Rash     Patient states he gets a rash in his mouth.        Family history  1. Diabetes  2. Hypertension  3. Ovarian cancer    Social History     Social History   • Marital status: Unknown     Spouse name: N/A   • Number of children: N/A   • Years of education: N/A     Occupational History   • Not on file.     Social History Main Topics   • Smoking status: Never Smoker   • Smokeless tobacco: Not on file   • Alcohol use No   • Drug use: Not on file   • Sexual activity: Not on file     Other Topics Concern   • Not on file     Social History Narrative   • No narrative on file       Review of Systems   Constitutional: Negative for activity change, appetite change, chills and fever.   HENT: Negative for hearing loss, nosebleeds and  trouble swallowing.    Respiratory: Positive for shortness of breath.    Cardiovascular: Negative for chest pain, palpitations and leg swelling.   Gastrointestinal: Positive for abdominal pain. Negative for abdominal distention, anal bleeding, anorexia, blood in stool, change in bowel habit, constipation, diarrhea, nausea, rectal pain and vomiting.   Endocrine: Negative for cold intolerance, heat intolerance, polydipsia and polyuria.   Genitourinary: Positive for difficulty urinating. Negative for decreased urine volume, dysuria, enuresis, frequency, hematuria and urgency.   Musculoskeletal: Negative for arthralgias, back pain, gait problem, myalgias and neck pain.   Skin: Negative for pallor, rash and wound.   Allergic/Immunologic: Negative for immunocompromised state.   Neurological: Negative for dizziness, seizures, weakness, light-headedness, numbness and headaches.   Psychiatric/Behavioral: Negative for agitation and behavioral problems. The patient is not nervous/anxious.        Physical Exam   Constitutional: He appears well-developed and well-nourished. No distress.   HENT:   Head: Normocephalic and atraumatic.   Eyes: Conjunctivae and EOM are normal. Pupils are equal, round, and reactive to light.   Neck: Normal range of motion. Neck supple. No JVD present. No tracheal deviation present. No thyromegaly present.   Cardiovascular: Normal rate, regular rhythm and normal heart sounds.  Exam reveals no friction rub.    No murmur heard.  Pulmonary/Chest: Effort normal and breath sounds normal. No stridor. No respiratory distress. He has no wheezes. He has no rales.   Abdominal: Soft. Bowel sounds are normal. He exhibits no distension and no mass. There is no tenderness. There is no rebound and no guarding. A hernia is present.       Lymphadenopathy:     He has no cervical adenopathy.     He has no axillary adenopathy.   Skin: Skin is warm, dry and intact. No lesion noted. He is not diaphoretic. No erythema.    Psychiatric: He has a normal mood and affect. His speech is normal and behavior is normal. Judgment and thought content normal. Cognition and memory are normal.   Vitals reviewed.        ASSESSMENT    Ventura was seen today for abdominal pain.    Diagnoses and all orders for this visit:    Incisional hernia, without obstruction or gangrene      PLAN    1. Records from [facility]  2. CT scan from [place]  3. Recheck in 1 week          This document has been electronically signed by Rui Shaver MD on May 19, 2017 9:12 AM          Electronically signed by Rui Shaver MD at 2017  5:32 PM              Physician Progress Notes (last 24 hours) (Notes from 2017  4:25 PM through 2017  4:25 PM)     No notes of this type exist for this encounter.           Physical Therapy Notes (last 24 hours) (Notes from 2017  4:25 PM through 2017  4:25 PM)      Ulysses Gardner PTA at 2017  7:50 AM  Version 1 of 1         Acute Care - Physical Therapy Treatment Note  [facility]     Patient Name: Ventura Boggs  : 1960  MRN: [id number]  Today's Date: 2017  Onset of Illness/Injury or Date of Surgery Date: 17  Date of Referral to PT: 07  Referring Physician: Dr. Shaver    Admit Date: 2017    Visit Dx:    ICD-10-CM ICD-9-CM   1. Ventral hernia without obstruction or gangrene K43.9 553.20   2. Impaired physical mobility Z74.09 781.99   3. Impaired mobility and ADLs Z74.09 799.89     Patient Active Problem List   Diagnosis   • Incisional hernia, without obstruction or gangrene   • Controlled type 2 diabetes mellitus without complication, with long-term current use of insulin   • Ventral hernia without obstruction or gangrene   • Postoperative urinary retention               Adult Rehabilitation Note       17 0936 17 0750 17 1324    Rehab Assessment/Intervention    Discipline occupational therapy assistant  -SAMUEL physical therapy assistant  -AM occupational therapy  assistant  -CS    Document Type therapy note (daily note)  -KD therapy note (daily note)  -AM therapy note (daily note)  -CS    Subjective Information agree to therapy  -KD agree to therapy;complains of;pain  -AM agree to therapy  -CS    Patient Effort, Rehab Treatment  good  -AM     Symptoms Noted During/After Treatment  none  -AM     Precautions/Limitations  no known precautions/limitations  -AM     Equipment Issued to Patient  gait belt  -AM     Recorded by [KD] JOLIE Jha [AM] Ulysses Gardner PTA [CS] MARYLIN Rosado/L    Vital Signs    Pre Systolic BP Rehab 119  -  -  -CS    Pre Treatment Diastolic BP 71  -KD 94  -AM 81  -CS    Post Systolic BP Rehab  144  -AM     Post Treatment Diastolic BP  97  -AM     Pretreatment Heart Rate (beats/min) 109  -  -  -CS    Posttreatment Heart Rate (beats/min) 111  -  -AM     Pre SpO2 (%) 95  -KD 95  -AM 91  -CS    O2 Delivery Pre Treatment room air  -KD room air  -AM room air  -CS    Post SpO2 (%) 93  -KD 92  -AM     O2 Delivery Post Treatment room air  -KD room air  -AM     Pre Patient Position Sitting  -KD Supine  -AM Sitting  -CS    Intra Patient Position Standing  -KD Standing  -AM Standing  -CS    Post Patient Position Sitting  -KD Supine  -AM Sitting  -CS    Recorded by [KD] JOLIE Jha [AM] Ulysses Gardner PTA [CS] MARYLIN Rosado/MEI    Pain Assessment    Pain Assessment 0-10  -KD 0-10  -AM 0-10  -CS    Pain Score 6  -KD 6  -AM 6  -CS    Post Pain Score  6  -AM 6  -CS    Pain Type  Acute pain;Surgical pain  -AM     Pain Location --   TAINA tube site  -KD Abdomen  -AM Abdomen  -CS    Pain Orientation  Mid  -AM     Pain Descriptors  Sore  -AM     Pain Frequency  Constant/continuous  -AM     Date Pain First Started  05/31/17  -AM     Clinical Progression  Not changed  -AM     Patient's Stated Pain Goal  No pain  -AM     Pain Intervention(s)  Medication (See MAR);Ambulation/increased activity  -AM     Result  of Injury  No  -AM     Work-Related Injury  No  -AM     Multiple Pain Sites  No  -AM     Recorded by [KD] SACHI JhaA/L [AM] Ulysses Gardner PTA [CS] SACHI RosadoA/L    Cognitive Assessment/Intervention    Current Cognitive/Communication Assessment functional  -KD functional  -AM functional  -CS    Orientation Status oriented x 4  -KD oriented x 4  -AM oriented x 4  -CS    Follows Commands/Answers Questions 100% of the time  -% of the time  -% of the time  -CS    Personal Safety Interventions gait belt;nonskid shoes/slippers when out of bed  -KD gait belt;nonskid shoes/slippers when out of bed;supervised activity  -AM gait belt;nonskid shoes/slippers when out of bed  -CS    Recorded by [KD] SACHI JhaA/L [AM] Ulysses Gardner PTA [CS] SACHI RosadoA/L    ROM (Range of Motion)    General ROM  no range of motion deficits identified  -AM     Recorded by  [AM] Ulysses Gardner PTA     Bed Mobility, Assessment/Treatment    Bed Mobility, Roll Left, [place]  not tested  -AM     Bed Mobility, Roll Right, [place] independent  -KD not tested  -AM     Bed Mobility, Scoot/Bridge, [place]  not tested  -AM     Bed Mob, Supine to Sit, [place] independent  -KD conditional independence  -AM supervision required  -CS    Bed Mob, Sit to Supine, [place] independent  -KD conditional independence  -AM supervision required  -CS    Bed Mob, Sidelying to Sit, [place] independent  -KD not tested  -AM     Bed Mob, Sit to Sidelying, [place]  not tested  -AM     Recorded by [KD] SACHI JhaA/L [AM] Ulysses Gardner PTA [CS] Divya Ash COULTER/L    Transfer Assessment/Treatment    Transfers, Bed-Chair [place] independent  -KD conditional independence  -AM     Transfers, Chair-Bed [place]  conditional independence  -AM     Transfers, Bed-Chair-Bed, Assist Device --   No AD  -KD rolling walker  -AM     Transfers, Sit-Stand [place]  independent  -KD independent  -AM supervision required  -CS    Transfers, Stand-Sit [place] independent  -KD independent  -AM supervision required  -CS    Transfers, Sit-Stand-Sit, Assist Device  rolling walker  -AM rolling walker  -CS    Toilet Transfer, [place] not tested  -KD not tested  -AM     Walk-In Shower Transfer, [place] not tested  -KD not tested  -AM     Bathtub Transfer, [place] not tested  -KD not tested  -AM     Transfer, Impairments  pain  -AM     Recorded by [KD] MARYLIN Jha/MEI [AM] Ulysses Gardner PTA [CS] MARYLIN Rosado/L    Gait Assessment/Treatment    Gait, [place] Level  conditional independence  -AM     Gait, Assistive Device  rolling walker  -AM     Gait, Distance (Feet)  --   1000+150  -AM     Gait, Gait Pattern Analysis  swing-through gait  -AM     Gait, Maintain Weight Bearing Status  able to maintain weight bearing status  -AM     Gait, Impairments  pain  -AM     Recorded by  [AM] Ulysses Gardner PTA     Stairs Assessment/Treatment    Number of Stairs  1 flight  -AM     Stairs, Handrail Location  right side (ascending)  -AM     Stairs, [place] Level  independent  -AM     Stairs, Technique Used  step over step (ascending);step over step (descending)  -AM     Stairs, Maintain Weight Bearing Status  able to maintain weight bearing status  -AM     Stairs, Impairments  pain  -AM     Recorded by  [AM] Ulysses Gardner PTA     Upper Body Bathing Assessment/Training    UB Bathing Assess/Train Assistive Device bath mitt  -KD      UB Bathing Assess/Train, Position edge of bed;sitting  -KD      UB Bathing Assess/Train, [place] Level set up required  -KD      Recorded by [KD] MARYLIN Jha/L      Lower Body Bathing Assessment/Training    LB Bathing Assess/Train Assistive Device bath mitt  -KD      LB Bathing Assess/Train, Position edge of bed;sitting  -KD      LB Bathing Assess/Train, [place] Level set up required  -KD      Recorded by  [KD] MARYLIN Jha/L      Upper Body Dressing Assessment/Training    UB Dressing Assess/Train, Clothing Type doffing:;donning:;hospital gown  -KD      UB Dressing Assess/Train, Position edge of bed;sitting  -KD      UB Dressing Assess/Train, [place] set up required  -KD      Recorded by [KD] MARYLIN Jha/L      Lower Body Dressing Assessment/Training    LB Dressing Assess/Train, Clothing Type doffing:;donning:;slipper socks  -KD      LB Dressing Assess/Train, Position edge of bed;sitting  -KD      LB Dressing Assess/Train, [place] set up required  -KD      Recorded by [KD] MARYLIN Jha/L      Grooming Assessment/Training    Grooming Assess/Train, Assistive Device --   comb hair  -KD      Grooming Assess/Train, Position edge of bed;sitting  -KD      Grooming Assess/Train, Indepen Level conditional independence  -KD      Recorded by [KD] MARYLIN Jha/L      Balance Skills Training    Standing-Level of Assistance Close supervision  -KD  Close supervision  -CS    Static Standing Balance Support assistive device  -KD  assistive device  -CS    Standing-Balance Activities --   static   -KD      Standing Balance # of Minutes --   5  -KD  5  -CS    Recorded by [KD] MARYLIN Jha/MEI  [CS] MARYLIN Rosado/L    Therapy Exercises    Bilateral Upper Extremity AROM:;20 reps;sitting;elbow flexion/extension;pronation/supination;shoulder abduction/adduction;shoulder extension/flexion;shoulder horizontal abd/add;shoulder ER/IR  -KD  AROM:;20 reps;sitting;elbow flexion/extension;hand pumps;pronation/supination;shoulder extension/flexion;shoulder ER/IR  -CS    BUE Resistance manual resistance- minimal  -KD  manual resistance- minimal  -CS    Recorded by [KD] MARYLIN Jha/MEI  [CS] MARYLIN Rosado/L    Positioning and Restraints    Pre-Treatment Position in bed  -KD in bed  -AM in bed  -CS    Post Treatment Position chair  -KD bed  -AM bed  -CS    In Bed  supine;call  light within reach;encouraged to call for assist  -AM supine;call light within reach  -CS    In Chair call light within reach;encouraged to call for assist;sitting  -KD      Recorded by [KD] JOLIE Jha [AM] Ulysses Gardner PTA [CS] JOLIE Rosado      06/07/17 1025 06/06/17 1135 06/06/17 1040    Rehab Assessment/Intervention    Discipline physical therapy assistant  -AM physical therapy assistant  -AM occupational therapy assistant  -CS    Document Type therapy note (daily note)  -AM therapy note (daily note)  -AM therapy note (daily note)  -CS    Subjective Information agree to therapy;complains of;pain  -AM agree to therapy;complains of;pain  -AM agree to therapy  -CS    Patient Effort, Rehab Treatment good  -AM good  -AM     Symptoms Noted During/After Treatment none  -AM none  -AM     Precautions/Limitations no known precautions/limitations  -AM no known precautions/limitations  -AM     Equipment Issued to Patient gait belt  -AM gait belt  -AM     Recorded by [AM] Ulysses Gardner PTA [AM] Ulysses Gardner PTA [CS] MARYLIN Rosado/L    Vital Signs    Pre Systolic BP Rehab 151  -  -AM     Pre Treatment Diastolic BP 82  -AM 85  -AM     Post Systolic BP Rehab 148  -  -AM     Post Treatment Diastolic BP 97  -AM 97  -AM     Pretreatment Heart Rate (beats/min) 116  -  -AM     Posttreatment Heart Rate (beats/min) 117  -AM 91  -  -CS    Pre SpO2 (%) 94  -AM 90  -AM     O2 Delivery Pre Treatment room air  -AM room air  -AM     Post SpO2 (%) 92  -AM 90  -AM 94  -CS    O2 Delivery Post Treatment room air  -AM room air  -AM room air  -CS    Pre Patient Position Sitting  -AM Supine  -AM Supine  -CS    Intra Patient Position Standing  -AM Standing  -AM Sitting  -CS    Post Patient Position Sitting  -AM Supine  -AM Sitting  -CS    Recorded by [AM] Ulysses Gardner PTA [AM] Ulysses Gardner PTA [CS] MARYLIN Rosado/MEI    Pain Assessment    Pain Assessment 0-10  -AM  0-10  -AM 0-10  -CS    Pain Score 8  -AM 7  -AM 7  -CS    Post Pain Score 8  -AM 7  -AM 7  -CS    Pain Type Acute pain;Surgical pain  -AM Acute pain;Surgical pain  -AM Surgical pain  -CS    Pain Location Abdomen  -AM Abdomen  -AM Abdomen  -CS    Pain Orientation Mid  -AM Mid  -AM     Pain Descriptors Sore  -AM Sore  -AM     Pain Frequency Constant/continuous  -AM Constant/continuous  -AM     Date Pain First Started 05/31/17  -AM 05/31/17  -AM     Clinical Progression Not changed  -AM Not changed  -AM     Patient's Stated Pain Goal No pain  -AM No pain  -AM     Pain Intervention(s) Medication (See MAR);Ambulation/increased activity  -AM Medication (See MAR);Ambulation/increased activity  -AM     Result of Injury No  -AM No  -AM     Work-Related Injury No  -AM No  -AM     Multiple Pain Sites No  -AM No  -AM     Recorded by [AM] Ulysses Gardner PTA [AM] Ulysses Gardner PTA [CS] MARYLIN Rosado/L    Cognitive Assessment/Intervention    Current Cognitive/Communication Assessment functional  -AM functional  -AM functional  -CS    Orientation Status oriented x 4  -AM oriented x 4  -AM oriented x 4  -CS    Follows Commands/Answers Questions 100% of the time  -% of the time  -% of the time  -CS    Personal Safety Interventions gait belt;nonskid shoes/slippers when out of bed;supervised activity  -AM gait belt;nonskid shoes/slippers when out of bed;supervised activity  -AM     Recorded by [AM] Ulysses Gardner PTA [AM] Ulysses Gardner PTA [CS] SACHI RosadoA/L    ROM (Range of Motion)    General ROM no range of motion deficits identified  -AM no range of motion deficits identified  -AM     Recorded by [AM] Ulysses Gardner PTA [AM] Ulysses Gardner PTA     Bed Mobility, Assessment/Treatment    Bed Mobility, Assistive Device  bed rails;head of bed elevated  -AM     Bed Mobility, Roll Left, [place] not tested  -AM not tested  -AM     Bed Mobility, Roll Right, [place] not tested  -AM not  tested  -AM     Bed Mobility, Scoot/Bridge, [place] not tested  -AM not tested  -AM     Bed Mob, Supine to Sit, [place] not tested  -AM supervision required  -AM     Bed Mob, Sit to Supine, [place] not tested  -AM supervision required  -AM     Bed Mob, Sidelying to Sit, [place] not tested  -AM not tested  -AM     Bed Mob, Sit to Sidelying, [place] not tested  -AM not tested  -AM     Bed Mobility, Safety Issues  decreased use of arms for pushing/pulling;decreased use of legs for bridging/pushing  -AM     Bed Mobility, Impairments  pain  -AM     Recorded by [AM] Ulysses Gardner, PTA [AM] Ulysses Gardner PTA     Transfer Assessment/Treatment    Transfers, Bed-Chair [place] conditional independence  -AM stand by assist  -AM     Transfers, Chair-Bed [place] conditional independence  -AM stand by assist  -AM     Transfers, Bed-Chair-Bed, Assist Device rolling walker  -AM rolling walker  -AM     Transfers, Sit-Stand [place] independent  -AM stand by assist  -AM     Transfers, Stand-Sit [place] independent  -AM stand by assist  -AM     Transfers, Sit-Stand-Sit, Assist Device rolling walker  -AM rolling walker  -AM     Toilet Transfer, [place] not tested  -AM not tested  -AM     Walk-In Shower Transfer, [place] not tested  -AM not tested  -AM     Bathtub Transfer, [place] not tested  -AM not tested  -AM     Transfer, Maintain Weight Bearing Status able to maintain weight bearing status  -AM able to maintain weight bearing status  -AM     Transfer, Safety Issues  step length decreased  -AM     Transfer, Impairments pain  -AM pain  -AM     Recorded by [AM] Ulysses Gardner, PTA [AM] Ulysses Gardner PTA     Gait Assessment/Treatment    Gait, [place] Level conditional independence  -AM stand by assist  -AM     Gait, Assistive Device rolling walker  -AM rolling walker  -AM     Gait, Distance (Feet) 1440   700+740  -  -AM     Gait, Gait Pattern Analysis  swing-through gait  -AM swing-through gait  -AM     Gait, Gait Deviations  bilateral:;[place] decreased  -AM     Gait, Maintain Weight Bearing Status able to maintain weight bearing status  -AM able to maintain weight bearing status  -AM     Gait, Safety Issues  step length decreased  -AM     Gait, Impairments pain  -AM pain  -AM     Recorded by [AM] Ulysses Gardner PTA [AM] Ulysses Gardner PTA     Stairs Assessment/Treatment    Number of Stairs 3  -AM 3  -AM     Stairs, Handrail Location right side (ascending)  -AM both sides  -AM     Stairs, [place] Level independent  -AM supervision required;contact guard assist  -AM     Stairs, Technique Used step over step (ascending);step over step (descending)  -AM step over step (ascending);step over step (descending)  -AM     Stairs, Maintain Weight Bearing Status able to maintain weight bearing status  -AM able to maintain weight bearing status  -AM     Stairs, Impairments pain  -AM strength decreased  -AM     Recorded by [AM] Ulysses Gardner PTA [AM] Ulysses Gardner PTA     Grooming Assessment/Training    Grooming Assess/Train, Position   long sitting  -CS    Grooming Assess/Train, Indepen Level   set up required  -CS    Grooming Assess/Train, Comment   oral care, wash face/hands, comb hair  -CS    Recorded by   [CS] JOLIE Rosado    Therapy Exercises    Bilateral Upper Extremity   AROM:;20 reps;sitting;elbow flexion/extension;hand pumps;pronation/supination;shoulder extension/flexion;shoulder ER/IR  -CS    BUE Resistance   manual resistance- minimal  -CS    Recorded by   [CS] JOLIE Rosado    Positioning and Restraints    Pre-Treatment Position sitting in chair/recliner  -AM in bed  -AM in bed  -CS    Post Treatment Position chair  -AM bed  -AM bed  -CS    In Bed  supine;call light within reach;encouraged to call for assist;exit alarm on  -AM sitting;call light within reach  -CS    In Chair reclined;call light within reach;encouraged to  call for assist;legs elevated  -AM      Recorded by [AM] Ulysses Gardner, PTA [AM] Ulysses Gardner, PTA [CS] Divya Ash, COULTER/L      User Key  (r) = Recorded By, (t) = Taken By, (c) = Cosigned By    Initials Name Effective Dates    AM Ulysses Gardner, PTA 10/17/16 -     KD Ele Avina, COULTER/L 10/17/16 -     CS Divya Ash, COULTER/L 10/17/16 -                 IP PT Goals       06/08/17 0750 06/07/17 1025 06/06/17 1325    Transfer Training PT LTG    Transfer Training PT  LTG, Date Goal Reviewed 06/08/17  -AM 06/07/17  -AM 06/06/17  -AM    Transfer Training PT LTG, Outcome goal met  -AM goal not met  -AM goal not met  -AM    Gait Training PT LTG    Gait Training Goal PT LTG, Date Goal Reviewed 06/08/17  -AM 06/07/17  -AM 06/06/17  -AM    Gait Training Goal PT LTG, Outcome goal partially met  -AM goal partially met  -AM goal not met  -AM    Stair Training PT LTG    Stair Training Goal PT LTG, Date Goal Reviewed 06/08/17  -AM 06/07/17  -AM 06/06/17  -AM    Stair Training Goal PT LTG, Outcome goal met  -AM goal not met  -AM goal not met  -AM    Patient Education PT LTG    Patient Education PT LTG, Date Goal Reviewed   06/06/17  -AM    Patient Education PT LTG Outcome   goal met  -AM      06/05/17 0929 06/04/17 1330 06/03/17 0855    Transfer Training PT LTG    Transfer Training PT  LTG, Date Goal Reviewed 06/05/17  -SS 06/04/17  -AM 06/03/17  -AM    Transfer Training PT LTG, Outcome goal ongoing  -SS goal not met  -AM goal not met  -AM    Gait Training PT LTG    Gait Training Goal PT LTG, Date Goal Reviewed 06/05/17  -SS 06/04/17  -AM 06/03/17  -AM    Gait Training Goal PT LTG, Outcome goal ongoing  -SS goal not met  -AM goal not met  -AM    Stair Training PT LTG    Stair Training Goal PT LTG, Date Goal Reviewed 06/05/17  -SS 06/04/17  -AM 06/03/17  -AM    Stair Training Goal PT LTG, Outcome goal ongoing  -SS goal not met  -AM goal not met  -AM    Patient Education PT LTG    Patient Education PT LTG, Date Goal  Reviewed 06/05/17  -SS 06/04/17  -AM 06/03/17  -AM    Patient Education PT LTG Outcome goal ongoing  - goal not met  -AM goal not met  -AM      06/02/17 1000 06/01/17 1108       Transfer Training PT LTG    Transfer Training PT LTG, Date Established  06/01/17  -     Transfer Training PT LTG, Time to Achieve  by discharge  -GAL     Transfer Training PT LTG, [place] Level  independent  -GAL     Transfer Training PT  LTG, Date Goal Reviewed 06/02/17  -TW      Transfer Training PT LTG, Outcome goal ongoing  -TW goal ongoing  -GAL     Gait Training PT LTG    Gait Training Goal PT LTG, Date Established  06/01/17  -GAL     Gait Training Goal PT LTG, Time to Achieve  by discharge  -GAL     Gait Training Goal PT LTG, [place] Level  independent  -AGL     Gait Training Goal PT LTG, Distance to Achieve  1000ft  -GAL     Gait Training Goal PT LTG, Additional Goal  Pt will complete 6 minute walk test  -     Gait Training Goal PT LTG, Date Goal Reviewed 06/02/17  -TW      Gait Training Goal PT LTG, Outcome goal ongoing  -TW goal ongoing  -GAL     Stair Training PT LTG    Stair Training Goal PT LTG, Date Established  06/01/17  -GAL     Stair Training Goal PT LTG, Time to Achieve  by discharge  -     Stair Training Goal PT LTG, Number of Steps  5  -     Stair Training Goal PT LTG, [place] Level  conditional independence  -GAL     Stair Training Goal PT LTG, Assist Device  1 handrail  -     Stair Training Goal PT LTG, Date Goal Reviewed 06/02/17  -TW      Stair Training Goal PT LTG, Outcome goal ongoing -TW goal ongoing  -GAL     Patient Education PT LTG    Patient Education PT LTG, Date Established  06/01/17  -     Patient Education PT LTG, Time to Achieve  by discharge  -     Patient Education PT LTG, Education Type  gait;transfers;home safety  -     Patient Education PT LTG, Date Goal Reviewed 06/02/17  -      Patient Education PT LTG Outcome goal ongoing  -TW goal ongoing  -GAL       User Key  (r) =  Recorded By, (t) = Taken By, (c) = Cosigned By    Initials Name Provider Type    GAL Shilpa Dumont, PT Physical Therapist    AM Ulysses Gardner, PTA Physical Therapy Assistant     Fartun Angela, [facility] Physical Therapy Assistant     Brant Palacios, [facility] Physical Therapy Assistant          Physical Therapy Education     Title: PT OT SLP Therapies (Active)     Topic: Physical Therapy (Active)     Point: Mobility training (Active)    Learning Progress Summary    Learner Readiness Method Response Comment Documented by Status   Patient Acceptance E,D,TB NR   06/05/17 0942 Active    Acceptance E NR   06/01/17 1107 Active   Family Acceptance E NR   06/01/17 1107 Active               Point: Precautions (Active)    Learning Progress Summary    Learner Readiness Method Response Comment Documented by Status   Patient Acceptance E,D,TB NR   06/05/17 0942 Active    Acceptance E NR   06/01/17 1107 Active   Family Acceptance E NR   06/01/17 1107 Active                      User Key     Initials Effective Dates Name Provider Type Discipline     10/17/16 -  Shilpa Dumont, PT Physical Therapist PT     10/17/16 -  Fartun Angela, [facility] Physical Therapy Assistant PT                    PT Recommendation and Plan  Anticipated Discharge Disposition: home with home health  Planned Therapy Interventions: balance training, bed mobility training, gait training, patient/family education, stair training, transfer training  PT Frequency: other (see comments) (5-14 times per week)  Plan of Care Review  Plan Of Care Reviewed With: patient  Progress: progress toward functional goals as expected  Outcome Summary/Follow up Plan: Pt met 2 PT goals this tx. Pt able to amb 1000+150 ft w/RW COnditional Indepdence. Pt would benefit from  PT once discharged from this facility.           Outcome Measures       06/08/17 0936 06/08/17 0750 06/07/17 1500    6 Minute Walk Test    Distance  1000  -AM     Device Used  Yes;rolling walker   -AM     SpO2  92  -AM     Supplemental O2 Used?  No  -AM     # of Rest Breaks  0  -AM     How much help from another person do you currently need...    Turning from your back to your side while in flat bed without using bedrails?  4  -AM     Moving from lying on back to sitting on the side of a flat bed without bedrails?  4  -AM     Moving to and from a bed to a chair (including a wheelchair)?  4  -AM     Standing up from a chair using your arms (e.g., wheelchair, bedside chair)?  4  -AM     Climbing 3-5 steps with a railing?  4  -AM     To walk in hospital room?  4  -AM     AM-PAC 6 Clicks Score  24  -AM     How much help from another is currently needed...    Putting on and taking off regular lower body clothing? 4  -KD  2  -CS    Bathing (including washing, rinsing, and drying) 4  -KD  3  -CS    Toileting (which includes using toilet bed pan or urinal) 3  -KD  3  -CS    Putting on and taking off regular upper body clothing 4  -KD  3  -CS    Taking care of personal grooming (such as brushing teeth) 4  -KD  3  -CS    Eating meals 4  -KD  4  -CS    Score 23  -KD  18  -CS    Functional Assessment    Outcome Measure Options  6 Minute Walk Test;AM-PAC 6 Clicks Basic Mobility (PT)  -AM AM-PAC 6 Clicks Daily Activity (OT)  -CS      06/07/17 1025 06/06/17 1135 06/06/17 1100    6 Minute Walk Test    Distance 740  -AM      Device Used Yes;rolling walker  -AM      SpO2 92  -AM      Supplemental O2 Used? No  -AM      # of Rest Breaks 0  -AM      How much help from another person do you currently need...    Turning from your back to your side while in flat bed without using bedrails? 3  -AM 3  -AM     Moving from lying on back to sitting on the side of a flat bed without bedrails? 3  -AM 3  -AM     Moving to and from a bed to a chair (including a wheelchair)? 4  -AM 3  -AM     Standing up from a chair using your arms (e.g., wheelchair, bedside chair)? 4  -AM 3  -AM     Climbing 3-5 steps with a railing? 4  -AM 3  -AM     To  "walk in hospital room? 4  -AM 3  -AM     AM-PAC 6 Clicks Score 22  -AM 18  -AM     How much help from another is currently needed...    Putting on and taking off regular lower body clothing?   2  -CS    Bathing (including washing, rinsing, and drying)   3  -CS    Toileting (which includes using toilet bed pan or urinal)   3  -CS    Putting on and taking off regular upper body clothing   3  -CS    Taking care of personal grooming (such as brushing teeth)   3  -CS    Eating meals   4  -CS    Score   18  -CS    Dynamic Gait Index (DGI)    Gait Level Surface 2  -AM      Change in Gait Speed 2  -AM      Gait with Horizontal Head Turns 3  -AM      Gait with Vertical Head Turns 3  -AM      Gait and Pivot Turn 2  -AM      Step Over Obstacle 2  -AM      Step Around Obstacles 3  -AM      Steps 2  -AM      Dynamic Gait Index Score 19  -AM      Tinetti Assessment    Tinetti Assessment yes  -AM      Sitting Balance 1  -AM      Arises 2  -AM      Attempts to Rise 2  -AM      Immediate Standing Balance (first 5 sec) 2  -AM      Standing Balance 2  -AM      Sternal Nudge (feet close together) 2  -AM      Eyes Closed (feet close together) 1  -AM      Turning 360 Degrees- Steps 1  -AM      Turning 360 Degrees- Steadiness 1  -AM      Sitting Down 2  -AM      Tinetti Balance Score 16  -AM      Gait Initiation (immediate after told \"go\") 1  -AM      Step Length- Right Swing 1  -AM      Step Length- Left Swing 1  -AM      Foot Clearance- Right Foot 1  -AM      Foot Clearance- Left Foot 1  -AM      Step Symmetry 1  -AM      Step Continuity 1  -AM      Path (excursion) 1  -AM      Trunk 0  -AM      Base of Support 1  -AM      Gait Score 9  -AM      Tinetti Total Score 25  -AM      Tinetti Assistive Device rolling walker  -AM      Functional Assessment    Outcome Measure Options 6 Minute Walk Test;AM-PAC 6 Clicks Basic Mobility (PT);Dynamic Gait Index;Tinetti  -AM AM-PAC 6 Clicks Basic Mobility (PT)  -AM AM-PAC 6 Clicks Daily Activity (OT)  " -CS      User Key  (r) = Recorded By, (t) = Taken By, (c) = Cosigned By    Initials Name Provider Type    AM Ulysses Gardner PTA Physical Therapy Assistant    MARYLIN Andrew/L Occupational Therapy Assistant    MARYLIN Stewart/L Occupational Therapy Assistant           Time Calculation:         PT Charges       17 0750          Time Calculation    Start Time 0750  -AM      Stop Time 0815  -AM      Time Calculation (min) 25 min  -AM      PT Received On 17  -AM      PT - Next Appointment 17  -AM      Time Calculation- PT    Total Timed Code Minutes- PT 25 minute(s)  -AM        User Key  (r) = Recorded By, (t) = Taken By, (c) = Cosigned By    Initials Name Provider Type    AM Ulysses Gardner PTA Physical Therapy Assistant          Therapy Charges for Today     Code Description Service Date Service Provider Modifiers Qty    69231315939 HC GAIT TRAINING EA 15 MIN 2017 Ulysses Gardner PTA GP 1    12760317161 HC PT THER PROC EA 15 MIN 2017 Ulysses Gardner PTA GP 1    75694807856 HC GAIT TRAINING EA 15 MIN 2017 Ulysses Gardner PTA GP 1    17899028738 HC PT THER PROC EA 15 MIN 2017 Ulysses Gardner PTA GP 1          PT G-Codes  PT Professional Judgement Used?: Yes  Outcome Measure Options: 6 Minute Walk Test, AM-PAC 6 Clicks Basic Mobility (PT)  Score: 18  Functional Limitation: Mobility: Walking and moving around  Mobility: Walking and Moving Around Current Status (): At least 40 percent but less than 60 percent impaired, limited or restricted  Mobility: Walking and Moving Around Goal Status (): 0 percent impaired, limited or restricted    Ulysses Gardner PTA  2017          Electronically signed by Ulysses Gardner PTA at 2017  3:00 PM      Ulysses Gardner PTA at 2017  7:50 AM  Version 1 of 1         Acute Care - Physical Therapy Discharge Summary  [facility]       Patient Name: Ventura Boggs  : 1960  MRN: [id number]    Today's  Date: 6/8/2017  Onset of Illness/Injury or Date of Surgery Date: 05/31/17    Date of Referral to PT: 05/31/07  Referring Physician: Dr. Shaver      Admit Date: 5/31/2017      PT Recommendation and Plan    Visit Dx:    ICD-10-CM ICD-9-CM   1. Ventral hernia without obstruction or gangrene K43.9 553.20   2. Impaired physical mobility Z74.09 781.99   3. Impaired mobility and ADLs Z74.09 799.89             Outcome Measures       06/08/17 0936 06/08/17 0750 06/07/17 1500    6 Minute Walk Test    Distance  1000  -AM     Device Used  Yes;rolling walker  -AM     SpO2  92  -AM     Supplemental O2 Used?  No  -AM     # of Rest Breaks  0  -AM     How much help from another person do you currently need...    Turning from your back to your side while in flat bed without using bedrails?  4  -AM     Moving from lying on back to sitting on the side of a flat bed without bedrails?  4  -AM     Moving to and from a bed to a chair (including a wheelchair)?  4  -AM     Standing up from a chair using your arms (e.g., wheelchair, bedside chair)?  4  -AM     Climbing 3-5 steps with a railing?  4  -AM     To walk in hospital room?  4  -AM     AM-PAC 6 Clicks Score  24  -AM     How much help from another is currently needed...    Putting on and taking off regular lower body clothing? 4  -KD  2  -CS    Bathing (including washing, rinsing, and drying) 4  -KD  3  -CS    Toileting (which includes using toilet bed pan or urinal) 3  -KD  3  -CS    Putting on and taking off regular upper body clothing 4  -KD  3  -CS    Taking care of personal grooming (such as brushing teeth) 4  -KD  3  -CS    Eating meals 4  -KD  4  -CS    Score 23  -KD  18  -CS    Functional Assessment    Outcome Measure Options  6 Minute Walk Test;AM-PAC 6 Clicks Basic Mobility (PT)  -AM AM-PAC 6 Clicks Daily Activity (OT)  -CS      06/07/17 1025 06/06/17 1135 06/06/17 1100    6 Minute Walk Test    Distance 740  -AM      Device Used Yes;rolling walker  -AM      SpO2 92  -AM       "Supplemental O2 Used? No  -AM      # of Rest Breaks 0  -AM      How much help from another person do you currently need...    Turning from your back to your side while in flat bed without using bedrails? 3  -AM 3  -AM     Moving from lying on back to sitting on the side of a flat bed without bedrails? 3  -AM 3  -AM     Moving to and from a bed to a chair (including a wheelchair)? 4  -AM 3  -AM     Standing up from a chair using your arms (e.g., wheelchair, bedside chair)? 4  -AM 3  -AM     Climbing 3-5 steps with a railing? 4  -AM 3  -AM     To walk in hospital room? 4  -AM 3  -AM     AM-PAC 6 Clicks Score 22  -AM 18  -AM     How much help from another is currently needed...    Putting on and taking off regular lower body clothing?   2  -CS    Bathing (including washing, rinsing, and drying)   3  -CS    Toileting (which includes using toilet bed pan or urinal)   3  -CS    Putting on and taking off regular upper body clothing   3  -CS    Taking care of personal grooming (such as brushing teeth)   3  -CS    Eating meals   4  -CS    Score   18  -CS    Dynamic Gait Index (DGI)    Gait Level Surface 2  -AM      Change in Gait Speed 2  -AM      Gait with Horizontal Head Turns 3  -AM      Gait with Vertical Head Turns 3  -AM      Gait and Pivot Turn 2  -AM      Step Over Obstacle 2  -AM      Step Around Obstacles 3  -AM      Steps 2  -AM      Dynamic Gait Index Score 19  -AM      Tinetti Assessment    Tinetti Assessment yes  -AM      Sitting Balance 1  -AM      Arises 2  -AM      Attempts to Rise 2  -AM      Immediate Standing Balance (first 5 sec) 2  -AM      Standing Balance 2  -AM      Sternal Nudge (feet close together) 2  -AM      Eyes Closed (feet close together) 1  -AM      Turning 360 Degrees- Steps 1  -AM      Turning 360 Degrees- Steadiness 1  -AM      Sitting Down 2  -AM      Tinetti Balance Score 16  -AM      Gait Initiation (immediate after told \"go\") 1  -AM      Step Length- Right Swing 1  -AM      Step " Length- Left Swing 1  -AM      Foot Clearance- Right Foot 1  -AM      Foot Clearance- Left Foot 1  -AM      Step Symmetry 1  -AM      Step Continuity 1  -AM      Path (excursion) 1  -AM      Trunk 0  -AM      Base of Support 1  -AM      Gait Score 9  -AM      Tinetti Total Score 25  -AM      Tinetti Assistive Device rolling walker  -AM      Functional Assessment    Outcome Measure Options 6 Minute Walk Test;AM-PAC 6 Clicks Basic Mobility (PT);Dynamic Gait Index;Tinetti  -AM AM-PAC 6 Clicks Basic Mobility (PT)  -AM AM-PAC 6 Clicks Daily Activity (OT)  -CS      User Key  (r) = Recorded By, (t) = Taken By, (c) = Cosigned By    Initials Name Provider Type    AM Ulysses Gardner PTA Physical Therapy Assistant    KD MARYLIN Jha/L Occupational Therapy Assistant    MARYLIN Stewart/MEI Occupational Therapy Assistant                PT Charges       06/08/17 0750          Time Calculation    Start Time 0750  -AM      Stop Time 0815  -AM      Time Calculation (min) 25 min  -AM      PT Received On 06/08/17  -AM      PT - Next Appointment 06/09/17  -AM      Time Calculation- PT    Total Timed Code Minutes- PT 25 minute(s)  -AM        User Key  (r) = Recorded By, (t) = Taken By, (c) = Cosigned By    Initials Name Provider Type    AM Ulysses Gardner PTA Physical Therapy Assistant                  IP PT Goals       06/08/17 0750 06/07/17 1025 06/06/17 1325    Transfer Training PT LTG    Transfer Training PT  LTG, Date Goal Reviewed 06/08/17  -AM 06/07/17  -AM 06/06/17  -AM    Transfer Training PT LTG, Outcome goal met  -AM goal not met  -AM goal not met  -AM    Gait Training PT LTG    Gait Training Goal PT LTG, Date Goal Reviewed 06/08/17  -AM 06/07/17  -AM 06/06/17  -AM    Gait Training Goal PT LTG, Outcome goal partially met  -AM goal partially met  -AM goal not met  -AM    Stair Training PT LTG    Stair Training Goal PT LTG, Date Goal Reviewed 06/08/17  -AM 06/07/17  -AM 06/06/17  -AM    Stair Training Goal PT  LTG, Outcome goal met  -AM goal not met  -AM goal not met  -AM    Patient Education PT LTG    Patient Education PT LTG, Date Goal Reviewed   06/06/17  -AM    Patient Education PT LTG Outcome   goal met  -AM      06/05/17 0929 06/04/17 1330 06/03/17 0855    Transfer Training PT LTG    Transfer Training PT  LTG, Date Goal Reviewed 06/05/17  -SS 06/04/17  -AM 06/03/17  -AM    Transfer Training PT LTG, Outcome goal ongoing  -SS goal not met  -AM goal not met  -AM    Gait Training PT LTG    Gait Training Goal PT LTG, Date Goal Reviewed 06/05/17  -SS 06/04/17  -AM 06/03/17  -AM    Gait Training Goal PT LTG, Outcome goal ongoing  -SS goal not met  -AM goal not met  -AM    Stair Training PT LTG    Stair Training Goal PT LTG, Date Goal Reviewed 06/05/17  -SS 06/04/17  -AM 06/03/17  -AM    Stair Training Goal PT LTG, Outcome goal ongoing  -SS goal not met  -AM goal not met  -AM    Patient Education PT LTG    Patient Education PT LTG, Date Goal Reviewed 06/05/17  -SS 06/04/17  -AM 06/03/17  -AM    Patient Education PT LTG Outcome goal ongoing  -SS goal not met  -AM goal not met  -AM      06/02/17 1000 06/01/17 1108       Transfer Training PT LTG    Transfer Training PT LTG, Date Established  06/01/17  -GAL     Transfer Training PT LTG, Time to Achieve  by discharge  -GAL     Transfer Training PT LTG, [place] Level  independent  -GAL     Transfer Training PT  LTG, Date Goal Reviewed 06/02/17  -TW      Transfer Training PT LTG, Outcome goal ongoing  -TW goal ongoing  -GAL     Gait Training PT LTG    Gait Training Goal PT LTG, Date Established  06/01/17  -GAL     Gait Training Goal PT LTG, Time to Achieve  by discharge  -GAL     Gait Training Goal PT LTG, [place] Level  independent  -GAL     Gait Training Goal PT LTG, Distance to Achieve  1000ft  -GAL     Gait Training Goal PT LTG, Additional Goal  Pt will complete 6 minute walk test  -GAL     Gait Training Goal PT LTG, Date Goal Reviewed 06/02/17  -TW      Gait Training Goal PT  LTG, Outcome goal ongoing  - goal ongoing  -     Stair Training PT LTG    Stair Training Goal PT LTG, Date Established  06/01/17  -     Stair Training Goal PT LTG, Time to Achieve  by discharge  -     Stair Training Goal PT LTG, Number of Steps  5  -     Stair Training Goal PT LTG, [place] Level  conditional independence  -     Stair Training Goal PT LTG, Assist Device  1 handrail  -     Stair Training Goal PT LTG, Date Goal Reviewed 06/02/17  -      Stair Training Goal PT LTG, Outcome goal ongoing  - goal ongoing  -     Patient Education PT LTG    Patient Education PT LTG, Date Established  06/01/17  -     Patient Education PT LTG, Time to Achieve  by discharge  -     Patient Education PT LTG, Education Type  gait;transfers;home safety  -     Patient Education PT LTG, Date Goal Reviewed 06/02/17  -      Patient Education PT LTG Outcome goal ongoing  - goal ongoing  -       User Key  (r) = Recorded By, (t) = Taken By, (c) = Cosigned By    Initials Name Provider Type    GAL Dumont, PT Physical Therapist    AM Ulysses Gardner, PTA Physical Therapy Assistant    SS Fartun Angela, PTA Physical Therapy Assistant    TW Brant Palacios, PTA Physical Therapy Assistant          Therapy Charges for Today     Code Description Service Date Service Provider Modifiers Qty    71218245131 HC GAIT TRAINING EA 15 MIN 6/7/2017 Ulysses Gardner, PTA GP 1    09150225923 HC PT THER PROC EA 15 MIN 6/7/2017 Ulysses Gardner PTA GP 1    80997466220 HC GAIT TRAINING EA 15 MIN 6/8/2017 Ulysses Gardner PTA GP 1    23156496677 HC PT THER PROC EA 15 MIN 6/8/2017 Ulysses Gardner, PTA GP 1          PT Discharge Summary  Anticipated Discharge Disposition: home with home health  Reason for Discharge: Discharge from facility, Per MD order  Outcomes Achieved: Patient able to partially acheive established goals  Discharge Destination: Home with home health      Ulysses Gardner PTA   6/8/2017           Electronically signed by Ulysses Gardner PTA at 6/8/2017  3:44 PM      Ulysses Gardner PTA at 6/8/2017  2:59 PM  Version 1 of 1         Problem: Patient Care Overview (Adult)  Goal: Plan of Care Review  Outcome: Ongoing (interventions implemented as appropriate)    06/08/17 0750   Coping/Psychosocial Response Interventions   Plan Of Care Reviewed With patient   Patient Care Overview   Progress progress toward functional goals as expected   Outcome Evaluation   Outcome Summary/Follow up Plan Pt met 2 PT goals this tx. Pt able to amb 1000+150 ft w/RW COnditional Indepdence. Pt would benefit from HH PT once discharged from this facility.        Goal: Discharge Needs Assessment  Outcome: Ongoing (interventions implemented as appropriate)    06/08/17 0750   Discharge Needs Assessment   Concerns To Be Addressed discharge planning concerns   Readmission Within The Last 30 Days no previous admission in last 30 days   Equipment Needed After Discharge walker, rolling   Discharge Facility/Level Of Care Needs home with home health   Current Discharge Risk physical impairment   Current Health   Anticipated Changes Related to Illness inability to care for self   Self-Care   Equipment Currently Used at Home cane, quad;cane, straight;walker, standard   Living Environment   Transportation Available family or friend will provide         Problem: Inpatient Physical Therapy  Goal: Transfer Training Goal 1 LTG- PT  Outcome: Outcome(s) achieved Date Met:  06/08/17 06/01/17 1108 06/08/17 0750   Transfer Training PT LTG   Transfer Training PT LTG, Date Established 06/01/17 --    Transfer Training PT LTG, Time to Achieve by discharge --    Transfer Training PT LTG, [place] Level independent --    Transfer Training PT LTG, Date Goal Reviewed --  06/08/17   Transfer Training PT LTG, Outcome --  goal met       Goal: Gait Training Goal LTG- PT  Outcome: Ongoing (interventions implemented as appropriate)    06/01/17 1108 06/08/17 0750    Gait Training PT LTG   Gait Training Goal PT LTG, Date Established 06/01/17 --    Gait Training Goal PT LTG, Time to Achieve by discharge --    Gait Training Goal PT LTG, [place] Level independent --    Gait Training Goal PT LTG, Distance to Achieve 1000ft --    Gait Training Goal PT LTG, Additional Goal Pt will complete 6 minute walk test --    Gait Training Goal PT LTG, Date Goal Reviewed --  06/08/17   Gait Training Goal PT LTG, Outcome --  goal partially met       Goal: Stair Training Goal LTG- PT  Outcome: Outcome(s) achieved Date Met:  06/08/17 06/01/17 1108 06/08/17 0750   Stair Training PT LTG   Stair Training Goal PT LTG, Date Established 06/01/17 --    Stair Training Goal PT LTG, Time to Achieve by discharge --    Stair Training Goal PT LTG, Number of Steps 5 --    Stair Training Goal PT LTG, [place] Level conditional independence --    Stair Training Goal PT LTG, Assist Device 1 handrail --    Stair Training Goal PT LTG, Date Goal Reviewed --  06/08/17   Stair Training Goal PT LTG, Outcome --  goal met              Electronically signed by Ulysses Gardner PTA at 6/8/2017  2:59 PM

## 2017-06-08 NOTE — THERAPY TREATMENT NOTE
Acute Care - Occupational Therapy Treatment Note  [facility]     Patient Name: Ventura Boggs  : 1960  MRN: [id number]  Today's Date: 2017  Onset of Illness/Injury or Date of Surgery Date: 17  Date of Referral to OT: 17  Referring Physician: Dr. Shaver      Admit Date: 2017    Visit Dx:     ICD-10-CM ICD-9-CM   1. Ventral hernia without obstruction or gangrene K43.9 553.20   2. Impaired physical mobility Z74.09 781.99   3. Impaired mobility and ADLs Z74.09 799.89     Patient Active Problem List   Diagnosis   • Incisional hernia, without obstruction or gangrene   • Controlled type 2 diabetes mellitus without complication, with long-term current use of insulin   • Ventral hernia without obstruction or gangrene   • Postoperative urinary retention             Adult Rehabilitation Note       17 0936 17 1324 17 1025    Rehab Assessment/Intervention    Discipline occupational therapy assistant  -KD occupational therapy assistant  -CS physical therapy assistant  -AM    Document Type therapy note (daily note)  -KD therapy note (daily note)  -CS therapy note (daily note)  -AM    Subjective Information agree to therapy  -KD agree to therapy  -CS agree to therapy;complains of;pain  -AM    Patient Effort, Rehab Treatment   good  -AM    Symptoms Noted During/After Treatment   none  -AM    Precautions/Limitations   no known precautions/limitations  -AM    Equipment Issued to Patient   gait belt  -AM    Recorded by [KD] MARYLIN Jha/MEI [CS] MARYLIN Rosado/MEI [AM] Ulysses Gardner, PTA    Vital Signs    Pre Systolic BP Rehab 119  -  -  -AM    Pre Treatment Diastolic BP 71  -KD 81  -CS 82  -AM    Post Systolic BP Rehab   148  -AM    Post Treatment Diastolic BP   97  -AM    Pretreatment Heart Rate (beats/min) 109  -  -  -AM    Posttreatment Heart Rate (beats/min) 111  -KD  117  -AM    Pre SpO2 (%) 95  -KD 91  -CS 94  -AM    O2 Delivery Pre  Treatment room air  -KD room air  -CS room air  -AM    Post SpO2 (%) 93  -KD  92  -AM    O2 Delivery Post Treatment room air  -KD  room air  -AM    Pre Patient Position Sitting  -KD Sitting  -CS Sitting  -AM    Intra Patient Position Standing  -KD Standing  -CS Standing  -AM    Post Patient Position Sitting  -KD Sitting  -CS Sitting  -AM    Recorded by [KD] SACHI JhaA/L [CS] MARYLIN Rosado/MEI [AM] Ulysses Gardner PTA    Pain Assessment    Pain Assessment 0-10  -KD 0-10  -CS 0-10  -AM    Pain Score 6  -KD 6  -CS 8  -AM    Post Pain Score  6  -CS 8  -AM    Pain Type   Acute pain;Surgical pain  -AM    Pain Location --   TAINA tube site  -KD Abdomen  -CS Abdomen  -AM    Pain Orientation   Mid  -AM    Pain Descriptors   Sore  -AM    Pain Frequency   Constant/continuous  -AM    Date Pain First Started   05/31/17  -AM    Clinical Progression   Not changed  -AM    Patient's Stated Pain Goal   No pain  -AM    Pain Intervention(s)   Medication (See MAR);Ambulation/increased activity  -AM    Result of Injury   No  -AM    Work-Related Injury   No  -AM    Multiple Pain Sites   No  -AM    Recorded by [KD] SACHI JhaA/L [CS] MARYLIN Rosado/MEI [AM] Ulysses Gardner PTA    Cognitive Assessment/Intervention    Current Cognitive/Communication Assessment functional  -KD functional  -CS functional  -AM    Orientation Status oriented x 4  -KD oriented x 4  -CS oriented x 4  -AM    Follows Commands/Answers Questions 100% of the time  -% of the time  -% of the time  -AM    Personal Safety Interventions gait belt;nonskid shoes/slippers when out of bed  -KD gait belt;nonskid shoes/slippers when out of bed  -CS gait belt;nonskid shoes/slippers when out of bed;supervised activity  -AM    Recorded by [KD] MARYLIN Jha/MEI [CS] MARYLIN Rosado/MEI [AM] Ulysses Gardner PTA    ROM (Range of Motion)    General ROM   no range of motion deficits identified  -AM    Recorded by   [AM] Ulysses PEREZ  Jj, PTA    Bed Mobility, Assessment/Treatment    Bed Mobility, Roll Left, [place]   not tested  -AM    Bed Mobility, Roll Right, [place] independent  -KD  not tested  -AM    Bed Mobility, Scoot/Bridge, [place]   not tested  -AM    Bed Mob, Supine to Sit, [place] independent  -KD supervision required  -CS not tested  -AM    Bed Mob, Sit to Supine, [place] independent  -KD supervision required  -CS not tested  -AM    Bed Mob, Sidelying to Sit, [place] independent  -KD  not tested  -AM    Bed Mob, Sit to Sidelying, [place]   not tested  -AM    Recorded by [KD] SACHI JhaA/L [CS] SACHI RosadoA/L [AM] Ulysses Gardner, PTA    Transfer Assessment/Treatment    Transfers, Bed-Chair [place] independent  -KD  conditional independence  -AM    Transfers, Chair-Bed [place]   conditional independence  -AM    Transfers, Bed-Chair-Bed, Assist Device --   No AD  -KD  rolling walker  -AM    Transfers, Sit-Stand [place] independent  -KD supervision required  -CS independent  -AM    Transfers, Stand-Sit [place] independent  -KD supervision required  -CS independent  -AM    Transfers, Sit-Stand-Sit, Assist Device  rolling walker  -CS rolling walker  -AM    Toilet Transfer, [place] not tested  -KD  not tested  -AM    Walk-In Shower Transfer, [place] not tested  -KD  not tested  -AM    Bathtub Transfer, [place] not tested  -KD  not tested  -AM    Transfer, Maintain Weight Bearing Status   able to maintain weight bearing status  -AM    Transfer, Impairments   pain  -AM    Recorded by [KD] SACHI JhaA/L [CS] SACHI RosadoA/L [AM] Ulysses Gardner, PTA    Gait Assessment/Treatment    Gait, [place] Level   conditional independence  -AM    Gait, Assistive Device   rolling walker  -AM    Gait, Distance (Feet)   1440   700+740  -AM    Gait, Gait Pattern Analysis   swing-through gait  -AM    Gait, Maintain Weight Bearing Status   able  to maintain weight bearing status  -AM    Gait, Impairments   pain  -AM    Recorded by   [AM] Ulysses Gardner PTA    Stairs Assessment/Treatment    Number of Stairs   3  -AM    Stairs, Handrail Location   right side (ascending)  -AM    Stairs, [place] Level   independent  -AM    Stairs, Technique Used   step over step (ascending);step over step (descending)  -AM    Stairs, Maintain Weight Bearing Status   able to maintain weight bearing status  -AM    Stairs, Impairments   pain  -AM    Recorded by   [AM] Ulysses Gardner PTA    Upper Body Bathing Assessment/Training    UB Bathing Assess/Train Assistive Device bath mitt  -KD      UB Bathing Assess/Train, Position edge of bed;sitting  -KD      UB Bathing Assess/Train, [place] Level set up required  -KD      Recorded by [KD] MARYLIN Jha/L      Lower Body Bathing Assessment/Training    LB Bathing Assess/Train Assistive Device bath mitt  -KD      LB Bathing Assess/Train, Position edge of bed;sitting  -KD      LB Bathing Assess/Train, [place] Level set up required  -KD      Recorded by [KD] MARYLIN Jha/L      Upper Body Dressing Assessment/Training    UB Dressing Assess/Train, Clothing Type doffing:;donning:;hospital gown  -KD      UB Dressing Assess/Train, Position edge of bed;sitting  -KD      UB Dressing Assess/Train, [place] set up required  -KD      Recorded by [KD] MARYLIN Jha/L      Lower Body Dressing Assessment/Training    LB Dressing Assess/Train, Clothing Type doffing:;donning:;slipper socks  -KD      LB Dressing Assess/Train, Position edge of bed;sitting  -KD      LB Dressing Assess/Train, [place] set up required  -KD      Recorded by [KD] MARYLIN Jha/L      Grooming Assessment/Training    Grooming Assess/Train, Assistive Device --   comb hair  -KD      Grooming Assess/Train, Position edge of bed;sitting  -KD      Grooming Assess/Train, Indepen Level conditional independence  -KD      Recorded by  [KD] JOLIE Jha      Balance Skills Training    Standing-Level of Assistance Close supervision  -KD Close supervision  -CS     Static Standing Balance Support assistive device  -KD assistive device  -CS     Standing-Balance Activities --   static   -KD      Standing Balance # of Minutes --   5  -KD 5  -CS     Recorded by [KD] JOLIE Jha [CS] MARYLIN Rosado/L     Therapy Exercises    Bilateral Upper Extremity AROM:;20 reps;sitting;elbow flexion/extension;pronation/supination;shoulder abduction/adduction;shoulder extension/flexion;shoulder horizontal abd/add;shoulder ER/IR  -KD AROM:;20 reps;sitting;elbow flexion/extension;hand pumps;pronation/supination;shoulder extension/flexion;shoulder ER/IR  -CS     BUE Resistance manual resistance- minimal  -KD manual resistance- minimal  -CS     Recorded by [KD] MARYLIN Jha/MEI [CS] MARYLIN Rosado/MEI     Positioning and Restraints    Pre-Treatment Position in bed  -KD in bed  -CS sitting in chair/recliner  -AM    Post Treatment Position chair  -KD bed  -CS chair  -AM    In Bed  supine;call light within reach  -CS     In Chair call light within reach;encouraged to call for assist;sitting  -KD  reclined;call light within reach;encouraged to call for assist;legs elevated  -AM    Recorded by [KD] JOLIE Jha [CS] MARYLIN Rosado/MEI [AM] Ulysses Gadrner, PTA      06/06/17 1135 06/06/17 1040       Rehab Assessment/Intervention    Discipline physical therapy assistant  -AM occupational therapy assistant  -CS     Document Type therapy note (daily note)  -AM therapy note (daily note)  -CS     Subjective Information agree to therapy;complains of;pain  -AM agree to therapy  -CS     Patient Effort, Rehab Treatment good  -AM      Symptoms Noted During/After Treatment none  -AM      Precautions/Limitations no known precautions/limitations  -AM      Equipment Issued to Patient gait belt  -AM      Recorded by [AM] Ulysses Gardner,  PTA [CS] MARYLIN Rosado/L     Vital Signs    Pre Systolic BP Rehab 141  -AM      Pre Treatment Diastolic BP 85  -AM      Post Systolic BP Rehab 162  -AM      Post Treatment Diastolic BP 97  -AM      Pretreatment Heart Rate (beats/min) 114  -AM      Posttreatment Heart Rate (beats/min) 91  -  -CS     Pre SpO2 (%) 90  -AM      O2 Delivery Pre Treatment room air  -AM      Post SpO2 (%) 90  -AM 94  -CS     O2 Delivery Post Treatment room air  -AM room air  -CS     Pre Patient Position Supine  -AM Supine  -CS     Intra Patient Position Standing  -AM Sitting  -CS     Post Patient Position Supine  -AM Sitting  -CS     Recorded by [AM] Ulysses Gardner PTA [CS] MARYLIN Rosado/MEI     Pain Assessment    Pain Assessment 0-10  -AM 0-10  -CS     Pain Score 7  -AM 7  -CS     Post Pain Score 7  -AM 7  -CS     Pain Type Acute pain;Surgical pain  -AM Surgical pain  -CS     Pain Location Abdomen  -AM Abdomen  -CS     Pain Orientation Mid  -AM      Pain Descriptors Sore  -AM      Pain Frequency Constant/continuous  -AM      Date Pain First Started 05/31/17  -AM      Clinical Progression Not changed  -AM      Patient's Stated Pain Goal No pain  -AM      Pain Intervention(s) Medication (See MAR);Ambulation/increased activity  -AM      Result of Injury No  -AM      Work-Related Injury No  -AM      Multiple Pain Sites No  -AM      Recorded by [AM] Ulysses Gardner PTA [CS] MARYLIN Rosado/L     Cognitive Assessment/Intervention    Current Cognitive/Communication Assessment functional  -AM functional  -CS     Orientation Status oriented x 4  -AM oriented x 4  -CS     Follows Commands/Answers Questions 100% of the time  -% of the time  -CS     Personal Safety Interventions gait belt;nonskid shoes/slippers when out of bed;supervised activity  -AM      Recorded by [AM] Ulysses Gardner PTA [CS] MARYLIN Rosado/L     ROM (Range of Motion)    General ROM no range of motion deficits identified  -AM       Recorded by [AM] Ulysses Gardner PTA      Bed Mobility, Assessment/Treatment    Bed Mobility, Assistive Device bed rails;head of bed elevated  -AM      Bed Mobility, Roll Left, [place] not tested  -AM      Bed Mobility, Roll Right, [place] not tested  -AM      Bed Mobility, Scoot/Bridge, [place] not tested  -AM      Bed Mob, Supine to Sit, [place] supervision required  -AM      Bed Mob, Sit to Supine, [place] supervision required  -AM      Bed Mob, Sidelying to Sit, [place] not tested  -AM      Bed Mob, Sit to Sidelying, [place] not tested  -AM      Bed Mobility, Safety Issues decreased use of arms for pushing/pulling;decreased use of legs for bridging/pushing  -AM      Bed Mobility, Impairments pain  -AM      Recorded by [AM] Ulysses Gardner PTA      Transfer Assessment/Treatment    Transfers, Bed-Chair [place] stand by assist  -AM      Transfers, Chair-Bed [place] stand by assist  -AM      Transfers, Bed-Chair-Bed, Assist Device rolling walker  -AM      Transfers, Sit-Stand [place] stand by assist  -AM      Transfers, Stand-Sit [place] stand by assist  -AM      Transfers, Sit-Stand-Sit, Assist Device rolling walker  -AM      Toilet Transfer, [place] not tested  -AM      Walk-In Shower Transfer, [place] not tested  -AM      Bathtub Transfer, [place] not tested  -AM      Transfer, Maintain Weight Bearing Status able to maintain weight bearing status  -AM      Transfer, Safety Issues step length decreased  -AM      Transfer, Impairments pain  -AM      Recorded by [AM] Ulysses Gardner PTA      Gait Assessment/Treatment    Gait, [place] Level stand by assist  -AM      Gait, Assistive Device rolling walker  -AM      Gait, Distance (Feet) 700  -AM      Gait, Gait Pattern Analysis swing-through gait  -AM      Gait, Gait Deviations bilateral:;[place] decreased  -AM      Gait, Maintain Weight Bearing Status able to maintain weight bearing status   -AM      Gait, Safety Issues step length decreased  -AM      Gait, Impairments pain  -AM      Recorded by [AM] Ulysses Gardner PTA      Stairs Assessment/Treatment    Number of Stairs 3  -AM      Stairs, Handrail Location both sides  -AM      Stairs, [place] Level supervision required;contact guard assist  -AM      Stairs, Technique Used step over step (ascending);step over step (descending)  -AM      Stairs, Maintain Weight Bearing Status able to maintain weight bearing status  -AM      Stairs, Impairments strength decreased  -AM      Recorded by [AM] Ulysses Gardner PTA      Grooming Assessment/Training    Grooming Assess/Train, Position  long sitting  -CS     Grooming Assess/Train, Indepen Level  set up required  -CS     Grooming Assess/Train, Comment  oral care, wash face/hands, comb hair  -CS     Recorded by  [CS] JOLIE Rosado     Therapy Exercises    Bilateral Upper Extremity  AROM:;20 reps;sitting;elbow flexion/extension;hand pumps;pronation/supination;shoulder extension/flexion;shoulder ER/IR  -CS     BUE Resistance  manual resistance- minimal  -CS     Recorded by  [CS] JOLIE Rosado     Positioning and Restraints    Pre-Treatment Position in bed  -AM in bed  -CS     Post Treatment Position bed  -AM bed  -CS     In Bed supine;call light within reach;encouraged to call for assist;exit alarm on  -AM sitting;call light within reach  -CS     Recorded by [AM] Ulysses Gardner PTA [CS] JOLIE Rosado       User Key  (r) = Recorded By, (t) = Taken By, (c) = Cosigned By    Initials Name Effective Dates    AM Ulysses Gardner PTA 10/17/16 -     JOLIE Andrew 10/17/16 -     CS JOLIE Rosado 10/17/16 -                 OT Goals       06/08/17 0936 06/07/17 1543 06/06/17 1139    Transfer Training OT LTG    Transfer Training OT LTG, Date Goal Reviewed 06/08/17  -KD 06/07/17  -CS 06/06/17  -CS    Transfer Training OT LTG, Outcome goal met  -KD      ADL OT LTG     ADL OT LTG, Date Goal Reviewed 06/08/17  -KD 06/07/17  -CS 06/06/17  -CS    ADL OT LTG, Outcome goal met  -KD      Functional Mobility OT LTG    Functional Mobility Goal OT LTG, Date Established 06/08/17  -KD      Functional Mobility Goal OT LTG, Date Goal Reviewed  06/07/17  -CS 06/06/17  -CS      06/05/17 1310 06/04/17 1220 06/02/17 1351    Transfer Training OT LTG    Transfer Training OT LTG, Date Established   06/02/17  -RW    Transfer Training OT LTG, Time to Achieve   2 wks  -RW    Transfer Training OT LTG, Activity Type   toilet;walk-in shower  -RW    Transfer Training OT LTG, [place] Level   supervision required  -RW    Transfer Training OT LTG, Date Goal Reviewed 06/05/17  -JH 06/04/17  -LM     Transfer Training OT LTG, Outcome  goal ongoing  -LM     ADL OT LTG    ADL OT LTG, Date Established   06/02/17  -RW    ADL OT LTG, Time to Achieve   2 wks  -RW    ADL OT LTG, Activity Type   ADL skills  -RW    ADL OT LTG, [place] Level   standby assist  -RW    ADL OT LTG, Date Goal Reviewed 06/05/17  -JH 06/04/17  -LM     ADL OT LTG, Outcome  goal ongoing  -LM     Functional Mobility OT LTG    Functional Mobility Goal OT LTG, Date Established   06/02/17  -RW    Functional Mobility Goal OT LTG, Time to Achieve   2 wks  -RW    Functional Mobility Goal OT LTG, [place] Level   supervision  -RW    Functional Mobility Goal OT LTG, Distance to Achieve   to the bathroom  -RW    Functional Mobility Goal OT LTG, Date Goal Reviewed 06/05/17  - 06/04/17  -LM     Functional Mobility Goal OT LTG, Outcome  goal ongoing  -LM       User Key  (r) = Recorded By, (t) = Taken By, (c) = Cosigned By    Initials Name Provider Type    RW Nicky Blanchard, OTR/L Occupational Therapist    SAMUEL Avina, COULTER/L Occupational Therapy Assistant     Abril Pugh, COULTER/L Occupational Therapy Assistant     Ese Dodson, COULTER/L Occupational Therapy Assistant     Divya Ash, COULTER/L Occupational Therapy  Assistant          Occupational Therapy Education     Title: PT OT SLP Therapies (Active)     Topic: Occupational Therapy (Done)     Point: ADL training (Done)    Description: Instruct learner(s) on proper safety adaptation and remediation techniques during self care or transfers.   Instruct in proper use of assistive devices.    Learning Progress Summary    Learner Readiness Method Response Comment Documented by Status   Patient Acceptance E,TB,D VU,NR   06/07/17 1542 Done    Acceptance E,TB,D NR   06/06/17 1139 Active    Acceptance E VU   06/05/17 1508 Done    Acceptance E VU   06/04/17 1221 Done    Acceptance E NR fall precautions, transfer safety, ADL safety RW 06/02/17 1349 Active               Point: Home exercise program (Done)    Description: Instruct learner(s) on appropriate technique for monitoring, assisting and/or progressing therapeutic exercises/activities.    Learning Progress Summary    Learner Readiness Method Response Comment Documented by Status   Patient Acceptance E,TB,D VU,NR   06/07/17 1542 Done    Acceptance E,TB,D NR   06/06/17 1139 Active    Acceptance E VU   06/05/17 1508 Done    Acceptance E VU   06/04/17 1221 Done               Point: Precautions (Done)    Description: Instruct learner(s) on prescribed precautions during self-care and functional transfers.    Learning Progress Summary    Learner Readiness Method Response Comment Documented by Status   Patient Acceptance E,TB,D VU,NR   06/07/17 1542 Done    Acceptance E,TB,D NR   06/06/17 1139 Active    Acceptance E VU   06/05/17 1508 Done    Acceptance E VU   06/04/17 1221 Done    Acceptance E NR fall precautions, transfer safety, ADL safety RW 06/02/17 1349 Active               Point: Body mechanics (Done)    Description: Instruct learner(s) on proper positioning and spine alignment during self-care, functional mobility activities and/or exercises.    Learning Progress Summary    Learner Readiness Method  Response Comment Documented by Status   Patient Acceptance E,TB,D VU,NR   06/07/17 1542 Done    Acceptance E,TB,D NR   06/06/17 1139 Active    Acceptance E VU   06/05/17 1508 Done    Acceptance E VU   06/04/17 1221 Done                      User Key     Initials Effective Dates Name Provider Type Discipline     10/17/16 -  Nicky Blanchard OTR/L Occupational Therapist OT     10/17/16 -  Abril Pugh COULTER/L Occupational Therapy Assistant OT     10/17/16 -  Ese Dodson COULTER/L Occupational Therapy Assistant OT     10/17/16 -  Divya Ash COULTER/L Occupational Therapy Assistant OT                  OT Recommendation and Plan  Anticipated Discharge Disposition: home with 24/7 care, home with home health  Planned Therapy Interventions: activity intolerance, adaptive equipment training, ADL retraining, balance training, bed mobility training, energy conservation, home exercise program, strengthening, transfer training  Therapy Frequency: other (see comments) (3-14 times/wk)  Plan of Care Review  Outcome Summary/Follow up Plan: P        Outcome Measures       06/08/17 0936 06/07/17 1500 06/07/17 1025    6 Minute Walk Test    Distance   740  -AM    Device Used   Yes;rolling walker  -AM    SpO2   92  -AM    Supplemental O2 Used?   No  -AM    # of Rest Breaks   0  -AM    How much help from another person do you currently need...    Turning from your back to your side while in flat bed without using bedrails?   3  -AM    Moving from lying on back to sitting on the side of a flat bed without bedrails?   3  -AM    Moving to and from a bed to a chair (including a wheelchair)?   4  -AM    Standing up from a chair using your arms (e.g., wheelchair, bedside chair)?   4  -AM    Climbing 3-5 steps with a railing?   4  -AM    To walk in hospital room?   4  -AM    AM-PAC 6 Clicks Score   22  -AM    How much help from another is currently needed...    Putting on and taking off regular lower body clothing? 4  " -KD 2  -CS     Bathing (including washing, rinsing, and drying) 4  -KD 3  -CS     Toileting (which includes using toilet bed pan or urinal) 3  -KD 3  -CS     Putting on and taking off regular upper body clothing 4  -KD 3  -CS     Taking care of personal grooming (such as brushing teeth) 4  -KD 3  -CS     Eating meals 4  -KD 4  -CS     Score 23  -KD 18  -CS     Dynamic Gait Index (DGI)    Gait Level Surface   2  -AM    Change in Gait Speed   2  -AM    Gait with Horizontal Head Turns   3  -AM    Gait with Vertical Head Turns   3  -AM    Gait and Pivot Turn   2  -AM    Step Over Obstacle   2  -AM    Step Around Obstacles   3  -AM    Steps   2  -AM    Dynamic Gait Index Score   19  -AM    Tinetti Assessment    T[place]etti Assessment   yes  -AM    Sitting Balance   1  -AM    Arises   2  -AM    Attempts to Rise   2  -AM    Immediate Standing Balance (first 5 sec)   2  -AM    Standing Balance   2  -AM    Sternal Nudge (feet close together)   2  -AM    Eyes Closed (feet close together)   1  -AM    Turning 360 Degrees- Steps   1  -AM    Turning 360 Degrees- Steadiness   1  -AM    Sitting Down   2  -AM    T[place]etti Balance Score   16  -AM    Gait Initiation (immediate after told \"go\")   1  -AM    Step Length- Right Swing   1  -AM    Step Length- Left Swing   1  -AM    Foot Clearance- Right Foot   1  -AM    Foot Clearance- Left Foot   1  -AM    Step Symmetry   1  -AM    Step Continuity   1  -AM    Path (excursion)   1  -AM    Trunk   0  -AM    Base of Support   1  -AM    Gait Score   9  -AM    Tinetti Total Score   25  -AM    Tinetti Assistive Device   rolling walker  -AM    Functional Assessment    Outcome Measure Options  AM-PAC 6 Clicks Daily Activity (OT)  -CS 6 Minute Walk Test;AM-PAC 6 Clicks Basic Mobility (PT);Dynamic Gait Index;Tinetti  -AM      06/06/17 1135 06/06/17 1100       How much help from another person do you currently need...    Turning from your back to your side while in flat bed without using bedrails? 3  -AM "      Moving from lying on back to sitting on the side of a flat bed without bedrails? 3  -AM      Moving to and from a bed to a chair (including a wheelchair)? 3  -AM      Standing up from a chair using your arms (e.g., wheelchair, bedside chair)? 3  -AM      Climbing 3-5 steps with a railing? 3  -AM      To walk in hospital room? 3  -AM      AM-PAC 6 Clicks Score 18  -AM      How much help from another is currently needed...    Putting on and taking off regular lower body clothing?  2  -CS     Bathing (including washing, rinsing, and drying)  3  -CS     Toileting (which includes using toilet bed pan or urinal)  3  -CS     Putting on and taking off regular upper body clothing  3  -CS     Taking care of personal grooming (such as brushing teeth)  3  -CS     Eating meals  4  -CS     Score  18  -CS     Functional Assessment    Outcome Measure Options AM-PAC 6 Clicks Basic Mobility (PT)  -AM AM-PAC 6 Clicks Daily Activity (OT)  -CS       User Key  (r) = Recorded By, (t) = Taken By, (c) = Cosigned By    Initials Name Provider Type    AM Ulysses Gardner PTA Physical Therapy Assistant    MARYLIN Andrew/L Occupational Therapy Assistant    MARYLIN Stewart/MEI Occupational Therapy Assistant           Time Calculation:         Time Calculation- OT       06/08/17 1436          Time Calculation- OT    OT Start Time 0936  -KD      OT Stop Time 1030  -KD      OT Time Calculation (min) 54 min  -KD      Total Timed Code Minutes- OT 54 minute(s)  -KD      OT Received On 06/08/17  -KD        User Key  (r) = Recorded By, (t) = Taken By, (c) = Cosigned By    Initials Name Provider Type    MARYLIN Andrew/L Occupational Therapy Assistant           Therapy Charges for Today     Code Description Service Date Service Provider Modifiers Qty    38008629212 HC OT SELF CARE/MGMT/TRAIN EA 15 MIN 6/8/2017 MARYLIN Jha/L GO 2    89073912837 HC OT THER PROC EA 15 MIN 6/8/2017 MARYLIN Jha/MEI GO 1     [id number]  OT THERAPEUTIC ACT EA 15 MIN 6/8/2017 MARYLIN Jha/L GO 1          OT G-codes  OT Professional Judgement Used?: Yes  OT Functional Scales Options: AM-PAC 6 Clicks Daily Activity (OT)  Score: 18  Functional Limitation: Self care  Self Care Current Status (): At least 40 percent but less than 60 percent impaired, limited or restricted  Self Care Goal Status (): At least 20 percent but less than 40 percent impaired, limited or restricted    MARYLIN Jha/MEI  6/8/2017

## 2017-06-09 NOTE — PLAN OF CARE
Problem: Inpatient Occupational Therapy  Goal: Transfer Training Goal 1 LTG- OT  Outcome: Outcome(s) achieved Date Met:  06/09/17 06/02/17 1351 06/08/17 0936 06/09/17 0826   Transfer Training OT LTG   Transfer Training OT LTG, Date Established 06/02/17 --  --    Transfer Training OT LTG, Time to Achieve 2 wks --  --    Transfer Training OT LTG, Activity Type toilet;walk-in shower --  --    Transfer Training OT LTG, [place] Level supervision required --  --    Transfer Training OT LTG, Date Goal Reviewed --  --  06/09/17   Transfer Training OT LTG, Outcome --  goal met --        Goal: ADL Goal LTG- OT  Outcome: Outcome(s) achieved Date Met:  06/09/17 06/02/17 1351 06/08/17 0936 06/09/17 0826   ADL OT LTG   ADL OT LTG, Date Established 06/02/17 --  --    ADL OT LTG, Time to Achieve 2 wks --  --    ADL OT LTG, Activity Type ADL skills --  --    ADL OT LTG, [place] Level standby assist --  --    ADL OT LTG, Date Goal Reviewed --  --  06/09/17   ADL OT LTG, Outcome --  goal met --        Goal: Functional Mobility Goal LTG- OT  Outcome: Unable to achieve outcome(s) by discharge Date Met:  06/09/17 06/02/17 1351 06/04/17 1220 06/08/17 0936   Functional Mobility OT LTG   Functional Mobility Goal OT LTG, Date Established --  --  06/08/17   Functional Mobility Goal OT LTG, Time to Achieve 2 wks --  --    Functional Mobility Goal OT LTG, [place] Level supervision --  --    Functional Mobility Goal OT LTG, Distance to Achieve to the bathroom --  --    Functional Mobility Goal OT LTG, Date Goal Reviewed --  --  --    Functional Mobility Goal OT LTG, Outcome --  goal ongoing --      06/09/17 0826   Functional Mobility OT LTG   Functional Mobility Goal OT LTG, Date Established --    Functional Mobility Goal OT LTG, Time to Achieve --    Functional Mobility Goal OT LTG, [place] Level --    Functional Mobility Goal OT LTG, Distance to Achieve --    Functional Mobility Goal OT LTG, Date Goal Reviewed  06/09/17   Functional Mobility Goal OT LTG, Outcome --

## 2017-06-09 NOTE — THERAPY DISCHARGE NOTE
Acute Care - Occupational Therapy Discharge Summary  [facility]     Patient Name: Ventura Boggs  : 1960  MRN: [id number]    Today's Date: 2017  Onset of Illness/Injury or Date of Surgery Date: 17    Date of Referral to OT: 17  Referring Physician: Dr. Shaver      Admit Date: 2017        OT Recommendation and Plan    Visit Dx:    ICD-10-CM ICD-9-CM   1. Ventral hernia without obstruction or gangrene K43.9 553.20   2. Impaired physical mobility Z74.09 781.99   3. Impaired mobility and ADLs Z74.09 799.89                     OT Goals       17 0826 17 0936 17 1543    Transfer Training OT LTG    Transfer Training OT LTG, Date Goal Reviewed 17  -RW 17  -KD 17  -CS    Transfer Training OT LTG, Outcome  goal met  -KD     ADL OT LTG    ADL OT LTG, Date Goal Reviewed 17  -RW 17  -KD 17  -CS    ADL OT LTG, Outcome  goal met  -KD     Functional Mobility OT LTG    Functional Mobility Goal OT LTG, Date Established  17  -KD     Functional Mobility Goal OT LTG, Date Goal Reviewed 17  -RW  17  -CS      17 1139 17 1310 17 1220    Transfer Training OT LTG    Transfer Training OT LTG, Date Goal Reviewed 17  -CS 17  -JH 17  -LM    Transfer Training OT LTG, Outcome   goal ongoing  -LM    ADL OT LTG    ADL OT LTG, Date Goal Reviewed 17  -CS 17  -JH 17  -LM    ADL OT LTG, Outcome   goal ongoing  -LM    Functional Mobility OT LTG    Functional Mobility Goal OT LTG, Date Goal Reviewed 17  -CS 17  -JH 17  -LM    Functional Mobility Goal OT LTG, Outcome   goal ongoing  -LM      17 1351          Transfer Training OT LTG    Transfer Training OT LTG, Date Established 17  -RW      Transfer Training OT LTG, Time to Achieve 2 wks  -RW      Transfer Training OT LTG, Activity Type toilet;walk-in shower  -RW      Transfer Training OT LTG, [place] Level  supervision required  -RW      ADL OT LTG    ADL OT LTG, Date Established 06/02/17  -RW      ADL OT LTG, Time to Achieve 2 wks  -RW      ADL OT LTG, Activity Type ADL skills  -RW      ADL OT LTG, [place] Level standby assist  -RW      Functional Mobility OT LTG    Functional Mobility Goal OT LTG, Date Established 06/02/17  -RW      Functional Mobility Goal OT LTG, Time to Achieve 2 wks  -RW      Functional Mobility Goal OT LTG, [place] Level supervision  -RW      Functional Mobility Goal OT LTG, Distance to Achieve to the bathroom  -RW        User Key  (r) = Recorded By, (t) = Taken By, (c) = Cosigned By    Initials Name Provider Type    RW Nicky Blanchard OTR/L Occupational Therapist    SAMUEL Avina, COULTER/L Occupational Therapy Assistant    ДМИТРИЙ Pugh, COULTER/L Occupational Therapy Assistant    LM Ese Dodson COULTER/L Occupational Therapy Assistant    TRISTON Ash OCULTER/L Occupational Therapy Assistant                Outcome Measures       06/08/17 0936 06/08/17 0750 06/07/17 1500    6 Minute Walk Test    Distance  1000  -AM     Device Used  Yes;rolling walker  -AM     SpO2  92  -AM     Supplemental O2 Used?  No  -AM     # of Rest Breaks  0  -AM     How much help from another person do you currently need...    Turning from your back to your side while in flat bed without using bedrails?  4  -AM     Moving from lying on back to sitting on the side of a flat bed without bedrails?  4  -AM     Moving to and from a bed to a chair (including a wheelchair)?  4  -AM     Standing up from a chair using your arms (e.g., wheelchair, bedside chair)?  4  -AM     Climbing 3-5 steps with a railing?  4  -AM     To walk in hospital room?  4  -AM     AM-PAC 6 Clicks Score  24  -AM     How much help from another is currently needed...    Putting on and taking off regular lower body clothing? 4  -KD  2  -CS    Bathing (including washing, rinsing, and drying) 4  -KD  3  -CS    Toileting (which  includes using toilet bed pan or urinal) 3  -KD  3  -CS    Putting on and taking off regular upper body clothing 4  -KD  3  -CS    Taking care of personal grooming (such as brushing teeth) 4  -KD  3  -CS    Eating meals 4  -KD  4  -CS    Score 23  -KD  18  -CS    Functional Assessment    Outcome Measure Options  6 Minute Walk Test;AM-PAC 6 Clicks Basic Mobility (PT)  -AM AM-PAC 6 Clicks Daily Activity (OT)  -CS      06/07/17 1025 06/06/17 1135 06/06/17 1100    6 Minute Walk Test    Distance 740  -AM      Device Used Yes;rolling walker  -AM      SpO2 92  -AM      Supplemental O2 Used? No  -AM      # of Rest Breaks 0  -AM      How much help from another person do you currently need...    Turning from your back to your side while in flat bed without using bedrails? 3  -AM 3  -AM     Moving from lying on back to sitting on the side of a flat bed without bedrails? 3  -AM 3  -AM     Moving to and from a bed to a chair (including a wheelchair)? 4  -AM 3  -AM     Standing up from a chair using your arms (e.g., wheelchair, bedside chair)? 4  -AM 3  -AM     Climbing 3-5 steps with a railing? 4  -AM 3  -AM     To walk in hospital room? 4  -AM 3  -AM     AM-PAC 6 Clicks Score 22  -AM 18  -AM     How much help from another is currently needed...    Putting on and taking off regular lower body clothing?   2  -CS    Bathing (including washing, rinsing, and drying)   3  -CS    Toileting (which includes using toilet bed pan or urinal)   3  -CS    Putting on and taking off regular upper body clothing   3  -CS    Taking care of personal grooming (such as brushing teeth)   3  -CS    Eating meals   4  -CS    Score   18  -CS    Dynamic Gait Index (DGI)    Gait Level Surface 2  -AM      Change in Gait Speed 2  -AM      Gait with Horizontal Head Turns 3  -AM      Gait with Vertical Head Turns 3  -AM      Gait and Pivot Turn 2  -AM      Step Over Obstacle 2  -AM      Step Around Obstacles 3  -AM      Steps 2  -AM      Dynamic Gait Index  "Score 19  -AM      Tinetti Assessment    Tinetti Assessment yes  -AM      Sitting Balance 1  -AM      Arises 2  -AM      Attempts to Rise 2  -AM      Immediate Standing Balance (first 5 sec) 2  -AM      Standing Balance 2  -AM      Sternal Nudge (feet close together) 2  -AM      Eyes Closed (feet close together) 1  -AM      Turning 360 Degrees- Steps 1  -AM      Turning 360 Degrees- Steadiness 1  -AM      Sitting Down 2  -AM      Tinetti Balance Score 16  -AM      Gait Initiation (immediate after told \"go\") 1  -AM      Step Length- Right Swing 1  -AM      Step Length- Left Swing 1  -AM      Foot Clearance- Right Foot 1  -AM      Foot Clearance- Left Foot 1  -AM      Step Symmetry 1  -AM      Step Continuity 1  -AM      Path (excursion) 1  -AM      Trunk 0  -AM      Base of Support 1  -AM      Gait Score 9  -AM      Tinetti Total Score 25  -AM      Tinetti Assistive Device rolling walker  -AM      Functional Assessment    Outcome Measure Options 6 Minute Walk Test;AM-PAC 6 Clicks Basic Mobility (PT);Dynamic Gait Index;Tinetti  -AM AM-PAC 6 Clicks Basic Mobility (PT)  -AM AM-PAC 6 Clicks Daily Activity (OT)  -CS      User Key  (r) = Recorded By, (t) = Taken By, (c) = Cosigned By    Initials Name Provider Type    AM Ulysses Gardner PTA Physical Therapy Assistant    KD MARYLIN Jha/L Occupational Therapy Assistant    MARYLIN Stewart/MEI Occupational Therapy Assistant              OT Discharge Summary  Anticipated Discharge Disposition: home with 24/7 care, home with home health  Reason for Discharge: Discharge from facility  Outcomes Achieved: Patient able to partially acheive established goals, Refer to plan of care for updates on goals achieved  Discharge Destination: Home with home health      Nicky Blanchard OTR/L  6/9/2017   "

## 2017-06-12 ENCOUNTER — TELEPHONE (OUTPATIENT)
Dept: SURGERY | Facility: CLINIC | Age: 57
End: 2017-06-12

## 2017-06-12 NOTE — TELEPHONE ENCOUNTER
LIVIA SPOKE TO PT /DR THOMPSON  ADVISED THAT THE DRAIN PRB IS OK / WILL SEE PT ON 6-14-17 FOR REVAL

## 2017-06-14 ENCOUNTER — OFFICE VISIT (OUTPATIENT)
Dept: SURGERY | Facility: CLINIC | Age: 57
End: 2017-06-14

## 2017-06-14 VITALS
DIASTOLIC BLOOD PRESSURE: 88 MMHG | SYSTOLIC BLOOD PRESSURE: 124 MMHG | HEIGHT: 68 IN | WEIGHT: 197 LBS | BODY MASS INDEX: 29.86 KG/M2

## 2017-06-14 DIAGNOSIS — K43.9 VENTRAL HERNIA WITHOUT OBSTRUCTION OR GANGRENE: Primary | ICD-10-CM

## 2017-06-14 PROCEDURE — 99024 POSTOP FOLLOW-UP VISIT: CPT | Performed by: SURGERY

## 2017-06-14 RX ORDER — OXYCODONE AND ACETAMINOPHEN 10; 325 MG/1; MG/1
1 TABLET ORAL EVERY 4 HOURS
Refills: 0 | Status: ON HOLD | COMMUNITY
Start: 2017-05-29 | End: 2019-02-05 | Stop reason: SDUPTHER

## 2017-06-15 NOTE — PROGRESS NOTES
Chief Complaint   Patient presents with   • Post-op Follow-up     post operative open ventral hernia.        HPI    Discharge Date: 06/08/17  Procedures: Open repair of a large ventral hernia with component separation and biologic mesh placement  Consults: Urology for urinary retention  Discharge Diagnoses: Large ventral hernia, diabetes, urinary retention  Hospital Course: This gentleman underwent open repair of a very difficult ventral hernia. This was repaired with biologic mesh. Postoperatively, he did well although he had urinary retention.  He was advanced to a regular diet once he had resumption of bowel function. He is discharged with drains in place and will be followed up in 5 days. The following discharge instructions were given:  Course since home: Doing well- Decreasing drainage from drains. Decreasing pain. Tolerating diet. Having bowel function.  Past Medical History:   Diagnosis Date   • Arthritis    • Carpal tunnel syndrome    • Depressive disorder    • GERD (gastroesophageal reflux disease)    • Hernia    • History of urinary system disease    • Hypertension    • Migraine    • Rotator cuff syndrome    • Type 2 diabetes mellitus    • Ureteric stone        Past Surgical History:   Procedure Laterality Date   • ABDOMINAL SURGERY  08/2016   • CHOLECYSTECTOMY     • CIRCUMCISION     • CYSTOSCOPY  08/13/2003    Cystoscopy and removal of J stent. Right ureteral stent.   • CYSTOSCOPY  08/13/2003    Cystoscopy and right stone extraction and placement of 26x 6 J-stent.   • VENTRAL/INCISIONAL HERNIA REPAIR N/A 5/31/2017    Procedure: LAPAROSCOPIC POSSIBLE OPEN ADHESIOLYSIS AND VENTRAL/INCISIONAL HERNIA REPAIR ;  Surgeon: Rui Shaver MD;  Location: [facility];  Service:          Current Outpatient Prescriptions:   •  aspirin 81 MG chewable tablet, Chew 81 mg Daily., Disp: , Rfl:   •  atorvastatin (LIPITOR) 20 MG tablet, Take 20 mg by mouth Every Night., Disp: , Rfl:   •  diclofenac (VOLTAREN) 1 % gel gel, MILAGRO  TO SHOULDER UTD, Disp: , Rfl: 0  •  insulin lispro (humaLOG) 100 UNIT/ML injection, Inject 8-12 Units under the skin 3 (Three) Times a Day As Needed (sliding scale). As needed per sliding scale, Disp: , Rfl:   •  lisinopril (PRINIVIL,ZESTRIL) 5 MG tablet, Take 5 mg by mouth Daily., Disp: , Rfl:   •  metFORMIN (GLUCOPHAGE) 500 MG tablet, Take 500 mg by mouth 2 (Two) Times a Day With Meals., Disp: , Rfl:   •  ondansetron ODT (ZOFRAN-ODT) 4 MG disintegrating tablet, Take 4 mg by mouth Every 8 (Eight) Hours., Disp: , Rfl:   •  oxyCODONE-acetaminophen (PERCOCET)  MG per tablet, Take 1 tablet by mouth Every 4 (Four) Hours., Disp: , Rfl: 0  •  SITagliptin (JANUVIA) 50 MG tablet, Take 50 mg by mouth 2 (Two) Times a Day., Disp: , Rfl:   •  oxyCODONE-acetaminophen (PERCOCET) 5-325 MG per tablet, Take 1 tablet by mouth Every 4 (Four) Hours As Needed for Moderate Pain (4-6) or Severe Pain (7-10) for up to 9 days., Disp: 20 tablet, Rfl: 0    Allergies   Allergen Reactions   • Naproxen Other (See Comments) and Rash     Blisters in mouth   • Tramadol Rash     Patient states he gets a rash in his mouth.        No family history on file.    Social History     Social History   • Marital status:      Spouse name: N/A   • Number of children: N/A   • Years of education: N/A     Occupational History   • Not on file.     Social History Main Topics   • Smoking status: Never Smoker   • Smokeless tobacco: Never Used   • Alcohol use No   • Drug use: No   • Sexual activity: Defer     Other Topics Concern   • Not on file     Social History Narrative       Review of Systems  Nothing to add  Physical Exam   Abdominal: Soft. Bowel sounds are normal. He exhibits no distension and no mass. There is no tenderness. There is no rebound and no guarding. No hernia.     Drains are removed    ASSESSMENT    Ventura was seen today for post-op follow-up.    Diagnoses and all orders for this visit:    Ventral hernia without obstruction or  gangrene      PLAN    1. Recheck in 1 week  2. No lifting  3. May drive          This document has been electronically signed by Rui Shaver MD on June 14, 2017 11:26 PM

## 2017-06-21 ENCOUNTER — OFFICE VISIT (OUTPATIENT)
Dept: SURGERY | Facility: CLINIC | Age: 57
End: 2017-06-21

## 2017-06-21 VITALS
HEIGHT: 68 IN | WEIGHT: 189 LBS | SYSTOLIC BLOOD PRESSURE: 142 MMHG | BODY MASS INDEX: 28.64 KG/M2 | DIASTOLIC BLOOD PRESSURE: 88 MMHG

## 2017-06-21 DIAGNOSIS — Z87.19 HX OF VENTRAL HERNIA REPAIR: Primary | ICD-10-CM

## 2017-06-21 DIAGNOSIS — Z98.890 HX OF VENTRAL HERNIA REPAIR: Primary | ICD-10-CM

## 2017-06-21 PROCEDURE — 99024 POSTOP FOLLOW-UP VISIT: CPT | Performed by: SURGERY

## 2017-06-21 RX ORDER — ONDANSETRON 4 MG/1
4 TABLET, ORALLY DISINTEGRATING ORAL EVERY 8 HOURS
Qty: 15 TABLET | Refills: 1 | Status: ON HOLD | OUTPATIENT
Start: 2017-06-21 | End: 2019-02-05 | Stop reason: SDUPTHER

## 2017-06-21 RX ORDER — OXYCODONE HYDROCHLORIDE AND ACETAMINOPHEN 5; 325 MG/1; MG/1
1 TABLET ORAL EVERY 6 HOURS PRN
Qty: 20 TABLET | Refills: 0 | Status: ON HOLD | OUTPATIENT
Start: 2017-06-21 | End: 2017-07-26 | Stop reason: DRUGHIGH

## 2017-06-21 RX ORDER — OXYCODONE HYDROCHLORIDE AND ACETAMINOPHEN 5; 325 MG/1; MG/1
1 TABLET ORAL AS NEEDED
COMMUNITY
Start: 2017-06-19 | End: 2017-06-21 | Stop reason: SDUPTHER

## 2017-06-23 ENCOUNTER — TELEPHONE (OUTPATIENT)
Dept: SURGERY | Facility: CLINIC | Age: 57
End: 2017-06-23

## 2017-06-23 NOTE — TELEPHONE ENCOUNTER
STILL NAUSEATED     SAYS ZOFRAN DOES NOT WORK  WILL YOU CHANGE  TO PHENOGREN    FAX TO PHARMACY...

## 2017-06-24 RX ORDER — PROMETHAZINE HYDROCHLORIDE 12.5 MG/1
25 TABLET ORAL EVERY 6 HOURS PRN
Qty: 30 TABLET | Refills: 1 | Status: SHIPPED | OUTPATIENT
Start: 2017-06-24 | End: 2017-08-02 | Stop reason: SDUPTHER

## 2017-07-07 ENCOUNTER — LAB (OUTPATIENT)
Dept: LAB | Facility: HOSPITAL | Age: 57
End: 2017-07-07

## 2017-07-07 ENCOUNTER — OFFICE VISIT (OUTPATIENT)
Dept: SURGERY | Facility: CLINIC | Age: 57
End: 2017-07-07

## 2017-07-07 ENCOUNTER — HOSPITAL ENCOUNTER (OUTPATIENT)
Dept: GENERAL RADIOLOGY | Facility: HOSPITAL | Age: 57
Discharge: HOME | End: 2017-07-07
Admitting: SURGERY

## 2017-07-07 VITALS
HEIGHT: 68 IN | BODY MASS INDEX: 28.04 KG/M2 | DIASTOLIC BLOOD PRESSURE: 80 MMHG | SYSTOLIC BLOOD PRESSURE: 140 MMHG | WEIGHT: 185 LBS

## 2017-07-07 DIAGNOSIS — Z98.890 POST-OPERATIVE NAUSEA AND VOMITING: ICD-10-CM

## 2017-07-07 DIAGNOSIS — R11.2 POST-OPERATIVE NAUSEA AND VOMITING: Primary | ICD-10-CM

## 2017-07-07 DIAGNOSIS — R11.2 POST-OPERATIVE NAUSEA AND VOMITING: ICD-10-CM

## 2017-07-07 DIAGNOSIS — Z98.890 POST-OPERATIVE NAUSEA AND VOMITING: Primary | ICD-10-CM

## 2017-07-07 LAB
ANION GAP SERPL CALCULATED.3IONS-SCNC: 16 MMOL/L (ref 5–15)
BUN BLD-MCNC: 6 MG/DL (ref 7–21)
BUN/CREAT SERPL: 9.2 (ref 7–25)
CALCIUM SPEC-SCNC: 9.9 MG/DL (ref 8.4–10.2)
CHLORIDE SERPL-SCNC: 99 MMOL/L (ref 95–110)
CO2 SERPL-SCNC: 28 MMOL/L (ref 22–31)
CREAT BLD-MCNC: 0.65 MG/DL (ref 0.7–1.3)
DEPRECATED RDW RBC AUTO: 43.7 FL (ref 35.1–43.9)
EOSINOPHIL # BLD MANUAL: 0.11 10*3/MM3 (ref 0–0.7)
EOSINOPHIL NFR BLD MANUAL: 1 % (ref 0–7)
ERYTHROCYTE [DISTWIDTH] IN BLOOD BY AUTOMATED COUNT: 14.3 % (ref 11.5–14.5)
GFR SERPL CREATININE-BSD FRML MDRD: 128 ML/MIN/1.73 (ref 60–130)
GLUCOSE BLD-MCNC: 142 MG/DL (ref 60–100)
HCT VFR BLD AUTO: 38.9 % (ref 39–49)
HGB BLD-MCNC: 12.9 G/DL (ref 13.7–17.3)
LYMPHOCYTES # BLD MANUAL: 1.21 10*3/MM3 (ref 0.6–4.2)
LYMPHOCYTES NFR BLD MANUAL: 11 % (ref 10–50)
LYMPHOCYTES NFR BLD MANUAL: 9 % (ref 0–12)
MAGNESIUM SERPL-MCNC: 2 MG/DL (ref 1.6–2.3)
MCH RBC QN AUTO: 27.8 PG (ref 26.5–34)
MCHC RBC AUTO-ENTMCNC: 33.2 G/DL (ref 31.5–36.3)
MCV RBC AUTO: 83.8 FL (ref 80–98)
MONOCYTES # BLD AUTO: 0.99 10*3/MM3 (ref 0–0.9)
NEUTROPHILS # BLD AUTO: 8.24 10*3/MM3 (ref 2–8.6)
NEUTROPHILS NFR BLD MANUAL: 69 % (ref 37–80)
NEUTS BAND NFR BLD MANUAL: 6 % (ref 0–5)
PHOSPHATE SERPL-MCNC: 3.9 MG/DL (ref 2.4–4.4)
PLATELET # BLD AUTO: 623 10*3/MM3 (ref 150–450)
PMV BLD AUTO: 9.8 FL (ref 8–12)
POTASSIUM BLD-SCNC: 3.8 MMOL/L (ref 3.5–5.1)
RBC # BLD AUTO: 4.64 10*6/MM3 (ref 4.37–5.74)
RBC MORPH BLD: NORMAL
SMALL PLATELETS BLD QL SMEAR: ABNORMAL
SODIUM BLD-SCNC: 143 MMOL/L (ref 137–145)
VARIANT LYMPHS NFR BLD MANUAL: 4 % (ref 0–5)
WBC MORPH BLD: NORMAL
WBC NRBC COR # BLD: 10.98 10*3/MM3 (ref 3.2–9.8)

## 2017-07-07 PROCEDURE — 85007 BL SMEAR W/DIFF WBC COUNT: CPT

## 2017-07-07 PROCEDURE — 36415 COLL VENOUS BLD VENIPUNCTURE: CPT

## 2017-07-07 PROCEDURE — 99024 POSTOP FOLLOW-UP VISIT: CPT | Performed by: SURGERY

## 2017-07-07 PROCEDURE — 83735 ASSAY OF MAGNESIUM: CPT

## 2017-07-07 PROCEDURE — 85025 COMPLETE CBC W/AUTO DIFF WBC: CPT

## 2017-07-07 PROCEDURE — 74022 RADEX COMPL AQT ABD SERIES: CPT

## 2017-07-07 PROCEDURE — 84100 ASSAY OF PHOSPHORUS: CPT

## 2017-07-07 PROCEDURE — 80048 BASIC METABOLIC PNL TOTAL CA: CPT

## 2017-07-07 RX ORDER — LIDOCAINE AND PRILOCAINE 25; 25 MG/G; MG/G
1 CREAM TOPICAL AS NEEDED
COMMUNITY
Start: 2017-06-30 | End: 2019-02-15 | Stop reason: HOSPADM

## 2017-07-07 NOTE — PROGRESS NOTES
Chief Complaint   Patient presents with   • Follow-up     Recheck ventral hernia.        HPI  States that he has persistent nausea. No vomiting. No fever. Good bowel function. Some serous drainage from the drain sites.  Past Medical History:   Diagnosis Date   • Arthritis    • Carpal tunnel syndrome    • Depressive disorder    • GERD (gastroesophageal reflux disease)    • Hernia    • History of urinary system disease    • Hypertension    • Migraine    • Rotator cuff syndrome    • Type 2 diabetes mellitus    • Ureteric stone        Past Surgical History:   Procedure Laterality Date   • ABDOMINAL SURGERY  08/2016   • CHOLECYSTECTOMY     • CIRCUMCISION     • CYSTOSCOPY  08/13/2003    Cystoscopy and removal of J stent. Right ureteral stent.   • CYSTOSCOPY  08/13/2003    Cystoscopy and right stone extraction and placement of 26x 6 J-stent.   • VENTRAL/INCISIONAL HERNIA REPAIR N/A 5/31/2017    Procedure: LAPAROSCOPIC POSSIBLE OPEN ADHESIOLYSIS AND VENTRAL/INCISIONAL HERNIA REPAIR ;  Surgeon: Rui Shaver MD;  Location: [facility];  Service:          Current Outpatient Prescriptions:   •  aspirin 81 MG chewable tablet, Chew 81 mg Daily., Disp: , Rfl:   •  atorvastatin (LIPITOR) 20 MG tablet, Take 20 mg by mouth Every Night., Disp: , Rfl:   •  diclofenac (VOLTAREN) 1 % gel gel, MILAGRO TO SHOULDER UTD, Disp: , Rfl: 0  •  insulin lispro (humaLOG) 100 UNIT/ML injection, Inject 8-12 Units under the skin 3 (Three) Times a Day As Needed (sliding scale). As needed per sliding scale, Disp: , Rfl:   •  lisinopril (PRINIVIL,ZESTRIL) 5 MG tablet, Take 5 mg by mouth Daily., Disp: , Rfl:   •  metFORMIN (GLUCOPHAGE) 500 MG tablet, Take 500 mg by mouth 2 (Two) Times a Day With Meals., Disp: , Rfl:   •  ondansetron ODT (ZOFRAN-ODT) 4 MG disintegrating tablet, Take 1 tablet by mouth Every 8 (Eight) Hours., Disp: 15 tablet, Rfl: 1  •  promethazine (PHENERGAN) 12.5 MG tablet, Take 2 tablets by mouth Every 6 (Six) Hours As Needed for Nausea.,  Disp: 30 tablet, Rfl: 1  •  SITagliptin (JANUVIA) 50 MG tablet, Take 50 mg by mouth 2 (Two) Times a Day., Disp: , Rfl:   •  lidocaine-prilocaine (EMLA) 2.5-2.5 % cream, , Disp: , Rfl:   •  oxyCODONE-acetaminophen (PERCOCET)  MG per tablet, Take 1 tablet by mouth Every 4 (Four) Hours., Disp: , Rfl: 0  •  oxyCODONE-acetaminophen (PERCOCET) 5-325 MG per tablet, Take 1 tablet by mouth Every 6 (Six) Hours As Needed for Severe Pain (7-10)., Disp: 20 tablet, Rfl: 0    Allergies   Allergen Reactions   • Naproxen Other (See Comments) and Rash     Blisters in mouth   • Tramadol Rash     Patient states he gets a rash in his mouth.        No family history on file.    Social History     Social History   • Marital status:      Spouse name: N/A   • Number of children: N/A   • Years of education: N/A     Occupational History   • Not on file.     Social History Main Topics   • Smoking status: Never Smoker   • Smokeless tobacco: Never Used   • Alcohol use No   • Drug use: No   • Sexual activity: Defer     Other Topics Concern   • Not on file     Social History Narrative       Review of Systems    Physical Exam   Abdominal: Soft. Bowel sounds are normal. He exhibits no distension and no mass. There is tenderness. There is no rebound and no guarding. No hernia.             ASSESSMENT    Ventura was seen today for follow-up.    Diagnoses and all orders for this visit:    Post-operative nausea and vomiting  -     CBC & Differential; Future  -     Basic Metabolic Panel; Future  -     Magnesium; Future  -     Phosphorus; Future  -     XR abdomen 2 vw w chest 1 vw; Future        PLAN    1. Recheck later today          This document has been electronically signed by Rui Shaver MD on July 7, 2017 2:34 PM      Addendum  Studies are fairly unremarkable- no major abnormalities.  Will obtain a CT scan Monday and recheck at that time.  No further narcotics without better indications than presently.          This document has been  electronically signed by Rui Shaver MD on July 9, 2017 11:20 PM

## 2017-07-10 ENCOUNTER — OFFICE VISIT (OUTPATIENT)
Dept: SURGERY | Facility: CLINIC | Age: 57
End: 2017-07-10

## 2017-07-10 ENCOUNTER — HOSPITAL ENCOUNTER (OUTPATIENT)
Dept: CT IMAGING | Facility: HOSPITAL | Age: 57
Discharge: HOME | End: 2017-07-10
Admitting: SURGERY

## 2017-07-10 VITALS
WEIGHT: 188 LBS | DIASTOLIC BLOOD PRESSURE: 78 MMHG | SYSTOLIC BLOOD PRESSURE: 126 MMHG | HEIGHT: 68 IN | BODY MASS INDEX: 28.49 KG/M2

## 2017-07-10 DIAGNOSIS — Z87.19 HX OF VENTRAL HERNIA REPAIR: Primary | ICD-10-CM

## 2017-07-10 DIAGNOSIS — Z98.890 POST-OPERATIVE NAUSEA AND VOMITING: ICD-10-CM

## 2017-07-10 DIAGNOSIS — Z98.890 HX OF VENTRAL HERNIA REPAIR: Primary | ICD-10-CM

## 2017-07-10 DIAGNOSIS — R11.2 POST-OPERATIVE NAUSEA AND VOMITING: ICD-10-CM

## 2017-07-10 PROCEDURE — 99024 POSTOP FOLLOW-UP VISIT: CPT | Performed by: SURGERY

## 2017-07-10 PROCEDURE — 74176 CT ABD & PELVIS W/O CONTRAST: CPT

## 2017-07-10 NOTE — PROGRESS NOTES
Chief Complaint   Patient presents with   • Follow-up     Recheck abdominal Cat Scan results.        HPI  Seroma with air on CT  Feels much better.  Past Medical History:   Diagnosis Date   • Arthritis    • Carpal tunnel syndrome    • Depressive disorder    • GERD (gastroesophageal reflux disease)    • Hernia    • History of urinary system disease    • Hypertension    • Migraine    • Rotator cuff syndrome    • Type 2 diabetes mellitus    • Ureteric stone        Past Surgical History:   Procedure Laterality Date   • ABDOMINAL SURGERY  08/2016   • CHOLECYSTECTOMY     • CIRCUMCISION     • CYSTOSCOPY  08/13/2003    Cystoscopy and removal of J stent. Right ureteral stent.   • CYSTOSCOPY  08/13/2003    Cystoscopy and right stone extraction and placement of 26x 6 J-stent.   • VENTRAL/INCISIONAL HERNIA REPAIR N/A 5/31/2017    Procedure: LAPAROSCOPIC POSSIBLE OPEN ADHESIOLYSIS AND VENTRAL/INCISIONAL HERNIA REPAIR ;  Surgeon: Rui Shaver MD;  Location: [facility];  Service:          Current Outpatient Prescriptions:   •  aspirin 81 MG chewable tablet, Chew 81 mg Daily., Disp: , Rfl:   •  atorvastatin (LIPITOR) 20 MG tablet, Take 20 mg by mouth Every Night., Disp: , Rfl:   •  diclofenac (VOLTAREN) 1 % gel gel, [place]AG[place] TO SHOULDER UTD, Disp: , Rfl: 0  •  insulin lispro (humaLOG) 100 UNIT/ML injection, Inject 8-12 Units under the skin 3 (Three) Times a Day As Needed (sliding scale). As needed per sliding scale, Disp: , Rfl:   •  lidocaine-prilocaine (EMLA) 2.5-2.5 % cream, , Disp: , Rfl:   •  lisinopril (PRINIVIL,ZESTRIL) 5 MG tablet, Take 5 mg by mouth Daily., Disp: , Rfl:   •  metFORMIN (GLUCOPHAGE) 500 MG tablet, Take 500 mg by mouth 2 (Two) Times a Day With Meals., Disp: , Rfl:   •  ondansetron ODT (ZOFRAN-ODT) 4 MG disintegrating tablet, Take 1 tablet by mouth Every 8 (Eight) Hours., Disp: 15 tablet, Rfl: 1  •  oxyCODONE-acetaminophen (PERCOCET)  MG per tablet, Take 1 tablet by mouth Every 4 (Four) Hours., Disp: , Rfl:  0  •  oxyCODONE-acetaminophen (PERCOCET) 5-325 MG per tablet, Take 1 tablet by mouth Every 6 (Six) Hours As Needed for Severe Pain (7-10)., Disp: 20 tablet, Rfl: 0  •  promethazine (PHENERGAN) 12.5 MG tablet, Take 2 tablets by mouth Every 6 (Six) Hours As Needed for Nausea., Disp: 30 tablet, Rfl: 1  •  SITagliptin (JANUVIA) 50 MG tablet, Take 50 mg by mouth 2 (Two) Times a Day., Disp: , Rfl:     Allergies   Allergen Reactions   • Naproxen Other (See Comments) and Rash     Blisters in mouth   • Tramadol Rash     Patient states he gets a rash in his mouth.        No family history on file.    Social History     Social History   • Marital status:      Spouse name: N/A   • Number of children: N/A   • Years of education: N/A     Occupational History   • Not on file.     Social History Main Topics   • Smoking status: Never Smoker   • Smokeless tobacco: Never Used   • Alcohol use No   • Drug use: No   • Sexual activity: Defer     Other Topics Concern   • Not on file     Social History Narrative       Review of Systems  Nothing to add  Physical Exam   Abdominal:             ASSESSMENT    Ventura was seen today for follow-up.    Diagnoses and all orders for this visit:    Hx of ventral hernia repair      PLAN    1. Recheck in 1 week          This document has been electronically signed by Rui Shaver MD on July 10, 2017 12:12 PM

## 2017-07-21 ENCOUNTER — OFFICE VISIT (OUTPATIENT)
Dept: SURGERY | Facility: CLINIC | Age: 57
End: 2017-07-21

## 2017-07-21 VITALS
HEIGHT: 68 IN | DIASTOLIC BLOOD PRESSURE: 80 MMHG | WEIGHT: 187 LBS | BODY MASS INDEX: 28.34 KG/M2 | SYSTOLIC BLOOD PRESSURE: 120 MMHG

## 2017-07-21 DIAGNOSIS — Z51.89 VISIT FOR WOUND CHECK: Primary | ICD-10-CM

## 2017-07-21 PROCEDURE — 99024 POSTOP FOLLOW-UP VISIT: CPT | Performed by: SURGERY

## 2017-07-21 RX ORDER — CIPROFLOXACIN 500 MG/1
500 TABLET, FILM COATED ORAL 2 TIMES DAILY
Qty: 20 TABLET | Refills: 0 | Status: ON HOLD | OUTPATIENT
Start: 2017-07-21 | End: 2017-07-26

## 2017-07-21 RX ORDER — CIPROFLOXACIN 500 MG/1
500 TABLET, FILM COATED ORAL 2 TIMES DAILY
Qty: 20 TABLET | Refills: 0 | Status: SHIPPED | OUTPATIENT
Start: 2017-07-21 | End: 2017-07-21

## 2017-07-21 RX ORDER — SULFAMETHOXAZOLE AND TRIMETHOPRIM 800; 160 MG/1; MG/1
1 TABLET ORAL 2 TIMES DAILY
Qty: 6 TABLET | Refills: 0 | Status: SHIPPED | OUTPATIENT
Start: 2017-07-21 | End: 2017-07-29 | Stop reason: HOSPADM

## 2017-07-21 RX ORDER — SULFAMETHOXAZOLE AND TRIMETHOPRIM 800; 160 MG/1; MG/1
1 TABLET ORAL 2 TIMES DAILY
Qty: 20 TABLET | Refills: 0 | Status: SHIPPED | OUTPATIENT
Start: 2017-07-21 | End: 2017-07-21

## 2017-07-21 RX ORDER — CYCLOBENZAPRINE HCL 10 MG
10 TABLET ORAL 2 TIMES DAILY PRN
COMMUNITY
Start: 2017-07-10 | End: 2019-02-15 | Stop reason: HOSPADM

## 2017-07-22 NOTE — PROGRESS NOTES
Chief Complaint   Patient presents with   • Follow-up     Recheck ventral hernia.        HPI  Please refer to last visit.  Since that visit, he has had no fever or chills; however, he has begun to drain a small amount of purulent material from his left subcutaneous drain site.      Current Outpatient Prescriptions:   •  aspirin 81 MG chewable tablet, Chew 81 mg Daily., Disp: , Rfl:   •  atorvastatin (LIPITOR) 20 MG tablet, Take 20 mg by mouth Every Night., Disp: , Rfl:   •  cyclobenzaprine (FLEXERIL) 10 MG tablet, , Disp: , Rfl:   •  diclofenac (VOLTAREN) 1 % gel gel, MILAGRO TO SHOULDER UTD, Disp: , Rfl: 0  •  insulin lispro (humaLOG) 100 UNIT/ML injection, Inject 8-12 Units under the skin 3 (Three) Times a Day As Needed (sliding scale). As needed per sliding scale, Disp: , Rfl:   •  lidocaine-prilocaine (EMLA) 2.5-2.5 % cream, , Disp: , Rfl:   •  lisinopril (PRINIVIL,ZESTRIL) 5 MG tablet, Take 5 mg by mouth Daily., Disp: , Rfl:   •  metFORMIN (GLUCOPHAGE) 500 MG tablet, Take 500 mg by mouth 2 (Two) Times a Day With Meals., Disp: , Rfl:   •  ondansetron ODT (ZOFRAN-ODT) 4 MG disintegrating tablet, Take 1 tablet by mouth Every 8 (Eight) Hours., Disp: 15 tablet, Rfl: 1  •  promethazine (PHENERGAN) 12.5 MG tablet, Take 2 tablets by mouth Every 6 (Six) Hours As Needed for Nausea., Disp: 30 tablet, Rfl: 1  •  SITagliptin (JANUVIA) 50 MG tablet, Take 50 mg by mouth 2 (Two) Times a Day., Disp: , Rfl:   •  oxyCODONE-acetaminophen (PERCOCET)  MG per tablet, Take 1 tablet by mouth Every 4 (Four) Hours., Disp: , Rfl: 0  •  oxyCODONE-acetaminophen (PERCOCET) 5-325 MG per tablet, Take 1 tablet by mouth Every 6 (Six) Hours As Needed for Severe Pain (7-10)., Disp: 20 tablet, Rfl: 0      Allergies   Allergen Reactions   • Naproxen Other (See Comments) and Rash     Blisters in mouth   • Tramadol Rash     Patient states he gets a rash in his mouth.        Review of Systems  Nothing to add  Physical Exam   Abdominal: Soft. Bowel  sounds are normal. He exhibits no distension and no mass. There is no tenderness. There is no rebound and no guarding. No hernia.             ASSESSMENT    Ventura was seen today for follow-up.    Diagnoses and all orders for this visit:    Visit for wound check  Comments:  Status post difficult ventral hernia repair and intestinal resection    Other orders  -     sulfamethoxazole-trimethoprim (BACTRIM DS) 800-160 MG per tablet; Take 1 tablet by mouth 2 (Two) Times a Day for 10 days.  -     ciprofloxacin (CIPRO) 500 MG tablet; Take 1 tablet by mouth 2 (Two) Times a Day for 10 days.      PLAN    1.  I will recheck in the office in 2 days          This document has been electronically signed by Rui Shaver MD on July 22, 2017 5:56 PM

## 2017-07-26 ENCOUNTER — ANESTHESIA EVENT (OUTPATIENT)
Dept: PERIOP | Facility: HOSPITAL | Age: 57
End: 2017-07-26

## 2017-07-26 ENCOUNTER — OFFICE VISIT (OUTPATIENT)
Dept: SURGERY | Facility: CLINIC | Age: 57
End: 2017-07-26

## 2017-07-26 ENCOUNTER — ANESTHESIA (OUTPATIENT)
Dept: PERIOP | Facility: HOSPITAL | Age: 57
End: 2017-07-26

## 2017-07-26 ENCOUNTER — HOSPITAL ENCOUNTER (INPATIENT)
Facility: HOSPITAL | Age: 57
LOS: 3 days | Discharge: HOME | End: 2017-07-29
Attending: SURGERY | Admitting: SURGERY

## 2017-07-26 VITALS
WEIGHT: 187 LBS | DIASTOLIC BLOOD PRESSURE: 70 MMHG | BODY MASS INDEX: 28.34 KG/M2 | HEIGHT: 68 IN | SYSTOLIC BLOOD PRESSURE: 110 MMHG

## 2017-07-26 DIAGNOSIS — L02.211 CUTANEOUS ABSCESS OF ABDOMINAL WALL: Primary | ICD-10-CM

## 2017-07-26 DIAGNOSIS — L02.211 CUTANEOUS ABSCESS OF ABDOMINAL WALL: ICD-10-CM

## 2017-07-26 LAB
ANION GAP SERPL CALCULATED.3IONS-SCNC: 9 MMOL/L (ref 5–15)
BUN BLD-MCNC: 11 MG/DL (ref 7–21)
BUN/CREAT SERPL: 14.7 (ref 7–25)
CALCIUM SPEC-SCNC: 8.3 MG/DL (ref 8.4–10.2)
CHLORIDE SERPL-SCNC: 98 MMOL/L (ref 95–110)
CO2 SERPL-SCNC: 26 MMOL/L (ref 22–31)
CREAT BLD-MCNC: 0.75 MG/DL (ref 0.7–1.3)
GFR SERPL CREATININE-BSD FRML MDRD: 109 ML/MIN/1.73 (ref 60–130)
GLUCOSE BLD-MCNC: 143 MG/DL (ref 60–100)
GLUCOSE BLDC GLUCOMTR-MCNC: 153 MG/DL (ref 70–130)
GLUCOSE BLDC GLUCOMTR-MCNC: 153 MG/DL (ref 70–130)
GLUCOSE BLDC GLUCOMTR-MCNC: 177 MG/DL (ref 70–130)
GLUCOSE BLDC GLUCOMTR-MCNC: 241 MG/DL (ref 70–130)
POTASSIUM BLD-SCNC: 3.7 MMOL/L (ref 3.5–5.1)
SODIUM BLD-SCNC: 133 MMOL/L (ref 137–145)

## 2017-07-26 PROCEDURE — 25010000002 PROPOFOL 10 MG/ML EMULSION: Performed by: NURSE ANESTHETIST, CERTIFIED REGISTERED

## 2017-07-26 PROCEDURE — 25010000002 HYDROMORPHONE PER 4 MG: Performed by: NURSE ANESTHETIST, CERTIFIED REGISTERED

## 2017-07-26 PROCEDURE — 25010000002 PHENYLEPHRINE PER 1 ML: Performed by: NURSE ANESTHETIST, CERTIFIED REGISTERED

## 2017-07-26 PROCEDURE — 87077 CULTURE AEROBIC IDENTIFY: CPT | Performed by: SURGERY

## 2017-07-26 PROCEDURE — 25010000002 MEPERIDINE 25 MG/0.5ML SOLUTION: Performed by: NURSE ANESTHETIST, CERTIFIED REGISTERED

## 2017-07-26 PROCEDURE — 87205 SMEAR GRAM STAIN: CPT | Performed by: SURGERY

## 2017-07-26 PROCEDURE — 25010000002 ONDANSETRON PER 1 MG: Performed by: NURSE ANESTHETIST, CERTIFIED REGISTERED

## 2017-07-26 PROCEDURE — 25010000002 HYDROMORPHONE PER 4 MG: Performed by: SURGERY

## 2017-07-26 PROCEDURE — 10060 I&D ABSCESS SIMPLE/SINGLE: CPT | Performed by: SURGERY

## 2017-07-26 PROCEDURE — 25010000002 VANCOMYCIN PER 500 MG: Performed by: SURGERY

## 2017-07-26 PROCEDURE — 87186 SC STD MICRODIL/AGAR DIL: CPT | Performed by: SURGERY

## 2017-07-26 PROCEDURE — 25010000002 ENOXAPARIN PER 10 MG: Performed by: SURGERY

## 2017-07-26 PROCEDURE — 25010000002 NEOSTIGMINE PER 0.5 MG: Performed by: NURSE ANESTHETIST, CERTIFIED REGISTERED

## 2017-07-26 PROCEDURE — 25010000002 MIDAZOLAM PER 1 MG: Performed by: NURSE ANESTHETIST, CERTIFIED REGISTERED

## 2017-07-26 PROCEDURE — 25010000002 FENTANYL CITRATE (PF) 100 MCG/2ML SOLUTION: Performed by: NURSE ANESTHETIST, CERTIFIED REGISTERED

## 2017-07-26 PROCEDURE — 99024 POSTOP FOLLOW-UP VISIT: CPT | Performed by: SURGERY

## 2017-07-26 PROCEDURE — 82962 GLUCOSE BLOOD TEST: CPT

## 2017-07-26 PROCEDURE — 25010000002 LEVOFLOXACIN PER 250 MG: Performed by: SURGERY

## 2017-07-26 PROCEDURE — 87147 CULTURE TYPE IMMUNOLOGIC: CPT | Performed by: SURGERY

## 2017-07-26 PROCEDURE — 0J9800Z DRAINAGE OF ABDOMEN SUBCUTANEOUS TISSUE AND FASCIA WITH DRAINAGE DEVICE, OPEN APPROACH: ICD-10-PCS | Performed by: SURGERY

## 2017-07-26 PROCEDURE — 87015 SPECIMEN INFECT AGNT CONCNTJ: CPT | Performed by: SURGERY

## 2017-07-26 PROCEDURE — 80048 BASIC METABOLIC PNL TOTAL CA: CPT | Performed by: SURGERY

## 2017-07-26 PROCEDURE — 87070 CULTURE OTHR SPECIMN AEROBIC: CPT | Performed by: SURGERY

## 2017-07-26 RX ORDER — DIPHENHYDRAMINE HYDROCHLORIDE 50 MG/ML
12.5 INJECTION INTRAMUSCULAR; INTRAVENOUS
Status: DISCONTINUED | OUTPATIENT
Start: 2017-07-26 | End: 2017-07-26 | Stop reason: HOSPADM

## 2017-07-26 RX ORDER — ROCURONIUM BROMIDE 10 MG/ML
INJECTION, SOLUTION INTRAVENOUS AS NEEDED
Status: DISCONTINUED | OUTPATIENT
Start: 2017-07-26 | End: 2017-07-26 | Stop reason: SURG

## 2017-07-26 RX ORDER — NALOXONE HCL 0.4 MG/ML
0.4 VIAL (ML) INJECTION
Status: DISCONTINUED | OUTPATIENT
Start: 2017-07-26 | End: 2017-07-28

## 2017-07-26 RX ORDER — LEVOFLOXACIN 5 MG/ML
500 INJECTION, SOLUTION INTRAVENOUS ONCE
Status: COMPLETED | OUTPATIENT
Start: 2017-07-26 | End: 2017-07-26

## 2017-07-26 RX ORDER — ONDANSETRON 2 MG/ML
INJECTION INTRAMUSCULAR; INTRAVENOUS AS NEEDED
Status: DISCONTINUED | OUTPATIENT
Start: 2017-07-26 | End: 2017-07-26 | Stop reason: SURG

## 2017-07-26 RX ORDER — EPHEDRINE SULFATE 50 MG/ML
5 INJECTION, SOLUTION INTRAVENOUS ONCE AS NEEDED
Status: DISCONTINUED | OUTPATIENT
Start: 2017-07-26 | End: 2017-07-26 | Stop reason: HOSPADM

## 2017-07-26 RX ORDER — ACETAMINOPHEN 325 MG/1
650 TABLET ORAL ONCE AS NEEDED
Status: DISCONTINUED | OUTPATIENT
Start: 2017-07-26 | End: 2017-07-26 | Stop reason: HOSPADM

## 2017-07-26 RX ORDER — SODIUM CHLORIDE 9 MG/ML
100 INJECTION, SOLUTION INTRAVENOUS CONTINUOUS
Status: CANCELLED | OUTPATIENT
Start: 2017-07-26

## 2017-07-26 RX ORDER — NALOXONE HCL 0.4 MG/ML
0.2 VIAL (ML) INJECTION AS NEEDED
Status: DISCONTINUED | OUTPATIENT
Start: 2017-07-26 | End: 2017-07-26 | Stop reason: HOSPADM

## 2017-07-26 RX ORDER — SODIUM CHLORIDE, SODIUM GLUCONATE, SODIUM ACETATE, POTASSIUM CHLORIDE, AND MAGNESIUM CHLORIDE 526; 502; 368; 37; 30 MG/100ML; MG/100ML; MG/100ML; MG/100ML; MG/100ML
INJECTION, SOLUTION INTRAVENOUS CONTINUOUS PRN
Status: DISCONTINUED | OUTPATIENT
Start: 2017-07-26 | End: 2017-07-26 | Stop reason: SURG

## 2017-07-26 RX ORDER — DEXTROSE MONOHYDRATE 25 G/50ML
25 INJECTION, SOLUTION INTRAVENOUS
Status: DISCONTINUED | OUTPATIENT
Start: 2017-07-26 | End: 2017-07-29 | Stop reason: HOSPADM

## 2017-07-26 RX ORDER — ONDANSETRON 4 MG/1
4 TABLET, ORALLY DISINTEGRATING ORAL EVERY 8 HOURS
Status: DISCONTINUED | OUTPATIENT
Start: 2017-07-26 | End: 2017-07-29 | Stop reason: HOSPADM

## 2017-07-26 RX ORDER — ACETAMINOPHEN 325 MG/1
650 TABLET ORAL EVERY 4 HOURS PRN
Status: DISCONTINUED | OUTPATIENT
Start: 2017-07-26 | End: 2017-07-29 | Stop reason: HOSPADM

## 2017-07-26 RX ORDER — ATORVASTATIN CALCIUM 20 MG/1
20 TABLET, FILM COATED ORAL NIGHTLY
Status: DISCONTINUED | OUTPATIENT
Start: 2017-07-26 | End: 2017-07-29 | Stop reason: HOSPADM

## 2017-07-26 RX ORDER — OXYCODONE AND ACETAMINOPHEN 10; 325 MG/1; MG/1
1 TABLET ORAL EVERY 4 HOURS
Status: DISCONTINUED | OUTPATIENT
Start: 2017-07-26 | End: 2017-07-29 | Stop reason: HOSPADM

## 2017-07-26 RX ORDER — MIDAZOLAM HYDROCHLORIDE 1 MG/ML
INJECTION INTRAMUSCULAR; INTRAVENOUS AS NEEDED
Status: DISCONTINUED | OUTPATIENT
Start: 2017-07-26 | End: 2017-07-26 | Stop reason: SURG

## 2017-07-26 RX ORDER — SODIUM CHLORIDE, SODIUM LACTATE, POTASSIUM CHLORIDE, CALCIUM CHLORIDE 600; 310; 30; 20 MG/100ML; MG/100ML; MG/100ML; MG/100ML
50 INJECTION, SOLUTION INTRAVENOUS CONTINUOUS
Status: DISCONTINUED | OUTPATIENT
Start: 2017-07-26 | End: 2017-07-28

## 2017-07-26 RX ORDER — FENTANYL CITRATE 50 UG/ML
INJECTION, SOLUTION INTRAMUSCULAR; INTRAVENOUS AS NEEDED
Status: DISCONTINUED | OUTPATIENT
Start: 2017-07-26 | End: 2017-07-26 | Stop reason: SURG

## 2017-07-26 RX ORDER — ASPIRIN 81 MG/1
81 TABLET, CHEWABLE ORAL DAILY
Status: DISCONTINUED | OUTPATIENT
Start: 2017-07-26 | End: 2017-07-29 | Stop reason: HOSPADM

## 2017-07-26 RX ORDER — CYCLOBENZAPRINE HCL 10 MG
10 TABLET ORAL 2 TIMES DAILY PRN
Status: DISCONTINUED | OUTPATIENT
Start: 2017-07-26 | End: 2017-07-29 | Stop reason: HOSPADM

## 2017-07-26 RX ORDER — ACETAMINOPHEN 650 MG/1
650 SUPPOSITORY RECTAL ONCE AS NEEDED
Status: DISCONTINUED | OUTPATIENT
Start: 2017-07-26 | End: 2017-07-26 | Stop reason: HOSPADM

## 2017-07-26 RX ORDER — LEVOFLOXACIN 5 MG/ML
500 INJECTION, SOLUTION INTRAVENOUS EVERY 24 HOURS
Status: DISCONTINUED | OUTPATIENT
Start: 2017-07-27 | End: 2017-07-27

## 2017-07-26 RX ORDER — LIDOCAINE HYDROCHLORIDE 20 MG/ML
INJECTION, SOLUTION INFILTRATION; PERINEURAL AS NEEDED
Status: DISCONTINUED | OUTPATIENT
Start: 2017-07-26 | End: 2017-07-26 | Stop reason: SURG

## 2017-07-26 RX ORDER — FLUMAZENIL 0.1 MG/ML
0.2 INJECTION INTRAVENOUS AS NEEDED
Status: DISCONTINUED | OUTPATIENT
Start: 2017-07-26 | End: 2017-07-26 | Stop reason: HOSPADM

## 2017-07-26 RX ORDER — SODIUM CHLORIDE, SODIUM LACTATE, POTASSIUM CHLORIDE, CALCIUM CHLORIDE 600; 310; 30; 20 MG/100ML; MG/100ML; MG/100ML; MG/100ML
9 INJECTION, SOLUTION INTRAVENOUS CONTINUOUS
Status: DISCONTINUED | OUTPATIENT
Start: 2017-07-26 | End: 2017-07-26 | Stop reason: SDUPTHER

## 2017-07-26 RX ORDER — SODIUM CHLORIDE 0.9 % (FLUSH) 0.9 %
1-10 SYRINGE (ML) INJECTION AS NEEDED
Status: CANCELLED | OUTPATIENT
Start: 2017-07-26

## 2017-07-26 RX ORDER — PROMETHAZINE HYDROCHLORIDE 25 MG/1
25 TABLET ORAL EVERY 6 HOURS PRN
Status: DISCONTINUED | OUTPATIENT
Start: 2017-07-26 | End: 2017-07-29 | Stop reason: HOSPADM

## 2017-07-26 RX ORDER — ONDANSETRON 2 MG/ML
4 INJECTION INTRAMUSCULAR; INTRAVENOUS ONCE AS NEEDED
Status: COMPLETED | OUTPATIENT
Start: 2017-07-26 | End: 2017-07-26

## 2017-07-26 RX ORDER — NICOTINE POLACRILEX 4 MG
15 LOZENGE BUCCAL
Status: DISCONTINUED | OUTPATIENT
Start: 2017-07-26 | End: 2017-07-29 | Stop reason: HOSPADM

## 2017-07-26 RX ORDER — ONDANSETRON 2 MG/ML
4 INJECTION INTRAMUSCULAR; INTRAVENOUS EVERY 6 HOURS PRN
Status: DISCONTINUED | OUTPATIENT
Start: 2017-07-26 | End: 2017-07-29 | Stop reason: HOSPADM

## 2017-07-26 RX ORDER — SODIUM CHLORIDE 0.9 % (FLUSH) 0.9 %
1-10 SYRINGE (ML) INJECTION AS NEEDED
Status: DISCONTINUED | OUTPATIENT
Start: 2017-07-26 | End: 2017-07-29 | Stop reason: HOSPADM

## 2017-07-26 RX ORDER — LABETALOL HYDROCHLORIDE 5 MG/ML
5 INJECTION, SOLUTION INTRAVENOUS
Status: DISCONTINUED | OUTPATIENT
Start: 2017-07-26 | End: 2017-07-26 | Stop reason: HOSPADM

## 2017-07-26 RX ORDER — SODIUM CHLORIDE 9 MG/ML
75 INJECTION, SOLUTION INTRAVENOUS CONTINUOUS
Status: DISCONTINUED | OUTPATIENT
Start: 2017-07-26 | End: 2017-07-29 | Stop reason: HOSPADM

## 2017-07-26 RX ORDER — GLYCOPYRROLATE 0.2 MG/ML
INJECTION INTRAMUSCULAR; INTRAVENOUS AS NEEDED
Status: DISCONTINUED | OUTPATIENT
Start: 2017-07-26 | End: 2017-07-26 | Stop reason: SURG

## 2017-07-26 RX ORDER — LISINOPRIL 5 MG/1
5 TABLET ORAL DAILY
Status: DISCONTINUED | OUTPATIENT
Start: 2017-07-26 | End: 2017-07-29 | Stop reason: HOSPADM

## 2017-07-26 RX ORDER — PROPOFOL 10 MG/ML
VIAL (ML) INTRAVENOUS AS NEEDED
Status: DISCONTINUED | OUTPATIENT
Start: 2017-07-26 | End: 2017-07-26 | Stop reason: SURG

## 2017-07-26 RX ORDER — SODIUM CHLORIDE 0.9 % (FLUSH) 0.9 %
1-10 SYRINGE (ML) INJECTION AS NEEDED
Status: DISCONTINUED | OUTPATIENT
Start: 2017-07-26 | End: 2017-07-26 | Stop reason: HOSPADM

## 2017-07-26 RX ADMIN — LIDOCAINE HYDROCHLORIDE 50 MG: 20 INJECTION, SOLUTION INFILTRATION; PERINEURAL at 13:24

## 2017-07-26 RX ADMIN — VANCOMYCIN HYDROCHLORIDE 1500 MG: 1 INJECTION, POWDER, LYOPHILIZED, FOR SOLUTION INTRAVENOUS at 17:40

## 2017-07-26 RX ADMIN — ONDANSETRON 4 MG: 4 TABLET, ORALLY DISINTEGRATING ORAL at 17:40

## 2017-07-26 RX ADMIN — PHENYLEPHRINE HYDROCHLORIDE 100 MCG: 10 INJECTION INTRAVENOUS at 13:39

## 2017-07-26 RX ADMIN — SODIUM CHLORIDE, SODIUM GLUCONATE, SODIUM ACETATE, POTASSIUM CHLORIDE, AND MAGNESIUM CHLORIDE: 526; 502; 368; 37; 30 INJECTION, SOLUTION INTRAVENOUS at 14:37

## 2017-07-26 RX ADMIN — SODIUM CHLORIDE 100 ML/HR: 9 INJECTION, SOLUTION INTRAVENOUS at 10:47

## 2017-07-26 RX ADMIN — VANCOMYCIN HYDROCHLORIDE 1.25 G: 500 INJECTION, POWDER, LYOPHILIZED, FOR SOLUTION INTRAVENOUS at 13:30

## 2017-07-26 RX ADMIN — ATORVASTATIN CALCIUM 20 MG: 20 TABLET, FILM COATED ORAL at 20:42

## 2017-07-26 RX ADMIN — PHENYLEPHRINE HYDROCHLORIDE 100 MCG: 10 INJECTION INTRAVENOUS at 14:00

## 2017-07-26 RX ADMIN — SODIUM CHLORIDE, POTASSIUM CHLORIDE, SODIUM LACTATE AND CALCIUM CHLORIDE 50 ML/HR: 600; 310; 30; 20 INJECTION, SOLUTION INTRAVENOUS at 20:43

## 2017-07-26 RX ADMIN — OXYCODONE HYDROCHLORIDE AND ACETAMINOPHEN 1 TABLET: 10; 325 TABLET ORAL at 16:52

## 2017-07-26 RX ADMIN — HYDROMORPHONE HYDROCHLORIDE 0.5 MG: 2 INJECTION, SOLUTION INTRAMUSCULAR; INTRAVENOUS; SUBCUTANEOUS at 15:02

## 2017-07-26 RX ADMIN — OXYCODONE HYDROCHLORIDE AND ACETAMINOPHEN 1 TABLET: 10; 325 TABLET ORAL at 20:42

## 2017-07-26 RX ADMIN — FENTANYL CITRATE 100 MCG: 50 INJECTION, SOLUTION INTRAMUSCULAR; INTRAVENOUS at 13:24

## 2017-07-26 RX ADMIN — PHENYLEPHRINE HYDROCHLORIDE 100 MCG: 10 INJECTION INTRAVENOUS at 13:56

## 2017-07-26 RX ADMIN — PROPOFOL 200 MG: 10 INJECTION, EMULSION INTRAVENOUS at 13:30

## 2017-07-26 RX ADMIN — MEPERIDINE HYDROCHLORIDE 12.5 MG: 50 INJECTION, SOLUTION INTRAMUSCULAR; INTRAVENOUS; SUBCUTANEOUS at 15:15

## 2017-07-26 RX ADMIN — MIDAZOLAM 2 MG: 1 INJECTION INTRAMUSCULAR; INTRAVENOUS at 13:24

## 2017-07-26 RX ADMIN — LISINOPRIL 5 MG: 5 TABLET ORAL at 17:40

## 2017-07-26 RX ADMIN — PHENYLEPHRINE HYDROCHLORIDE 100 MCG: 10 INJECTION INTRAVENOUS at 13:33

## 2017-07-26 RX ADMIN — HYDROMORPHONE HYDROCHLORIDE 1 MG: 1 INJECTION, SOLUTION INTRAMUSCULAR; INTRAVENOUS; SUBCUTANEOUS at 21:59

## 2017-07-26 RX ADMIN — LEVOFLOXACIN 500 MG: 5 INJECTION, SOLUTION INTRAVENOUS at 13:35

## 2017-07-26 RX ADMIN — PHENYLEPHRINE HYDROCHLORIDE 100 MCG: 10 INJECTION INTRAVENOUS at 13:46

## 2017-07-26 RX ADMIN — HYDROMORPHONE HYDROCHLORIDE 0.5 MG: 2 INJECTION, SOLUTION INTRAMUSCULAR; INTRAVENOUS; SUBCUTANEOUS at 14:52

## 2017-07-26 RX ADMIN — SODIUM CHLORIDE 100 ML/HR: 9 INJECTION, SOLUTION INTRAVENOUS at 17:39

## 2017-07-26 RX ADMIN — PHENYLEPHRINE HYDROCHLORIDE 100 MCG: 10 INJECTION INTRAVENOUS at 13:35

## 2017-07-26 RX ADMIN — HYDROMORPHONE HYDROCHLORIDE 0.5 MG: 2 INJECTION, SOLUTION INTRAMUSCULAR; INTRAVENOUS; SUBCUTANEOUS at 15:10

## 2017-07-26 RX ADMIN — PHENYLEPHRINE HYDROCHLORIDE 100 MCG: 10 INJECTION INTRAVENOUS at 13:31

## 2017-07-26 RX ADMIN — PHENYLEPHRINE HYDROCHLORIDE 100 MCG: 10 INJECTION INTRAVENOUS at 13:49

## 2017-07-26 RX ADMIN — NEOSTIGMINE METHYLSULFATE 2 MG: 1 INJECTION, SOLUTION INTRAMUSCULAR; INTRAVENOUS; SUBCUTANEOUS at 13:43

## 2017-07-26 RX ADMIN — MEPERIDINE HYDROCHLORIDE 12.5 MG: 50 INJECTION, SOLUTION INTRAMUSCULAR; INTRAVENOUS; SUBCUTANEOUS at 15:10

## 2017-07-26 RX ADMIN — ONDANSETRON 4 MG: 2 INJECTION INTRAMUSCULAR; INTRAVENOUS at 14:59

## 2017-07-26 RX ADMIN — ONDANSETRON 4 MG: 2 INJECTION INTRAMUSCULAR; INTRAVENOUS at 14:01

## 2017-07-26 RX ADMIN — MEPERIDINE HYDROCHLORIDE 12.5 MG: 50 INJECTION, SOLUTION INTRAMUSCULAR; INTRAVENOUS; SUBCUTANEOUS at 15:20

## 2017-07-26 RX ADMIN — HYDROMORPHONE HYDROCHLORIDE 1 MG: 1 INJECTION, SOLUTION INTRAMUSCULAR; INTRAVENOUS; SUBCUTANEOUS at 18:32

## 2017-07-26 RX ADMIN — ENOXAPARIN SODIUM 40 MG: 40 INJECTION SUBCUTANEOUS at 17:40

## 2017-07-26 RX ADMIN — MEPERIDINE HYDROCHLORIDE 12.5 MG: 50 INJECTION, SOLUTION INTRAMUSCULAR; INTRAVENOUS; SUBCUTANEOUS at 15:44

## 2017-07-26 RX ADMIN — PHENYLEPHRINE HYDROCHLORIDE 100 MCG: 10 INJECTION INTRAVENOUS at 13:54

## 2017-07-26 RX ADMIN — HYDROMORPHONE HYDROCHLORIDE 0.5 MG: 2 INJECTION, SOLUTION INTRAMUSCULAR; INTRAVENOUS; SUBCUTANEOUS at 14:42

## 2017-07-26 RX ADMIN — PHENYLEPHRINE HYDROCHLORIDE 100 MCG: 10 INJECTION INTRAVENOUS at 13:44

## 2017-07-26 RX ADMIN — ROCURONIUM BROMIDE 20 MG: 10 INJECTION INTRAVENOUS at 13:43

## 2017-07-26 RX ADMIN — GLYCOPYRROLATE 0.2 MG: 0.2 INJECTION, SOLUTION INTRAMUSCULAR; INTRAVENOUS at 13:43

## 2017-07-26 NOTE — ANESTHESIA PROCEDURE NOTES
Airway  Urgency: elective    Airway not difficult    General Information and Staff    Patient location during procedure: OR  Anesthesiologist: TAMMY DODSON  CRNA: DUNG JEAN-BAPTISTE    Indications and Patient Condition  Indications for airway management: airway protection    Preoxygenated: yes  Mask difficulty assessment: 0 - not attempted    Final Airway Details  Final airway type: endotracheal airway      Successful airway: ETT  Cuffed: yes   Successful intubation technique: direct laryngoscopy  Facilitating devices/methods: intubating stylet  Endotracheal tube insertion site: oral  Blade: Nicol  Blade size: #4  ETT size: 7.5 mm  Cormack-Lehane Classification: grade IIa - partial view of glottis  Placement verified by: chest auscultation and capnometry   Measured from: lips  ETT to lips (cm): 22  Number of attempts at approach: 1    Additional Comments  Atraumatic intubation.

## 2017-07-26 NOTE — ANESTHESIA PREPROCEDURE EVALUATION
Anesthesia Evaluation     Patient summary reviewed and Nursing notes reviewed   no history of anesthetic complications:  NPO Solid Status: > 8 hours  NPO Liquid Status: > 8 hours     Airway   Mallampati: II  TM distance: >3 FB  Neck ROM: full  no difficulty expected  Dental - normal exam     Pulmonary - normal exam   Cardiovascular - normal exam    (+) hypertension,       Neuro/Psych  (+) headaches, numbness, psychiatric history Depression,    GI/Hepatic/Renal/Endo    (+)  GERD, diabetes mellitus type 2,     Musculoskeletal     Abdominal  - normal exam   Substance History      OB/GYN          Other   (+) arthritis                                     Anesthesia Plan    ASA 3     general     intravenous induction   Anesthetic plan and risks discussed with patient.

## 2017-07-26 NOTE — PROGRESS NOTES
Chief Complaint   Patient presents with   • Follow-up     Recheck abdominal wound.        HPI  Has reaccumulated pus subcutaneously. Previous repair of difficult ventral hernia with bioabsorbable mesh. Has been on ABS.  Past Medical History:   Diagnosis Date   • Arthritis    • Carpal tunnel syndrome    • Depressive disorder    • GERD (gastroesophageal reflux disease)    • Hernia    • History of urinary system disease    • Hypertension    • Migraine    • Rotator cuff syndrome    • Type 2 diabetes mellitus    • Ureteric stone        Past Surgical History:   Procedure Laterality Date   • ABDOMINAL SURGERY  08/2016   • CHOLECYSTECTOMY     • CIRCUMCISION     • CYSTOSCOPY  08/13/2003    Cystoscopy and removal of J stent. Right ureteral stent.   • CYSTOSCOPY  08/13/2003    Cystoscopy and right stone extraction and placement of 26x 6 J-stent.   • VENTRAL/INCISIONAL HERNIA REPAIR N/A 5/31/2017    Procedure: LAPAROSCOPIC POSSIBLE OPEN ADHESIOLYSIS AND VENTRAL/INCISIONAL HERNIA REPAIR ;  Surgeon: Rui Shaver MD;  Location: [facility];  Service:          Current Outpatient Prescriptions:   •  aspirin 81 MG chewable tablet, Chew 81 mg Daily., Disp: , Rfl:   •  atorvastatin (LIPITOR) 20 MG tablet, Take 20 mg by mouth Every Night., Disp: , Rfl:   •  ciprofloxacin (CIPRO) 500 MG tablet, Take 1 tablet by mouth 2 (Two) Times a Day for 10 days., Disp: 20 tablet, Rfl: 0  •  cyclobenzaprine (FLEXERIL) 10 MG tablet, , Disp: , Rfl:   •  diclofenac (VOLTAREN) 1 % gel gel, MILAGRO TO SHOULDER UTD, Disp: , Rfl: 0  •  insulin lispro (humaLOG) 100 UNIT/ML injection, Inject 8-12 Units under the skin 3 (Three) Times a Day As Needed (sliding scale). As needed per sliding scale, Disp: , Rfl:   •  lidocaine-prilocaine (EMLA) 2.5-2.5 % cream, , Disp: , Rfl:   •  lisinopril (PRINIVIL,ZESTRIL) 5 MG tablet, Take 5 mg by mouth Daily., Disp: , Rfl:   •  metFORMIN (GLUCOPHAGE) 500 MG tablet, Take 500 mg by mouth 2 (Two) Times a Day With Meals., Disp: , Rfl:    •  ondansetron ODT (ZOFRAN-ODT) 4 MG disintegrating tablet, Take 1 tablet by mouth Every 8 (Eight) Hours., Disp: 15 tablet, Rfl: 1  •  oxyCODONE-acetaminophen (PERCOCET)  MG per tablet, Take 1 tablet by mouth Every 4 (Four) Hours., Disp: , Rfl: 0  •  oxyCODONE-acetaminophen (PERCOCET) 5-325 MG per tablet, Take 1 tablet by mouth Every 6 (Six) Hours As Needed for Severe Pain (7-10)., Disp: 20 tablet, Rfl: 0  •  promethazine (PHENERGAN) 12.5 MG tablet, Take 2 tablets by mouth Every 6 (Six) Hours As Needed for Nausea., Disp: 30 tablet, Rfl: 1  •  SITagliptin (JANUVIA) 50 MG tablet, Take 50 mg by mouth 2 (Two) Times a Day., Disp: , Rfl:   •  sulfamethoxazole-trimethoprim (BACTRIM DS) 800-160 MG per tablet, Take 1 tablet by mouth 2 (Two) Times a Day for 10 days., Disp: 6 tablet, Rfl: 0    Allergies   Allergen Reactions   • Naproxen Other (See Comments) and Rash     Blisters in mouth   • Tramadol Rash     Patient states he gets a rash in his mouth.        No family history on file.    Social History     Social History   • Marital status:      Spouse name: N/A   • Number of children: N/A   • Years of education: N/A     Occupational History   • Not on file.     Social History Main Topics   • Smoking status: Never Smoker   • Smokeless tobacco: Never Used   • Alcohol use No   • Drug use: No   • Sexual activity: Defer     Other Topics Concern   • Not on file     Social History Narrative       Review of Systems  Nothing to add  Physical Exam   Neck: Normal range of motion. Neck supple.   Cardiovascular: Normal rate and regular rhythm.    Pulmonary/Chest: Effort normal and breath sounds normal.   Abdominal: Soft. Bowel sounds are normal. He exhibits no distension and no mass. There is no tenderness. There is no rebound and no guarding. No hernia.             ASSESSMENT    Ventura was seen today for follow-up.    Diagnoses and all orders for this visit:    Cutaneous abscess of abdominal wall      PLAN    1. Incision  and drainage    Risks of bleeding, infection open wounds drains explained. Understands and agrees.          This document has been electronically signed by Rui Shaver MD on July 26, 2017 9:37 AM

## 2017-07-26 NOTE — ANESTHESIA POSTPROCEDURE EVALUATION
Patient: Ventura Boggs    Procedure Summary     Date Anesthesia Start Anesthesia Stop Room / Location    07/26/17 [id number]  [facility] 03 / [facility]       Procedure Diagnosis Surgeon Provider    INCISION AND DRAINAGE ABD.ABSCESS (N/A ) Cutaneous abscess of abdominal wall  (Cutaneous abscess of abdominal wall [L02.211]) MD Misael Leonard MD          Anesthesia Type: general  Last vitals  BP        Temp        Pulse       Resp        SpO2          Post Anesthesia Care and Evaluation    Patient location during evaluation: bedside  Patient participation: complete - patient cannot participate  Level of consciousness: awake  Pain score: 0  Pain management: adequate  Airway patency: patent  Anesthetic complications: No anesthetic complications  PONV Status: none  Cardiovascular status: acceptable  Respiratory status: acceptable  Hydration status: acceptable

## 2017-07-27 LAB
DEPRECATED RDW RBC AUTO: 48.6 FL (ref 35.1–43.9)
ERYTHROCYTE [DISTWIDTH] IN BLOOD BY AUTOMATED COUNT: 16.1 % (ref 11.5–14.5)
GLUCOSE BLDC GLUCOMTR-MCNC: 151 MG/DL (ref 70–130)
GLUCOSE BLDC GLUCOMTR-MCNC: 182 MG/DL (ref 70–130)
GLUCOSE BLDC GLUCOMTR-MCNC: 191 MG/DL (ref 70–130)
GLUCOSE BLDC GLUCOMTR-MCNC: 191 MG/DL (ref 70–130)
GLUCOSE BLDC GLUCOMTR-MCNC: 196 MG/DL (ref 70–130)
HCT VFR BLD AUTO: 33.8 % (ref 39–49)
HGB BLD-MCNC: 10.9 G/DL (ref 13.7–17.3)
MCH RBC QN AUTO: 26.3 PG (ref 26.5–34)
MCHC RBC AUTO-ENTMCNC: 32.2 G/DL (ref 31.5–36.3)
MCV RBC AUTO: 81.6 FL (ref 80–98)
PLATELET # BLD AUTO: 448 10*3/MM3 (ref 150–450)
PMV BLD AUTO: 11.3 FL (ref 8–12)
RBC # BLD AUTO: 4.14 10*6/MM3 (ref 4.37–5.74)
VANCOMYCIN PEAK SERPL-MCNC: 38.78 MCG/ML (ref 30–40)
WBC NRBC COR # BLD: 8.06 10*3/MM3 (ref 3.2–9.8)

## 2017-07-27 PROCEDURE — 25010000002 ENOXAPARIN PER 10 MG: Performed by: SURGERY

## 2017-07-27 PROCEDURE — 25010000002 HYDROMORPHONE PER 4 MG: Performed by: SURGERY

## 2017-07-27 PROCEDURE — 25010000002 VANCOMYCIN PER 500 MG: Performed by: SURGERY

## 2017-07-27 PROCEDURE — 82962 GLUCOSE BLOOD TEST: CPT

## 2017-07-27 PROCEDURE — 63710000001 INSULIN ASPART PER 5 UNITS: Performed by: SURGERY

## 2017-07-27 PROCEDURE — 85027 COMPLETE CBC AUTOMATED: CPT | Performed by: SURGERY

## 2017-07-27 PROCEDURE — 99024 POSTOP FOLLOW-UP VISIT: CPT | Performed by: SURGERY

## 2017-07-27 PROCEDURE — 63710000001 PROMETHAZINE PER 25 MG: Performed by: SURGERY

## 2017-07-27 PROCEDURE — 80202 ASSAY OF VANCOMYCIN: CPT | Performed by: SURGERY

## 2017-07-27 RX ADMIN — ATORVASTATIN CALCIUM 20 MG: 20 TABLET, FILM COATED ORAL at 20:45

## 2017-07-27 RX ADMIN — ONDANSETRON 4 MG: 4 TABLET, ORALLY DISINTEGRATING ORAL at 18:16

## 2017-07-27 RX ADMIN — PROMETHAZINE HYDROCHLORIDE 25 MG: 25 TABLET ORAL at 03:41

## 2017-07-27 RX ADMIN — OXYCODONE HYDROCHLORIDE AND ACETAMINOPHEN 1 TABLET: 10; 325 TABLET ORAL at 01:57

## 2017-07-27 RX ADMIN — ENOXAPARIN SODIUM 40 MG: 40 INJECTION SUBCUTANEOUS at 08:16

## 2017-07-27 RX ADMIN — VANCOMYCIN HYDROCHLORIDE 1500 MG: 1 INJECTION, POWDER, LYOPHILIZED, FOR SOLUTION INTRAVENOUS at 18:16

## 2017-07-27 RX ADMIN — OXYCODONE HYDROCHLORIDE AND ACETAMINOPHEN 1 TABLET: 10; 325 TABLET ORAL at 08:16

## 2017-07-27 RX ADMIN — HYDROMORPHONE HYDROCHLORIDE 1 MG: 1 INJECTION, SOLUTION INTRAMUSCULAR; INTRAVENOUS; SUBCUTANEOUS at 19:45

## 2017-07-27 RX ADMIN — HYDROMORPHONE HYDROCHLORIDE 1 MG: 1 INJECTION, SOLUTION INTRAMUSCULAR; INTRAVENOUS; SUBCUTANEOUS at 03:41

## 2017-07-27 RX ADMIN — OXYCODONE HYDROCHLORIDE AND ACETAMINOPHEN 1 TABLET: 10; 325 TABLET ORAL at 18:16

## 2017-07-27 RX ADMIN — ONDANSETRON 4 MG: 4 TABLET, ORALLY DISINTEGRATING ORAL at 08:16

## 2017-07-27 RX ADMIN — ASPIRIN 81 MG 81 MG: 81 TABLET ORAL at 08:19

## 2017-07-27 RX ADMIN — SODIUM CHLORIDE 100 ML/HR: 9 INJECTION, SOLUTION INTRAVENOUS at 11:19

## 2017-07-27 RX ADMIN — INSULIN ASPART 2 UNITS: 100 INJECTION, SOLUTION INTRAVENOUS; SUBCUTANEOUS at 08:12

## 2017-07-27 RX ADMIN — HYDROMORPHONE HYDROCHLORIDE 1 MG: 1 INJECTION, SOLUTION INTRAMUSCULAR; INTRAVENOUS; SUBCUTANEOUS at 22:41

## 2017-07-27 RX ADMIN — VANCOMYCIN HYDROCHLORIDE 1500 MG: 1 INJECTION, POWDER, LYOPHILIZED, FOR SOLUTION INTRAVENOUS at 06:06

## 2017-07-27 RX ADMIN — OXYCODONE HYDROCHLORIDE AND ACETAMINOPHEN 1 TABLET: 10; 325 TABLET ORAL at 21:02

## 2017-07-27 RX ADMIN — OXYCODONE HYDROCHLORIDE AND ACETAMINOPHEN 1 TABLET: 10; 325 TABLET ORAL at 06:00

## 2017-07-27 RX ADMIN — HYDROMORPHONE HYDROCHLORIDE 1 MG: 1 INJECTION, SOLUTION INTRAMUSCULAR; INTRAVENOUS; SUBCUTANEOUS at 15:42

## 2017-07-27 RX ADMIN — INSULIN ASPART 2 UNITS: 100 INJECTION, SOLUTION INTRAVENOUS; SUBCUTANEOUS at 20:45

## 2017-07-27 RX ADMIN — INSULIN ASPART 2 UNITS: 100 INJECTION, SOLUTION INTRAVENOUS; SUBCUTANEOUS at 18:17

## 2017-07-27 RX ADMIN — OXYCODONE HYDROCHLORIDE AND ACETAMINOPHEN 1 TABLET: 10; 325 TABLET ORAL at 12:47

## 2017-07-28 LAB
BACTERIA SPEC AEROBE CULT: ABNORMAL
GLUCOSE BLDC GLUCOMTR-MCNC: 143 MG/DL (ref 70–130)
GLUCOSE BLDC GLUCOMTR-MCNC: 155 MG/DL (ref 70–130)
GLUCOSE BLDC GLUCOMTR-MCNC: 210 MG/DL (ref 70–130)
GLUCOSE BLDC GLUCOMTR-MCNC: 219 MG/DL (ref 70–130)
GRAM STN SPEC: ABNORMAL
GRAM STN SPEC: ABNORMAL
VANCOMYCIN TROUGH SERPL-MCNC: 19.42 MCG/ML (ref 10–15)

## 2017-07-28 PROCEDURE — 63710000001 INSULIN ASPART PER 5 UNITS: Performed by: SURGERY

## 2017-07-28 PROCEDURE — 82962 GLUCOSE BLOOD TEST: CPT

## 2017-07-28 PROCEDURE — 80202 ASSAY OF VANCOMYCIN: CPT | Performed by: SURGERY

## 2017-07-28 PROCEDURE — 25010000002 HYDROMORPHONE PER 4 MG: Performed by: SURGERY

## 2017-07-28 PROCEDURE — 25010000002 VANCOMYCIN PER 500 MG: Performed by: SURGERY

## 2017-07-28 PROCEDURE — 25010000002 ENOXAPARIN PER 10 MG: Performed by: SURGERY

## 2017-07-28 RX ORDER — SULFAMETHOXAZOLE AND TRIMETHOPRIM 400; 80 MG/1; MG/1
2 TABLET ORAL EVERY 12 HOURS SCHEDULED
Status: DISCONTINUED | OUTPATIENT
Start: 2017-07-28 | End: 2017-07-29 | Stop reason: HOSPADM

## 2017-07-28 RX ADMIN — ENOXAPARIN SODIUM 40 MG: 40 INJECTION SUBCUTANEOUS at 09:06

## 2017-07-28 RX ADMIN — ONDANSETRON 4 MG: 4 TABLET, ORALLY DISINTEGRATING ORAL at 09:04

## 2017-07-28 RX ADMIN — OXYCODONE HYDROCHLORIDE AND ACETAMINOPHEN 1 TABLET: 10; 325 TABLET ORAL at 01:04

## 2017-07-28 RX ADMIN — INSULIN ASPART 2 UNITS: 100 INJECTION, SOLUTION INTRAVENOUS; SUBCUTANEOUS at 12:13

## 2017-07-28 RX ADMIN — HYDROMORPHONE HYDROCHLORIDE 1 MG: 1 INJECTION, SOLUTION INTRAMUSCULAR; INTRAVENOUS; SUBCUTANEOUS at 03:14

## 2017-07-28 RX ADMIN — OXYCODONE HYDROCHLORIDE AND ACETAMINOPHEN 1 TABLET: 10; 325 TABLET ORAL at 21:30

## 2017-07-28 RX ADMIN — OXYCODONE HYDROCHLORIDE AND ACETAMINOPHEN 1 TABLET: 10; 325 TABLET ORAL at 05:56

## 2017-07-28 RX ADMIN — ATORVASTATIN CALCIUM 20 MG: 20 TABLET, FILM COATED ORAL at 21:30

## 2017-07-28 RX ADMIN — VANCOMYCIN HYDROCHLORIDE 1500 MG: 1 INJECTION, POWDER, LYOPHILIZED, FOR SOLUTION INTRAVENOUS at 06:18

## 2017-07-28 RX ADMIN — SULFAMETHOXAZOLE AND TRIMETHOPRIM 2 TABLET: 400; 80 TABLET ORAL at 09:06

## 2017-07-28 RX ADMIN — ONDANSETRON 4 MG: 4 TABLET, ORALLY DISINTEGRATING ORAL at 01:04

## 2017-07-28 RX ADMIN — OXYCODONE HYDROCHLORIDE AND ACETAMINOPHEN 1 TABLET: 10; 325 TABLET ORAL at 09:04

## 2017-07-28 RX ADMIN — SULFAMETHOXAZOLE AND TRIMETHOPRIM 2 TABLET: 400; 80 TABLET ORAL at 21:30

## 2017-07-28 RX ADMIN — SODIUM CHLORIDE 75 ML/HR: 9 INJECTION, SOLUTION INTRAVENOUS at 03:50

## 2017-07-28 RX ADMIN — ASPIRIN 81 MG 81 MG: 81 TABLET ORAL at 09:04

## 2017-07-28 RX ADMIN — ONDANSETRON 4 MG: 4 TABLET, ORALLY DISINTEGRATING ORAL at 18:49

## 2017-07-28 RX ADMIN — OXYCODONE HYDROCHLORIDE AND ACETAMINOPHEN 1 TABLET: 10; 325 TABLET ORAL at 12:21

## 2017-07-28 RX ADMIN — INSULIN ASPART 4 UNITS: 100 INJECTION, SOLUTION INTRAVENOUS; SUBCUTANEOUS at 09:05

## 2017-07-28 RX ADMIN — INSULIN ASPART 4 UNITS: 100 INJECTION, SOLUTION INTRAVENOUS; SUBCUTANEOUS at 21:32

## 2017-07-28 RX ADMIN — OXYCODONE HYDROCHLORIDE AND ACETAMINOPHEN 1 TABLET: 10; 325 TABLET ORAL at 17:36

## 2017-07-28 RX ADMIN — LISINOPRIL 5 MG: 5 TABLET ORAL at 09:05

## 2017-07-29 VITALS
OXYGEN SATURATION: 96 % | HEART RATE: 83 BPM | HEIGHT: 68 IN | BODY MASS INDEX: 28.1 KG/M2 | RESPIRATION RATE: 18 BRPM | WEIGHT: 185.41 LBS | TEMPERATURE: 97.7 F | SYSTOLIC BLOOD PRESSURE: 134 MMHG | DIASTOLIC BLOOD PRESSURE: 83 MMHG

## 2017-07-29 LAB
GLUCOSE BLDC GLUCOMTR-MCNC: 179 MG/DL (ref 70–130)
GLUCOSE BLDC GLUCOMTR-MCNC: 181 MG/DL (ref 70–130)

## 2017-07-29 PROCEDURE — 82962 GLUCOSE BLOOD TEST: CPT

## 2017-07-29 PROCEDURE — 25010000002 ENOXAPARIN PER 10 MG: Performed by: SURGERY

## 2017-07-29 PROCEDURE — 63710000001 INSULIN ASPART PER 5 UNITS: Performed by: SURGERY

## 2017-07-29 PROCEDURE — 25010000002 ONDANSETRON PER 1 MG: Performed by: SURGERY

## 2017-07-29 RX ORDER — SULFAMETHOXAZOLE AND TRIMETHOPRIM 400; 80 MG/1; MG/1
2 TABLET ORAL EVERY 12 HOURS SCHEDULED
Qty: 20 TABLET | Refills: 0 | Status: SHIPPED | OUTPATIENT
Start: 2017-07-29 | End: 2017-08-03

## 2017-07-29 RX ADMIN — ASPIRIN 81 MG 81 MG: 81 TABLET ORAL at 07:48

## 2017-07-29 RX ADMIN — SULFAMETHOXAZOLE AND TRIMETHOPRIM 2 TABLET: 400; 80 TABLET ORAL at 07:48

## 2017-07-29 RX ADMIN — OXYCODONE HYDROCHLORIDE AND ACETAMINOPHEN 1 TABLET: 10; 325 TABLET ORAL at 07:48

## 2017-07-29 RX ADMIN — ONDANSETRON 4 MG: 4 TABLET, ORALLY DISINTEGRATING ORAL at 00:36

## 2017-07-29 RX ADMIN — ENOXAPARIN SODIUM 40 MG: 40 INJECTION SUBCUTANEOUS at 07:47

## 2017-07-29 RX ADMIN — ONDANSETRON 4 MG: 2 INJECTION INTRAMUSCULAR; INTRAVENOUS at 07:49

## 2017-07-29 RX ADMIN — OXYCODONE HYDROCHLORIDE AND ACETAMINOPHEN 1 TABLET: 10; 325 TABLET ORAL at 04:37

## 2017-07-29 RX ADMIN — CYCLOBENZAPRINE HYDROCHLORIDE 10 MG: 10 TABLET, FILM COATED ORAL at 04:37

## 2017-07-29 RX ADMIN — OXYCODONE HYDROCHLORIDE AND ACETAMINOPHEN 1 TABLET: 10; 325 TABLET ORAL at 00:36

## 2017-07-29 RX ADMIN — LISINOPRIL 5 MG: 5 TABLET ORAL at 07:48

## 2017-07-29 RX ADMIN — INSULIN ASPART 2 UNITS: 100 INJECTION, SOLUTION INTRAVENOUS; SUBCUTANEOUS at 07:47

## 2017-07-31 ENCOUNTER — OFFICE VISIT (OUTPATIENT)
Dept: SURGERY | Facility: CLINIC | Age: 57
End: 2017-07-31

## 2017-07-31 VITALS
SYSTOLIC BLOOD PRESSURE: 124 MMHG | BODY MASS INDEX: 29.1 KG/M2 | DIASTOLIC BLOOD PRESSURE: 86 MMHG | WEIGHT: 192 LBS | HEIGHT: 68 IN

## 2017-07-31 DIAGNOSIS — L02.211 CUTANEOUS ABSCESS OF ABDOMINAL WALL: Primary | ICD-10-CM

## 2017-07-31 PROCEDURE — 99024 POSTOP FOLLOW-UP VISIT: CPT | Performed by: SURGERY

## 2017-07-31 NOTE — PROGRESS NOTES
56-year-old gentleman now 3 weeks out from a hernia repair by Dr. Shaver and developed a superficial wound infection.  Patient wishes discharge a few days ago asked him to come see me in the office today regarding his remaining drain.  Patient is doing well feels much better.  Drain is functioning and has more than 40 cc per day out.  His midline incision is intact abdomen is soft he is nontoxic-appearing.  Related his drain in place for now and follow up with Dr. Shaver later in the week.  He should continue keeping a diary that he is doing and continue his present care otherwise.

## 2017-08-02 ENCOUNTER — TELEPHONE (OUTPATIENT)
Dept: SURGERY | Facility: CLINIC | Age: 57
End: 2017-08-02

## 2017-08-02 RX ORDER — PROMETHAZINE HYDROCHLORIDE 12.5 MG/1
25 TABLET ORAL EVERY 6 HOURS PRN
Qty: 30 TABLET | Refills: 1 | Status: ON HOLD | OUTPATIENT
Start: 2017-08-02 | End: 2019-02-05 | Stop reason: SDUPTHER

## 2017-08-09 ENCOUNTER — OFFICE VISIT (OUTPATIENT)
Dept: SURGERY | Facility: CLINIC | Age: 57
End: 2017-08-09

## 2017-08-09 VITALS
SYSTOLIC BLOOD PRESSURE: 140 MMHG | WEIGHT: 194 LBS | BODY MASS INDEX: 29.4 KG/M2 | HEIGHT: 68 IN | DIASTOLIC BLOOD PRESSURE: 80 MMHG

## 2017-08-09 DIAGNOSIS — Z98.890 HX OF VENTRAL HERNIA REPAIR: Primary | ICD-10-CM

## 2017-08-09 DIAGNOSIS — IMO0002 SEROMA: ICD-10-CM

## 2017-08-09 DIAGNOSIS — Z87.19 HX OF VENTRAL HERNIA REPAIR: Primary | ICD-10-CM

## 2017-08-09 PROCEDURE — 99024 POSTOP FOLLOW-UP VISIT: CPT | Performed by: SURGERY

## 2017-08-13 NOTE — PROGRESS NOTES
Chief Complaint   Patient presents with   • Follow-up     Recheck ventral hernia with abdominal abscess 5-31-17.        HPI  Doing well- no complaints.   Past Medical History:   Diagnosis Date   • Arthritis    • Carpal tunnel syndrome    • Depressive disorder    • GERD (gastroesophageal reflux disease)    • Hernia    • History of urinary system disease    • Hypertension    • Migraine    • Rotator cuff syndrome    • Type 2 diabetes mellitus    • Ureteric stone        Past Surgical History:   Procedure Laterality Date   • ABDOMINAL SURGERY  08/2016   • CHOLECYSTECTOMY     • CIRCUMCISION     • CYSTOSCOPY  08/13/2003    Cystoscopy and removal of J stent. Right ureteral stent.   • CYSTOSCOPY  08/13/2003    Cystoscopy and right stone extraction and placement of 26x 6 J-stent.   • INCISION AND DRAINAGE ABSCESS N/A 7/26/2017    Procedure: INCISION AND DRAINAGE ABD.ABSCESS;  Surgeon: Rui Shaver MD;  Location: [facility];  Service:    • VENTRAL/INCISIONAL HERNIA REPAIR N/A 5/31/2017    Procedure: LAPAROSCOPIC POSSIBLE OPEN ADHESIOLYSIS AND VENTRAL/INCISIONAL HERNIA REPAIR ;  Surgeon: Rui Shaver MD;  Location: [facility];  Service:          Current Outpatient Prescriptions:   •  aspirin 81 MG chewable tablet, Chew 81 mg Daily., Disp: , Rfl:   •  atorvastatin (LIPITOR) 20 MG tablet, Take 20 mg by mouth Every Night., Disp: , Rfl:   •  cyclobenzaprine (FLEXERIL) 10 MG tablet, Take 10 mg by mouth 2 (Two) Times a Day As Needed., Disp: , Rfl:   •  insulin lispro (humaLOG) 100 UNIT/ML injection, Inject 8-12 Units under the skin 3 (Three) Times a Day As Needed (sliding scale). As needed per sliding scale, Disp: , Rfl:   •  lidocaine-prilocaine (EMLA) 2.5-2.5 % cream, Apply 1 application topically As Needed., Disp: , Rfl:   •  lisinopril (PRINIVIL,ZESTRIL) 5 MG tablet, Take 5 mg by mouth Daily., Disp: , Rfl:   •  metFORMIN (GLUCOPHAGE) 500 MG tablet, Take 500 mg by mouth 2 (Two) Times a Day With Meals., Disp: , Rfl:   •  ondansetron  ODT (ZOFRAN-ODT) 4 MG disintegrating tablet, Take 1 tablet by mouth Every 8 (Eight) Hours., Disp: 15 tablet, Rfl: 1  •  oxyCODONE-acetaminophen (PERCOCET)  MG per tablet, Take 1 tablet by mouth Every 4 (Four) Hours., Disp: , Rfl: 0  •  promethazine (PHENERGAN) 12.5 MG tablet, Take 2 tablets by mouth Every 6 (Six) Hours As Needed for Nausea., Disp: 30 tablet, Rfl: 1  •  SITagliptin (JANUVIA) 50 MG tablet, Take 50 mg by mouth Daily., Disp: , Rfl:     Allergies   Allergen Reactions   • Naproxen Other (See Comments) and Rash     Blisters in mouth   • Tramadol Rash     Patient states he gets a rash in his mouth.        No family history on file.    Social History     Social History   • Marital status:      Social History Main Topics   • Smoking status: Never Smoker   • Smokeless tobacco: Never Used   • Alcohol use No   • Drug use: No          Review of Systems  Nothing to add- no abdominal pain and no fever.  Physical Exam   Abdominal: Soft. Bowel sounds are normal.             ASSESSMENT    Ventura was seen today for follow-up.    Diagnoses and all orders for this visit:    Hx of ventral hernia repair    Seroma      PLAN    1. Recheck in 1 week          This document has been electronically signed by Rui Shaver MD on August 13, 2017 7:03 PM

## 2017-08-16 ENCOUNTER — APPOINTMENT (OUTPATIENT)
Dept: CT IMAGING | Facility: HOSPITAL | Age: 57
End: 2017-08-16

## 2017-08-16 ENCOUNTER — HOSPITAL ENCOUNTER (EMERGENCY)
Facility: HOSPITAL | Age: 57
Discharge: HOME | End: 2017-08-16
Attending: EMERGENCY MEDICINE | Admitting: EMERGENCY MEDICINE

## 2017-08-16 VITALS
WEIGHT: 194 LBS | DIASTOLIC BLOOD PRESSURE: 76 MMHG | HEIGHT: 68 IN | SYSTOLIC BLOOD PRESSURE: 135 MMHG | OXYGEN SATURATION: 98 % | TEMPERATURE: 98.1 F | BODY MASS INDEX: 29.4 KG/M2 | HEART RATE: 89 BPM | RESPIRATION RATE: 18 BRPM

## 2017-08-16 DIAGNOSIS — R10.33 PERIUMBILICAL ABDOMINAL PAIN: Primary | ICD-10-CM

## 2017-08-16 LAB
ALBUMIN SERPL-MCNC: 4.8 G/DL (ref 3.4–4.8)
ALBUMIN/GLOB SERPL: 1.2 G/DL (ref 1.1–1.8)
ALP SERPL-CCNC: 112 U/L (ref 38–126)
ALT SERPL W P-5'-P-CCNC: 32 U/L (ref 21–72)
ANION GAP SERPL CALCULATED.3IONS-SCNC: 14 MMOL/L (ref 5–15)
AST SERPL-CCNC: 21 U/L (ref 17–59)
BASOPHILS # BLD AUTO: 0.05 10*3/MM3 (ref 0–0.2)
BASOPHILS NFR BLD AUTO: 0.5 % (ref 0–2)
BILIRUB SERPL-MCNC: 0.7 MG/DL (ref 0.2–1.3)
BILIRUB UR QL STRIP: NEGATIVE
BUN BLD-MCNC: 11 MG/DL (ref 7–21)
BUN/CREAT SERPL: 18.3 (ref 7–25)
CALCIUM SPEC-SCNC: 9.9 MG/DL (ref 8.4–10.2)
CHLORIDE SERPL-SCNC: 97 MMOL/L (ref 95–110)
CLARITY UR: CLEAR
CO2 SERPL-SCNC: 26 MMOL/L (ref 22–31)
COLOR UR: YELLOW
CREAT BLD-MCNC: 0.6 MG/DL (ref 0.7–1.3)
D-LACTATE SERPL-SCNC: 1.2 MMOL/L (ref 0.5–2)
D-LACTATE SERPL-SCNC: 2.2 MMOL/L (ref 0.5–2)
DEPRECATED RDW RBC AUTO: 47.6 FL (ref 35.1–43.9)
EOSINOPHIL # BLD AUTO: 0.23 10*3/MM3 (ref 0–0.7)
EOSINOPHIL NFR BLD AUTO: 2.2 % (ref 0–7)
ERYTHROCYTE [DISTWIDTH] IN BLOOD BY AUTOMATED COUNT: 16.4 % (ref 11.5–14.5)
GFR SERPL CREATININE-BSD FRML MDRD: 140 ML/MIN/1.73 (ref 56–130)
GLOBULIN UR ELPH-MCNC: 4.1 GM/DL (ref 2.3–3.5)
GLUCOSE BLD-MCNC: 123 MG/DL (ref 60–100)
GLUCOSE UR STRIP-MCNC: ABNORMAL MG/DL
HCT VFR BLD AUTO: 38.5 % (ref 39–49)
HGB BLD-MCNC: 12.9 G/DL (ref 13.7–17.3)
HGB UR QL STRIP.AUTO: NEGATIVE
HOLD SPECIMEN: NORMAL
HOLD SPECIMEN: NORMAL
IMM GRANULOCYTES # BLD: 0.02 10*3/MM3 (ref 0–0.02)
IMM GRANULOCYTES NFR BLD: 0.2 % (ref 0–0.5)
KETONES UR QL STRIP: NEGATIVE
LEUKOCYTE ESTERASE UR QL STRIP.AUTO: NEGATIVE
LIPASE SERPL-CCNC: 136 U/L (ref 23–300)
LYMPHOCYTES # BLD AUTO: 2.36 10*3/MM3 (ref 0.6–4.2)
LYMPHOCYTES NFR BLD AUTO: 23.1 % (ref 10–50)
MCH RBC QN AUTO: 26.5 PG (ref 26.5–34)
MCHC RBC AUTO-ENTMCNC: 33.5 G/DL (ref 31.5–36.3)
MCV RBC AUTO: 79.1 FL (ref 80–98)
MONOCYTES # BLD AUTO: 1.06 10*3/MM3 (ref 0–0.9)
MONOCYTES NFR BLD AUTO: 10.4 % (ref 0–12)
NEUTROPHILS # BLD AUTO: 6.51 10*3/MM3 (ref 2–8.6)
NEUTROPHILS NFR BLD AUTO: 63.6 % (ref 37–80)
NITRITE UR QL STRIP: NEGATIVE
PH UR STRIP.AUTO: <=5 [PH] (ref 5–9)
PLATELET # BLD AUTO: 364 10*3/MM3 (ref 150–450)
PMV BLD AUTO: 10.5 FL (ref 8–12)
POTASSIUM BLD-SCNC: 3.6 MMOL/L (ref 3.5–5.1)
PROT SERPL-MCNC: 8.9 G/DL (ref 6.3–8.6)
PROT UR QL STRIP: NEGATIVE
RBC # BLD AUTO: 4.87 10*6/MM3 (ref 4.37–5.74)
SODIUM BLD-SCNC: 137 MMOL/L (ref 137–145)
SP GR UR STRIP: 1.02 (ref 1–1.03)
UROBILINOGEN UR QL STRIP: ABNORMAL
WBC NRBC COR # BLD: 10.23 10*3/MM3 (ref 3.2–9.8)
WHOLE BLOOD HOLD SPECIMEN: NORMAL

## 2017-08-16 PROCEDURE — 96375 TX/PRO/DX INJ NEW DRUG ADDON: CPT

## 2017-08-16 PROCEDURE — 74177 CT ABD & PELVIS W/CONTRAST: CPT

## 2017-08-16 PROCEDURE — 81003 URINALYSIS AUTO W/O SCOPE: CPT | Performed by: EMERGENCY MEDICINE

## 2017-08-16 PROCEDURE — 25010000002 MORPHINE PER 10 MG: Performed by: EMERGENCY MEDICINE

## 2017-08-16 PROCEDURE — 96376 TX/PRO/DX INJ SAME DRUG ADON: CPT

## 2017-08-16 PROCEDURE — 0 IOPAMIDOL 61 % SOLUTION: Performed by: EMERGENCY MEDICINE

## 2017-08-16 PROCEDURE — 83690 ASSAY OF LIPASE: CPT | Performed by: EMERGENCY MEDICINE

## 2017-08-16 PROCEDURE — 25010000002 ONDANSETRON PER 1 MG: Performed by: EMERGENCY MEDICINE

## 2017-08-16 PROCEDURE — 83605 ASSAY OF LACTIC ACID: CPT | Performed by: EMERGENCY MEDICINE

## 2017-08-16 PROCEDURE — 99284 EMERGENCY DEPT VISIT MOD MDM: CPT

## 2017-08-16 PROCEDURE — 80053 COMPREHEN METABOLIC PANEL: CPT | Performed by: EMERGENCY MEDICINE

## 2017-08-16 PROCEDURE — 96361 HYDRATE IV INFUSION ADD-ON: CPT

## 2017-08-16 PROCEDURE — 85025 COMPLETE CBC W/AUTO DIFF WBC: CPT | Performed by: EMERGENCY MEDICINE

## 2017-08-16 PROCEDURE — 96374 THER/PROPH/DIAG INJ IV PUSH: CPT

## 2017-08-16 RX ORDER — MORPHINE SULFATE 4 MG/ML
4 INJECTION, SOLUTION INTRAMUSCULAR; INTRAVENOUS ONCE
Status: COMPLETED | OUTPATIENT
Start: 2017-08-16 | End: 2017-08-16

## 2017-08-16 RX ORDER — SODIUM CHLORIDE 0.9 % (FLUSH) 0.9 %
10 SYRINGE (ML) INJECTION AS NEEDED
Status: DISCONTINUED | OUTPATIENT
Start: 2017-08-16 | End: 2017-08-17 | Stop reason: HOSPADM

## 2017-08-16 RX ORDER — ONDANSETRON 2 MG/ML
4 INJECTION INTRAMUSCULAR; INTRAVENOUS ONCE
Status: COMPLETED | OUTPATIENT
Start: 2017-08-16 | End: 2017-08-16

## 2017-08-16 RX ORDER — ONDANSETRON 2 MG/ML
4 INJECTION INTRAMUSCULAR; INTRAVENOUS ONCE
Status: DISCONTINUED | OUTPATIENT
Start: 2017-08-16 | End: 2017-08-16

## 2017-08-16 RX ORDER — MORPHINE SULFATE 4 MG/ML
4 INJECTION, SOLUTION INTRAMUSCULAR; INTRAVENOUS ONCE
Status: DISCONTINUED | OUTPATIENT
Start: 2017-08-16 | End: 2017-08-16

## 2017-08-16 RX ADMIN — MORPHINE SULFATE 4 MG: 4 INJECTION, SOLUTION INTRAMUSCULAR; INTRAVENOUS at 21:54

## 2017-08-16 RX ADMIN — ONDANSETRON 4 MG: 2 INJECTION INTRAMUSCULAR; INTRAVENOUS at 21:02

## 2017-08-16 RX ADMIN — ONDANSETRON 4 MG: 2 INJECTION INTRAMUSCULAR; INTRAVENOUS at 21:53

## 2017-08-16 RX ADMIN — SODIUM CHLORIDE 1000 ML: 9 INJECTION, SOLUTION INTRAVENOUS at 20:56

## 2017-08-16 RX ADMIN — MORPHINE SULFATE 4 MG: 4 INJECTION, SOLUTION INTRAMUSCULAR; INTRAVENOUS at 20:53

## 2017-08-16 RX ADMIN — IOPAMIDOL 100 ML: 612 INJECTION, SOLUTION INTRAVENOUS at 21:34

## 2017-08-16 NOTE — ED TRIAGE NOTES
Pt states he had a hernia repair performed in May. He has been seen several times since then related to the surgery, to include having a drain placed. Drain currently not in place. Pt states that he has a painful area of swelling in the same general area (right abdomen) and was told by his doctor to come to the ER to get the pain under control.

## 2017-08-17 NOTE — ED PROVIDER NOTES
Subjective   Patient is a 56 y.o. male presenting with abdominal pain.   History provided by:  Patient and spouse   used: No    Abdominal Pain   Pain location:  LLQ and periumbilical  Pain quality: cramping, sharp and stabbing    Pain radiates to:  Does not radiate  Pain severity:  Moderate  Onset quality:  Gradual  Duration:  2 days  Timing:  Intermittent  Progression:  Waxing and waning  Chronicity:  Recurrent  Context: previous surgery    Context: not laxative use, not recent travel and not sick contacts    Relieved by:  Nothing  Worsened by:  Movement  Ineffective treatments:  OTC medications  Associated symptoms: diarrhea, nausea and vomiting    Associated symptoms: no anorexia, no chest pain, no chills, no cough, no dysuria and no fever    Risk factors: multiple surgeries and recent hospitalization        Review of Systems   Constitutional: Negative for chills and fever.   Respiratory: Negative for cough.    Cardiovascular: Negative for chest pain.   Gastrointestinal: Positive for abdominal pain, diarrhea, nausea and vomiting. Negative for anorexia.   Genitourinary: Negative for dysuria.   All other systems reviewed and are negative.      Past Medical History:   Diagnosis Date   • Arthritis    • Carpal tunnel syndrome    • Depressive disorder    • GERD (gastroesophageal reflux disease)    • Hernia    • History of urinary system disease    • Hypertension    • Migraine    • Rotator cuff syndrome    • Type 2 diabetes mellitus    • Ureteric stone        Allergies   Allergen Reactions   • Naproxen Other (See Comments) and Rash     Blisters in mouth   • Tramadol Rash     Patient states he gets a rash in his mouth.        Past Surgical History:   Procedure Laterality Date   • ABDOMINAL SURGERY  08/2016   • CHOLECYSTECTOMY     • CIRCUMCISION     • CYSTOSCOPY  08/13/2003    Cystoscopy and removal of J stent. Right ureteral stent.   • CYSTOSCOPY  08/13/2003    Cystoscopy and right stone extraction  and placement of 26x 6 J-stent.   • INCISION AND DRAINAGE ABSCESS N/A 7/26/2017    Procedure: INCISION AND DRAINAGE ABD.ABSCESS;  Surgeon: Rui Shaver MD;  Location: [facility];  Service:    • VENTRAL/INCISIONAL HERNIA REPAIR N/A 5/31/2017    Procedure: LAPAROSCOPIC POSSIBLE OPEN ADHESIOLYSIS AND VENTRAL/INCISIONAL HERNIA REPAIR ;  Surgeon: Rui Shaver MD;  Location: [facility];  Service:        History reviewed. No pertinent family history.    Social History     Social History   • Marital status:      Spouse name: N/A   • Number of children: N/A   • Years of education: N/A     Social History Main Topics   • Smoking status: Never Smoker   • Smokeless tobacco: Never Used   • Alcohol use No   • Drug use: No   • Sexual activity: Defer     Other Topics Concern   • None     Social History Narrative           Objective   Physical Exam   Constitutional: He is oriented to person, place, and time. He appears well-developed and well-nourished.   HENT:   Head: Normocephalic and atraumatic.   Mouth/Throat: Oropharynx is clear and moist.   Eyes: Conjunctivae are normal. Pupils are equal, round, and reactive to light.   Neck: Neck supple.   Cardiovascular: Normal rate, regular rhythm, normal heart sounds and intact distal pulses.    Abdominal: Soft. Bowel sounds are normal. He exhibits distension. There is tenderness. There is no rebound and no guarding.   Patient is tenderness to the periumbilical area and also towards the left upper quadrant.  Patient has a ventral hernia and patient had it repaired 2 different times before and had postop abscess.  Last one was drained about 3 weeks ago.  I   Musculoskeletal: Normal range of motion.   Neurological: He is alert and oriented to person, place, and time.   Skin: Skin is warm and dry.   Nursing note and vitals reviewed.      Procedures         ED Course  ED Course      Labs Reviewed   COMPREHENSIVE METABOLIC PANEL - Abnormal; Notable for the following:        Result Value     Glucose 123 (*)     Creatinine 0.60 (*)     Total Protein 8.9 (*)     eGFR Non  Amer 140 (*)     Globulin 4.1 (*)     All other components within normal limits   URINALYSIS W/ CULTURE IF INDICATED - Abnormal; Notable for the following:     Glucose,  mg/dL (1+) (*)     All other components within normal limits    Narrative:     Urine microscopic not indicated.   LACTIC ACID, PLASMA - Abnormal; Notable for the following:     Lactate 2.2 (*)     All other components within normal limits   CBC WITH AUTO DIFFERENTIAL - Abnormal; Notable for the following:     WBC 10.23 (*)     Hemoglobin 12.9 (*)     Hematocrit 38.5 (*)     MCV 79.1 (*)     RDW 16.4 (*)     RDW-SD 47.6 (*)     Monocytes, Absolute 1.06 (*)     All other components within normal limits   LIPASE - Normal   RAINBOW DRAW    Narrative:     The following orders were created for panel order [place] Draw.  Procedure                               Abnormality         Status                     ---------                               -----------         ------                     Light Blue Top[[id number]]                                   Final result               Green Top (Gel)[[id number]]                                  Final result               Lavender Top[[id number]]                                     Final result               Gold Top - SST[[id number]]                                   Final result                 Please view results for these tests on the individual orders.   LACTATE ACID, REFLEX   CBC AND DIFFERENTIAL    Narrative:     The following orders were created for panel order CBC & Differential.  Procedure                               Abnormality         Status                     ---------                               -----------         ------                     CBC Auto Differential[[id number]]        Abnormal            Final result                 Please view results for these tests on the individual orders.   LIGHT BLUE TOP    GREEN TOP   LAVENDER TOP   GOLD TOP - SST   EXTRA TUBES    Narrative:     The following orders were created for panel order Extra Tubes.  Procedure                               Abnormality         Status                     ---------                               -----------         ------                     Light Blue Top[[id number]]                                   Final result               Lavender Top[[id number]]                                     Final result                 Please view results for these tests on the individual orders.   LIGHT BLUE TOP   LAVENDER TOP       CT Abdomen Pelvis With Contrast   Final Result   1. Resolving postoperative seroma or abscess in the anterior   abdominal wall subcutaneous tissues with no evidence of worsening   fluid collection or recurrent hernia.   2. Otherwise essentially unremarkable.      Electronically signed by:  Mauricio Jackson  8/16/2017 9:59 PM   CDT Workstation: [id number]                  [facility]  Number of Diagnoses or Management Options  Periumbilical abdominal pain: established and improving  Diagnosis management comments: Patient's CT of abdomen and pelvis is unremarkable and patient was seen by the surgeon and he will follow patient as an outpatient.  Patient lactic acid slightly elevated but patient is not septic and I do not think that patient has ischemic bowel.  Patient will be discharged home to follow-up closely with surgery       Amount and/or Complexity of Data Reviewed  Clinical lab tests: reviewed  Tests in the radiology section of CPT®: reviewed  Tests in the medicine section of CPT®: reviewed        Final diagnoses:   Periumbilical abdominal pain            Freddy Fairbanks MD  08/16/17 [id number]

## 2017-08-17 NOTE — DISCHARGE INSTRUCTIONS

## 2017-08-18 ENCOUNTER — OFFICE VISIT (OUTPATIENT)
Dept: SURGERY | Facility: CLINIC | Age: 57
End: 2017-08-18

## 2017-08-18 VITALS
SYSTOLIC BLOOD PRESSURE: 120 MMHG | BODY MASS INDEX: 29.25 KG/M2 | HEIGHT: 68 IN | WEIGHT: 193 LBS | DIASTOLIC BLOOD PRESSURE: 60 MMHG

## 2017-08-18 DIAGNOSIS — Z98.890 S/P REPAIR OF VENTRAL HERNIA: Primary | ICD-10-CM

## 2017-08-18 DIAGNOSIS — Z87.19 S/P REPAIR OF VENTRAL HERNIA: Primary | ICD-10-CM

## 2017-08-18 PROCEDURE — 99024 POSTOP FOLLOW-UP VISIT: CPT | Performed by: SURGERY

## 2017-08-20 PROBLEM — L02.211 CUTANEOUS ABSCESS OF ABDOMINAL WALL: Status: RESOLVED | Noted: 2017-07-26 | Resolved: 2017-08-20

## 2017-08-20 NOTE — PROGRESS NOTES
Chief Complaint   Patient presents with   • Post-op Follow-up     Recheck ventral hernia with abdominal abscess 5-31-17        HPI  Doing well- no complaints. Last CT shows:  Study Result   CT abdomen and pelvis with contrast on  8/16/2017      CLINICAL INDICATION: Left lower quadrant pain, recent hernia  surgery, abdominal swelling     TECHNIQUE: Multiple axial images are obtained throughout the  abdomen and pelvis following the administration of IV contrast,  95 mL of Isovue-300 contrast was administered intravenously  without complication. This study was performed with techniques to  keep radiation doses as low as reasonably achievable, (ALARA).   Total DLP is 457.6 mGy*cm.     COMPARISON: 7/10/2017     FINDINGS:  Abdomen: The lung bases are clear. The patient is status post  cholecystectomy. The solid abdominal organs are unremarkable.  There is no abdominal adenopathy. The patient is status post  anterior abdominal wall hernia repair. Subcutaneous air and fluid  collection in the anterior abdominal wall along the hernia repair  site has significantly decreased in size. There has also been  improvement in adjacent fat stranding. The findings are  consistent with a resolving postoperative seroma or abscess. The  largest area of air and fluid collection now measures  approximately 3.6 x 0.9 x 6.2 cm and is very thin along the  anterior abdominal wall subcutaneous tissues. Previously this  measured up to 8.8 x 3.7 x 15.5 cm. There is an area of likely  developing fat necrosis in the left anterior peritoneum seen on  images 46 through 52. There are some adherent loops of bowel  along the anterior left abdominal wall likely from adhesions but  there is no evidence of bowel obstruction. There is no free fluid  or free air within the abdomen. The abdominal portion of the GI  tract is unremarkable.     Pelvis: There is no free fluid in the pelvis. Pelvic portion of  the GI tract is unremarkable. There is no pelvic  adenopathy. No  bony abnormality is noted.     IMPRESSION:  1. Resolving postoperative seroma or abscess in the anterior  abdominal wall subcutaneous tissues with no evidence of worsening  fluid collection or recurrent hernia.  2. Otherwise essentially unremarkable.     Electronically signed by:  Mauricio Manuel  8/16/2017 9:59 PM  CDT Workstation: [id number]       Past Medical History:   Diagnosis Date   • Arthritis    • Carpal tunnel syndrome    • Depressive disorder    • GERD (gastroesophageal reflux disease)    • Hernia    • History of urinary system disease    • Hypertension    • Migraine    • Rotator cuff syndrome    • Type 2 diabetes mellitus    • Ureteric stone        Past Surgical History:   Procedure Laterality Date   • ABDOMINAL SURGERY  08/2016   • CHOLECYSTECTOMY     • CIRCUMCISION     • CYSTOSCOPY  08/13/2003    Cystoscopy and removal of J stent. Right ureteral stent.   • CYSTOSCOPY  08/13/2003    Cystoscopy and right stone extraction and placement of 26x 6 J-stent.   • INCISION AND DRAINAGE ABSCESS N/A 7/26/2017    Procedure: INCISION AND DRAINAGE ABD.ABSCESS;  Surgeon: Rui Shaver MD;  Location: [facility];  Service:    • VENTRAL/INCISIONAL HERNIA REPAIR N/A 5/31/2017    Procedure: LAPAROSCOPIC POSSIBLE OPEN ADHESIOLYSIS AND VENTRAL/INCISIONAL HERNIA REPAIR ;  Surgeon: Rui Shaver MD;  Location: [facility];  Service:          Current Outpatient Prescriptions:   •  aspirin 81 MG chewable tablet, Chew 81 mg Daily., Disp: , Rfl:   •  atorvastatin (LIPITOR) 20 MG tablet, Take 20 mg by mouth Every Night., Disp: , Rfl:   •  cyclobenzaprine (FLEXERIL) 10 MG tablet, Take 10 mg by mouth 2 (Two) Times a Day As Needed., Disp: , Rfl:   •  insulin lispro (humaLOG) 100 UNIT/ML injection, Inject 8-12 Units under the skin 3 (Three) Times a Day As Needed (sliding scale). As needed per sliding scale, Disp: , Rfl:   •  lidocaine-prilocaine (EMLA) 2.5-2.5 % cream, Apply 1 application topically As Needed., Disp: ,  Rfl:   •  lisinopril (PRINIVIL,ZESTRIL) 5 MG tablet, Take 5 mg by mouth Daily., Disp: , Rfl:   •  metFORMIN (GLUCOPHAGE) 500 MG tablet, Take 500 mg by mouth 2 (Two) Times a Day With Meals., Disp: , Rfl:   •  ondansetron ODT (ZOFRAN-ODT) 4 MG disintegrating tablet, Take 1 tablet by mouth Every 8 (Eight) Hours., Disp: 15 tablet, Rfl: 1  •  promethazine (PHENERGAN) 12.5 MG tablet, Take 2 tablets by mouth Every 6 (Six) Hours As Needed for Nausea., Disp: 30 tablet, Rfl: 1  •  SITagliptin (JANUVIA) 50 MG tablet, Take 50 mg by mouth Daily., Disp: , Rfl:   •  oxyCODONE-acetaminophen (PERCOCET)  MG per tablet, Take 1 tablet by mouth Every 4 (Four) Hours., Disp: , Rfl: 0    Allergies   Allergen Reactions   • Naproxen Other (See Comments) and Rash     Blisters in mouth   • Tramadol Rash     Patient states he gets a rash in his mouth.        No family history on file.        Review of Systems  Nothing to add  Physical Exam   Abdominal: Soft. Bowel sounds are normal. He exhibits no distension and no mass. There is no tenderness. There is no rebound and no guarding. No hernia.             ASSESSMENT    Ventura was seen today for post-op follow-up.    Diagnoses and all orders for this visit:    S/P repair of ventral hernia      PLAN    1. Recheck in 2 weeks          This document has been electronically signed by Rui Shaver MD on August 20, 2017 3:38 PM

## 2017-09-01 ENCOUNTER — TELEPHONE (OUTPATIENT)
Dept: SURGERY | Facility: CLINIC | Age: 57
End: 2017-09-01

## 2017-09-01 ENCOUNTER — OFFICE VISIT (OUTPATIENT)
Dept: SURGERY | Facility: CLINIC | Age: 57
End: 2017-09-01

## 2017-09-01 VITALS
HEIGHT: 68 IN | BODY MASS INDEX: 30.01 KG/M2 | SYSTOLIC BLOOD PRESSURE: 138 MMHG | DIASTOLIC BLOOD PRESSURE: 76 MMHG | WEIGHT: 198 LBS

## 2017-09-01 DIAGNOSIS — Z87.19 S/P REPAIR OF VENTRAL HERNIA: Primary | ICD-10-CM

## 2017-09-01 DIAGNOSIS — Z98.890 S/P REPAIR OF VENTRAL HERNIA: Primary | ICD-10-CM

## 2017-09-01 PROCEDURE — 99024 POSTOP FOLLOW-UP VISIT: CPT | Performed by: SURGERY

## 2017-09-01 RX ORDER — BLOOD SUGAR DIAGNOSTIC
STRIP MISCELLANEOUS
COMMUNITY
Start: 2017-08-24 | End: 2021-07-14

## 2017-09-04 NOTE — PROGRESS NOTES
Chief Complaint   Patient presents with   • Follow-up     Recheck ventral hernia with abdominal abscess 5-31-17.        HPI  Doing much better. No further abdominal pain and no re-accumulation of fluid. No recurrence of his hernia have been noted.  Past Medical History:   Diagnosis Date   • Arthritis    • Carpal tunnel syndrome    • Depressive disorder    • GERD (gastroesophageal reflux disease)    • Hernia    • History of urinary system disease    • Hypertension    • Migraine    • Rotator cuff syndrome    • Type 2 diabetes mellitus    • Ureteric stone        Past Surgical History:   Procedure Laterality Date   • ABDOMINAL SURGERY  08/2016   • CHOLECYSTECTOMY     • CIRCUMCISION     • CYSTOSCOPY  08/13/2003    Cystoscopy and removal of J stent. Right ureteral stent.   • CYSTOSCOPY  08/13/2003    Cystoscopy and right stone extraction and placement of 26x 6 J-stent.   • INCISION AND DRAINAGE ABSCESS N/A 7/26/2017    Procedure: INCISION AND DRAINAGE ABD.ABSCESS;  Surgeon: Rui Shaver MD;  Location: [facility];  Service:    • VENTRAL/INCISIONAL HERNIA REPAIR N/A 5/31/2017    Procedure: LAPAROSCOPIC POSSIBLE OPEN ADHESIOLYSIS AND VENTRAL/INCISIONAL HERNIA REPAIR ;  Surgeon: Rui Shaver MD;  Location: [facility];  Service:          Current Outpatient Prescriptions:   •  atorvastatin (LIPITOR) 20 MG tablet, Take 20 mg by mouth Every Night., Disp: , Rfl:   •  FREESTYLE TEST STRIPS test strip, , Disp: , Rfl:   •  insulin lispro (humaLOG) 100 UNIT/ML injection, Inject 8-12 Units under the skin 3 (Three) Times a Day As Needed (sliding scale). As needed per sliding scale, Disp: , Rfl:   •  lisinopril (PRINIVIL,ZESTRIL) 5 MG tablet, Take 5 mg by mouth Daily., Disp: , Rfl:   •  metFORMIN (GLUCOPHAGE) 500 MG tablet, Take 500 mg by mouth 2 (Two) Times a Day With Meals., Disp: , Rfl:   •  oxyCODONE-acetaminophen (PERCOCET)  MG per tablet, Take 1 tablet by mouth Every 4 (Four) Hours., Disp: , Rfl: 0  •  promethazine (PHENERGAN)  12.5 MG tablet, Take 2 tablets by mouth Every 6 (Six) Hours As Needed for Nausea., Disp: 30 tablet, Rfl: 1  •  aspirin 81 MG chewable tablet, Chew 81 mg Daily., Disp: , Rfl:   •  cyclobenzaprine (FLEXERIL) 10 MG tablet, Take 10 mg by mouth 2 (Two) Times a Day As Needed., Disp: , Rfl:   •  lidocaine-prilocaine (EMLA) 2.5-2.5 % cream, Apply 1 application topically As Needed., Disp: , Rfl:   •  ondansetron ODT (ZOFRAN-ODT) 4 MG disintegrating tablet, Take 1 tablet by mouth Every 8 (Eight) Hours., Disp: 15 tablet, Rfl: 1  •  SITagliptin (JANUVIA) 50 MG tablet, Take 50 mg by mouth Daily., Disp: , Rfl:     Allergies   Allergen Reactions   • Naproxen Other (See Comments) and Rash     Blisters in mouth   • Tramadol Rash     Patient states he gets a rash in his mouth.        Review of Systems  Nothing to add  Physical Exam   Abdominal: Soft. Bowel sounds are normal. He exhibits no distension and no mass. There is no tenderness. There is no rebound and no guarding. No hernia.             ASSESSMENT    Ventura was seen today for follow-up.    Diagnoses and all orders for this visit:    S/P repair of ventral hernia  Comments:  Phasix mesh with SBR resection required      PLAN    1. Recheck in 1 month          This document has been electronically signed by Rui Shaver MD on September 4, 2017 4:15 PM

## 2017-09-07 ENCOUNTER — HOSPITAL ENCOUNTER (EMERGENCY)
Facility: HOSPITAL | Age: 57
Discharge: HOME | End: 2017-09-07
Attending: EMERGENCY MEDICINE | Admitting: EMERGENCY MEDICINE

## 2017-09-07 ENCOUNTER — APPOINTMENT (OUTPATIENT)
Dept: GENERAL RADIOLOGY | Facility: HOSPITAL | Age: 57
End: 2017-09-07

## 2017-09-07 VITALS
SYSTOLIC BLOOD PRESSURE: 186 MMHG | RESPIRATION RATE: 20 BRPM | DIASTOLIC BLOOD PRESSURE: 70 MMHG | WEIGHT: 198 LBS | HEIGHT: 68 IN | OXYGEN SATURATION: 98 % | HEART RATE: 96 BPM | BODY MASS INDEX: 30.01 KG/M2 | TEMPERATURE: 98 F

## 2017-09-07 DIAGNOSIS — B34.9 VIRAL SYNDROME: Primary | ICD-10-CM

## 2017-09-07 LAB
ALBUMIN SERPL-MCNC: 4.4 G/DL (ref 3.4–4.8)
ALBUMIN/GLOB SERPL: 1.2 G/DL (ref 1.1–1.8)
ALP SERPL-CCNC: 94 U/L (ref 38–126)
ALT SERPL W P-5'-P-CCNC: 32 U/L (ref 21–72)
ANION GAP SERPL CALCULATED.3IONS-SCNC: 13 MMOL/L (ref 5–15)
AST SERPL-CCNC: 23 U/L (ref 17–59)
BASOPHILS # BLD AUTO: 0.04 10*3/MM3 (ref 0–0.2)
BASOPHILS NFR BLD AUTO: 0.4 % (ref 0–2)
BILIRUB SERPL-MCNC: 0.7 MG/DL (ref 0.2–1.3)
BUN BLD-MCNC: 11 MG/DL (ref 7–21)
BUN/CREAT SERPL: 18.3 (ref 7–25)
CALCIUM SPEC-SCNC: 9.5 MG/DL (ref 8.4–10.2)
CHLORIDE SERPL-SCNC: 97 MMOL/L (ref 95–110)
CO2 SERPL-SCNC: 27 MMOL/L (ref 22–31)
CREAT BLD-MCNC: 0.6 MG/DL (ref 0.7–1.3)
DEPRECATED RDW RBC AUTO: 46.9 FL (ref 35.1–43.9)
EOSINOPHIL # BLD AUTO: 0.08 10*3/MM3 (ref 0–0.7)
EOSINOPHIL NFR BLD AUTO: 0.8 % (ref 0–7)
ERYTHROCYTE [DISTWIDTH] IN BLOOD BY AUTOMATED COUNT: 16.5 % (ref 11.5–14.5)
GFR SERPL CREATININE-BSD FRML MDRD: 140 ML/MIN/1.73 (ref 60–130)
GLOBULIN UR ELPH-MCNC: 3.8 GM/DL (ref 2.3–3.5)
GLUCOSE BLD-MCNC: 256 MG/DL (ref 60–100)
HCT VFR BLD AUTO: 42.2 % (ref 39–49)
HGB BLD-MCNC: 14.4 G/DL (ref 13.7–17.3)
HOLD SPECIMEN: NORMAL
HOLD SPECIMEN: NORMAL
IMM GRANULOCYTES # BLD: 0.02 10*3/MM3 (ref 0–0.02)
IMM GRANULOCYTES NFR BLD: 0.2 % (ref 0–0.5)
INR PPP: 1.11 (ref 0.8–1.2)
LIPASE SERPL-CCNC: 64 U/L (ref 23–300)
LYMPHOCYTES # BLD AUTO: 1.72 10*3/MM3 (ref 0.6–4.2)
LYMPHOCYTES NFR BLD AUTO: 18.2 % (ref 10–50)
MCH RBC QN AUTO: 26.6 PG (ref 26.5–34)
MCHC RBC AUTO-ENTMCNC: 34.1 G/DL (ref 31.5–36.3)
MCV RBC AUTO: 78 FL (ref 80–98)
MONOCYTES # BLD AUTO: 0.71 10*3/MM3 (ref 0–0.9)
MONOCYTES NFR BLD AUTO: 7.5 % (ref 0–12)
NEUTROPHILS # BLD AUTO: 6.87 10*3/MM3 (ref 2–8.6)
NEUTROPHILS NFR BLD AUTO: 72.9 % (ref 37–80)
NT-PROBNP SERPL-MCNC: 42.3 PG/ML (ref 0–900)
PLATELET # BLD AUTO: 308 10*3/MM3 (ref 150–450)
PMV BLD AUTO: 10.8 FL (ref 8–12)
POTASSIUM BLD-SCNC: 4 MMOL/L (ref 3.5–5.1)
PROT SERPL-MCNC: 8.2 G/DL (ref 6.3–8.6)
PROTHROMBIN TIME: 14.2 SECONDS (ref 11.1–15.3)
RBC # BLD AUTO: 5.41 10*6/MM3 (ref 4.37–5.74)
SODIUM BLD-SCNC: 137 MMOL/L (ref 137–145)
TROPONIN I SERPL-MCNC: <0.012 NG/ML
TROPONIN I SERPL-MCNC: <0.012 NG/ML
WBC NRBC COR # BLD: 9.44 10*3/MM3 (ref 3.2–9.8)
WHOLE BLOOD HOLD SPECIMEN: NORMAL
WHOLE BLOOD HOLD SPECIMEN: NORMAL

## 2017-09-07 PROCEDURE — 74022 RADEX COMPL AQT ABD SERIES: CPT

## 2017-09-07 PROCEDURE — 84484 ASSAY OF TROPONIN QUANT: CPT | Performed by: EMERGENCY MEDICINE

## 2017-09-07 PROCEDURE — 85025 COMPLETE CBC W/AUTO DIFF WBC: CPT | Performed by: EMERGENCY MEDICINE

## 2017-09-07 PROCEDURE — 83690 ASSAY OF LIPASE: CPT | Performed by: EMERGENCY MEDICINE

## 2017-09-07 PROCEDURE — 83880 ASSAY OF NATRIURETIC PEPTIDE: CPT | Performed by: EMERGENCY MEDICINE

## 2017-09-07 PROCEDURE — 25010000002 ONDANSETRON PER 1 MG: Performed by: EMERGENCY MEDICINE

## 2017-09-07 PROCEDURE — 96374 THER/PROPH/DIAG INJ IV PUSH: CPT

## 2017-09-07 PROCEDURE — 25010000002 KETOROLAC TROMETHAMINE PER 15 MG: Performed by: EMERGENCY MEDICINE

## 2017-09-07 PROCEDURE — 93005 ELECTROCARDIOGRAM TRACING: CPT | Performed by: EMERGENCY MEDICINE

## 2017-09-07 PROCEDURE — 96361 HYDRATE IV INFUSION ADD-ON: CPT

## 2017-09-07 PROCEDURE — 93010 ELECTROCARDIOGRAM REPORT: CPT | Performed by: INTERNAL MEDICINE

## 2017-09-07 PROCEDURE — 80053 COMPREHEN METABOLIC PANEL: CPT | Performed by: EMERGENCY MEDICINE

## 2017-09-07 PROCEDURE — 85610 PROTHROMBIN TIME: CPT | Performed by: EMERGENCY MEDICINE

## 2017-09-07 PROCEDURE — 96375 TX/PRO/DX INJ NEW DRUG ADDON: CPT

## 2017-09-07 PROCEDURE — 99284 EMERGENCY DEPT VISIT MOD MDM: CPT

## 2017-09-07 RX ORDER — SODIUM CHLORIDE 0.9 % (FLUSH) 0.9 %
10 SYRINGE (ML) INJECTION AS NEEDED
Status: DISCONTINUED | OUTPATIENT
Start: 2017-09-07 | End: 2017-09-07 | Stop reason: HOSPADM

## 2017-09-07 RX ORDER — ONDANSETRON 2 MG/ML
4 INJECTION INTRAMUSCULAR; INTRAVENOUS ONCE
Status: COMPLETED | OUTPATIENT
Start: 2017-09-07 | End: 2017-09-07

## 2017-09-07 RX ORDER — KETOROLAC TROMETHAMINE 30 MG/ML
30 INJECTION, SOLUTION INTRAMUSCULAR; INTRAVENOUS ONCE
Status: COMPLETED | OUTPATIENT
Start: 2017-09-07 | End: 2017-09-07

## 2017-09-07 RX ORDER — SODIUM CHLORIDE 9 MG/ML
125 INJECTION, SOLUTION INTRAVENOUS CONTINUOUS
Status: DISCONTINUED | OUTPATIENT
Start: 2017-09-07 | End: 2017-09-07 | Stop reason: HOSPADM

## 2017-09-07 RX ADMIN — KETOROLAC TROMETHAMINE 30 MG: 30 INJECTION, SOLUTION INTRAMUSCULAR; INTRAVENOUS at 11:31

## 2017-09-07 RX ADMIN — SODIUM CHLORIDE 125 ML/HR: 9 INJECTION, SOLUTION INTRAVENOUS at 11:28

## 2017-09-07 RX ADMIN — Medication 10 ML: at 11:30

## 2017-09-07 RX ADMIN — ONDANSETRON 4 MG: 2 INJECTION INTRAMUSCULAR; INTRAVENOUS at 11:31

## 2017-09-07 NOTE — ED NOTES
Discussed increased blood pressure with Dr. Leslie. Dr. Leslie stated that patient would have to follow-up with family physician for continuity of care, for his blood pressure. Pain is still a 7/10 which was discussed with provider.  He states he is not going to treat this blood pressure at this time and that the patient is to follow-up with his doctor. RN educated the patient on this and he was agreeable. Patient was d/c'd. IV was removed by patient prior to RN entering room for d/c. Discharge instructions and paperwork provided. Patient left alone, driving himself.      Marbella Ann RN  09/07/17 1400

## 2017-09-07 NOTE — ED PROVIDER NOTES
Subjective   HPI Comments: Pt presents to the ER c/o a vauge constellation of complaints including nondescript chest pain, abdominal pain that is generalized without fever, nausea or vomiting.    Patient is a 56 y.o. male presenting with general illness.   Illness   Associated symptoms: abdominal pain, chest pain and cough    Associated symptoms: no congestion, no diarrhea, no ear pain, no fever, no headaches, no myalgias, no nausea, no rash, no shortness of breath, no sore throat and no vomiting        Review of Systems   Constitutional: Negative for activity change, appetite change, chills and fever.   HENT: Negative for congestion, ear pain and sore throat.    Eyes: Negative.  Negative for discharge and redness.   Respiratory: Positive for cough. Negative for chest tightness and shortness of breath.    Cardiovascular: Positive for chest pain. Negative for palpitations and leg swelling.   Gastrointestinal: Positive for abdominal pain. Negative for diarrhea, nausea and vomiting.   Genitourinary: Negative for difficulty urinating, dysuria, flank pain and urgency.   Musculoskeletal: Negative.  Negative for arthralgias, back pain, joint swelling, myalgias and neck pain.   Skin: Negative.  Negative for color change and rash.   Neurological: Negative.  Negative for dizziness, seizures, speech difficulty, weakness, numbness and headaches.   Psychiatric/Behavioral: Negative for behavioral problems.   All other systems reviewed and are negative.      Past Medical History:   Diagnosis Date   • Arthritis    • Carpal tunnel syndrome    • Depressive disorder    • GERD (gastroesophageal reflux disease)    • Hernia    • History of urinary system disease    • Hypertension    • Migraine    • Rotator cuff syndrome    • Type 2 diabetes mellitus    • Ureteric stone        Allergies   Allergen Reactions   • Naproxen Other (See Comments) and Rash     Blisters in mouth   • Tramadol Rash     Patient states he gets a rash in his mouth.         Past Surgical History:   Procedure Laterality Date   • ABDOMINAL SURGERY  08/2016   • CHOLECYSTECTOMY     • CIRCUMCISION     • CYSTOSCOPY  08/13/2003    Cystoscopy and removal of J stent. Right ureteral stent.   • CYSTOSCOPY  08/13/2003    Cystoscopy and right stone extraction and placement of 26x 6 J-stent.   • INCISION AND DRAINAGE ABSCESS N/A 7/26/2017    Procedure: INCISION AND DRAINAGE ABD.ABSCESS;  Surgeon: Rui Shaver MD;  Location: [facility];  Service:    • VENTRAL/INCISIONAL HERNIA REPAIR N/A 5/31/2017    Procedure: LAPAROSCOPIC POSSIBLE OPEN ADHESIOLYSIS AND VENTRAL/INCISIONAL HERNIA REPAIR ;  Surgeon: Rui Shaver MD;  Location: [facility];  Service:        History reviewed. No pertinent family history.    Social History     Social History   • Marital status:      Spouse name: N/A   • Number of children: N/A   • Years of education: N/A     Social History Main Topics   • Smoking status: Never Smoker   • Smokeless tobacco: Never Used   • Alcohol use No   • Drug use: No   • Sexual activity: Defer     Other Topics Concern   • None     Social History Narrative           Objective   Physical Exam   Constitutional: He is oriented to person, place, and time. He appears well-developed and well-nourished.   HENT:   Head: Normocephalic and atraumatic.   Mouth/Throat: Oropharynx is clear and moist.   Eyes: Conjunctivae and EOM are normal. Pupils are equal, round, and reactive to light.   Neck: Normal range of motion. Neck supple.   Cardiovascular: Normal rate, regular rhythm and normal heart sounds.  Exam reveals no gallop and no friction rub.    No murmur heard.  Pulmonary/Chest: Effort normal and breath sounds normal. He has no wheezes. He has no rales.   Abdominal: Soft. Bowel sounds are normal. He exhibits no mass. There is no tenderness. There is no rebound and no guarding.   Musculoskeletal: Normal range of motion. He exhibits no tenderness.   Neurological: He is alert and oriented to person,  place, and time. He has normal reflexes. No cranial nerve deficit.   Skin: Skin is warm and dry.   Nursing note and vitals reviewed.      ECG 12 Lead    Date/Time: 9/7/2017 9:37 AM  Performed by: KARISHMA NORIEGA  Authorized by: KARISHMA NORIEGA   Comparison: not compared with previous ECG   Rhythm: sinus rhythm  Rate: normal  QRS axis: normal  Conduction: conduction normal  ST Segments: ST segments normal  T Waves: T waves normal  Other: no other findings  Clinical impression: normal ECG               ED Course  ED Course      Labs Reviewed   COMPREHENSIVE METABOLIC PANEL - Abnormal; Notable for the following:        Result Value    Glucose 256 (*)     Creatinine 0.60 (*)     Globulin 3.8 (*)     All other components within normal limits   CBC WITH AUTO DIFFERENTIAL - Abnormal; Notable for the following:     MCV 78.0 (*)     RDW 16.5 (*)     RDW-SD 46.9 (*)     All other components within normal limits   TROPONIN (IN-HOUSE) - Normal   TROPONIN (IN-HOUSE) - Normal   BNP (IN-HOUSE) - Normal   PROTIME-INR - Normal    Narrative:     Therapeutic range for most indications is 2.0-3.0 INR,  or 2.5-3.5 for mechanical heart valves.   LIPASE - Normal   RAINBOW DRAW    Narrative:     The following orders were created for panel order [place] Draw.  Procedure                               Abnormality         Status                     ---------                               -----------         ------                     Light Blue Top[[id number]]                                   Final result               Green Top (Gel)[[id number]]                                  In process                 Lavender Top[[id number]]                                     Final result               Gold Top - SST[[id number]]                                   In process                   Please view results for these tests on the individual orders.   CBC AND DIFFERENTIAL    Narrative:     The following orders were created for panel order CBC &  Differential.  Procedure                               Abnormality         Status                     ---------                               -----------         ------                     CBC Auto Differential[[id number]]        Abnormal            Final result                 Please view results for these tests on the individual orders.   LIGHT BLUE TOP   LAVENDER TOP   GREEN TOP   GOLD TOP - SST       XR Abdomen 2 View With Chest 1 View   Final Result   CONCLUSION:     1. No acute cardiopulmonary disease.      2. Normal bowel gas pattern.      Electronically signed by:  Kirt Galvez MD  9/7/2017 11:23 AM CDT   Workstation: [id number]        Will d/c to f/u with PCP in 3 days          MDM  Number of Diagnoses or Management Options  Viral syndrome:       Final diagnoses:   Viral syndrome            Justice Leslie MD  09/08/17 [id number]

## 2017-09-07 NOTE — ED TRIAGE NOTES
Pt stated he had an abdominal surgery on 5/31/2017.  Since then he states he continues to have abdominal pain and discomfort, he is also complaining of chest pain, left shoulder pain, lower back pain (patient states he has not injured it that he knows of). Pt also states he is having issues with diarrhea, coughing, vomitting and blood in urine.

## 2017-09-07 NOTE — ED NOTES
Patient requesting pain meds multiple times. Notified MD at these times, by FRANCES Faria RN and FRANCES Ann RN. MD is aware that patient is in pain and that he is asking for pain meds.      Marbella Ann RN  09/07/17 5748

## 2017-09-07 NOTE — ED NOTES
Pt is complaining of pain that was not relieved by previous medication, he is asking for something stronger for lower back pain.     Kimber Faria RN  09/07/17 3624

## 2017-10-04 ENCOUNTER — OFFICE VISIT (OUTPATIENT)
Dept: SURGERY | Facility: CLINIC | Age: 57
End: 2017-10-04

## 2017-10-04 VITALS
WEIGHT: 188 LBS | HEIGHT: 68 IN | DIASTOLIC BLOOD PRESSURE: 80 MMHG | SYSTOLIC BLOOD PRESSURE: 132 MMHG | BODY MASS INDEX: 28.49 KG/M2

## 2017-10-04 DIAGNOSIS — K43.2 INCISIONAL HERNIA, WITHOUT OBSTRUCTION OR GANGRENE: Primary | ICD-10-CM

## 2017-10-04 PROCEDURE — 99024 POSTOP FOLLOW-UP VISIT: CPT | Performed by: SURGERY

## 2017-10-04 NOTE — PROGRESS NOTES
56-year-old gentleman patient of Dr. Shaver's she's now over 2 months out from incisional hernia repair.  Postoperatively had a superficial infection that was also treated.  Patient has some discomfort on the right side of his incision but that seems to slowly be improving.  His GI tract is working well no fever no chills    Incisions clean and intact.  Got a little distortion to the right side difficult to say whether this is fluid and/or scar tissue.  There is no drainage there is no redness is got good support his abdominal wall.    Fill patient is improving slowly.  Asked to follow up with Dr. Shaver in 4-6 weeks or sooner if he has a further concerns or questions

## 2017-11-15 ENCOUNTER — OFFICE VISIT (OUTPATIENT)
Dept: SURGERY | Facility: CLINIC | Age: 57
End: 2017-11-15

## 2017-11-15 VITALS
WEIGHT: 199 LBS | HEIGHT: 68 IN | SYSTOLIC BLOOD PRESSURE: 126 MMHG | DIASTOLIC BLOOD PRESSURE: 78 MMHG | BODY MASS INDEX: 30.16 KG/M2

## 2017-11-15 DIAGNOSIS — K43.2 INCISIONAL HERNIA, WITHOUT OBSTRUCTION OR GANGRENE: Primary | ICD-10-CM

## 2017-11-15 PROCEDURE — 99212 OFFICE O/P EST SF 10 MIN: CPT | Performed by: SURGERY

## 2017-11-15 RX ORDER — METHADONE HYDROCHLORIDE 5 MG/1
5 TABLET ORAL 2 TIMES DAILY
Status: ON HOLD | COMMUNITY
Start: 2017-11-13 | End: 2021-02-16

## 2017-11-15 NOTE — PROGRESS NOTES
Chief Complaint   Patient presents with   • Follow-up     Recheck Ventral hernia 5-31-17.        HPI   Recheck ventral hernia with abdominal abscess 5-31-17.   Doing much better. No further abdominal pain and no re-accumulation of fluid. No recurrence of his hernia have been noted.  I have limited to 5-10 pounds of lifting. Much less pain with weight restriction and slower eating.    Past Medical History:   Diagnosis Date   • Arthritis    • Carpal tunnel syndrome    • Depressive disorder    • GERD (gastroesophageal reflux disease)    • Hernia    • History of urinary system disease    • Hypertension    • Migraine    • Rotator cuff syndrome    • Type 2 diabetes mellitus    • Ureteric stone        Past Surgical History:   Procedure Laterality Date   • ABDOMINAL SURGERY  08/2016   • CHOLECYSTECTOMY     • CIRCUMCISION     • CYSTOSCOPY  08/13/2003    Cystoscopy and removal of J stent. Right ureteral stent.   • CYSTOSCOPY  08/13/2003    Cystoscopy and right stone extraction and placement of 26x 6 J-stent.   • INCISION AND DRAINAGE ABSCESS N/A 7/26/2017    Procedure: INCISION AND DRAINAGE ABD.ABSCESS;  Surgeon: Rui Shaver MD;  Location: [facility];  Service:    • VENTRAL/INCISIONAL HERNIA REPAIR N/A 5/31/2017    Procedure: LAPAROSCOPIC POSSIBLE OPEN ADHESIOLYSIS AND VENTRAL/INCISIONAL HERNIA REPAIR ;  Surgeon: Rui Shaver MD;  Location: [facility];  Service:          Current Outpatient Prescriptions:   •  atorvastatin (LIPITOR) 20 MG tablet, Take 20 mg by mouth Every Night., Disp: , Rfl:   •  FREESTYLE TEST STRIPS test strip, , Disp: , Rfl:   •  insulin lispro (humaLOG) 100 UNIT/ML injection, Inject 8-12 Units under the skin 3 (Three) Times a Day As Needed (sliding scale). As needed per sliding scale, Disp: , Rfl:   •  lidocaine-prilocaine (EMLA) 2.5-2.5 % cream, Apply 1 application topically As Needed., Disp: , Rfl:   •  lisinopril (PRINIVIL,ZESTRIL) 5 MG tablet, Take 5 mg by mouth Daily., Disp: , Rfl:   •  metFORMIN  (GLUCOPHAGE) 500 MG tablet, Take 500 mg by mouth 2 (Two) Times a Day With Meals., Disp: , Rfl:   •  methadone (DOLOPHINE) 5 MG tablet, Take 5 mg by mouth 2 (Two) Times a Day., Disp: , Rfl:   •  ondansetron ODT (ZOFRAN-ODT) 4 MG disintegrating tablet, Take 1 tablet by mouth Every 8 (Eight) Hours., Disp: 15 tablet, Rfl: 1  •  oxyCODONE-acetaminophen (PERCOCET)  MG per tablet, Take 1 tablet by mouth Every 4 (Four) Hours., Disp: , Rfl: 0  •  promethazine (PHENERGAN) 12.5 MG tablet, Take 2 tablets by mouth Every 6 (Six) Hours As Needed for Nausea., Disp: 30 tablet, Rfl: 1  •  SITagliptin (JANUVIA) 50 MG tablet, Take 50 mg by mouth Daily., Disp: , Rfl:   •  aspirin 81 MG chewable tablet, Chew 81 mg Daily., Disp: , Rfl:   •  cyclobenzaprine (FLEXERIL) 10 MG tablet, Take 10 mg by mouth 2 (Two) Times a Day As Needed., Disp: , Rfl:     Allergies   Allergen Reactions   • Naproxen Other (See Comments) and Rash     Blisters in mouth   • Tramadol Rash     Patient states he gets a rash in his mouth.        No family history on file.    Social History     Social History   • Marital status:      Spouse name: N/A   • Number of children: N/A   • Years of education: N/A     Occupational History   • Not on file.     Social History Main Topics   • Smoking status: Never Smoker   • Smokeless tobacco: Never Used   • Alcohol use No   • Drug use: No   • Sexual activity: Defer     Other Topics Concern   • Not on file     Social History Narrative       Review of Systems   Gastrointestinal: Negative for abdominal distention, abdominal pain, anal bleeding, blood in stool, constipation, diarrhea, nausea, rectal pain and vomiting.       Physical Exam   Abdominal: Soft. Bowel sounds are normal. He exhibits no distension and no mass. There is no tenderness. There is no rebound and no guarding. No hernia.             ASSESSMENT    Ventura was seen today for follow-up.    Diagnoses and all orders for this visit:    Incisional hernia, without  obstruction or gangrene      PLAN    1. Recheck in 6 months          This document has been electronically signed by Rui Shaver MD on November 15, 2017 9:35 AM

## 2018-01-27 ENCOUNTER — HOSPITAL ENCOUNTER (OUTPATIENT)
Facility: HOSPITAL | Age: 58
Setting detail: OBSERVATION
Discharge: HOME | End: 2018-01-30
Attending: EMERGENCY MEDICINE | Admitting: SURGERY

## 2018-01-27 ENCOUNTER — ANESTHESIA (OUTPATIENT)
Dept: PERIOP | Facility: HOSPITAL | Age: 58
End: 2018-01-27

## 2018-01-27 ENCOUNTER — APPOINTMENT (OUTPATIENT)
Dept: CT IMAGING | Facility: HOSPITAL | Age: 58
End: 2018-01-27

## 2018-01-27 ENCOUNTER — ANESTHESIA EVENT (OUTPATIENT)
Dept: PERIOP | Facility: HOSPITAL | Age: 58
End: 2018-01-27

## 2018-01-27 DIAGNOSIS — K35.30 ACUTE APPENDICITIS WITH LOCALIZED PERITONITIS: Primary | ICD-10-CM

## 2018-01-27 LAB
ALBUMIN SERPL-MCNC: 5 G/DL (ref 3.4–4.8)
ALBUMIN/GLOB SERPL: 1.4 G/DL (ref 1.1–1.8)
ALP SERPL-CCNC: 98 U/L (ref 38–126)
ALT SERPL W P-5'-P-CCNC: 41 U/L (ref 21–72)
ANION GAP SERPL CALCULATED.3IONS-SCNC: 18 MMOL/L (ref 5–15)
AST SERPL-CCNC: 34 U/L (ref 17–59)
BASOPHILS # BLD AUTO: 0.03 10*3/MM3 (ref 0–0.2)
BASOPHILS NFR BLD AUTO: 0.3 % (ref 0–2)
BILIRUB SERPL-MCNC: 0.6 MG/DL (ref 0.2–1.3)
BILIRUB UR QL STRIP: NEGATIVE
BUN BLD-MCNC: 12 MG/DL (ref 7–21)
BUN/CREAT SERPL: 18.5 (ref 7–25)
CALCIUM SPEC-SCNC: 10 MG/DL (ref 8.4–10.2)
CHLORIDE SERPL-SCNC: 95 MMOL/L (ref 95–110)
CLARITY UR: CLEAR
CO2 SERPL-SCNC: 25 MMOL/L (ref 22–31)
COLOR UR: YELLOW
CREAT BLD-MCNC: 0.65 MG/DL (ref 0.7–1.3)
DEPRECATED RDW RBC AUTO: 38.1 FL (ref 35.1–43.9)
EOSINOPHIL # BLD AUTO: 0.06 10*3/MM3 (ref 0–0.7)
EOSINOPHIL NFR BLD AUTO: 0.7 % (ref 0–7)
ERYTHROCYTE [DISTWIDTH] IN BLOOD BY AUTOMATED COUNT: 12.6 % (ref 11.5–14.5)
GFR SERPL CREATININE-BSD FRML MDRD: 128 ML/MIN/1.73 (ref 60–130)
GLOBULIN UR ELPH-MCNC: 3.7 GM/DL (ref 2.3–3.5)
GLUCOSE BLD-MCNC: 283 MG/DL (ref 60–100)
GLUCOSE BLDC GLUCOMTR-MCNC: 181 MG/DL (ref 70–130)
GLUCOSE BLDC GLUCOMTR-MCNC: 200 MG/DL (ref 70–130)
GLUCOSE BLDC GLUCOMTR-MCNC: 217 MG/DL (ref 70–130)
GLUCOSE UR STRIP-MCNC: ABNORMAL MG/DL
HCT VFR BLD AUTO: 45.1 % (ref 39–49)
HGB BLD-MCNC: 15.8 G/DL (ref 13.7–17.3)
HGB UR QL STRIP.AUTO: NEGATIVE
HOLD SPECIMEN: NORMAL
IMM GRANULOCYTES # BLD: 0.02 10*3/MM3 (ref 0–0.02)
IMM GRANULOCYTES NFR BLD: 0.2 % (ref 0–0.5)
INR PPP: 1.05 (ref 0.8–1.2)
KETONES UR QL STRIP: NEGATIVE
LEUKOCYTE ESTERASE UR QL STRIP.AUTO: NEGATIVE
LIPASE SERPL-CCNC: 34 U/L (ref 23–300)
LYMPHOCYTES # BLD AUTO: 1.41 10*3/MM3 (ref 0.6–4.2)
LYMPHOCYTES NFR BLD AUTO: 15.5 % (ref 10–50)
MCH RBC QN AUTO: 29.4 PG (ref 26.5–34)
MCHC RBC AUTO-ENTMCNC: 35 G/DL (ref 31.5–36.3)
MCV RBC AUTO: 83.8 FL (ref 80–98)
MONOCYTES # BLD AUTO: 0.51 10*3/MM3 (ref 0–0.9)
MONOCYTES NFR BLD AUTO: 5.6 % (ref 0–12)
NEUTROPHILS # BLD AUTO: 7.04 10*3/MM3 (ref 2–8.6)
NEUTROPHILS NFR BLD AUTO: 77.7 % (ref 37–80)
NITRITE UR QL STRIP: NEGATIVE
PH UR STRIP.AUTO: 7 [PH] (ref 5–9)
PLATELET # BLD AUTO: 324 10*3/MM3 (ref 150–450)
PMV BLD AUTO: 10.8 FL (ref 8–12)
POTASSIUM BLD-SCNC: 4.4 MMOL/L (ref 3.5–5.1)
PROT SERPL-MCNC: 8.7 G/DL (ref 6.3–8.6)
PROT UR QL STRIP: NEGATIVE
PROTHROMBIN TIME: 13.6 SECONDS (ref 11.1–15.3)
RBC # BLD AUTO: 5.38 10*6/MM3 (ref 4.37–5.74)
SODIUM BLD-SCNC: 138 MMOL/L (ref 137–145)
SP GR UR STRIP: 1.01 (ref 1–1.03)
UROBILINOGEN UR QL STRIP: ABNORMAL
WBC NRBC COR # BLD: 9.07 10*3/MM3 (ref 3.2–9.8)

## 2018-01-27 PROCEDURE — 63710000001 INSULIN ASPART PER 5 UNITS: Performed by: SURGERY

## 2018-01-27 PROCEDURE — 25010000002 CEFOXITIN PER 1 G: Performed by: NURSE ANESTHETIST, CERTIFIED REGISTERED

## 2018-01-27 PROCEDURE — 25010000002 CEFOXITIN PER 1 G: Performed by: SURGERY

## 2018-01-27 PROCEDURE — 99219 PR INITIAL OBSERVATION CARE/DAY 50 MINUTES: CPT | Performed by: SURGERY

## 2018-01-27 PROCEDURE — 25010000002 MIDAZOLAM PER 1 MG: Performed by: NURSE ANESTHETIST, CERTIFIED REGISTERED

## 2018-01-27 PROCEDURE — 74176 CT ABD & PELVIS W/O CONTRAST: CPT

## 2018-01-27 PROCEDURE — 88304 TISSUE EXAM BY PATHOLOGIST: CPT | Performed by: PATHOLOGY

## 2018-01-27 PROCEDURE — 85610 PROTHROMBIN TIME: CPT | Performed by: EMERGENCY MEDICINE

## 2018-01-27 PROCEDURE — 25010000002 HYDROMORPHONE PER 4 MG: Performed by: NURSE ANESTHETIST, CERTIFIED REGISTERED

## 2018-01-27 PROCEDURE — 25010000002 ONDANSETRON PER 1 MG: Performed by: SURGERY

## 2018-01-27 PROCEDURE — 80053 COMPREHEN METABOLIC PANEL: CPT | Performed by: EMERGENCY MEDICINE

## 2018-01-27 PROCEDURE — G0378 HOSPITAL OBSERVATION PER HR: HCPCS

## 2018-01-27 PROCEDURE — 25010000002 NEOSTIGMINE PER 0.5 MG: Performed by: NURSE ANESTHETIST, CERTIFIED REGISTERED

## 2018-01-27 PROCEDURE — 25010000002 HYDROMORPHONE PER 4 MG: Performed by: EMERGENCY MEDICINE

## 2018-01-27 PROCEDURE — 94760 N-INVAS EAR/PLS OXIMETRY 1: CPT

## 2018-01-27 PROCEDURE — 25010000002 PROPOFOL 10 MG/ML EMULSION: Performed by: NURSE ANESTHETIST, CERTIFIED REGISTERED

## 2018-01-27 PROCEDURE — 82962 GLUCOSE BLOOD TEST: CPT

## 2018-01-27 PROCEDURE — 94799 UNLISTED PULMONARY SVC/PX: CPT

## 2018-01-27 PROCEDURE — 96374 THER/PROPH/DIAG INJ IV PUSH: CPT

## 2018-01-27 PROCEDURE — 25010000002 FENTANYL CITRATE (PF) 100 MCG/2ML SOLUTION: Performed by: NURSE ANESTHETIST, CERTIFIED REGISTERED

## 2018-01-27 PROCEDURE — 96375 TX/PRO/DX INJ NEW DRUG ADDON: CPT

## 2018-01-27 PROCEDURE — 85025 COMPLETE CBC W/AUTO DIFF WBC: CPT | Performed by: EMERGENCY MEDICINE

## 2018-01-27 PROCEDURE — 96376 TX/PRO/DX INJ SAME DRUG ADON: CPT

## 2018-01-27 PROCEDURE — 25010000002 ONDANSETRON PER 1 MG: Performed by: NURSE ANESTHETIST, CERTIFIED REGISTERED

## 2018-01-27 PROCEDURE — 93005 ELECTROCARDIOGRAM TRACING: CPT | Performed by: EMERGENCY MEDICINE

## 2018-01-27 PROCEDURE — 99285 EMERGENCY DEPT VISIT HI MDM: CPT

## 2018-01-27 PROCEDURE — 25010000002 ONDANSETRON PER 1 MG: Performed by: EMERGENCY MEDICINE

## 2018-01-27 PROCEDURE — 44950 APPENDECTOMY: CPT | Performed by: SURGERY

## 2018-01-27 PROCEDURE — 83690 ASSAY OF LIPASE: CPT | Performed by: EMERGENCY MEDICINE

## 2018-01-27 PROCEDURE — 88304 TISSUE EXAM BY PATHOLOGIST: CPT | Performed by: SURGERY

## 2018-01-27 PROCEDURE — 81003 URINALYSIS AUTO W/O SCOPE: CPT | Performed by: EMERGENCY MEDICINE

## 2018-01-27 PROCEDURE — 93010 ELECTROCARDIOGRAM REPORT: CPT | Performed by: INTERNAL MEDICINE

## 2018-01-27 PROCEDURE — 25010000002 MORPHINE PER 10 MG: Performed by: EMERGENCY MEDICINE

## 2018-01-27 RX ORDER — LABETALOL HYDROCHLORIDE 5 MG/ML
5 INJECTION, SOLUTION INTRAVENOUS
Status: DISCONTINUED | OUTPATIENT
Start: 2018-01-27 | End: 2018-01-27 | Stop reason: HOSPADM

## 2018-01-27 RX ORDER — CEFOXITIN 1 G/1
INJECTION, POWDER, FOR SOLUTION INTRAVENOUS AS NEEDED
Status: DISCONTINUED | OUTPATIENT
Start: 2018-01-27 | End: 2018-01-27 | Stop reason: SURG

## 2018-01-27 RX ORDER — ONDANSETRON 2 MG/ML
4 INJECTION INTRAMUSCULAR; INTRAVENOUS ONCE
Status: COMPLETED | OUTPATIENT
Start: 2018-01-27 | End: 2018-01-27

## 2018-01-27 RX ORDER — DIPHENHYDRAMINE HYDROCHLORIDE 50 MG/ML
12.5 INJECTION INTRAMUSCULAR; INTRAVENOUS
Status: CANCELLED | OUTPATIENT
Start: 2018-01-27

## 2018-01-27 RX ORDER — BUPIVACAINE HYDROCHLORIDE AND EPINEPHRINE 5; 5 MG/ML; UG/ML
INJECTION, SOLUTION EPIDURAL; INTRACAUDAL; PERINEURAL AS NEEDED
Status: DISCONTINUED | OUTPATIENT
Start: 2018-01-27 | End: 2018-01-30 | Stop reason: HOSPADM

## 2018-01-27 RX ORDER — PROMETHAZINE HYDROCHLORIDE 25 MG/1
25 TABLET ORAL EVERY 6 HOURS PRN
Status: DISCONTINUED | OUTPATIENT
Start: 2018-01-27 | End: 2018-01-30 | Stop reason: HOSPADM

## 2018-01-27 RX ORDER — FLUMAZENIL 0.1 MG/ML
0.2 INJECTION INTRAVENOUS AS NEEDED
Status: CANCELLED | OUTPATIENT
Start: 2018-01-27

## 2018-01-27 RX ORDER — HYDROMORPHONE HCL IN 0.9% NACL 0.2 MG/ML
PLASTIC BAG, INJECTION (ML) INTRAVENOUS CONTINUOUS
Status: DISCONTINUED | OUTPATIENT
Start: 2018-01-27 | End: 2018-01-27

## 2018-01-27 RX ORDER — ONDANSETRON 4 MG/1
4 TABLET, FILM COATED ORAL EVERY 6 HOURS PRN
Status: DISCONTINUED | OUTPATIENT
Start: 2018-01-27 | End: 2018-01-30 | Stop reason: HOSPADM

## 2018-01-27 RX ORDER — NICOTINE POLACRILEX 4 MG
15 LOZENGE BUCCAL
Status: DISCONTINUED | OUTPATIENT
Start: 2018-01-27 | End: 2018-01-30 | Stop reason: HOSPADM

## 2018-01-27 RX ORDER — EPHEDRINE SULFATE 50 MG/ML
5 INJECTION, SOLUTION INTRAVENOUS ONCE AS NEEDED
Status: CANCELLED | OUTPATIENT
Start: 2018-01-27

## 2018-01-27 RX ORDER — FLUMAZENIL 0.1 MG/ML
0.2 INJECTION INTRAVENOUS AS NEEDED
Status: DISCONTINUED | OUTPATIENT
Start: 2018-01-27 | End: 2018-01-27 | Stop reason: HOSPADM

## 2018-01-27 RX ORDER — ACETAMINOPHEN 325 MG/1
650 TABLET ORAL ONCE AS NEEDED
Status: CANCELLED | OUTPATIENT
Start: 2018-01-27

## 2018-01-27 RX ORDER — ONDANSETRON 2 MG/ML
4 INJECTION INTRAMUSCULAR; INTRAVENOUS ONCE AS NEEDED
Status: CANCELLED | OUTPATIENT
Start: 2018-01-27

## 2018-01-27 RX ORDER — ONDANSETRON 2 MG/ML
4 INJECTION INTRAMUSCULAR; INTRAVENOUS ONCE AS NEEDED
Status: COMPLETED | OUTPATIENT
Start: 2018-01-27 | End: 2018-01-27

## 2018-01-27 RX ORDER — ACETAMINOPHEN 650 MG/1
650 SUPPOSITORY RECTAL ONCE AS NEEDED
Status: DISCONTINUED | OUTPATIENT
Start: 2018-01-27 | End: 2018-01-27 | Stop reason: HOSPADM

## 2018-01-27 RX ORDER — ACETAMINOPHEN 650 MG/1
650 SUPPOSITORY RECTAL ONCE AS NEEDED
Status: CANCELLED | OUTPATIENT
Start: 2018-01-27

## 2018-01-27 RX ORDER — MIDAZOLAM HYDROCHLORIDE 1 MG/ML
INJECTION INTRAMUSCULAR; INTRAVENOUS AS NEEDED
Status: DISCONTINUED | OUTPATIENT
Start: 2018-01-27 | End: 2018-01-27 | Stop reason: SURG

## 2018-01-27 RX ORDER — DOCUSATE SODIUM 100 MG/1
100 CAPSULE, LIQUID FILLED ORAL 2 TIMES DAILY PRN
Status: DISCONTINUED | OUTPATIENT
Start: 2018-01-27 | End: 2018-01-30 | Stop reason: HOSPADM

## 2018-01-27 RX ORDER — SODIUM CHLORIDE 9 MG/ML
75 INJECTION, SOLUTION INTRAVENOUS CONTINUOUS
Status: DISCONTINUED | OUTPATIENT
Start: 2018-01-27 | End: 2018-01-29

## 2018-01-27 RX ORDER — OXYCODONE AND ACETAMINOPHEN 10; 325 MG/1; MG/1
1 TABLET ORAL EVERY 4 HOURS
Status: DISCONTINUED | OUTPATIENT
Start: 2018-01-27 | End: 2018-01-30 | Stop reason: HOSPADM

## 2018-01-27 RX ORDER — ONDANSETRON 2 MG/ML
INJECTION INTRAMUSCULAR; INTRAVENOUS AS NEEDED
Status: DISCONTINUED | OUTPATIENT
Start: 2018-01-27 | End: 2018-01-27 | Stop reason: SURG

## 2018-01-27 RX ORDER — SODIUM CHLORIDE, SODIUM GLUCONATE, SODIUM ACETATE, POTASSIUM CHLORIDE, AND MAGNESIUM CHLORIDE 526; 502; 368; 37; 30 MG/100ML; MG/100ML; MG/100ML; MG/100ML; MG/100ML
INJECTION, SOLUTION INTRAVENOUS CONTINUOUS PRN
Status: DISCONTINUED | OUTPATIENT
Start: 2018-01-27 | End: 2018-01-27 | Stop reason: SURG

## 2018-01-27 RX ORDER — LISINOPRIL 5 MG/1
5 TABLET ORAL DAILY
Status: DISCONTINUED | OUTPATIENT
Start: 2018-01-27 | End: 2018-01-30 | Stop reason: HOSPADM

## 2018-01-27 RX ORDER — ACETAMINOPHEN 325 MG/1
650 TABLET ORAL ONCE AS NEEDED
Status: DISCONTINUED | OUTPATIENT
Start: 2018-01-27 | End: 2018-01-27 | Stop reason: HOSPADM

## 2018-01-27 RX ORDER — CYCLOBENZAPRINE HCL 10 MG
10 TABLET ORAL 2 TIMES DAILY PRN
Status: DISCONTINUED | OUTPATIENT
Start: 2018-01-27 | End: 2018-01-30 | Stop reason: HOSPADM

## 2018-01-27 RX ORDER — DIPHENHYDRAMINE HYDROCHLORIDE 50 MG/ML
12.5 INJECTION INTRAMUSCULAR; INTRAVENOUS
Status: DISCONTINUED | OUTPATIENT
Start: 2018-01-27 | End: 2018-01-27 | Stop reason: HOSPADM

## 2018-01-27 RX ORDER — LABETALOL HYDROCHLORIDE 5 MG/ML
5 INJECTION, SOLUTION INTRAVENOUS
Status: CANCELLED | OUTPATIENT
Start: 2018-01-27

## 2018-01-27 RX ORDER — ONDANSETRON 4 MG/1
4 TABLET, ORALLY DISINTEGRATING ORAL EVERY 6 HOURS PRN
Status: DISCONTINUED | OUTPATIENT
Start: 2018-01-27 | End: 2018-01-30 | Stop reason: HOSPADM

## 2018-01-27 RX ORDER — NALOXONE HCL 0.4 MG/ML
0.2 VIAL (ML) INJECTION AS NEEDED
Status: CANCELLED | OUTPATIENT
Start: 2018-01-27

## 2018-01-27 RX ORDER — NALOXONE HCL 0.4 MG/ML
0.2 VIAL (ML) INJECTION AS NEEDED
Status: DISCONTINUED | OUTPATIENT
Start: 2018-01-27 | End: 2018-01-27 | Stop reason: HOSPADM

## 2018-01-27 RX ORDER — DEXTROSE MONOHYDRATE 25 G/50ML
25 INJECTION, SOLUTION INTRAVENOUS
Status: DISCONTINUED | OUTPATIENT
Start: 2018-01-27 | End: 2018-01-30 | Stop reason: HOSPADM

## 2018-01-27 RX ORDER — ONDANSETRON 2 MG/ML
4 INJECTION INTRAMUSCULAR; INTRAVENOUS EVERY 6 HOURS PRN
Status: DISCONTINUED | OUTPATIENT
Start: 2018-01-27 | End: 2018-01-30 | Stop reason: HOSPADM

## 2018-01-27 RX ORDER — NALOXONE HCL 0.4 MG/ML
0.1 VIAL (ML) INJECTION
Status: DISCONTINUED | OUTPATIENT
Start: 2018-01-27 | End: 2018-01-30 | Stop reason: HOSPADM

## 2018-01-27 RX ORDER — ROCURONIUM BROMIDE 10 MG/ML
INJECTION, SOLUTION INTRAVENOUS AS NEEDED
Status: DISCONTINUED | OUTPATIENT
Start: 2018-01-27 | End: 2018-01-27 | Stop reason: SURG

## 2018-01-27 RX ORDER — GLYCOPYRROLATE 0.2 MG/ML
INJECTION INTRAMUSCULAR; INTRAVENOUS AS NEEDED
Status: DISCONTINUED | OUTPATIENT
Start: 2018-01-27 | End: 2018-01-27 | Stop reason: SURG

## 2018-01-27 RX ORDER — EPHEDRINE SULFATE 50 MG/ML
5 INJECTION, SOLUTION INTRAVENOUS ONCE AS NEEDED
Status: DISCONTINUED | OUTPATIENT
Start: 2018-01-27 | End: 2018-01-27 | Stop reason: HOSPADM

## 2018-01-27 RX ORDER — PROPOFOL 10 MG/ML
VIAL (ML) INTRAVENOUS AS NEEDED
Status: DISCONTINUED | OUTPATIENT
Start: 2018-01-27 | End: 2018-01-27 | Stop reason: SURG

## 2018-01-27 RX ORDER — ONDANSETRON 4 MG/1
4 TABLET, ORALLY DISINTEGRATING ORAL EVERY 8 HOURS
Status: DISCONTINUED | OUTPATIENT
Start: 2018-01-27 | End: 2018-01-30 | Stop reason: HOSPADM

## 2018-01-27 RX ORDER — LIDOCAINE HYDROCHLORIDE 20 MG/ML
INJECTION, SOLUTION INFILTRATION; PERINEURAL AS NEEDED
Status: DISCONTINUED | OUTPATIENT
Start: 2018-01-27 | End: 2018-01-27 | Stop reason: SURG

## 2018-01-27 RX ORDER — FENTANYL CITRATE 50 UG/ML
INJECTION, SOLUTION INTRAMUSCULAR; INTRAVENOUS AS NEEDED
Status: DISCONTINUED | OUTPATIENT
Start: 2018-01-27 | End: 2018-01-27 | Stop reason: SURG

## 2018-01-27 RX ADMIN — HYDROMORPHONE HYDROCHLORIDE 0.5 MG: 1 INJECTION, SOLUTION INTRAMUSCULAR; INTRAVENOUS; SUBCUTANEOUS at 14:48

## 2018-01-27 RX ADMIN — INSULIN ASPART 3 UNITS: 100 INJECTION, SOLUTION INTRAVENOUS; SUBCUTANEOUS at 21:26

## 2018-01-27 RX ADMIN — SODIUM CHLORIDE 125 ML/HR: 9 INJECTION, SOLUTION INTRAVENOUS at 16:19

## 2018-01-27 RX ADMIN — LIDOCAINE HYDROCHLORIDE 50 MG: 20 INJECTION, SOLUTION INFILTRATION; PERINEURAL at 13:24

## 2018-01-27 RX ADMIN — HYDROMORPHONE HYDROCHLORIDE 0.5 MG: 1 INJECTION, SOLUTION INTRAMUSCULAR; INTRAVENOUS; SUBCUTANEOUS at 14:54

## 2018-01-27 RX ADMIN — LISINOPRIL 5 MG: 5 TABLET ORAL at 17:42

## 2018-01-27 RX ADMIN — HYDROMORPHONE HYDROCHLORIDE: 10 INJECTION, SOLUTION INTRAMUSCULAR; INTRAVENOUS; SUBCUTANEOUS at 15:09

## 2018-01-27 RX ADMIN — ONDANSETRON 4 MG: 4 TABLET, ORALLY DISINTEGRATING ORAL at 22:39

## 2018-01-27 RX ADMIN — MORPHINE SULFATE 4 MG: 4 INJECTION, SOLUTION INTRAMUSCULAR; INTRAVENOUS at 09:00

## 2018-01-27 RX ADMIN — ONDANSETRON 4 MG: 2 INJECTION INTRAMUSCULAR; INTRAVENOUS at 16:42

## 2018-01-27 RX ADMIN — LIDOCAINE HYDROCHLORIDE 50 MG: 20 INJECTION, SOLUTION INFILTRATION; PERINEURAL at 14:18

## 2018-01-27 RX ADMIN — INSULIN ASPART 4 UNITS: 100 INJECTION, SOLUTION INTRAVENOUS; SUBCUTANEOUS at 17:43

## 2018-01-27 RX ADMIN — SODIUM CHLORIDE, SODIUM GLUCONATE, SODIUM ACETATE, POTASSIUM CHLORIDE, AND MAGNESIUM CHLORIDE: 526; 502; 368; 37; 30 INJECTION, SOLUTION INTRAVENOUS at 13:19

## 2018-01-27 RX ADMIN — FENTANYL CITRATE 50 MCG: 50 INJECTION, SOLUTION INTRAMUSCULAR; INTRAVENOUS at 14:06

## 2018-01-27 RX ADMIN — MIDAZOLAM 2 MG: 1 INJECTION INTRAMUSCULAR; INTRAVENOUS at 13:18

## 2018-01-27 RX ADMIN — HYDROMORPHONE HYDROCHLORIDE 1 MG: 1 INJECTION, SOLUTION INTRAMUSCULAR; INTRAVENOUS; SUBCUTANEOUS at 10:44

## 2018-01-27 RX ADMIN — FENTANYL CITRATE 50 MCG: 50 INJECTION, SOLUTION INTRAMUSCULAR; INTRAVENOUS at 13:24

## 2018-01-27 RX ADMIN — PROPOFOL 150 MG: 10 INJECTION, EMULSION INTRAVENOUS at 13:24

## 2018-01-27 RX ADMIN — HYDROMORPHONE HYDROCHLORIDE 1 MG: 1 INJECTION, SOLUTION INTRAMUSCULAR; INTRAVENOUS; SUBCUTANEOUS at 09:39

## 2018-01-27 RX ADMIN — OXYCODONE HYDROCHLORIDE AND ACETAMINOPHEN 1 TABLET: 10; 325 TABLET ORAL at 17:42

## 2018-01-27 RX ADMIN — Medication 1 MG: at 14:18

## 2018-01-27 RX ADMIN — CEFOXITIN SODIUM 2 G: 2 POWDER, FOR SOLUTION INTRAVENOUS at 21:25

## 2018-01-27 RX ADMIN — HYDROMORPHONE HYDROCHLORIDE 1 MG: 1 INJECTION, SOLUTION INTRAMUSCULAR; INTRAVENOUS; SUBCUTANEOUS at 11:57

## 2018-01-27 RX ADMIN — ONDANSETRON 4 MG: 2 INJECTION INTRAMUSCULAR; INTRAVENOUS at 13:24

## 2018-01-27 RX ADMIN — ONDANSETRON 4 MG: 2 INJECTION INTRAMUSCULAR; INTRAVENOUS at 14:46

## 2018-01-27 RX ADMIN — OXYCODONE HYDROCHLORIDE AND ACETAMINOPHEN 1 TABLET: 10; 325 TABLET ORAL at 21:26

## 2018-01-27 RX ADMIN — CEFOXITIN SODIUM 2 G: 1 POWDER, FOR SOLUTION INTRAVENOUS at 13:31

## 2018-01-27 RX ADMIN — HYDROMORPHONE HYDROCHLORIDE 0.5 MG: 1 INJECTION, SOLUTION INTRAMUSCULAR; INTRAVENOUS; SUBCUTANEOUS at 14:35

## 2018-01-27 RX ADMIN — HYDROMORPHONE HYDROCHLORIDE 0.5 MG: 1 INJECTION, SOLUTION INTRAMUSCULAR; INTRAVENOUS; SUBCUTANEOUS at 14:41

## 2018-01-27 RX ADMIN — GLYCOPYRROLATE 0.2 MG: 0.2 INJECTION, SOLUTION INTRAMUSCULAR; INTRAVENOUS at 14:18

## 2018-01-27 RX ADMIN — ONDANSETRON 4 MG: 2 INJECTION INTRAMUSCULAR; INTRAVENOUS at 12:00

## 2018-01-27 RX ADMIN — ONDANSETRON 4 MG: 2 INJECTION INTRAMUSCULAR; INTRAVENOUS at 09:00

## 2018-01-27 RX ADMIN — ROCURONIUM BROMIDE 25 MG: 10 INJECTION INTRAVENOUS at 13:31

## 2018-01-27 NOTE — ANESTHESIA PREPROCEDURE EVALUATION
Anesthesia Evaluation     NPO Solid Status: > 6 hours  NPO Liquid Status: > 6 hours     Airway   Mallampati: II  TM distance: >3 FB  possible difficult intubation  Dental    (+) poor dentition    Pulmonary     breath sounds clear to auscultation  Cardiovascular     Rhythm: regular  Rate: normal    (+) hypertension well controlled,       Neuro/Psych  (+) headaches, numbness, psychiatric history Anxiety,     GI/Hepatic/Renal/Endo    (+)  GERD poorly controlled, diabetes mellitus well controlled,     Musculoskeletal     Abdominal     Abdomen: tender.   Substance History      OB/GYN          Other   (+) arthritis                                             Anesthesia Plan    ASA 3 - emergent     general     intravenous induction   Anesthetic plan and risks discussed with patient.

## 2018-01-27 NOTE — ANESTHESIA PROCEDURE NOTES
Airway  Urgency: emergent      General Information and Staff    Patient location during procedure: OR  Anesthesiologist: COMPA JOHNS  CRNA: LIDIA EUCEDA    Consent for Airway (if performed for an anesthetic, see related documentation for consents)  Patient identity confirmed: verbally with patient  Consent: No emergent situation. Verbal consent obtained.  Consent given by: patient      Indications and Patient Condition  Indications for airway management: airway protection    Preoxygenated: yes  MILS maintained throughout  Mask difficulty assessment: 1 - vent by mask    Final Airway Details        Number of attempts at approach: 1

## 2018-01-27 NOTE — ANESTHESIA POSTPROCEDURE EVALUATION
Patient: Ventuar Boggs    Procedure Summary     Date Anesthesia Start Anesthesia Stop Room / Location    01/27/18 1319   [facility] 09 / [facility]       Procedure Diagnosis Surgeon Provider    APPENDECTOMY (N/A Abdomen) Acute appendicitis with localized peritonitis  (Acute appendicitis with localized peritonitis [K35.3]) MD Nolberto Lauren MD          Anesthesia Type: general  Last vitals  BP   131/93 (01/27/18 1256)   Temp   97.5 °F (36.4 °C) (01/27/18 0811)   Pulse   94 (01/27/18 1256)   Resp   18 (01/27/18 1045)     SpO2   93 % (01/27/18 1256)     Post Anesthesia Care and Evaluation    Patient location during evaluation: PACU  Patient participation: complete - patient participated  Level of consciousness: awake  Pain score: 1  Pain management: adequate  Airway patency: patent  Anesthetic complications: No anesthetic complications  PONV Status: none  Cardiovascular status: acceptable  Respiratory status: acceptable  Hydration status: acceptable

## 2018-01-28 LAB
ANION GAP SERPL CALCULATED.3IONS-SCNC: 11 MMOL/L (ref 5–15)
BUN BLD-MCNC: 19 MG/DL (ref 7–21)
BUN/CREAT SERPL: 24.4 (ref 7–25)
CALCIUM SPEC-SCNC: 8.8 MG/DL (ref 8.4–10.2)
CHLORIDE SERPL-SCNC: 99 MMOL/L (ref 95–110)
CO2 SERPL-SCNC: 25 MMOL/L (ref 22–31)
CREAT BLD-MCNC: 0.78 MG/DL (ref 0.7–1.3)
DEPRECATED RDW RBC AUTO: 41.1 FL (ref 35.1–43.9)
ERYTHROCYTE [DISTWIDTH] IN BLOOD BY AUTOMATED COUNT: 13 % (ref 11.5–14.5)
GFR SERPL CREATININE-BSD FRML MDRD: 103 ML/MIN/1.73 (ref 56–130)
GLUCOSE BLD-MCNC: 137 MG/DL (ref 60–100)
GLUCOSE BLDC GLUCOMTR-MCNC: 118 MG/DL (ref 70–130)
GLUCOSE BLDC GLUCOMTR-MCNC: 132 MG/DL (ref 70–130)
GLUCOSE BLDC GLUCOMTR-MCNC: 151 MG/DL (ref 70–130)
GLUCOSE BLDC GLUCOMTR-MCNC: 188 MG/DL (ref 70–130)
HCT VFR BLD AUTO: 38.9 % (ref 39–49)
HGB BLD-MCNC: 13.2 G/DL (ref 13.7–17.3)
MCH RBC QN AUTO: 29.1 PG (ref 26.5–34)
MCHC RBC AUTO-ENTMCNC: 33.9 G/DL (ref 31.5–36.3)
MCV RBC AUTO: 85.7 FL (ref 80–98)
PLATELET # BLD AUTO: 318 10*3/MM3 (ref 150–450)
PMV BLD AUTO: 10.7 FL (ref 8–12)
POTASSIUM BLD-SCNC: 4 MMOL/L (ref 3.5–5.1)
RBC # BLD AUTO: 4.54 10*6/MM3 (ref 4.37–5.74)
SODIUM BLD-SCNC: 135 MMOL/L (ref 137–145)
WBC NRBC COR # BLD: 11.18 10*3/MM3 (ref 3.2–9.8)

## 2018-01-28 PROCEDURE — 99024 POSTOP FOLLOW-UP VISIT: CPT | Performed by: SURGERY

## 2018-01-28 PROCEDURE — 25010000002 ENOXAPARIN PER 10 MG: Performed by: SURGERY

## 2018-01-28 PROCEDURE — 25010000002 CEFOXITIN PER 1 G: Performed by: SURGERY

## 2018-01-28 PROCEDURE — 63710000001 INSULIN ASPART PER 5 UNITS: Performed by: SURGERY

## 2018-01-28 PROCEDURE — 25010000002 HYDROMORPHONE PER 4 MG: Performed by: SURGERY

## 2018-01-28 PROCEDURE — 82962 GLUCOSE BLOOD TEST: CPT

## 2018-01-28 PROCEDURE — 63710000001 PROMETHAZINE PER 25 MG: Performed by: SURGERY

## 2018-01-28 PROCEDURE — 85027 COMPLETE CBC AUTOMATED: CPT | Performed by: SURGERY

## 2018-01-28 PROCEDURE — G0378 HOSPITAL OBSERVATION PER HR: HCPCS

## 2018-01-28 PROCEDURE — 80048 BASIC METABOLIC PNL TOTAL CA: CPT | Performed by: SURGERY

## 2018-01-28 RX ORDER — OXYCODONE HYDROCHLORIDE 5 MG/1
5 TABLET ORAL EVERY 4 HOURS PRN
Status: DISCONTINUED | OUTPATIENT
Start: 2018-01-28 | End: 2018-01-30 | Stop reason: HOSPADM

## 2018-01-28 RX ADMIN — HYDROMORPHONE HYDROCHLORIDE 0.5 MG: 1 INJECTION, SOLUTION INTRAMUSCULAR; INTRAVENOUS; SUBCUTANEOUS at 23:42

## 2018-01-28 RX ADMIN — OXYCODONE HYDROCHLORIDE AND ACETAMINOPHEN 1 TABLET: 10; 325 TABLET ORAL at 05:25

## 2018-01-28 RX ADMIN — CEFOXITIN SODIUM 2 G: 2 POWDER, FOR SOLUTION INTRAVENOUS at 00:39

## 2018-01-28 RX ADMIN — SODIUM CHLORIDE 75 ML/HR: 9 INJECTION, SOLUTION INTRAVENOUS at 21:19

## 2018-01-28 RX ADMIN — INSULIN ASPART 2 UNITS: 100 INJECTION, SOLUTION INTRAVENOUS; SUBCUTANEOUS at 21:19

## 2018-01-28 RX ADMIN — CEFOXITIN SODIUM 2 G: 2 POWDER, FOR SOLUTION INTRAVENOUS at 08:29

## 2018-01-28 RX ADMIN — OXYCODONE HYDROCHLORIDE AND ACETAMINOPHEN 1 TABLET: 10; 325 TABLET ORAL at 12:45

## 2018-01-28 RX ADMIN — ONDANSETRON 4 MG: 4 TABLET, ORALLY DISINTEGRATING ORAL at 23:42

## 2018-01-28 RX ADMIN — SODIUM CHLORIDE 125 ML/HR: 9 INJECTION, SOLUTION INTRAVENOUS at 09:45

## 2018-01-28 RX ADMIN — ENOXAPARIN SODIUM 40 MG: 40 INJECTION SUBCUTANEOUS at 08:29

## 2018-01-28 RX ADMIN — OXYCODONE HYDROCHLORIDE AND ACETAMINOPHEN 1 TABLET: 10; 325 TABLET ORAL at 00:38

## 2018-01-28 RX ADMIN — OXYCODONE HYDROCHLORIDE AND ACETAMINOPHEN 1 TABLET: 10; 325 TABLET ORAL at 20:30

## 2018-01-28 RX ADMIN — ONDANSETRON 4 MG: 4 TABLET, ORALLY DISINTEGRATING ORAL at 16:37

## 2018-01-28 RX ADMIN — HYDROMORPHONE HYDROCHLORIDE 0.5 MG: 1 INJECTION, SOLUTION INTRAMUSCULAR; INTRAVENOUS; SUBCUTANEOUS at 21:42

## 2018-01-28 RX ADMIN — SODIUM CHLORIDE 125 ML/HR: 9 INJECTION, SOLUTION INTRAVENOUS at 00:39

## 2018-01-28 RX ADMIN — HYDROMORPHONE HYDROCHLORIDE 0.5 MG: 1 INJECTION, SOLUTION INTRAMUSCULAR; INTRAVENOUS; SUBCUTANEOUS at 09:45

## 2018-01-28 RX ADMIN — PROMETHAZINE HYDROCHLORIDE 25 MG: 25 TABLET ORAL at 00:38

## 2018-01-28 RX ADMIN — OXYCODONE HYDROCHLORIDE AND ACETAMINOPHEN 1 TABLET: 10; 325 TABLET ORAL at 16:37

## 2018-01-28 RX ADMIN — HYDROMORPHONE HYDROCHLORIDE 0.5 MG: 1 INJECTION, SOLUTION INTRAMUSCULAR; INTRAVENOUS; SUBCUTANEOUS at 18:16

## 2018-01-28 RX ADMIN — HYDROMORPHONE HYDROCHLORIDE 0.5 MG: 1 INJECTION, SOLUTION INTRAMUSCULAR; INTRAVENOUS; SUBCUTANEOUS at 14:14

## 2018-01-29 LAB
DEPRECATED RDW RBC AUTO: 40.7 FL (ref 35.1–43.9)
ERYTHROCYTE [DISTWIDTH] IN BLOOD BY AUTOMATED COUNT: 12.9 % (ref 11.5–14.5)
GLUCOSE BLDC GLUCOMTR-MCNC: 136 MG/DL (ref 70–130)
GLUCOSE BLDC GLUCOMTR-MCNC: 151 MG/DL (ref 70–130)
GLUCOSE BLDC GLUCOMTR-MCNC: 154 MG/DL (ref 70–130)
HCT VFR BLD AUTO: 38.8 % (ref 39–49)
HGB BLD-MCNC: 13.2 G/DL (ref 13.7–17.3)
MCH RBC QN AUTO: 29.2 PG (ref 26.5–34)
MCHC RBC AUTO-ENTMCNC: 34 G/DL (ref 31.5–36.3)
MCV RBC AUTO: 85.8 FL (ref 80–98)
PLATELET # BLD AUTO: 292 10*3/MM3 (ref 150–450)
PMV BLD AUTO: 10.4 FL (ref 8–12)
RBC # BLD AUTO: 4.52 10*6/MM3 (ref 4.37–5.74)
WBC NRBC COR # BLD: 8.77 10*3/MM3 (ref 3.2–9.8)

## 2018-01-29 PROCEDURE — G0378 HOSPITAL OBSERVATION PER HR: HCPCS

## 2018-01-29 PROCEDURE — 85027 COMPLETE CBC AUTOMATED: CPT | Performed by: SURGERY

## 2018-01-29 PROCEDURE — 99024 POSTOP FOLLOW-UP VISIT: CPT | Performed by: SURGERY

## 2018-01-29 PROCEDURE — 25010000002 ENOXAPARIN PER 10 MG: Performed by: SURGERY

## 2018-01-29 PROCEDURE — 82962 GLUCOSE BLOOD TEST: CPT

## 2018-01-29 PROCEDURE — 63710000001 INSULIN ASPART PER 5 UNITS: Performed by: SURGERY

## 2018-01-29 PROCEDURE — 25010000002 HYDROMORPHONE PER 4 MG: Performed by: SURGERY

## 2018-01-29 RX ADMIN — OXYCODONE HYDROCHLORIDE 5 MG: 5 TABLET ORAL at 14:19

## 2018-01-29 RX ADMIN — HYDROMORPHONE HYDROCHLORIDE 0.5 MG: 1 INJECTION, SOLUTION INTRAMUSCULAR; INTRAVENOUS; SUBCUTANEOUS at 11:37

## 2018-01-29 RX ADMIN — HYDROMORPHONE HYDROCHLORIDE 0.5 MG: 1 INJECTION, SOLUTION INTRAMUSCULAR; INTRAVENOUS; SUBCUTANEOUS at 20:22

## 2018-01-29 RX ADMIN — ONDANSETRON 4 MG: 4 TABLET, ORALLY DISINTEGRATING ORAL at 22:39

## 2018-01-29 RX ADMIN — OXYCODONE HYDROCHLORIDE AND ACETAMINOPHEN 1 TABLET: 10; 325 TABLET ORAL at 05:39

## 2018-01-29 RX ADMIN — OXYCODONE HYDROCHLORIDE AND ACETAMINOPHEN 1 TABLET: 10; 325 TABLET ORAL at 01:05

## 2018-01-29 RX ADMIN — ONDANSETRON 4 MG: 4 TABLET, ORALLY DISINTEGRATING ORAL at 09:04

## 2018-01-29 RX ADMIN — OXYCODONE HYDROCHLORIDE 5 MG: 5 TABLET ORAL at 19:06

## 2018-01-29 RX ADMIN — INSULIN ASPART 2 UNITS: 100 INJECTION, SOLUTION INTRAVENOUS; SUBCUTANEOUS at 17:07

## 2018-01-29 RX ADMIN — OXYCODONE HYDROCHLORIDE AND ACETAMINOPHEN 1 TABLET: 10; 325 TABLET ORAL at 12:47

## 2018-01-29 RX ADMIN — OXYCODONE HYDROCHLORIDE AND ACETAMINOPHEN 1 TABLET: 10; 325 TABLET ORAL at 17:07

## 2018-01-29 RX ADMIN — OXYCODONE HYDROCHLORIDE AND ACETAMINOPHEN 1 TABLET: 10; 325 TABLET ORAL at 09:04

## 2018-01-29 RX ADMIN — LISINOPRIL 5 MG: 5 TABLET ORAL at 09:04

## 2018-01-29 RX ADMIN — HYDROMORPHONE HYDROCHLORIDE 0.5 MG: 1 INJECTION, SOLUTION INTRAMUSCULAR; INTRAVENOUS; SUBCUTANEOUS at 03:16

## 2018-01-29 RX ADMIN — ONDANSETRON 4 MG: 4 TABLET, ORALLY DISINTEGRATING ORAL at 17:07

## 2018-01-29 RX ADMIN — ONDANSETRON 4 MG: 4 TABLET, FILM COATED ORAL at 11:37

## 2018-01-29 RX ADMIN — HYDROMORPHONE HYDROCHLORIDE 0.5 MG: 1 INJECTION, SOLUTION INTRAMUSCULAR; INTRAVENOUS; SUBCUTANEOUS at 15:48

## 2018-01-29 RX ADMIN — HYDROMORPHONE HYDROCHLORIDE 0.5 MG: 1 INJECTION, SOLUTION INTRAMUSCULAR; INTRAVENOUS; SUBCUTANEOUS at 05:39

## 2018-01-29 RX ADMIN — HYDROMORPHONE HYDROCHLORIDE 0.5 MG: 1 INJECTION, SOLUTION INTRAMUSCULAR; INTRAVENOUS; SUBCUTANEOUS at 22:39

## 2018-01-29 RX ADMIN — ENOXAPARIN SODIUM 40 MG: 40 INJECTION SUBCUTANEOUS at 09:04

## 2018-01-30 VITALS
HEART RATE: 85 BPM | RESPIRATION RATE: 18 BRPM | HEIGHT: 68 IN | OXYGEN SATURATION: 94 % | SYSTOLIC BLOOD PRESSURE: 104 MMHG | DIASTOLIC BLOOD PRESSURE: 70 MMHG | TEMPERATURE: 97.8 F | WEIGHT: 190 LBS | BODY MASS INDEX: 28.79 KG/M2

## 2018-01-30 LAB
GLUCOSE BLDC GLUCOMTR-MCNC: 130 MG/DL (ref 70–130)
GLUCOSE BLDC GLUCOMTR-MCNC: 133 MG/DL (ref 70–130)
GLUCOSE BLDC GLUCOMTR-MCNC: 189 MG/DL (ref 70–130)
LAB AP CASE REPORT: NORMAL
Lab: NORMAL
PATH REPORT.FINAL DX SPEC: NORMAL
PATH REPORT.GROSS SPEC: NORMAL

## 2018-01-30 PROCEDURE — 99024 POSTOP FOLLOW-UP VISIT: CPT | Performed by: SURGERY

## 2018-01-30 PROCEDURE — 25010000002 ENOXAPARIN PER 10 MG: Performed by: SURGERY

## 2018-01-30 PROCEDURE — 63710000001 INSULIN ASPART PER 5 UNITS: Performed by: SURGERY

## 2018-01-30 PROCEDURE — G0378 HOSPITAL OBSERVATION PER HR: HCPCS

## 2018-01-30 PROCEDURE — 82962 GLUCOSE BLOOD TEST: CPT

## 2018-01-30 RX ORDER — OXYCODONE AND ACETAMINOPHEN 10; 325 MG/1; MG/1
1 TABLET ORAL EVERY 4 HOURS
Qty: 30 TABLET | Refills: 0 | Status: ON HOLD | OUTPATIENT
Start: 2018-01-30 | End: 2021-02-16

## 2018-01-30 RX ORDER — OXYCODONE HYDROCHLORIDE 5 MG/1
5 TABLET ORAL EVERY 4 HOURS PRN
Qty: 30 TABLET | Refills: 0 | Status: SHIPPED | OUTPATIENT
Start: 2018-01-30 | End: 2018-02-07

## 2018-01-30 RX ADMIN — OXYCODONE HYDROCHLORIDE AND ACETAMINOPHEN 1 TABLET: 10; 325 TABLET ORAL at 08:03

## 2018-01-30 RX ADMIN — INSULIN ASPART 2 UNITS: 100 INJECTION, SOLUTION INTRAVENOUS; SUBCUTANEOUS at 11:09

## 2018-01-30 RX ADMIN — ENOXAPARIN SODIUM 40 MG: 40 INJECTION SUBCUTANEOUS at 08:02

## 2018-01-30 RX ADMIN — OXYCODONE HYDROCHLORIDE 5 MG: 5 TABLET ORAL at 10:29

## 2018-01-30 RX ADMIN — OXYCODONE HYDROCHLORIDE AND ACETAMINOPHEN 1 TABLET: 10; 325 TABLET ORAL at 11:53

## 2018-01-30 RX ADMIN — LISINOPRIL 5 MG: 5 TABLET ORAL at 08:03

## 2018-01-30 RX ADMIN — OXYCODONE HYDROCHLORIDE AND ACETAMINOPHEN 1 TABLET: 10; 325 TABLET ORAL at 04:58

## 2018-01-30 RX ADMIN — OXYCODONE HYDROCHLORIDE AND ACETAMINOPHEN 1 TABLET: 10; 325 TABLET ORAL at 00:55

## 2018-01-30 RX ADMIN — ONDANSETRON 4 MG: 4 TABLET, ORALLY DISINTEGRATING ORAL at 08:03

## 2018-01-30 RX ADMIN — OXYCODONE HYDROCHLORIDE 5 MG: 5 TABLET ORAL at 02:41

## 2018-02-07 ENCOUNTER — OFFICE VISIT (OUTPATIENT)
Dept: SURGERY | Facility: CLINIC | Age: 58
End: 2018-02-07

## 2018-02-07 VITALS
DIASTOLIC BLOOD PRESSURE: 62 MMHG | HEIGHT: 68 IN | SYSTOLIC BLOOD PRESSURE: 102 MMHG | WEIGHT: 204 LBS | BODY MASS INDEX: 30.92 KG/M2

## 2018-02-07 DIAGNOSIS — Z09 FOLLOW UP: Primary | ICD-10-CM

## 2018-02-07 PROCEDURE — 99024 POSTOP FOLLOW-UP VISIT: CPT | Performed by: SURGERY

## 2018-02-07 NOTE — PROGRESS NOTES
CHIEF COMPLAINT:    Chief Complaint   Patient presents with   • Post-op     Open Appendectomy on 1/27/18.       HISTORY OF PRESENT ILLNESS:    Ventura Boggs is a 57 y.o. male who underwent open appendectomy on 1/27/18.  He returns today for follow up.  Overall he is feeling well.  He has no complaints today.    EXAM:  Vitals:    02/07/18 0846   BP: 102/62         Abdomen soft, incision CDI with staples out.    ASSESSMENT:    S/p open appendectomy    PLAN:    Overall doing well.  Will see prn.        This document has been electronically signed by Rusty Palacio MD on February 7, 2018 9:02 AM

## 2018-02-11 NOTE — DISCHARGE SUMMARY
Discharge Summary  Date: 01/30/18  Service: General Surgery  Attending: Rusty Palacio    Procedures: Open appendectomy  Consults: None  Discharge Diagnoses: Appendicitis  Hospital Course: Patient was admitted to the hospital following an on, gated open appendectomy.  We initially had difficulty with pain control following to his chronic use of opiates however he was able to tolerate an oral pain medication regimen.  At that time he was afebrile, tolerating a regular diet, and ambulatory and therefore deemed stable for discharge home.    Discharge diet: Regular    Discharge Medications:     Your medication list       As of 1/30/2018  1:31 PM      START taking these medications       Instructions Last Dose Given Next Dose Due    oxyCODONE 5 MG immediate release tablet   Commonly known as:  ROXICODONE        Take 1 tablet by mouth Every 4 (Four) Hours As Needed for Moderate Pain  or Severe Pain  for up to 8 days.           CHANGE how you take these medications       Instructions Last Dose Given Next Dose Due    oxyCODONE-acetaminophen  MG per tablet   Commonly known as:  PERCOCET   What changed:  Another medication with the same name was added. Make sure you understand how and when to take each.              oxyCODONE-acetaminophen  MG per tablet   Commonly known as:  PERCOCET   What changed:  You were already taking a medication with the same name, and this prescription was added. Make sure you understand how and when to take each.        Take 1 tablet by mouth Every 4 (Four) Hours.           CONTINUE taking these medications       Instructions Last Dose Given Next Dose Due    aspirin 81 MG chewable tablet              atorvastatin 20 MG tablet   Commonly known as:  LIPITOR              cyclobenzaprine 10 MG tablet   Commonly known as:  FLEXERIL              FREESTYLE TEST STRIPS test strip   Generic drug:  glucose blood              insulin lispro 100 UNIT/ML injection   Commonly known as:  humaLOG               lidocaine-prilocaine 2.5-2.5 % cream   Commonly known as:  EMLA              lisinopril 5 MG tablet   Commonly known as:  PRINIVIL,ZESTRIL              metFORMIN 500 MG tablet   Commonly known as:  GLUCOPHAGE              methadone 5 MG tablet   Commonly known as:  DOLOPHINE              ondansetron ODT 4 MG disintegrating tablet   Commonly known as:  ZOFRAN-ODT        Take 1 tablet by mouth Every 8 (Eight) Hours.         promethazine 12.5 MG tablet   Commonly known as:  PHENERGAN        Take 2 tablets by mouth Every 6 (Six) Hours As Needed for Nausea.         SITagliptin 50 MG tablet   Commonly known as:  JANUVIA                   Where to Get Your Medications      You can get these medications from any pharmacy     Bring a paper prescription for each of these medications    • oxyCODONE 5 MG immediate release tablet   • oxyCODONE-acetaminophen  MG per tablet             Activity Instructions     Discharge Activity       1) No driving while taking narcotics.   2) May shower, no tub bath or submerging incisions  3) Do not lift / push / pull more than 15 lbs.                   Your Scheduled Appointments     May 16, 2018  9:05 AM CDT   Office Visit with Rui Shaver MD   [facility] GENERAL SURGERY (--)    [address] [place] [address] Dr  [facility] [address] [place] [id number] [phone]           Arrive 15 minutes prior to appointment.                          This document has been electronically signed by Rusty Palacio MD on February 11, 2018 5:14 PM

## 2018-06-01 ENCOUNTER — OFFICE VISIT (OUTPATIENT)
Dept: SURGERY | Facility: CLINIC | Age: 58
End: 2018-06-01

## 2018-06-01 VITALS
BODY MASS INDEX: 30.98 KG/M2 | HEIGHT: 68 IN | WEIGHT: 204.4 LBS | DIASTOLIC BLOOD PRESSURE: 80 MMHG | SYSTOLIC BLOOD PRESSURE: 140 MMHG

## 2018-06-01 DIAGNOSIS — K43.2 INCISIONAL HERNIA, WITHOUT OBSTRUCTION OR GANGRENE: Primary | ICD-10-CM

## 2018-06-01 PROCEDURE — 99212 OFFICE O/P EST SF 10 MIN: CPT | Performed by: SURGERY

## 2018-06-01 RX ORDER — DULOXETIN HYDROCHLORIDE 20 MG/1
20 CAPSULE, DELAYED RELEASE ORAL NIGHTLY
COMMUNITY

## 2018-06-01 NOTE — PATIENT INSTRUCTIONS

## 2018-06-05 NOTE — PROGRESS NOTES
Chief Complaint   Patient presents with   • Follow-up     Recheck ventral hernia.        HPI  Doing well- no complaints. No bulging, no abdominal pain.  Past Medical History:   Diagnosis Date   • Arthritis    • Carpal tunnel syndrome    • Depressive disorder    • GERD (gastroesophageal reflux disease)    • Hernia    • History of urinary system disease    • Hypertension    • Migraine    • Rotator cuff syndrome    • Type 2 diabetes mellitus    • Ureteric stone        Past Surgical History:   Procedure Laterality Date   • ABDOMINAL SURGERY  08/2016   • APPENDECTOMY N/A 1/27/2018    Procedure: APPENDECTOMY;  Surgeon: Rusty Palacio MD;  Location: [facility];  Service:    • CHOLECYSTECTOMY     • CIRCUMCISION     • CYSTOSCOPY  08/13/2003    Cystoscopy and removal of J stent. Right ureteral stent.   • CYSTOSCOPY  08/13/2003    Cystoscopy and right stone extraction and placement of 26x 6 J-stent.   • INCISION AND DRAINAGE ABSCESS N/A 7/26/2017    Procedure: INCISION AND DRAINAGE ABD.ABSCESS;  Surgeon: Rui Shaver MD;  Location: [facility];  Service:    • VENTRAL/INCISIONAL HERNIA REPAIR N/A 5/31/2017    Procedure: LAPAROSCOPIC POSSIBLE OPEN ADHESIOLYSIS AND VENTRAL/INCISIONAL HERNIA REPAIR ;  Surgeon: Rui Shaver MD;  Location: [facility];  Service:          Current Outpatient Prescriptions:   •  aspirin 81 MG chewable tablet, Chew 81 mg Daily., Disp: , Rfl:   •  atorvastatin (LIPITOR) 20 MG tablet, Take 20 mg by mouth Every Night., Disp: , Rfl:   •  cyclobenzaprine (FLEXERIL) 10 MG tablet, Take 10 mg by mouth 2 (Two) Times a Day As Needed., Disp: , Rfl:   •  DULoxetine (CYMBALTA) 20 MG capsule, Take 20 mg by mouth Daily., Disp: , Rfl:   •  FREESTYLE TEST STRIPS test strip, , Disp: , Rfl:   •  insulin lispro (humaLOG) 100 UNIT/ML injection, Inject 8-12 Units under the skin 3 (Three) Times a Day As Needed (sliding scale). As needed per sliding scale, Disp: , Rfl:   •  lidocaine-prilocaine (EMLA) 2.5-2.5 % cream,  Apply 1 application topically As Needed., Disp: , Rfl:   •  lisinopril (PRINIVIL,ZESTRIL) 5 MG tablet, Take 5 mg by mouth Daily., Disp: , Rfl:   •  metFORMIN (GLUCOPHAGE) 500 MG tablet, Take 500 mg by mouth 2 (Two) Times a Day With Meals., Disp: , Rfl:   •  methadone (DOLOPHINE) 5 MG tablet, Take 5 mg by mouth 2 (Two) Times a Day., Disp: , Rfl:   •  ondansetron ODT (ZOFRAN-ODT) 4 MG disintegrating tablet, Take 1 tablet by mouth Every 8 (Eight) Hours., Disp: 15 tablet, Rfl: 1  •  oxyCODONE-acetaminophen (PERCOCET)  MG per tablet, Take 1 tablet by mouth Every 4 (Four) Hours., Disp: , Rfl: 0  •  promethazine (PHENERGAN) 12.5 MG tablet, Take 2 tablets by mouth Every 6 (Six) Hours As Needed for Nausea., Disp: 30 tablet, Rfl: 1  •  SITagliptin (JANUVIA) 50 MG tablet, Take 50 mg by mouth Daily., Disp: , Rfl:   •  oxyCODONE-acetaminophen (PERCOCET)  MG per tablet, Take 1 tablet by mouth Every 4 (Four) Hours., Disp: 30 tablet, Rfl: 0    Allergies   Allergen Reactions   • Naproxen Other (See Comments) and Rash     Blisters in mouth   • Tramadol Rash     Patient states he gets a rash in his mouth.        No family history on file.    Social History     Social History   • Marital status:      Spouse name: N/A   • Number of children: N/A   • Years of education: N/A     Occupational History   • Not on file.     Social History Main Topics   • Smoking status: Never Smoker   • Smokeless tobacco: Never Used   • Alcohol use No   • Drug use: No   • Sexual activity: Defer     Other Topics Concern   • Not on file     Social History Narrative   • No narrative on file       Review of Systems   Gastrointestinal: Negative for abdominal distention, abdominal pain, anal bleeding, blood in stool, constipation, diarrhea, nausea, rectal pain and vomiting.       Physical Exam   Abdominal: Soft. Bowel sounds are normal. He exhibits no distension and no mass. There is no tenderness. There is no rebound and no guarding. No hernia.          ASSESSMENT    Ventura was seen today for follow-up.    Diagnoses and all orders for this visit:    Incisional hernia, without obstruction or gangrene        PLAN    1. Recheck as needed  2. No restriction of activity          This document has been electronically signed by Rui Shaver MD on June 5, 2018 6:06 PM

## 2018-06-25 ENCOUNTER — APPOINTMENT (OUTPATIENT)
Dept: GENERAL RADIOLOGY | Facility: HOSPITAL | Age: 58
End: 2018-06-25

## 2018-06-25 ENCOUNTER — HOSPITAL ENCOUNTER (EMERGENCY)
Facility: HOSPITAL | Age: 58
Discharge: HOME | End: 2018-06-25
Attending: EMERGENCY MEDICINE | Admitting: EMERGENCY MEDICINE

## 2018-06-25 ENCOUNTER — APPOINTMENT (OUTPATIENT)
Dept: CT IMAGING | Facility: HOSPITAL | Age: 58
End: 2018-06-25

## 2018-06-25 VITALS
WEIGHT: 200 LBS | DIASTOLIC BLOOD PRESSURE: 83 MMHG | RESPIRATION RATE: 18 BRPM | BODY MASS INDEX: 29.62 KG/M2 | HEIGHT: 69 IN | TEMPERATURE: 97.9 F | OXYGEN SATURATION: 94 % | SYSTOLIC BLOOD PRESSURE: 131 MMHG | HEART RATE: 102 BPM

## 2018-06-25 DIAGNOSIS — R11.2 NON-INTRACTABLE VOMITING WITH NAUSEA, UNSPECIFIED VOMITING TYPE: ICD-10-CM

## 2018-06-25 DIAGNOSIS — E11.65 TYPE 2 DIABETES MELLITUS WITH HYPERGLYCEMIA, UNSPECIFIED LONG TERM INSULIN USE STATUS: ICD-10-CM

## 2018-06-25 DIAGNOSIS — R10.84 GENERALIZED ABDOMINAL PAIN: Primary | ICD-10-CM

## 2018-06-25 DIAGNOSIS — K76.0 FATTY LIVER: ICD-10-CM

## 2018-06-25 LAB
ALBUMIN SERPL-MCNC: 4.9 G/DL (ref 3.4–4.8)
ALBUMIN/GLOB SERPL: 1.3 G/DL (ref 1.1–1.8)
ALP SERPL-CCNC: 126 U/L (ref 38–126)
ALT SERPL W P-5'-P-CCNC: 36 U/L (ref 21–72)
ANION GAP SERPL CALCULATED.3IONS-SCNC: 14 MMOL/L (ref 5–15)
AST SERPL-CCNC: 34 U/L (ref 17–59)
BACTERIA UR QL AUTO: ABNORMAL /HPF
BASOPHILS # BLD AUTO: 0.05 10*3/MM3 (ref 0–0.2)
BASOPHILS NFR BLD AUTO: 0.3 % (ref 0–2)
BILIRUB SERPL-MCNC: 0.5 MG/DL (ref 0.2–1.3)
BILIRUB UR QL STRIP: NEGATIVE
BUN BLD-MCNC: 15 MG/DL (ref 7–21)
BUN/CREAT SERPL: 23.1 (ref 7–25)
CALCIUM SPEC-SCNC: 9.8 MG/DL (ref 8.4–10.2)
CHLORIDE SERPL-SCNC: 91 MMOL/L (ref 95–110)
CLARITY UR: CLEAR
CO2 SERPL-SCNC: 34 MMOL/L (ref 22–31)
COLOR UR: YELLOW
CREAT BLD-MCNC: 0.65 MG/DL (ref 0.7–1.3)
DEPRECATED RDW RBC AUTO: 37.5 FL (ref 35.1–43.9)
EOSINOPHIL # BLD AUTO: 0.2 10*3/MM3 (ref 0–0.7)
EOSINOPHIL NFR BLD AUTO: 1.3 % (ref 0–7)
ERYTHROCYTE [DISTWIDTH] IN BLOOD BY AUTOMATED COUNT: 12.4 % (ref 11.5–14.5)
GFR SERPL CREATININE-BSD FRML MDRD: 128 ML/MIN/1.73 (ref 56–130)
GLOBULIN UR ELPH-MCNC: 3.7 GM/DL (ref 2.3–3.5)
GLUCOSE BLD-MCNC: 311 MG/DL (ref 60–100)
GLUCOSE BLDC GLUCOMTR-MCNC: 241 MG/DL (ref 70–130)
GLUCOSE UR STRIP-MCNC: ABNORMAL MG/DL
HCT VFR BLD AUTO: 44.3 % (ref 39–49)
HGB BLD-MCNC: 15.6 G/DL (ref 13.7–17.3)
HGB UR QL STRIP.AUTO: NEGATIVE
HYALINE CASTS UR QL AUTO: ABNORMAL /LPF
IMM GRANULOCYTES # BLD: 0.04 10*3/MM3 (ref 0–0.02)
IMM GRANULOCYTES NFR BLD: 0.3 % (ref 0–0.5)
KETONES UR QL STRIP: NEGATIVE
LEUKOCYTE ESTERASE UR QL STRIP.AUTO: NEGATIVE
LIPASE SERPL-CCNC: 43 U/L (ref 23–300)
LYMPHOCYTES # BLD AUTO: 1.87 10*3/MM3 (ref 0.6–4.2)
LYMPHOCYTES NFR BLD AUTO: 12.4 % (ref 10–50)
MCH RBC QN AUTO: 29.7 PG (ref 26.5–34)
MCHC RBC AUTO-ENTMCNC: 35.2 G/DL (ref 31.5–36.3)
MCV RBC AUTO: 84.2 FL (ref 80–98)
MONOCYTES # BLD AUTO: 1.3 10*3/MM3 (ref 0–0.9)
MONOCYTES NFR BLD AUTO: 8.6 % (ref 0–12)
NEUTROPHILS # BLD AUTO: 11.62 10*3/MM3 (ref 2–8.6)
NEUTROPHILS NFR BLD AUTO: 77.1 % (ref 37–80)
NITRITE UR QL STRIP: NEGATIVE
PH UR STRIP.AUTO: <=5 [PH] (ref 5–9)
PLATELET # BLD AUTO: 334 10*3/MM3 (ref 150–450)
PMV BLD AUTO: 11.5 FL (ref 8–12)
POTASSIUM BLD-SCNC: 3.8 MMOL/L (ref 3.5–5.1)
PROT SERPL-MCNC: 8.6 G/DL (ref 6.3–8.6)
PROT UR QL STRIP: ABNORMAL
RBC # BLD AUTO: 5.26 10*6/MM3 (ref 4.37–5.74)
RBC # UR: ABNORMAL /HPF
REF LAB TEST METHOD: ABNORMAL
SODIUM BLD-SCNC: 139 MMOL/L (ref 137–145)
SP GR UR STRIP: >1.035 (ref 1–1.03)
SQUAMOUS #/AREA URNS HPF: ABNORMAL /HPF
UROBILINOGEN UR QL STRIP: ABNORMAL
WBC NRBC COR # BLD: 15.08 10*3/MM3 (ref 3.2–9.8)
WBC UR QL AUTO: ABNORMAL /HPF

## 2018-06-25 PROCEDURE — 25010000002 PROMETHAZINE PER 50 MG: Performed by: EMERGENCY MEDICINE

## 2018-06-25 PROCEDURE — 71045 X-RAY EXAM CHEST 1 VIEW: CPT

## 2018-06-25 PROCEDURE — 80053 COMPREHEN METABOLIC PANEL: CPT | Performed by: EMERGENCY MEDICINE

## 2018-06-25 PROCEDURE — 96374 THER/PROPH/DIAG INJ IV PUSH: CPT

## 2018-06-25 PROCEDURE — 81001 URINALYSIS AUTO W/SCOPE: CPT | Performed by: EMERGENCY MEDICINE

## 2018-06-25 PROCEDURE — 0 DIATRIZOATE MEGLUMINE & SODIUM PER 1 ML: Performed by: EMERGENCY MEDICINE

## 2018-06-25 PROCEDURE — 25010000002 MORPHINE PER 10 MG: Performed by: EMERGENCY MEDICINE

## 2018-06-25 PROCEDURE — 99283 EMERGENCY DEPT VISIT LOW MDM: CPT

## 2018-06-25 PROCEDURE — 25010000002 IOPAMIDOL 61 % SOLUTION: Performed by: EMERGENCY MEDICINE

## 2018-06-25 PROCEDURE — 83690 ASSAY OF LIPASE: CPT | Performed by: EMERGENCY MEDICINE

## 2018-06-25 PROCEDURE — 63710000001 INSULIN REGULAR HUMAN PER 5 UNITS: Performed by: EMERGENCY MEDICINE

## 2018-06-25 PROCEDURE — 82962 GLUCOSE BLOOD TEST: CPT

## 2018-06-25 PROCEDURE — 96361 HYDRATE IV INFUSION ADD-ON: CPT

## 2018-06-25 PROCEDURE — 25010000002 ONDANSETRON PER 1 MG: Performed by: EMERGENCY MEDICINE

## 2018-06-25 PROCEDURE — 85025 COMPLETE CBC W/AUTO DIFF WBC: CPT | Performed by: EMERGENCY MEDICINE

## 2018-06-25 PROCEDURE — 96375 TX/PRO/DX INJ NEW DRUG ADDON: CPT

## 2018-06-25 PROCEDURE — 96376 TX/PRO/DX INJ SAME DRUG ADON: CPT

## 2018-06-25 PROCEDURE — 74177 CT ABD & PELVIS W/CONTRAST: CPT

## 2018-06-25 RX ORDER — FAMOTIDINE 10 MG/ML
20 INJECTION, SOLUTION INTRAVENOUS ONCE
Status: COMPLETED | OUTPATIENT
Start: 2018-06-25 | End: 2018-06-25

## 2018-06-25 RX ORDER — MORPHINE SULFATE 2 MG/ML
2 INJECTION, SOLUTION INTRAMUSCULAR; INTRAVENOUS ONCE
Status: COMPLETED | OUTPATIENT
Start: 2018-06-25 | End: 2018-06-25

## 2018-06-25 RX ORDER — PROCHLORPERAZINE MALEATE 10 MG
10 TABLET ORAL EVERY 6 HOURS PRN
Qty: 10 TABLET | Refills: 0 | Status: SHIPPED | OUTPATIENT
Start: 2018-06-25 | End: 2019-02-15 | Stop reason: HOSPADM

## 2018-06-25 RX ORDER — FAMOTIDINE 20 MG/1
20 TABLET, FILM COATED ORAL 2 TIMES DAILY
Qty: 20 TABLET | Refills: 0 | Status: ON HOLD | OUTPATIENT
Start: 2018-06-25 | End: 2021-02-16

## 2018-06-25 RX ORDER — SODIUM CHLORIDE 9 MG/ML
125 INJECTION, SOLUTION INTRAVENOUS CONTINUOUS
Status: DISCONTINUED | OUTPATIENT
Start: 2018-06-25 | End: 2018-06-25 | Stop reason: HOSPADM

## 2018-06-25 RX ORDER — ONDANSETRON 2 MG/ML
4 INJECTION INTRAMUSCULAR; INTRAVENOUS ONCE
Status: COMPLETED | OUTPATIENT
Start: 2018-06-25 | End: 2018-06-25

## 2018-06-25 RX ORDER — DICYCLOMINE HCL 20 MG
20 TABLET ORAL EVERY 6 HOURS PRN
Qty: 10 TABLET | Refills: 0 | Status: ON HOLD | OUTPATIENT
Start: 2018-06-25 | End: 2021-02-16

## 2018-06-25 RX ORDER — SODIUM CHLORIDE 0.9 % (FLUSH) 0.9 %
10 SYRINGE (ML) INJECTION AS NEEDED
Status: DISCONTINUED | OUTPATIENT
Start: 2018-06-25 | End: 2018-06-25 | Stop reason: HOSPADM

## 2018-06-25 RX ORDER — PROMETHAZINE HYDROCHLORIDE 25 MG/ML
12.5 INJECTION, SOLUTION INTRAMUSCULAR; INTRAVENOUS ONCE
Status: COMPLETED | OUTPATIENT
Start: 2018-06-25 | End: 2018-06-25

## 2018-06-25 RX ADMIN — SODIUM CHLORIDE 1000 ML: 9 INJECTION, SOLUTION INTRAVENOUS at 04:11

## 2018-06-25 RX ADMIN — FAMOTIDINE 20 MG: 10 INJECTION, SOLUTION INTRAVENOUS at 04:11

## 2018-06-25 RX ADMIN — MORPHINE SULFATE 4 MG: 4 INJECTION INTRAVENOUS at 04:11

## 2018-06-25 RX ADMIN — SODIUM CHLORIDE 125 ML/HR: 900 INJECTION, SOLUTION INTRAVENOUS at 04:57

## 2018-06-25 RX ADMIN — MORPHINE SULFATE 2 MG: 2 INJECTION, SOLUTION INTRAMUSCULAR; INTRAVENOUS at 05:06

## 2018-06-25 RX ADMIN — ONDANSETRON 4 MG: 2 INJECTION INTRAMUSCULAR; INTRAVENOUS at 04:11

## 2018-06-25 RX ADMIN — IOPAMIDOL 90 ML: 612 INJECTION, SOLUTION INTRAVENOUS at 05:43

## 2018-06-25 RX ADMIN — PROMETHAZINE HYDROCHLORIDE 12.5 MG: 25 INJECTION INTRAMUSCULAR; INTRAVENOUS at 06:32

## 2018-06-25 RX ADMIN — DIATRIZOATE MEGLUMINE AND DIATRIZOATE SODIUM 45 ML: 660; 100 LIQUID ORAL; RECTAL at 05:43

## 2018-06-25 RX ADMIN — HUMAN INSULIN 4 UNITS: 100 INJECTION, SOLUTION SUBCUTANEOUS at 05:07

## 2018-06-25 RX ADMIN — ONDANSETRON 4 MG: 2 INJECTION INTRAMUSCULAR; INTRAVENOUS at 05:07

## 2018-07-07 ENCOUNTER — APPOINTMENT (OUTPATIENT)
Dept: ULTRASOUND IMAGING | Facility: HOSPITAL | Age: 58
End: 2018-07-07

## 2018-07-07 ENCOUNTER — HOSPITAL ENCOUNTER (EMERGENCY)
Facility: HOSPITAL | Age: 58
Discharge: HOME | End: 2018-07-07
Admitting: NURSE PRACTITIONER

## 2018-07-07 ENCOUNTER — APPOINTMENT (OUTPATIENT)
Dept: CT IMAGING | Facility: HOSPITAL | Age: 58
End: 2018-07-07

## 2018-07-07 ENCOUNTER — APPOINTMENT (OUTPATIENT)
Dept: GENERAL RADIOLOGY | Facility: HOSPITAL | Age: 58
End: 2018-07-07

## 2018-07-07 VITALS
HEIGHT: 69 IN | RESPIRATION RATE: 20 BRPM | WEIGHT: 200 LBS | OXYGEN SATURATION: 99 % | DIASTOLIC BLOOD PRESSURE: 94 MMHG | HEART RATE: 99 BPM | BODY MASS INDEX: 29.62 KG/M2 | SYSTOLIC BLOOD PRESSURE: 157 MMHG | TEMPERATURE: 98.2 F

## 2018-07-07 DIAGNOSIS — R10.84 GENERALIZED ABDOMINAL PAIN: ICD-10-CM

## 2018-07-07 DIAGNOSIS — R11.2 NON-INTRACTABLE VOMITING WITH NAUSEA, UNSPECIFIED VOMITING TYPE: ICD-10-CM

## 2018-07-07 DIAGNOSIS — K76.0 FATTY LIVER: Primary | ICD-10-CM

## 2018-07-07 LAB
ALBUMIN SERPL-MCNC: 4.7 G/DL (ref 3.4–4.8)
ALBUMIN/GLOB SERPL: 1.2 G/DL (ref 1.1–1.8)
ALP SERPL-CCNC: 128 U/L (ref 38–126)
ALT SERPL W P-5'-P-CCNC: 27 U/L (ref 21–72)
AMYLASE SERPL-CCNC: 53 U/L (ref 50–130)
ANION GAP SERPL CALCULATED.3IONS-SCNC: 15 MMOL/L (ref 5–15)
AST SERPL-CCNC: 25 U/L (ref 17–59)
BASOPHILS # BLD AUTO: 0.03 10*3/MM3 (ref 0–0.2)
BASOPHILS NFR BLD AUTO: 0.3 % (ref 0–2)
BILIRUB SERPL-MCNC: 0.6 MG/DL (ref 0.2–1.3)
BUN BLD-MCNC: 12 MG/DL (ref 7–21)
BUN/CREAT SERPL: 18.5 (ref 7–25)
CALCIUM SPEC-SCNC: 9.6 MG/DL (ref 8.4–10.2)
CHLORIDE SERPL-SCNC: 95 MMOL/L (ref 95–110)
CO2 SERPL-SCNC: 27 MMOL/L (ref 22–31)
CREAT BLD-MCNC: 0.65 MG/DL (ref 0.7–1.3)
DEPRECATED RDW RBC AUTO: 37.4 FL (ref 35.1–43.9)
EOSINOPHIL # BLD AUTO: 0.19 10*3/MM3 (ref 0–0.7)
EOSINOPHIL NFR BLD AUTO: 1.9 % (ref 0–7)
ERYTHROCYTE [DISTWIDTH] IN BLOOD BY AUTOMATED COUNT: 12.4 % (ref 11.5–14.5)
GFR SERPL CREATININE-BSD FRML MDRD: 128 ML/MIN/1.73 (ref 56–130)
GLOBULIN UR ELPH-MCNC: 4 GM/DL (ref 2.3–3.5)
GLUCOSE BLD-MCNC: 280 MG/DL (ref 60–100)
HCT VFR BLD AUTO: 45.4 % (ref 39–49)
HGB BLD-MCNC: 16.5 G/DL (ref 13.7–17.3)
HOLD SPECIMEN: NORMAL
HOLD SPECIMEN: NORMAL
IMM GRANULOCYTES # BLD: 0.03 10*3/MM3 (ref 0–0.02)
IMM GRANULOCYTES NFR BLD: 0.3 % (ref 0–0.5)
LIPASE SERPL-CCNC: 46 U/L (ref 23–300)
LYMPHOCYTES # BLD AUTO: 2.44 10*3/MM3 (ref 0.6–4.2)
LYMPHOCYTES NFR BLD AUTO: 23.8 % (ref 10–50)
MCH RBC QN AUTO: 30.3 PG (ref 26.5–34)
MCHC RBC AUTO-ENTMCNC: 36.3 G/DL (ref 31.5–36.3)
MCV RBC AUTO: 83.5 FL (ref 80–98)
MONOCYTES # BLD AUTO: 1.27 10*3/MM3 (ref 0–0.9)
MONOCYTES NFR BLD AUTO: 12.4 % (ref 0–12)
NEUTROPHILS # BLD AUTO: 6.29 10*3/MM3 (ref 2–8.6)
NEUTROPHILS NFR BLD AUTO: 61.3 % (ref 37–80)
NRBC BLD MANUAL-RTO: 0 /100 WBC (ref 0–0)
PLATELET # BLD AUTO: 330 10*3/MM3 (ref 150–450)
PMV BLD AUTO: 10.6 FL (ref 8–12)
POTASSIUM BLD-SCNC: 4.1 MMOL/L (ref 3.5–5.1)
PROT SERPL-MCNC: 8.7 G/DL (ref 6.3–8.6)
RBC # BLD AUTO: 5.44 10*6/MM3 (ref 4.37–5.74)
SODIUM BLD-SCNC: 137 MMOL/L (ref 137–145)
WBC NRBC COR # BLD: 10.25 10*3/MM3 (ref 3.2–9.8)
WHOLE BLOOD HOLD SPECIMEN: NORMAL
WHOLE BLOOD HOLD SPECIMEN: NORMAL

## 2018-07-07 PROCEDURE — 74022 RADEX COMPL AQT ABD SERIES: CPT

## 2018-07-07 PROCEDURE — 76705 ECHO EXAM OF ABDOMEN: CPT

## 2018-07-07 PROCEDURE — 99284 EMERGENCY DEPT VISIT MOD MDM: CPT

## 2018-07-07 PROCEDURE — 83690 ASSAY OF LIPASE: CPT | Performed by: NURSE PRACTITIONER

## 2018-07-07 PROCEDURE — 25010000002 MORPHINE PER 10 MG: Performed by: NURSE PRACTITIONER

## 2018-07-07 PROCEDURE — 25010000002 ONDANSETRON PER 1 MG: Performed by: NURSE PRACTITIONER

## 2018-07-07 PROCEDURE — 96376 TX/PRO/DX INJ SAME DRUG ADON: CPT

## 2018-07-07 PROCEDURE — 85025 COMPLETE CBC W/AUTO DIFF WBC: CPT | Performed by: NURSE PRACTITIONER

## 2018-07-07 PROCEDURE — 74177 CT ABD & PELVIS W/CONTRAST: CPT

## 2018-07-07 PROCEDURE — 25010000002 PROMETHAZINE PER 50 MG: Performed by: NURSE PRACTITIONER

## 2018-07-07 PROCEDURE — 80053 COMPREHEN METABOLIC PANEL: CPT | Performed by: NURSE PRACTITIONER

## 2018-07-07 PROCEDURE — 96375 TX/PRO/DX INJ NEW DRUG ADDON: CPT

## 2018-07-07 PROCEDURE — 25010000002 IOPAMIDOL 61 % SOLUTION: Performed by: NURSE PRACTITIONER

## 2018-07-07 PROCEDURE — 96374 THER/PROPH/DIAG INJ IV PUSH: CPT

## 2018-07-07 PROCEDURE — 82150 ASSAY OF AMYLASE: CPT | Performed by: NURSE PRACTITIONER

## 2018-07-07 RX ORDER — SODIUM CHLORIDE 0.9 % (FLUSH) 0.9 %
10 SYRINGE (ML) INJECTION AS NEEDED
Status: DISCONTINUED | OUTPATIENT
Start: 2018-07-07 | End: 2018-07-07 | Stop reason: HOSPADM

## 2018-07-07 RX ORDER — PROMETHAZINE HYDROCHLORIDE 25 MG/ML
12.5 INJECTION, SOLUTION INTRAMUSCULAR; INTRAVENOUS ONCE
Status: COMPLETED | OUTPATIENT
Start: 2018-07-07 | End: 2018-07-07

## 2018-07-07 RX ORDER — ONDANSETRON 2 MG/ML
4 INJECTION INTRAMUSCULAR; INTRAVENOUS ONCE
Status: COMPLETED | OUTPATIENT
Start: 2018-07-07 | End: 2018-07-07

## 2018-07-07 RX ORDER — PROMETHAZINE HYDROCHLORIDE 25 MG/1
25 TABLET ORAL EVERY 6 HOURS PRN
Qty: 20 TABLET | Refills: 0 | Status: SHIPPED | OUTPATIENT
Start: 2018-07-07 | End: 2019-02-15 | Stop reason: HOSPADM

## 2018-07-07 RX ORDER — MORPHINE SULFATE 2 MG/ML
8 INJECTION, SOLUTION INTRAMUSCULAR; INTRAVENOUS ONCE
Status: COMPLETED | OUTPATIENT
Start: 2018-07-07 | End: 2018-07-07

## 2018-07-07 RX ORDER — ONDANSETRON 4 MG/1
4 TABLET, ORALLY DISINTEGRATING ORAL EVERY 8 HOURS PRN
Qty: 15 TABLET | Refills: 0 | Status: ON HOLD | OUTPATIENT
Start: 2018-07-07 | End: 2021-02-16

## 2018-07-07 RX ORDER — DICYCLOMINE HYDROCHLORIDE 10 MG/1
10 CAPSULE ORAL 3 TIMES DAILY PRN
Qty: 15 CAPSULE | Refills: 0 | Status: SHIPPED | OUTPATIENT
Start: 2018-07-07 | End: 2018-07-12

## 2018-07-07 RX ADMIN — IOPAMIDOL 90 ML: 612 INJECTION, SOLUTION INTRAVENOUS at 19:02

## 2018-07-07 RX ADMIN — SODIUM CHLORIDE 1000 ML: 900 INJECTION, SOLUTION INTRAVENOUS at 15:51

## 2018-07-07 RX ADMIN — ONDANSETRON HYDROCHLORIDE 4 MG: 2 INJECTION, SOLUTION INTRAMUSCULAR; INTRAVENOUS at 15:51

## 2018-07-07 RX ADMIN — MORPHINE SULFATE 4 MG: 4 INJECTION, SOLUTION INTRAMUSCULAR; INTRAVENOUS at 15:51

## 2018-07-07 RX ADMIN — MORPHINE SULFATE 4 MG: 4 INJECTION INTRAVENOUS at 17:11

## 2018-07-07 RX ADMIN — PROMETHAZINE HYDROCHLORIDE 12.5 MG: 25 INJECTION INTRAMUSCULAR; INTRAVENOUS at 17:07

## 2018-07-07 RX ADMIN — MORPHINE SULFATE 8 MG: 2 INJECTION, SOLUTION INTRAMUSCULAR; INTRAVENOUS at 18:33

## 2018-07-07 NOTE — ED PROVIDER NOTES
Subjective   57-year-old male reports emergency department complaining of 2 day abdominal pain.  He was seen here in the emergency department on 6/25 for the same complaint.  Negative workup then.  CT abdomen pelvis without contrast showed a fatty liver.  He reports the pain is sharp generalized abdominal pain, however he does report some significant pain in the right upper quadrant.        History provided by:  Patient      Review of Systems   Constitutional: Negative for chills, fatigue and fever.   HENT: Negative for congestion and ear pain.    Respiratory: Negative for choking and wheezing.    Cardiovascular: Negative for chest pain and palpitations.   Gastrointestinal: Positive for abdominal pain, nausea and vomiting.   Genitourinary: Negative for dysuria.   Musculoskeletal: Positive for arthralgias.   Skin: Negative for rash.   Allergic/Immunologic: Negative for immunocompromised state.   Neurological: Negative for dizziness.   Hematological: Negative for adenopathy.   Psychiatric/Behavioral: Negative for confusion.       Past Medical History:   Diagnosis Date   • Arthritis    • Carpal tunnel syndrome    • Depressive disorder    • GERD (gastroesophageal reflux disease)    • Hernia    • History of urinary system disease    • Hypertension    • Migraine    • Rotator cuff syndrome    • Type 2 diabetes mellitus (CMS/HCC)    • Ureteric stone        Allergies   Allergen Reactions   • Naproxen Other (See Comments) and Rash     Blisters in mouth   • Tramadol Rash     Patient states he gets a rash in his mouth.        Past Surgical History:   Procedure Laterality Date   • ABDOMINAL SURGERY  08/2016   • APPENDECTOMY N/A 1/27/2018    Procedure: APPENDECTOMY;  Surgeon: Rusty Palacio MD;  Location: [facility];  Service:    • CHOLECYSTECTOMY     • CIRCUMCISION     • CYSTOSCOPY  08/13/2003    Cystoscopy and removal of J stent. Right ureteral stent.   • CYSTOSCOPY  08/13/2003    Cystoscopy and right stone extraction  and placement of 26x 6 J-stent.   • INCISION AND DRAINAGE ABSCESS N/A 7/26/2017    Procedure: INCISION AND DRAINAGE ABD.ABSCESS;  Surgeon: Rui Shaver MD;  Location: [facility];  Service:    • VENTRAL/INCISIONAL HERNIA REPAIR N/A 5/31/2017    Procedure: LAPAROSCOPIC POSSIBLE OPEN ADHESIOLYSIS AND VENTRAL/INCISIONAL HERNIA REPAIR ;  Surgeon: Rui Shaver MD;  Location: [facility];  Service:        History reviewed. No pertinent family history.    Social History     Social History   • Marital status:      Social History Main Topics   • Smoking status: Never Smoker   • Smokeless tobacco: Never Used   • Alcohol use No   • Drug use: No   • Sexual activity: Defer     Other Topics Concern   • Not on file           Objective   Physical Exam   Constitutional: He is oriented to person, place, and time. Vital signs are normal. He appears well-developed and well-nourished.   HENT:   Head: Normocephalic.   Nose: Nose normal.   Eyes: Conjunctivae are normal. Pupils are equal, round, and reactive to light.   Neck: Normal range of motion.   Cardiovascular: Normal rate, regular rhythm and normal heart sounds.    Pulmonary/Chest: Effort normal and breath sounds normal.   Abdominal: Soft. There is generalized tenderness and tenderness in the right upper quadrant.       Musculoskeletal: Normal range of motion.   Neurological: He is alert and oriented to person, place, and time. GCS eye subscore is 4. GCS verbal subscore is 5. GCS motor subscore is 6.   Skin: Skin is warm and dry.   Psychiatric: He has a normal mood and affect.   Nursing note and vitals reviewed.      Procedures           ED Course  ED Course as of Jul 07 1952   Sat Jul 07, 2018   1858 Updated patient regards to his labs and imaging.  We'll perform CT abdomen pelvis with IV contrast only for further evaluation.  [JL]      ED Course User Index  [JL] Silvano Pino, APRN        ..  CT Abdomen Pelvis With Contrast   Final Result    CONCLUSION:   1. No evidence of  an acute intra-abdominal/pelvic process.           Electronically signed by:  HALI Jean MD  7/7/2018 7:23 PM   CDT Workstation: [id number]      XR Abdomen 2 View With Chest 1 View   Final Result   CONCLUSION:          1. Small focus of airspace disease in the right lower lobe.   2. Negative abdominal examination..                                  If pain or symptoms persist beyond reasonable expectations, a   contrast enhanced CT examination is suggested, as is deemed   clinical appropriate.                           Electronically signed by:  HALI Jean MD  7/7/2018 7:14 PM   CDT Workstation: [id number]      US Liver   Final Result   CONCLUSION:            1.  Status post cholecystectomy.     2. No evidence of an acute process.                  Electronically signed by:  HALI Jean MD  7/7/2018 5:13 PM   CDT Workstation: [id number]        ..  Results for orders placed or performed during the hospital encounter of 07/07/18   Comprehensive Metabolic Panel   Result Value Ref Range    Glucose 280 (H) 60 - 100 mg/dL    BUN 12 7 - 21 mg/dL    Creatinine 0.65 (L) 0.70 - 1.30 mg/dL    Sodium 137 137 - 145 mmol/L    Potassium 4.1 3.5 - 5.1 mmol/L    Chloride 95 95 - 110 mmol/L    CO2 27.0 22.0 - 31.0 mmol/L    Calcium 9.6 8.4 - 10.2 mg/dL    Total Protein 8.7 (H) 6.3 - 8.6 g/dL    Albumin 4.70 3.40 - 4.80 g/dL    ALT (SGPT) 27 21 - 72 U/L    AST (SGOT) 25 17 - 59 U/L    Alkaline Phosphatase 128 (H) 38 - 126 U/L    Total Bilirubin 0.6 0.2 - 1.3 mg/dL    eGFR Non  Amer 128 56 - 130 mL/min/1.73    Globulin 4.0 (H) 2.3 - 3.5 gm/dL    A/G Ratio 1.2 1.1 - 1.8 g/dL    BUN/Creatinine Ratio 18.5 7.0 - 25.0    Anion Gap 15.0 5.0 - 15.0 mmol/L   Lipase   Result Value Ref Range    Lipase 46 23 - 300 U/L   Amylase   Result Value Ref Range    Amylase 53 50 - 130 U/L   CBC Auto Differential   Result Value Ref Range    WBC 10.25 (H) 3.20 - 9.80 10*3/mm3    RBC 5.44 4.37 - 5.74 10*6/mm3    Hemoglobin 16.5 13.7 - 17.3  g/dL    Hematocrit 45.4 39.0 - 49.0 %    MCV 83.5 80.0 - 98.0 fL    MCH 30.3 26.5 - 34.0 pg    MCHC 36.3 31.5 - 36.3 g/dL    RDW 12.4 11.5 - 14.5 %    RDW-SD 37.4 35.1 - 43.9 fl    MPV 10.6 8.0 - 12.0 fL    Platelets 330 150 - 450 10*3/mm3    Neutrophil % 61.3 37.0 - 80.0 %    Lymphocyte % 23.8 10.0 - 50.0 %    Monocyte % 12.4 (H) 0.0 - 12.0 %    Eosinophil % 1.9 0.0 - 7.0 %    Basophil % 0.3 0.0 - 2.0 %    Immature Grans % 0.3 0.0 - 0.5 %    Neutrophils, Absolute 6.29 2.00 - 8.60 10*3/mm3    Lymphocytes, Absolute 2.44 0.60 - 4.20 10*3/mm3    Monocytes, Absolute 1.27 (H) 0.00 - 0.90 10*3/mm3    Eosinophils, Absolute 0.19 0.00 - 0.70 10*3/mm3    Basophils, Absolute 0.03 0.00 - 0.20 10*3/mm3    Immature Grans, Absolute 0.03 (H) 0.00 - 0.02 10*3/mm3    nRBC 0.0 0.0 - 0.0 /100 WBC   Light Blue Top   Result Value Ref Range    Extra Tube hold for add-on    Green Top (Gel)   Result Value Ref Range    Extra Tube Hold for add-ons.    Lavender Top   Result Value Ref Range    Extra Tube hold for add-on    Gold Top - SST   Result Value Ref Range    Extra Tube Hold for add-ons.                MDM  Number of Diagnoses or Management Options  Fatty liver:   Generalized abdominal pain:   Non-intractable vomiting with nausea, unspecified vomiting type:      Amount and/or Complexity of Data Reviewed  Clinical lab tests: reviewed  Tests in the radiology section of CPT®: reviewed    Risk of Complications, Morbidity, and/or Mortality  General comments: Patient instructed on the results of his plain film, ultrasound, CT with IV contrast.  All his labs.  The patient reports he has a follow-up with a GI provider on Wednesday.  The patient was sent home with Bentyl, Zofran, Phenergan.    Patient Progress  Patient progress: stable        Final diagnoses:   Fatty liver   Generalized abdominal pain   Non-intractable vomiting with nausea, unspecified vomiting type            Silvano Pino, APRN  07/07/18 1953

## 2019-02-05 ENCOUNTER — PREP FOR SURGERY (OUTPATIENT)
Dept: OTHER | Facility: HOSPITAL | Age: 59
End: 2019-02-05

## 2019-02-05 ENCOUNTER — HOSPITAL ENCOUNTER (INPATIENT)
Facility: HOSPITAL | Age: 59
LOS: 10 days | Discharge: HOME HEALTH | End: 2019-02-15
Attending: FAMILY MEDICINE | Admitting: FAMILY MEDICINE

## 2019-02-05 DIAGNOSIS — Z74.09 IMPAIRED FUNCTIONAL MOBILITY, BALANCE, GAIT, AND ENDURANCE: ICD-10-CM

## 2019-02-05 DIAGNOSIS — E11.65 UNCONTROLLED TYPE 2 DIABETES MELLITUS WITH HYPERGLYCEMIA (HCC): ICD-10-CM

## 2019-02-05 DIAGNOSIS — G89.29 CHRONIC INTRACTABLE PAIN: ICD-10-CM

## 2019-02-05 DIAGNOSIS — Z78.9 MEDICALLY COMPLEX PATIENT: Primary | ICD-10-CM

## 2019-02-05 DIAGNOSIS — Z74.09 IMPAIRED MOBILITY AND ADLS: ICD-10-CM

## 2019-02-05 DIAGNOSIS — R29.90 STROKE-LIKE SYMPTOMS: Primary | ICD-10-CM

## 2019-02-05 DIAGNOSIS — Z78.9 IMPAIRED MOBILITY AND ADLS: ICD-10-CM

## 2019-02-05 DIAGNOSIS — Z78.9 MEDICALLY COMPLEX PATIENT: ICD-10-CM

## 2019-02-05 DIAGNOSIS — M62.81 LEFT-SIDED MUSCLE WEAKNESS: ICD-10-CM

## 2019-02-05 DIAGNOSIS — R27.0 ATAXIA: ICD-10-CM

## 2019-02-05 DIAGNOSIS — R48.9 SYMBOLIC DYSFUNCTION: ICD-10-CM

## 2019-02-05 PROBLEM — Z86.73 HISTORY OF RECURRENT TIAS: Status: ACTIVE | Noted: 2019-02-05

## 2019-02-05 PROBLEM — S09.90XA CLOSED HEAD INJURY: Status: ACTIVE | Noted: 2019-02-05

## 2019-02-05 PROBLEM — G89.4 CHRONIC PAIN SYNDROME: Status: ACTIVE | Noted: 2019-02-05

## 2019-02-05 PROBLEM — E87.1 HYPONATREMIA: Status: ACTIVE | Noted: 2019-02-05

## 2019-02-05 LAB
CRE SCREEN PCR: NOT DETECTED
GLUCOSE BLDC GLUCOMTR-MCNC: 191 MG/DL (ref 70–130)
GLUCOSE BLDC GLUCOMTR-MCNC: 90 MG/DL (ref 70–130)
IMP STRAIN: NOT DETECTED
KPC STRAIN: NOT DETECTED
NDM STRAIN: NOT DETECTED
OXA 48 STRAIN: NOT DETECTED
VIM STRAIN: NOT DETECTED

## 2019-02-05 PROCEDURE — 82962 GLUCOSE BLOOD TEST: CPT

## 2019-02-05 PROCEDURE — 87081 CULTURE SCREEN ONLY: CPT | Performed by: FAMILY MEDICINE

## 2019-02-05 PROCEDURE — 99222 1ST HOSP IP/OBS MODERATE 55: CPT | Performed by: FAMILY MEDICINE

## 2019-02-05 PROCEDURE — 25010000002 ENOXAPARIN PER 10 MG: Performed by: FAMILY MEDICINE

## 2019-02-05 PROCEDURE — 63710000001 INSULIN DETEMIR PER 5 UNITS: Performed by: FAMILY MEDICINE

## 2019-02-05 PROCEDURE — 63710000001 INSULIN ASPART PER 5 UNITS: Performed by: FAMILY MEDICINE

## 2019-02-05 RX ORDER — AMITRIPTYLINE HYDROCHLORIDE 10 MG/1
10 TABLET, FILM COATED ORAL NIGHTLY
Status: CANCELLED | OUTPATIENT
Start: 2019-02-05

## 2019-02-05 RX ORDER — ACETAMINOPHEN 325 MG/1
650 TABLET ORAL EVERY 4 HOURS PRN
Status: DISCONTINUED | OUTPATIENT
Start: 2019-02-05 | End: 2019-02-15 | Stop reason: HOSPADM

## 2019-02-05 RX ORDER — DICYCLOMINE HCL 20 MG
20 TABLET ORAL EVERY 6 HOURS PRN
Status: DISCONTINUED | OUTPATIENT
Start: 2019-02-05 | End: 2019-02-15 | Stop reason: HOSPADM

## 2019-02-05 RX ORDER — ATORVASTATIN CALCIUM 20 MG/1
20 TABLET, FILM COATED ORAL NIGHTLY
Status: DISCONTINUED | OUTPATIENT
Start: 2019-02-05 | End: 2019-02-15 | Stop reason: HOSPADM

## 2019-02-05 RX ORDER — NICOTINE POLACRILEX 4 MG
15 LOZENGE BUCCAL
Status: DISCONTINUED | OUTPATIENT
Start: 2019-02-05 | End: 2019-02-15 | Stop reason: HOSPADM

## 2019-02-05 RX ORDER — SENNA AND DOCUSATE SODIUM 50; 8.6 MG/1; MG/1
2 TABLET, FILM COATED ORAL NIGHTLY
Status: DISCONTINUED | OUTPATIENT
Start: 2019-02-05 | End: 2019-02-15 | Stop reason: HOSPADM

## 2019-02-05 RX ORDER — AMLODIPINE BESYLATE 5 MG/1
5 TABLET ORAL
Status: DISCONTINUED | OUTPATIENT
Start: 2019-02-06 | End: 2019-02-15 | Stop reason: HOSPADM

## 2019-02-05 RX ORDER — FAMOTIDINE 20 MG/1
20 TABLET, FILM COATED ORAL 2 TIMES DAILY
Status: DISCONTINUED | OUTPATIENT
Start: 2019-02-05 | End: 2019-02-15 | Stop reason: HOSPADM

## 2019-02-05 RX ORDER — NICOTINE POLACRILEX 4 MG
15 LOZENGE BUCCAL
Status: CANCELLED | OUTPATIENT
Start: 2019-02-05

## 2019-02-05 RX ORDER — CALCIUM CARBONATE 200(500)MG
2 TABLET,CHEWABLE ORAL 2 TIMES DAILY PRN
Status: DISCONTINUED | OUTPATIENT
Start: 2019-02-05 | End: 2019-02-15 | Stop reason: HOSPADM

## 2019-02-05 RX ORDER — METHADONE HYDROCHLORIDE 10 MG/1
5 TABLET ORAL EVERY 12 HOURS SCHEDULED
Status: DISCONTINUED | OUTPATIENT
Start: 2019-02-05 | End: 2019-02-15 | Stop reason: HOSPADM

## 2019-02-05 RX ORDER — OXYCODONE AND ACETAMINOPHEN 10; 325 MG/1; MG/1
1 TABLET ORAL EVERY 4 HOURS PRN
Status: DISCONTINUED | OUTPATIENT
Start: 2019-02-05 | End: 2019-02-15 | Stop reason: HOSPADM

## 2019-02-05 RX ORDER — ASPIRIN 81 MG/1
325 TABLET, CHEWABLE ORAL DAILY
Status: DISCONTINUED | OUTPATIENT
Start: 2019-02-05 | End: 2019-02-15 | Stop reason: HOSPADM

## 2019-02-05 RX ORDER — ACETAMINOPHEN 325 MG/1
650 TABLET ORAL EVERY 4 HOURS PRN
Status: CANCELLED | OUTPATIENT
Start: 2019-02-05

## 2019-02-05 RX ORDER — INSULIN GLARGINE 100 [IU]/ML
30 INJECTION, SOLUTION SUBCUTANEOUS NIGHTLY
COMMUNITY
End: 2021-07-16 | Stop reason: HOSPADM

## 2019-02-05 RX ORDER — AMITRIPTYLINE HYDROCHLORIDE 10 MG/1
10 TABLET, FILM COATED ORAL NIGHTLY
Status: DISCONTINUED | OUTPATIENT
Start: 2019-02-05 | End: 2019-02-15 | Stop reason: HOSPADM

## 2019-02-05 RX ORDER — CALCIUM CARBONATE 200(500)MG
2 TABLET,CHEWABLE ORAL 2 TIMES DAILY PRN
Status: CANCELLED | OUTPATIENT
Start: 2019-02-05

## 2019-02-05 RX ORDER — DULOXETIN HYDROCHLORIDE 20 MG/1
20 CAPSULE, DELAYED RELEASE ORAL DAILY
Status: DISCONTINUED | OUTPATIENT
Start: 2019-02-05 | End: 2019-02-15 | Stop reason: HOSPADM

## 2019-02-05 RX ORDER — ASPIRIN 81 MG/1
81 TABLET, CHEWABLE ORAL DAILY
Status: DISCONTINUED | OUTPATIENT
Start: 2019-02-05 | End: 2019-02-05

## 2019-02-05 RX ORDER — LISINOPRIL 5 MG/1
5 TABLET ORAL DAILY
Status: DISCONTINUED | OUTPATIENT
Start: 2019-02-05 | End: 2019-02-15 | Stop reason: HOSPADM

## 2019-02-05 RX ORDER — SENNA AND DOCUSATE SODIUM 50; 8.6 MG/1; MG/1
2 TABLET, FILM COATED ORAL NIGHTLY
Status: CANCELLED | OUTPATIENT
Start: 2019-02-05

## 2019-02-05 RX ADMIN — ATORVASTATIN CALCIUM 20 MG: 20 TABLET, FILM COATED ORAL at 20:14

## 2019-02-05 RX ADMIN — METHADONE HYDROCHLORIDE 5 MG: 10 TABLET ORAL at 20:13

## 2019-02-05 RX ADMIN — FAMOTIDINE 20 MG: 20 TABLET ORAL at 20:13

## 2019-02-05 RX ADMIN — INSULIN DETEMIR 30 UNITS: 100 INJECTION, SOLUTION SUBCUTANEOUS at 20:15

## 2019-02-05 RX ADMIN — AMITRIPTYLINE HYDROCHLORIDE 10 MG: 10 TABLET, FILM COATED ORAL at 20:14

## 2019-02-05 RX ADMIN — OXYCODONE HYDROCHLORIDE AND ACETAMINOPHEN 1 TABLET: 10; 325 TABLET ORAL at 15:44

## 2019-02-05 RX ADMIN — INSULIN ASPART 7 UNITS: 100 INJECTION, SOLUTION INTRAVENOUS; SUBCUTANEOUS at 17:07

## 2019-02-05 RX ADMIN — SENNOSIDES AND DOCUSATE SODIUM 2 TABLET: 8.6; 5 TABLET ORAL at 20:14

## 2019-02-05 RX ADMIN — ENOXAPARIN SODIUM 40 MG: 40 INJECTION SUBCUTANEOUS at 15:33

## 2019-02-05 RX ADMIN — OXYCODONE HYDROCHLORIDE AND ACETAMINOPHEN 1 TABLET: 10; 325 TABLET ORAL at 19:45

## 2019-02-05 RX ADMIN — INSULIN ASPART 2 UNITS: 100 INJECTION, SOLUTION INTRAVENOUS; SUBCUTANEOUS at 20:14

## 2019-02-05 RX ADMIN — METFORMIN HYDROCHLORIDE 1000 MG: 500 TABLET ORAL at 17:06

## 2019-02-05 NOTE — H&P
[address] [address]  T - [id number]     H/P NOTE         SUBJECTIVE:   Patient Care Team:  Anita Faria APRN as PCP - General (Family Medicine)    Chief Complaint:   Acute worsening of left-sided weakness.    Subjective     Patient is 58 y.o. male presents with onset at about 4 AM on 1/31/19 of left-sided numbness, weakness, unsteady gait, slurred speech when he awoke.  He fell several times going to the bathroom after calling the emergency he was some brought to the [facility] emergency Department for evaluation.  CT was reportedly unremarkable there and he was transferred to [facility] for evaluation and treatment.  At [facility] he did see neurology and they felt like his symptoms had resolved by the time he was evaluated.  However the patient says that his symptoms have worsened since that time and he has left arm and especially left leg weakness.  Tells me that his legs buckle when he tries to walk.  Of note at the time of admission to the emergency room his glucose was 704 his sodium was 125 TSH was 5.8 and his free T4 was mildly low.  Thyroid functions were repeated at [facility] and they were normal and felt that he did not need medication for thyroid.  He had a workup for acute stroke and it was essentially negative at the [facility].    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  Subsequently he did have a TIA similar to this episode and he says that he's had left-sided weakness since the original head injury.    He does have long-standing diabetes hypertension is had kidney stones he has osteoarthritis.  He has chronic back pain and right shoulder pain and is followed by pain management in [facility]..      ROS/HISTORY/ CURRENT MEDICATIONS/OBJECTIVE/VS/PE:       History:     Past Medical History:   Diagnosis Date   • Arthritis    • Carpal tunnel  syndrome    • Chronic pain syndrome    • Depressive disorder    • GERD (gastroesophageal reflux disease)    • Hernia    • History of closed head injury     with ICH and left weakness   • History of urinary system disease    • Hypertension    • Migraine    • Right shoulder pain    • Rotator cuff syndrome    • Type 2 diabetes mellitus (CMS/HCC)    • Ureteric stone      Past Surgical History:   Procedure Laterality Date   • ABDOMINAL SURGERY  08/2016   • APPENDECTOMY N/A 1/27/2018    Procedure: APPENDECTOMY;  Surgeon: Rusty Palacio MD;  Location: [facility];  Service:    • CHOLECYSTECTOMY     • CIRCUMCISION     • CYSTOSCOPY  08/13/2003    Cystoscopy and removal of J stent. Right ureteral stent.   • CYSTOSCOPY  08/13/2003    Cystoscopy and right stone extraction and placement of 26x 6 J-stent.   • INCISION AND DRAINAGE ABSCESS N/A 7/26/2017    Procedure: INCISION AND DRAINAGE ABD.ABSCESS;  Surgeon: Rui Shaver MD;  Location: [facility];  Service:    • VENTRAL/INCISIONAL HERNIA REPAIR N/A 5/31/2017    Procedure: LAPAROSCOPIC POSSIBLE OPEN ADHESIOLYSIS AND VENTRAL/INCISIONAL HERNIA REPAIR ;  Surgeon: Rui Shaver MD;  Location: [facility];  Service:      No family history on file.  Social History     Tobacco Use   • Smoking status: Never Smoker   • Smokeless tobacco: Never Used   Substance Use Topics   • Alcohol use: No   • Drug use: No   He has lived alone but says at discharge his girlfriend will be staying with him to help if needed      Medications Prior to Admission   Medication Sig Dispense Refill Last Dose   • aspirin 81 MG chewable tablet Chew 81 mg Daily.   Taking   • atorvastatin (LIPITOR) 20 MG tablet Take 20 mg by mouth Every Night.   Taking   • cyclobenzaprine (FLEXERIL) 10 MG tablet Take 10 mg by mouth 2 (Two) Times a Day As Needed.   Taking   • dicyclomine (BENTYL) 20 MG tablet Take 1 tablet by mouth Every 6 (Six) Hours As Needed (abdominal pain). 10 tablet 0    • DULoxetine (CYMBALTA) 20 MG capsule  Take 20 mg by mouth Daily.   Taking   • famotidine (PEPCID) 20 MG tablet Take 1 tablet by mouth 2 (Two) Times a Day. 20 tablet 0    • FREESTYLE TEST STRIPS test strip    Taking   • insulin lispro (humaLOG) 100 UNIT/ML injection Inject 8-12 Units under the skin 3 (Three) Times a Day As Needed (sliding scale). As needed per sliding scale   Taking   • lidocaine-prilocaine (EMLA) 2.5-2.5 % cream Apply 1 application topically As Needed.   Taking   • lisinopril (PRINIVIL,ZESTRIL) 5 MG tablet Take 5 mg by mouth Daily.   Taking   • metFORMIN (GLUCOPHAGE) 500 MG tablet Take 500 mg by mouth 2 (Two) Times a Day With Meals.   Taking   • methadone (DOLOPHINE) 5 MG tablet Take 5 mg by mouth 2 (Two) Times a Day.   Taking   • ondansetron ODT (ZOFRAN-ODT) 4 MG disintegrating tablet Take 1 tablet by mouth Every 8 (Eight) Hours. 15 tablet 1 Taking   • ondansetron ODT (ZOFRAN-ODT) 4 MG disintegrating tablet Take 1 tablet by mouth Every 8 (Eight) Hours As Needed for Nausea or Vomiting. 15 tablet 0    • oxyCODONE-acetaminophen (PERCOCET)  MG per tablet Take 1 tablet by mouth Every 4 (Four) Hours.  0 Taking   • oxyCODONE-acetaminophen (PERCOCET)  MG per tablet Take 1 tablet by mouth Every 4 (Four) Hours. 30 tablet 0 Not Taking   • prochlorperazine (COMPAZINE) 10 MG tablet Take 1 tablet by mouth Every 6 (Six) Hours As Needed for Nausea or Vomiting. 10 tablet 0    • promethazine (PHENERGAN) 12.5 MG tablet Take 2 tablets by mouth Every 6 (Six) Hours As Needed for Nausea. 30 tablet 1 Taking   • promethazine (PHENERGAN) 25 MG tablet Take 1 tablet by mouth Every 6 (Six) Hours As Needed for Nausea or Vomiting. 20 tablet 0    • SITagliptin (JANUVIA) 50 MG tablet Take 50 mg by mouth Daily.   Taking     Allergies:  Naproxen and Tramadol    Current Medications:     Current Facility-Administered Medications:   •  acetaminophen (TYLENOL) tablet 650 mg, 650 mg, Oral, Q4H PRN, Richard Galvez MD  •  amitriptyline (ELAVIL) tablet 10 mg,  10 mg, Oral, Nightly, Richard Galvez MD  •  [START ON 2/6/2019] amLODIPine (NORVASC) tablet 5 mg, 5 mg, Oral, Q24H, Richard Galvez MD  •  aspirin chewable tablet 325 mg, 325 mg, Oral, Daily, Richard Galvez MD  •  atorvastatin (LIPITOR) tablet 20 mg, 20 mg, Oral, Nightly, Richard Galvez MD  •  calcium carbonate (TUMS) chewable tablet 500 mg (200 mg elemental), 2 tablet, Oral, BID PRN, Richard Galvez MD  •  dextrose (GLUTOSE) oral gel 15 g, 15 g, Oral, Q15 Min PRN, Richard Galvez MD  •  dicyclomine (BENTYL) tablet 20 mg, 20 mg, Oral, Q6H PRN, Richard Galvez MD  •  DULoxetine (CYMBALTA) DR capsule 20 mg, 20 mg, Oral, Daily, Richard Galvez MD  •  enoxaparin (LOVENOX) syringe 40 mg, 40 mg, Subcutaneous, Q24H, Richard Galvez MD  •  famotidine (PEPCID) tablet 20 mg, 20 mg, Oral, BID, Richard Galvez MD  •  insulin aspart (novoLOG) injection 0-9 Units, 0-9 Units, Subcutaneous, 4x Daily AC & at Bedtime, Richard Galvez MD  •  insulin aspart (novoLOG) injection 7 Units, 7 Units, Subcutaneous, TID With Meals, Richard Galvez MD  •  insulin detemir (LEVEMIR) injection 30 Units, 30 Units, Subcutaneous, Nightly, Richard Galvez MD  •  lisinopril (PRINIVIL,ZESTRIL) tablet 5 mg, 5 mg, Oral, Daily, Richard Galvez MD  •  magnesium hydroxide (MILK OF MAGNESIA) suspension 2400 mg/10mL 10 mL, 10 mL, Oral, Daily PRN, Richard Galvez MD  •  metFORMIN (GLUCOPHAGE) tablet 1,000 mg, 1,000 mg, Oral, BID With Meals, Richard Galvez MD  •  sennosides-docusate sodium (SENOKOT-S) 8.6-50 MG tablet 2 tablet, 2 tablet, Oral, Nightly, Richard Galvez MD      REVIEW OF SYSTEMS:  Review of Systems   Constitutional: Positive for fatigue.   HENT: Negative.    Eyes: Negative.    Respiratory: Negative.    Cardiovascular: Negative.    Gastrointestinal: Positive for abdominal distention and abdominal pain. Negative for anal bleeding and blood in stool.        He  has chronic abdominal pain due to prior surgery and he says he sleeps in a recliner at home   Endocrine: Negative.    Genitourinary: Negative.    Musculoskeletal: Positive for arthralgias, back pain, gait problem, joint swelling and myalgias.        Specifically he has a lot of pain in his right shoulder   Skin: Negative.    Allergic/Immunologic: Negative.    Neurological: Positive for weakness and numbness. Negative for speech difficulty.        He does complain of weakness in his right arm and right leg.  He has some chronic weakness but it is much worse since this episode   Hematological: Negative.    Psychiatric/Behavioral: Negative.        Objective     Physical Exam:   Temp:  [97.3 °F (36.3 °C)] 97.3 °F (36.3 °C)  Heart Rate:  [90] 90  Resp:  [18] 18  BP: (120)/(74) 120/74    Physical Exam:    Physical Exam   Constitutional: He is oriented to person, place, and time. He appears well-developed and well-nourished. No distress.   HENT:   Head: Normocephalic.   Eyes: Conjunctivae are normal. Pupils are equal, round, and reactive to light. Left eye exhibits no discharge. No scleral icterus.   Neck: Normal range of motion. Neck supple. No tracheal deviation present. No thyromegaly present.   Cardiovascular: Normal rate, regular rhythm and normal heart sounds. Exam reveals no gallop and no friction rub.   No murmur heard.  Pulmonary/Chest: Effort normal and breath sounds normal. No stridor. No respiratory distress. He has no wheezes. He has no rales. He exhibits no tenderness.   Abdominal: Soft. Bowel sounds are normal. He exhibits no distension and no mass. There is no tenderness. There is no guarding.   Musculoskeletal: Normal range of motion. He exhibits no edema, tenderness or deformity.   Neurological: He is alert and oriented to person, place, and time. A cranial nerve deficit is present. He exhibits abnormal muscle tone.   He does have decreased strength in his left arm and left leg.  Strength is about  4/5.  He does have some drift in the left arms.  Strength is not symmetrical   Skin: Skin is warm and dry. Capillary refill takes less than 2 seconds. No rash noted. He is not diaphoretic. No erythema. No pallor.   Psychiatric: He has a normal mood and affect. His behavior is normal. Judgment and thought content normal.   Nursing note and vitals reviewed.           Results Review:      Lab Results (last 24 hours)     Procedure Component Value Units Date/Time    CRE Screen by PCR - Swab, Large Intestine, Rectum [[id number]] Collected:  02/05/19 1343    Specimen:  Swab from Large Intestine, Rectum Updated:  02/05/19 1346       initially at admission his sodium was 125  Glucose was 704  Renal function was normal                         Imaging Results (last 24 hours)     ** No results found for the last 24 hours. **      MRI of the head was reviewed and was did not show acute changes    CTA did not show acute changes either  X-rays of the right shoulder were unremarkable yesterday  He needs assistance with ambulation and activities of daily living transfers  He has participated with PT OT and speech therapy  I reviewed the patient's  clinical results.  I reviewed the patient's  imaging results and agree with the interpretation.   I reviewed the therapy notes   ASSESSMENT/PLAN:   Assessment/Plan     Medically complex patient    Uncontrolled type 2 diabetes mellitus with hyperglycemia (CMS/HCC)    Stroke-like symptoms    Right shoulder pain    Left-sided muscle weakness    Closed head injury    Hyponatremia    History of recurrent TIAs    Chronic pain syndrome        He will be dmitted to the acute rehabilitation unit for intensive rehabilitation.  I did review his records from [facility] and neurology felt that he did not have a stroke but thought this was rigid visual from his old cranial hemorrhage.  However the patient says his symptoms have worsened significantly in the last week since this episode and he  feels that he's had a stroke.  PT and OT felt that he had acute changes well and recommended inpatient rehabilitation.  For now we will continue intensive rehabilitation with PT OT and speech to try to improve his strength and ambulation.  He does have chronic pain and I did review his [place]  He has uncontrolled diabetes and we will  Continue insulin and adjust as needed also continue metformin.  Will continue his home medicines for hypertension as well  X-rays of the right shoulder were negative yesterday  It is expected he'll participate 3 hours a day make measurable improvement and be able to return home at discharge  I discussed the patients findings and my recommendations with patient.          This document has been electronically signed by Richard aGlvez MD on February 5, 2019 3:05 PM

## 2019-02-05 NOTE — NURSING NOTE
FIM FIM Goal  Discharge FIM     Evaluation Score        Eating 4 6      Toileting 3 6      Bladder Management 4 6      Accident in Past 7 days 6 6      Bowel Management 4 6      Accident in Past 7 days: 6 6        Accident in Past 7 Days:   7= 0, 6=device, 5=1, 4=2, 3=3,  2=4, 1=5+

## 2019-02-06 LAB
ALBUMIN SERPL-MCNC: 4.1 G/DL (ref 3.4–4.8)
ALBUMIN/GLOB SERPL: 1.7 G/DL (ref 1.1–1.8)
ALP SERPL-CCNC: 114 U/L (ref 38–126)
ALT SERPL W P-5'-P-CCNC: 43 U/L (ref 21–72)
ANION GAP SERPL CALCULATED.3IONS-SCNC: 10 MMOL/L (ref 5–15)
AST SERPL-CCNC: 47 U/L (ref 17–59)
BASOPHILS # BLD AUTO: 0.03 10*3/MM3 (ref 0–0.2)
BASOPHILS NFR BLD AUTO: 0.4 % (ref 0–2)
BILIRUB SERPL-MCNC: 0.5 MG/DL (ref 0.2–1.3)
BUN BLD-MCNC: 11 MG/DL (ref 7–21)
BUN/CREAT SERPL: 16.4 (ref 7–25)
CALCIUM SPEC-SCNC: 10.1 MG/DL (ref 8.4–10.2)
CHLORIDE SERPL-SCNC: 97 MMOL/L (ref 95–110)
CO2 SERPL-SCNC: 30 MMOL/L (ref 22–31)
CREAT BLD-MCNC: 0.67 MG/DL (ref 0.7–1.3)
DEPRECATED RDW RBC AUTO: 41.2 FL (ref 35.1–43.9)
EOSINOPHIL # BLD AUTO: 0.33 10*3/MM3 (ref 0–0.7)
EOSINOPHIL NFR BLD AUTO: 4.2 % (ref 0–7)
ERYTHROCYTE [DISTWIDTH] IN BLOOD BY AUTOMATED COUNT: 13 % (ref 11.5–14.5)
GFR SERPL CREATININE-BSD FRML MDRD: 123 ML/MIN/1.73 (ref 56–130)
GLOBULIN UR ELPH-MCNC: 2.4 GM/DL (ref 2.3–3.5)
GLUCOSE BLD-MCNC: 140 MG/DL (ref 60–100)
GLUCOSE BLDC GLUCOMTR-MCNC: 105 MG/DL (ref 70–130)
GLUCOSE BLDC GLUCOMTR-MCNC: 133 MG/DL (ref 70–130)
GLUCOSE BLDC GLUCOMTR-MCNC: 156 MG/DL (ref 70–130)
GLUCOSE BLDC GLUCOMTR-MCNC: 206 MG/DL (ref 70–130)
HBA1C MFR BLD: 15.1 % (ref 4–5.6)
HCT VFR BLD AUTO: 40.4 % (ref 39–49)
HGB BLD-MCNC: 13.9 G/DL (ref 13.7–17.3)
IMM GRANULOCYTES # BLD AUTO: 0.04 10*3/MM3 (ref 0–0.02)
IMM GRANULOCYTES NFR BLD AUTO: 0.5 % (ref 0–0.5)
IRON 24H UR-MRATE: 80 MCG/DL (ref 49–181)
IRON SATN MFR SERPL: 25 % (ref 20–55)
LYMPHOCYTES # BLD AUTO: 2.09 10*3/MM3 (ref 0.6–4.2)
LYMPHOCYTES NFR BLD AUTO: 26.5 % (ref 10–50)
MAGNESIUM SERPL-MCNC: 1.8 MG/DL (ref 1.6–2.3)
MCH RBC QN AUTO: 29.9 PG (ref 26.5–34)
MCHC RBC AUTO-ENTMCNC: 34.4 G/DL (ref 31.5–36.3)
MCV RBC AUTO: 86.9 FL (ref 80–98)
MONOCYTES # BLD AUTO: 0.91 10*3/MM3 (ref 0–0.9)
MONOCYTES NFR BLD AUTO: 11.5 % (ref 0–12)
NEUTROPHILS # BLD AUTO: 4.49 10*3/MM3 (ref 2–8.6)
NEUTROPHILS NFR BLD AUTO: 56.9 % (ref 37–80)
PLATELET # BLD AUTO: 283 10*3/MM3 (ref 150–450)
PMV BLD AUTO: 11.4 FL (ref 8–12)
POTASSIUM BLD-SCNC: 4.2 MMOL/L (ref 3.5–5.1)
PROT SERPL-MCNC: 6.5 G/DL (ref 6.3–8.6)
RBC # BLD AUTO: 4.65 10*6/MM3 (ref 4.37–5.74)
SODIUM BLD-SCNC: 137 MMOL/L (ref 137–145)
TIBC SERPL-MCNC: 323 MCG/DL (ref 261–462)
WBC NRBC COR # BLD: 7.89 10*3/MM3 (ref 3.2–9.8)

## 2019-02-06 PROCEDURE — 85025 COMPLETE CBC W/AUTO DIFF WBC: CPT | Performed by: FAMILY MEDICINE

## 2019-02-06 PROCEDURE — 97162 PT EVAL MOD COMPLEX 30 MIN: CPT

## 2019-02-06 PROCEDURE — 63710000001 INSULIN ASPART PER 5 UNITS: Performed by: FAMILY MEDICINE

## 2019-02-06 PROCEDURE — 82962 GLUCOSE BLOOD TEST: CPT

## 2019-02-06 PROCEDURE — 83540 ASSAY OF IRON: CPT | Performed by: FAMILY MEDICINE

## 2019-02-06 PROCEDURE — 99232 SBSQ HOSP IP/OBS MODERATE 35: CPT | Performed by: FAMILY MEDICINE

## 2019-02-06 PROCEDURE — 25010000002 ENOXAPARIN PER 10 MG: Performed by: FAMILY MEDICINE

## 2019-02-06 PROCEDURE — 92523 SPEECH SOUND LANG COMPREHEN: CPT | Performed by: SPEECH-LANGUAGE PATHOLOGIST

## 2019-02-06 PROCEDURE — 97530 THERAPEUTIC ACTIVITIES: CPT

## 2019-02-06 PROCEDURE — 97535 SELF CARE MNGMENT TRAINING: CPT

## 2019-02-06 PROCEDURE — 83036 HEMOGLOBIN GLYCOSYLATED A1C: CPT | Performed by: FAMILY MEDICINE

## 2019-02-06 PROCEDURE — 97542 WHEELCHAIR MNGMENT TRAINING: CPT

## 2019-02-06 PROCEDURE — 83735 ASSAY OF MAGNESIUM: CPT | Performed by: FAMILY MEDICINE

## 2019-02-06 PROCEDURE — 80053 COMPREHEN METABOLIC PANEL: CPT | Performed by: FAMILY MEDICINE

## 2019-02-06 PROCEDURE — 97110 THERAPEUTIC EXERCISES: CPT

## 2019-02-06 PROCEDURE — 97166 OT EVAL MOD COMPLEX 45 MIN: CPT

## 2019-02-06 PROCEDURE — 83550 IRON BINDING TEST: CPT | Performed by: FAMILY MEDICINE

## 2019-02-06 PROCEDURE — 63710000001 INSULIN DETEMIR PER 5 UNITS: Performed by: FAMILY MEDICINE

## 2019-02-06 RX ADMIN — LISINOPRIL 5 MG: 5 TABLET ORAL at 08:19

## 2019-02-06 RX ADMIN — DULOXETINE HYDROCHLORIDE 20 MG: 20 CAPSULE, DELAYED RELEASE ORAL at 08:18

## 2019-02-06 RX ADMIN — FAMOTIDINE 20 MG: 20 TABLET ORAL at 20:32

## 2019-02-06 RX ADMIN — METFORMIN HYDROCHLORIDE 1000 MG: 500 TABLET ORAL at 08:18

## 2019-02-06 RX ADMIN — FAMOTIDINE 20 MG: 20 TABLET ORAL at 08:19

## 2019-02-06 RX ADMIN — INSULIN ASPART 4 UNITS: 100 INJECTION, SOLUTION INTRAVENOUS; SUBCUTANEOUS at 07:04

## 2019-02-06 RX ADMIN — OXYCODONE HYDROCHLORIDE AND ACETAMINOPHEN 1 TABLET: 10; 325 TABLET ORAL at 19:18

## 2019-02-06 RX ADMIN — METHADONE HYDROCHLORIDE 5 MG: 10 TABLET ORAL at 20:32

## 2019-02-06 RX ADMIN — INSULIN DETEMIR 30 UNITS: 100 INJECTION, SOLUTION SUBCUTANEOUS at 20:29

## 2019-02-06 RX ADMIN — OXYCODONE HYDROCHLORIDE AND ACETAMINOPHEN 1 TABLET: 10; 325 TABLET ORAL at 14:43

## 2019-02-06 RX ADMIN — INSULIN ASPART 7 UNITS: 100 INJECTION, SOLUTION INTRAVENOUS; SUBCUTANEOUS at 11:53

## 2019-02-06 RX ADMIN — ATORVASTATIN CALCIUM 20 MG: 20 TABLET, FILM COATED ORAL at 20:32

## 2019-02-06 RX ADMIN — INSULIN ASPART 7 UNITS: 100 INJECTION, SOLUTION INTRAVENOUS; SUBCUTANEOUS at 16:21

## 2019-02-06 RX ADMIN — ENOXAPARIN SODIUM 40 MG: 40 INJECTION SUBCUTANEOUS at 14:43

## 2019-02-06 RX ADMIN — OXYCODONE HYDROCHLORIDE AND ACETAMINOPHEN 1 TABLET: 10; 325 TABLET ORAL at 05:19

## 2019-02-06 RX ADMIN — OXYCODONE HYDROCHLORIDE AND ACETAMINOPHEN 1 TABLET: 10; 325 TABLET ORAL at 10:20

## 2019-02-06 RX ADMIN — INSULIN ASPART 2 UNITS: 100 INJECTION, SOLUTION INTRAVENOUS; SUBCUTANEOUS at 20:29

## 2019-02-06 RX ADMIN — METFORMIN HYDROCHLORIDE 1000 MG: 500 TABLET ORAL at 17:09

## 2019-02-06 RX ADMIN — METHADONE HYDROCHLORIDE 5 MG: 10 TABLET ORAL at 08:17

## 2019-02-06 RX ADMIN — AMITRIPTYLINE HYDROCHLORIDE 10 MG: 10 TABLET, FILM COATED ORAL at 20:32

## 2019-02-06 RX ADMIN — OXYCODONE HYDROCHLORIDE AND ACETAMINOPHEN 1 TABLET: 10; 325 TABLET ORAL at 00:52

## 2019-02-06 RX ADMIN — AMLODIPINE BESYLATE 5 MG: 5 TABLET ORAL at 08:19

## 2019-02-06 RX ADMIN — ASPIRIN 81 MG CHEWABLE TABLET 325 MG: 81 TABLET CHEWABLE at 08:17

## 2019-02-06 RX ADMIN — INSULIN ASPART 7 UNITS: 100 INJECTION, SOLUTION INTRAVENOUS; SUBCUTANEOUS at 08:20

## 2019-02-06 NOTE — PLAN OF CARE
Problem: Patient Care Overview  Goal: Plan of Care Review  Outcome: Ongoing (interventions implemented as appropriate)   02/06/19 5315   Coping/Psychosocial   Plan of Care Reviewed With patient   OTHER   Outcome Summary OT flaquito completed this date. Pt averaged need for min assist with bathing, set up and SBA for UB dressing, mod assist for LB dressing, min assist with toilet t/f. Pt displayed decreased ROM in BUE, pain in BUE with movement especially the RUE shoulder area, pt decreased fine and gross motor control with BUE. Pt could benefit from further skilled OT to increase strength, endurance, safety and independence with ADL. Pt may need 24/7 care at d/c and further home health therapy.

## 2019-02-06 NOTE — THERAPY EVALUATION
Inpatient Rehabilitation - Physical Therapy Initial Evaluation       [facility]     Patient Name: Ventura Boggs  : 1960  MRN: [id number]    Today's Date: 2019                    Admit Date: 2019      Visit Dx:     ICD-10-CM ICD-9-CM   1. Uncontrolled type 2 diabetes mellitus with hyperglycemia (CMS/HCC) E11.65 250.02   2. Medically complex patient Z78.9 V49.9   3. Chronic intractable pain G89.29 338.29   4. Symbolic dysfunction R48.9 784.60   5. Impaired mobility and ADLs Z74.09 799.89   6. Impaired functional mobility, balance, gait, and endurance Z74.09 V49.89       Patient Active Problem List   Diagnosis   • Uncontrolled type 2 diabetes mellitus with hyperglycemia (CMS/HCC)   • Postoperative urinary retention   • Incisional hernia, without obstruction or gangrene   • Acute appendicitis with localized peritonitis   • Closed head injury   • Hyponatremia   • History of recurrent TIAs   • Ataxia   • Chronic pain syndrome   • Right shoulder pain   • Left-sided muscle weakness   • Medically complex patient   • Stroke-like symptoms       Past Medical History:   Diagnosis Date   • Arthritis    • Carpal tunnel syndrome    • Chronic pain syndrome    • Depressive disorder    • GERD (gastroesophageal reflux disease)    • Hernia    • History of closed head injury     with ICH and left weakness   • History of urinary system disease    • Hypertension    • Migraine    • Right shoulder pain    • Rotator cuff syndrome    • Type 2 diabetes mellitus (CMS/HCC)    • Ureteric stone        Past Surgical History:   Procedure Laterality Date   • ABDOMINAL SURGERY  2016   • APPENDECTOMY N/A 2018    Procedure: APPENDECTOMY;  Surgeon: Rusty Palacio MD;  Location: [facility];  Service:    • CHOLECYSTECTOMY     • CIRCUMCISION     • CYSTOSCOPY  2003    Cystoscopy and removal of J stent. Right ureteral stent.   • CYSTOSCOPY  2003    Cystoscopy and right stone extraction and placement of 26x 6  J-stent.   • INCISION AND DRAINAGE ABSCESS N/A 7/26/2017    Procedure: INCISION AND DRAINAGE ABD.ABSCESS;  Surgeon: Rui Shaver MD;  Location: [facility] OR;  Service:    • VENTRAL/INCISIONAL HERNIA REPAIR N/A 5/31/2017    Procedure: LAPAROSCOPIC POSSIBLE OPEN ADHESIOLYSIS AND VENTRAL/INCISIONAL HERNIA REPAIR ;  Surgeon: Rui Shaver MD;  Location: [facility];  Service:           PT ASSESSMENT (last 72 hours)      IRF Physical Therapy Evaluation     Row Name 02/06/19 1110          Evaluation/Treatment Time and Intent    Subjective Information  complains of;pain;weakness  -     Existing Precautions/Restrictions  fall;lifting  -     Document Type  evaluation  -     Mode of Treatment  physical therapy;individual therapy  -     Patient/Family Observations  no family present  -     Start Time (Evaluation/Treatment)  1110  -     Stop Time (Evaluation/Treatment)  1155  -     Row Name 02/06/19 1110          Relationship/Environment    Lives With  significant other  -     Concerns About Impact on Relationships  Pt reports girlfriend works days but his dtr and friend can stay with him and assist as needed  -     Row Name 02/06/19 1110          Resource/Environmental Concerns    Current Living Arrangements  home/apartment/condo  -     Row Name 02/06/19 1110          Living Environment    Living Arrangements  mobile home  -     Home Accessibility  stairs to enter home;tub/shower is not walk in  -     Living Environment Comment  Pt lives with girlfriend. Pt sleeps in recliner;girlfriend assists with bathing and dressing. Pt did not use device prior to walk but has quadcane and RW. Pt able to drive;girlfriend completed IADL's  -     Row Name 02/06/19 1110          Cognition/Psychosocial- PT/OT    Affect/Mental Status (Cognitive)  [facility]  -     Orientation Status (Cognition)  oriented x 4  -     Follows Commands (Cognition)  [facility]  -     Personal Safety Interventions  fall prevention program maintained;gait  belt;nonskid shoes/slippers when out of bed;supervised activity  -     Cognitive Function (Cognitive)  safety deficit  -     Row Name 02/06/19 1110          Mobility    Additional Documentation  Wheelchair Mobility/Management (Group)  -     Row Name 02/06/19 1110          Bed Mobility Assessment/Treatment    Comment (Bed Mobility)  not tested;pt in w/c  -     Row Name 02/06/19 1110          Transfer Assessment/Treatment    Transfer Assessment/Treatment  sit-stand transfer;stand-sit transfer  -     Row Name 02/06/19 1110          Sit-Stand Transfer    Sit-Stand [place] (Transfers)  minimum assist (75% patient effort);verbal cues;nonverbal cues (demo/gesture)  -     Assistive Device (Sit-Stand Transfers)  walker, front-wheeled  -     Row Name 02/06/19 1110          Stand-Sit Transfer    Stand-Sit [place] (Transfers)  minimum assist (75% patient effort);nonverbal cues (demo/gesture);verbal cues  -     Assistive Device (Stand-Sit Transfers)  walker, front-wheeled  -     Row Name 02/06/19 1110          Wheelchair Transfer    Type (Wheelchair Transfer)  sit-stand;stand-sit  -     [place] Level (Wheelchair Transfer)  minimum assist (75% patient effort);verbal cues  -     Assistive Device (Wheelchair Transfer)  walker, front-wheeled  -     Row Name 02/06/19 1110          Gait/Stairs Assessment/Training    Comment (Gait/Stairs)  Attempted step forward/backward with RW. Deferred additional ambulation as L foot cramping and in supination with just slipper sock on. Also L knee extremely unstable with foot in supination.(patient reports girlfriend is bringing his high top tennis shoes this pm.) PT will deferr ambulation until pt has his shoe to support foot and stabilize knee better.  (Significant)   -     Row Name 02/06/19 1110          Wheelchair Mobility/Management    Method of Wheelchair Locomotion (Mobility)  bimanual (upper extremity) propulsion  -     Mobility Activities  (Wheelchair)  forward propulsion;backward propulsion;steering;turning;doorway navigation backing  -     Forward Propulsion [place] (Wheelchair)  stand by assist  -     Backward Propulsion [place] (Wheelchair)  stand by assist  -     Steering [place] (Wheelchair)  stand by assist  -     Turning [place] (Wheelchair)  stand by assist  -     Doorway Navigation [place] (Wheelchair)  stand by assist  -     Distance Propelled in Feet (Wheelchair)  200  -     Management Activities (Wheelchair)  wheel locks/brakes management  -     Footrest Management [place] (Wheelchair)  minimum assist (75% patient effort)  -     Wheel Locks/Brakes Management [place] (Wheelchair)  verbal cues  -     Row Name 02/06/19 1110          General ROM    GENERAL ROM COMMENTS  Please refer to OT evaluation for BUE range detail;RLE: AROM WFL;LLE: PROM WFL ankle/foot(foot spasming at the time pt unable to actively move foot/ankle/out of supination/plantar flexion respectively), AROM WFL lacks endrange knee, hip  flexion/extension actively  -     Row Name 02/06/19 1110          MMT (Manual Muscle Testing)    General MMT Comments  Please refer to OT evaluation for BUE strength detail;noted pt was able to  RW without assistance;RLE: 4+/5 ankle/foot, knee, hip;LLE: 2/5 ankle/foot at evaluation, pt unable to completely move foot out ot supination with spasming, 3-/5 knee/hip flexion/extension 3+/5 abduction/adduction  -     Row Name 02/06/19 1110          Motor Assessment/Intervention    Additional Documentation  Gross Motor Coordination (Group)  -     Row Name 02/06/19 1110          Sensory    Hearing Status  hearing impairment, left  -     Sensory General Assessment  light touch sensation deficits identified  -     Row Name 02/06/19 1110          Vision Assessment/Intervention    Visual Impairment/Limitations  corrective lenses for reading reports some blurred vision left eye since head  injury   -GAL     Row Name 02/06/19 1110          Light Touch Sensation Assessment    Left Lower Extremity: Light Touch Sensation Assessment  mild impairment, 75% or more correct responses  -     Comment, Left Lower Extremity: Light Touch Sensation Assessment  distal greater than proximal  -     Right Lower Extremity: Light Touch Sensation Assessment  intact  -GAL     Row Name 02/06/19 1110          Pain Assessment    Additional Documentation  Pain Scale: Numbers Pre/Post-Treatment (Group)  -GAL     Row Name 02/06/19 1110          Pain Scale: Numbers Pre/Post-Treatment    Pain Scale: Numbers, Pretreatment  8/10  -     Pain Scale: Numbers, Post-Treatment  8/10  -     Pain Location  shoulder r shoulder;L leg  -     Pre/Post Treatment Pain Comment  R shoulder/neck increased in pain since recent fall;L leg painful due to cramping  -     Pain Intervention(s)  Repositioned;Massage massage and weight bearing L foot;minimum relief  -GAL     Row Name 02/06/19 1110          Balance    Balance  static standing balance;dynamic standing balance  -GAL     Row Name 02/06/19 1110          Static Sitting Balance    Level of [place] (Unsupported Sitting, Static Balance)  supervision  -GAL     Row Name 02/06/19 1110          Dynamic Sitting Balance    Level of [place], Reaches Outside Midline (Sitting, Dynamic Balance)  contact guard assist  -GAL     Row Name 02/06/19 1110          Static Standing Balance    Level of [place] (Supported Standing, Static Balance)  contact guard assist  -     Time Able to Maintain Position (Supported Standing, Static Balance)  1 to 2 minutes  -     Assistive Device Utilized (Supported Standing, Static Balance)  walker, rolling  -[facility] 02/06/19 1110          Dynamic Standing Balance    Level of [place], Reaches Outside Midline (Standing, Dynamic Balance)  moderate assist, 50 to 74% patient effort  -     Time Able to Maintain Position, Reaches Outside Midline  (Standing, Dynamic Balance)  less than 15 seconds  -     Row Name 02/06/19 1110          Special Tests    Additional Documentation  Neuromuscular Tests/Assessment ([facility])  -     Row Name 02/06/19 1110          PT Clinical Impression    General Observations of Patient  pt sitting w/c next to bed  -     Patient's Goals For Discharge  return home;take care of myself at home  -     Plan For Care Reviewed: Physical Therapy  patient voices agreement with PT plan for care;PT plan for care discussed with patient;patient feedback incorporated in PT plan for care  -     Rehab Potential/Prognosis (PT Eval)  good, to achieve stated therapy goals  -     Frequency of Treatment (PT Eval)  other (see comments) 90 minutes per day;5-7 days per week  -     Estimated Length of Stay, Weeks (PT Eval)  2 weeks  -     Problem List (PT Eval)  muscle tone abnormal;ROM decreased;strength decreased;sensation decreased;hemiparesis/hemiplegia;impaired balance;motor control impaired;pain  -     Limitations/Impairments  safety/cognitive  -     Activity Limitations Related to Problem List (PT Eval)  ambulation not performed safely  -     Equipment Currently Used at Home  none has quadcane and RW  -GAL     Expected Discharge Disposition (PT Eval)  home with home health care vs outpatient PT   -GAL     Planned Therapy Interventions (PT Eval)  activity tolerance training;functional balance retraining;neuromuscular control/coordination retraining;passive ROM/stretching;patient/caregiver education/training;ROM/therapeutic exercise;strengthening exercise;transfer/mobility retraining;wheelchair assessment/training  -     Row Name 02/06/19 1110          Vital Signs    Pre Systolic BP Rehab  115  -GAL     Pre Treatment Diastolic BP  78  -GAL     Post Systolic BP Rehab  93  -GAL     Post Treatment Diastolic BP  71  -GAL     Pretreatment Heart Rate (beats/min)  92  -GAL     Posttreatment Heart Rate (beats/min)  94  -GAL     Pre SpO2 (%)  96  -GAL      O2 Delivery Pre Treatment  room air  -GAL     Post SpO2 (%)  98  -GAL     O2 Delivery Post Treatment  room air  -GAL     Pre Patient Position  Sitting  -GAL     Post Patient Position  Sitting  -GAL     Row Name 02/06/19 1110          IRF PT Goals    Bed Mobility Goal Selection (PT-IRF)  bed mobility, PT goal 1  -GAL     Transfer Goal Selection (PT-IRF)  transfers, PT goal 1  -GAL     Gait (Walking Locomotion) Goal Selection (PT-IRF)  gait, PT goal 1;gait, PT goal 2  -GAL     Wheelchair Locomotion Goal Selection (PT-IRF)  wheelchair locomotion, PT goal 1  -GAL     Stairs Goal Selection (PT-IRF)  stairs, PT goal 1  -GAL     Strength Goal Selection (PT-IRF)  strength, PT goal 1 (free text)  -GAL     Caregiver Training Goal Selection (PT-IRF)  caregiver training, PT goal 1  -GAL     Additional Documentation  Strength Goal Selection (PT-IRF) (Row);Wheelchair Locomotion Goal Selection (PT-IRF) (Row);Caregiver Training Goal Selection (PT-IRF) (Row)  -     Row Name 02/06/19 1110          Bed Mobility Goal 1 (PT-IRF)    Activity/Assistive Device (Bed Mobility Goal 1, PT-IRF)  rolling to left;rolling to right;sit to supine;supine to sit  -     [place] Level (Bed Mobility Goal 1, PT-IRF)  independent  -GAL     Time Frame (Bed Mobility Goal 1, PT-IRF)  1 week  -GAL     Progress/Outcomes (Bed Mobility Goal 1, PT-IRF)  goal not met  -     Row Name 02/06/19 1110          Transfer Goal 1 (PT-IRF)    Activity/Assistive Device (Transfer Goal 1, PT-IRF)  sit-to-stand/stand-to-sit;bed-to-chair/chair-to-bed;wheelchair transfer  -GAL     [place] Level (Transfer Goal 1, PT-IRF)  conditional independence  -GAL     Time Frame (Transfer Goal 1, PT-IRF)  1 week  -GAL     Progress/Outcomes (Transfer Goal 1, PT-IRF)  goal not met  -     Row Name 02/06/19 1110          Gait/Walking Locomotion Goal 1 (PT-IRF)    Activity/Assistive Device (Gait/Walking Locomotion Goal 1, PT-IRF)  gait (walking locomotion);assistive device use  -GAL      Gait/Walking Locomotion Distance Goal 1 (PT-IRF)  50ft  -GAL     [place] Level (Gait/Walking Locomotion Goal 1, PT-IRF)  contact guard assist  -GAL     Time Frame (Gait/Walking Locomotion Goal 1, PT-IRF)  1 week  -GAL     Progress/Outcomes (Gait/Walking Locomotion Goal 1, PT-IRF)  goal not met  -GAL     Row Name 02/06/19 1110          Gait/Walking Locomotion Goal 2 (PT-IRF)    Activity/Assistive Device (Gait/Walking Locomotion Goal 2, PT-IRF)  gait (walking locomotion);assistive device use  -GAL     Gait/Walking Locomotion Distance Goal 2 (PT-IRF)  150ft or more  -GAL     [place] Level (Gait/Walking Locomotion Goal 2, PT-IRF)  conditional independence  -GAL     Time Frame (Gait/Walking Locomotion Goal 2, PT-IRF)  2 weeks  -GAL     Progress/Outcomes (Gait/Walking Locomotion Goal 2, PT-IRF)  goal not met  -     Row Name 02/06/19 1110          Wheelchair Locomotion Goal 1 (PT-IRF)    Activity (Wheelchair Locomotion Goal 1, PT-IRF)  forward propulsion;backward propulsion;steering;turning;doorway navigation  -GAL     [place] Level (Wheelchair Locomotion Goal 1, PT-IRF)  independent  -GAL     Distance Goal 1 (Wheelchair Locomotion, PT-IRF)  150ft  -AGL     Time Frame (Wheelchair Locomotion Goal 1, PT-IRF)  2 days  -GAL     Progress/Outcomes (Wheelchair Locomotion Goal 1, PT-IRF)  goal not met  -     Row Name 02/06/19 1110          Stairs Goal 1 (PT-IRF)    Activity/Assistive Device (Stairs Goal 1, PT-IRF)  ascending stairs;descending stairs;using handrail, right  -GAL     Number of Stairs (Stairs Goal 1, PT-IRF)  5  -GAL     [place] Level (Stairs Goal 1, PT-IRF)  standby assist;conditional independence  -GAL     Time Frame (Stairs Goal 1, PT-IRF)  2 weeks  -GAL     Progress/Outcomes (Stairs Goal 1, PT-IRF)  goal not met  -     Row Name 02/06/19 1110          Strength Goal 1 (PT-IRF)    Strength Goal 1 (PT-IRF)  Pt will perform 20 reps of AROM exercises with VSS  -GAL     Time Frame (Strength Goal 1, PT-IRF)  by  discharge  -GAL     Progress/Outcomes (Strength Goal 1, PT-IRF)  goal not met  -GAL     Row Name 02/06/19 1110          Caregiver Training Goal 1 (PT-IRF)    Caregiver Training Goal 1 (PT-IRF)  Homecaregiver will demonstrate understanding assisting with mobility as needed, home safety and homecare instructions  -GAL     Time Frame (Caregiver Training Goal 1, PT-IRF)  by discharge  -GAL     Progress/Outcomes (Caregiver Training Goal 1, PT-IRF)  goal not met  -GAL     Row Name 02/06/19 1110          Positioning and Restraints    Pre-Treatment Position  sitting in chair/recliner  -GAL     Post Treatment Position  wheelchair  -GAL     In Bed  call light within reach;encouraged to call for assist  -GAL       User Key  (r) = Recorded By, (t) = Taken By, (c) = Cosigned By    Initials Name Provider Type    Shilpa Cabello, PT Physical Therapist        IRF Treatment Summary     Row Name 02/06/19 1015             Evaluation/Treatment Time and Intent    Subjective Information  no complaints  -BL      Existing Precautions/Restrictions  fall;lifting  (Significant)  no lifting over 5 pounds  -BL      Document Type  therapy note (daily note)  -BL      Mode of Treatment  occupational therapy;individual therapy  -BL      Patient/Family Observations  Pt in bed upon entry; no family present  -BL      Start Time (Evaluation/Treatment)  1015  -BL      Stop Time (Evaluation/Treatment)  1100  -BL      Recorded by [BL] Giana Chakraborty COTA/MEI      Row Name 02/06/19 1015             Cognition/Psychosocial- PT/OT    Affect/Mental Status (Cognitive)  WFL  -BL      Orientation Status (Cognition)  oriented x 4  -BL      Follows Commands (Cognition)  WFL  -BL      Personal Safety Interventions  fall prevention program maintained;gait belt;muscle strengthening facilitated;nonskid shoes/slippers when out of bed;supervised activity  -BL      Recorded by [BL] Giana Chakraborty COTA/MEI      Row Name 02/06/19 1015             Mobility    Additional  Documentation  Wheelchair Mobility/Management (Group)  -BL      Recorded by [BL] Giana Chakraborty COTA/MEI      Row Name 02/06/19 1015             Bed Mobility Assessment/Treatment    Bed Mobility Assessment/Treatment  supine-sit;scooting/bridging  -BL      Scooting/Bridging [place] (Bed Mobility)  supervision  -BL      Supine-Sit [place] (Bed Mobility)  supervision  -BL      Bed Mobility, Safety Issues  decreased use of arms for pushing/pulling;decreased use of legs for bridging/pushing  -BL      Assistive Device (Bed Mobility)  bed rails;head of bed elevated  -BL      Recorded by [BL] Giana Chakraborty COTA/MEI      Row Name 02/06/19 1015             Transfer Assessment/Treatment    Transfer Assessment/Treatment  sit-stand transfer;stand-sit transfer;bed-chair transfer  -BL      Recorded by [BL] Giana Chakraborty COTA/MEI      Row Name 02/06/19 1015             Bed-Chair Transfer    Bed-Chair [place] (Transfers)  minimum assist (75% patient effort);verbal cues;nonverbal cues (demo/gesture)  -BL      Assistive Device (Bed-Chair Transfers)  wheelchair  -BL      Recorded by [BL] Giana Chakraborty COTA/MEI      Row Name 02/06/19 1015             Sit-Stand Transfer    Sit-Stand [place] (Transfers)  contact guard  -BL      Assistive Device (Sit-Stand Transfers)  walker, front-wheeled;wheelchair  -BL      Recorded by [BL] Giana Chakraborty COTA/MEI      Row Name 02/06/19 1015             Stand-Sit Transfer    Stand-Sit [place] (Transfers)  contact guard;verbal cues  -BL      Assistive Device (Stand-Sit Transfers)  wheelchair  -BL      Recorded by [BL] Giana Chakraborty COTA/MEI      Row Name 02/06/19 1015             Toilet Transfer    [place] Level (Toilet Transfer)  not tested  -BL      Recorded by [BL] Giana Chakraborty COTA/MEI      Row Name 02/06/19 1015             Wheelchair Mobility/Management    Method of Wheelchair Locomotion (Mobility)  bimanual (upper extremity) propulsion  -BL      Mobility Activities  (Wheelchair)  forward propulsion;steering;turning;doorway navigation;stopping  -BL      Forward Propulsion [place] (Wheelchair)  stand by assist;contact guard;verbal cues;nonverbal cues (demo/gesture)  -BL      Management Activities (Wheelchair)  footrest/footplate management  -BL      Footrest Management [place] (Wheelchair)  minimum assist (75% patient effort);verbal cues  -BL      Comment, Parts Management (Wheelchair)  Overall pt is SBA/CGA for w/c mobility within large therapy gym/hallways and through doors requiring vc's for steering with LUE demo'ing decreased coordination/strength overall. Pt did require rest breaks this date as well due to fatigue.  -BL      Recorded by [BL] Giana Chakraborty COTA/MEI      Row Name 02/06/19 1015             Motor Assessment/Intervention    Additional Documentation  Gross Motor Coordination (Group)  -BL      Recorded by [BL] Giana Chakraborty COTA/MEI      Row Name 02/06/19 1015             Gross Motor Coordination    Gross Motor Impairments  motor control;strength  -BL      Gross Motor Coordination Interventions  -- [facility]ENNIF[facility] task reaching to target 75% accuracy  -BL      Recorded by [BL] Giana Chakraborty COTA/MEI      Row Name 02/06/19 1015             Pain Assessment    Additional Documentation  Pain Scale: Numbers Pre/Post-Treatment (Group)  -BL      Recorded by [BL] Giana Chakraborty COTA/MEI      Row Name 02/06/19 1015             Pain Scale: Numbers Pre/Post-Treatment    Pain Scale: Numbers, Pretreatment  8/10  -BL      Pain Scale: Numbers, Post-Treatment  8/10  -BL      Pain Location - Side  Right  -BL      Pain Location  shoulder left leg  -BL      Pre/Post Treatment Pain Comment  R UE shoulder and L leg due to severe tone/cramps noted  -BL      Pain Intervention(s)  Medication (See MAR);Repositioned;Massage;Rest  -BL      Recorded by [BL] Giana Chakraborty COTA/MEI      Row Name 02/06/19 1015             Balance    Balance  static standing balance  -BL      Additional  Documentation  Balance (Row)  -BL      Recorded by [BL] Giana Chakraborty COTA/L      Row Name 02/06/19 1015             Static Standing Balance    Level of [place] (Supported Standing, Static Balance)  contact guard assist  -BL      Time Able to Maintain Position (Supported Standing, Static Balance)  1 to 2 minutes  -BL      Assistive Device Utilized (Supported Standing, Static Balance)  walker, rolling  -BL      Comment (Supported Standing, Static Balance)  no LOB this date and no reaching out of center of gravity with noted fatigue and pt stating he has increasd LLE cramps when applying pressure/weight into foot. Will address with PT.  -BL      Recorded by [BL] Giana Chakraborty COTA/L      Row Name 02/06/19 1015             Therapeutic Exercise    Therapeutic Exercise  seated, upper extremities  -BL      Additional Documentation  Therapeutic Exercise (Row)  -BL      Recorded by [BL] Giana Chakraborty COTA/L      Row Name 02/06/19 1015             Upper Extremity Seated Therapeutic Exercise    Performed, Seated Upper Extremity (Therapeutic Exercise)  shoulder horizontal abduction/adduction;shoulder flexion/extension  -BL      Device, Seated Upper Extremity (Therapeutic Exercise)  elastic bands/tubing  -BL      Exercise Type, Seated Upper Extremity (Therapeutic Exercise)  resistive exercise;AROM (active range of motion)  -BL      Expected Outcomes, Seated Upper Extremity (Therapeutic Exercise)  improve functional tolerance, self-care activity;improve motor control;improve functional stability;improve performance, BADLs;improve performance, gross motor coordination skills;improve performance, IADLs;improve performance, proprioception;improve performance, reaching above shoulder level;improve performance, reaching for objects;improve performance, reaching/manipulating objects;improve performance, propel a wheelchair;improve performance, transfer skills;strengthen normal movement patterns;strengthen, facilitate  independent active range of motion  -BL      Restrictions, Seated Upper Extremity (Therapeutic Exercise)  no lifting over 5 pounds  -BL      Sets/Reps Detail, Seated Upper Extremity (Therapeutic Exercise)  Jose mohr utilized this date in seated position for [facility] strengthening task x 2 sets of 10 reps with rest breaks and moderate fatigue noted.  -BL      Transfers Skills, Training to Functional Activity, Seated Upper Extremity (Therapeutic Exercise)  beginning to transfer skills to functional activity  -BL      Recorded by [BL] Giana Chakraborty COTA/L      Row Name 02/06/19 1015             Vital Signs    Post Systolic BP Rehab  112  -BL      Post Treatment Diastolic BP  78  -BL      Pretreatment Heart Rate (beats/min)  86  -BL      Posttreatment Heart Rate (beats/min)  87  -BL      Pre SpO2 (%)  95  -BL      O2 Delivery Pre Treatment  room air  -BL      Post SpO2 (%)  93  -BL      O2 Delivery Post Treatment  room air  -BL      Pre Patient Position  Supine  -BL      Intra Patient Position  Standing  -BL      Post Patient Position  Sitting  -BL      Recorded by [BL] Giana Chakraborty COTA/L      Row Name 02/06/19 1015             Positioning and Restraints    Pre-Treatment Position  in bed  -BL      Post Treatment Position  wheelchair  -BL      In Wheelchair  sitting;call light within reach;encouraged to call for assist  -BL      Recorded by [BL] Giana Chakraborty COTA/L      Row Name 02/06/19 1015             Daily Summary of Progress (OT)    Functional Goal Overall Progress: Occupational Therapy  progressing toward functional goals as expected  -BL      Recommendations: Occupational Therapy  cont current POC  -BL      Recorded by [BL] Giana Chakraborty COTA/L        User Key  (r) = Recorded By, (t) = Taken By, (c) = Cosigned By    Initials Name Effective Dates    BL Giana Chakraborty COTA/L 10/17/16 -         Physical Therapy Education     Title: PT OT SLP Therapies (In Progress)     Topic: Physical Therapy (In  Progress)     Point: Mobility training (In Progress)     Learning Progress Summary           Patient Acceptance, E, NR by GAL at 2/6/2019  2:39 PM                   Point: Body mechanics (In Progress)     Learning Progress Summary           Patient Acceptance, E, NR by GAL at 2/6/2019  2:39 PM                   Point: Precautions (In Progress)     Learning Progress Summary           Patient Acceptance, E, NR by GAL at 2/6/2019  2:39 PM                               User Key     Initials Effective Dates Name Provider Type Discipline     04/03/18 -  Shilpa Dumont PT Physical Therapist PT                PT Recommendation and Plan    Planned Therapy Interventions (PT Eval): balance training, bed mobility training, gait training, home exercise program, motor coordination training, neuromuscular re-education, patient/family education, ROM (range of motion), stair training, strengthening, stretching, transfer training, wheelchair management/propulsion training  Frequency of Treatment (PT Eval): other (see comments)(90 minutes per day;5-7 days per week)  Plan of Care Reviewed With: patient  IRF Plan of Care Review: progress ongoing, continue  Outcome Summary: PT evaluation completed. Pt transferred sit to stand with min assistance. Attempted ambulation;however, pt having cramping in L foot at time of evaluation and pt not actively able to move footout of supination and this contributed to unstable knee. Pt propelled w/c 200ft with SBA. Function limited by decreased strength balance and tolerance for functional mobility and activities. Pt will benefit from PT to regain lost function. Anticipate home with assistance and continued therapy services at discharge.      Outcome Measures     Row Name 02/06/19 1110 02/06/19 1015 02/06/19 0732       How much help from another person do you currently need...    Turning from your back to your side while in flat bed without using bedrails?  3  -GAL  --  --    Moving from lying on back  to sitting on the side of a flat bed without bedrails?  3  -GAL  --  --    Moving to and from a bed to a chair (including a wheelchair)?  3  -GAL  --  --    Standing up from a chair using your arms (e.g., wheelchair, bedside chair)?  3  -GAL  --  --    Climbing 3-5 steps with a railing?  2  -GAL  --  --    To walk in hospital room?  2  -GAL  --  --    AM-PAC 6 Clicks Score  16  -GAL  --  --       How much help from another is currently needed...    Putting on and taking off regular lower body clothing?  --  2  -BL  2  -BH    Bathing (including washing, rinsing, and drying)  --  2  -BL  2  -BH    Toileting (which includes using toilet bed pan or urinal)  --  3  -BL  3  -BH    Putting on and taking off regular upper body clothing  --  3  -BL  3  -BH    Taking care of personal grooming (such as brushing teeth)  --  3  -BL  3  -BH    Eating meals  --  4  -BL  4  -BH    Score  --  17  -BL  17  -BH       Functional Assessment    Outcome Measure Options  AM-PAC 6 Clicks Basic Mobility (PT)  -GAL  AM-PAC 6 Clicks Daily Activity (OT)  -  AM-[facility] 6 Clicks Daily Activity (OT)  -      User Key  (r) = Recorded By, (t) = Taken By, (c) = Cosigned By    Initials Name Provider Type     Maria Elena Barragan, OTR/L Occupational Therapist    GAL Shilpa Dumont, PT Physical Therapist     Giana Chakraborty, COULTER/L Occupational Therapy Assistant           Juan FIM FIM Goal  Discharge FIM Daily FIM           Grooming        Bathing        Dressing - Upper Body        Dressing - Lower Body        Toilet        Tub, Shower        Problem Solving         Social Interaction                 Bed, Chair, W/C Transfer 3 6      Walking 3 6      Wheelchair Locomotion 5 7      Stairs 0 6              Comprehension        Expression        Memory                    Time Calculation:     PT Charges     Row Name 02/06/19 4753             Time Calculation    Start Time  1110  -      Stop Time  1155  -      Time Calculation (min)  45 min  -      PT Received  On  02/06/19  -GAL      PT Goal Re-Cert Due Date  02/19/19  -         Time Calculation- PT    Total Timed Code Minutes- PT  30 minute(s)  -        User Key  (r) = Recorded By, (t) = Taken By, (c) = Cosigned By    Initials Name Provider Type    Shilpa Cabello, PT Physical Therapist        Therapy Suggested Charges     Code   Minutes Charges    None             Therapy Charges for Today     Code Description Service Date Service Provider Modifiers Qty    15320692632 HC PT EVAL MOD COMPLEXITY 1 2/6/2019 Shilpa Dumont, PT GP 1    24010633826  PT THERAPEUTIC ACT EA 15 MIN 2/6/2019 Shilpa Dumont, PT GP 2            PT G-Codes  Outcome Measure Options: AM-PAC 6 Clicks Basic Mobility (PT)  AM-PAC 6 Clicks Score: 16  Score: 17      Shilpa Dumont, PT  2/6/2019

## 2019-02-06 NOTE — PLAN OF CARE
Problem: Patient Care Overview  Goal: Plan of Care Review  Outcome: Ongoing (interventions implemented as appropriate)   02/06/19 1321   Patient Care Overview   IRF Plan of Care Review progress ongoing, continue   Coping/Psychosocial   Plan of Care Reviewed With patient   OTHER   Outcome Summary Cognitive-linguistic evalution completed this date. Pt was administered BCAT and scored 39/50 which places pt w/mild cognitive deficits. Pt c/o memory and attention issues that impact him daily. Pt also struggled w/thought organization, problem solving and mental flexibility. SLP discussed results, recommendations and POC to which pt was in agreement. Goals established to address listed deficits.

## 2019-02-06 NOTE — CONSULTS
Adult Nutrition  Assessment    Patient Name:  Ventura Boggs  YOB: 1960  MRN: [id number]  Admit Date:  2/5/2019    Assessment Date:  2/6/2019    Comments:  Patient admitted to ARU d/t TIA, left-side weakness, and uncontrolled type 2 DM. Patient has Hgb A1C of 15.1. Patient reported that he has a good appetite and intake of meals, with 75% intake of the last 2 meals. The patient reported that he usually eats 2 meals/day at home, but snacks quite frequently throughout the day. I will follow-up tomorrow to educate on ADA Diet.     Reason for Assessment     Row Name 02/06/19 1502          Reason for Assessment    Reason For Assessment  physician consult  (Pended)          Nutrition/Diet History     Row Name 02/06/19 1502          Nutrition/Diet History    Typical Food/Fluid Intake  The patient usually eats about 2 meals/day with several snacks throughout the day.   (Pended)            Labs/Tests/Procedures/Meds     Row Name 02/06/19 1503          Labs/Procedures/Meds    Lab Results Reviewed  reviewed, pertinent  (Pended)      Lab Results Comments  Glucose-133 (704 at ED), Na+ (125 at ED), HgA1C-15.1  (Pended)         Diagnostic Tests/Procedures    Diagnostic Test/Procedure Reviewed  reviewed  (Pended)         Medications    Pertinent Medications Reviewed  reviewed, pertinent  (Pended)      Pertinent Medications Comments  Pepcid, NovoLog, Levemir, Metformin  (Pended)            Estimated/Assessed Needs     Row Name 02/06/19 1527 02/06/19 1526       Calculation Measurements    Weight Used For Calculations  90.3 kg (199 lb 1.2 oz)  (Pended)   90.3 kg (199 lb 1.2 oz)  (Pended)        Estimated/Assessed Needs    Additional Documentation  --  [place]-St. Jeor Equation (Group);Calorie Requirements (Group);Fluid Requirements (Group);KCAL/KG (Group);Protein Requirements (Group)  (Pended)        Calorie Requirements    Weight Used For Calorie Calculations  --  90.3 kg (199 lb 1.2 oz)  (Pended)     Estimated  Calorie Requirement (kcal/day)  --  2200  (Pended)     Estimated Calorie Need Method  --  Eugene-St Jeor  (Pended)        KCAL/KG    14 Kcal/Kg (kcal)  1264.2  (Pended)   1264.2  (Pended)     15 Kcal/Kg (kcal)  1354.5  (Pended)   1354.5  (Pended)     18 Kcal/Kg (kcal)  1625.4  (Pended)   1625.4  (Pended)     20 Kcal/Kg (kcal)  1806  (Pended)   1806  (Pended)     25 Kcal/Kg (kcal)  2257.5  (Pended)   2257.5  (Pended)     30 Kcal/Kg (kcal)  2709  (Pended)   2709  (Pended)     35 Kcal/Kg (kcal)  3160.5  (Pended)   3160.5  (Pended)     40 Kcal/Kg (kcal)  3612  (Pended)   3612  (Pended)     45 Kcal/Kg (kcal)  4063.5  (Pended)   4063.5  (Pended)     50 Kcal/Kg (kcal)  4515  (Pended)   4515  (Pended)        Eugene-St. Jeor Equation    RMR (Eugene-St. Jeor Equation)  1723.38  (Pended)   1723.38  (Pended)        Protein Requirements    Weight Used For Protein Calculations  --  90.3 kg (199 lb 1.2 oz)  (Pended)     Est Protein Requirement Amount (gms/kg)  --  0.8 gm protein  (Pended)     Estimated Protein Requirements (gms/day)  --  72.24  (Pended)        Fluid Requirements    Estimated Fluid Requirements (mL/day)  --  2200  (Pended)     RDA Method (mL)  --  2200  (Pended)     Amie-Rafael Method (over 20 kg)  3306  (Pended)   3306  (Pended)         Nutrition Prescription Ordered     Row Name 02/06/19 1527          Nutrition Prescription PO    Current PO Diet  Regular  (Pended)      Fluid Consistency  Thin  (Pended)      Common Modifiers  Consistent Carbohydrate  (Pended)          Evaluation of Received Nutrient/Fluid Intake     Row Name 02/06/19 1527 02/06/19 1526       Calculation Measurements    Weight Used For Calculations  90.3 kg (199 lb 1.2 oz)  (Pended)   90.3 kg (199 lb 1.2 oz)  (Pended)        PO Evaluation    Number of Days PO Intake Evaluated  1 day  (Pended)   --    Number of Meals  3  (Pended)   --    % PO Intake  75%  (Pended)   --        Evaluation of Prescribed Nutrient/Fluid Intake     Row Name 02/06/19  1527 02/06/19 1526       Calculation Measurements    Weight Used For Calculations  90.3 kg (199 lb 1.2 oz)  (Pended)   90.3 kg (199 lb 1.2 oz)  (Pended)             Electronically signed by:  Lyudmila Conway  02/06/19 3:27 PM

## 2019-02-06 NOTE — PROGRESS NOTES
LOS: 1 day   Patient Care Team:  Anita Faria APRN as PCP - General (Family Medicine)    Chief Complaint:   Medically complex patient  Strokelike symptoms with some left sided weakness that is worse than his baseline        Interval History:     SUBJECTIVE:  He slept well.  His pain is well-controlled.  He has chronic pain back and right shoulder.  No shortness breath no chest pain  Again discuss with him and he says his left-sided weakness is definitely worse than prior to this episode  History taken from: patient chart RN    Objective     Vital Signs  Temp:  [97.3 °F (36.3 °C)] 97.3 °F (36.3 °C)  Heart Rate:  [78-90] 78  Resp:  [18] 18  BP: (116-134)/(74-87) 127/87      02/05/19  1404   Weight: 90.3 kg (199 lb)         Physical Exam:     General Appearance:    Alert, cooperative, in no acute distress   Head:    Normocephalic, without obvious abnormality, atraumatic   Eyes:            Lids and lashes normal, conjunctivae and sclerae normal, no   icterus, no pallor, corneas clear, PERRLA   Throat:   No oral lesions, no thrush, oral mucosa moist   Neck:   No adenopathy, supple, trachea midline, no thyromegaly, no   carotid bruit, no JVD    .  Tenderness in the right shoulder with good range of motion    Lungs:     Clear to auscultation,respirations regular, even and                  Unlabored good bilateral air movement     Heart:    Regular rhythm and normal rate, normal S1 and S2, no            murmur, no gallop, no rub, no click no ectopy    Chest Wall:    No abnormalities observed   Abdomen:     Normal bowel sounds, no masses, no organomegaly, soft        non-tender, non-distended, no guarding, no rebound                Tenderness      Extremities:   Moves all extremities well, no edema, no cyanosis, no             Redness he does have left lower extremity and left upper extremity weakness        Skin:   No bleeding, bruising or rash   Lymph nodes:   No palpable adenopathy   Neurologic:   Cranial  nerves 2 - 12 grossly intact, sensation intact, DTR       present and equal bilaterally cranial nerves are intact speech is intact .  Left-sided weakness unchanged from admission       RESULTS REVIEW:     Lab Results (last 24 hours)     Procedure Component Value Units Date/Time    POC Glucose Once [[id number]]  (Abnormal) Collected:  02/06/19 1059    Specimen:  Blood Updated:  02/06/19 1114     Glucose 133 mg/dL      Comment: RN NotifiedOperator: [id number] VALERIA Hirsch ID: [id number]       Comprehensive Metabolic Panel [[id number]]  (Abnormal) Collected:  02/06/19 0943    Specimen:  Blood Updated:  02/06/19 1020     Glucose 140 mg/dL      BUN 11 mg/dL      Creatinine 0.67 mg/dL      Sodium 137 mmol/L      Potassium 4.2 mmol/L      Chloride 97 mmol/L      CO2 30.0 mmol/L      Calcium 10.1 mg/dL      Total Protein 6.5 g/dL      Albumin 4.10 g/dL      ALT (SGPT) 43 U/L      AST (SGOT) 47 U/L      Alkaline Phosphatase 114 U/L      Total Bilirubin 0.5 mg/dL      eGFR Non African Amer 123 mL/min/1.73      Globulin 2.4 gm/dL      A/G Ratio 1.7 g/dL      BUN/Creatinine Ratio 16.4     Anion Gap 10.0 mmol/L     Magnesium [[id number]]  (Normal) Collected:  02/06/19 0943    Specimen:  Blood Updated:  02/06/19 1020     Magnesium 1.8 mg/dL     Hemoglobin A1c [[id number]]  (Abnormal) Collected:  02/06/19 0546    Specimen:  Blood Updated:  02/06/19 0931     Hemoglobin A1C 15.1 %     Iron Profile [[id number]]  (Normal) Collected:  02/06/19 0546    Specimen:  Blood Updated:  02/06/19 0726     Iron 80 mcg/dL      TIBC 323 mcg/dL      Iron Saturation 25 %     CBC Auto Differential [[id number]]  (Abnormal) Collected:  02/06/19 0546    Specimen:  Blood Updated:  02/06/19 0601     WBC 7.89 10*3/mm3      RBC 4.65 10*6/mm3      Hemoglobin 13.9 g/dL      Hematocrit 40.4 %      MCV 86.9 fL      MCH 29.9 pg      MCHC 34.4 g/dL      RDW 13.0 %      RDW-SD 41.2 fl      MPV 11.4 fL      Platelets 283 10*3/mm3      Neutrophil % 56.9 %       Lymphocyte % 26.5 %      Monocyte % 11.5 %      Eosinophil % 4.2 %      Basophil % 0.4 %      Immature Grans % 0.5 %      Neutrophils, Absolute 4.49 10*3/mm3      Lymphocytes, Absolute 2.09 10*3/mm3      Monocytes, Absolute 0.91 10*3/mm3      Eosinophils, Absolute 0.33 10*3/mm3      Basophils, Absolute 0.03 10*3/mm3      Immature Grans, Absolute 0.04 10*3/mm3     POC Glucose Once [[id number]]  (Abnormal) Collected:  02/06/19 0521    Specimen:  Blood Updated:  02/06/19 0601     Glucose 206 mg/dL      Comment: Sliding Scale AdminOperator: [id number] KAMLA BLASeter ID: [id number]       POC Glucose Once [[id number]]  (Abnormal) Collected:  02/05/19 1958    Specimen:  Blood Updated:  02/05/19 2020     Glucose 191 mg/dL      Comment: RN NotifiedOperator: [id number] QUAN LONGORIANMeter ID: [id number]       POC Glucose Once [[id number]]  (Normal) Collected:  02/05/19 1550    Specimen:  Blood Updated:  02/05/19 1615     Glucose 90 mg/dL      Comment: RN NotifiedOperator: [id number] VALERIA TORREZNMeter ID: [id number]       CRE Screen by PCR - Swab, Large Intestine, Rectum [[id number]] Collected:  02/05/19 1343    Specimen:  Swab from Large Intestine, Rectum Updated:  02/05/19 1534     CRE SCREEN Not Detected     Comment: Test performed by real-time polymerase chain reaction (qPCR).        OXA 48 Strain Not Detected     IMP STRAIN Not Detected     VIM STRAIN Not Detected     NDM Strain Not Detected     KPC Strain Not Detected        Imaging Results (last 72 hours)     ** No results found for the last 72 hours. **          Assessment/Plan     Active Hospital Problems    Diagnosis   • **Medically complex patient   • Stroke-like symptoms       He has chronic left-sided weakness but it is much worse after this episode.  Specifically the left leg much  weaker than it was prior to this episode     • Uncontrolled type 2 diabetes mellitus with hyperglycemia (CMS/HCC)     -at presentation to ED glucose 704     • Left-sided muscle  weakness     Due to prior Intracranial hemorrhage with closed head injury     • Right shoulder pain     He's had right shoulder pain for some time and has had steroid injections.  But since his fall the pain is much worse.  X-rays were unremarkable yesterday at [facility].     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  I suspect he did have a stroke and we will continue intensive therapy  His diabetes is better and will continue to monitor  Blood pressure stable  Continue analgesics as he was taking at home   24 hour reassessment shows that the patient is a good rehabilitation candidate.  Expected to be able to participate 3 hours a day, make measurable improvement, and return home at discharge.    Reassessment shows that post admission functional status is consistent with the preadmission screen.          This document has been electronically signed by Richard Galvez MD on February 6, 2019 11:24 AM

## 2019-02-06 NOTE — PLAN OF CARE
Problem: Patient Care Overview  Goal: Plan of Care Review  Outcome: Ongoing (interventions implemented as appropriate)   02/06/19 1244   Patient Care Overview   IRF Plan of Care Review progress ongoing, continue   Progress, Functional Goals demonstrating adequate progress   Coping/Psychosocial   Plan of Care Reviewed With patient   OTHER   Outcome Summary flaquito went great with patient. His pain is controlled with oral meds. VSS. continue to work with therapy.      Goal: Individualization and Mutuality  Outcome: Ongoing (interventions implemented as appropriate)    Goal: Discharge Needs Assessment  Outcome: Ongoing (interventions implemented as appropriate)    Goal: Home Safety Plan  Outcome: Ongoing (interventions implemented as appropriate)    Goal: Coping Plan  Outcome: Ongoing (interventions implemented as appropriate)    Goal: Community Reintegration Plan  Outcome: Ongoing (interventions implemented as appropriate)      Problem: Fall Risk (Adult)  Goal: Identify Related Risk Factors and Signs and Symptoms  Outcome: Ongoing (interventions implemented as appropriate)    Goal: Absence of Fall  Outcome: Ongoing (interventions implemented as appropriate)      Problem: Diabetes, Type 2 (Adult)  Goal: Signs and Symptoms of Listed Potential Problems Will be Absent, Minimized or Managed (Diabetes, Type 2)  Outcome: Ongoing (interventions implemented as appropriate)
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
## 2019-02-06 NOTE — THERAPY EVALUATION
Inpatient Rehabilitation - Occupational Therapy Initial Evaluation    [facility]     Patient Name: Ventura Boggs  : 1960  MRN: [id number]    Today's Date: 2019                 Admit Date: 2019         ICD-10-CM ICD-9-CM   1. Uncontrolled type 2 diabetes mellitus with hyperglycemia (CMS/HCC) E11.65 250.02   2. Medically complex patient Z78.9 V49.9   3. Chronic intractable pain G89.29 338.29   4. Symbolic dysfunction R48.9 784.60   5. Impaired mobility and ADLs Z74.09 799.89       Patient Active Problem List   Diagnosis   • Uncontrolled type 2 diabetes mellitus with hyperglycemia (CMS/HCC)   • Postoperative urinary retention   • Incisional hernia, without obstruction or gangrene   • Acute appendicitis with localized peritonitis   • Closed head injury   • Hyponatremia   • History of recurrent TIAs   • Ataxia   • Chronic pain syndrome   • Right shoulder pain   • Left-sided muscle weakness   • Medically complex patient   • Stroke-like symptoms       Past Medical History:   Diagnosis Date   • Arthritis    • Carpal tunnel syndrome    • Chronic pain syndrome    • Depressive disorder    • GERD (gastroesophageal reflux disease)    • Hernia    • History of closed head injury     with ICH and left weakness   • History of urinary system disease    • Hypertension    • Migraine    • Right shoulder pain    • Rotator cuff syndrome    • Type 2 diabetes mellitus (CMS/HCC)    • Ureteric stone        Past Surgical History:   Procedure Laterality Date   • ABDOMINAL SURGERY  2016   • APPENDECTOMY N/A 2018    Procedure: APPENDECTOMY;  Surgeon: Rusty Palacio MD;  Location: [facility];  Service:    • CHOLECYSTECTOMY     • CIRCUMCISION     • CYSTOSCOPY  2003    Cystoscopy and removal of J stent. Right ureteral stent.   • CYSTOSCOPY  2003    Cystoscopy and right stone extraction and placement of 26x 6 J-stent.   • INCISION AND DRAINAGE ABSCESS N/A 2017    Procedure: INCISION AND  DRAINAGE ABD.ABSCESS;  Surgeon: Rui Shaver MD;  Location: [facility] OR;  Service:    • VENTRAL/INCISIONAL HERNIA REPAIR N/A 5/31/2017    Procedure: LAPAROSCOPIC POSSIBLE OPEN ADHESIOLYSIS AND VENTRAL/INCISIONAL HERNIA REPAIR ;  Surgeon: Rui Shaver MD;  Location: [facility] OR;  Service:             IRF OT ASSESSMENT FLOWSHEET (last 72 hours)      IRF Occupational Therapy Evaluation     Row Name 02/06/19 0732                   Evaluation/Treatment Time and Intent    Subjective Information  complains of;weakness;fatigue  -        Existing Precautions/Restrictions  fall;lifting no lifiting over 5 pounds; higher gait belt placement  -        Document Type  evaluation  -        Mode of Treatment  individual therapy;occupational therapy  -        Patient/Family Observations  no family present   -        Start Time (Evaluation/Treatment)  0732  -        Stop Time (Evaluation/Treatment)  0857  -        Row Name 02/06/19 0732                   Cognition/Psychosocial- PT/OT    Affect/Mental Status (Cognitive)  WFL  -        Orientation Status (Cognition)  oriented x 4;verbal cues/prompts needed for orientation  -        Follows Commands (Cognition)  over 90% accuracy;verbal cues/prompting required;repetition of directions required  -        Personal Safety Interventions  fall prevention program maintained;gait belt;nonskid shoes/slippers when out of bed;supervised activity  -        Cognitive Function (Cognitive)  safety deficit  -        Safety Deficit (Cognitive)  mild deficit  -        Row Name 02/06/19 0732                   Mobility    Additional Documentation  Wheelchair Mobility/Management (Group)  -        Row Name 02/06/19 0732                   Bed Mobility Assessment/Treatment    Bed Mobility Assessment/Treatment  sit-supine  -        Sit-Supine [place] (Bed Mobility)  minimum assist (75% patient effort)  -        Bed Mobility, Safety Issues  decreased use of arms for  pushing/pulling;decreased use of legs for bridging/pushing  -        Assistive Device (Bed Mobility)  head of bed elevated;bed rails  -        Comment (Bed Mobility)  pt required assist with LE to get onto the bed   -        Row Name 02/06/19 0732                   Transfer Assessment/Treatment    Transfer Assessment/Treatment  bed-chair transfer;chair-bed transfer;toilet transfer  -        Row Name 02/06/19 0732                   Bed-Chair Transfer    Bed-Chair [place] (Transfers)  minimum assist (75% patient effort);verbal cues;nonverbal cues (demo/gesture)  -        Assistive Device (Bed-Chair Transfers)  wheelchair  -        Row Name 02/06/19 0732                   Chair-Bed Transfer    Chair-Bed [place] (Transfers)  minimum assist (75% patient effort);verbal cues;nonverbal cues (demo/gesture)  -        Assistive Device (Chair-Bed Transfers)  wheelchair  -        Row Name 02/06/19 0732                   Sit-Stand Transfer    Sit-Stand [place] (Transfers)  contact guard;minimum assist (75% patient effort);verbal cues;nonverbal cues (demo/gesture)  -        Assistive Device (Sit-Stand Transfers)  walker, front-wheeled;wheelchair  -        Row Name 02/06/19 0732                   Stand-Sit Transfer    Stand-Sit [place] (Transfers)  minimum assist (75% patient effort);contact guard;verbal cues  -        Assistive Device (Stand-Sit Transfers)  walker, front-wheeled;wheelchair  -        Row Name 02/06/19 0732                   Toilet Transfer    Type (Toilet Transfer)  stand-sit;sit-stand  -        [place] Level (Toilet Transfer)  minimum assist (75% patient effort)  -        Assistive Device (Toilet Transfer)  grab bars/safety frame;wheelchair  -        Row Name 02/06/19 0732                   Wheelchair Mobility/Management    Comment, Parts Management (Wheelchair)  pt SBA and vc to move w/c in room   -        Row Name 02/06/19 0732                   Safety  Issues, Functional Mobility    Safety Issues Affecting Function (Mobility)  insight into deficits/self awareness;judgment;positioning of assistive device;problem solving;safety precaution awareness;safety precautions follow-through/compliance;sequencing abilities  -        Impairments Affecting Function (Mobility)  balance;coordination;endurance/activity tolerance;motor control;motor planning;pain;postural/trunk control;range of motion (ROM);strength  -        Row Name 02/06/19 0732                   Basic Activities of Daily Living (BADLs)    Basic Activities of Daily Living  self-feeding;toileting;bathing;upper body dressing;lower body dressing;grooming  -        Row Name 02/06/19 0732                   Bathing Assessment/Treatment    Bathing [place] Level  minimum assist (75% patient effort)  -        Comment (Bathing)  pt set up assist for UB bath, for UB, total assist with back. pt struggled with UB pain in right shoulder with movement raised to 90 degrees for bath ; pt min assist for balance to wash LB in standing, sitting CGA to min assist for balance for safety with reaching to floor to wash LB   -[facility] Name 02/06/19 0732                   Upper Body Dressing Assessment/Treatment    Upper Body Dressing Task  doff;don;pull over garment;verbal cues;supervision;set up assistance  -        Comment (Upper Body Dressing)  mod difficulty extended time   -        Row Name 02/06/19 0732                   Lower Body Dressing Assessment/Treatment    Lower Body Dressing [place] Level  moderate assist (50% patient effort)  -        Comment (Lower Body Dressing)  pt min assist with standing balance to pull down and up pants/underwear, used feet to get off socks and lower portions, min assist to CGA to don sock sitting in w/c leaning forward and to thread underwear and pants. min assist ot stand and get pulled up for balance.   -        Row Name 02/06/19 0732                   Grooming  Assessment/Treatment    Grooming [place] Level  verbal cues;standby assist  -        Comment (Grooming)  pt set up assist to apply deodorant   -[facility] Name 02/06/19 0732                   Toileting Assessment/Treatment    Toileting [place] Level  toileting skills;minimum assist (75% patient effort);contact guard assist  -[facility] Name 02/06/19 0732                   Self-Feeding Assessment/Treatment    [place] Level (Self-Feeding)  feeding skills;set up;supervision;independent  -        Comment (Self-Feeding)  min-mod difficulty with opening containers and using utensils, spreading items on food   -[facility] Name 02/06/19 0732                   General ROM    GENERAL ROM COMMENTS  RUE limited shoulder, reports difficulty with rotator cuff was getting injects and neck pain reports fell last Thursday, rest appears WFL not full range, LUE not full range, decreased fluidity decreased shoulder range, reports pain with movement approx 70 degrees unable to reach his head/back when arm raise, able with max difficulty reach his back lower, rest grossly WFL but decreased digit movement and control. Pt reports difficulty with motor control and dropping items.   -[facility] Name 02/06/19 0732                   MMT (Manual Muscle Testing)    General MMT Comments  BUE  grossly 4/5; BUE in limited shoulder range on right report pain at 3+/5; left unable to tolerate 3+/5   -[facility] Name 02/06/19 0732                   Motor Assessment/Intervention    Additional Documentation  Fine Motor Testing & Training (Group)  -[facility] Name 02/06/19 0732                   Fine Motor Testing & Training    Fine Motor Tests  9 Hole Peg Test of Fine Motor Coordination Results  -        Results, 9 Hole Peg Test of Fine Motor Coordination-Right  32 seconds min difficulty   -        Results, 9 Hole Peg Test of Fine Motor Coordination-Left  1 minute 24 seconds mod difficulty decreased dexterity,  translation, manipulation skills.   -BH        Row Name 02/06/19 0732                   Balance    Balance  static sitting balance;dynamic sitting balance  -BH        Row Name 02/06/19 0732                   Static Sitting Balance    Level of [place] (Unsupported Sitting, Static Balance)  supervision  -BH        Row Name 02/06/19 0732                   Dynamic Sitting Balance    Level of [place], Reaches Outside Midline (Sitting, Dynamic Balance)  contact guard assist;minimal assist, 75% patient effort  -BH        Row Name 02/06/19 0732                   Static Standing Balance    Level of [place] (Supported Standing, Static Balance)  contact guard assist;minimal assist, 75% patient effort  -BH        Row Name 02/06/19 0732                   Sensory    Hearing Status  hearing impairment, left  -BH        Row Name 02/06/19 0732                   Vision Assessment/Intervention    Visual Impairment/Limitations  corrective lenses for reading  -BH        Row Name 02/06/19 0732                   Light Touch Sensation Assessment    Left Upper Extremity: Light Touch Sensation Assessment  mild impairment, 75% or more correct responses  -        Comment, Left Upper Extremity: Light Touch Sensation Assessment  report different feeling in LUE asleep feeling and pain with touch at times, reports more painful then right with touch   -        Right Upper Extremity: Light Touch Sensation Assessment  intact  -BH        Row Name 02/06/19 0732                   Pain Assessment    Additional Documentation  Pain Scale: Numbers Pre/Post-Treatment (Group)  -BH        Row Name 02/06/19 0732                   Pain Scale: Numbers Pre/Post-Treatment    Pain Scale: Numbers, Pretreatment  8/10  [facility]        Pain Scale: Numbers, Post-Treatment  8/10  [facility]        Pain Location  -- right shoulder, left leg   -        Pain Intervention(s)  Repositioned;Ambulation/increased activity;Rest  -BH        Row Name 02/06/19 0732                    OT Clinical Impression    General Observations of Patient  pt sitting on EOB with breakfast on room air   -        Patient's Goals For Discharge  return home;take care of myself at home  -        Rehab Potential/Prognosis: Occupational Therapy  adequate, monitor progress closely;re-evaluate goals as necessary  -        Frequency of Treatment (OT Eval)  other (see comments) 5-7 days a week   -        Estimated Length of Stay (OT Eval)  2 weeks  -        Problem List: Occupational Therapy  ROM decreased;decreased flexibility;strength decreased;impaired balance;coordination impaired;impaired communication;motor control impaired;postural control impaired;pain  -        Limitations/Impairments  safety/cognitive  -        Activity Limitations Related to Problem List (OT Eval)  ambulation not performed safely;BADL activities not performed adequately or safely;community activities not performed adequately or safely;home management activity not performed adequately or safely;IADLs not performed adequately or safely  -        Planned Therapy Interventions (OT Eval)  activity tolerance training;adaptive equipment training;BADL retraining;functional balance retraining;IADL retraining;neuromuscular control/coordination retraining;occupation/activity based interventions;passive ROM/stretching;patient/caregiver education/training;ROM/therapeutic exercise;strengthening exercise;transfer/mobility retraining;wheelchair assessment/training  -        Row Name 02/06/19 0732                   Vital Signs    Pre Systolic BP Rehab  127  -BH        Pre Treatment Diastolic BP  87  -BH        Post Systolic BP Rehab  117  -BH        Post Treatment Diastolic BP  78  -BH        Pretreatment Heart Rate (beats/min)  80  -BH        Posttreatment Heart Rate (beats/min)  82  -BH        Pre SpO2 (%)  100  -BH        O2 Delivery Pre Treatment  room air  -        Post SpO2 (%)  98  -BH        O2 Delivery Post Treatment  room  air  -        Pre Patient Position  Sitting  -        Post Patient Position  Supine  -        Row Name 02/06/19 0732                   IRF OT Goals    Transfer Goal Selection (OT-IRF)  transfers, OT FIM goal(s)  -        Bathing Goal Selection (OT-IRF)  bathing, OT FIM goal  -        UB Dressing Goal Selection (OT-IRF)  UB dressing, OT FIM goal  -        LB Dressing Goal Selection (OT-IRF)  LB dressing, OT FIM goal  -        Toileting Goal Selection (OT-IRF)  toileting, OT goal 1  -        ROM Goal Selection (OT-IRF)  ROM, OT goal 1 (free text)  -        Coordination Goal Selection (OT)  coordination, OT goal 1  -        Additional Documentation  Toileting Goal Selection (OT-IRF) (Row);Transfer Goal Selection (OT-IRF) (Row);ROM Goal Selection (OT-IRF) (Row);Coordination Goal Selection (OT-IRF) (Row)  -        Row Name 02/06/19 0732                   Transfer FIM Goals (OT-IRF)    Tub-Shower Transfer FIM Goal (OT-IRF)  Score of 6 (FIM)  -        Toilet Transfer FIM Goal (OT-IRF)  Score of 6 (FIM)  -        Time Frame (Transfer FIM Goals 1, OT-IRF)  long term goal (LTG);by discharge  -        Progress/Outcomes (Transfer FIM Goals, OT-IRF)  goal not met  -        Row Name 02/06/19 0732                   Bathing FIM Goal (OT-IRF)    Bathing FIM Goal (OT-IRF)  Score of 6 (FIM)  -        Time Frame (Bathing FIM Goal, OT-IRF)  long term goal (LTG);by discharge  -        Progress/Outcomes (Bathing FIM Goal, OT-IRF)  goal not met  -        Row Name 02/06/19 0732                   UB Dressing FIM Goal (OT-IRF)    Upper Body Dressing, FIM Goal, OT-IRF  Score of 6 (FIM)  -        Time Frame (UB Dressing FIM Goal, OT-IRF)  long term goal (LTG);by discharge  -        Progress/Outcomes (UB Dressing FIM Goal, OT-IRF)  goal not met  -        Row Name 02/06/19 0732                   LB Dressing FIM Goal (OT-IRF)    Lower Body Dressing, FIM Goal, OT-IRF  Score of 6 (FIM)  -        Time Frame  (LB Dressing FIM Goal, OT-IRF)  long term goal (LTG);by discharge  -        Progress/Outcomes (LB Dressing FIM Goal, OT-IRF)  goal not met  -        Row Name 02/06/19 0732                   Toileting Goal 1 (OT-IRF)    Activity/Device (Toileting Goal 1, OT-IRF)  toileting skills, all  -        [place] Level (Toileting Goal 1, OT-IRF)  supervision required  -        Time Frame (Toileting Goal 1, OT-IRF)  long term goal (LTG);by discharge  -        Progress/Outcomes (Toileting Goal 1, OT-IRF)  goal not met  -        Row Name 02/06/19 0732                   ROM Goal 1 (OT-IRF)    ROM Goal 1 (OT-IRF)  Pt will display WFL with ROM in BUE with min reports of pain/increased pain level.   -        Time Frame (ROM Goal 1, OT-IRF)  long term goal (LTG);by discharge  -        Progress/Outcomes (ROM Goal 1, OT-IRF)  goal not met  -        Row Name 02/06/19 0732                   Coordination Goal 1 (OT)    Activity/Assistive Device (Coordination Goal 1, OT)  FM task;GM task fair accuracy   -        [place] Level/Cues Needed (Coordination Goal 1, OT)  set-up required;verbal cues required;supervision required  -        Time Frame (Coordination Goal 1, OT)  long term goal (LTG);by discharge  -        Progress/Outcomes (Coordination Goal 1, OT)  goal not met  -        Row Name 02/06/19 0732                   IRF OT Cognition Goals    Problem Specific Goal Selection (OT-IRF)  problem specific goal 1, OT  -        Additional Documentation  Problem Specific Goal Selection (OT-IRF) (Row)  -        Row Name 02/06/19 0732                   Problem Specific Goal 1 (OT-IRF)    Problem Specific Goal 1 (OT-IRF)  Pt will be independent with BUE HEP and home safety awarness.   -        Time Frame (Problem Specific Goal 1, OT-IRF)  long term goal (LTG);by discharge  -        Progress/Outcomes (Problem Specific Goal 1, OT-IRF)  goal not met  -        Row Name 02/06/19 0732                    Positioning and Restraints    Pre-Treatment Position  in bed  -        Post Treatment Position  bed  -        In Bed  supine;call light within reach;encouraged to call for assist;exit alarm on;side rails up   -          User Key  (r) = Recorded By, (t) = Taken By, (c) = Cosigned By    Initials Name Effective Dates     Maria Elena Barragan OTR/L 06/08/18 -            Occupational Therapy Education     Title: PT OT SLP Therapies (In Progress)     Topic: Occupational Therapy (In Progress)     Point: ADL training (Done)     Description: Instruct learner(s) on proper safety adaptation and remediation techniques during self care or transfers.   Instruct in proper use of assistive devices.    Learning Progress Summary           Patient Acceptance, E, VU,NR by  at 2/6/2019  1:35 PM    Comment:  Educated about OT and POC. Educated to call for assist. Educated on safety throughout.                   Point: Precautions (Done)     Description: Instruct learner(s) on prescribed precautions during self-care and functional transfers.    Learning Progress Summary           Patient Acceptance, E, VU,NR by  at 2/6/2019  1:35 PM    Comment:  Educated about OT and POC. Educated to call for assist. Educated on safety throughout.    Acceptance, E, VU,NR by  at 2/6/2019 11:36 AM                   Point: Body mechanics (Done)     Description: Instruct learner(s) on proper positioning and spine alignment during self-care, functional mobility activities and/or exercises.    Learning Progress Summary           Patient Acceptance, E, VU,NR by  at 2/6/2019 11:36 AM                               User Key     Initials Effective Dates Name Provider Type Discipline     06/08/18 -  Maria Elena Barragan OTR/L Occupational Therapist OT     10/17/16 -  Giana Chakraborty COTA/L Occupational Therapy Assistant OT                    OT Recommendation and Plan    Planned Therapy Interventions (OT Eval): activity tolerance training, adaptive  equipment training, BADL retraining, functional balance retraining, IADL retraining, neuromuscular control/coordination retraining, occupation/activity based interventions, passive ROM/stretching, patient/caregiver education/training, ROM/therapeutic exercise, strengthening exercise, transfer/mobility retraining, wheelchair assessment/training    Plan of Care Review  Plan of Care Reviewed With: patient  Plan of Care Reviewed With: patient  Outcome Summary: OT flaquito completed this date. Pt averaged need for min assist with bathing, set up and SBA for UB dressing, mod assist for LB dressing, min assist with toilet t/f. Pt displayed decreased ROM in BUE, pain in BUE with movement especially the RUE shoulder area, pt decreased fine and gross motor control with BUE. Pt could benefit from further skilled OT to increase strength, endurance, safety and independence with ADL. Pt may need 24/7 care at d/c and further home health therapy.       Outcome Measures     Row Name 02/06/19 1015 02/06/19 0732          How much help from another is currently needed...    Putting on and taking off regular lower body clothing?  --  2  -BH     Bathing (including washing, rinsing, and drying)  --  2  -BH     Toileting (which includes using toilet bed pan or urinal)  --  3  -BH     Putting on and taking off regular upper body clothing  --  3  -BH     Taking care of personal grooming (such as brushing teeth)  --  3  -BH     Eating meals  --  4  -     Score  --  17  -        Functional Assessment    Outcome Measure Options  AM-PAC 6 Clicks Daily Activity (OT)  -  AM-PAC 6 Clicks Daily Activity (OT)  -       User Key  (r) = Recorded By, (t) = Taken By, (c) = Cosigned By    Initials Name Provider Type     Maria Elena Barragan, OTR/L Occupational Therapist    Giana Carrizales, COULTER/L Occupational Therapy Assistant            Time Calculation:     OT Start Time: 0732  OT Stop Time: 0857  OT Time Calculation (min): 85 min    Therapy Suggested  Charges     Code   Minutes Charges    None           Therapy Charges for Today     Code Description Service Date Service Provider Modifiers Qty    16418920823 HC OT SELF CARE/MGMT/TRAIN EA 15 MIN 2/6/2019 Maria Elena Barragan, OTR/L GO 4    84333548038 HC OT EVAL MOD COMPLEXITY 2 2/6/2019 Maria Elena Barragan, OTR/L GO 1                  Eval FIM FIM Goal  Discharge FIM Daily FIM           Grooming 5 6      Bathing 4 6      Dressing - Upper Body 5 6      Dressing - Lower Body 3 6      Toilet 3 6      Tub, Shower 0 6      Problem Solving 4 6       Social Interaction  5 6              Bed, Chair, W/C Transfer        Walking        Wheelchair Locomotion        Stairs                Comprehension        Expression        Memory                 Maria Elena Barragan OTR/L  2/6/2019

## 2019-02-06 NOTE — PLAN OF CARE
Problem: Patient Care Overview  Goal: Plan of Care Review  Outcome: Ongoing (interventions implemented as appropriate)   02/06/19 1531   Patient Care Overview   IRF Plan of Care Review progress ongoing, continue   Progress, Functional Goals demonstrating adequate progress   Coping/Psychosocial   Plan of Care Reviewed With patient   OTHER   Outcome Summary Patient admitted to ARU d/t TIA, left-sided weakness, and uncontrolled type II DM. Patient has a Hgb A1C of 15.1. The patient reported that he usually eats 2 meals/day with frequent snacks throughout the day. I will follow up tomorrow to educate the patient on ADA diet.

## 2019-02-06 NOTE — PLAN OF CARE
Problem: Patient Care Overview  Goal: Plan of Care Review  Outcome: Ongoing (interventions implemented as appropriate)   02/06/19 1500   Patient Care Overview   IRF Plan of Care Review progress ongoing, continue   Progress, Functional Goals demonstrating adequate progress   Coping/Psychosocial   Plan of Care Reviewed With patient   OTHER   Outcome Summary Pt able to manuever w/c 250ft x 2 with occassional VCs for safety and proper tech using B UE's to manipulate. Pt performed LE ther ex in w/c without c/o voiced.

## 2019-02-06 NOTE — PLAN OF CARE
Problem: Patient Care Overview  Goal: Plan of Care Review  Outcome: Ongoing (interventions implemented as appropriate)    Goal: Individualization and Mutuality  Outcome: Ongoing (interventions implemented as appropriate)    Goal: Discharge Needs Assessment  Outcome: Ongoing (interventions implemented as appropriate)    Goal: Home Safety Plan  Outcome: Ongoing (interventions implemented as appropriate)    Goal: Coping Plan  Outcome: Ongoing (interventions implemented as appropriate)    Goal: Community Reintegration Plan  Outcome: Ongoing (interventions implemented as appropriate)      Problem: Fall Risk (Adult)  Goal: Identify Related Risk Factors and Signs and Symptoms  Outcome: Ongoing (interventions implemented as appropriate)    Goal: Absence of Fall  Outcome: Ongoing (interventions implemented as appropriate)      Problem: Diabetes, Type 2 (Adult)  Goal: Signs and Symptoms of Listed Potential Problems Will be Absent, Minimized or Managed (Diabetes, Type 2)  Outcome: Ongoing (interventions implemented as appropriate)   02/06/19 5602   Goal/Outcome Evaluation   Problems Assessed (Type 2 Diabetes) hyperglycemia   Problems Present (Type 2 Diabetes) hyperglycemia  (Glucose monitoring and coverage per orders.)

## 2019-02-06 NOTE — THERAPY TREATMENT NOTE
Inpatient Rehabilitation - Physical Therapy Treatment Note  [facility]     Patient Name: Ventura Boggs  : 1960  MRN: [id number]    Today's Date: 2019                 Admit Date: 2019      Visit Dx:      ICD-10-CM ICD-9-CM   1. Uncontrolled type 2 diabetes mellitus with hyperglycemia (CMS/HCC) E11.65 250.02   2. Medically complex patient Z78.9 V49.9   3. Chronic intractable pain G89.29 338.29   4. Symbolic dysfunction R48.9 784.60   5. Impaired mobility and ADLs Z74.09 799.89   6. Impaired functional mobility, balance, gait, and endurance Z74.09 V49.89       Patient Active Problem List   Diagnosis   • Uncontrolled type 2 diabetes mellitus with hyperglycemia (CMS/HCC)   • Postoperative urinary retention   • Incisional hernia, without obstruction or gangrene   • Acute appendicitis with localized peritonitis   • Closed head injury   • Hyponatremia   • History of recurrent TIAs   • Ataxia   • Chronic pain syndrome   • Right shoulder pain   • Left-sided muscle weakness   • Medically complex patient   • Stroke-like symptoms       Therapy Treatment    IRF Treatment Summary     Row Name 19 1500 19 1015          Evaluation/Treatment Time and Intent    Subjective Information  complains of;weakness;pain  -TW  no complaints  -BL     Existing Precautions/Restrictions  fall;lifting  -TW  fall;lifting  (Significant)  no lifting over 5 pounds  -BL     Document Type  therapy note (daily note)  -TW  therapy note (daily note)  -BL     Mode of Treatment  physical therapy;individual therapy  -TW  occupational therapy;individual therapy  -BL     Patient/Family Observations  --  Pt in bed upon entry; no family present  -BL     Start Time (Evaluation/Treatment)  1339  -TW  1015  -BL     Stop Time (Evaluation/Treatment)  1425  -TW  1100  -BL     Recorded by [TW] Brant Palacios, PTA [BL] Giana Chakraborty COTA/MEI     Row Name 19 1500 19 1015          Cognition/Psychosocial- PT/OT     Affect/Mental Status (Cognitive)  WFL  -TW  WFL  -BL     Orientation Status (Cognition)  oriented x 4  -TW  oriented x 4  -BL     Follows Commands (Cognition)  WFL  -TW  WFL  -BL     Personal Safety Interventions  fall prevention program maintained;gait belt;nonskid shoes/slippers when out of bed  -TW  fall prevention program maintained;gait belt;muscle strengthening facilitated;nonskid shoes/slippers when out of bed;supervised activity  -BL     Recorded by [TW] Brant Palacios PTA [BL] Giana Chakraborty COTA/MEI     Row Name 02/06/19 1015             Mobility    Additional Documentation  Wheelchair Mobility/Management (Group)  -BL      Recorded by [BL] Giana Chakraborty COTA/MEI      Row Name 02/06/19 1500 02/06/19 1015          Bed Mobility Assessment/Treatment    Bed Mobility Assessment/Treatment  --  supine-sit;scooting/bridging  -BL     Scooting/Bridging [place] (Bed Mobility)  --  supervision  -BL     Supine-Sit [place] (Bed Mobility)  --  supervision  -BL     Bed Mobility, Safety Issues  --  decreased use of arms for pushing/pulling;decreased use of legs for bridging/pushing  -BL     Assistive Device (Bed Mobility)  --  bed rails;head of bed elevated  -BL     Comment (Bed Mobility)  Not tested due to pt up in w/c already.  -TW  --     Recorded by [TW] Brant Palacios PTA [BL] Giana Chakraborty COTA/MEI     Row Name 02/06/19 1015             Transfer Assessment/Treatment    Transfer Assessment/Treatment  sit-stand transfer;stand-sit transfer;bed-chair transfer  -BL      Recorded by [BL] Giana Chakraborty COTA/MEI      Row Name 02/06/19 1500 02/06/19 1015          Bed-Chair Transfer    Bed-Chair [place] (Transfers)  not tested  -TW  minimum assist (75% patient effort);verbal cues;nonverbal cues (demo/gesture)  -BL     Assistive Device (Bed-Chair Transfers)  --  wheelchair  -BL     Recorded by [TW] Brant Palacios PTA [BL] Giana Chakraborty COTA/MEI     Row Name 02/06/19 1500 02/06/19 1015           Sit-Stand Transfer    Sit-Stand [place] (Transfers)  not tested  -TW  contact guard  -BL     Assistive Device (Sit-Stand Transfers)  --  walker, front-wheeled;wheelchair  -BL     Recorded by [TW] Brant Palacios, KAREN [BL] Giana Chakraborty COULTER/MEI     Row Name 02/06/19 1500 02/06/19 1015          Stand-Sit Transfer    Stand-Sit [place] (Transfers)  not tested  -TW  contact guard;verbal cues  -BL     Assistive Device (Stand-Sit Transfers)  --  wheelchair  -BL     Recorded by [TW] Brant Palacios PTA [BL] Giana Chakraborty COULTER/MEI     Row Name 02/06/19 1015             Toilet Transfer    [place] Level (Toilet Transfer)  not tested  -BL      Recorded by [BL] Giana Chakraborty COTA/MEI      Row Name 02/06/19 1500 02/06/19 1015          Wheelchair Mobility/Management    Method of Wheelchair Locomotion (Mobility)  bimanual (upper extremity) propulsion  -TW  bimanual (upper extremity) propulsion  -BL     Mobility Activities (Wheelchair)  --  forward propulsion;steering;turning;doorway navigation;stopping  -BL     Forward Propulsion [place] (Wheelchair)  stand by assist  -TW  stand by assist;contact guard;verbal cues;nonverbal cues (demo/gesture)  -BL     Backward Propulsion [place] (Wheelchair)  stand by assist  -TW  --     Steering [place] (Wheelchair)  stand by assist  -TW  --     Stopping [place] (Wheelchair)  stand by assist  -TW  --     Turning [place] (Wheelchair)  stand by assist  -TW  --     Doorway Navigation [place] (Wheelchair)  stand by assist  -TW  --     Distance Propelled in Feet (Wheelchair)  250ft x 2    -TW  --     Management Activities (Wheelchair)  wheel locks/brakes management  -TW  footrest/footplate management  -BL     Footrest Management [place] (Wheelchair)  minimum assist (75% patient effort)  -TW  minimum assist (75% patient effort);verbal cues  -BL     Wheel Locks/Brakes Management [place] (Wheelchair)  verbal cues  -TW  --     Comment, Parts  Management (Wheelchair)  --  Overall pt is SBA/CGA for w/c mobility within large therapy gym/hallways and through doors requiring vc's for steering with LUE demo'ing decreased coordination/strength overall. Pt did require rest breaks this date as well due to fatigue.  -BL     Recorded by [TW] Brant Palacios PTA [BL] Giana Chakraborty COTA/MEI     Row Name 02/06/19 1015             Motor Assessment/Intervention    Additional Documentation  Gross Motor Coordination (Group)  -BL      Recorded by [BL] Giana Chakraborty COTA/MEI      Row Name 02/06/19 1015             Gross Motor Coordination    Gross Motor Impairments  motor control;strength  -BL      Gross Motor Coordination Interventions  -- L[place] [facility] [place] 75% accuracy  -BL      Recorded by [BL] Giana Chakraborty COTA/MEI      Row Name 02/06/19 1015             Pain Assessment    Additional Documentation  Pain Scale: Numbers Pre/Post-Treatment (Group)  -BL      Recorded by [BL] Giana Chakraborty COTA/MEI      Row Name 02/06/19 1500 02/06/19 1015          Pain Scale: Numbers Pre/Post-Treatment    Pain Scale: Numbers, Pretreatment  8/10  -TW  8/10  -BL     Pain Scale: Numbers, Post-Treatment  8/10  -TW  8/10  -BL     Pain Location - Side  --  Right  -BL     Pain Location  --  shoulder left leg  -BL     Pre/Post Treatment Pain Comment  --  R UE shoulder and L leg due to severe tone/cramps noted  -BL     Pain Intervention(s)  --  Medication (See MAR);Repositioned;Massage;Rest  -BL     Recorded by [TW] Brant Palacios PTA [BL] Giana Chakraborty COULTER/MEI     Row Name 02/06/19 1015             Balance    Balance  static standing balance  -BL      Additional Documentation  Balance (Row)  -BL      Recorded by [BL] Giana Chakraborty COTA/MEI      Row Name 02/06/19 1015             Static Standing Balance    Level of [place] (Supported Standing, Static Balance)  contact guard assist  -BL      Time Able to Maintain Position (Supported Standing, Static Balance)  1 to 2  minutes  -BL      Assistive Device Utilized (Supported Standing, Static Balance)  walker, rolling  -BL      Comment (Supported Standing, Static Balance)  no LOB this date and no reaching out of center of gravity with noted fatigue and pt stating he has increasd LLE cramps when applying pressure/weight into foot. Will address with PT.  -BL      Recorded by [BL] Giana Chakraborty COTA/L      Row Name 02/06/19 1015             Therapeutic Exercise    Therapeutic Exercise  seated, upper extremities  -BL      Additional Documentation  Therapeutic Exercise (Row)  -BL      Recorded by [BL] Giana Chakraborty COTA/L      Row Name 02/06/19 1015             Upper Extremity Seated Therapeutic Exercise    Performed, Seated Upper Extremity (Therapeutic Exercise)  shoulder horizontal abduction/adduction;shoulder flexion/extension  -BL      Device, Seated Upper Extremity (Therapeutic Exercise)  elastic bands/tubing  -BL      Exercise Type, Seated Upper Extremity (Therapeutic Exercise)  resistive exercise;AROM (active range of motion)  -BL      Expected Outcomes, Seated Upper Extremity (Therapeutic Exercise)  improve functional tolerance, self-care activity;improve motor control;improve functional stability;improve performance, BADLs;improve performance, gross motor coordination skills;improve performance, IADLs;improve performance, proprioception;improve performance, reaching above shoulder level;improve performance, reaching for objects;improve performance, reaching/manipulating objects;improve performance, propel a wheelchair;improve performance, transfer skills;strengthen normal movement patterns;strengthen, facilitate independent active range of motion  -BL      Restrictions, Seated Upper Extremity (Therapeutic Exercise)  no lifting over 5 pounds  -BL      Sets/Reps Detail, Seated Upper Extremity (Therapeutic Exercise)  Jose mohr utilized this date in seated position for [employer] strengthening task x 2 sets of 10 reps with  rest breaks and moderate fatigue noted.  -BL      Transfers Skills, Training to Functional Activity, Seated Upper Extremity (Therapeutic Exercise)  beginning to transfer skills to functional activity  -BL      Recorded by [BL] Giana Chakraborty COTA/L      Row Name 02/06/19 1500             Lower Extremity Seated Therapeutic Exercise    Performed, Seated Lower Extremity (Therapeutic Exercise)  hip flexion/extension;hip abduction/adduction;LAQ (long arc quad), knee extension;ankle dorsiflexion/plantarflexion  -TW      Exercise Type, Seated Lower Extremity (Therapeutic Exercise)  AROM (active range of motion)  -TW      Sets/Reps Detail, Seated Lower Extremity (Therapeutic Exercise)  2/10-15  -TW      Recorded by [TW] Brant Palacios PTA      Row Name 02/06/19 1500 02/06/19 1015          Vital Signs    Pre Systolic BP Rehab  109  -TW  --     Pre Treatment Diastolic BP  67  -TW  --     Post Systolic BP Rehab  --  112  -BL     Post Treatment Diastolic BP  --  78  -BL     Pretreatment Heart Rate (beats/min)  96  -TW  86  -BL     Posttreatment Heart Rate (beats/min)  --  87  -BL     Pre SpO2 (%)  94  -TW  95  -BL     O2 Delivery Pre Treatment  room air  -TW  room air  -BL     Post SpO2 (%)  --  93  -BL     O2 Delivery Post Treatment  --  room air  -BL     Pre Patient Position  Sitting  -TW  Supine  -BL     Intra Patient Position  Sitting  -TW  Standing  -BL     Post Patient Position  Sitting  -TW  Sitting  -BL     Recorded by [TW] Brant Palacios PTA [BL] Giana Chakraborty COTA/MEI     Row Name 02/06/19 1500 02/06/19 1015          Positioning and Restraints    Pre-Treatment Position  sitting in chair/recliner  -TW  in bed  -BL     Post Treatment Position  wheelchair  -TW  wheelchair  -BL     In Wheelchair  encouraged to call for assist;patient within staff view  -TW  sitting;call light within reach;encouraged to call for assist  -BL     Recorded by [TW] Brant Palacios PTA [BL] Giana Chakraborty COTA/MEI     Row Name  02/06/19 1500             Daily Summary of Progress (PT)    Functional Goal Overall Progress: Physical Therapy  progressing toward functional goals as expected  -TW      Impairments Continuing to Limit Function: Physical Therapy  pain;motor control impaired;impaired balance  -TW      Recommendations: Physical Therapy  Cont  -TW      Recorded by [TW] Brant Palacios PTA      Row Name 02/06/19 1015             Daily Summary of Progress (OT)    Functional Goal Overall Progress: Occupational Therapy  progressing toward functional goals as expected  -BL      Recommendations: Occupational Therapy  cont current POC  -BL      Recorded by [BL] Giana Chakraborty COTA/L        User Key  (r) = Recorded By, (t) = Taken By, (c) = Cosigned By    Initials Name Effective Dates    TW rBant Palacios PTA 03/07/18 -     BL Giana Chakraborty COTA/L 10/17/16 -            Physical Therapy Education     Title: PT OT SLP Therapies (In Progress)     Topic: Physical Therapy (In Progress)     Point: Mobility training (In Progress)     Learning Progress Summary           Patient Acceptance, E, NR by  at 2/6/2019  2:39 PM                   Point: Body mechanics (In Progress)     Learning Progress Summary           Patient Acceptance, E, NR by GAL at 2/6/2019  2:39 PM                   Point: Precautions (In Progress)     Learning Progress Summary           Patient Acceptance, E, NR by GAL at 2/6/2019  2:39 PM                               User Key     Initials Effective Dates Name Provider Type Discipline     04/03/18 -  Shilpa Dumont, PT Physical Therapist PT                  PT Recommendation and Plan     Plan of Care Reviewed With: patient  Daily Summary of Progress (PT)  Functional Goal Overall Progress: Physical Therapy: progressing toward functional goals as expected  Impairments Continuing to Limit Function: Physical Therapy: pain, motor control impaired, impaired balance  Recommendations: Physical Therapy: Cont  IRF Plan of Care  Review: progress ongoing, continue  Progress, Functional Goals: demonstrating adequate progress  Outcome Summary: Pt able to manuever w/c 250ft x 2 with occassional VCs for safety and proper tech using B UE's to manipulate. Pt performed LE ther ex in w/c without c/o voiced.      PT IRF GOALS     Row Name 02/06/19 1500 02/06/19 1110          Bed Mobility Goal 1 (PT-IRF)    Activity/Assistive Device (Bed Mobility Goal 1, PT-IRF)  rolling to left;rolling to right;sit to supine;supine to sit  -TW  rolling to left;rolling to right;sit to supine;supine to sit  -GAL     [place] Level (Bed Mobility Goal 1, PT-IRF)  independent  -TW  independent  -GAL     Time Frame (Bed Mobility Goal 1, PT-IRF)  1 week  -TW  1 week  -GAL     Progress/Outcomes (Bed Mobility Goal 1, PT-IRF)  goal not met  -TW  goal not met  -GAL        Transfer Goal 1 (PT-IRF)    Activity/Assistive Device (Transfer Goal 1, PT-IRF)  sit-to-stand/stand-to-sit;bed-to-chair/chair-to-bed;wheelchair transfer  -TW  sit-to-stand/stand-to-sit;bed-to-chair/chair-to-bed;wheelchair transfer  -GAL     [place] Level (Transfer Goal 1, PT-IRF)  conditional independence  -TW  conditional independence  -GAL     Time Frame (Transfer Goal 1, PT-IRF)  1 week  -TW  1 week  -GAL     Progress/Outcomes (Transfer Goal 1, PT-IRF)  goal not met  -TW  goal not met  -GAL        Gait/Walking Locomotion Goal 1 (PT-IRF)    Activity/Assistive Device (Gait/Walking Locomotion Goal 1, PT-IRF)  gait (walking locomotion);assistive device use  -TW  gait (walking locomotion);assistive device use  -GAL     Gait/Walking Locomotion Distance Goal 1 (PT-IRF)  50ft  -TW  50ft  -GAL     [place] Level (Gait/Walking Locomotion Goal 1, PT-IRF)  contact guard assist  -TW  contact guard assist  -GAL     Time Frame (Gait/Walking Locomotion Goal 1, PT-IRF)  1 week  -TW  1 week  -GAL     Progress/Outcomes (Gait/Walking Locomotion Goal 1, PT-IRF)  goal not met  -TW  goal not met  -GAL        Gait/Walking  Locomotion Goal 2 (PT-IRF)    Activity/Assistive Device (Gait/Walking Locomotion Goal 2, PT-IRF)  gait (walking locomotion);assistive device use  -TW  gait (walking locomotion);assistive device use  -GAL     Gait/Walking Locomotion Distance Goal 2 (PT-IRF)  150ft or more  -TW  150ft or more  -GAL     [place] Level (Gait/Walking Locomotion Goal 2, PT-IRF)  conditional independence  -TW  conditional independence  -GAL     Time Frame (Gait/Walking Locomotion Goal 2, PT-IRF)  2 weeks  -TW  2 weeks  -GAL     Progress/Outcomes (Gait/Walking Locomotion Goal 2, PT-IRF)  goal not met  -TW  goal not met  -GAL        Wheelchair Locomotion Goal 1 (PT-IRF)    Activity (Wheelchair Locomotion Goal 1, PT-IRF)  forward propulsion;backward propulsion;steering;turning;doorway navigation  -TW  forward propulsion;backward propulsion;steering;turning;doorway navigation  -GAL     [place] Level (Wheelchair Locomotion Goal 1, PT-IRF)  independent  -TW  independent  -GAL     Distance Goal 1 (Wheelchair Locomotion, PT-IRF)  150ft  -TW  150ft  -GAL     Time Frame (Wheelchair Locomotion Goal 1, PT-IRF)  2 days  -TW  2 days  -GAL     Progress/Outcomes (Wheelchair Locomotion Goal 1, PT-IRF)  goal not met  -TW  goal not met  -GAL        Stairs Goal 1 (PT-IRF)    Activity/Assistive Device (Stairs Goal 1, PT-IRF)  ascending stairs;descending stairs;using handrail, right  -TW  ascending stairs;descending stairs;using handrail, right  -GAL     Number of Stairs (Stairs Goal 1, PT-IRF)  5  -TW  5  -GAL     [place] Level (Stairs Goal 1, PT-IRF)  standby assist;conditional independence  -TW  standby assist;conditional independence  -GAL     Time Frame (Stairs Goal 1, PT-IRF)  2 weeks  -TW  2 weeks  -GAL     Progress/Outcomes (Stairs Goal 1, PT-IRF)  goal not met  -TW  goal not met  -GAL        Strength Goal 1 (PT-IRF)    Strength Goal 1 (PT-IRF)  Pt will perform 20 reps of AROM exercises with VSS  -TW  Pt will perform 20 reps of AROM exercises with VSS   -GAL     Time Frame (Strength Goal 1, PT-IRF)  by discharge  -TW  by discharge  -GAL     Progress/Outcomes (Strength Goal 1, PT-IRF)  goal not met  -TW  goal not met  -GAL        Caregiver Training Goal 1 (PT-IRF)    Caregiver Training Goal 1 (PT-IRF)  Homecaregiver will demonstrate understanding assisting with mobility as needed, home safety and homecare instructions  -TW  Homecaregiver will demonstrate understanding assisting with mobility as needed, home safety and homecare instructions  -GAL     Time Frame (Caregiver Training Goal 1, PT-IRF)  by discharge  -TW  by discharge  -GAL     Progress/Outcomes (Caregiver Training Goal 1, PT-IRF)  goal not met  -TW  goal not met  -GAL       User Key  (r) = Recorded By, (t) = Taken By, (c) = Cosigned By    Initials Name Provider Type    GAL Shilpa Dumont, PT Physical Therapist    TW Brant Palacios, PTA Physical Therapy Assistant        Outcome Measures     Row Name 02/06/19 1500 02/06/19 1110 02/06/19 1015       How much help from another person do you currently need...    Turning from your back to your side while in flat bed without using bedrails?  3  -TW  3  -GAL  --    Moving from lying on back to sitting on the side of a flat bed without bedrails?  3  -TW  3  -GAL  --    Moving to and from a bed to a chair (including a wheelchair)?  3  -TW  3  -GAL  --    Standing up from a chair using your arms (e.g., wheelchair, bedside chair)?  3  -TW  3  -GAL  --    Climbing 3-5 steps with a railing?  2  -TW  2  -GAL  --    To walk in hospital room?  2  -TW  2  -GAL  --    AM-PAC 6 Clicks Score  16  -TW  16  -GAL  --       How much help from another is currently needed...    Putting on and taking off regular lower body clothing?  --  --  2  -BL    Bathing (including washing, rinsing, and drying)  --  --  2  -BL    Toileting (which includes using toilet bed pan or urinal)  --  --  3  -BL    Putting on and taking off regular upper body clothing  --  --  3  -BL    Taking care of personal  grooming (such as brushing teeth)  --  --  3  -BL    Eating meals  --  --  4  -BL    Score  --  --  17  -BL       Functional Assessment    Outcome Measure Options  AM-PAC 6 Clicks Basic Mobility (PT)  -TW  AM-PAC 6 Clicks Basic Mobility (PT)  -GAL  AM-PAC 6 Clicks Daily Activity (OT)  -BL    Row Name 02/06/19 0732             How much help from another is currently needed...    Putting on and taking off regular lower body clothing?  2  -BH      Bathing (including washing, rinsing, and drying)  2  -BH      Toileting (which includes using toilet bed pan or urinal)  3  -BH      Putting on and taking off regular upper body clothing  3  -BH      Taking care of personal grooming (such as brushing teeth)  3  -BH      Eating meals  4  -BH      Score  17  -         Functional Assessment    Outcome Measure Options  AM-PAC 6 Clicks Daily Activity (OT)  -        User Key  (r) = Recorded By, (t) = Taken By, (c) = Cosigned By    Initials Name Provider Type     Maria Elena Barragan, OTR/L Occupational Therapist    Shilpa Cabello, PT Physical Therapist    Brant Lancaster, PTA Physical Therapy Assistant     Giana Chakraborty, COULTER/L Occupational Therapy Assistant             Time Calculation:     PT Charges     Row Name 02/06/19 1539 02/06/19 1443          Time Calculation    Start Time  1339  -TW  1110  -     Stop Time  1425  -TW  1155  -GAL     Time Calculation (min)  46 min  -TW  45 min  -     PT Received On  02/06/19  -TW  02/06/19  -     PT Goal Re-Cert Due Date  02/19/19  -TW  02/19/19  -        Time Calculation- PT    Total Timed Code Minutes- PT  46 minute(s)  -TW  30 minute(s)  -       User Key  (r) = Recorded By, (t) = Taken By, (c) = Cosigned By    Initials Name Provider Type    Shilpa Cabello, PT Physical Therapist    Brant Lancaster, PTA Physical Therapy Assistant            Therapy Charges for Today     Code Description Service Date Service Provider Modifiers Qty    55745300152  PT  WHEELCHAIR MGMT EA 15 MIN 2/6/2019 Brant Palacios, PTA GP 1    21863138348 HC PT THERAPEUTIC ACT EA 15 MIN 2/6/2019 Brant Palacios, PTA GP 1    90323878058 HC PT THER PROC EA 15 MIN 2/6/2019 rBant Palacios, PTA GP 1            PT G-Codes  Outcome Measure Options: AM-PAC 6 Clicks Basic Mobility (PT)  AM-PAC 6 Clicks Score: 16  Score: 17      Brant Palacios PTA  2/6/2019

## 2019-02-06 NOTE — THERAPY TREATMENT NOTE
Inpatient Rehabilitation - Occupational Therapy Treatment Note    [facility]     Patient Name: Ventura Boggs  : 1960  MRN: [id number]    Today's Date: 2019                 Admit Date: 2019      Visit Dx:    ICD-10-CM ICD-9-CM   1. Uncontrolled type 2 diabetes mellitus with hyperglycemia (CMS/HCC) E11.65 250.02   2. Medically complex patient Z78.9 V49.9   3. Chronic intractable pain G89.29 338.29   4. Symbolic dysfunction R48.9 784.60   5. Impaired mobility and ADLs Z74.09 799.89       Patient Active Problem List   Diagnosis   • Uncontrolled type 2 diabetes mellitus with hyperglycemia (CMS/HCC)   • Postoperative urinary retention   • Incisional hernia, without obstruction or gangrene   • Acute appendicitis with localized peritonitis   • Closed head injury   • Hyponatremia   • History of recurrent TIAs   • Ataxia   • Chronic pain syndrome   • Right shoulder pain   • Left-sided muscle weakness   • Medically complex patient   • Stroke-like symptoms         Therapy Treatment    IRF Treatment Summary     Row Name 19 1015             Evaluation/Treatment Time and Intent    Subjective Information  no complaints  -BL      Existing Precautions/Restrictions  fall;lifting  (Significant)  no lifting over 5 pounds  -BL      Document Type  therapy note (daily note)  -BL      Mode of Treatment  occupational therapy;individual therapy  -BL      Patient/Family Observations  Pt in bed upon entry; no family present  -BL      Start Time (Evaluation/Treatment)  1015  -BL      Stop Time (Evaluation/Treatment)  1100  -BL      Recorded by [BL] Giana Chakraborty COTA/L      Row Name 19 1015             Cognition/Psychosocial- PT/OT    Affect/Mental Status (Cognitive)  WFL  -BL      Orientation Status (Cognition)  oriented x 4  -BL      Follows Commands (Cognition)  WFL  -BL      Personal Safety Interventions  fall prevention program maintained;gait belt;muscle strengthening facilitated;nonskid shoes/slippers  when out of bed;supervised activity  -BL      Recorded by [BL] Giana Chakraborty COTA/MEI      Row Name 02/06/19 1015             Mobility    Additional Documentation  Wheelchair Mobility/Management (Group)  -BL      Recorded by [BL] Giana Chakraborty COTA/MEI      Row Name 02/06/19 1015             Bed Mobility Assessment/Treatment    Bed Mobility Assessment/Treatment  supine-sit;scooting/bridging  -BL      Scooting/Bridging [place] (Bed Mobility)  supervision  -BL      Supine-Sit [place] (Bed Mobility)  supervision  -BL      Bed Mobility, Safety Issues  decreased use of arms for pushing/pulling;decreased use of legs for bridging/pushing  -BL      Assistive Device (Bed Mobility)  bed rails;head of bed elevated  -BL      Recorded by [BL] Giana Chakraborty COTA/MEI      Row Name 02/06/19 1015             Transfer Assessment/Treatment    Transfer Assessment/Treatment  sit-stand transfer;stand-sit transfer;bed-chair transfer  -BL      Recorded by [BL] Giana Chakraborty COTA/MEI      Row Name 02/06/19 1015             Bed-Chair Transfer    Bed-Chair [place] (Transfers)  minimum assist (75% patient effort);verbal cues;nonverbal cues (demo/gesture)  -BL      Assistive Device (Bed-Chair Transfers)  wheelchair  -BL      Recorded by [BL] Giana Chakraborty COTA/MEI      Row Name 02/06/19 1015             Sit-Stand Transfer    Sit-Stand [place] (Transfers)  contact guard  -BL      Assistive Device (Sit-Stand Transfers)  walker, front-wheeled;wheelchair  -BL      Recorded by [BL] Giana Chakraborty COTA/MEI      Row Name 02/06/19 1015             Stand-Sit Transfer    Stand-Sit [place] (Transfers)  contact guard;verbal cues  -BL      Assistive Device (Stand-Sit Transfers)  wheelchair  -BL      Recorded by [BL] Giana Chakraborty COTA/MEI      Row Name 02/06/19 1015             Toilet Transfer    [place] Level (Toilet Transfer)  not tested  -BL      Recorded by [BL] Giana Chakraborty COTA/MEI      Row Name 02/06/19 1015              Wheelchair Mobility/Management    Method of Wheelchair Locomotion (Mobility)  bimanual (upper extremity) propulsion  -BL      Mobility Activities (Wheelchair)  forward propulsion;steering;turning;doorway navigation;stopping  -BL      Forward Propulsion [place] (Wheelchair)  stand by assist;contact guard;verbal cues;nonverbal cues (demo/gesture)  -BL      Management Activities (Wheelchair)  footrest/footplate management  -BL      Footrest Management [place] (Wheelchair)  minimum assist (75% patient effort);verbal cues  -BL      Comment, Parts Management (Wheelchair)  Overall pt is SBA/CGA for w/c mobility within large therapy gym/hallways and through doors requiring vc's for steering with LUE demo'ing decreased coordination/strength overall. Pt did require rest breaks this date as well due to fatigue.  -BL      Recorded by [BL] Giana Chakraborty COTA/L      Row Name 02/06/19 1015             Motor Assessment/Intervention    Additional Documentation  Gross Motor Coordination (Group)  -BL      Recorded by [BL] Giana Chakraborty COTA/MEI      Row Name 02/06/19 1015             Gross Motor Coordination    Gross Motor Impairments  motor control;strength  -BL      Gross Motor Coordination Interventions  -- [place] [facility] [place] target 75% accuracy  -BL      Recorded by [BL] Giana Chakraborty COTA/MEI      Row Name 02/06/19 1015             Pain Assessment    Additional Documentation  Pain Scale: Numbers Pre/Post-Treatment (Group)  -BL      Recorded by [BL] Giana Chakraborty COTA/MEI      Row Name 02/06/19 1015             Pain Scale: Numbers Pre/Post-Treatment    Pain Scale: Numbers, Pretreatment  8/10  -BL      Pain Scale: Numbers, Post-Treatment  8/10  -BL      Pain Location - Side  Right  -BL      Pain Location  shoulder left leg  -BL      Pre/Post Treatment Pain Comment  R UE shoulder and L leg due to severe tone/cramps noted  -BL      Pain Intervention(s)  Medication (See MAR);Repositioned;Massage;Rest  -BL       Recorded by [BL] Giana Chakraborty COTA/L      Row Name 02/06/19 1015             Balance    Balance  static standing balance  -BL      Additional Documentation  Balance (Row)  -BL      Recorded by [BL] Giana Chakraborty COTA/L      Row Name 02/06/19 1015             Static Standing Balance    Level of [place] (Supported Standing, Static Balance)  contact guard assist  -BL      Time Able to Maintain Position (Supported Standing, Static Balance)  1 to 2 minutes  -BL      Assistive Device Utilized (Supported Standing, Static Balance)  walker, rolling  -BL      Comment (Supported Standing, Static Balance)  no LOB this date and no reaching out of center of gravity with noted fatigue and pt stating he has increasd LLE cramps when applying pressure/weight into foot. Will address with PT.  -BL      Recorded by [BL] Giana Chakraborty COTA/L      Row Name 02/06/19 1015             Therapeutic Exercise    Therapeutic Exercise  seated, upper extremities  -BL      Additional Documentation  Therapeutic Exercise (Row)  -BL      Recorded by [BL] Giana Chakraborty COTA/L      Row Name 02/06/19 1015             Upper Extremity Seated Therapeutic Exercise    Performed, Seated Upper Extremity (Therapeutic Exercise)  shoulder horizontal abduction/adduction;shoulder flexion/extension  -BL      Device, Seated Upper Extremity (Therapeutic Exercise)  elastic bands/tubing  -BL      Exercise Type, Seated Upper Extremity (Therapeutic Exercise)  resistive exercise;AROM (active range of motion)  -BL      Expected Outcomes, Seated Upper Extremity (Therapeutic Exercise)  improve functional tolerance, self-care activity;improve motor control;improve functional stability;improve performance, BADLs;improve performance, gross motor coordination skills;improve performance, IADLs;improve performance, proprioception;improve performance, reaching above shoulder level;improve performance, reaching for objects;improve performance, reaching/manipulating  objects;improve performance, propel a wheelchair;improve performance, transfer skills;strengthen normal movement patterns;strengthen, facilitate independent active range of motion  -BL      Restrictions, Seated Upper Extremity (Therapeutic Exercise)  no lifting over 5 pounds  -BL      Sets/Reps Detail, Seated Upper Extremity (Therapeutic Exercise)  Jose mohr utilized this date in seated position for [facility] strengthening task x 2 sets of 10 reps with rest breaks and moderate fatigue noted.  -BL      Transfers Skills, Training to Functional Activity, Seated Upper Extremity (Therapeutic Exercise)  beginning to transfer skills to functional activity  -BL      Recorded by [BL] Giana Chakraborty COTA/L      Row Name 02/06/19 1015             Vital Signs    Post Systolic BP Rehab  112  -BL      Post Treatment Diastolic BP  78  -BL      Pretreatment Heart Rate (beats/min)  86  -BL      Posttreatment Heart Rate (beats/min)  87  -BL      Pre SpO2 (%)  95  -BL      O2 Delivery Pre Treatment  room air  -BL      Post SpO2 (%)  93  -BL      O2 Delivery Post Treatment  room air  -BL      Pre Patient Position  Supine  -BL      Intra Patient Position  Standing  -BL      Post Patient Position  Sitting  -BL      Recorded by [BL] Giana Chakraborty COTA/L      Row Name 02/06/19 1015             Positioning and Restraints    Pre-Treatment Position  in bed  -BL      Post Treatment Position  wheelchair  -BL      In Wheelchair  sitting;call light within reach;encouraged to call for assist  -BL      Recorded by [BL] Giana Chakraborty COTA/L      Row Name 02/06/19 1015             Daily Summary of Progress (OT)    Functional Goal Overall Progress: Occupational Therapy  progressing toward functional goals as expected  -BL      Recommendations: Occupational Therapy  cont current POC  -BL      Recorded by [BL] Giana Chakraborty COTA/L        User Key  (r) = Recorded By, (t) = Taken By, (c) = Cosigned By    Initials Name Effective Dates    BL  Giana Chakraborty, COULTER/L 10/17/16 -                  OT Recommendation and Plan         Plan of Care Review  Plan of Care Reviewed With: patient  Daily Summary of Progress (OT)  Functional Goal Overall Progress: Occupational Therapy: progressing toward functional goals as expected  Recommendations: Occupational Therapy: cont current POC  Plan of Care Reviewed With: patient  IRF Plan of Care Review: progress ongoing, continue  Progress, Functional Goals: demonstrating adequate progress  Outcome Summary: Pt participated well in OT session this date completing bed mobility with SBA/CGA at most using Bedrails, t/f from bed to w/c with stand pivot requiring min assist, and BUE GMC task with vc's and rest breaks due to fatigue. Pt demonstrates L knee buckling during transfers and increased pain in L LE foot with weightbearing through LLE at this time. Pt seen up in W/C in room upon exit all needs met. No goals met this date will continue OT to increase functional independence ADL/IAD''s    OT IRF GOALS     Row Name 02/06/19 1015 02/06/19 0732          Transfer FIM Goals (OT-IRF)    Tub-Shower Transfer FIM Goal (OT-IRF)  Score of 6 (FIM)  -BL  Score of 6 (FIM)  -BH     Toilet Transfer FIM Goal (OT-IRF)  Score of 6 (FIM)  -BL  Score of 6 (FIM)  -BH     Time Frame (Transfer FIM Goals 1, OT-IRF)  long term goal (LTG);by discharge  -BL  long term goal (LTG);by discharge  -BH     Progress/Outcomes (Transfer FIM Goals, OT-IRF)  goal not met  -BL  goal not met  -BH        Bathing FIM Goal (OT-IRF)    Bathing FIM Goal (OT-IRF)  Score of 6 (FIM)  -BL  Score of 6 (FIM)  -BH     Time Frame (Bathing FIM Goal, OT-IRF)  long term goal (LTG);by discharge  -BL  long term goal (LTG);by discharge  -BH     Progress/Outcomes (Bathing FIM Goal, OT-IRF)  goal not met  -BL  goal not met  -BH        UB Dressing FIM Goal (OT-IRF)    Upper Body Dressing, FIM Goal, OT-IRF  Score of 6 (FIM)  -BL  Score of 6 (FIM)  -BH     Time Frame (UB Dressing FIM  Goal, OT-IRF)  long term goal (LTG);by discharge  -BL  long term goal (LTG);by discharge  -BH     Progress/Outcomes (UB Dressing FIM Goal, OT-IRF)  goal not met  -BL  goal not met  -BH        LB Dressing FIM Goal (OT-IRF)    Lower Body Dressing, FIM Goal, OT-IRF  Score of 6 (FIM)  -BL  Score of 6 (FIM)  -BH     Time Frame (LB Dressing FIM Goal, OT-IRF)  long term goal (LTG);by discharge  -BL  long term goal (LTG);by discharge  -BH     Progress/Outcomes (LB Dressing FIM Goal, OT-IRF)  goal not met  -BL  goal not met  -BH        Toileting Goal 1 (OT-IRF)    Activity/Device (Toileting Goal 1, OT-IRF)  toileting skills, all  -BL  toileting skills, all  -BH     [place] Level (Toileting Goal 1, OT-IRF)  supervision required  -BL  supervision required  -BH     Time Frame (Toileting Goal 1, OT-IRF)  long term goal (LTG);by discharge  -BL  long term goal (LTG);by discharge  -BH     Progress/Outcomes (Toileting Goal 1, OT-IRF)  goal not met  -BL  goal not met  -BH        ROM Goal 1 (OT-IRF)    ROM Goal 1 (OT-IRF)  Pt will display WFL with ROM in BUE with min reports of pain/increased pain level.   -BL  Pt will display WFL with ROM in BUE with min reports of pain/increased pain level.   -BH     Time Frame (ROM Goal 1, OT-IRF)  long term goal (LTG);by discharge  -BL  long term goal (LTG);by discharge  -BH     Progress/Outcomes (ROM Goal 1, OT-IRF)  goal not met  -BL  goal not met  -BH        Problem Specific Goal 1 (OT-IRF)    Problem Specific Goal 1 (OT-IRF)  Pt will be independent with BUE HEP and home safety awarness.   -BL  Pt will be independent with BUE HEP and home safety awarness.   -BH     Time Frame (Problem Specific Goal 1, OT-IRF)  long term goal (LTG);by discharge  -BL  long term goal (LTG);by discharge  -BH     Progress/Outcomes (Problem Specific Goal 1, OT-IRF)  goal not met  -BL  goal not met  -BH       User Key  (r) = Recorded By, (t) = Taken By, (c) = Cosigned By    Initials Name Provider Type    BH  Maria Elena Barragan OTR/L Occupational Therapist     Giana Chakraborty COTA/L Occupational Therapy Assistant          Occupational Therapy Education     Title: PT OT SLP Therapies (In Progress)     Topic: Occupational Therapy (In Progress)     Point: ADL training (Done)     Description: Instruct learner(s) on proper safety adaptation and remediation techniques during self care or transfers.   Instruct in proper use of assistive devices.    Learning Progress Summary           Patient Acceptance, E, VU,NR by  at 2/6/2019  1:35 PM    Comment:  Educated about OT and POC. Educated to call for assist. Educated on safety throughout.                   Point: Precautions (Done)     Description: Instruct learner(s) on prescribed precautions during self-care and functional transfers.    Learning Progress Summary           Patient Acceptance, E, VU,NR by  at 2/6/2019  1:35 PM    Comment:  Educated about OT and POC. Educated to call for assist. Educated on safety throughout.    Acceptance, E, VU,NR by  at 2/6/2019 11:36 AM                   Point: Body mechanics (Done)     Description: Instruct learner(s) on proper positioning and spine alignment during self-care, functional mobility activities and/or exercises.    Learning Progress Summary           Patient Acceptance, E, VU,NR by  at 2/6/2019 11:36 AM                               User Key     Initials Effective Dates Name Provider Type Discipline     06/08/18 -  Maria Elena Barragan OTR/L Occupational Therapist OT     10/17/16 -  Giana Chakraborty COTA/L Occupational Therapy Assistant OT                Outcome Measures     Row Name 02/06/19 1110 02/06/19 1015 02/06/19 0732       How much help from another person do you currently need...    Turning from your back to your side while in flat bed without using bedrails?  3  -GAL  --  --    Moving from lying on back to sitting on the side of a flat bed without bedrails?  3  -GAL  --  --    Moving to and from a bed to a chair  (including a wheelchair)?  3  -GAL  --  --    Standing up from a chair using your arms (e.g., wheelchair, bedside chair)?  3  -GAL  --  --    Climbing 3-5 steps with a railing?  2  -GAL  --  --    To walk in hospital room?  2  -GAL  --  --    AM-PAC 6 Clicks Score  16  -GAL  --  --       How much help from another is currently needed...    Putting on and taking off regular lower body clothing?  --  2  -BL  2  -BH    Bathing (including washing, rinsing, and drying)  --  2  -BL  2  -BH    Toileting (which includes using toilet bed pan or urinal)  --  3  -BL  3  -BH    Putting on and taking off regular upper body clothing  --  3  -BL  3  -BH    Taking care of personal grooming (such as brushing teeth)  --  3  -BL  3  -BH    Eating meals  --  4  -BL  4  -BH    Score  --  17  -BL  17  -       Functional Assessment    Outcome Measure Options  AM-PAC 6 Clicks Basic Mobility (PT)  -  AM-[facility] 6 Clicks Daily Activity (OT)  -  AM-[facility] 6 Clicks Daily Activity (OT)  -      User Key  (r) = Recorded By, (t) = Taken By, (c) = Cosigned By    Initials Name Provider Type     Maria Elena Barragan, OTR/L Occupational Therapist    GAL Shilpa Dumont, PT Physical Therapist    Giana Carrizales, MARYLIN/L Occupational Therapy Assistant             Time Calculation:     Time Calculation- OT     Row Name 02/06/19 1338 02/06/19 1137          Time Calculation- OT    OT Start Time  0732  -  1015  -     OT Stop Time  0857  -  1100  -     OT Time Calculation (min)  85 min  -  45 min  -     Total Timed Code Minutes- OT  55 minute(s)  -  45 minute(s)  -     OT Received On  02/06/19  -  02/06/19  -     OT Goal Re-Cert Due Date  02/19/19  -  --       User Key  (r) = Recorded By, (t) = Taken By, (c) = Cosigned By    Initials Name Provider Type     Maria Elena Barragan OTR/L Occupational Therapist    Giana Carrizales, COULTER/L Occupational Therapy Assistant          Therapy Suggested Charges     Code   Minutes Charges    None               Therapy Charges for Today     Code Description Service Date Service Provider Modifiers Qty    87023474617 HC OT THER PROC EA 15 MIN 2/6/2019 Giana Chakraborty COTA/MEI GO 3                   MARYLIN Lott/MEI  2/6/2019

## 2019-02-06 NOTE — PLAN OF CARE
Problem: Patient Care Overview  Goal: Plan of Care Review   02/06/19 1066   Patient Care Overview   IRF Plan of Care Review progress ongoing, continue   Coping/Psychosocial   Plan of Care Reviewed With patient   OTHER   Outcome Summary PT evaluation completed. Pt transferred sit to stand with min assistance. Attempted ambulation;however, pt having cramping in L foot at time of evaluation and pt not actively able to move footout of supination and this contributed to unstable knee. Pt propelled w/c 200ft with SBA. Function limited by decreased strength balance and tolerance for functional mobility and activities. Pt will benefit from PT to regain lost function. Anticipate home with assistance and continued therapy services at discharge.

## 2019-02-06 NOTE — PLAN OF CARE
Problem: Patient Care Overview  Goal: Plan of Care Review  Outcome: Ongoing (interventions implemented as appropriate)   02/06/19 1440   Patient Care Overview   IRF Plan of Care Review progress ongoing, continue   Progress, Functional Goals demonstrating adequate progress   Coping/Psychosocial   Plan of Care Reviewed With patient   OTHER   Outcome Summary Pt participated well in OT session this date completing bed mobility with SBA/CGA at most using Bedrails, t/f from bed to w/c with stand pivot requiring min assist, and BUE GMC task with vc's and rest breaks due to fatigue. Pt demonstrates L knee buckling during transfers and increased pain in L LE foot with weightbearing through LLE at this time. Pt seen up in W/C in room upon exit all needs met. No goals met this date will continue OT to increase functional independence ADL/IAD''s

## 2019-02-06 NOTE — PROGRESS NOTES
Data Collection Information Summary for Patients in Inpatient Rehabilitation Facilities given to patient.

## 2019-02-07 LAB
GLUCOSE BLDC GLUCOMTR-MCNC: 141 MG/DL (ref 70–130)
GLUCOSE BLDC GLUCOMTR-MCNC: 146 MG/DL (ref 70–130)
GLUCOSE BLDC GLUCOMTR-MCNC: 167 MG/DL (ref 70–130)
GLUCOSE BLDC GLUCOMTR-MCNC: 216 MG/DL (ref 70–130)

## 2019-02-07 PROCEDURE — 97116 GAIT TRAINING THERAPY: CPT

## 2019-02-07 PROCEDURE — 25010000002 ENOXAPARIN PER 10 MG: Performed by: FAMILY MEDICINE

## 2019-02-07 PROCEDURE — 92507 TX SP LANG VOICE COMM INDIV: CPT | Performed by: SPEECH-LANGUAGE PATHOLOGIST

## 2019-02-07 PROCEDURE — 63710000001 INSULIN ASPART PER 5 UNITS: Performed by: FAMILY MEDICINE

## 2019-02-07 PROCEDURE — 82962 GLUCOSE BLOOD TEST: CPT

## 2019-02-07 PROCEDURE — 63710000001 INSULIN DETEMIR PER 5 UNITS: Performed by: FAMILY MEDICINE

## 2019-02-07 PROCEDURE — 97535 SELF CARE MNGMENT TRAINING: CPT

## 2019-02-07 PROCEDURE — 97530 THERAPEUTIC ACTIVITIES: CPT

## 2019-02-07 PROCEDURE — 97110 THERAPEUTIC EXERCISES: CPT

## 2019-02-07 PROCEDURE — 99232 SBSQ HOSP IP/OBS MODERATE 35: CPT | Performed by: FAMILY MEDICINE

## 2019-02-07 PROCEDURE — 97542 WHEELCHAIR MNGMENT TRAINING: CPT

## 2019-02-07 RX ADMIN — INSULIN ASPART 4 UNITS: 100 INJECTION, SOLUTION INTRAVENOUS; SUBCUTANEOUS at 16:51

## 2019-02-07 RX ADMIN — INSULIN DETEMIR 30 UNITS: 100 INJECTION, SOLUTION SUBCUTANEOUS at 21:52

## 2019-02-07 RX ADMIN — ASPIRIN 81 MG CHEWABLE TABLET 325 MG: 81 TABLET CHEWABLE at 08:39

## 2019-02-07 RX ADMIN — INSULIN ASPART 2 UNITS: 100 INJECTION, SOLUTION INTRAVENOUS; SUBCUTANEOUS at 07:13

## 2019-02-07 RX ADMIN — METHADONE HYDROCHLORIDE 5 MG: 10 TABLET ORAL at 08:38

## 2019-02-07 RX ADMIN — OXYCODONE HYDROCHLORIDE AND ACETAMINOPHEN 1 TABLET: 10; 325 TABLET ORAL at 04:28

## 2019-02-07 RX ADMIN — OXYCODONE HYDROCHLORIDE AND ACETAMINOPHEN 1 TABLET: 10; 325 TABLET ORAL at 15:35

## 2019-02-07 RX ADMIN — AMLODIPINE BESYLATE 5 MG: 5 TABLET ORAL at 08:39

## 2019-02-07 RX ADMIN — FAMOTIDINE 20 MG: 20 TABLET ORAL at 08:37

## 2019-02-07 RX ADMIN — SENNOSIDES AND DOCUSATE SODIUM 2 TABLET: 8.6; 5 TABLET ORAL at 21:51

## 2019-02-07 RX ADMIN — OXYCODONE HYDROCHLORIDE AND ACETAMINOPHEN 1 TABLET: 10; 325 TABLET ORAL at 11:11

## 2019-02-07 RX ADMIN — DULOXETINE HYDROCHLORIDE 20 MG: 20 CAPSULE, DELAYED RELEASE ORAL at 08:37

## 2019-02-07 RX ADMIN — METHADONE HYDROCHLORIDE 5 MG: 10 TABLET ORAL at 21:51

## 2019-02-07 RX ADMIN — ENOXAPARIN SODIUM 40 MG: 40 INJECTION SUBCUTANEOUS at 14:04

## 2019-02-07 RX ADMIN — INSULIN ASPART 7 UNITS: 100 INJECTION, SOLUTION INTRAVENOUS; SUBCUTANEOUS at 08:40

## 2019-02-07 RX ADMIN — LISINOPRIL 5 MG: 5 TABLET ORAL at 08:37

## 2019-02-07 RX ADMIN — AMITRIPTYLINE HYDROCHLORIDE 10 MG: 10 TABLET, FILM COATED ORAL at 21:51

## 2019-02-07 RX ADMIN — METFORMIN HYDROCHLORIDE 1000 MG: 500 TABLET ORAL at 17:36

## 2019-02-07 RX ADMIN — INSULIN ASPART 7 UNITS: 100 INJECTION, SOLUTION INTRAVENOUS; SUBCUTANEOUS at 11:11

## 2019-02-07 RX ADMIN — INSULIN ASPART 7 UNITS: 100 INJECTION, SOLUTION INTRAVENOUS; SUBCUTANEOUS at 17:36

## 2019-02-07 RX ADMIN — METFORMIN HYDROCHLORIDE 1000 MG: 500 TABLET ORAL at 08:37

## 2019-02-07 RX ADMIN — OXYCODONE HYDROCHLORIDE AND ACETAMINOPHEN 1 TABLET: 10; 325 TABLET ORAL at 19:53

## 2019-02-07 RX ADMIN — ATORVASTATIN CALCIUM 20 MG: 20 TABLET, FILM COATED ORAL at 21:51

## 2019-02-07 RX ADMIN — FAMOTIDINE 20 MG: 20 TABLET ORAL at 21:51

## 2019-02-07 RX ADMIN — OXYCODONE HYDROCHLORIDE AND ACETAMINOPHEN 1 TABLET: 10; 325 TABLET ORAL at 00:16

## 2019-02-07 NOTE — PLAN OF CARE
Problem: Patient Care Overview  Goal: Plan of Care Review  Outcome: Ongoing (interventions implemented as appropriate)   02/07/19 6174   Patient Care Overview   IRF Plan of Care Review progress ongoing, continue   Progress, Functional Goals demonstrating adequate progress   Coping/Psychosocial   Plan of Care Reviewed With patient   OTHER   Outcome Summary working well with therapy. pain controlled with oral pain medicine. VSS.      Goal: Individualization and Mutuality  Outcome: Ongoing (interventions implemented as appropriate)    Goal: Discharge Needs Assessment  Outcome: Ongoing (interventions implemented as appropriate)    Goal: Home Safety Plan  Outcome: Ongoing (interventions implemented as appropriate)    Goal: Coping Plan  Outcome: Ongoing (interventions implemented as appropriate)    Goal: Community Reintegration Plan  Outcome: Ongoing (interventions implemented as appropriate)      Problem: Fall Risk (Adult)  Goal: Identify Related Risk Factors and Signs and Symptoms  Outcome: Ongoing (interventions implemented as appropriate)    Goal: Absence of Fall  Outcome: Ongoing (interventions implemented as appropriate)      Problem: Diabetes, Type 2 (Adult)  Goal: Signs and Symptoms of Listed Potential Problems Will be Absent, Minimized or Managed (Diabetes, Type 2)  Outcome: Ongoing (interventions implemented as appropriate)

## 2019-02-07 NOTE — PLAN OF CARE
Problem: Patient Care Overview  Goal: Plan of Care Review  Outcome: Ongoing (interventions implemented as appropriate)   02/07/19 1302   Patient Care Overview   IRF Plan of Care Review progress ongoing, continue   Progress, Functional Goals demonstrating adequate progress   Coping/Psychosocial   Plan of Care Reviewed With patient   OTHER   Outcome Summary Pt able to amb 26ft with RW and CGA/Min A with VCs for walker placement and steppage sequencing. Pt progressing well with t/f's but cont to require VCs for safety and proper tech.

## 2019-02-07 NOTE — PLAN OF CARE
Problem: Patient Care Overview  Goal: Plan of Care Review  Outcome: Ongoing (interventions implemented as appropriate)   02/07/19 0805   Patient Care Overview   IRF Plan of Care Review progress ongoing, continue   Progress, Functional Goals demonstrating adequate progress   Coping/Psychosocial   Plan of Care Reviewed With patient   OTHER   Outcome Summary Pt with hightop shoes on this am. Pt able to amb within // bars with min/CGA of 1 with max VCs for short step with L LE first, go slow and use UE's to assist with weight stabilization.

## 2019-02-07 NOTE — PROGRESS NOTES
LOS: 2 days   Patient Care Team:  Anita Faria APRN as PCP - General (Family Medicine)    Chief Complaint:   Medically complex patient          Interval History:     SUBJECTIVE: He is feeling well and says that he is improving.  He says that his arm strength is improving but not the leg strength.  He still complains of left-sided weakness.  Some of which is chronic but he says is definitely worse since this episode.  History taken from: patient chart RN    Objective     Vital Signs  Temp:  [96.3 °F (35.7 °C)-96.5 °F (35.8 °C)] 96.5 °F (35.8 °C)  Heart Rate:  [76-89] 76  Resp:  [18] 18  BP: (102-111)/(60-77) 111/77      02/05/19  1404   Weight: 90.3 kg (199 lb)         Physical Exam:     General Appearance:    Alert, cooperative, in no acute distress   Head:    Normocephalic, without obvious abnormality, atraumatic   Eyes:            Lids and lashes normal, conjunctivae and sclerae normal, no   icterus, no pallor, corneas clear, PERRLA   Throat:   No oral lesions, no thrush, oral mucosa moist   Neck:   No adenopathy, supple, trachea midline, no thyromegaly, no   carotid bruit, no JVD       Lungs:     Clear to auscultation,respirations regular, even and                  Unlabored good breath sounds bilaterally    Heart:    Regular rhythm and normal rate, normal S1 and S2, no            murmur, no gallop, no rub, no click no ectopy   Chest Wall:    No abnormalities observed   Abdomen:     Normal bowel sounds, no masses, no organomegaly, soft        non-tender, non-distended, no guarding, no rebound                Tenderness normal exam   Extremities:   Moves all extremities well, no edema, no cyanosis, no             Redness he does have left-sided weakness more so in his left lower extremity and upper extremity       Skin:   No bleeding, bruising or rash   Lymph nodes:   No palpable adenopathy   Neurologic:   Cranial nerves 2 - 12 grossly intact, sensation intact, DTR       present and equal bilaterally         RESULTS REVIEW:     Lab Results (last 24 hours)     Procedure Component Value Units Date/Time    POC Glucose Once [[id number]]  (Abnormal) Collected:  02/07/19 0504    Specimen:  Blood Updated:  02/07/19 0627     Glucose 167 mg/dL      Comment: RN NotifiedOperator: [id number] QUAN LONGORIANMeter ID: [id number]       POC Glucose Once [[id number]]  (Abnormal) Collected:  02/06/19 1952    Specimen:  Blood Updated:  02/06/19 2021     Glucose 156 mg/dL      Comment: RN NotifiedOperator: [id number] QUAN TAOYLNMeter ID: [id number]       POC Glucose Once [[id number]]  (Normal) Collected:  02/06/19 1548    Specimen:  Blood Updated:  02/06/19 1608     Glucose 105 mg/dL      Comment: : [id number] VALERIA TORREZNMeter ID: [id number]       POC Glucose Once [[id number]]  (Abnormal) Collected:  02/06/19 1059    Specimen:  Blood Updated:  02/06/19 1114     Glucose 133 mg/dL      Comment: RN NotifiedOperator: [id number] VALERIA MiRTLE MedicalNMeter ID: [id number]       Comprehensive Metabolic Panel [[id number]]  (Abnormal) Collected:  02/06/19 0943    Specimen:  Blood Updated:  02/06/19 1020     Glucose 140 mg/dL      BUN 11 mg/dL      Creatinine 0.67 mg/dL      Sodium 137 mmol/L      Potassium 4.2 mmol/L      Chloride 97 mmol/L      CO2 30.0 mmol/L      Calcium 10.1 mg/dL      Total Protein 6.5 g/dL      Albumin 4.10 g/dL      ALT (SGPT) 43 U/L      AST (SGOT) 47 U/L      Alkaline Phosphatase 114 U/L      Total Bilirubin 0.5 mg/dL      eGFR Non African Amer 123 mL/min/1.73      Globulin 2.4 gm/dL      A/G Ratio 1.7 g/dL      BUN/Creatinine Ratio 16.4     Anion Gap 10.0 mmol/L     Magnesium [[id number]]  (Normal) Collected:  02/06/19 0943    Specimen:  Blood Updated:  02/06/19 1020     Magnesium 1.8 mg/dL         Imaging Results (last 72 hours)     ** No results found for the last 72 hours. **          Assessment/Plan     Active Hospital Problems    Diagnosis   • **Medically complex patient   • Stroke-like symptoms       He has  chronic left-sided weakness but it is much worse after this episode.  Specifically the left leg much  weaker than it was prior to this episode     • Uncontrolled type 2 diabetes mellitus with hyperglycemia (CMS/[facility])     -at presentation to ED glucose 704    Hemoglobin A1c is 15.1 admission to acute rehabilitation     • Left-sided muscle weakness     Due to prior Intracranial hemorrhage with closed head injury     • Right shoulder pain     He's had right shoulder pain for some time and has had steroid injections.  But since his fall the pain is much worse.  X-rays were unremarkable yesterday at [facility].     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  Hemoglobin A1c is 15.1 suggesting that he was not taking medication prior to admission  His workup for stroke was negative but he says left-sided weakness is worse suggesting that he may have had extension of prior stroke which was  intracranial hemorrhage approximately 7-8 months ago  We will continue intensive therapy , continue diabetic control we will continue his analgesics as he has taken them  Therapy notes were reviewed and he is making good progress        This document has been electronically signed by Richard Galvez MD on February 7, 2019 9:31 AM

## 2019-02-07 NOTE — THERAPY TREATMENT NOTE
Inpatient Rehabilitation - Speech Language Pathology Treatment Note    [facility]       Patient Name: Ventura Boggs  : 1960  MRN: [id number]    Today's Date: 2019           Admit Date: 2019  Cognitive Goals:  1.  Pt will complete mental manipulation task w/90% acc:75%       3.  Pt will complete word problems involving time w/90% acc:  60%     4.  Pt will complete selective attention task and divided attention task w/90% acc:met goal for seletive attention with 100%.  Continue with divided attention portion of goal for 60%.       5. Pt will listen to a paragraph and answer questions with 90% accuracy: 60%         Eval FIM FIM Goal  Discharge FIM Daily FIM           Grooming        Bathing        Dressing - Upper Body        Dressing - Lower Body        Toilet        Tub, Shower        Problem Solving         Social Interaction                 Bed, Chair, W/C Transfer        Walking        Wheelchair Locomotion        Stairs                Comprehension 5 6  5    Expression 5 6  6    Memory  4 6  5            Visit Dx:        ICD-10-CM ICD-9-CM   1. Uncontrolled type 2 diabetes mellitus with hyperglycemia (CMS/HCC) E11.65 250.02   2. Medically complex patient Z78.9 V49.9   3. Chronic intractable pain G89.29 338.29   4. Symbolic dysfunction R48.9 784.60   5. Impaired mobility and ADLs Z74.09 799.89   6. Impaired functional mobility, balance, gait, and endurance Z74.09 V49.89       Patient Active Problem List   Diagnosis   • Uncontrolled type 2 diabetes mellitus with hyperglycemia (CMS/HCC)   • Postoperative urinary retention   • Incisional hernia, without obstruction or gangrene   • Acute appendicitis with localized peritonitis   • Closed head injury   • Hyponatremia   • History of recurrent TIAs   • Ataxia   • Chronic pain syndrome   • Right shoulder pain   • Left-sided muscle weakness   • Medically complex patient   • Stroke-like symptoms          Therapy  Treatment    Evaluation/Coping    Evaluation/Treatment Time and Intent  Subjective Information: no complaints (02/07/19 0905 : Madison Hall CCC-SLP)  Existing Precautions/Restrictions: fall, lifting (02/07/19 0905 : Madison Hall CCC-SLP)  Document Type: therapy note (daily note) (02/07/19 0905 : Madison Hall CCC-SLP)  Mode of Treatment: individual therapy, speech-language pathology (02/07/19 0905 : Madison Hall CCC-SLP)  Start Time (Evaluation/Treatment): 0905 (02/07/19 0905 : Madison Hall CCC-SLP)  Stop Time (Evaluation/Treatment): 1000 (02/07/19 0905 : Bickleton, Madison A, CCC-SLP)    Vitals/Pain/Safety    Pain Scale: Numbers Pre/Post-Treatment  Pain Scale: Numbers, Pretreatment: 0/10 - no pain (02/07/19 0905 : Madison Hall CCC-SLP)  Pain Scale: Numbers, Post-Treatment: 0/10 - no pain (02/07/19 0905 : Madison Hall CCC-SLP)  Positioning and Restraints  Pre-Treatment Position: sitting in chair/recliner (02/07/19 0905 : Madison Hall CCC-SLP)  Post Treatment Position: wheelchair (02/07/19 0905 : Bickleton, Madison A, CCC-SLP)  In Wheelchair: call light within reach, sitting, encouraged to call for assist, exit alarm on (02/07/19 0905 : Bickleton, Madison A, CCC-SLP)    Cognition/Communication         Oral Motor/Eating         Mobility/Basic Activities/Instrumental Activities/Motor/Modality                   ROM/MMT                   Sensory/Myotome/Dermatome/Edema               Posture/Balance/Special Tests/Exercise/Transportation/Sexual Function                   Orthotics/Residual Limb/Prosthetic Management              Outcome Summary         EDUCATION    The patient has been educated in the following areas:     Cognitive Impairment.    SLP Recommendation and Plan                                                          SLP GOALS     Row Name 02/07/19 0905 02/06/19 0901          Attention Goal 1 (SLP)    Improve Attention by Goal 1 (SLP)  complete  selective attention task;complete divided attention task;90%  -EC  complete selective attention task;complete divided attention task;90%  -CK     Time Frame (Attention Goal 1, SLP)  by discharge  -EC  by discharge  -CK     Barriers (Attention Goal 1, SLP)  hx of CHI  -EC  hx of CHI  -CK     Progress (Attention Goal 1, SLP)  --  other (comment) new goal  -CK     Progress/Outcomes (Attention Goal 1, SLP)  goal partially met  -EC  other (see comments) new goal  -CK        Memory Skills Goal 1 (SLP)    Improve Memory Skills Through Goal 1 (SLP)  recalling unrelated word lists with an imposed delay;listen to a paragraph and answer questions;90%  -EC  recalling unrelated word lists with an imposed delay;listen to a paragraph and answer questions;90%  -CK     Time Frame (Memory Skills Goal 1, SLP)  by discharge  -EC  by discharge  -CK     Barriers (Memory Skills Goal 1, SLP)  hx of CHI  -EC  hx of CHI  -CK     Progress (Memory Skills Goal 1, SLP)  --  other (comment) new goal  -CK     Progress/Outcomes (Memory Skills Goal 1, SLP)  goal ongoing  -EC  other (see comments) new goal  -CK        Organizational Skills Goal 1 (SLP)    Improve Thought Organization Through Goal 1 (SLP)  completing mental manipulation task;90%  -EC  completing mental manipulation task;90%  -CK     Time Frame (Thought Organization Skills Goal 1, SLP)  by discharge  -EC  by discharge  -CK     Barriers (Thought Organization Skills Goal 1, SLP)  Hx of CHI  -EC  Hx of CHI  -CK     Progress (Thought Organization Skills Goal 1, SLP)  --  other (comment) new goal  -CK     Progress/Outcomes (Thought Organization Skills Goal 1, SLP)  --  other (see comments) new goal  -CK        Functional Math Skills Goal 1 (SLP)    Improve Functional Math Skills Through Goal 1 (SLP)  complete functional math task;complete word problems involving time;90%  -EC  complete functional math task;complete word problems involving time;90%  -CK     Time Frame (Functional Math Skills  Goal 1, SLP)  by discharge  -EC  by discharge  -CK     Barriers (Functional Math Skills Goal 1, SLP)  Hx of CHI  -EC  Hx of CHI  -CK     Progress (Functional Math Skills Goal 1, SLP)  --  other (comment) new goal  -CK     Progress/Outcomes (Functional Math Skills Goal 1, SLP)  --  other (see comments) new goal  -CK       User Key  (r) = Recorded By, (t) = Taken By, (c) = Cosigned By    Initials Name Provider Type    Madison Alicea CCC-SLP Speech and Language Pathologist    Divya Santana, MS CCC-SLP Speech and Language Pathologist                  Time Calculation:       Time Calculation- SLP     Row Name 02/07/19 0925             Time Calculation- SLP    SLP Start Time  0905  -EC      SLP Stop Time  1000  -EC      SLP Time Calculation (min)  55 min  -EC      Total Timed Code Minutes- SLP  55 minute(s)  -EC      SLP Received On  02/07/19  -EC        User Key  (r) = Recorded By, (t) = Taken By, (c) = Cosigned By    Initials Name Provider Type    Madison Alicea CCC-SLP Speech and Language Pathologist            Therapy Charges for Today     Code Description Service Date Service Provider Modifiers Qty    [id number] HC ST TREATMENT SPEECH 4 2/7/2019 Madison Hall CCC-SLP GN 1                           FRED Soliman  2/7/2019

## 2019-02-07 NOTE — PLAN OF CARE
Problem: Patient Care Overview  Goal: Plan of Care Review  Outcome: Ongoing (interventions implemented as appropriate)   02/07/19 0805 02/07/19 0925 02/07/19 1000   Patient Care Overview   IRF Plan of Care Review --  progress ongoing, continue --    Progress, Functional Goals --  demonstrating adequate progress --    Coping/Psychosocial   Plan of Care Reviewed With patient --  --    OTHER   Outcome Summary --  --  sit/stand-Min A, sit/sup-SBA. pt completed grooming activity-with set up only. No new goals met this date. Cont Ot POC.

## 2019-02-07 NOTE — THERAPY TREATMENT NOTE
Inpatient Rehabilitation - Occupational Therapy Treatment Note    [facility] [place]     Patient Name: Ventura Boggs  : 1960  MRN: [id number]    Today's Date: 2019                 Admit Date: 2019      Visit Dx:    ICD-10-CM ICD-9-CM   1. Uncontrolled type 2 diabetes mellitus with hyperglycemia (CMS/HCC) E11.65 250.02   2. Medically complex patient Z78.9 V49.9   3. Chronic intractable pain G89.29 338.29   4. Symbolic dysfunction R48.9 784.60   5. Impaired mobility and ADLs Z74.09 799.89   6. Impaired functional mobility, balance, gait, and endurance Z74.09 V49.89       Patient Active Problem List   Diagnosis   • Uncontrolled type 2 diabetes mellitus with hyperglycemia (CMS/HCC)   • Postoperative urinary retention   • Incisional hernia, without obstruction or gangrene   • Acute appendicitis with localized peritonitis   • Closed head injury   • Hyponatremia   • History of recurrent TIAs   • Ataxia   • Chronic pain syndrome   • Right shoulder pain   • Left-sided muscle weakness   • Medically complex patient   • Stroke-like symptoms         Therapy Treatment    IRF Treatment Summary     Row Name 19 0919 08       Evaluation/Treatment Time and Intent    Subjective Information  no complaints  -KD  no complaints  -EC  no complaints  -TW    Existing Precautions/Restrictions  fall;lifting  -KD  fall;lifting  -EC  fall;lifting  -TW    Document Type  therapy note (daily note)  -KD  therapy note (daily note)  -EC  therapy note (daily note)  -TW    Mode of Treatment  occupational therapy;individual therapy  -KD  individual therapy;speech-language pathology  -EC  physical therapy;individual therapy  -TW    Start Time (Evaluation/Treatment)  1000  -KD  0905  -EC  0805  -TW    Stop Time (Evaluation/Treatment)  1123  -KD  1000  -EC  0900  -TW    Recorded by [KD] Ele Avina COTA/MEI [EC] Madison Hall CCC-SLP [TW] Brant Palacios PTA    Row Name 19 0805           Cognition/Psychosocial- PT/OT    Affect/Mental Status (Cognitive)  WFL  -KD  WFL  -TW     Orientation Status (Cognition)  oriented x 4  -KD  oriented x 4  -TW     Follows Commands (Cognition)  WFL  -KD  WFL  -TW     Personal Safety Interventions  fall prevention program maintained  -KD  fall prevention program maintained;gait belt;nonskid shoes/slippers when out of bed  -TW     Cognitive Function (Cognitive)  safety deficit  -KD  --     Recorded by [KD] Ele Avina COTA/L [TW] Brant Palacios, KAREN     Row Name 02/07/19 1000 02/07/19 0805          Bed Mobility Assessment/Treatment    Bed Mobility Assessment/Treatment  sit-supine  -KD  --     Scooting/Bridging [place] (Bed Mobility)  supervision  -KD  --     Comment (Bed Mobility)  --  Not tested.  -TW     Recorded by [KD] Ele Avina COTA/L [TW] Brant Palacios PTA     Row Name 02/07/19 1000             Functional Mobility    Functional Mobility- Ind. Level  contact guard assist  -KD      Functional Mobility- Device  rolling walker  -KD      Functional Mobility-Distance (Feet)  2  -KD      Functional Mobility- Safety Issues  balance decreased during turns  -KD      Recorded by [KD] Ele Avina COTA/L      Row Name 02/07/19 0805             Bed-Chair Transfer    Bed-Chair [place] (Transfers)  not tested  -TW      Recorded by [TW] Brant Palacios, KAREN      Row Name 02/07/19 1000             Chair-Bed Transfer    Chair-Bed [place] (Transfers)  minimum assist (75% patient effort)  -KD      Assistive Device (Chair-Bed Transfers)  walker, front-wheeled  -KD      Recorded by [KD] Ele Avina COTA/L      Row Name 02/07/19 1000 02/07/19 0805          Sit-Stand Transfer    Sit-Stand [place] (Transfers)  minimum assist (75% patient effort)  -KD  minimum assist (75% patient effort)  -TW     Assistive Device (Sit-Stand Transfers)  walker, front-wheeled  -KD  other (see comments) // Bars  -TW     Recorded by [KD]  Ele Avina COTA/MEI [TW] Brant Palacios PTA     Row Name 02/07/19 1000 02/07/19 0805          Stand-Sit Transfer    Stand-Sit [place] (Transfers)  minimum assist (75% patient effort)  -KD  minimum assist (75% patient effort)  -TW     Assistive Device (Stand-Sit Transfers)  walker, front-wheeled  -KD  other (see comments) // Bars  -TW     Recorded by [KD] Ele Avina COTA/L [TW] Brant Palacios PTA     Row Name 02/07/19 1000             Wheelchair Transfer    Type (Wheelchair Transfer)  sit-stand;stand-sit  -KD      [place] Level (Wheelchair Transfer)  minimum assist (75% patient effort)  -KD      Assistive Device (Wheelchair Transfer)  walker, 4-wheeled  -KD      Recorded by [KD] Ele Avina COTA/L      Row Name 02/07/19 0805             Gait/Stairs Assessment/Training    Gait/Stairs Assessment/Training  gait/ambulation assistive device  -TW      [place] Level (Gait)  contact guard;minimum assist (75% patient effort)  -TW      Assistive Device (Gait)  parallel bars  -TW      Distance in Feet (Gait)  24ft x 3 trials  -TW      Pattern (Gait)  step-to  -TW      Deviations/Abnormal Patterns (Gait)  left sided deviations;stride length decreased  -TW      Bilateral Gait Deviations  foot drop/toe drag;heel strike decreased;knee buckling, left side  -TW      Recorded by [TW] Brant Palacios PTA      Row Name 02/07/19 1000 02/07/19 0805          Wheelchair Mobility/Management    Method of Wheelchair Locomotion (Mobility)  bimanual (upper extremity) propulsion  -KD  --     Mobility Activities (Wheelchair)  forward propulsion  -KD  --     Forward Propulsion [place] (Wheelchair)  stand by assist  -KD  independent  -TW     Backward Propulsion [place] (Wheelchair)  --  independent  -TW     Steering [place] (Wheelchair)  --  independent  -TW     Stopping [place] (Wheelchair)  --  independent  -TW     Turning [place] (Wheelchair)  stand by assist  -KD   independent  -TW     Doorway Navigation [place] (Wheelchair)  --  independent  -TW     Distance Propelled in Feet (Wheelchair)  140 x 2  -KD  300ft x2  -TW     Management Activities (Wheelchair)  wheel locks/brakes management  -KD  --     Footrest Management [place] (Wheelchair)  --  minimum assist (75% patient effort)  -TW     Wheel Locks/Brakes Management [place] (Wheelchair)  --  verbal cues  -TW     Recorded by [KD] Ele Avina COTA/L [TW] Brant Palacios, KAREN     Row Name 02/07/19 1000             Lower Body Dressing Assessment/Treatment    Lower Body Dressing [place] Level  doff;don;socks;shoes/slippers;supervision  -KD      Recorded by [KD] Ele Avina COTA/L      Row Name 02/07/19 1000             Grooming Assessment/Treatment    Grooming [place] Level  grooming skills;wash face, hands;set up  -KD      Recorded by [KD] Ele Avina COTA/L      Row Name 02/07/19 1000             Toileting Assessment/Treatment    Toileting [place] Level  toileting skills;set up assistance  -KD      Assistive Device Use (Toileting)  urinal  -KD      Toileting Position  supported sitting  -KD      Recorded by [KD] Ele Avina COTA/L      Row Name 02/07/19 1000 02/07/19 0905 02/07/19 0805       Pain Scale: Numbers Pre/Post-Treatment    Pain Scale: Numbers, Pretreatment  9/10  -KD  0/10 - no pain  -EC  6/10  -TW    Pain Scale: Numbers, Post-Treatment  9/10  -KD  0/10 - no pain  -EC  6/10  -TW    Pain Location - Side  Right  -KD  --  Right  -TW    Pain Location - Orientation  --  --  upper  -TW    Pain Location  shoulder back  -KD  --  extremity  -TW    Recorded by [KD] Ele Avina COTA/MEI [EC] Madison Hall CCC-SLP [TW] Brant Palacios PTA    Row Name 02/07/19 0805             Static Sitting Balance    Level of [place] (Unsupported Sitting, Static Balance)  independent  -TW      Recorded by [TW] Brant Palacios PTA      Row Name 02/07/19 0805              Dynamic Sitting Balance    Level of [place], Reaches Outside Midline (Sitting, Dynamic Balance)  contact guard assist;standby assist  -TW      Recorded by [TW] Brant Palacios PTA      Row Name 02/07/19 0805             Static Standing Balance    Level of [place] (Supported Standing, Static Balance)  supervision  -TW      Time Able to Maintain Position (Supported Standing, Static Balance)  2 to 3 minutes  -TW      Assistive Device Utilized (Supported Standing, Static Balance)  parallel bars  -TW      Recorded by [TW] Brant Palacios PTA      Row Name 02/07/19 0805             Dynamic Standing Balance    Level of [place], Reaches Outside Midline (Standing, Dynamic Balance)  minimal assist, 75% patient effort;contact guard assist  -TW      Time Able to Maintain Position, Reaches Outside Midline (Standing, Dynamic Balance)  3 to 4 minutes  -TW      Recorded by [TW] Brant Palacios PTA      Row Name 02/07/19 1000             Upper Extremity Seated Therapeutic Exercise    Performed, Seated Upper Extremity (Therapeutic Exercise)  shoulder flexion/extension;shoulder abduction/adduction;shoulder external/internal rotation;shoulder horizontal abduction/adduction;elbow flexion/extension  -KD      Device, Seated Upper Extremity (Therapeutic Exercise)  free weights, barbell  -KD      Exercise Type, Seated Upper Extremity (Therapeutic Exercise)  AROM (active range of motion)  -KD      Expected Outcomes, Seated Upper Extremity (Therapeutic Exercise)  improve functional tolerance, self-care activity;improve performance, transfer skills  -KD      Sets/Reps Detail, Seated Upper Extremity (Therapeutic Exercise)  2/15  -KD      Recorded by [KD] Ele Avina COTA/L      Row Name 02/07/19 1000             Aerobic Exercise Activity    Exercise Performed (Aerobic, Therapeutic Exercise)  arm bike clothespin tree;  LUE only  -KD      Expected Outcome (Aerobic, Therapeutic Exercise)  strengthen normal  movement patterns;improve functional tolerance, self-care activity  -KD      Time (Aerobic, Therapeutic Exercise)  10  -KD      Comment (Aerobic, Therapeutic Exercise)  RB's PRN  -KD      Recorded by [KD] Ele Avina COTA/L      Row Name 02/07/19 0805             Lower Extremity Seated Therapeutic Exercise    Performed, Seated Lower Extremity (Therapeutic Exercise)  hip flexion/extension;hip abduction/adduction;ankle dorsiflexion/plantarflexion;LAQ (long arc quad), knee extension Performed with R LE  -TW      Exercise Type, Seated Lower Extremity (Therapeutic Exercise)  AROM (active range of motion)  -TW      Sets/Reps Detail, Seated Lower Extremity (Therapeutic Exercise)  1/15-20  -TW      Recorded by [TW] Brant Palacios PTA      Row Name 02/07/19 1000 02/07/19 0805          Vital Signs    Pre Systolic BP Rehab  111  -KD  --     Pre Treatment Diastolic BP  77  -KD  --     Pretreatment Heart Rate (beats/min)  85  -KD  88  -TW     Intratreatment Heart Rate (beats/min)  --  96  -TW     Posttreatment Heart Rate (beats/min)  94  -KD  94  -TW     Pre SpO2 (%)  94  -KD  95  -TW     O2 Delivery Pre Treatment  room air  -KD  room air  -TW     Intra SpO2 (%)  --  98  -TW     Post SpO2 (%)  96  -KD  97  -TW     O2 Delivery Post Treatment  room air  -KD  --     Pre Patient Position  Sitting  -KD  Sitting  -TW     Intra Patient Position  Standing  -KD  Standing  -TW     Post Patient Position  Sitting  -KD  Sitting  -TW     Recorded by [KD] Ele Avina COTA/L [TW] Brant Palacios PTA     Row Name 02/07/19 1000 02/07/19 0905 02/07/19 0805       Positioning and Restraints    Pre-Treatment Position  sitting in chair/recliner  -KD  sitting in chair/recliner  -EC  sitting in chair/recliner  -TW    Post Treatment Position  bed  -KD  wheelchair  -EC  wheelchair  -TW    In Bed  fowlers;call light within reach;encouraged to call for assist;exit alarm on  -KD  --  --    In Wheelchair  --  call light within  reach;sitting;encouraged to call for assist;exit alarm on  -EC  sitting;encouraged to call for assist  -TW    Recorded by [KD] Ele Avina COTA/L [EC] Madison Hall CCC-SLP [TW] Brant Palacios PTA    Row Name 19 0805             Daily Summary of Progress (PT)    Functional Goal Overall Progress: Physical Therapy  progressing toward functional goals as expected  -TW      Impairments Continuing to Limit Function: Physical Therapy  strength decreased;impaired balance;coordination impaired;motor control impaired;pain  -TW      Recommendations: Physical Therapy  Cont  -TW      Recorded by [TW] Brant Palacios PTA      Row Name 19 1000             Daily Summary of Progress (OT)    Functional Goal Overall Progress: Occupational Therapy  progressing toward functional goals as expected  -      Recommendations: Occupational Therapy  Cont current POC  -KD      Recorded by [KD] Ele Avina COTA/L      Row Name 19 0759             Nursing Weekly Outcome Summary    Weekly Progress Summary: Nursing  improving, more independent in daily activity  -      Weekly Outcome Statement: Nursing  Eatin; Toileting: 3; Bladder: 5/6; Bowel: 5/6  -      Recorded by [] Julee Aguillon, RN        User Key  (r) = Recorded By, (t) = Taken By, (c) = Cosigned By    Initials Name Effective Dates     Madison Hall CCC-SLP 18 -      Julee Aguillon RN 10/17/16 -      Brant Palacios PTA 18 -     Ele Chu COTA/L 18 -                  OT Recommendation and Plan            Daily Summary of Progress (OT)  Functional Goal Overall Progress: Occupational Therapy: progressing toward functional goals as expected  Recommendations: Occupational Therapy: Cont current POC  Outcome Summary: sit/stand-Min A, sit/sup-SBA. pt completed grooming activity-with set up only.  No new goals met this date. Cont Ot POC.    OT IRF GOALS     Row Name 19 1000  02/06/19 1015 02/06/19 0732       Transfer FIM Goals (OT-IRF)    Tub-Shower Transfer FIM Goal (OT-IRF)  Score of 6 (FIM)  -KD  Score of 6 (FIM)  -BL  Score of 6 (FIM)  -BH    Toilet Transfer FIM Goal (OT-IRF)  Score of 6 (FIM)  -KD  Score of 6 (FIM)  -BL  Score of 6 (FIM)  -BH    Time Frame (Transfer FIM Goals 1, OT-IRF)  long term goal (LTG);by discharge  -KD  long term goal (LTG);by discharge  -BL  long term goal (LTG);by discharge  -BH    Progress/Outcomes (Transfer FIM Goals, OT-IRF)  goal not met  -KD  goal not met  -BL  goal not met  -BH       Bathing FIM Goal (OT-IRF)    Bathing FIM Goal (OT-IRF)  Score of 6 (FIM)  -KD  Score of 6 (FIM)  -BL  Score of 6 (FIM)  -    Time Frame (Bathing FIM Goal, OT-IRF)  long term goal (LTG);by discharge  -KD  long term goal (LTG);by discharge  -BL  long term goal (LTG);by discharge  -BH    Progress/Outcomes (Bathing FIM Goal, OT-IRF)  goal not met  -KD  goal not met  -BL  goal not met  -BH       UB Dressing FIM Goal (OT-IRF)    Upper Body Dressing, FIM Goal, OT-IRF  Score of 6 (FIM)  -KD  Score of 6 (FIM)  -BL  Score of 6 (FIM)  -    Time Frame (UB Dressing FIM Goal, OT-IRF)  long term goal (LTG);by discharge  -KD  long term goal (LTG);by discharge  -BL  long term goal (LTG);by discharge  -BH    Progress/Outcomes (UB Dressing FIM Goal, OT-IRF)  goal not met  -KD  goal not met  -BL  goal not met  -BH       LB Dressing FIM Goal (OT-IRF)    Lower Body Dressing, FIM Goal, OT-IRF  Score of 6 (FIM)  -KD  Score of 6 (FIM)  -BL  Score of 6 (FIM)  -BH    Time Frame (LB Dressing FIM Goal, OT-IRF)  long term goal (LTG);by discharge  -KD  long term goal (LTG);by discharge  -BL  long term goal (LTG);by discharge  -BH    Progress/Outcomes (LB Dressing FIM Goal, OT-IRF)  goal not met  -KD  goal not met  -BL  goal not met  -BH       Toileting Goal 1 (OT-IRF)    Activity/Device (Toileting Goal 1, OT-IRF)  toileting skills, all  -KD  toileting skills, all  -BL  toileting skills, all  -BH     Hand Level (Toileting Goal 1, OT-IRF)  supervision required  -KD  supervision required  -BL  supervision required  -BH    Time Frame (Toileting Goal 1, OT-IRF)  long term goal (LTG);by discharge  -KD  long term goal (LTG);by discharge  -BL  long term goal (LTG);by discharge  -BH    Progress/Outcomes (Toileting Goal 1, OT-IRF)  goal not met  -KD  goal not met  -BL  goal not met  -BH       ROM Goal 1 (OT-IRF)    ROM Goal 1 (OT-IRF)  Pt will display WFL with ROM in BUE with min reports of pain/increased pain level.   -KD  Pt will display WFL with ROM in BUE with min reports of pain/increased pain level.   -BL  Pt will display WFL with ROM in BUE with min reports of pain/increased pain level.   -BH    Time Frame (ROM Goal 1, OT-IRF)  long term goal (LTG);by discharge  -KD  long term goal (LTG);by discharge  -BL  long term goal (LTG);by discharge  -BH    Progress/Outcomes (ROM Goal 1, OT-IRF)  goal not met  -KD  goal not met  -BL  goal not met  -BH       Problem Specific Goal 1 (OT-IRF)    Problem Specific Goal 1 (OT-IRF)  Pt will be independent with BUE HEP and home safety awarness.   -KD  Pt will be independent with BUE HEP and home safety awarness.   -BL  Pt will be independent with BUE HEP and home safety awarness.   -BH    Time Frame (Problem Specific Goal 1, OT-IRF)  long term goal (LTG);by discharge  -KD  long term goal (LTG);by discharge  -BL  long term goal (LTG);by discharge  -BH    Progress/Outcomes (Problem Specific Goal 1, OT-IRF)  goal not met  -KD  goal not met  -BL  goal not met  -BH      User Key  (r) = Recorded By, (t) = Taken By, (c) = Cosigned By    Initials Name Provider Type    Maria Elena Reno, OTR/L Occupational Therapist    KD Ele Avina, COULTER/L Occupational Therapy Assistant    Giana Carrizales, COULTER/L Occupational Therapy Assistant          Occupational Therapy Education     Title: PT OT SLP Therapies (In Progress)     Topic: Occupational Therapy (In Progress)     Point: ADL  training (Done)     Description: Instruct learner(s) on proper safety adaptation and remediation techniques during self care or transfers.   Instruct in proper use of assistive devices.    Learning Progress Summary           Patient Acceptance, E, VU,NR by  at 2/6/2019  1:35 PM    Comment:  Educated about OT and POC. Educated to call for assist. Educated on safety throughout.                   Point: Precautions (Done)     Description: Instruct learner(s) on prescribed precautions during self-care and functional transfers.    Learning Progress Summary           Patient Acceptance, E, VU,NR by  at 2/6/2019  1:35 PM    Comment:  Educated about OT and POC. Educated to call for assist. Educated on safety throughout.    Acceptance, E, VU,NR by  at 2/6/2019 11:36 AM                   Point: Body mechanics (Done)     Description: Instruct learner(s) on proper positioning and spine alignment during self-care, functional mobility activities and/or exercises.    Learning Progress Summary           Patient Acceptance, E, VU,NR by  at 2/6/2019 11:36 AM                               User Key     Initials Effective Dates Name Provider Type Discipline     06/08/18 -  Maria Elena Barragan, OTR/L Occupational Therapist OT     10/17/16 -  Giana Chakraborty, COULTER/L Occupational Therapy Assistant OT                Outcome Measures     Row Name 02/07/19 1000 02/07/19 0805 02/06/19 1500       How much help from another person do you currently need...    Turning from your back to your side while in flat bed without using bedrails?  --  3  -TW  3  -TW    Moving from lying on back to sitting on the side of a flat bed without bedrails?  --  3  -TW  3  -TW    Moving to and from a bed to a chair (including a wheelchair)?  --  3  -TW  3  -TW    Standing up from a chair using your arms (e.g., wheelchair, bedside chair)?  --  3  -TW  3  -TW    Climbing 3-5 steps with a railing?  --  2  -TW  2  -TW    To walk in hospital room?  --  2  -TW  2   -TW    AM-[facility] 6 Clicks Score  --  16  -TW  16  -TW       How much help from another is currently needed...    Putting on and taking off regular lower body clothing?  2  -KD  --  --    Bathing (including washing, rinsing, and drying)  2  -KD  --  --    Toileting (which includes using toilet bed pan or urinal)  3  -KD  --  --    Putting on and taking off regular upper body clothing  3  -KD  --  --    Taking care of personal grooming (such as brushing teeth)  3  -KD  --  --    Eating meals  4  -KD  --  --    Score  17  -KD  --  --       Functional Assessment    Outcome Measure Options  --  AM-PAC 6 Clicks Basic Mobility (PT)  -TW  AM-[facility] 6 Clicks Basic Mobility (PT)  -    Row Name 02/06/19 1110 02/06/19 1015 02/06/19 0732       How much help from another person do you currently need...    Turning from your back to your side while in flat bed without using bedrails?  3  -GAL  --  --    Moving from lying on back to sitting on the side of a flat bed without bedrails?  3  -GAL  --  --    Moving to and from a bed to a chair (including a wheelchair)?  3  -GAL  --  --    Standing up from a chair using your arms (e.g., wheelchair, bedside chair)?  3  -GAL  --  --    Climbing 3-5 steps with a railing?  2  -GAL  --  --    To walk in hospital room?  2  -GAL  --  --    AM-PAC 6 Clicks Score  16  -GAL  --  --       How much help from another is currently needed...    Putting on and taking off regular lower body clothing?  --  2  -BL  2  -BH    Bathing (including washing, rinsing, and drying)  --  2  -BL  2  -BH    Toileting (which includes using toilet bed pan or urinal)  --  3  -BL  3  -BH    Putting on and taking off regular upper body clothing  --  3  -BL  3  -BH    Taking care of personal grooming (such as brushing teeth)  --  3  -BL  3  -BH    Eating meals  --  4  -BL  4  -BH    Score  --  17  -BL  17  -BH       Functional Assessment    Outcome Measure Options  AM-PAC 6 Clicks Basic Mobility (PT)  -[facility] 6 Clicks Daily  Activity (OT)  -  AM-PAC 6 Clicks Daily Activity (OT)  -      User Key  (r) = Recorded By, (t) = Taken By, (c) = Cosigned By    Initials Name Provider Type     Maria Elena Barragan, OTR/L Occupational Therapist    Shilpa Cabello, PT Physical Therapist    TW Brant Palacios, PTA Physical Therapy Assistant    KD Ele Avina, COULTER/L Occupational Therapy Assistant    Giana Carrizales, COULTER/L Occupational Therapy Assistant             Time Calculation:     Time Calculation- OT     Row Name 02/07/19 1154             Time Calculation- OT    OT Start Time  1000  -KD      OT Stop Time  1123  -KD      OT Time Calculation (min)  83 min  -KD      Total Timed Code Minutes- OT  83 minute(s)  -KD      OT Received On  02/07/19  -KD        User Key  (r) = Recorded By, (t) = Taken By, (c) = Cosigned By    Initials Name Provider Type     Ele Avina, COULTER/L Occupational Therapy Assistant          Therapy Suggested Charges     Code   Minutes Charges    None              Therapy Charges for Today     Code Description Service Date Service Provider Modifiers Qty    66925890447 HC OT THER PROC EA 15 MIN 2/7/2019 Ele Avina COULTER/L GO 3    49553104380 HC OT WHEELCHAIR MGMT EA 15 MIN 2/7/2019 Ele Avina COULTER/L GO 1    37250835742 HC OT SELF CARE/MGMT/TRAIN EA 15 MIN 2/7/2019 Ele Avina COULTER/L GO 1    54713885987 HC OT THERAPEUTIC ACT EA 15 MIN 2/7/2019 Ele Avina COTA/MEI GO 1                   MARYLIN Jha/MEI  2/7/2019

## 2019-02-07 NOTE — PLAN OF CARE
Problem: Patient Care Overview  Goal: Plan of Care Review  Outcome: Ongoing (interventions implemented as appropriate)    Goal: Individualization and Mutuality  Outcome: Ongoing (interventions implemented as appropriate)    Goal: Discharge Needs Assessment  Outcome: Ongoing (interventions implemented as appropriate)    Goal: Home Safety Plan  Outcome: Ongoing (interventions implemented as appropriate)    Goal: Coping Plan  Outcome: Ongoing (interventions implemented as appropriate)    Goal: Community Reintegration Plan  Outcome: Ongoing (interventions implemented as appropriate)      Problem: Fall Risk (Adult)  Goal: Identify Related Risk Factors and Signs and Symptoms  Outcome: Ongoing (interventions implemented as appropriate)    Goal: Absence of Fall  Outcome: Ongoing (interventions implemented as appropriate)      Problem: Diabetes, Type 2 (Adult)  Goal: Signs and Symptoms of Listed Potential Problems Will be Absent, Minimized or Managed (Diabetes, Type 2)  Outcome: Ongoing (interventions implemented as appropriate)   02/07/19 9824   Goal/Outcome Evaluation   Problems Assessed (Type 2 Diabetes) all   Problems Present (Type 2 Diabetes) hyperglycemia

## 2019-02-07 NOTE — PLAN OF CARE
Problem: Patient Care Overview  Goal: Plan of Care Review  Outcome: Ongoing (interventions implemented as appropriate)   02/07/19 1748   Patient Care Overview   IRF Plan of Care Review progress ongoing, continue   Progress, Functional Goals demonstrating adequate progress   OTHER   Outcome Summary speech threrapy: pt doing well on cognitive tasks for memory and mental manipulation

## 2019-02-07 NOTE — PATIENT CARE CONFERENCE
Attendance of weekly team conference:   Valeri Woodard, Dr. Richard Galvez, Maria Elena Abarca, Fan Jolly and Julee Aguillon    Patient's progress reviewed, FIMs reviewed, discharge date discussed and equipment needs addressed.     Expected Discharge Date: To be determined  Anticipated discharge orders/equipment: To be determined

## 2019-02-07 NOTE — THERAPY TREATMENT NOTE
Inpatient Rehabilitation - Physical Therapy Treatment Note  [facility]     Patient Name: Ventura Boggs  : 1960  MRN: [id number]    Today's Date: 2019                 Admit Date: 2019      Visit Dx:      ICD-10-CM ICD-9-CM   1. Uncontrolled type 2 diabetes mellitus with hyperglycemia (CMS/HCC) E11.65 250.02   2. Medically complex patient Z78.9 V49.9   3. Chronic intractable pain G89.29 338.29   4. Symbolic dysfunction R48.9 784.60   5. Impaired mobility and ADLs Z74.09 799.89   6. Impaired functional mobility, balance, gait, and endurance Z74.09 V49.89       Patient Active Problem List   Diagnosis   • Uncontrolled type 2 diabetes mellitus with hyperglycemia (CMS/HCC)   • Postoperative urinary retention   • Incisional hernia, without obstruction or gangrene   • Acute appendicitis with localized peritonitis   • Closed head injury   • Hyponatremia   • History of recurrent TIAs   • Ataxia   • Chronic pain syndrome   • Right shoulder pain   • Left-sided muscle weakness   • Medically complex patient   • Stroke-like symptoms       Therapy Treatment    IRF Treatment Summary     Row Name 19 0805          Evaluation/Treatment Time and Intent    Subjective Information  no complaints  -EC  no complaints  -TW     Existing Precautions/Restrictions  fall;lifting  -EC  fall;lifting  -TW     Document Type  therapy note (daily note)  -EC  therapy note (daily note)  -TW     Mode of Treatment  individual therapy;speech-language pathology  -EC  physical therapy;individual therapy  -TW     Start Time (Evaluation/Treatment)  0905  -EC  0805  -TW     Stop Time (Evaluation/Treatment)  1000  -EC  0900  -TW     Recorded by [EC] Madison Hall CCC-SLP [TW] Brant Palacios PTA     Row Name 19 0805             Cognition/Psychosocial- PT/OT    Affect/Mental Status (Cognitive)  WFL  -TW      Orientation Status (Cognition)  oriented x 4  -TW      Follows Commands (Cognition)  WFL  -TW       Personal Safety Interventions  fall prevention program maintained;gait belt;nonskid shoes/slippers when out of bed  -TW      Recorded by [TW] Brant Palacios PTA      Row Name 02/07/19 0805             Bed Mobility Assessment/Treatment    Comment (Bed Mobility)  Not tested.  -TW      Recorded by [TW] Brant Palacios PTA      Row Name 02/07/19 0805             Bed-Chair Transfer    Bed-Chair [place] (Transfers)  not tested  -TW      Recorded by [TW] Brant Palacios, KAREN      Row Name 02/07/19 0805             Sit-Stand Transfer    Sit-Stand [place] (Transfers)  minimum assist (75% patient effort)  -TW      Assistive Device (Sit-Stand Transfers)  other (see comments) // Bars  -TW      Recorded by [TW] Brant Palacios PTA      Row Name 02/07/19 0805             Stand-Sit Transfer    Stand-Sit [place] (Transfers)  minimum assist (75% patient effort)  -TW      Assistive Device (Stand-Sit Transfers)  other (see comments) // Bars  -TW      Recorded by [TW] Brant Palacios PTA      Row Name 02/07/19 0805             Gait/Stairs Assessment/Training    Gait/Stairs Assessment/Training  gait/ambulation assistive device  -TW      [place] Level (Gait)  contact guard;minimum assist (75% patient effort)  -TW      Assistive Device (Gait)  parallel bars  -TW      Distance in Feet (Gait)  24ft x 3 trials  -TW      Pattern (Gait)  step-to  -TW      Deviations/Abnormal Patterns (Gait)  left sided deviations;stride length decreased  -TW      Bilateral Gait Deviations  foot drop/toe drag;heel strike decreased;knee buckling, left side  -TW      Recorded by [TW] Brant Palacios PTA      Row Name 02/07/19 0805             Wheelchair Mobility/Management    Forward Propulsion [place] (Wheelchair)  independent  -TW      Backward Propulsion [place] (Wheelchair)  independent  -TW      Steering [place] (Wheelchair)  independent  -TW      Stopping [place] (Wheelchair)  independent   -TW      Turning [place] (Wheelchair)  independent  -TW      Doorway Navigation [place] (Wheelchair)  independent  -TW      Distance Propelled in Feet (Wheelchair)  300ft x2  -TW      Footrest Management [place] (Wheelchair)  minimum assist (75% patient effort)  -TW      Wheel Locks/Brakes Management [place] (Wheelchair)  verbal cues  -TW      Recorded by [TW] Brant Palacios, KAREN      Row Name 02/07/19 0905 02/07/19 0805          Pain Scale: Numbers Pre/Post-Treatment    Pain Scale: Numbers, Pretreatment  0/10 - no pain  -EC  6/10  -TW     Pain Scale: Numbers, Post-Treatment  0/10 - no pain  -EC  6/10  -TW     Pain Location - Side  --  Right  -TW     Pain Location - Orientation  --  upper  -TW     Pain Location  --  extremity  -TW     Recorded by [EC] Madison Hall CCC-SLP [TW] Brant Palacios PTA     Row Name 02/07/19 0805             Static Sitting Balance    Level of [place] (Unsupported Sitting, Static Balance)  independent  -TW      Recorded by [TW] Brant Palacios PTA      Row Name 02/07/19 0805             Dynamic Sitting Balance    Level of [place], Reaches Outside Midline (Sitting, Dynamic Balance)  contact guard assist;standby assist  -TW      Recorded by [TW] Brant Palacios PTA      Row Name 02/07/19 0805             Static Standing Balance    Level of [place] (Supported Standing, Static Balance)  supervision  -TW      Time Able to Maintain Position (Supported Standing, Static Balance)  2 to 3 minutes  -TW      Assistive Device Utilized (Supported Standing, Static Balance)  parallel bars  -TW      Recorded by [TW] Brant Palacios PTA      Row Name 02/07/19 0805             Dynamic Standing Balance    Level of [place], Reaches Outside Midline (Standing, Dynamic Balance)  minimal assist, 75% patient effort;contact guard assist  -TW      Time Able to Maintain Position, Reaches Outside Midline (Standing, Dynamic Balance)  3 to 4 minutes   -TW      Recorded by [TW] Brant Palacios PTA      Row Name 02/07/19 0805             Lower Extremity Seated Therapeutic Exercise    Performed, Seated Lower Extremity (Therapeutic Exercise)  hip flexion/extension;hip abduction/adduction;ankle dorsiflexion/plantarflexion;LAQ (long arc quad), knee extension Performed with R LE  -TW      Exercise Type, Seated Lower Extremity (Therapeutic Exercise)  AROM (active range of motion)  -TW      Sets/Reps Detail, Seated Lower Extremity (Therapeutic Exercise)  1/15-20  -TW      Recorded by [TW] Brant Palacios PTA      Row Name 02/07/19 0805             Vital Signs    Pretreatment Heart Rate (beats/min)  88  -TW      Intratreatment Heart Rate (beats/min)  96  -TW      Posttreatment Heart Rate (beats/min)  94  -TW      Pre SpO2 (%)  95  -TW      O2 Delivery Pre Treatment  room air  -TW      Intra SpO2 (%)  98  -TW      Post SpO2 (%)  97  -TW      Pre Patient Position  Sitting  -TW      Intra Patient Position  Standing  -TW      Post Patient Position  Sitting  -TW      Recorded by [TW] Brant Palacios PTA      Row Name 02/07/19 0905 02/07/19 0805          Positioning and Restraints    Pre-Treatment Position  sitting in chair/recliner  -EC  sitting in chair/recliner  -TW     Post Treatment Position  wheelchair  -EC  wheelchair  -TW     In Wheelchair  call light within reach;sitting;encouraged to call for assist;exit alarm on  -EC  sitting;encouraged to call for assist  -TW     Recorded by [EC] aMdison Hall CCC-SLP [TW] Brant Palacios PTA     Row Name 02/07/19 0805             Daily Summary of Progress (PT)    Functional Goal Overall Progress: Physical Therapy  progressing toward functional goals as expected  -TW      Impairments Continuing to Limit Function: Physical Therapy  strength decreased;impaired balance;coordination impaired;motor control impaired;pain  -TW      Recommendations: Physical Therapy  Cont  -TW      Recorded by [TW] Philip  Brant HURLEY, KAREN      Row Name 19 0759             Nursing Weekly Outcome Summary    Weekly Progress Summary: Nursing  improving, more independent in daily activity  -      Weekly Outcome Statement: Nursing  Eatin; Toileting: 3; Bladder: 5/6; Bowel: 5/6  -      Recorded by [] Julee Aguillon, RN        User Key  (r) = Recorded By, (t) = Taken By, (c) = Cosigned By    Initials Name Effective Dates    Madison Alicea CCC-SLP 18 -     Julee Chisholm RN 10/17/16 -      Brant Palacios PTA 18 -            Physical Therapy Education     Title: PT OT SLP Therapies (In Progress)     Topic: Physical Therapy (In Progress)     Point: Mobility training (In Progress)     Learning Progress Summary           Patient Acceptance, E, NR by  at 2019  2:39 PM                   Point: Body mechanics (In Progress)     Learning Progress Summary           Patient Acceptance, E, NR by  at 2019  2:39 PM                   Point: Precautions (In Progress)     Learning Progress Summary           Patient Acceptance, E, NR by  at 2019  2:39 PM                               User Key     Initials Effective Dates Name Provider Type Discipline     18 -  Shilpa Dumont, PT Physical Therapist PT                  PT Recommendation and Plan     Plan of Care Reviewed With: patient  Daily Summary of Progress (PT)  Functional Goal Overall Progress: Physical Therapy: progressing toward functional goals as expected  Impairments Continuing to Limit Function: Physical Therapy: strength decreased, impaired balance, coordination impaired, motor control impaired, pain  Recommendations: Physical Therapy: Cont  IRF Plan of Care Review: progress ongoing, continue  Progress, Functional Goals: demonstrating adequate progress  Outcome Summary: Pt with hightop shoes on this am. Pt able to amb within // bars with min/CGA of 1 with max VCs for short step with L LE first, go slow and use UE's to  assist with weight stabilization.      PT IRF GOALS     Row Name 02/07/19 0805             Bed Mobility Goal 1 (PT-IRF)    Activity/Assistive Device (Bed Mobility Goal 1, PT-IRF)  rolling to left;rolling to right;sit to supine;supine to sit  -TW      [place] Level (Bed Mobility Goal 1, PT-IRF)  independent  -TW      Time Frame (Bed Mobility Goal 1, PT-IRF)  1 week  -TW      Progress/Outcomes (Bed Mobility Goal 1, PT-IRF)  goal not met  -TW         Transfer Goal 1 (PT-IRF)    Activity/Assistive Device (Transfer Goal 1, PT-IRF)  sit-to-stand/stand-to-sit;bed-to-chair/chair-to-bed;wheelchair transfer  -TW      [place] Level (Transfer Goal 1, PT-IRF)  conditional independence  -TW      Time Frame (Transfer Goal 1, PT-IRF)  1 week  -TW      Progress/Outcomes (Transfer Goal 1, PT-IRF)  goal not met  -TW         Gait/Walking Locomotion Goal 1 (PT-IRF)    Activity/Assistive Device (Gait/Walking Locomotion Goal 1, PT-IRF)  gait (walking locomotion);assistive device use  -TW      Gait/Walking Locomotion Distance Goal 1 (PT-IRF)  50ft  -TW      [place] Level (Gait/Walking Locomotion Goal 1, PT-IRF)  contact guard assist  -TW      Time Frame (Gait/Walking Locomotion Goal 1, PT-IRF)  1 week  -TW      Progress/Outcomes (Gait/Walking Locomotion Goal 1, PT-IRF)  goal not met  -TW         Gait/Walking Locomotion Goal 2 (PT-IRF)    Activity/Assistive Device (Gait/Walking Locomotion Goal 2, PT-IRF)  gait (walking locomotion);assistive device use  -TW      Gait/Walking Locomotion Distance Goal 2 (PT-IRF)  150ft or more  -TW      [place] Level (Gait/Walking Locomotion Goal 2, PT-IRF)  conditional independence  -TW      Time Frame (Gait/Walking Locomotion Goal 2, PT-IRF)  2 weeks  -TW      Progress/Outcomes (Gait/Walking Locomotion Goal 2, PT-IRF)  goal not met  -TW         Wheelchair Locomotion Goal 1 (PT-IRF)    Activity (Wheelchair Locomotion Goal 1, PT-IRF)  forward propulsion;backward  propulsion;steering;turning;doorway navigation  -TW      [place] Level (Wheelchair Locomotion Goal 1, PT-IRF)  independent  -TW      Distance Goal 1 (Wheelchair Locomotion, PT-IRF)  150ft  -TW      Time Frame (Wheelchair Locomotion Goal 1, PT-IRF)  2 days  -TW      Progress/Outcomes (Wheelchair Locomotion Goal 1, PT-IRF)  goal not met  -TW         Stairs Goal 1 (PT-IRF)    Activity/Assistive Device (Stairs Goal 1, PT-IRF)  ascending stairs;descending stairs;using handrail, right  -TW      Number of Stairs (Stairs Goal 1, PT-IRF)  5  -TW      [place] Level (Stairs Goal 1, PT-IRF)  standby assist;conditional independence  -TW      Time Frame (Stairs Goal 1, PT-IRF)  2 weeks  -TW      Progress/Outcomes (Stairs Goal 1, PT-IRF)  goal not met  -TW         Strength Goal 1 (PT-IRF)    Strength Goal 1 (PT-IRF)  Pt will perform 20 reps of AROM exercises with VSS  -TW      Time Frame (Strength Goal 1, PT-IRF)  by discharge  -TW      Progress/Outcomes (Strength Goal 1, PT-IRF)  goal not met  -TW         Caregiver Training Goal 1 (PT-IRF)    Caregiver Training Goal 1 (PT-IRF)  Homecaregiver will demonstrate understanding assisting with mobility as needed, home safety and homecare instructions  -TW      Time Frame (Caregiver Training Goal 1, PT-IRF)  by discharge  -TW      Progress/Outcomes (Caregiver Training Goal 1, PT-IRF)  goal not met  -TW        User Key  (r) = Recorded By, (t) = Taken By, (c) = Cosigned By    Initials Name Provider Type    TW Brant Palacios PTA Physical Therapy Assistant        Outcome Measures     Row Name 02/07/19 0805 02/06/19 1500 02/06/19 1110       How much help from another person do you currently need...    Turning from your back to your side while in flat bed without using bedrails?  3  -TW  3  -TW  3  -GAL    Moving from lying on back to sitting on the side of a flat bed without bedrails?  3  -TW  3  -TW  3  -GAL    Moving to and from a bed to a chair (including a wheelchair)?  3   -TW  3  -TW  3  -GAL    Standing up from a chair using your arms (e.g., wheelchair, bedside chair)?  3  -TW  3  -TW  3  -GAL    Climbing 3-5 steps with a railing?  2  -TW  2  -TW  2  -GAL    To walk in hospital room?  2  -TW  2  -TW  2  -GAL    AM-PAC 6 Clicks Score  16  -TW  16  -TW  16  -GAL       Functional Assessment    Outcome Measure Options  AM-PAC 6 Clicks Basic Mobility (PT)  -TW  AM-PAC 6 Clicks Basic Mobility (PT)  -TW  AM-PAC 6 Clicks Basic Mobility (PT)  -GAL    Row Name 02/06/19 1015 02/06/19 0732          How much help from another is currently needed...    Putting on and taking off regular lower body clothing?  2  -BL  2  -BH     Bathing (including washing, rinsing, and drying)  2  -BL  2  -BH     Toileting (which includes using toilet bed pan or urinal)  3  -BL  3  -BH     Putting on and taking off regular upper body clothing  3  -BL  3  -BH     Taking care of personal grooming (such as brushing teeth)  3  -BL  3  -BH     Eating meals  4  -BL  4  -BH     Score  17  -BL  17  -BH        Functional Assessment    Outcome Measure Options  AM-PAC 6 Clicks Daily Activity (OT)  -BL  AM-[facility] 6 Clicks Daily Activity (OT)  -       User Key  (r) = Recorded By, (t) = Taken By, (c) = Cosigned By    Initials Name Provider Type     Maria Elena Barragan, OTR/L Occupational Therapist    GAL Shilpa Dumont, PT Physical Therapist    TW Brant Palacios PTA Physical Therapy Assistant    BL Giana Chakraborty, COULTER/L Occupational Therapy Assistant             Time Calculation:     PT Charges     Row Name 02/07/19 1129             Time Calculation    Start Time  0805  -TW      Stop Time  0900  -TW      Time Calculation (min)  55 min  -TW      PT Received On  02/07/19  -TW      PT Goal Re-Cert Due Date  02/19/19  -TW         Time Calculation- PT    Total Timed Code Minutes- PT  55 minute(s)  -TW        User Key  (r) = Recorded By, (t) = Taken By, (c) = Cosigned By    Initials Name Provider Type     Brant Palacios PTA  Physical Therapy Assistant            Therapy Charges for Today     Code Description Service Date Service Provider Modifiers Qty    62474815432 HC PT WHEELCHAIR MGMT EA 15 MIN 2/6/2019 Brant Palacios, PTA GP 1    40841512958 HC PT THERAPEUTIC ACT EA 15 MIN 2/6/2019 Brant Palacios, PTA GP 1    65359993942 HC PT THER PROC EA 15 MIN 2/6/2019 Brant Palacios, PTA GP 1    62485409653 HC GAIT TRAINING EA 15 MIN 2/7/2019 Brant Palacios, PTA GP 1    83518068162 HC PT THERAPEUTIC ACT EA 15 MIN 2/7/2019 Brant Palacios, PTA GP 2    66305952403 HC PT THER PROC EA 15 MIN 2/7/2019 Brant Palacios, PTA GP 1            PT G-Codes  Outcome Measure Options: AM-PAC 6 Clicks Basic Mobility (PT)  AM-PAC 6 Clicks Score: 16  Score: 17      Brant Palacios PTA  2/7/2019

## 2019-02-07 NOTE — THERAPY TREATMENT NOTE
Inpatient Rehabilitation - Physical Therapy Treatment Note  [facility]     Patient Name: Ventura Boggs  : 1960  MRN: [id number]    Today's Date: 2019                 Admit Date: 2019      Visit Dx:      ICD-10-CM ICD-9-CM   1. Uncontrolled type 2 diabetes mellitus with hyperglycemia (CMS/HCC) E11.65 250.02   2. Medically complex patient Z78.9 V49.9   3. Chronic intractable pain G89.29 338.29   4. Symbolic dysfunction R48.9 784.60   5. Impaired mobility and ADLs Z74.09 799.89   6. Impaired functional mobility, balance, gait, and endurance Z74.09 V49.89       Patient Active Problem List   Diagnosis   • Uncontrolled type 2 diabetes mellitus with hyperglycemia (CMS/HCC)   • Postoperative urinary retention   • Incisional hernia, without obstruction or gangrene   • Acute appendicitis with localized peritonitis   • Closed head injury   • Hyponatremia   • History of recurrent TIAs   • Ataxia   • Chronic pain syndrome   • Right shoulder pain   • Left-sided muscle weakness   • Medically complex patient   • Stroke-like symptoms       Therapy Treatment    IRF Treatment Summary     Row Name 19 1302 19 1000 19 0905       Evaluation/Treatment Time and Intent    Subjective Information  no complaints  -TW  no complaints  -KD  no complaints  -EC    Existing Precautions/Restrictions  fall;lifting  -TW  fall;lifting  -KD  fall;lifting  -EC    Document Type  therapy note (daily note)  -TW  therapy note (daily note)  -KD  therapy note (daily note)  -EC    Mode of Treatment  physical therapy;individual therapy  -TW  occupational therapy;individual therapy  -KD  individual therapy;speech-language pathology  -EC    Start Time (Evaluation/Treatment)  --  1000  -KD  0905  -EC    Stop Time (Evaluation/Treatment)  --  1123  -KD  1000  -EC    Recorded by [TW] Brant Palacios, KAREN [KD] Ele Avina COTA/MEI [EC] Madison Hall CCC-SLP    Row Name 19 0805             Evaluation/Treatment  Time and Intent    Subjective Information  no complaints  -TW      Existing Precautions/Restrictions  fall;lifting  -TW      Document Type  therapy note (daily note)  -TW      Mode of Treatment  physical therapy;individual therapy  -TW      Start Time (Evaluation/Treatment)  0805  -TW      Stop Time (Evaluation/Treatment)  0900  -TW      Recorded by [TW] Brant Palacios PTA      Row Name 02/07/19 1302 02/07/19 1000 02/07/19 0805       Cognition/Psychosocial- PT/OT    Affect/Mental Status (Cognitive)  WFL  -TW  WFL  -KD  WFL  -TW    Orientation Status (Cognition)  oriented x 4  -TW  oriented x 4  -KD  oriented x 4  -TW    Follows Commands (Cognition)  WFL  -TW  WFL  -KD  WFL  -TW    Personal Safety Interventions  fall prevention program maintained;gait belt;nonskid shoes/slippers when out of bed  -TW  fall prevention program maintained  -KD  fall prevention program maintained;gait belt;nonskid shoes/slippers when out of bed  -TW    Cognitive Function (Cognitive)  --  safety deficit  -KD  --    Recorded by [TW] Brant Palacios PTA [KD] Ele Avina, COULTER/L [TW] Brant Palacios PTA    Row Name 02/07/19 1302 02/07/19 1000 02/07/19 0805       Bed Mobility Assessment/Treatment    Bed Mobility Assessment/Treatment  --  sit-supine  -KD  --    Scooting/Bridging [place] (Bed Mobility)  --  supervision  -KD  --    Supine-Sit [place] (Bed Mobility)  supervision  -TW  --  --    Sit-Supine [place] (Bed Mobility)  not tested  -TW  --  --    Bed Mobility, Safety Issues  decreased use of legs for bridging/pushing  -TW  --  --    Assistive Device (Bed Mobility)  bed rails;head of bed elevated  -TW  --  --    Comment (Bed Mobility)  --  --  Not tested.  -TW    Recorded by [TW] Brant Palacios PTA [KD] Ele Avina COULTER/L [TW] Brant Palacios PTA    Row Name 02/07/19 1000             Functional Mobility    Functional Mobility- Ind. Level  contact guard assist  -KD      Functional Mobility-  Device  rolling walker  -KD      Functional Mobility-Distance (Feet)  2  -KD      Functional Mobility- Safety Issues  balance decreased during turns  -KD      Recorded by [KD] Ele Avina COTA/L      Row Name 02/07/19 1302 02/07/19 0805          Bed-Chair Transfer    Bed-Chair [place] (Transfers)  minimum assist (75% patient effort) Pt performed 3 trials of surface to surface t/f's.  -TW  not tested  -TW     Assistive Device (Bed-Chair Transfers)  walker, front-wheeled  -TW  --     Recorded by [TW] Brant Palacios, KAREN [TW] Brant Palacios PTA     Row Name 02/07/19 1302 02/07/19 1000          Chair-Bed Transfer    Chair-Bed [place] (Transfers)  minimum assist (75% patient effort)  -TW  minimum assist (75% patient effort)  -KD     Assistive Device (Chair-Bed Transfers)  walker, front-wheeled  -TW  walker, front-wheeled  -KD     Recorded by [TW] Brant Palacios PTA [KD] Ele Avina COTA/L     Row Name 02/07/19 1302 02/07/19 1000 02/07/19 0805       Sit-Stand Transfer    Sit-Stand [place] (Transfers)  minimum assist (75% patient effort)  -TW  minimum assist (75% patient effort)  -KD  minimum assist (75% patient effort)  -TW    Assistive Device (Sit-Stand Transfers)  walker, front-wheeled  -TW  walker, front-wheeled  -KD  other (see comments) // Bars  -TW    Recorded by [TW] Brant Palacios PTA [KD] Ele Avina COTA/L [TW] Brant Palacios PTA    Row Name 02/07/19 1302 02/07/19 1000 02/07/19 0805       Stand-Sit Transfer    Stand-Sit [place] (Transfers)  minimum assist (75% patient effort)  -TW  minimum assist (75% patient effort)  -KD  minimum assist (75% patient effort)  -TW    Assistive Device (Stand-Sit Transfers)  walker, front-wheeled  -TW  walker, front-wheeled  -KD  other (see comments) // Bars  -TW    Recorded by [TW] Brant Palacios PTA [KD] Ele Avina COTA/L [TW] Brant Palacios, PTA    Row Name 02/07/19 1000             Wheelchair  Transfer    Type (Wheelchair Transfer)  sit-stand;stand-sit  -KD      [place] Level (Wheelchair Transfer)  minimum assist (75% patient effort)  -KD      Assistive Device (Wheelchair Transfer)  walker, 4-wheeled  -KD      Recorded by [KD] Ele Avina COTA/L      Row Name 02/07/19 1302 02/07/19 0805          Gait/Stairs Assessment/Training    Gait/Stairs Assessment/Training  gait/ambulation assistive device  -TW  gait/ambulation assistive device  -TW     [place] Level (Gait)  contact guard;minimum assist (75% patient effort)  -TW  contact guard;minimum assist (75% patient effort)  -TW     Assistive Device (Gait)  walker, front-wheeled  -TW  parallel bars  -TW     Distance in Feet (Gait)  26ft  -TW  24ft x 3 trials  -TW     Pattern (Gait)  step-to  -TW  step-to  -TW     Deviations/Abnormal Patterns (Gait)  left sided deviations;stride length decreased  -TW  left sided deviations;stride length decreased  -TW     Bilateral Gait Deviations  foot drop/toe drag;heel strike decreased;knee buckling, left side  -TW  foot drop/toe drag;heel strike decreased;knee buckling, left side  -TW     Comment (Gait/Stairs)  Pt requires increase time to perform gait activity due to using RW instead of // bars.  -TW  --     Recorded by [TW] Brant Palacios PTA [TW] Brant Palacios PTA     Row Name 02/07/19 1000 02/07/19 0805          Wheelchair Mobility/Management    Method of Wheelchair Locomotion (Mobility)  bimanual (upper extremity) propulsion  -KD  --     Mobility Activities (Wheelchair)  forward propulsion  -KD  --     Forward Propulsion [place] (Wheelchair)  stand by assist  -KD  independent  -TW     Backward Propulsion [place] (Wheelchair)  --  independent  -TW     Steering [place] (Wheelchair)  --  independent  -TW     Stopping [place] (Wheelchair)  --  independent  -TW     Turning [place] (Wheelchair)  stand by assist  -KD  independent  -TW     Doorway Navigation [place]  (Wheelchair)  --  independent  -TW     Distance Propelled in Feet (Wheelchair)  140 x 2  -KD  300ft x2  -TW     Management Activities (Wheelchair)  wheel locks/brakes management  -KD  --     Footrest Management [place] (Wheelchair)  --  minimum assist (75% patient effort)  -TW     Wheel Locks/Brakes Management [place] (Wheelchair)  --  verbal cues  -TW     Recorded by [KD] Ele Avina COTA/L [TW] Brant Palacios PTA     Row Name 02/07/19 1000             Lower Body Dressing Assessment/Treatment    Lower Body Dressing [place] Level  doff;don;socks;shoes/slippers;supervision  -KD      Recorded by [KD] Ele Avina COTA/L      Row Name 02/07/19 1000             Grooming Assessment/Treatment    Grooming [place] Level  grooming skills;wash face, hands;set up  -KD      Recorded by [KD] Ele Avina COTA/L      Row Name 02/07/19 1000             Toileting Assessment/Treatment    Toileting [place] Level  toileting skills;set up assistance  -KD      Assistive Device Use (Toileting)  urinal  -KD      Toileting Position  supported sitting  -KD      Recorded by [KD] Ele Avina COTA/L      Row Name 02/07/19 1302 02/07/19 1000 02/07/19 0905       Pain Scale: Numbers Pre/Post-Treatment    Pain Scale: Numbers, Pretreatment  7/10  -TW  9/10  -KD  0/10 - no pain  -EC    Pain Scale: Numbers, Post-Treatment  7/10  -TW  9/10  -KD  0/10 - no pain  -EC    Pain Location - Side  Right  -TW  Right  -KD  --    Pain Location - Orientation  upper  -TW  --  --    Pain Location  extremity  -TW  shoulder back  -KD  --    Recorded by [TW] Brant Palacios, KAREN [KD] Ele Avina COTA/L [EC] Madison Hall CCC-SLP    Row Name 02/07/19 0805             Pain Scale: Numbers Pre/Post-Treatment    Pain Scale: Numbers, Pretreatment  6/10  -TW      Pain Scale: Numbers, Post-Treatment  6/10  -TW      Pain Location - Side  Right  -TW      Pain Location - Orientation  upper  -TW      Pain Location   extremity  -TW      Recorded by [TW] Brant Palacios PTA      Row Name 02/07/19 0805             Static Sitting Balance    Level of [place] (Unsupported Sitting, Static Balance)  independent  -TW      Recorded by [TW] Brant Palacios PTA      Row Name 02/07/19 0805             Dynamic Sitting Balance    Level of [place], Reaches Outside Midline (Sitting, Dynamic Balance)  contact guard assist;standby assist  -TW      Recorded by [TW] Brant Palacios PTA      Row Name 02/07/19 1302 02/07/19 0805          Static Standing Balance    Level of [place] (Supported Standing, Static Balance)  supervision  -TW  supervision  -TW     Time Able to Maintain Position (Supported Standing, Static Balance)  3 to 4 minutes  -TW  2 to 3 minutes  -TW     Assistive Device Utilized (Supported Standing, Static Balance)  walker, rolling  -TW  parallel bars  -TW     Recorded by [TW] Brant Palacios PTA [TW] Brant Palacios PTA     Row Name 02/07/19 1302 02/07/19 0805          Dynamic Standing Balance    Level of [place], Reaches Outside Midline (Standing, Dynamic Balance)  minimal assist, 75% patient effort  -TW  minimal assist, 75% patient effort;contact guard assist  -TW     Time Able to Maintain Position, Reaches Outside Midline (Standing, Dynamic Balance)  4 to 5 minutes  -TW  3 to 4 minutes  -TW     Recorded by [TW] Brant Palacios PTA [TW] Brant Palacios PTA     Row Name 02/07/19 1000             Upper Extremity Seated Therapeutic Exercise    Performed, Seated Upper Extremity (Therapeutic Exercise)  shoulder flexion/extension;shoulder abduction/adduction;shoulder external/internal rotation;shoulder horizontal abduction/adduction;elbow flexion/extension  -KD      Device, Seated Upper Extremity (Therapeutic Exercise)  free weights, barbell  -KD      Exercise Type, Seated Upper Extremity (Therapeutic Exercise)  AROM (active range of motion)  -KD      Expected Outcomes, Seated Upper  Extremity (Therapeutic Exercise)  improve functional tolerance, self-care activity;improve performance, transfer skills  -KD      Sets/Reps Detail, Seated Upper Extremity (Therapeutic Exercise)  2/15  -KD      Recorded by [KD] Ele Avina COTA/L      Row Name 02/07/19 1000             Aerobic Exercise Activity    Exercise Performed (Aerobic, Therapeutic Exercise)  arm bike clothespin tree;  LUE only  -KD      Expected Outcome (Aerobic, Therapeutic Exercise)  strengthen normal movement patterns;improve functional tolerance, self-care activity  -KD      Time (Aerobic, Therapeutic Exercise)  10  -KD      Comment (Aerobic, Therapeutic Exercise)  RB's PRN  -KD      Recorded by [KD] Ele Avina COTA/L      Row Name 02/07/19 0805             Lower Extremity Seated Therapeutic Exercise    Performed, Seated Lower Extremity (Therapeutic Exercise)  hip flexion/extension;hip abduction/adduction;ankle dorsiflexion/plantarflexion;LAQ (long arc quad), knee extension Performed with R LE  -TW      Exercise Type, Seated Lower Extremity (Therapeutic Exercise)  AROM (active range of motion)  -TW      Sets/Reps Detail, Seated Lower Extremity (Therapeutic Exercise)  1/15-20  -TW      Recorded by [TW] Brant Palacios PTA      Row Name 02/07/19 1302 02/07/19 1000 02/07/19 0805       Vital Signs    Pre Systolic BP Rehab  120  -TW  111  -KD  --    Pre Treatment Diastolic BP  75  -TW  77  -KD  --    Pretreatment Heart Rate (beats/min)  92  -TW  85  -KD  88  -TW    Intratreatment Heart Rate (beats/min)  --  --  96  -TW    Posttreatment Heart Rate (beats/min)  96  -TW  94  -KD  94  -TW    Pre SpO2 (%)  96  -TW  94  -KD  95  -TW    O2 Delivery Pre Treatment  room air  -TW  room air  -KD  room air  -TW    Intra SpO2 (%)  --  --  98  -TW    Post SpO2 (%)  98  -TW  96  -KD  97  -TW    O2 Delivery Post Treatment  --  room air  -KD  --    Pre Patient Position  Supine  -TW  Sitting  -KD  Sitting  -TW    Intra Patient Position  Standing   -TW  Standing  -KD  Standing  -TW    Post Patient Position  Sitting  -TW  Sitting  -KD  Sitting  -TW    Recorded by [TW] Brant Palacios PTA [KD] Ele Avina COTA/L [TW] Brant Palacios PTA    Row Name 02/07/19 1302 02/07/19 1000 02/07/19 0905       Positioning and Restraints    Pre-Treatment Position  in bed  -TW  sitting in chair/recliner  -KD  sitting in chair/recliner  -EC    Post Treatment Position  wheelchair  -TW  bed  -KD  wheelchair  -EC    In Bed  --  fowlers;call light within reach;encouraged to call for assist;exit alarm on  -KD  --    In Wheelchair  sitting;encouraged to call for assist  -TW  --  call light within reach;sitting;encouraged to call for assist;exit alarm on  -EC    Recorded by [TW] Brant Palacios PTA [KD] Ele Avina COTA/MEI [EC] Madison Hall CCC-SLP    Row Name 02/07/19 0805             Positioning and Restraints    Pre-Treatment Position  sitting in chair/recliner  -TW      Post Treatment Position  wheelchair  -TW      In Wheelchair  sitting;encouraged to call for assist  -TW      Recorded by [TW] Brant Palacios PTA      Row Name 02/07/19 1302 02/07/19 0805          Daily Summary of Progress (PT)    Functional Goal Overall Progress: Physical Therapy  progressing toward functional goals as expected  -TW  progressing toward functional goals as expected  -TW     Impairments Continuing to Limit Function: Physical Therapy  strength decreased;impaired balance;coordination impaired;motor control impaired;pain  -TW  strength decreased;impaired balance;coordination impaired;motor control impaired;pain  -TW     Recommendations: Physical Therapy  Cont  -TW  Cont  -TW     Recorded by [TW] Brant Palacios PTA [TW] Brant Palacios PTA     Row Name 02/07/19 1000             Daily Summary of Progress (OT)    Functional Goal Overall Progress: Occupational Therapy  progressing toward functional goals as expected  -KD      Recommendations: Occupational  Therapy  Cont current POC  -KD      Recorded by [KD] Ele Avina COTA/L      Row Name 19 0759             Nursing Weekly Outcome Summary    Weekly Progress Summary: Nursing  improving, more independent in daily activity  -      Weekly Outcome Statement: Nursing  Eatin; Toileting: 3; Bladder: 5/6; Bowel: 5/6  -      Recorded by [MC] Julee Aguillon, RN        User Key  (r) = Recorded By, (t) = Taken By, (c) = Cosigned By    Initials Name Effective Dates     Madison Hall CCC-SLP 18 -     Julee Chisholm RN 10/17/16 -     Brant Lancaster PTA 18 -     Ele Chu COTA/MEI 18 -            Physical Therapy Education     Title: PT OT SLP Therapies (In Progress)     Topic: Physical Therapy (In Progress)     Point: Mobility training (In Progress)     Learning Progress Summary           Patient Acceptance, E, NR by  at 2019  2:39 PM                   Point: Body mechanics (In Progress)     Learning Progress Summary           Patient Acceptance, E, NR by  at 2019  2:39 PM                   Point: Precautions (In Progress)     Learning Progress Summary           Patient Acceptance, E, NR by  at 2019  2:39 PM                               User Key     Initials Effective Dates Name Provider Type Discipline     18 -  Shilpa Dumont, PT Physical Therapist PT                  PT Recommendation and Plan     Plan of Care Reviewed With: patient  Daily Summary of Progress (PT)  Functional Goal Overall Progress: Physical Therapy: progressing toward functional goals as expected  Impairments Continuing to Limit Function: Physical Therapy: strength decreased, impaired balance, coordination impaired, motor control impaired, pain  Recommendations: Physical Therapy: Cont  IRF Plan of Care Review: progress ongoing, continue  Progress, Functional Goals: demonstrating adequate progress  Outcome Summary: Pt able to amb 26ft with RW and CGA/Min A with  VCs for walker placement and steppage sequencing. Pt progressing well with t/f's but cont to require VCs for safety and proper tech.      PT IRF GOALS     Row Name 02/07/19 1302 02/07/19 0805          Bed Mobility Goal 1 (PT-IRF)    Activity/Assistive Device (Bed Mobility Goal 1, PT-IRF)  rolling to left;rolling to right;sit to supine;supine to sit  -TW  rolling to left;rolling to right;sit to supine;supine to sit  -TW     [place] Level (Bed Mobility Goal 1, PT-IRF)  independent  -TW  independent  -TW     Time Frame (Bed Mobility Goal 1, PT-IRF)  1 week  -TW  1 week  -TW     Progress/Outcomes (Bed Mobility Goal 1, PT-IRF)  goal not met  -TW  goal not met  -TW        Transfer Goal 1 (PT-IRF)    Activity/Assistive Device (Transfer Goal 1, PT-IRF)  sit-to-stand/stand-to-sit;bed-to-chair/chair-to-bed;wheelchair transfer  -TW  sit-to-stand/stand-to-sit;bed-to-chair/chair-to-bed;wheelchair transfer  -TW     [place] Level (Transfer Goal 1, PT-IRF)  conditional independence  -TW  conditional independence  -TW     Time Frame (Transfer Goal 1, PT-IRF)  1 week  -TW  1 week  -TW     Progress/Outcomes (Transfer Goal 1, PT-IRF)  goal not met  -TW  goal not met  -TW        Gait/Walking Locomotion Goal 1 (PT-IRF)    Activity/Assistive Device (Gait/Walking Locomotion Goal 1, PT-IRF)  gait (walking locomotion);assistive device use  -TW  gait (walking locomotion);assistive device use  -TW     Gait/Walking Locomotion Distance Goal 1 (PT-IRF)  50ft  -TW  50ft  -TW     [place] Level (Gait/Walking Locomotion Goal 1, PT-IRF)  contact guard assist  -TW  contact guard assist  -TW     Time Frame (Gait/Walking Locomotion Goal 1, PT-IRF)  1 week  -TW  1 week  -TW     Progress/Outcomes (Gait/Walking Locomotion Goal 1, PT-IRF)  goal not met  -TW  goal not met  -TW        Gait/Walking Locomotion Goal 2 (PT-IRF)    Activity/Assistive Device (Gait/Walking Locomotion Goal 2, PT-IRF)  gait (walking locomotion);assistive device use  -TW   gait (walking locomotion);assistive device use  -TW     Gait/Walking Locomotion Distance Goal 2 (PT-IRF)  150ft or more  -TW  150ft or more  -TW     [place] Level (Gait/Walking Locomotion Goal 2, PT-IRF)  conditional independence  -TW  conditional independence  -TW     Time Frame (Gait/Walking Locomotion Goal 2, PT-IRF)  2 weeks  -TW  2 weeks  -TW     Progress/Outcomes (Gait/Walking Locomotion Goal 2, PT-IRF)  goal not met  -TW  goal not met  -TW        Wheelchair Locomotion Goal 1 (PT-IRF)    Activity (Wheelchair Locomotion Goal 1, PT-IRF)  forward propulsion;backward propulsion;steering;turning;doorway navigation  -TW  forward propulsion;backward propulsion;steering;turning;doorway navigation  -TW     [place] Level (Wheelchair Locomotion Goal 1, PT-IRF)  independent  -TW  independent  -TW     Distance Goal 1 (Wheelchair Locomotion, PT-IRF)  150ft  -TW  150ft  -TW     Time Frame (Wheelchair Locomotion Goal 1, PT-IRF)  2 days  -TW  2 days  -TW     Progress/Outcomes (Wheelchair Locomotion Goal 1, PT-IRF)  goal not met  -TW  goal not met  -TW        Stairs Goal 1 (PT-IRF)    Activity/Assistive Device (Stairs Goal 1, PT-IRF)  ascending stairs;descending stairs;using handrail, right  -TW  ascending stairs;descending stairs;using handrail, right  -TW     Number of Stairs (Stairs Goal 1, PT-IRF)  5  -TW  5  -TW     [place] Level (Stairs Goal 1, PT-IRF)  standby assist;conditional independence  -TW  standby assist;conditional independence  -TW     Time Frame (Stairs Goal 1, PT-IRF)  2 weeks  -TW  2 weeks  -TW     Progress/Outcomes (Stairs Goal 1, PT-IRF)  goal not met  -TW  goal not met  -TW        Strength Goal 1 (PT-IRF)    Strength Goal 1 (PT-IRF)  Pt will perform 20 reps of AROM exercises with VSS  -TW  Pt will perform 20 reps of AROM exercises with VSS  -TW     Time Frame (Strength Goal 1, PT-IRF)  by discharge  -TW  by discharge  -TW     Progress/Outcomes (Strength Goal 1, PT-IRF)  goal not met  -TW   goal not met  -TW        Caregiver Training Goal 1 (PT-IRF)    Caregiver Training Goal 1 (PT-IRF)  Homecaregiver will demonstrate understanding assisting with mobility as needed, home safety and homecare instructions  -TW  Homecaregiver will demonstrate understanding assisting with mobility as needed, home safety and homecare instructions  -TW     Time Frame (Caregiver Training Goal 1, PT-IRF)  by discharge  -TW  by discharge  -TW     Progress/Outcomes (Caregiver Training Goal 1, PT-IRF)  goal not met  -TW  goal not met  -TW       User Key  (r) = Recorded By, (t) = Taken By, (c) = Cosigned By    Initials Name Provider Type    TW Brant Palacios, PTA Physical Therapy Assistant        Outcome Measures     Row Name 02/07/19 1000 02/07/19 0805 02/06/19 1500       How much help from another person do you currently need...    Turning from your back to your side while in flat bed without using bedrails?  --  3  -TW  3  -TW    Moving from lying on back to sitting on the side of a flat bed without bedrails?  --  3  -TW  3  -TW    Moving to and from a bed to a chair (including a wheelchair)?  --  3  -TW  3  -TW    Standing up from a chair using your arms (e.g., wheelchair, bedside chair)?  --  3  -TW  3  -TW    Climbing 3-5 steps with a railing?  --  2  -TW  2  -TW    To walk in hospital room?  --  2  -TW  2  -TW    AM-PAC 6 Clicks Score  --  16  -TW  16  -TW       How much help from another is currently needed...    Putting on and taking off regular lower body clothing?  2  -KD  --  --    Bathing (including washing, rinsing, and drying)  2  -KD  --  --    Toileting (which includes using toilet bed pan or urinal)  3  -KD  --  --    Putting on and taking off regular upper body clothing  3  -KD  --  --    Taking care of personal grooming (such as brushing teeth)  3  -KD  --  --    Eating meals  4  -KD  --  --    Score  17  -KD  --  --       Functional Assessment    Outcome Measure Options  --  AM-PAC 6 Clicks Basic Mobility  (PT)  -  AM-PAC 6 Clicks Basic Mobility (PT)  -    Row Name 02/06/19 1110 02/06/19 1015 02/06/19 0732       How much help from another person do you currently need...    Turning from your back to your side while in flat bed without using bedrails?  3  -GAL  --  --    Moving from lying on back to sitting on the side of a flat bed without bedrails?  3  -GAL  --  --    Moving to and from a bed to a chair (including a wheelchair)?  3  -GAL  --  --    Standing up from a chair using your arms (e.g., wheelchair, bedside chair)?  3  -GAL  --  --    Climbing 3-5 steps with a railing?  2  -AGL  --  --    To walk in hospital room?  2  -GAL  --  --    AM-PAC 6 Clicks Score  16  -GAL  --  --       How much help from another is currently needed...    Putting on and taking off regular lower body clothing?  --  2  -BL  2  -BH    Bathing (including washing, rinsing, and drying)  --  2  -BL  2  -BH    Toileting (which includes using toilet bed pan or urinal)  --  3  -BL  3  -BH    Putting on and taking off regular upper body clothing  --  3  -BL  3  -BH    Taking care of personal grooming (such as brushing teeth)  --  3  -BL  3  -BH    Eating meals  --  4  -BL  4  -BH    Score  --  17  -BL  17  -BH       Functional Assessment    Outcome Measure Options  AM-PAC 6 Clicks Basic Mobility (PT)  -  AM-PAC 6 Clicks Daily Activity (OT)  -[facility] 6 Clicks Daily Activity (OT)  -      User Key  (r) = Recorded By, (t) = Taken By, (c) = Cosigned By    Initials Name Provider Type     Maria Elena Barragan, OTR/L Occupational Therapist    Shilpa Cabello, PT Physical Therapist    TW Brant Palacios, PTA Physical Therapy Assistant    KD Ele vAina, COULTER/L Occupational Therapy Assistant    BL Giana Chakraborty, COULTER/L Occupational Therapy Assistant             Time Calculation:     PT Charges     Row Name 02/07/19 1442 02/07/19 1129          Time Calculation    Start Time  1302  -TW  0805  -TW     Stop Time  1347  -TW  0900  -TW     Time  Calculation (min)  45 min  -TW  55 min  -TW     PT Received On  02/07/19  -TW  02/07/19  -TW     PT Goal Re-Cert Due Date  02/19/19  -TW 02/19/19 -TW        Time Calculation- PT    Total Timed Code Minutes- PT  45 minute(s)  -TW  55 minute(s)  -TW       User Key  (r) = Recorded By, (t) = Taken By, (c) = Cosigned By    Initials Name Provider Type    TW Brant Palacios PTA Physical Therapy Assistant            Therapy Charges for Today     Code Description Service Date Service Provider Modifiers Qty    13453387480 HC PT WHEELCHAIR MGMT EA 15 MIN 2/6/2019 Brant Palacios, PTA GP 1    67940055054 HC PT THERAPEUTIC ACT EA 15 MIN 2/6/2019 Brant Palacios, PTA GP 1    79652264796 HC PT THER PROC EA 15 MIN 2/6/2019 Brant Palacios, PTA GP 1    30596079929 HC GAIT TRAINING EA 15 MIN 2/7/2019 Brant Palacios, PTA GP 1    08570180184 HC PT THERAPEUTIC ACT EA 15 MIN 2/7/2019 Brant Palacios, PTA GP 2    43519882130 HC PT THER PROC EA 15 MIN 2/7/2019 Brant Palacios, PTA GP 1    16016935612 HC GAIT TRAINING EA 15 MIN 2/7/2019 Brant Palacios, PTA GP 1    01627998442 HC PT THERAPEUTIC ACT EA 15 MIN 2/7/2019 Brant Palacios, PTA GP 2            PT G-Codes  Outcome Measure Options: AM-PAC 6 Clicks Basic Mobility (PT)  AM-PAC 6 Clicks Score: 16  Score: 17      Brant Palacios PTA  2/7/2019

## 2019-02-08 LAB
GLUCOSE BLDC GLUCOMTR-MCNC: 139 MG/DL (ref 70–130)
GLUCOSE BLDC GLUCOMTR-MCNC: 154 MG/DL (ref 70–130)
GLUCOSE BLDC GLUCOMTR-MCNC: 191 MG/DL (ref 70–130)
GLUCOSE BLDC GLUCOMTR-MCNC: 91 MG/DL (ref 70–130)

## 2019-02-08 PROCEDURE — 63710000001 INSULIN ASPART PER 5 UNITS: Performed by: FAMILY MEDICINE

## 2019-02-08 PROCEDURE — 82962 GLUCOSE BLOOD TEST: CPT

## 2019-02-08 PROCEDURE — 97535 SELF CARE MNGMENT TRAINING: CPT

## 2019-02-08 PROCEDURE — 97110 THERAPEUTIC EXERCISES: CPT | Performed by: PHYSICAL THERAPIST

## 2019-02-08 PROCEDURE — 97530 THERAPEUTIC ACTIVITIES: CPT | Performed by: PHYSICAL THERAPIST

## 2019-02-08 PROCEDURE — 92507 TX SP LANG VOICE COMM INDIV: CPT | Performed by: SPEECH-LANGUAGE PATHOLOGIST

## 2019-02-08 PROCEDURE — 97116 GAIT TRAINING THERAPY: CPT | Performed by: PHYSICAL THERAPIST

## 2019-02-08 PROCEDURE — 99232 SBSQ HOSP IP/OBS MODERATE 35: CPT | Performed by: FAMILY MEDICINE

## 2019-02-08 PROCEDURE — 25010000002 ENOXAPARIN PER 10 MG: Performed by: FAMILY MEDICINE

## 2019-02-08 PROCEDURE — 97530 THERAPEUTIC ACTIVITIES: CPT

## 2019-02-08 PROCEDURE — 63710000001 INSULIN DETEMIR PER 5 UNITS: Performed by: FAMILY MEDICINE

## 2019-02-08 RX ADMIN — INSULIN ASPART 7 UNITS: 100 INJECTION, SOLUTION INTRAVENOUS; SUBCUTANEOUS at 07:05

## 2019-02-08 RX ADMIN — INSULIN DETEMIR 30 UNITS: 100 INJECTION, SOLUTION SUBCUTANEOUS at 20:17

## 2019-02-08 RX ADMIN — INSULIN ASPART 7 UNITS: 100 INJECTION, SOLUTION INTRAVENOUS; SUBCUTANEOUS at 12:00

## 2019-02-08 RX ADMIN — ENOXAPARIN SODIUM 40 MG: 40 INJECTION SUBCUTANEOUS at 15:19

## 2019-02-08 RX ADMIN — METFORMIN HYDROCHLORIDE 1000 MG: 500 TABLET ORAL at 17:09

## 2019-02-08 RX ADMIN — METFORMIN HYDROCHLORIDE 1000 MG: 500 TABLET ORAL at 08:27

## 2019-02-08 RX ADMIN — AMITRIPTYLINE HYDROCHLORIDE 10 MG: 10 TABLET, FILM COATED ORAL at 20:10

## 2019-02-08 RX ADMIN — OXYCODONE HYDROCHLORIDE AND ACETAMINOPHEN 1 TABLET: 10; 325 TABLET ORAL at 11:44

## 2019-02-08 RX ADMIN — LISINOPRIL 5 MG: 5 TABLET ORAL at 08:27

## 2019-02-08 RX ADMIN — INSULIN ASPART 2 UNITS: 100 INJECTION, SOLUTION INTRAVENOUS; SUBCUTANEOUS at 16:42

## 2019-02-08 RX ADMIN — INSULIN ASPART 2 UNITS: 100 INJECTION, SOLUTION INTRAVENOUS; SUBCUTANEOUS at 07:04

## 2019-02-08 RX ADMIN — ASPIRIN 81 MG CHEWABLE TABLET 325 MG: 81 TABLET CHEWABLE at 08:26

## 2019-02-08 RX ADMIN — FAMOTIDINE 20 MG: 20 TABLET ORAL at 20:10

## 2019-02-08 RX ADMIN — OXYCODONE HYDROCHLORIDE AND ACETAMINOPHEN 1 TABLET: 10; 325 TABLET ORAL at 16:46

## 2019-02-08 RX ADMIN — FAMOTIDINE 20 MG: 20 TABLET ORAL at 08:27

## 2019-02-08 RX ADMIN — OXYCODONE HYDROCHLORIDE AND ACETAMINOPHEN 1 TABLET: 10; 325 TABLET ORAL at 08:27

## 2019-02-08 RX ADMIN — OXYCODONE HYDROCHLORIDE AND ACETAMINOPHEN 1 TABLET: 10; 325 TABLET ORAL at 21:05

## 2019-02-08 RX ADMIN — METHADONE HYDROCHLORIDE 5 MG: 10 TABLET ORAL at 08:27

## 2019-02-08 RX ADMIN — METHADONE HYDROCHLORIDE 5 MG: 10 TABLET ORAL at 20:10

## 2019-02-08 RX ADMIN — AMLODIPINE BESYLATE 5 MG: 5 TABLET ORAL at 08:27

## 2019-02-08 RX ADMIN — OXYCODONE HYDROCHLORIDE AND ACETAMINOPHEN 1 TABLET: 10; 325 TABLET ORAL at 03:52

## 2019-02-08 RX ADMIN — ATORVASTATIN CALCIUM 20 MG: 20 TABLET, FILM COATED ORAL at 20:10

## 2019-02-08 RX ADMIN — INSULIN ASPART 7 UNITS: 100 INJECTION, SOLUTION INTRAVENOUS; SUBCUTANEOUS at 17:09

## 2019-02-08 RX ADMIN — DULOXETINE HYDROCHLORIDE 20 MG: 20 CAPSULE, DELAYED RELEASE ORAL at 08:27

## 2019-02-08 NOTE — CONSULTS
Adult Nutrition  Assessment    Patient Name:  Ventura Boggs  YOB: 1960  MRN: [id number]  Admit Date:  2/5/2019    Assessment Date:  2/8/2019    Comments:  Visited pt after lunch. He ate all his lunch. Leaving room to go with therapy.will need to educate pt prior to d/c. Intake is excellent. RDN staff will continue to monitor.    Reason for Assessment     Row Name 02/08/19 0839          Reason for Assessment    Reason For Assessment  follow-up protocol         Nutrition/Diet History     Row Name 02/08/19 0839          Nutrition/Diet History    Typical Food/Fluid Intake  pt was starting to work with PTA. said his lunch was good. will need education prior to d/c.           Labs/Tests/Procedures/Meds     Row Name 02/08/19 0840          Labs/Procedures/Meds    Lab Results Reviewed  reviewed        Medications    Pertinent Medications Reviewed  reviewed             Nutrition Prescription Ordered     Row Name 02/08/19 0840          Nutrition Prescription PO    Current PO Diet  Regular     Common Modifiers  Consistent Carbohydrate         Evaluation of Received Nutrient/Fluid Intake     Row Name 02/08/19 0841          PO Evaluation    Number of Meals  4     % PO Intake  100               Electronically signed by:  La Faye RD  02/08/19 8:42 AM

## 2019-02-08 NOTE — THERAPY TREATMENT NOTE
Inpatient Rehabilitation - Speech Language Pathology Treatment Note    [facility]       Patient Name: Ventura Boggs  : 1960  MRN: [id number]    Today's Date: 2019           Admit Date: 2019  Cognitive Goals:  1.  Pt will complete mental manipulation task w/90% acc: 60%        3.  Pt will complete word problems involving time w/90% acc:  60%      4.  Pt will complete  divided attention task w/90% acc: divided attention portion of goal for 60%.       5. Pt will listen to a paragraph and answer questions with 90% accuracy: deferred today.           Visit Dx:        ICD-10-CM ICD-9-CM   1. Uncontrolled type 2 diabetes mellitus with hyperglycemia (CMS/HCC) E11.65 250.02   2. Medically complex patient Z78.9 V49.9   3. Chronic intractable pain G89.29 338.29   4. Symbolic dysfunction R48.9 784.60   5. Impaired mobility and ADLs Z74.09 799.89   6. Impaired functional mobility, balance, gait, and endurance Z74.09 V49.89       Patient Active Problem List   Diagnosis   • Uncontrolled type 2 diabetes mellitus with hyperglycemia (CMS/HCC)   • Postoperative urinary retention   • Incisional hernia, without obstruction or gangrene   • Acute appendicitis with localized peritonitis   • Closed head injury   • Hyponatremia   • History of recurrent TIAs   • Ataxia   • Chronic pain syndrome   • Right shoulder pain   • Left-sided muscle weakness   • Medically complex patient   • Stroke-like symptoms          Therapy Treatment    Evaluation/Coping    Evaluation/Treatment Time and Intent  Subjective Information: no complaints (1937 : Madison Hall CCC-SLP)  Existing Precautions/Restrictions: fall, lifting (1937 : Madison Hall CCC-SLP)  Document Type: therapy note (daily note) (1937 : Madison Hall CCC-SLP)  Mode of Treatment: individual therapy, speech-language pathology (19 : Madison Hall CCC-SLP)  Patient/Family Observations: pt in wc in room  (02/08/19 0837 : Jasmin Hallbeth A, CCC-SLP)  Start Time (Evaluation/Treatment): 0837 (02/08/19 0837 : Jasmin Hallbeth A, CCC-SLP)  Stop Time (Evaluation/Treatment): 0917 (02/08/19 0837 : Jasmin Hallbeth A, CCC-SLP)    Vitals/Pain/Safety    Pain Scale: Numbers Pre/Post-Treatment  Pain Scale: Numbers, Pretreatment: 8/10 (02/08/19 0837 : [place]min riccib[place] A, CCC-SLP)  Pain Scale: Numbers, Post-Treatment: 8/10 (02/08/19 0837 : [place], Madison A, CCC-SLP)  Pain Location - Side: Right (02/08/19 0837 : Leeanne Hallzabeth A, CCC-SLP)  Pain Location - Orientation: upper (02/08/19 0837 : Leeanne Hallzabeth A, CCC-SLP)  Pain Location: extremity (02/08/19 0837 : [place]oveLeeanne ricciMadison A, CCC-SLP)  Pre/Post Treatment Pain Comment: R UE shoulder and L leg due to severe tone/cramps noted (02/08/19 0837 : Leeanne Hallzabeth A, CCC-SLP)  Pain Intervention(s): Emotional support (02/08/19 0837 : Leeanne Hallzabeth A, CCC-SLP)  Pain Scale: Word Pre/Post-Treatment  Pain Location - Side: Right (02/08/19 0837 : Jasmin Hallbeth A, CCC-SLP)  Pain Location - Orientation: upper (02/08/19 0837 : Leeanne Hallzabeth A, CCC-SLP)  Pain Location: extremity (02/08/19 0837 : [place], Madison A, CCC-SLP)  Pain Intervention(s): Emotional support (02/08/19 0837 : Leeanne Hallzabeth A, CCC-SLP)  Pain Scale: FACES Pre/Post-Treatment  Pain Location - Side: Right (02/08/19 0837 : Jasmin Hallbeth A, CCC-SLP)  Pain Location - Orientation: upper (02/08/19 0837 : [place], Madison A, CCC-SLP)  Pain Location: extremity (02/08/19 0837 : Madison Hall CCC-SLP)  Pain Intervention(s): Emotional support (02/08/19 0837 : Madison Hall CCC-SLP)  Positioning and Restraints  Pre-Treatment Position: sitting in chair/recliner (02/08/19 0837 : Madison Hall CCC-SLP)  Post Treatment Position: chair (02/08/19 0837 : Madison Hall CCC-SLP)  In Wheelchair: sitting, call light within reach, encouraged to call for assist, exit alarm on (02/08/19  0837 : Madison Hall, CCC-SLP)    Cognition/Communication         Oral Motor/Eating         Mobility/Basic Activities/Instrumental Activities/Motor/Modality                   ROM/MMT                   Sensory/Myotome/Dermatome/Edema               Posture/Balance/Special Tests/Exercise/Transportation/Sexual Function                   Orthotics/Residual Limb/Prosthetic Management              Outcome Summary         EDUCATION    The patient has been educated in the following areas:     Cognitive Impairment.    SLP Recommendation and Plan                                                          SLP GOALS     Row Name 02/08/19 0837 02/07/19 0905 02/06/19 0901       Attention Goal 1 (SLP)    Improve Attention by Goal 1 (SLP)  complete divided attention task;90%  -EC  complete selective attention task;complete divided attention task;90%  -EC  complete selective attention task;complete divided attention task;90%  -CK    Time Frame (Attention Goal 1, SLP)  by discharge  -EC  by discharge  -EC  by discharge  -CK    Barriers (Attention Goal 1, SLP)  hx of CHI  -EC  hx of CHI  -EC  hx of CHI  -CK    Progress (Attention Goal 1, SLP)  60%  -EC  100%;60%  -EC  other (comment) new goal  -CK    Progress/Outcomes (Attention Goal 1, SLP)  goal ongoing  -EC  goal ongoing  -EC  other (see comments) new goal  -CK    Comment (Attention Goal 1, SLP)  ipad ap for divided attention 6 atempts to get to level 18  -EC  met goal for sustained attention.  divided attention was more difficult.    -EC  --       Memory Skills Goal 1 (SLP)    Improve Memory Skills Through Goal 1 (SLP)  listen to a paragraph and answer questions;90%  -EC  listen to a paragraph and answer questions;90%  -EC  recalling unrelated word lists with an imposed delay;listen to a paragraph and answer questions;90%  -CK    Time Frame (Memory Skills Goal 1, SLP)  by discharge  -EC  by discharge  -EC  by discharge  -CK    Barriers (Memory Skills Goal 1, SLP)  hx of CHI   -EC  hx of CHI  -EC  hx of CHI  -CK    Progress (Memory Skills Goal 1, SLP)  --  60%  -EC  other (comment) new goal  -CK    Progress/Outcomes (Memory Skills Goal 1, SLP)  goal ongoing  -EC  goal ongoing  -EC  other (see comments) new goal  -CK       Organizational Skills Goal 1 (SLP)    Improve Thought Organization Through Goal 1 (SLP)  completing mental manipulation task;90%  -EC  completing mental manipulation task;90%  -EC  completing mental manipulation task;90%  -CK    Time Frame (Thought Organization Skills Goal 1, SLP)  by discharge  -EC  by discharge  -EC  by discharge  -CK    Barriers (Thought Organization Skills Goal 1, SLP)  Hx of CHI  -EC  Hx of CHI  -EC  Hx of CHI  -CK    Progress (Thought Organization Skills Goal 1, SLP)  60%  -EC  60%  -EC  other (comment) new goal  -CK    Progress/Outcomes (Thought Organization Skills Goal 1, SLP)  goal ongoing  -EC  goal ongoing  -EC  other (see comments) new goal  -CK       Functional Math Skills Goal 1 (SLP)    Improve Functional Math Skills Through Goal 1 (SLP)  complete word problems involving time;90%  -EC  complete functional math task;complete word problems involving time;90%  -EC  complete functional math task;complete word problems involving time;90%  -CK    Time Frame (Functional Math Skills Goal 1, SLP)  by discharge  -EC  by discharge  -EC  by discharge  -CK    Barriers (Functional Math Skills Goal 1, SLP)  Hx of CHI  -EC  Hx of CHI  -EC  Hx of CHI  -CK    Progress (Functional Math Skills Goal 1, SLP)  60%  -EC  60%  -EC  other (comment) new goal  -CK    Progress/Outcomes (Functional Math Skills Goal 1, SLP)  goal ongoing  -EC  goal ongoing  -EC  other (see comments) new goal  -CK    Comment (Functional Math Skills Goal 1, SLP)  --  verbal and visual word problems.    -EC  --      User Key  (r) = Recorded By, (t) = Taken By, (c) = Cosigned By    Initials Name Provider Type    Madison Alicea CCC-SLP Speech and Language Pathologist    TIAN Abarca  Divya HURLEY MS GEOVANY-SLP Speech and Language Pathologist                  Time Calculation:       Time Calculation- SLP     Row Name 02/08/19 0915             Time Calculation- SLP    SLP Start Time  0837  -EC      SLP Stop Time  0917  -EC      SLP Time Calculation (min)  40 min  -EC      Total Timed Code Minutes- SLP  40 minute(s)  -EC      SLP Received On  02/08/19  -EC        User Key  (r) = Recorded By, (t) = Taken By, (c) = Cosigned By    Initials Name Provider Type     Madison Hall CCC-SLP Speech and Language Pathologist            Therapy Charges for Today     Code Description Service Date Service Provider Modifiers Qty    61182326153 HC ST TREATMENT SPEECH 4 2/7/2019 Madison Hall CCC-SLP GN 1    45755720677 HC ST TREATMENT SPEECH 3 2/8/2019 Madison Hall CCC-SLP GN 1                           FRED Soliman  2/8/2019

## 2019-02-08 NOTE — THERAPY TREATMENT NOTE
Inpatient Rehabilitation - Physical Therapy Treatment Note  [facility]     Patient Name: Ventura Boggs  : 1960  MRN: [id number]    Today's Date: 2019                 Admit Date: 2019      Visit Dx:      ICD-10-CM ICD-9-CM   1. Uncontrolled type 2 diabetes mellitus with hyperglycemia (CMS/HCC) E11.65 250.02   2. Medically complex patient Z78.9 V49.9   3. Chronic intractable pain G89.29 338.29   4. Symbolic dysfunction R48.9 784.60   5. Impaired mobility and ADLs Z74.09 799.89   6. Impaired functional mobility, balance, gait, and endurance Z74.09 V49.89       Patient Active Problem List   Diagnosis   • Uncontrolled type 2 diabetes mellitus with hyperglycemia (CMS/HCC)   • Postoperative urinary retention   • Incisional hernia, without obstruction or gangrene   • Acute appendicitis with localized peritonitis   • Closed head injury   • Hyponatremia   • History of recurrent TIAs   • Ataxia   • Chronic pain syndrome   • Right shoulder pain   • Left-sided muscle weakness   • Medically complex patient   • Stroke-like symptoms       Therapy Treatment    IRF Treatment Summary     Row Name 19 1501 19 0935 19 0837       Evaluation/Treatment Time and Intent    Subjective Information  complains of;fatigue;pain  -BS  complains of;pain L LE pain and cramping  -BS  no complaints  -EC    Existing Precautions/Restrictions  fall;lifting  -BS  fall;lifting  -BS  fall;lifting  -EC    Document Type  therapy note (daily note)  -BS  therapy note (daily note)  -BS  therapy note (daily note)  -EC    Mode of Treatment  individual therapy;physical therapy  -BS  individual therapy;physical therapy  -BS  individual therapy;speech-language pathology  -EC    Patient/Family Observations  no family at bedside  -BS  pt wheeling self in manaul w/c in hallway  -BS  pt in wc in room  -EC    Start Time (Evaluation/Treatment)  1501  -BS  0935  -BS  0837  -EC    Stop Time (Evaluation/Treatment)  1533  -BS  1036  -BS   0917  -EC    Recorded by [BS] Jose Antonio Cuevas, PT [BS] Jose Antonio Cuevas, PT [EC] Madison Hall CCC-SLP    Row Name 02/08/19 0700             Evaluation/Treatment Time and Intent    Subjective Information  no complaints  -LW      Existing Precautions/Restrictions  fall;lifting  -LW      Document Type  therapy note (daily note)  -LW      Mode of Treatment  occupational therapy  -LW      Patient/Family Observations  Pt sitting in w/c. No family present.  -LW      Start Time (Evaluation/Treatment)  0700  -LW      Stop Time (Evaluation/Treatment)  0930  -LW      Recorded by [LW] Irina Armstrong COTA/L      Row Name 02/08/19 1501 02/08/19 0935 02/08/19 0700       Cognition/Psychosocial- PT/OT    Affect/Mental Status (Cognitive)  WFL  -BS  WFL  -BS  WFL  -LW    Orientation Status (Cognition)  oriented x 4  -BS  oriented x 4  -BS  oriented x 4  -LW    Follows Commands (Cognition)  --  --  WFL  -LW    Personal Safety Interventions  --  --  elopement precautions initiated;gait belt;nonskid shoes/slippers when out of bed  -LW    Cognitive Function (Cognitive)  --  --  safety deficit  -LW    Safety Deficit (Cognitive)  --  --  mild deficit  -LW    Recorded by [BS] Jose Antonio Cuevas, PT [BS] Jose Antonio Cuevas, PT [LW] Irina Armstrong COTA/L    Row Name 02/08/19 1501 02/08/19 0935          Bed Mobility Assessment/Treatment    Bed Mobility Assessment/Treatment  supine-sit  -BS  sit-supine;supine-sit  -BS     Supine-Sit [place] (Bed Mobility)  minimum assist (75% patient effort)  -BS  supervision  -BS     Sit-Supine [place] (Bed Mobility)  --  minimum assist (75% patient effort)  -BS     Bed Mobility, Safety Issues  --  decreased use of legs for bridging/pushing  -BS     Comment (Bed Mobility)  --  required Jose Manuel to manually lifting L LE onto the gym mat (sit to supine)  -BS     Recorded by [BS] Jose Antonio Cuevas, PT [BS] Jose Antonio Cuevas, PT     Row Name 02/08/19 0700             Functional Mobility    Functional Mobility-  Ind. Level  contact guard assist  -LW      Functional Mobility- Device  rolling walker  -LW      Functional Mobility-Distance (Feet)  -- W/c to shower to w/c  -LW      Functional Mobility- Safety Issues  balance decreased during turns  -LW      Recorded by [LW] Irina Armstrong COTA/L      Row Name 02/08/19 0935 02/08/19 0700          Transfer Assessment/Treatment    Transfer Assessment/Treatment  sit-stand transfer;stand-sit transfer;shower transfer;toilet transfer  -BS  sit-stand transfer;stand-sit transfer;shower transfer;toilet transfer  -LW     Recorded by [BS] Jose Antonio Cuevas, PT [LW] Irina Armstrong COTA/L     Row Name 02/08/19 1501 02/08/19 0935          Bed-Chair Transfer    Bed-Chair [place] (Transfers)  minimum assist (75% patient effort);verbal cues  -BS  minimum assist (75% patient effort)  -BS     Assistive Device (Bed-Chair Transfers)  -- no AD, stand pivot transfer  -BS  --     Recorded by [BS] Jose Antonio Cuevas, PT [BS] Jose Antonio Cuevas, PT     Row Name 02/08/19 0935             Chair-Bed Transfer    Chair-Bed [place] (Transfers)  minimum assist (75% patient effort);verbal cues  -BS      Assistive Device (Chair-Bed Transfers)  -- use of bed rails w/ transfer  -BS      Recorded by [BS] Jose Antonio Cuevas, PT      Row Name 02/08/19 1501 02/08/19 0935 02/08/19 0700       Sit-Stand Transfer    Sit-Stand [place] (Transfers)  minimum assist (75% patient effort);verbal cues  -BS  minimum assist (75% patient effort)  -BS  minimum assist (75% patient effort)  -LW    Assistive Device (Sit-Stand Transfers)  walker, front-wheeled  -BS  other (see comments) parallel bars  -BS  walker, front-wheeled  -LW    Recorded by [BS] Jose Antonio Cuevas, PT [BS] Jose Antonio Cuevas, PT [LW] Irina Armstrong COTA/L    Row Name 02/08/19 1501 02/08/19 0935 02/08/19 0700       Stand-Sit Transfer    Stand-Sit [place] (Transfers)  minimum assist (75% patient effort)  -BS  minimum assist (75% patient effort)  -BS  minimum  assist (75% patient effort)  -LW    Assistive Device (Stand-Sit Transfers)  walker, front-wheeled  -BS  other (see comments) // bars  -BS  walker, front-wheeled  -LW    Recorded by [BS] Jose Antonio Cuevas, PT [BS] Jose Antonio Cuevas, PT [LW] Irina Armstrong COTA/L    Row Name 02/08/19 0700             Toilet Transfer    Type (Toilet Transfer)  sit-stand;stand-sit  -LW      [place] Level (Toilet Transfer)  minimum assist (75% patient effort)  -LW      Assistive Device (Toilet Transfer)  grab bars/safety frame;commode;walker, front-wheeled  -LW      Recorded by [LW] Irina Armstrong COTA/L      Row Name 02/08/19 0700             Shower Transfer    Type (Shower Transfer)  sit-stand;stand-sit  -LW      [place] Level (Shower Transfer)  minimum assist (75% patient effort);1 person assist  -LW      Assistive Device (Shower Transfer)  shower chair;grab bars/tub rail;walker, 4-wheeled  -LW      Recorded by [LW] Irina Armstrong COTA/L      Row Name 02/08/19 0700             Wheelchair Transfer    Type (Wheelchair Transfer)  sit-stand;stand-sit  -LW      [place] Level (Wheelchair Transfer)  minimum assist (75% patient effort)  -LW      Assistive Device (Wheelchair Transfer)  walker, front-wheeled  -LW      Recorded by [LW] Irina Armstrong COTA/L      Row Name 02/08/19 1501 02/08/19 0935          Gait/Stairs Assessment/Training    Gait/Stairs Assessment/Training  gait/ambulation assistive device  -BS  --     [place] Level (Gait)  minimum assist (75% patient effort);moderate assist (50% patient effort)  -BS  minimum assist (75% patient effort)  -BS     Assistive Device (Gait)  walker, front-wheeled  -BS  parallel bars  -BS     Distance in Feet (Gait)  9 ft  -BS  8 ft x 1  -BS     Pattern (Gait)  step-to  -BS  step-to  -BS     Deviations/Abnormal Patterns (Gait)  antalgic;caroly[place] decreased;stride length decreased;left sided deviations  -BS  ataxic;base of support, wide;carolyne decreased;left sided deviations  poor L foot clearance,limited L hip/knee flex w/ swing phase  -BS     Left Sided Gait Deviations  knee buckling, left side;foot drop/toe drag multiple episodes of L knee buckling  -BS  --     Recorded by [BS] Jose Antonio Cuevas PT [BS] Jose Antonio Cuevas, PT     Row Name 02/08/19 0935             Wheelchair Mobility/Management    Method of Wheelchair Locomotion (Mobility)  bimanual (upper extremity) propulsion  -BS      Mobility Activities (Wheelchair)  forward propulsion  -BS      Forward Propulsion [place] (Wheelchair)  stand by assist  -BS      Distance Propelled in Feet (Wheelchair)  140' x 1  -BS      Footrest Management [place] (Wheelchair)  minimum assist (75% patient effort)  -BS      Wheel Locks/Brakes Management [place] (Wheelchair)  verbal cues  -BS      Recorded by [BS] Jose Antonio Cuevas PT      Row Name 02/08/19 0700             Safety Issues, Functional Mobility    Safety Issues Affecting Function (Mobility)  insight into deficits/self awareness  -LW      Impairments Affecting Function (Mobility)  balance;coordination;endurance/activity tolerance  -LW      Recorded by [LW] Irina Armstrong COTA/L      Row Name 02/08/19 0700             Bathing Assessment/Treatment    Bathing [place] Level  supervision  -LW      Assistive Device (Bathing)  bath mitt;grab bar/tub rail;hand held shower spray hose;long-handled sponge;shower chair  -LW      Bathing Position  unsupported sitting  -LW      Bathing Setup Assistance  adaptive equipment set-up;adjust water temperature;obtain supplies  -LW      Recorded by [LW] Irina Armstrong COTA/L      Row Name 02/08/19 0700             Upper Body Dressing Assessment/Treatment    Upper Body Dressing Task  upper body dressing skills;doff;don;pull over garment;supervision  -LW      Upper Body Dressing Position  supported sitting  -LW      Recorded by [LW] Irina Armstrong COTA/L      Row Name 02/08/19 0700             Lower Body Dressing Assessment/Treatment     Lower Body Dressing [place] Level  doff;don;pants/bottoms;shoes/slippers;socks;underwear;shoelaces;minimum assist (75% patient effort)  -LW      Lower Body Dressing Position  supported sitting;supported standing  -LW      Recorded by [LW] Irina Armstrong COTA/L      Row Name 02/08/19 0700             Grooming Assessment/Treatment    Grooming [place] Level  grooming skills;deodorant application;hair care, combing/brushing;oral care regimen;shave face;wash face, hands;supervision  -LW      Grooming Position  supported sitting  -LW      Recorded by [LW] Irina Armstrong COTA/L      Row Name 02/08/19 0700             Toileting Assessment/Treatment    Toileting [place] Level  toileting skills;adjust/manage clothing;perform perineal hygiene;supervision  -LW      Assistive Device Use (Toileting)  commode chair  -LW      Toileting Position  supported sitting  -LW      Recorded by [LW] Irina Armstrong COTA/L      Row Name 02/08/19 1501             Pain Assessment    Additional Documentation  Pain Scale: Numbers Pre/Post-Treatment (Group)  -BS      Recorded by [BS] Jose Antonio Cuevas, PT      Row Name 02/08/19 1501             Pain Assessment    Additional Documentation  Pain Scale: Numbers Pre/Post-Treatment (Group)  -BS      Recorded by [BS] Jose Antonio Cuevas, PT      Row Name 02/08/19 1501 02/08/19 0935 02/08/19 0837       Pain Scale: Numbers Pre/Post-Treatment    Pain Scale: Numbers, Pretreatment  8/10  -BS  8/10  -BS  8/10  -EC    Pain Scale: Numbers, Post-Treatment  8/10  -BS  8/10  -BS  8/10  -EC    Pain Location - Side  Right  -BS  Left  -BS  Right  -EC    Pain Location - Orientation  --  lower  -BS  upper  -EC    Pain Location  shoulder  -BS  extremity  -BS  extremity  -EC    Pre/Post Treatment Pain Comment  L LE 6/10 pain post tx  -BS  R shoulder equal pain to L LE  -BS  R UE shoulder and L leg due to severe tone/cramps noted  -EC    Pain Intervention(s)  --  --  Emotional support  -EC    Recorded by  [BS] Jose Antonio Cuevas, PT [BS] Jose Antonio Cuevas, PT [EC] Madison Hall CCC-SLP    Row Name 02/08/19 0700             Pain Scale: Numbers Pre/Post-Treatment    Pain Scale: Numbers, Pretreatment  5/10  -LW      Pain Scale: Numbers, Post-Treatment  5/10  -LW      Pain Location - Side  Right  -LW      Pain Location  shoulder  -LW      Pain Intervention(s)  Medication (See MAR)  -LW      Recorded by [LW] Irina Armstrong COTA/L      Row Name 02/08/19 0700             Upper Extremity Seated Therapeutic Exercise    Comment, Seated Upper Extremity (Therapeutic Exercise)  -- 6 pack, theraputty for fine motor  -LW      Recorded by [LW] Irina Armstrong COTA/L      Row Name 02/08/19 1501 02/08/19 0935          Lower Extremity Seated Therapeutic Exercise    Comment, Seated Lower Extremity (Therapeutic Exercise)  B LE therex in supine: hip abd x 10 (AAROM w/ L LE) heel slides x 10 (AAROM w/ L LE), SAQ's x 10, glut sets x 10, QS x 10 L, seated therex of B LAQ's x 10 (AAROM w/ L LE)  -BS  supine therex of hook lying alt leg lifts x 10 ea, B heel slides x 10 (AAROM w/ left only), seated L LAQ's (AAROM) x 10  -BS     Recorded by [BS] Jose Antonio Cuevas, PT [BS] Jose Antonio Cuevas, PT     Row Name 02/08/19 1501 02/08/19 0935 02/08/19 0700       Vital Signs    Pre Systolic BP Rehab  118  -BS  121  -BS  126  -LW    Pre Treatment Diastolic BP  78  -BS  76  -BS  76  -LW    Post Systolic BP Rehab  --  108  -BS  --    Post Treatment Diastolic BP  --  72  -BS  --    Pretreatment Heart Rate (beats/min)  91  -BS  102  -BS  91  -LW    Intratreatment Heart Rate (beats/min)  9  -BS  --  --    Posttreatment Heart Rate (beats/min)  --  86  -BS  89  -LW    Pre SpO2 (%)  96  -BS  97  -BS  97  -LW    O2 Delivery Pre Treatment  room air  -BS  room air  -BS  room air  -LW    Post SpO2 (%)  --  --  98  -LW    O2 Delivery Post Treatment  --  --  room air  -LW    Pre Patient Position  Supine  -BS  Sitting  -BS  Sitting  -LW    Intra Patient Position  Sitting   -BS  Sitting  -BS  --    Post Patient Position  Sitting  -BS  Supine  -BS  Sitting  -LW    Recorded by [BS] Jose Antonio Cuevas, PT [BS] Jose Antonio Cuevas, PT [LW] Irina Armstrong COTA/L    Row Name 02/08/19 1501 02/08/19 0935 02/08/19 0837       Positioning and Restraints    Pre-Treatment Position  in bed  -BS  sitting in chair/recliner  -BS  sitting in chair/recliner  -EC    Post Treatment Position  wheelchair  -BS  bed  -BS  chair  -EC    In Bed  --  notified nsg;exit alarm on;call light within reach  -BS  --    In Wheelchair  notified nsg;RLE elevated;LLE elevated  -BS  --  sitting;call light within reach;encouraged to call for assist;exit alarm on  -EC    Recorded by [BS] Jose Antonio Cuevas, PT [BS] Jose Antonio Cuevas, PT [EC] Madison Hall CCC-SLP    Row Name 02/08/19 0700             Positioning and Restraints    Pre-Treatment Position  sitting in chair/recliner  -LW      Post Treatment Position  wheelchair  -LW      In Wheelchair  sitting;call light within reach;encouraged to call for assist  -LW      Recorded by [LW] Irina Armstrong COTA/L      Row Name 02/08/19 1501 02/08/19 0935          Daily Summary of Progress (PT)    Functional Goal Overall Progress: Physical Therapy  progressing toward functional goals as expected  -BS  progressing toward functional goals as expected  -BS     Recorded by [BS] Jose Antonio Cuevas, PT [BS] Jose Antonio Cuevas, PT     Row Name 02/08/19 0700             Daily Summary of Progress (OT)    Functional Goal Overall Progress: Occupational Therapy  progressing toward functional goals as expected  -LW      Recommendations: Occupational Therapy  Continue POC  -LW      Recorded by [LW] Irina Armstrong COTA/L        User Key  (r) = Recorded By, (t) = Taken By, (c) = Cosigned By    Initials Name Effective Dates    EC Madison Hall CCC-SLP 03/07/18 -     Irina Lorenzo COTA/L 03/07/18 -     BS Jose Antonio Cuevas, PT 04/08/18 -            Physical Therapy Education     Title: PT OT SLP  Therapies (In Progress)     Topic: Physical Therapy (In Progress)     Point: Mobility training (In Progress)     Learning Progress Summary           Patient TA Bain, NR by BS at 2/8/2019 12:59 PM    Comment:  further addressed body position with weight shifting and sequencing of LE's with limb advancement in gait training w/ // bars. Tactile and verbal cues provided.    Acceptance, E, NR by GAL at 2/6/2019  2:39 PM                   Point: Home exercise program (In Progress)     Learning Progress Summary           Patient TA Bain, NR by BS at 2/8/2019 12:59 PM    Comment:  further addressed body position with weight shifting and sequencing of LE's with limb advancement in gait training w/ // bars. Tactile and verbal cues provided.                   Point: Body mechanics (In Progress)     Learning Progress Summary           Patient TA Bain, NR by BS at 2/8/2019 12:59 PM    Comment:  further addressed body position with weight shifting and sequencing of LE's with limb advancement in gait training w/ // bars. Tactile and verbal cues provided.    Acceptance, E, NR by GAL at 2/6/2019  2:39 PM                   Point: Precautions (In Progress)     Learning Progress Summary           Patient TA Bain, NR by BS at 2/8/2019 12:59 PM    Comment:  further addressed body position with weight shifting and sequencing of LE's with limb advancement in gait training w/ // bars. Tactile and verbal cues provided.    Acceptance, E, NR by GAL at 2/6/2019  2:39 PM                               User Key     Initials Effective Dates Name Provider Type Discipline    GAL 04/03/18 -  Shilpa Dumont, PT Physical Therapist PT    ERIC 04/08/18 -  Jose Antonio Cuevas, PT Physical Therapist PT                  PT Recommendation and Plan     Plan of Care Reviewed With: patient  Daily Summary of Progress (PT)  Functional Goal Overall Progress: Physical Therapy: progressing toward functional goals as expected  IRF Plan of Care Review: progress ongoing,  continue  Progress, Functional Goals: demonstrating adequate progress  Outcome Summary: PT treatment performed. Limited ambulation due to dense L hemiparesis, L LE hypertonicity as well as L LE and R shoulder pain. Addressed pre-gait and gait training in // bars, requiring moderate tc's and vc's for correctly sequencing with ambulation. Continue to address pregait sequencing and wt shifting in // bars. Progressing toward goals.        Outcome Measures     Row Name 02/08/19 0700 02/07/19 1000 02/07/19 0805       How much help from another person do you currently need...    Turning from your back to your side while in flat bed without using bedrails?  --  --  3  -TW    Moving from lying on back to sitting on the side of a flat bed without bedrails?  --  --  3  -TW    Moving to and from a bed to a chair (including a wheelchair)?  --  --  3  -TW    Standing up from a chair using your arms (e.g., wheelchair, bedside chair)?  --  --  3  -TW    Climbing 3-5 steps with a railing?  --  --  2  -TW    To walk in hospital room?  --  --  2  -TW    AM-PAC 6 Clicks Score  --  --  16  -TW       How much help from another is currently needed...    Putting on and taking off regular lower body clothing?  2  -LW  2  -KD  --    Bathing (including washing, rinsing, and drying)  2  -LW  2  -KD  --    Toileting (which includes using toilet bed pan or urinal)  3  -LW  3  -KD  --    Putting on and taking off regular upper body clothing  3  -LW  3  -KD  --    Taking care of personal grooming (such as brushing teeth)  3  -LW  3  -KD  --    Eating meals  4  -LW  4  -KD  --    Score  17  -LW  17  -KD  --       Functional Assessment    Outcome Measure Options  --  --  AM-PAC 6 Clicks Basic Mobility (PT)  -TW    Row Name 02/06/19 1500 02/06/19 1110 02/06/19 1015       How much help from another person do you currently need...    Turning from your back to your side while in flat bed without using bedrails?  3  -TW  3  -GAL  --    Moving from lying  on back to sitting on the side of a flat bed without bedrails?  3  -TW  3  -GAL  --    Moving to and from a bed to a chair (including a wheelchair)?  3  -TW  3  -GAL  --    Standing up from a chair using your arms (e.g., wheelchair, bedside chair)?  3  -TW  3  -GAL  --    Climbing 3-5 steps with a railing?  2  -TW  2  -GAL  --    To walk in hospital room?  2  -TW  2  -GAL  --    AM-PAC 6 Clicks Score  16  -TW  16  -GAL  --       How much help from another is currently needed...    Putting on and taking off regular lower body clothing?  --  --  2  -BL    Bathing (including washing, rinsing, and drying)  --  --  2  -BL    Toileting (which includes using toilet bed pan or urinal)  --  --  3  -BL    Putting on and taking off regular upper body clothing  --  --  3  -BL    Taking care of personal grooming (such as brushing teeth)  --  --  3  -BL    Eating meals  --  --  4  -BL    Score  --  --  17  -BL       Functional Assessment    Outcome Measure Options  AM-PAC 6 Clicks Basic Mobility (PT)  -  AM-PAC 6 Clicks Basic Mobility (PT)  -  AM-PAC 6 Clicks Daily Activity (OT)  -    Row Name 02/06/19 0732             How much help from another is currently needed...    Putting on and taking off regular lower body clothing?  2  -BH      Bathing (including washing, rinsing, and drying)  2  -BH      Toileting (which includes using toilet bed pan or urinal)  3  -BH      Putting on and taking off regular upper body clothing  3  -BH      Taking care of personal grooming (such as brushing teeth)  3  -BH      Eating meals  4  -      Score  17  -         Functional Assessment    Outcome Measure Options  AM-PAC 6 Clicks Daily Activity (OT)  -        User Key  (r) = Recorded By, (t) = Taken By, (c) = Cosigned By    Initials Name Provider Type     Maria Elena Barragan, OTR/L Occupational Therapist    Shilpa Cabello, PT Physical Therapist    TW Brant Palacios, PTA Physical Therapy Assistant    KD Ele Avina, COULTER/L  Occupational Therapy Assistant    Giana Carrizales, MARYLIN/L Occupational Therapy Assistant    Irina Lorenzo COTA/L Occupational Therapy Assistant             Time Calculation:     PT Charges     Row Name 02/08/19 1642 02/08/19 1300          Time Calculation    Start Time  1501  -BS  0935  -BS     Stop Time  1533  -BS  1036  -BS     Time Calculation (min)  32 min  -BS  61 min  -BS        Timed Charges    38966 - PT Therapeutic Exercise Minutes  16  -BS  26  -BS     35918 - Gait Training Minutes   16  -BS  20  -BS     73622 - PT Therapeutic Activity Minutes  --  15  -BS       User Key  (r) = Recorded By, (t) = Taken By, (c) = Cosigned By    Initials Name Provider Type    Jose Antonio Choi, PT Physical Therapist            Therapy Charges for Today     Code Description Service Date Service Provider Modifiers Qty    58325919182 HC PT THER PROC EA 15 MIN 2/8/2019 Jose Antonio Cuevas, PT GP 2    99491723049 HC GAIT TRAINING EA 15 MIN 2/8/2019 Jose Antonio Cuevas, PT GP 1    60904109028 HC PT THERAPEUTIC ACT EA 15 MIN 2/8/2019 Jose Antonio Cuevas, PT GP 1    64605633370 HC PT THER PROC EA 15 MIN 2/8/2019 Jose Antonio Cuevas, PT GP 1    52087041285 HC GAIT TRAINING EA 15 MIN 2/8/2019 Jose Antonio Cuevas, PT GP 1            PT [email or web address]  Outcome Measure Options: AM-PAC 6 Clicks Basic Mobility (PT)  AM-PAC 6 Clicks Score: 16  Score: 17      Jose Antonio Cuevas PT  2/8/2019

## 2019-02-08 NOTE — THERAPY TREATMENT NOTE
Inpatient Rehabilitation - Occupational Therapy Treatment Note    [facility]     Patient Name: Ventura Boggs  : 1960  MRN: [id number]    Today's Date: 2019                 Admit Date: 2019      Visit Dx:    ICD-10-CM ICD-9-CM   1. Uncontrolled type 2 diabetes mellitus with hyperglycemia (CMS/HCC) E11.65 250.02   2. Medically complex patient Z78.9 V49.9   3. Chronic intractable pain G89.29 338.29   4. Symbolic dysfunction R48.9 784.60   5. Impaired mobility and ADLs Z74.09 799.89   6. Impaired functional mobility, balance, gait, and endurance Z74.09 V49.89       Patient Active Problem List   Diagnosis   • Uncontrolled type 2 diabetes mellitus with hyperglycemia (CMS/HCC)   • Postoperative urinary retention   • Incisional hernia, without obstruction or gangrene   • Acute appendicitis with localized peritonitis   • Closed head injury   • Hyponatremia   • History of recurrent TIAs   • Ataxia   • Chronic pain syndrome   • Right shoulder pain   • Left-sided muscle weakness   • Medically complex patient   • Stroke-like symptoms         Therapy Treatment    IRF Treatment Summary     Row Name 19 0935 19 0837 19 0700       Evaluation/Treatment Time and Intent    Subjective Information  complains of;pain L LE pain and cramping  -BS  no complaints  -EC  no complaints  -LW    Existing Precautions/Restrictions  fall;lifting  -BS  fall;lifting  -EC  fall;lifting  -LW    Document Type  therapy note (daily note)  -BS  therapy note (daily note)  -EC  therapy note (daily note)  -LW    Mode of Treatment  individual therapy;physical therapy  -BS  individual therapy;speech-language pathology  -EC  occupational therapy  -LW    Patient/Family Observations  pt wheeling self in manaul w/c in hallway  -BS  pt in wc in room  -EC  Pt sitting in w/c. No family present.  -LW    Start Time (Evaluation/Treatment)  0935  -BS  0837  -EC  0700  -LW    Stop Time (Evaluation/Treatment)  1036  -BS  0917  -EC   0930  -LW    Recorded by [BS] Jose Antonio Cuevas, PT [EC] Madison Hall CCC-SLP [LW] Irina Armstrong COTA/L    Row Name 02/08/19 0935 02/08/19 0700          Cognition/Psychosocial- PT/OT    Affect/Mental Status (Cognitive)  WFL  -BS  WFL  -LW     Orientation Status (Cognition)  oriented x 4  -BS  oriented x 4  -LW     Follows Commands (Cognition)  --  WFL  -LW     Personal Safety Interventions  --  elopement precautions initiated;gait belt;nonskid shoes/slippers when out of bed  -LW     Cognitive Function (Cognitive)  --  safety deficit  -LW     Safety Deficit (Cognitive)  --  mild deficit  -LW     Recorded by [BS] Jose Antonio Cuevas, PT [LW] Irina Armstrong COTA/L     Row Name 02/08/19 0935             Bed Mobility Assessment/Treatment    Bed Mobility Assessment/Treatment  sit-supine;supine-sit  -BS      Supine-Sit [place] (Bed Mobility)  supervision  -BS      Sit-Supine [place] (Bed Mobility)  minimum assist (75% patient effort)  -BS      Bed Mobility, Safety Issues  decreased use of legs for bridging/pushing  -BS      Comment (Bed Mobility)  required Jose Manuel to manually lifting L LE onto the gym mat (sit to supine)  -BS      Recorded by [BS] Jose Antonio Cuevas, PT      Row Name 02/08/19 0700             Functional Mobility    Functional Mobility- Ind. Level  contact guard assist  -LW      Functional Mobility- Device  rolling walker  -LW      Functional Mobility-Distance (Feet)  -- W/c to shower to w/c  -LW      Functional Mobility- Safety Issues  balance decreased during turns  -LW      Recorded by [LW] Irina Armstrong COTA/L      Row Name 02/08/19 0935 02/08/19 0700          Transfer Assessment/Treatment    Transfer Assessment/Treatment  sit-stand transfer;stand-sit transfer;shower transfer;toilet transfer  -BS  sit-stand transfer;stand-sit transfer;shower transfer;toilet transfer  -LW     Recorded by [BS] Jose Antonio Cuevas, PT [LW] Irina Armstrong COTA/L     Row Name 02/08/19 0935             Bed-Chair  Transfer    Bed-Chair [place] (Transfers)  minimum assist (75% patient effort)  -BS      Recorded by [BS] Jose Antonio Cuevas, PT      Row Name 02/08/19 0935             Chair-Bed Transfer    Chair-Bed [place] (Transfers)  minimum assist (75% patient effort);verbal cues  -BS      Assistive Device (Chair-Bed Transfers)  -- use of bed rails w/ transfer  -BS      Recorded by [BS] Jose Antonio Cuevas, PT      Row Name 02/08/19 0935 02/08/19 0700          Sit-Stand Transfer    Sit-Stand [place] (Transfers)  minimum assist (75% patient effort)  -BS  minimum assist (75% patient effort)  -LW     Assistive Device (Sit-Stand Transfers)  other (see comments) parallel bars  -BS  walker, front-wheeled  -LW     Recorded by [BS] Jose Antonio Cuevas, PT [LW] Irina Armstrong COULTER/L     Row Name 02/08/19 0935 02/08/19 0700          Stand-Sit Transfer    Stand-Sit [place] (Transfers)  minimum assist (75% patient effort)  -BS  minimum assist (75% patient effort)  -LW     Assistive Device (Stand-Sit Transfers)  other (see comments) // bars  -BS  walker, front-wheeled  -LW     Recorded by [BS] Jose Antonio Cuevas, PT [LW] Irina Armstrong COULTER/L     Row Name 02/08/19 0700             Toilet Transfer    Type (Toilet Transfer)  sit-stand;stand-sit  -LW      [place] Level (Toilet Transfer)  minimum assist (75% patient effort)  -LW      Assistive Device (Toilet Transfer)  grab bars/safety frame;commode;walker, front-wheeled  -LW      Recorded by [LW] Irina Armstrong COULTER/L      Row Name 02/08/19 0700             Shower Transfer    Type (Shower Transfer)  sit-stand;stand-sit  -LW      [place] Level (Shower Transfer)  minimum assist (75% patient effort);1 person assist  -LW      Assistive Device (Shower Transfer)  shower chair;grab bars/tub rail;walker, 4-wheeled  -LW      Recorded by [LW] Irina Armstrong COULTER/L      Row Name 02/08/19 0700             Wheelchair Transfer    Type (Wheelchair Transfer)  sit-stand;stand-sit  -LW       [place] Level (Wheelchair Transfer)  minimum assist (75% patient effort)  -LW      Assistive Device (Wheelchair Transfer)  walker, front-wheeled  -LW      Recorded by [LW] Irina Armstrong COTA/L      Row Name 02/08/19 0935             Gait/Stairs Assessment/Training    [place] Level (Gait)  minimum assist (75% patient effort)  -BS      Assistive Device (Gait)  parallel bars  -BS      Distance in Feet (Gait)  8 ft x 1  -BS      Pattern (Gait)  step-to  -BS      Deviations/Abnormal Patterns (Gait)  ataxic;base of support, wide;carolyne decreased;left sided deviations poor L foot clearance,limited L hip/knee flex w/ swing phase  -BS      Recorded by [BS] Jose Antonio Cuevas PT      Row Name 02/08/19 0935             Wheelchair Mobility/Management    Method of Wheelchair Locomotion (Mobility)  bimanual (upper extremity) propulsion  -BS      Mobility Activities (Wheelchair)  forward propulsion  -BS      Forward Propulsion [place] (Wheelchair)  stand by assist  -BS      Distance Propelled in Feet (Wheelchair)  140' x 1  -BS      Footrest Management [place] (Wheelchair)  minimum assist (75% patient effort)  -BS      Wheel Locks/Brakes Management [place] (Wheelchair)  verbal cues  -BS      Recorded by [BS] Jose Antonio Cuevas PT      Row Name 02/08/19 0700             Safety Issues, Functional Mobility    Safety Issues Affecting Function (Mobility)  insight into deficits/self awareness  -LW      Impairments Affecting Function (Mobility)  balance;coordination;endurance/activity tolerance  -LW      Recorded by [LW] Irina Armstrong COTA/L      Row Name 02/08/19 0700             Bathing Assessment/Treatment    Bathing [place] Level  supervision  -LW      Assistive Device (Bathing)  bath mitt;grab bar/tub rail;hand held shower spray hose;long-handled sponge;shower chair  -LW      Bathing Position  unsupported sitting  -LW      Bathing Setup Assistance  adaptive equipment set-up;adjust water  temperature;obtain supplies  -LW      Recorded by [LW] Irina Armstrong COTA/L      Row Name 02/08/19 0700             Upper Body Dressing Assessment/Treatment    Upper Body Dressing Task  upper body dressing skills;doff;don;pull over garment;supervision  -LW      Upper Body Dressing Position  supported sitting  -LW      Recorded by [LW] Irina Armstrong COTA/L      Row Name 02/08/19 0700             Lower Body Dressing Assessment/Treatment    Lower Body Dressing [place] Level  doff;don;pants/bottoms;shoes/slippers;socks;underwear;shoelaces;minimum assist (75% patient effort)  -LW      Lower Body Dressing Position  supported sitting;supported standing  -LW      Recorded by [LW] Irina Armstrong COTA/L      Row Name 02/08/19 0700             Grooming Assessment/Treatment    Grooming [place] Level  grooming skills;deodorant application;hair care, combing/brushing;oral care regimen;shave face;wash face, hands;supervision  -LW      Grooming Position  supported sitting  -LW      Recorded by [LW] Irina Armstrong COTA/L      Row Name 02/08/19 0700             Toileting Assessment/Treatment    Toileting [place] Level  toileting skills;adjust/manage clothing;perform perineal hygiene;supervision  -LW      Assistive Device Use (Toileting)  commode chair  -LW      Toileting Position  supported sitting  -LW      Recorded by [LW] Irina Armstrong COTA/L      Row Name 02/08/19 0935 02/08/19 0837 02/08/19 0700       Pain Scale: Numbers Pre/Post-Treatment    Pain Scale: Numbers, Pretreatment  8/10  -BS  8/10  -EC  5/10  -LW    Pain Scale: Numbers, Post-Treatment  8/10  -BS  8/10  -EC  5/10  -LW    Pain Location - Side  Left  -BS  Right  -EC  Right  -LW    Pain Location - Orientation  lower  -BS  upper  -EC  --    Pain Location  extremity  -BS  extremity  -EC  shoulder  -LW    Pre/Post Treatment Pain Comment  R shoulder equal pain to L LE  -BS  R UE shoulder and L leg due to severe tone/cramps noted  -EC  --     Pain Intervention(s)  --  Emotional support  -EC  Medication (See MAR)  -LW    Recorded by [BS] Jose Antonio Cuevas, PT [EC] Madison Hall CCC-SLP [LW] Irina Armstrong COTA/L    Row Name 02/08/19 0700             Upper Extremity Seated Therapeutic Exercise    Comment, Seated Upper Extremity (Therapeutic Exercise)  -- 6 pack, theraputty for fine motor  -LW      Recorded by [LW] Irina Armstrong COTA/L      Row Name 02/08/19 0935             Lower Extremity Seated Therapeutic Exercise    Comment, Seated Lower Extremity (Therapeutic Exercise)  supine therex of hook lying alt leg lifts x 10 ea, B heel slides x 10 (AAROM w/ left only), seated L LAQ's (AAROM) x 10  -BS      Recorded by [BS] Jose Antonio Cuevas, PT      Row Name 02/08/19 0935 02/08/19 0700          Vital Signs    Pre Systolic BP Rehab  121  -BS  126  -LW     Pre Treatment Diastolic BP  76  -BS  76  -LW     Post Systolic BP Rehab  108  -BS  --     Post Treatment Diastolic BP  72  -BS  --     Pretreatment Heart Rate (beats/min)  102  -BS  91  -LW     Posttreatment Heart Rate (beats/min)  86  -BS  89  -LW     Pre SpO2 (%)  97  -BS  97  -LW     O2 Delivery Pre Treatment  room air  -BS  room air  -LW     Post SpO2 (%)  --  98  -LW     O2 Delivery Post Treatment  --  room air  -LW     Pre Patient Position  Sitting  -BS  Sitting  -LW     Intra Patient Position  Sitting  -BS  --     Post Patient Position  Supine  -BS  Sitting  -LW     Recorded by [BS] Jose Antonio Cuevas, PT [LW] Irina Armstrong COTA/L     Row Name 02/08/19 0935 02/08/19 0837 02/08/19 0700       Positioning and Restraints    Pre-Treatment Position  sitting in chair/recliner  -BS  sitting in chair/recliner  -EC  sitting in chair/recliner  -LW    Post Treatment Position  bed  -BS  chair  -EC  wheelchair  -LW    In Bed  notified nsg;exit alarm on;call light within reach  -BS  --  --    In Wheelchair  --  sitting;call light within reach;encouraged to call for assist;exit alarm on  -EC  sitting;call light  within reach;encouraged to call for assist  -LW    Recorded by [BS] Jose Antonio Cuevas, PT [EC] Madison Hall, CCC-SLP [LW] Irina Armstrong COULTER/L    Row Name 02/08/19 0935             Daily Summary of Progress (PT)    Functional Goal Overall Progress: Physical Therapy  progressing toward functional goals as expected  -BS      Recorded by [BS] Jose Antonio Cuevas, PT      Row Name 02/08/19 0700             Daily Summary of Progress (OT)    Functional Goal Overall Progress: Occupational Therapy  progressing toward functional goals as expected  -LW      Recommendations: Occupational Therapy  Continue POC  -LW      Recorded by [LW] Irina Armstrong COULTER/L        User Key  (r) = Recorded By, (t) = Taken By, (c) = Cosigned By    Initials Name Effective Dates     Madison Hall, CCC-SLP 03/07/18 -     LW Irina Armstrong COULTER/L 03/07/18 -     BS Jose Antonio Cuevas, PT 04/08/18 -                  OT Recommendation and Plan         Plan of Care Review  Plan of Care Reviewed With: patient  Daily Summary of Progress (OT)  Functional Goal Overall Progress: Occupational Therapy: progressing toward functional goals as expected  Recommendations: Occupational Therapy: Continue POC  Plan of Care Reviewed With: patient  IRF Plan of Care Review: progress ongoing, continue  Progress, Functional Goals: progressing to functional independence  Outcome Summary: Pt performed bathing with Supervision. Pt performed UB dressing with supervision, LB bathing with Min A. Pt groomed with Supervision. Performed Fine motor exercises to strengthen L hand.     OT IRF GOALS     Row Name 02/08/19 0700 02/07/19 1000 02/06/19 1015       Transfer FIM Goals (OT-IRF)    Tub-Shower Transfer FIM Goal (OT-IRF)  Score of 6 (FIM)  -LW  Score of 6 (FIM)  -KD  Score of 6 (FIM)  -BL    Toilet Transfer FIM Goal (OT-IRF)  Score of 6 (FIM)  -LW  Score of 6 (FIM)  -KD  Score of 6 (FIM)  -BL    Time Frame (Transfer FIM Goals 1, OT-IRF)  long term goal (LTG);by  discharge  -LW  long term goal (LTG);by discharge  -KD  long term goal (LTG);by discharge  -BL    Progress/Outcomes (Transfer FIM Goals, OT-IRF)  goal not met  -LW  goal not met  -KD  goal not met  -BL       Bathing FIM Goal (OT-IRF)    Bathing FIM Goal (OT-IRF)  Score of 6 (FIM)  -LW  Score of 6 (FIM)  -KD  Score of 6 (FIM)  -BL    Time Frame (Bathing FIM Goal, OT-IRF)  long term goal (LTG);by discharge  -LW  long term goal (LTG);by discharge  -KD  long term goal (LTG);by discharge  -BL    Progress/Outcomes (Bathing FIM Goal, OT-IRF)  goal not met  -LW  goal not met  -KD  goal not met  -BL       UB Dressing FIM Goal (OT-IRF)    Upper Body Dressing, FIM Goal, OT-IRF  Score of 6 (FIM)  -LW  Score of 6 (FIM)  -KD  Score of 6 (FIM)  -BL    Time Frame (UB Dressing FIM Goal, OT-IRF)  long term goal (LTG);by discharge  -LW  long term goal (LTG);by discharge  -KD  long term goal (LTG);by discharge  -BL    Progress/Outcomes (UB Dressing FIM Goal, OT-IRF)  goal not met  -LW  goal not met  -KD  goal not met  -BL       LB Dressing FIM Goal (OT-IRF)    Lower Body Dressing, FIM Goal, OT-IRF  Score of 6 (FIM)  -LW  Score of 6 (FIM)  -KD  Score of 6 (FIM)  -BL    Time Frame (LB Dressing FIM Goal, OT-IRF)  long term goal (LTG);by discharge  -LW  long term goal (LTG);by discharge  -KD  long term goal (LTG);by discharge  -BL    Progress/Outcomes (LB Dressing FIM Goal, OT-IRF)  goal not met  -LW  goal not met  -KD  goal not met  -BL       Toileting Goal 1 (OT-IRF)    Activity/Device (Toileting Goal 1, OT-IRF)  toileting skills, all  -LW  toileting skills, all  -KD  toileting skills, all  -BL    [place] Level (Toileting Goal 1, OT-IRF)  supervision required  -LW  supervision required  -KD  supervision required  -BL    Time Frame (Toileting Goal 1, OT-IRF)  long term goal (LTG);by discharge  -LW  long term goal (LTG);by discharge  -KD  long term goal (LTG);by discharge  -BL    Progress/Outcomes (Toileting Goal 1, OT-IRF)  goal not  met  -LW  goal not met  -KD  goal not met  -BL       ROM Goal 1 (OT-IRF)    ROM Goal 1 (OT-IRF)  Pt will display WFL with ROM in BUE with min reports of pain/increased pain level.   -LW  Pt will display WFL with ROM in BUE with min reports of pain/increased pain level.   -KD  Pt will display WFL with ROM in BUE with min reports of pain/increased pain level.   -BL    Time Frame (ROM Goal 1, OT-IRF)  long term goal (LTG);by discharge  -LW  long term goal (LTG);by discharge  -KD  long term goal (LTG);by discharge  -BL    Progress/Outcomes (ROM Goal 1, OT-IRF)  goal not met  -LW  goal not met  -KD  goal not met  -BL       Problem Specific Goal 1 (OT-IRF)    Problem Specific Goal 1 (OT-IRF)  Pt will be independent with BUE HEP and home safety awarness.   -LW  Pt will be independent with BUE HEP and home safety awarness.   -KD  Pt will be independent with BUE HEP and home safety awarness.   -BL    Time Frame (Problem Specific Goal 1, OT-IRF)  long term goal (LTG);by discharge  -LW  long term goal (LTG);by discharge  -KD  long term goal (LTG);by discharge  -BL    Progress/Outcomes (Problem Specific Goal 1, OT-IRF)  goal not met  -LW  goal not met  -KD  goal not met  -BL    Row Name 02/06/19 0732             Transfer FIM Goals (OT-IRF)    Tub-Shower Transfer FIM Goal (OT-IRF)  Score of 6 (FIM)  -BH      Toilet Transfer FIM Goal (OT-IRF)  Score of 6 (FIM)  -      Time Frame (Transfer FIM Goals 1, OT-IRF)  long term goal (LTG);by discharge  -      Progress/Outcomes (Transfer FIM Goals, OT-IRF)  goal not met  -BH         Bathing FIM Goal (OT-IRF)    Bathing FIM Goal (OT-IRF)  Score of 6 (FIM)  -      Time Frame (Bathing FIM Goal, OT-IRF)  long term goal (LTG);by discharge  -      Progress/Outcomes (Bathing FIM Goal, OT-IRF)  goal not met  -BH         UB Dressing FIM Goal (OT-IRF)    Upper Body Dressing, FIM Goal, OT-IRF  Score of 6 (FIM)  -      Time Frame (UB Dressing FIM Goal, OT-IRF)  long term goal (LTG);by  discharge  -      Progress/Outcomes (UB Dressing FIM Goal, OT-IRF)  goal not met  -BH         LB Dressing FIM Goal (OT-IRF)    Lower Body Dressing, FIM Goal, OT-IRF  Score of 6 (FIM)  -BH      Time Frame (LB Dressing FIM Goal, OT-IRF)  long term goal (LTG);by discharge  -BH      Progress/Outcomes (LB Dressing FIM Goal, OT-IRF)  goal not met  -BH         Toileting Goal 1 (OT-IRF)    Activity/Device (Toileting Goal 1, OT-IRF)  toileting skills, all  -BH      [place] Level (Toileting Goal 1, OT-IRF)  supervision required  -BH      Time Frame (Toileting Goal 1, OT-IRF)  long term goal (LTG);by discharge  -      Progress/Outcomes (Toileting Goal 1, OT-IRF)  goal not met  -BH         ROM Goal 1 (OT-IRF)    ROM Goal 1 (OT-IRF)  Pt will display WFL with ROM in BUE with min reports of pain/increased pain level.   -BH      Time Frame (ROM Goal 1, OT-IRF)  long term goal (LTG);by discharge  -      Progress/Outcomes (ROM Goal 1, OT-IRF)  goal not met  -BH         Problem Specific Goal 1 (OT-IRF)    Problem Specific Goal 1 (OT-IRF)  Pt will be independent with BUE HEP and home safety awarness.   -      Time Frame (Problem Specific Goal 1, OT-IRF)  long term goal (LTG);by discharge  -      Progress/Outcomes (Problem Specific Goal 1, OT-IRF)  goal not met  -BH        User Key  (r) = Recorded By, (t) = Taken By, (c) = Cosigned By    Initials Name Provider Type     Maria Elena Barragan, OTR/L Occupational Therapist    Ele Chu, COULTER/L Occupational Therapy Assistant    BL Giana Chakraborty, COULTER/L Occupational Therapy Assistant    Irina Lorenzo, COULTER/L Occupational Therapy Assistant          Occupational Therapy Education     Title: PT OT SLP Therapies (In Progress)     Topic: Occupational Therapy (Done)     Point: ADL training (Done)     Description: Instruct learner(s) on proper safety adaptation and remediation techniques during self care or transfers.   Instruct in proper use of assistive devices.     Learning Progress Summary           Patient Acceptance, E,TB, VU by  at 2/8/2019  9:12 AM    Acceptance, E, VU,NR by  at 2/6/2019  1:35 PM    Comment:  Educated about OT and POC. Educated to call for assist. Educated on safety throughout.                   Point: Home exercise program (Done)     Description: Instruct learner(s) on appropriate technique for monitoring, assisting and/or progressing therapeutic exercises/activities.    Learning Progress Summary           Patient Acceptance, E,TB, VU by  at 2/8/2019  9:12 AM                   Point: Precautions (Done)     Description: Instruct learner(s) on prescribed precautions during self-care and functional transfers.    Learning Progress Summary           Patient Acceptance, E,TB, VU by  at 2/8/2019  9:12 AM    Acceptance, E, VU,NR by  at 2/6/2019  1:35 PM    Comment:  Educated about OT and POC. Educated to call for assist. Educated on safety throughout.    Acceptance, E, VU,NR by  at 2/6/2019 11:36 AM                   Point: Body mechanics (Done)     Description: Instruct learner(s) on proper positioning and spine alignment during self-care, functional mobility activities and/or exercises.    Learning Progress Summary           Patient Acceptance, E,TB, VU by  at 2/8/2019  9:12 AM    Acceptance, E, VU,NR by  at 2/6/2019 11:36 AM                               User Key     Initials Effective Dates Name Provider Type Discipline     06/08/18 -  Maria Elena Barragan OTR/L Occupational Therapist OT     10/17/16 -  Giana Chakraborty COULTER/L Occupational Therapy Assistant OT     03/07/18 -  Irina Armstrong COTA/L Occupational Therapy Assistant OT                Outcome Measures     Row Name 02/08/19 0700 02/07/19 1000 02/07/19 0805       How much help from another person do you currently need...    Turning from your back to your side while in flat bed without using bedrails?  --  --  3  -TW    Moving from lying on back to sitting on the side of a flat  bed without bedrails?  --  --  3  -TW    Moving to and from a bed to a chair (including a wheelchair)?  --  --  3  -TW    Standing up from a chair using your arms (e.g., wheelchair, bedside chair)?  --  --  3  -TW    Climbing 3-5 steps with a railing?  --  --  2  -TW    To walk in hospital room?  --  --  2  -TW    AM-PAC 6 Clicks Score  --  --  16  -TW       How much help from another is currently needed...    Putting on and taking off regular lower body clothing?  2  -LW  2  -KD  --    Bathing (including washing, rinsing, and drying)  2  -LW  2  -KD  --    Toileting (which includes using toilet bed pan or urinal)  3  -LW  3  -KD  --    Putting on and taking off regular upper body clothing  3  -LW  3  -KD  --    Taking care of personal grooming (such as brushing teeth)  3  -LW  3  -KD  --    Eating meals  4  -LW  4  -KD  --    Score  17  -LW  17  -KD  --       Functional Assessment    Outcome Measure Options  --  --  AM-PAC 6 Clicks Basic Mobility (PT)  -TW    Row Name 02/06/19 1500 02/06/19 1110 02/06/19 1015       How much help from another person do you currently need...    Turning from your back to your side while in flat bed without using bedrails?  3  -TW  3  -GAL  --    Moving from lying on back to sitting on the side of a flat bed without bedrails?  3  -TW  3  -GAL  --    Moving to and from a bed to a chair (including a wheelchair)?  3  -TW  3  -GAL  --    Standing up from a chair using your arms (e.g., wheelchair, bedside chair)?  3  -TW  3  -GAL  --    Climbing 3-5 steps with a railing?  2  -TW  2  -GAL  --    To walk in hospital room?  2  -TW  2  -GAL  --    AM-PAC 6 Clicks Score  16  -TW  16  -GAL  --       How much help from another is currently needed...    Putting on and taking off regular lower body clothing?  --  --  2  -BL    Bathing (including washing, rinsing, and drying)  --  --  2  -BL    Toileting (which includes using toilet bed pan or urinal)  --  --  3  -BL    Putting on and taking off regular  upper body clothing  --  --  3  -BL    Taking care of personal grooming (such as brushing teeth)  --  --  3  -BL    Eating meals  --  --  4  -BL    Score  --  --  17  -BL       Functional Assessment    Outcome Measure Options  AM-PAC 6 Clicks Basic Mobility (PT)  -  AM-PAC 6 Clicks Basic Mobility (PT)  -  AM-PAC 6 Clicks Daily Activity (OT)  -    Row Name 02/06/19 0732             How much help from another is currently needed...    Putting on and taking off regular lower body clothing?  2  -BH      Bathing (including washing, rinsing, and drying)  2  -BH      Toileting (which includes using toilet bed pan or urinal)  3  -BH      Putting on and taking off regular upper body clothing  3  -BH      Taking care of personal grooming (such as brushing teeth)  3  -      Eating meals  4  -      Score  17  -         Functional Assessment    Outcome Measure Options  AM-PAC 6 Clicks Daily Activity (OT)  -        User Key  (r) = Recorded By, (t) = Taken By, (c) = Cosigned By    Initials Name Provider Type     Maria Elena Barragan, OTR/L Occupational Therapist    GAL Shilpa Dumont, PT Physical Therapist    TW Brant Palacios, PTA Physical Therapy Assistant    KD Ele Avina, COULTER/L Occupational Therapy Assistant    BL Giana Chakraborty, COULTER/L Occupational Therapy Assistant    Irina Lorenzo, COULTER/L Occupational Therapy Assistant             Time Calculation:     Time Calculation- OT     Row Name 02/08/19 1300 02/08/19 0915          Time Calculation- OT    OT Start Time  --  0700  -     OT Stop Time  --  0830  -     OT Time Calculation (min)  --  90 min  -LW     Total Timed Code Minutes- OT  --  90 minute(s)  -     OT Received On  --  02/08/19  -        Timed Charges    38334 - Gait Training Minutes   20  -BS  --       User Key  (r) = Recorded By, (t) = Taken By, (c) = Cosigned By    Initials Name Provider Type    Irina Lorenzo, COULTER/L Occupational Therapy Assistant    BS Jose Antonio Cuevas, PT  Physical Therapist          Therapy Suggested Charges     Code   Minutes Charges    None              Therapy Charges for Today     Code Description Service Date Service Provider Modifiers Qty    65309971685 HC OT SELF CARE/MGMT/TRAIN EA 15 MIN 2/8/2019 Irina Armstrong COTA/L GO 4    35998035125  OT THERAPEUTIC ACT EA 15 MIN 2/8/2019 Irina Armstrong COTA/MEI GO 2                   MARYLIN Perez/MEI  2/8/2019

## 2019-02-08 NOTE — PLAN OF CARE
Problem: Patient Care Overview  Goal: Plan of Care Review  Outcome: Ongoing (interventions implemented as appropriate)   02/08/19 0911   Patient Care Overview   IRF Plan of Care Review progress ongoing, continue   Progress, Functional Goals progressing to functional independence   Coping/Psychosocial   Plan of Care Reviewed With patient   OTHER   Outcome Summary Pt performed bathing with Supervision. Pt performed UB dressing with supervision, LB bathing with Min A. Pt groomed with Supervision. Performed Fine motor exercises to strengthen L hand.

## 2019-02-08 NOTE — THERAPY TREATMENT NOTE
Acute Care - Occupational Therapy Treatment Note  [facility]     Patient Name: Ventura Boggs  : 1960  MRN: [id number]  Today's Date: 2019             Admit Date: 2019       ICD-10-CM ICD-9-CM   1. Uncontrolled type 2 diabetes mellitus with hyperglycemia (CMS/HCC) E11.65 250.02   2. Medically complex patient Z78.9 V49.9   3. Chronic intractable pain G89.29 338.29   4. Symbolic dysfunction R48.9 784.60   5. Impaired mobility and ADLs Z74.09 799.89   6. Impaired functional mobility, balance, gait, and endurance Z74.09 V49.89     Patient Active Problem List   Diagnosis   • Uncontrolled type 2 diabetes mellitus with hyperglycemia (CMS/HCC)   • Postoperative urinary retention   • Incisional hernia, without obstruction or gangrene   • Acute appendicitis with localized peritonitis   • Closed head injury   • Hyponatremia   • History of recurrent TIAs   • Ataxia   • Chronic pain syndrome   • Right shoulder pain   • Left-sided muscle weakness   • Medically complex patient   • Stroke-like symptoms     Past Medical History:   Diagnosis Date   • Arthritis    • Carpal tunnel syndrome    • Chronic pain syndrome    • Depressive disorder    • GERD (gastroesophageal reflux disease)    • Hernia    • History of closed head injury     with ICH and left weakness   • History of urinary system disease    • Hypertension    • Migraine    • Right shoulder pain    • Rotator cuff syndrome    • Type 2 diabetes mellitus (CMS/HCC)    • Ureteric stone      Past Surgical History:   Procedure Laterality Date   • ABDOMINAL SURGERY  2016   • APPENDECTOMY N/A 2018    Procedure: APPENDECTOMY;  Surgeon: Rusty Palacio MD;  Location: [facility];  Service:    • CHOLECYSTECTOMY     • CIRCUMCISION     • CYSTOSCOPY  2003    Cystoscopy and removal of J stent. Right ureteral stent.   • CYSTOSCOPY  2003    Cystoscopy and right stone extraction and placement of 26x 6 J-stent.   • INCISION AND DRAINAGE ABSCESS N/A  7/26/2017    Procedure: INCISION AND DRAINAGE ABD.ABSCESS;  Surgeon: Rui Shaver MD;  Location: [facility];  Service:    • VENTRAL/INCISIONAL HERNIA REPAIR N/A 5/31/2017    Procedure: LAPAROSCOPIC POSSIBLE OPEN ADHESIOLYSIS AND VENTRAL/INCISIONAL HERNIA REPAIR ;  Surgeon: Rui Shaver MD;  Location: [facility];  Service:        Therapy Treatment         Rehab Goal Summary     Row Name 02/08/19 0837 02/08/19 0700          Coordination Goal 1 (OT)    Activity/Assistive Device (Coordination Goal 1, OT)  —  FM task;GM task fair accuracy   -LW     [place] Level/Cues Needed (Coordination Goal 1, OT)  —  set-up required;verbal cues required;supervision required  -LW     Time Frame (Coordination Goal 1, OT)  —  long term goal (LTG);by discharge  -LW     Progress/Outcomes (Coordination Goal 1, OT)  —  goal not met  -LW        Attention Goal 1 (SLP)    Improve Attention by Goal 1 (SLP)  complete divided attention task;90%  -EC  —     Time Frame (Attention Goal 1, SLP)  by discharge  -EC  —     Barriers (Attention Goal 1, SLP)  hx of CHI  -EC  —     Progress (Attention Goal 1, SLP)  60%  -EC  —     Progress/Outcomes (Attention Goal 1, SLP)  goal ongoing  -EC  —     Comment (Attention Goal 1, SLP)  ipad ap for divided attention 6 atempts to get to level 18  -EC  —        Memory Skills Goal 1 (SLP)    Improve Memory Skills Through Goal 1 (SLP)  listen to a paragraph and answer questions;90%  -EC  —     Time Frame (Memory Skills Goal 1, SLP)  by discharge  -EC  —     Barriers (Memory Skills Goal 1, SLP)  hx of [facility]I  -EC  —     Progress/Outcomes (Memory Skills Goal 1, SLP)  goal ongoing  -EC  —        Organizational Skills Goal 1 (SLP)    Improve Thought Organization Through Goal 1 (SLP)  completing mental manipulation task;90%  -EC  —     Time Frame (Thought Organization Skills Goal 1, SLP)  by discharge  -EC  —     Barriers (Thought Organization Skills Goal 1, SLP)  Hx of [facility]I  -EC  —     Progress (Thought Organization Skills  Goal 1, SLP)  60%  -EC  —     Progress/Outcomes (Thought Organization Skills Goal 1, SLP)  goal ongoing  -EC  —        Functional Math Skills Goal 1 (SLP)    Improve Functional Math Skills Through Goal 1 (SLP)  complete word problems involving time;90%  -EC  —     Time Frame (Functional Math Skills Goal 1, SLP)  by discharge  -EC  —     Barriers (Functional Math Skills Goal 1, SLP)  Hx of [facility]I  -EC  —     Progress (Functional Math Skills Goal 1, SLP)  60%  -EC  —     Progress/Outcomes (Functional Math Skills Goal 1, SLP)  goal ongoing  -EC  —       User Key  (r) = Recorded By, (t) = Taken By, (c) = Cosigned By    Initials Name Provider Type Discipline    EC Madison Hall CCC-SLP Speech and Language Pathologist SLP    Irina Lorenzo COTA/L Occupational Therapy Assistant OT        Occupational Therapy Education     Title: PT OT SLP Therapies (In Progress)     Topic: Occupational Therapy (Done)     Point: ADL training (Done)     Description: Instruct learner(s) on proper safety adaptation and remediation techniques during self care or transfers.   Instruct in proper use of assistive devices.    Learning Progress Summary           Patient Acceptance, E,TB, VU by BARBIE at 2/8/2019  9:12 AM    Acceptance, E, VU,NR by  at 2/6/2019  1:35 PM    Comment:  Educated about OT and POC. Educated to call for assist. Educated on safety throughout.                   Point: Home exercise program (Done)     Description: Instruct learner(s) on appropriate technique for monitoring, assisting and/or progressing therapeutic exercises/activities.    Learning Progress Summary           Patient Acceptance, E,TB, VU by BARBIE at 2/8/2019  9:12 AM                   Point: Precautions (Done)     Description: Instruct learner(s) on prescribed precautions during self-care and functional transfers.    Learning Progress Summary           Patient Acceptance, E,TB, VU by BARBIE at 2/8/2019  9:12 AM    Acceptance, E, VU,NR by  at 2/6/2019  1:35 PM     Comment:  Educated about OT and POC. Educated to call for assist. Educated on safety throughout.    Acceptance, E, VU,NR by  at 2/6/2019 11:36 AM                   Point: Body mechanics (Done)     Description: Instruct learner(s) on proper positioning and spine alignment during self-care, functional mobility activities and/or exercises.    Learning Progress Summary           Patient Acceptance, E,TB, VU by LW at 2/8/2019  9:12 AM    Acceptance, E, VU,NR by  at 2/6/2019 11:36 AM                               User Key     Initials Effective Dates Name Provider Type Discipline     06/08/18 -  Maria Elena Barragan OTR/L Occupational Therapist OT     10/17/16 -  Giana Chakraborty COULTER/L Occupational Therapy Assistant OT     03/07/18 -  Irina Armstrong COULTER/L Occupational Therapy Assistant OT                OT Recommendation and Plan     Plan of Care Review  Plan of Care Reviewed With: patient  Plan of Care Reviewed With: patient  Outcome Measures     Row Name 02/08/19 0700 02/07/19 1000 02/07/19 0805       How much help from another person do you currently need...    Turning from your back to your side while in flat bed without using bedrails?  —  —  3  -TW    Moving from lying on back to sitting on the side of a flat bed without bedrails?  —  —  3  -TW    Moving to and from a bed to a chair (including a wheelchair)?  —  —  3  -TW    Standing up from a chair using your arms (e.g., wheelchair, bedside chair)?  —  —  3  -TW    Climbing 3-5 steps with a railing?  —  —  2  -TW    To walk in hospital room?  —  —  2  -TW    AM-PAC 6 Clicks Score  —  —  16  -TW       How much help from another is currently needed...    Putting on and taking off regular lower body clothing?  2  -LW  2  -KD  —    Bathing (including washing, rinsing, and drying)  2  -LW  2  -KD  —    Toileting (which includes using toilet bed pan or urinal)  3  -LW  3  -KD  —    Putting on and taking off regular upper body clothing  3  -LW  3  -KD  —     Taking care of personal grooming (such as brushing teeth)  3  -LW  3  -KD  —    Eating meals  4  -LW  4  -KD  —    Score  17  -LW  17  -KD  —       Functional Assessment    Outcome Measure Options  —  —  AM-PAC 6 Clicks Basic Mobility (PT)  -TW    Row Name 02/06/19 1500 02/06/19 1110 02/06/19 1015       How much help from another person do you currently need...    Turning from your back to your side while in flat bed without using bedrails?  3  -TW  3  -GAL  —    Moving from lying on back to sitting on the side of a flat bed without bedrails?  3  -TW  3  -GAL  —    Moving to and from a bed to a chair (including a wheelchair)?  3  -TW  3  -GAL  —    Standing up from a chair using your arms (e.g., wheelchair, bedside chair)?  3  -TW  3  -GAL  —    Climbing 3-5 steps with a railing?  2  -TW  2  -GAL  —    To walk in hospital room?  2  -TW  2  -GAL  —    AM-PAC 6 Clicks Score  16  -TW  16  -GAL  —       How much help from another is currently needed...    Putting on and taking off regular lower body clothing?  —  —  2  -BL    Bathing (including washing, rinsing, and drying)  —  —  2  -BL    Toileting (which includes using toilet bed pan or urinal)  —  —  3  -BL    Putting on and taking off regular upper body clothing  —  —  3  -BL    Taking care of personal grooming (such as brushing teeth)  —  —  3  -BL    Eating meals  —  —  4  -BL    Score  —  —  17  -BL       Functional Assessment    Outcome Measure Options  AM-PAC 6 Clicks Basic Mobility (PT)  -TW  AM-PAC 6 Clicks Basic Mobility (PT)  -GAL  AM-PAC 6 Clicks Daily Activity (OT)  -BL    Row Name 02/06/19 0732             How much help from another is currently needed...    Putting on and taking off regular lower body clothing?  2  -BH      Bathing (including washing, rinsing, and drying)  2  -BH      Toileting (which includes using toilet bed pan or urinal)  3  -BH      Putting on and taking off regular upper body clothing  3  -BH      Taking care of personal grooming (such  as brushing teeth)  3  -      Eating meals  4  -      Score  17  -         Functional Assessment    Outcome Measure Options  AM-PAC 6 Clicks Daily Activity (OT)  -        User Key  (r) = Recorded By, (t) = Taken By, (c) = Cosigned By    Initials Name Provider Type     Maria Elena Barragan, OTR/L Occupational Therapist    GAL Shilpa Dumont, PT Physical Therapist    TW Brant Palacios, PTA Physical Therapy Assistant    KD Ele Avina, COULTER/L Occupational Therapy Assistant    Giana Carrizales, COULTER/L Occupational Therapy Assistant    Irina Lorenzo COTA/L Occupational Therapy Assistant           Time Calculation:   Time Calculation- OT     Row Name 02/08/19 0915             Time Calculation- OT    OT Start Time  0700  -LW      OT Stop Time  0830  -LW      OT Time Calculation (min)  90 min  -      Total Timed Code Minutes- OT  90 minute(s)  -      OT Received On  02/08/19  -        User Key  (r) = Recorded By, (t) = Taken By, (c) = Cosigned By    Initials Name Provider Type     Irina Armstrong COTA/L Occupational Therapy Assistant           Therapy Suggested Charges     Code   Minutes Charges    None           Therapy Charges for Today     Code Description Service Date Service Provider Modifiers Qty    91267683233 HC OT SELF CARE/MGMT/TRAIN EA 15 MIN 2/8/2019 Irina Armstrong COTA/L GO 4    77040437806 HC OT THERAPEUTIC ACT EA 15 MIN 2/8/2019 Irina Armstrong COTA/MEI GO 2               JOLIE Perez  2/8/2019

## 2019-02-08 NOTE — PLAN OF CARE
Problem: Patient Care Overview  Goal: Plan of Care Review  Outcome: Ongoing (interventions implemented as appropriate)   02/08/19 0910   Patient Care Overview   IRF Plan of Care Review progress ongoing, continue   Progress, Functional Goals demonstrating adequate progress   OTHER   Outcome Summary speech therapy: pt continues to work on mental math

## 2019-02-08 NOTE — PLAN OF CARE
Problem: Patient Care Overview  Goal: Plan of Care Review  Outcome: Ongoing (interventions implemented as appropriate)   02/08/19 0935 02/08/19 1324   Patient Care Overview   IRF Plan of Care Review --  progress ongoing, continue   Progress, Functional Goals demonstrating adequate progress --    Coping/Psychosocial   Plan of Care Reviewed With patient --        Problem: Fall Risk (Adult)  Goal: Absence of Fall  Outcome: Ongoing (interventions implemented as appropriate)      Problem: Diabetes, Type 2 (Adult)  Goal: Signs and Symptoms of Listed Potential Problems Will be Absent, Minimized or Managed (Diabetes, Type 2)  Outcome: Ongoing (interventions implemented as appropriate)

## 2019-02-08 NOTE — PROGRESS NOTES
LOS: 3 days   Patient Care Team:  Anita Faria APRN as PCP - General (Family Medicine)    Chief Complaint:   Medically complex patient  Strokelike symptoms        Interval History:     SUBJECTIVE: No complaints today he feels good.  He is pleased with his diabetic management and control.  Says that he is getting stronger but he is not back to his baseline.  Says that left side weakness is improving but the leg is not improving as quickly as the upper extremity  History taken from: patient chart RN    Objective     Vital Signs  Temp:  [96.1 °F (35.6 °C)-96.8 °F (36 °C)] 96.8 °F (36 °C)  Heart Rate:  [86-90] 86  Resp:  [18] 18  BP: (109-120)/(73-79) 120/79      02/05/19  1404   Weight: 90.3 kg (199 lb)         Physical Exam:     General Appearance:    Alert, cooperative, in no acute distress   Head:    Normocephalic, without obvious abnormality, atraumatic   Eyes:            Lids and lashes normal, conjunctivae and sclerae normal, no   icterus, no pallor, corneas clear, PERRLA   Throat:   No oral lesions, no thrush, oral mucosa moist   Neck:   No adenopathy, supple, trachea midline, no thyromegaly, no   carotid bruit, no JVD       Lungs:     Clear to auscultation,respirations regular, even and                  Unlabored good bilateral air movement    Heart:    Regular rhythm and normal rate, normal S1 and S2, no            murmur, no gallop, no rub, no click no ectopy   Chest Wall:    No abnormalities observed   Abdomen:     Normal bowel sounds, no masses, no organomegaly, soft        non-tender, non-distended, no guarding, no rebound                Tenderness normal exam   Extremities:   Moves all extremities well, no edema, no cyanosis, no             Redness he does have left upper and left lower extremity weakness       Skin:   No bleeding, bruising or rash   Lymph nodes:   No palpable adenopathy   Neurologic:   Cranial nerves 2 - 12 grossly intact, sensation intact, DTR       present and equal  bilaterally        RESULTS REVIEW:     Lab Results (last 24 hours)     Procedure Component Value Units Date/Time    POC Glucose Once [[id number]]  (Abnormal) Collected:  02/08/19 1103    Specimen:  Blood Updated:  02/08/19 1116     Glucose 139 mg/dL      Comment: RN NotifiedOperator: [id number] CRYSTAL Dixon ID: [id number]       POC Glucose Once [[id number]]  (Abnormal) Collected:  02/08/19 0519    Specimen:  Blood Updated:  02/08/19 0531     Glucose 154 mg/dL      Comment: Sliding Scale AdminOperator: [id number] SYDNEY JENNIFERMeter ID: [id number]       POC Glucose Once [[id number]]  (Abnormal) Collected:  02/07/19 1918    Specimen:  Blood Updated:  02/07/19 1955     Glucose 141 mg/dL      Comment: RN NotifiedOperator: [id number] QUAN LONGORIANMeter ID: [id number]       POC Glucose Once [[id number]]  (Abnormal) Collected:  02/07/19 1627    Specimen:  Blood Updated:  02/07/19 1717     Glucose 216 mg/dL      Comment: RN NotifiedOperator: [id number] SOPHIE RECIOKIMeter ID: [id number]           Imaging Results (last 72 hours)     ** No results found for the last 72 hours. **          Assessment/Plan     Active Hospital Problems    Diagnosis   • **Medically complex patient   • Stroke-like symptoms       He has chronic left-sided weakness but it is much worse after this episode.  Specifically the left leg much  weaker than it was prior to this episode     • Uncontrolled type 2 diabetes mellitus with hyperglycemia (CMS/[facility])     -at presentation to ED glucose 704    Hemoglobin A1c is 15.1 admission to acute rehabilitation     • Left-sided muscle weakness     Due to prior Intracranial hemorrhage with closed head injury     • Right shoulder pain     He's had right shoulder pain for some time and has had steroid injections.  But since his fall the pain is much worse.  [place] [facility]     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  We will recheck lab work tomorrow  Diabetes control is much better with an admission hemoglobin A1c of 15  Continue intensive therapy and current medications  He does have chronic pain but it is also well controlled on his home medicines        This document has been electronically signed by Richard Galvez MD on February 8, 2019 11:47 AM

## 2019-02-08 NOTE — PLAN OF CARE
Problem: Patient Care Overview  Goal: Plan of Care Review  Outcome: Ongoing (interventions implemented as appropriate)   02/08/19 3995   Patient Care Overview   IRF Plan of Care Review progress ongoing, continue   Progress, Functional Goals demonstrating adequate progress   Coping/Psychosocial   Plan of Care Reviewed With patient   OTHER   Outcome Summary Patient slept well. V/S stable. Continues to complain of pain in right shoulder and right side of neck. Relieved and controlled with Pain medication ordered.        Problem: Fall Risk (Adult)  Goal: Identify Related Risk Factors and Signs and Symptoms  Outcome: Outcome(s) achieved Date Met: 02/08/19    Goal: Absence of Fall  Outcome: Ongoing (interventions implemented as appropriate)      Problem: Diabetes, Type 2 (Adult)  Goal: Signs and Symptoms of Listed Potential Problems Will be Absent, Minimized or Managed (Diabetes, Type 2)  Outcome: Ongoing (interventions implemented as appropriate)

## 2019-02-08 NOTE — PLAN OF CARE
Problem: Patient Care Overview  Goal: Plan of Care Review  Outcome: Ongoing (interventions implemented as appropriate)   02/08/19 0919   Patient Care Overview   IRF Plan of Care Review progress ongoing, continue   Progress, Functional Goals demonstrating adequate progress   Coping/Psychosocial   Plan of Care Reviewed With patient   OTHER   Outcome Summary PT treatment performed. Limited ambulation due to dense L hemiparesis, L LE hypertonicity as well as L LE and R shoulder pain. Addressed pre-gait and gait training in // bars, requiring moderate tc's and vc's for correctly sequencing with ambulation. Continue to address pregait sequencing and wt shifting in // bars. Progressing toward goals.

## 2019-02-09 LAB
ALBUMIN SERPL-MCNC: 4.1 G/DL (ref 3.4–4.8)
ANION GAP SERPL CALCULATED.3IONS-SCNC: 9 MMOL/L (ref 5–15)
BASOPHILS # BLD AUTO: 0.04 10*3/MM3 (ref 0–0.2)
BASOPHILS NFR BLD AUTO: 0.6 % (ref 0–2)
BUN BLD-MCNC: 14 MG/DL (ref 7–21)
BUN/CREAT SERPL: 19.2 (ref 7–25)
CALCIUM SPEC-SCNC: 9.4 MG/DL (ref 8.4–10.2)
CHLORIDE SERPL-SCNC: 94 MMOL/L (ref 95–110)
CO2 SERPL-SCNC: 33 MMOL/L (ref 22–31)
CREAT BLD-MCNC: 0.73 MG/DL (ref 0.7–1.3)
DEPRECATED RDW RBC AUTO: 42.2 FL (ref 35.1–43.9)
EOSINOPHIL # BLD AUTO: 0.36 10*3/MM3 (ref 0–0.7)
EOSINOPHIL NFR BLD AUTO: 5 % (ref 0–7)
ERYTHROCYTE [DISTWIDTH] IN BLOOD BY AUTOMATED COUNT: 13.3 % (ref 11.5–14.5)
GFR SERPL CREATININE-BSD FRML MDRD: 111 ML/MIN/1.73 (ref 56–130)
GLUCOSE BLD-MCNC: 128 MG/DL (ref 60–100)
GLUCOSE BLDC GLUCOMTR-MCNC: 135 MG/DL (ref 70–130)
GLUCOSE BLDC GLUCOMTR-MCNC: 138 MG/DL (ref 70–130)
GLUCOSE BLDC GLUCOMTR-MCNC: 76 MG/DL (ref 70–130)
GLUCOSE BLDC GLUCOMTR-MCNC: 94 MG/DL (ref 70–130)
HCT VFR BLD AUTO: 38.8 % (ref 39–49)
HGB BLD-MCNC: 13.3 G/DL (ref 13.7–17.3)
IMM GRANULOCYTES # BLD AUTO: 0.04 10*3/MM3 (ref 0–0.02)
IMM GRANULOCYTES NFR BLD AUTO: 0.6 % (ref 0–0.5)
LARGE PLATELETS: NORMAL
LYMPHOCYTES # BLD AUTO: 1.26 10*3/MM3 (ref 0.6–4.2)
LYMPHOCYTES NFR BLD AUTO: 17.4 % (ref 10–50)
MCH RBC QN AUTO: 30 PG (ref 26.5–34)
MCHC RBC AUTO-ENTMCNC: 34.3 G/DL (ref 31.5–36.3)
MCV RBC AUTO: 87.6 FL (ref 80–98)
MONOCYTES # BLD AUTO: 1.53 10*3/MM3 (ref 0–0.9)
MONOCYTES NFR BLD AUTO: 21.2 % (ref 0–12)
NEUTROPHILS # BLD AUTO: 4 10*3/MM3 (ref 2–8.6)
NEUTROPHILS NFR BLD AUTO: 55.2 % (ref 37–80)
PHOSPHATE SERPL-MCNC: 4.3 MG/DL (ref 2.4–4.4)
PLATELET # BLD AUTO: 258 10*3/MM3 (ref 150–450)
PMV BLD AUTO: 10.8 FL (ref 8–12)
POTASSIUM BLD-SCNC: 4.8 MMOL/L (ref 3.5–5.1)
RBC # BLD AUTO: 4.43 10*6/MM3 (ref 4.37–5.74)
RBC MORPH BLD: NORMAL
SMALL PLATELETS BLD QL SMEAR: ADEQUATE
SODIUM BLD-SCNC: 136 MMOL/L (ref 137–145)
WBC MORPH BLD: NORMAL
WBC NRBC COR # BLD: 7.23 10*3/MM3 (ref 3.2–9.8)

## 2019-02-09 PROCEDURE — 97110 THERAPEUTIC EXERCISES: CPT

## 2019-02-09 PROCEDURE — 63710000001 INSULIN DETEMIR PER 5 UNITS: Performed by: FAMILY MEDICINE

## 2019-02-09 PROCEDURE — 85007 BL SMEAR W/DIFF WBC COUNT: CPT | Performed by: FAMILY MEDICINE

## 2019-02-09 PROCEDURE — 97530 THERAPEUTIC ACTIVITIES: CPT

## 2019-02-09 PROCEDURE — 80069 RENAL FUNCTION PANEL: CPT | Performed by: FAMILY MEDICINE

## 2019-02-09 PROCEDURE — 63710000001 INSULIN ASPART PER 5 UNITS: Performed by: FAMILY MEDICINE

## 2019-02-09 PROCEDURE — 25010000002 ENOXAPARIN PER 10 MG: Performed by: FAMILY MEDICINE

## 2019-02-09 PROCEDURE — 97116 GAIT TRAINING THERAPY: CPT

## 2019-02-09 PROCEDURE — 85025 COMPLETE CBC W/AUTO DIFF WBC: CPT | Performed by: FAMILY MEDICINE

## 2019-02-09 PROCEDURE — 82962 GLUCOSE BLOOD TEST: CPT

## 2019-02-09 PROCEDURE — 92507 TX SP LANG VOICE COMM INDIV: CPT | Performed by: SPEECH-LANGUAGE PATHOLOGIST

## 2019-02-09 RX ADMIN — LISINOPRIL 5 MG: 5 TABLET ORAL at 08:28

## 2019-02-09 RX ADMIN — DULOXETINE HYDROCHLORIDE 20 MG: 20 CAPSULE, DELAYED RELEASE ORAL at 08:28

## 2019-02-09 RX ADMIN — OXYCODONE HYDROCHLORIDE AND ACETAMINOPHEN 1 TABLET: 10; 325 TABLET ORAL at 03:48

## 2019-02-09 RX ADMIN — INSULIN DETEMIR 30 UNITS: 100 INJECTION, SOLUTION SUBCUTANEOUS at 20:23

## 2019-02-09 RX ADMIN — METFORMIN HYDROCHLORIDE 1000 MG: 500 TABLET ORAL at 08:28

## 2019-02-09 RX ADMIN — METFORMIN HYDROCHLORIDE 1000 MG: 500 TABLET ORAL at 17:22

## 2019-02-09 RX ADMIN — AMLODIPINE BESYLATE 5 MG: 5 TABLET ORAL at 08:27

## 2019-02-09 RX ADMIN — METHADONE HYDROCHLORIDE 5 MG: 10 TABLET ORAL at 08:27

## 2019-02-09 RX ADMIN — ENOXAPARIN SODIUM 40 MG: 40 INJECTION SUBCUTANEOUS at 14:18

## 2019-02-09 RX ADMIN — OXYCODONE HYDROCHLORIDE AND ACETAMINOPHEN 1 TABLET: 10; 325 TABLET ORAL at 20:23

## 2019-02-09 RX ADMIN — OXYCODONE HYDROCHLORIDE AND ACETAMINOPHEN 1 TABLET: 10; 325 TABLET ORAL at 16:34

## 2019-02-09 RX ADMIN — ATORVASTATIN CALCIUM 20 MG: 20 TABLET, FILM COATED ORAL at 20:22

## 2019-02-09 RX ADMIN — INSULIN ASPART 7 UNITS: 100 INJECTION, SOLUTION INTRAVENOUS; SUBCUTANEOUS at 11:46

## 2019-02-09 RX ADMIN — INSULIN ASPART 7 UNITS: 100 INJECTION, SOLUTION INTRAVENOUS; SUBCUTANEOUS at 17:22

## 2019-02-09 RX ADMIN — INSULIN ASPART 7 UNITS: 100 INJECTION, SOLUTION INTRAVENOUS; SUBCUTANEOUS at 07:19

## 2019-02-09 RX ADMIN — METHADONE HYDROCHLORIDE 5 MG: 10 TABLET ORAL at 20:22

## 2019-02-09 RX ADMIN — ASPIRIN 81 MG CHEWABLE TABLET 325 MG: 81 TABLET CHEWABLE at 08:28

## 2019-02-09 RX ADMIN — FAMOTIDINE 20 MG: 20 TABLET ORAL at 08:28

## 2019-02-09 RX ADMIN — OXYCODONE HYDROCHLORIDE AND ACETAMINOPHEN 1 TABLET: 10; 325 TABLET ORAL at 08:28

## 2019-02-09 RX ADMIN — AMITRIPTYLINE HYDROCHLORIDE 10 MG: 10 TABLET, FILM COATED ORAL at 20:22

## 2019-02-09 RX ADMIN — OXYCODONE HYDROCHLORIDE AND ACETAMINOPHEN 1 TABLET: 10; 325 TABLET ORAL at 12:04

## 2019-02-09 RX ADMIN — FAMOTIDINE 20 MG: 20 TABLET ORAL at 20:23

## 2019-02-09 NOTE — PLAN OF CARE
Problem: Patient Care Overview  Goal: Plan of Care Review  Outcome: Ongoing (interventions implemented as appropriate)   02/09/19 0892   Patient Care Overview   IRF Plan of Care Review progress ongoing, continue   Progress, Functional Goals demonstrating adequate progress   Coping/Psychosocial   Plan of Care Reviewed With patient   OTHER   Outcome Summary Pt progressing well with gait but shows some inconsistencies with gait and t/f's. Pt performed majority of gait this tx on carpet but was able to compensate for toe drag by picking his toes up on L LE before attempting to advance L LE and this worked well for pt. Pt voiced approval of this after completion of tx.

## 2019-02-09 NOTE — THERAPY TREATMENT NOTE
Inpatient Rehabilitation - Speech Language Pathology Treatment Note  [facility]     Patient Name: Ventura Boggs  : 1960  MRN: [id number]  Today's Date: 2019         Admit Date: 2019    Skilled ST therapy completed this date. Pt was alert and sitting in wc upon SLP arrival. Pt was oriented x4. Pt complaints loss of memory at times which has been noticed approximately 1 year. POC was reviewed and memory strategies provided. No goals met this date. Continue POC.     Cognitive Goals:  1.  Pt will complete mental manipulation task w/90% acc: Abstractions - 80%; categorization 90%; Verbal problem solving with time and money - 70%      3.  Pt will complete word problems involving time w/90% acc:  70% accuracy       4.  Pt will complete  divided attention task w/90% acc: Sustained attention/visual attention search - 1200% accuracy with an average completion tome of 25 seconds. Divided attention tasks using [employer] - 60% accuracy      5. Pt will listen to a paragraph and answer questions with 90% accuracy: [place] 6 sentence paragraph - 80% accuracy. 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 Pt was able to use visualization strategies to recall 10 digit telephone numbers with a delay with 100% accuracy this date.     Madison Abel MS CCC-SLP              Visit Dx:      ICD-10-CM ICD-9-CM   1. Uncontrolled type 2 diabetes mellitus with hyperglycemia (CMS/HCC) E11.65 250.02   2. Medically complex patient Z78.9 V49.9   3. Chronic intractable pain G89.29 338.29   4. Symbolic dysfunction R48.9 784.60   5. Impaired mobility and ADLs Z74.09 799.89   6. Impaired functional mobility, balance, gait, and endurance Z74.09 V49.89     Patient Active Problem List   Diagnosis   • Uncontrolled type 2 diabetes mellitus with hyperglycemia (CMS/HCC)   • Postoperative urinary retention   • Incisional hernia, without obstruction or gangrene   • Acute  appendicitis with localized peritonitis   • Closed head injury   • Hyponatremia   • History of recurrent TIAs   • Ataxia   • Chronic pain syndrome   • Right shoulder pain   • Left-sided muscle weakness   • Medically complex patient   • Stroke-like symptoms        Therapy Treatment      EDUCATION  The patient has been educated in the following areas:   Cognitive Impairment.    SLP Recommendation and Plan     Rehab Potential/Prognosis: good  SLC Criteria for Skilled Therapy Interventions Met: yes  Anticipated Dischage Disposition: home with home health        Predicted Duration Therapy Intervention (Days): until discharge       Plan of Care Reviewed With: patient  Plan of Care Review  Plan of Care Reviewed With: patient    SLP GOALS     Row Name 02/09/19 1057 02/08/19 0837 02/07/19 0905       Attention Goal 1 (SLP)    Improve Attention by Goal 1 (SLP)  complete selective attention task;complete divided attention task;90%  -EA  complete divided attention task;90%  -EC  complete selective attention task;complete divided attention task;90%  -EC    Time Frame (Attention Goal 1, SLP)  by discharge  -EA  by discharge  -EC  by discharge  -EC    Barriers (Attention Goal 1, SLP)  hx of CHI  -EA  hx of CHI  -EC  hx of CHI  -EC    Progress (Attention Goal 1, SLP)  other (comment) new goal  -EA  60%  -EC  100%;60%  -EC    Progress/Outcomes (Attention Goal 1, SLP)  goal ongoing  -EA  goal ongoing  -EC  goal ongoing  -EC    Comment (Attention Goal 1, SLP)  Selective attention and visual attention search - 100% accuracy with minimal distractions average time 25 secs. Divided attention and speed using MindGames - 70% acuracy.  -EA  ipad ap for divided attention 6 atempts to get to level 18  -EC  met goal for sustained attention.  divided attention was more difficult.    -EC       Memory Skills Goal 1 (SLP)    Improve Memory Skills Through Goal 1 (SLP)  recalling unrelated word lists with an imposed delay;listen to a paragraph and  answer questions;90%  -EA  listen to a paragraph and answer questions;90%  -EC  listen to a paragraph and answer questions;90%  -EC    Time Frame (Memory Skills Goal 1, SLP)  by discharge  -EA  by discharge  -EC  by discharge  -EC    Barriers (Memory Skills Goal 1, SLP)  hx of CHI  -EA  hx of CHI  -EC  hx of CHI  -EC    Progress (Memory Skills Goal 1, SLP)  other (comment) new goal  -EA  --  60%  -EC    Progress/Outcomes (Memory Skills Goal 1, SLP)  goal ongoing  -EA  goal ongoing  -EC  goal ongoing  -EC    Comment (Memory Skills Goal 1, SLP)  SLP reviewed memory strategies of visualization this date and modeled use. Pt increased recall when provided with visualization strategy and use.  -EA  --  --       Organizational Skills Goal 1 (SLP)    Improve Thought Organization Through Goal 1 (SLP)  completing mental manipulation task;90%  -EA  completing mental manipulation task;90%  -EC  completing mental manipulation task;90%  -EC    Time Frame (Thought Organization Skills Goal 1, SLP)  by discharge  -EA  by discharge  -EC  by discharge  -EC    Barriers (Thought Organization Skills Goal 1, SLP)  Hx of CHI  -EA  Hx of CHI  -EC  Hx of CHI  -EC    Progress (Thought Organization Skills Goal 1, SLP)  other (comment) new goal  -EA  60%  -EC  60%  -EC    Progress/Outcomes (Thought Organization Skills Goal 1, SLP)  goal ongoing  -EA  goal ongoing  -EC  goal ongoing  -EC    Comment (Thought Organization Skills Goal 1, SLP)  Abstraction and categorization completed this date - 80% accuracy   -EA  --  --       Functional Math Skills Goal 1 (SLP)    Improve Functional Math Skills Through Goal 1 (SLP)  complete functional math task;complete word problems involving time;90%  -EA  complete word problems involving time;90%  -EC  complete functional math task;complete word problems involving time;90%  -EC    Time Frame (Functional Math Skills Goal 1, SLP)  by discharge  -EA  by discharge  -EC  by discharge  -EC    Barriers (Functional  Math Skills Goal 1, SLP)  Hx of CHI  -EA  Hx of CHI  -EC  Hx of CHI  -EC    Progress (Functional Math Skills Goal 1, SLP)  other (comment) new goal  -EA  60%  -EC  60%  -EC    Progress/Outcomes (Functional Math Skills Goal 1, SLP)  other (see comments) new goal  -EA  goal ongoing  -EC  goal ongoing  -EC    Comment (Functional Math Skills Goal 1, SLP)  Word problems with money and time 70% accuracy. Required repetition and review.  -EA  --  verbal and visual word problems.    -EC      User Key  (r) = Recorded By, (t) = Taken By, (c) = Cosigned By    Initials Name Provider Type    Madison Alicea CCC-SLP Speech and Language Pathologist    Madison Silverman MS CCC-SLP Speech and Language Pathologist              Time Calculation:     Time Calculation- SLP     Row Name 02/09/19 1405             Time Calculation- SLP    SLP Start Time  1057  -EA      SLP Stop Time  1152  -EA      SLP Time Calculation (min)  55 min  -EA      Total Timed Code Minutes- SLP  55 minute(s)  -EA      SLP Received On  02/08/19  -EA      SLP Goal Re-Cert Due Date  02/20/19  -EA        User Key  (r) = Recorded By, (t) = Taken By, (c) = Cosigned By    Initials Name Provider Type    Madison Silverman MS CCC-SLP Speech and Language Pathologist          Therapy Charges for Today     Code Description Service Date Service Provider Modifiers Qty    [id number]  ST TREATMENT SPEECH 4 2/9/2019 Madison Abel MS CCC-SLP GN 1                     Madison Abel MS CCC-SLP  2/9/2019

## 2019-02-09 NOTE — PLAN OF CARE
Problem: Patient Care Overview  Goal: Plan of Care Review  Outcome: Ongoing (interventions implemented as appropriate)    Goal: Individualization and Mutuality  Outcome: Ongoing (interventions implemented as appropriate)    Goal: Discharge Needs Assessment  Outcome: Ongoing (interventions implemented as appropriate)    Goal: Home Safety Plan  Outcome: Ongoing (interventions implemented as appropriate)    Goal: Coping Plan  Outcome: Ongoing (interventions implemented as appropriate)    Goal: Community Reintegration Plan  Outcome: Ongoing (interventions implemented as appropriate)      Problem: Fall Risk (Adult)  Goal: Absence of Fall  Outcome: Ongoing (interventions implemented as appropriate)      Problem: Diabetes, Type 2 (Adult)  Goal: Signs and Symptoms of Listed Potential Problems Will be Absent, Minimized or Managed (Diabetes, Type 2)  Outcome: Ongoing (interventions implemented as appropriate)   02/09/19 1494   Goal/Outcome Evaluation   Problems Assessed (Type 2 Diabetes) all   Problems Present (Type 2 Diabetes) hyperglycemia

## 2019-02-09 NOTE — PLAN OF CARE
Problem: Patient Care Overview  Goal: Plan of Care Review  Outcome: Ongoing (interventions implemented as appropriate)   02/09/19 2659   Patient Care Overview   IRF Plan of Care Review progress ongoing, continue   Progress, Functional Goals demonstrating adequate progress   Coping/Psychosocial   Plan of Care Reviewed With patient   OTHER   Outcome Summary worked on increasing fx use of l ue this tx. pt reports hand is working better cont poc

## 2019-02-09 NOTE — THERAPY TREATMENT NOTE
Inpatient Rehabilitation - Occupational Therapy Treatment Note    [facility]     Patient Name: Ventura Boggs  : 1960  MRN: [id number]    Today's Date: 2019                 Admit Date: 2019      Visit Dx:    ICD-10-CM ICD-9-CM   1. Uncontrolled type 2 diabetes mellitus with hyperglycemia (CMS/HCC) E11.65 250.02   2. Medically complex patient Z78.9 V49.9   3. Chronic intractable pain G89.29 338.29   4. Symbolic dysfunction R48.9 784.60   5. Impaired mobility and ADLs Z74.09 799.89   6. Impaired functional mobility, balance, gait, and endurance Z74.09 V49.89       Patient Active Problem List   Diagnosis   • Uncontrolled type 2 diabetes mellitus with hyperglycemia (CMS/HCC)   • Postoperative urinary retention   • Incisional hernia, without obstruction or gangrene   • Acute appendicitis with localized peritonitis   • Closed head injury   • Hyponatremia   • History of recurrent TIAs   • Ataxia   • Chronic pain syndrome   • Right shoulder pain   • Left-sided muscle weakness   • Medically complex patient   • Stroke-like symptoms         Therapy Treatment    IRF Treatment Summary     Row Name 19 1432 19 1300 19 1057       Evaluation/Treatment Time and Intent    Subjective Information  no complaints  -TW  complains of  -LW  --    Existing Precautions/Restrictions  fall;lifting  -TW  --  --    Document Type  therapy note (daily note)  -TW  therapy note (daily note)  -LW  --    Mode of Treatment  physical therapy;individual therapy  -TW  occupational therapy  -LW  individual therapy  -EA    Patient/Family Observations  --  Pt sitting in w/c. No family present  -LW  --    Start Time (Evaluation/Treatment)  1432  -TW  1300  -LW  --  -TW    Stop Time (Evaluation/Treatment)  1505  -TW  1340  -LW  --    Recorded by [TW] Brant Palacios PTA [LW] Irina Armstrong COTA/MEI [EA] Madison Abel MS CCC-SLP  [TW] Brant Palacios PTA    Row Name 19 0950 19 0819           Evaluation/Treatment Time and Intent    Subjective Information  complains of  -RC  no complaints  -TW     Existing Precautions/Restrictions  fall;lifting  -RC  fall;lifting  -TW     Document Type  therapy note (daily note)  -RC  therapy note (daily note)  -TW     Mode of Treatment  occupational therapy;individual therapy  -RC  physical therapy;individual therapy  -TW     Start Time (Evaluation/Treatment)  0950  -RC  0819  -TW     Stop Time (Evaluation/Treatment)  --  0915  -TW     Recorded by [RC] Valeri Woodard COTA/MEI [TW] Brant Palacios PTA     Row Name 02/09/19 1432 02/09/19 1300 02/09/19 0819       Cognition/Psychosocial- PT/OT    Affect/Mental Status (Cognitive)  WFL  -TW  WFL  -LW  WFL  -TW    Orientation Status (Cognition)  oriented x 4  -TW  oriented x 4  -LW  oriented x 4  -TW    Follows Commands (Cognition)  WFL  -TW  WFL  -LW  WFL  -TW    Personal Safety Interventions  --  fall prevention program maintained;gait belt;nonskid shoes/slippers when out of bed  -LW  --    Safety Deficit (Cognitive)  --  mild deficit  -LW  --    Recorded by [TW] Brant Palacios PTA [LW] Irina Armstrong COTA/L [TW] Brant Palacios PTA    Row Name 02/09/19 1300             Cognitive Assessment Intervention- SLP    Cognitive Function (Cognition)  mild impairment;WFL  -LW      Orientation Status (Cognition)  WFL  -LW      Memory (Cognitive)  mild impairment  -LW      Attention (Cognitive)  mild impairment  -LW      Thought Organization (Cognitive)  mild impairment  -LW      Reasoning (Cognitive)  WFL  -LW      Problem Solving (Cognitive)  mild impairment  -LW      Functional Math (Cognitive)  mild impairment  -LW      Executive Function (Cognition)  mild impairment  -LW      Pragmatics (Communication)  WFL  -LW      Right Hemisphere Function  WFL  -LW      Recorded by [LW] Irina Armstrong COTA/L      Row Name 02/09/19 1432 02/09/19 0819          Bed Mobility Assessment/Treatment    Supine-Sit [place]  (Bed Mobility)  not tested  -TW  supervision  -TW     Sit-Supine [employer] (Bed Mobility)  not tested  -TW  not tested  -TW     Bed Mobility, Safety Issues  --  decreased use of legs for bridging/pushing  -TW     Assistive Device (Bed Mobility)  --  bed rails;head of bed elevated  -TW     Recorded by [TW] Brant Palacios, KAREN [TW] Brant Palacios, KAREN     Row Name 02/09/19 1432 02/09/19 0819          Bed-Chair Transfer    Bed-Chair [employer] (Transfers)  minimum assist (75% patient effort) To mat  -TW  minimum assist (75% patient effort)  -TW     Assistive Device (Bed-Chair Transfers)  walker, front-wheeled  -TW  walker, front-wheeled  -TW     Recorded by [TW] Brant Palacios PTA [TW] Brant Palacios, KAREN     Row Name 02/09/19 1432 02/09/19 0819          Chair-Bed Transfer    Chair-Bed [employer] (Transfers)  minimum assist (75% patient effort) To w/c.  -TW  minimum assist (75% patient effort)  -TW     Assistive Device (Chair-Bed Transfers)  walker, front-wheeled  -TW  walker, front-wheeled  -TW     Recorded by [TW] Brant Palacios, PTA [TW] Brant Palacios PTA     Row Name 02/09/19 1432 02/09/19 0819          Sit-Stand Transfer    Sit-Stand [employer] (Transfers)  minimum assist (75% patient effort)  -TW  minimum assist (75% patient effort)  -TW     Assistive Device (Sit-Stand Transfers)  walker, front-wheeled  -TW  walker, front-wheeled  -TW     Recorded by [TW] Brant Palacios, KAREN [TW] Brant Palacios, PTA     Row Name 02/09/19 1432 02/09/19 0819          Stand-Sit Transfer    Stand-Sit [employer] (Transfers)  minimum assist (75% patient effort)  -TW  minimum assist (75% patient effort)  -TW     Assistive Device (Stand-Sit Transfers)  walker, front-wheeled  -TW  walker, front-wheeled  -TW     Recorded by [TW] Brant Palacios, PTA [TW] Brant Palacios, PTA     Row Name 02/09/19 1432 02/09/19 0819          Gait/Stairs Assessment/Training    Gait/Stairs  Assessment/Training  gait/ambulation assistive device  -TW  gait/ambulation assistive device  -TW     Teller Level (Gait)  contact guard;minimum assist (75% patient effort)  -TW  contact guard;minimum assist (75% patient effort)  -TW     Assistive Device (Gait)  walker, front-wheeled  -TW  walker, front-wheeled  -TW     Distance in Feet (Gait)  55ft  -TW  40ft then 48ft  -TW     Pattern (Gait)  step-to  -TW  step-to  -TW     Deviations/Abnormal Patterns (Gait)  left sided deviations;stride length decreased  -TW  left sided deviations;stride length decreased  -TW     Bilateral Gait Deviations  foot drop/toe drag;heel strike decreased;knee buckling, left side  -TW  foot drop/toe drag;heel strike decreased;knee buckling, left side  -TW     Recorded by [TW] Brant Palacios PTA [TW] Brant Palacios PTA     Row Name 02/09/19 1432 02/09/19 0950 02/09/19 0819       Pain Scale: Numbers Pre/Post-Treatment    Pain Scale: Numbers, Pretreatment  5/10  -TW  8/10  -RC  7/10  -TW    Pain Scale: Numbers, Post-Treatment  5/10  -TW  8/10  -RC  7/10  -TW    Pain Location - Side  Right  -TW  Right  -RC  Right  -TW    Pain Location - Orientation  upper  -TW  -- r shoulder l leg   -RC  upper  -TW    Pain Location  extremity  -TW  shoulder  -RC  extremity  -TW    Pain Intervention(s)  --  Medication (See MAR)  -RC  --    Recorded by [TW] Brant Palacios PTA [RC] Valeri Woodard COTA/MEI [TW] Brant Palacios PTA    Row Name 02/09/19 1432 02/09/19 0819          Static Standing Balance    Level of Teller (Supported Standing, Static Balance)  supervision;contact guard assist  -TW  supervision  -TW     Time Able to Maintain Position (Supported Standing, Static Balance)  3 to 4 minutes  -TW  3 to 4 minutes  -TW     Assistive Device Utilized (Supported Standing, Static Balance)  walker, rolling  -TW  walker, rolling  -TW     Recorded by [TW] Brant Palacios PTA [TW] Brant Palacios PTA     Row Name  02/09/19 1300             Upper Extremity Seated Therapeutic Exercise    Performed, Seated Upper Extremity (Therapeutic Exercise)  shoulder flexion/extension;shoulder abduction/adduction;shoulder external/internal rotation;elbow flexion/extension;forearm supination/pronation;wrist flexion/extension 90 deg for shoulder only 2* to pain  -LW      Device, Seated Upper Extremity (Therapeutic Exercise)  free weights, barbell  -LW      Exercise Type, Seated Upper Extremity (Therapeutic Exercise)  AROM (active range of motion)  -LW      Expected Outcomes, Seated Upper Extremity (Therapeutic Exercise)  improve functional tolerance, self-care activity  -LW      Restrictions, Seated Upper Extremity (Therapeutic Exercise)  No lifting over 5 lbs  -LW      Sets/Reps Detail, Seated Upper Extremity (Therapeutic Exercise)  2x25  -LW      Comment, Seated Upper Extremity (Therapeutic Exercise)  6 pack ex to L hand X10  -LW      Recorded by [LW] Irina Armstrong COTA/L      Row Name 02/09/19 0819             Lower Extremity Supine Therapeutic Exercise    Performed, Supine Lower Extremity (Therapeutic Exercise)  hip flexion/extension;hip abduction/adduction;hip external/internal rotation;ankle dorsiflexion/plantarflexion;SAQ (short arc quad) over bolster;quadriceps sets;gluteal sets;ankle pumps;heel slides;bridging (bilateral w/bolster)  -TW      Exercise Type, Supine Lower Extremity (Therapeutic Exercise)  AAROM (active assistive range of motion);AROM (active range of motion) AAROM with L LE and AROM with R LE.  -TW      Sets/Reps Detail, Supine Lower Extremity (Therapeutic Exercise)  1/15-20  -TW      Recorded by [TW] Brant Palacios PTA      Row Name 02/09/19 1300             Vital Signs    Pretreatment Heart Rate (beats/min)  87  -LW      Posttreatment Heart Rate (beats/min)  83  -LW      Pre SpO2 (%)  97  -LW      O2 Delivery Pre Treatment  room air  -LW      Post SpO2 (%)  98  -LW      O2 Delivery Post Treatment  room air   -LW      Pre Patient Position  Sitting  -LW      Post Patient Position  Sitting  -LW      Recorded by [LW] Irina Armstrong COTA/L      Row Name 02/09/19 1300 02/09/19 0819          Positioning and Restraints    Pre-Treatment Position  sitting in chair/recliner  -LW  in bed  -TW     Post Treatment Position  wheelchair  -LW  wheelchair  -TW     In Wheelchair  encouraged to call for assist;exit alarm on;call light within reach  -LW  sitting;encouraged to call for assist  -TW     Recorded by [LW] Irina Armstrong COTA/L [TW] Brant Palacios PTA     Row Name 02/09/19 1432 02/09/19 0819          Daily Summary of Progress (PT)    Functional Goal Overall Progress: Physical Therapy  progressing toward functional goals as expected  -TW  progressing toward functional goals as expected  -TW     Impairments Continuing to Limit Function: Physical Therapy  strength decreased;impaired balance;coordination impaired;motor control impaired;pain  -TW  strength decreased;impaired balance;coordination impaired;motor control impaired;pain  -TW     Recommendations: Physical Therapy  cont to mobilize.  -TW  Cont  -TW     Recorded by [TW] Brant Palacios PTA [TW] Brant Palacios PTA     Row Name 02/09/19 1300             Daily Summary of Progress (OT)    Functional Goal Overall Progress: Occupational Therapy  progressing towards functional goals slower than expected  -LW      Recommendations: Occupational Therapy  Continue POC  -LW      Recorded by [LW] Irina Armstrong COTA/L        User Key  (r) = Recorded By, (t) = Taken By, (c) = Cosigned By    Initials Name Effective Dates    EA Madison Abel MS CCC-SLP 10/17/16 -     Brant Lancaster PTA 03/07/18 -     Valeri Lozoya COTA/L 03/07/18 -     Irina Lorenzo COTA/L 03/07/18 -                  OT Recommendation and Plan         Plan of Care Review  Plan of Care Reviewed With: patient  Daily Summary of Progress (OT)  Functional Goal Overall  Progress: Occupational Therapy: progressing towards functional goals slower than expected  Recommendations: Occupational Therapy: Continue POC  Plan of Care Reviewed With: patient  IRF Plan of Care Review: progress ongoing, continue  Progress, Functional Goals: demonstrating adequate progress  Outcome Summary: Pt agreed to perform 6 pk ex for L hand, and UB strengthening for  aid independence with ADL's    OT IRF GOALS     Row Name 02/09/19 1300 02/09/19 0950 02/08/19 0700       Transfer FIM Goals (OT-IRF)    Tub-Shower Transfer FIM Goal (OT-IRF)  Score of 6 (FIM)  -LW  Score of 6 (FIM)  -RC  Score of 6 (FIM)  -LW    Toilet Transfer FIM Goal (OT-IRF)  Score of 6 (FIM)  -LW  Score of 6 (FIM)  -RC  Score of 6 (FIM)  -LW    Time Frame (Transfer FIM Goals 1, OT-IRF)  long term goal (LTG);by discharge  -LW  long term goal (LTG);by discharge  -RC  long term goal (LTG);by discharge  -LW    Progress/Outcomes (Transfer FIM Goals, OT-IRF)  goal not met;continuing progress toward goal  -LW  goal not met;continuing progress toward goal  -RC  goal not met  -LW       Bathing FIM Goal (OT-IRF)    Bathing FIM Goal (OT-IRF)  Score of 6 (FIM)  -LW  Score of 6 (FIM)  -RC  Score of 6 (FIM)  -LW    Time Frame (Bathing FIM Goal, OT-IRF)  long term goal (LTG);by discharge  -LW  long term goal (LTG);by discharge  -RC  long term goal (LTG);by discharge  -LW    Progress/Outcomes (Bathing FIM Goal, OT-IRF)  goal not met;continuing progress toward goal  -LW  goal not met;continuing progress toward goal  -RC  goal not met  -LW       UB Dressing FIM Goal (OT-IRF)    Upper Body Dressing, FIM Goal, OT-IRF  Score of 6 (FIM)  -LW  Score of 6 (FIM)  -RC  Score of 6 (FIM)  -LW    Time Frame (UB Dressing FIM Goal, OT-IRF)  long term goal (LTG);by discharge  -LW  long term goal (LTG);by discharge  -RC  long term goal (LTG);by discharge  -LW    Progress/Outcomes (UB Dressing FIM Goal, OT-IRF)  goal not met;continuing progress toward goal  -LW  goal not  met;continuing progress toward goal  -RC  goal not met  -LW       LB Dressing FIM Goal (OT-IRF)    Lower Body Dressing, FIM Goal, OT-IRF  Score of 6 (FIM)  -LW  Score of 6 (FIM)  -RC  Score of 6 (FIM)  -LW    Time Frame (LB Dressing FIM Goal, OT-IRF)  long term goal (LTG);by discharge  -LW  long term goal (LTG);by discharge  -RC  long term goal (LTG);by discharge  -LW    Progress/Outcomes (LB Dressing FIM Goal, OT-IRF)  goal not met;continuing progress toward goal  -LW  goal not met;continuing progress toward goal  -RC  goal not met  -LW       Toileting Goal 1 (OT-IRF)    Activity/Device (Toileting Goal 1, OT-IRF)  toileting skills, all  -LW  toileting skills, all  -RC  toileting skills, all  -LW    [place] Level (Toileting Goal 1, OT-IRF)  supervision required  -LW  supervision required  -RC  supervision required  -LW    Time Frame (Toileting Goal 1, OT-IRF)  long term goal (LTG);by discharge  -LW  long term goal (LTG);by discharge  -RC  long term goal (LTG);by discharge  -LW    Progress/Outcomes (Toileting Goal 1, OT-IRF)  goal not met;continuing progress toward goal  -LW  goal not met;continuing progress toward goal  -RC  goal not met  -LW       ROM Goal 1 (OT-IRF)    ROM Goal 1 (OT-IRF)  Pt will display WFL with ROM in BUE with min reports of pain/increased pain level.   -LW  Pt will display WFL with ROM in BUE with min reports of pain/increased pain level.   -RC  Pt will display WFL with ROM in BUE with min reports of pain/increased pain level.   -LW    Time Frame (ROM Goal 1, OT-IRF)  long term goal (LTG);by discharge  -LW  long term goal (LTG);by discharge  -RC  long term goal (LTG);by discharge  -LW    Progress/Outcomes (ROM Goal 1, OT-IRF)  goal not met;continuing progress toward goal  -LW  goal not met;continuing progress toward goal  -RC  goal not met  -LW       Problem Specific Goal 1 (OT-IRF)    Problem Specific Goal 1 (OT-IRF)  Pt will be independent with BUE HEP and home safety awarness.   -LW   Pt will be independent with BUE HEP and home safety awarness.   -RC  Pt will be independent with BUE HEP and home safety awarness.   -LW    Time Frame (Problem Specific Goal 1, OT-IRF)  long term goal (LTG);by discharge  -LW  long term goal (LTG);by discharge  -RC  long term goal (LTG);by discharge  -LW    Progress/Outcomes (Problem Specific Goal 1, OT-IRF)  goal not met;continuing progress toward goal  -LW  goal not met;continuing progress toward goal  -RC  goal not met  -LW    Row Name 02/07/19 1000 02/06/19 1015 02/06/19 0732       Transfer FIM Goals (OT-IRF)    Tub-Shower Transfer FIM Goal (OT-IRF)  Score of 6 (FIM)  -KD  Score of 6 (FIM)  -BL  Score of 6 (FIM)  -BH    Toilet Transfer FIM Goal (OT-IRF)  Score of 6 (FIM)  -KD  Score of 6 (FIM)  -BL  Score of 6 (FIM)  -BH    Time Frame (Transfer FIM Goals 1, OT-IRF)  long term goal (LTG);by discharge  -KD  long term goal (LTG);by discharge  -BL  long term goal (LTG);by discharge  -BH    Progress/Outcomes (Transfer FIM Goals, OT-IRF)  goal not met  -KD  goal not met  -BL  goal not met  -BH       Bathing FIM Goal (OT-IRF)    Bathing FIM Goal (OT-IRF)  Score of 6 (FIM)  -KD  Score of 6 (FIM)  -BL  Score of 6 (FIM)  -BH    Time Frame (Bathing FIM Goal, OT-IRF)  long term goal (LTG);by discharge  -KD  long term goal (LTG);by discharge  -BL  long term goal (LTG);by discharge  -BH    Progress/Outcomes (Bathing FIM Goal, OT-IRF)  goal not met  -KD  goal not met  -BL  goal not met  -BH       UB Dressing FIM Goal (OT-IRF)    Upper Body Dressing, FIM Goal, OT-IRF  Score of 6 (FIM)  -KD  Score of 6 (FIM)  -BL  Score of 6 (FIM)  -BH    Time Frame (UB Dressing FIM Goal, OT-IRF)  long term goal (LTG);by discharge  -KD  long term goal (LTG);by discharge  -BL  long term goal (LTG);by discharge  -BH    Progress/Outcomes (UB Dressing FIM Goal, OT-IRF)  goal not met  -KD  goal not met  -BL  goal not met  -BH       LB Dressing FIM Goal (OT-IRF)    Lower Body Dressing, FIM Goal, OT-IRF   Score of 6 (FIM)  -KD  Score of 6 (FIM)  -BL  Score of 6 (FIM)  -BH    Time Frame (LB Dressing FIM Goal, OT-IRF)  long term goal (LTG);by discharge  -KD  long term goal (LTG);by discharge  -BL  long term goal (LTG);by discharge  -BH    Progress/Outcomes (LB Dressing FIM Goal, OT-IRF)  goal not met  -KD  goal not met  -BL  goal not met  -BH       Toileting Goal 1 (OT-IRF)    Activity/Device (Toileting Goal 1, OT-IRF)  toileting skills, all  -KD  toileting skills, all  -BL  toileting skills, all  -BH    [place] Level (Toileting Goal 1, OT-IRF)  supervision required  -KD  supervision required  -BL  supervision required  -BH    Time Frame (Toileting Goal 1, OT-IRF)  long term goal (LTG);by discharge  -KD  long term goal (LTG);by discharge  -BL  long term goal (LTG);by discharge  -BH    Progress/Outcomes (Toileting Goal 1, OT-IRF)  goal not met  -KD  goal not met  -BL  goal not met  -BH       ROM Goal 1 (OT-IRF)    ROM Goal 1 (OT-IRF)  Pt will display WFL with ROM in BUE with min reports of pain/increased pain level.   -KD  Pt will display WFL with ROM in BUE with min reports of pain/increased pain level.   -BL  Pt will display WFL with ROM in BUE with min reports of pain/increased pain level.   -BH    Time Frame (ROM Goal 1, OT-IRF)  long term goal (LTG);by discharge  -KD  long term goal (LTG);by discharge  -BL  long term goal (LTG);by discharge  -BH    Progress/Outcomes (ROM Goal 1, OT-IRF)  goal not met  -KD  goal not met  -BL  goal not met  -BH       Problem Specific Goal 1 (OT-IRF)    Problem Specific Goal 1 (OT-IRF)  Pt will be independent with BUE HEP and home safety awarness.   -KD  Pt will be independent with BUE HEP and home safety awarness.   -BL  Pt will be independent with BUE HEP and home safety awarness.   -BH    Time Frame (Problem Specific Goal 1, OT-IRF)  long term goal (LTG);by discharge  -KD  long term goal (LTG);by discharge  -BL  long term goal (LTG);by discharge  -BH    Progress/Outcomes  (Problem Specific Goal 1, OT-IRF)  goal not met  -KD  goal not met  -  goal not met  -      User Key  (r) = Recorded By, (t) = Taken By, (c) = Cosigned By    Initials Name Provider Type     Maria Elena Barragan, OTR/L Occupational Therapist     Valeri Woodard, COULTER/L Occupational Therapy Assistant     Ele Avina, COULTER/L Occupational Therapy Assistant     Giana Chakraborty, COULTER/L Occupational Therapy Assistant     Irina Armstrong, COULTER/L Occupational Therapy Assistant          Occupational Therapy Education     Title: PT OT SLP Therapies (In Progress)     Topic: Occupational Therapy (Done)     Point: ADL training (Done)     Description: Instruct learner(s) on proper safety adaptation and remediation techniques during self care or transfers.   Instruct in proper use of assistive devices.    Learning Progress Summary           Patient Acceptance, E,TB, VU by  at 2/9/2019  3:38 PM    Acceptance, E,TB, VU by  at 2/8/2019  9:12 AM    Acceptance, E, VU,NR by  at 2/6/2019  1:35 PM    Comment:  Educated about OT and POC. Educated to call for assist. Educated on safety throughout.                   Point: Home exercise program (Done)     Description: Instruct learner(s) on appropriate technique for monitoring, assisting and/or progressing therapeutic exercises/activities.    Learning Progress Summary           Patient Acceptance, E,TB, VU by  at 2/9/2019  3:38 PM    Acceptance, E,TB, VU by  at 2/8/2019  9:12 AM                   Point: Precautions (Done)     Description: Instruct learner(s) on prescribed precautions during self-care and functional transfers.    Learning Progress Summary           Patient Acceptance, E,TB, VU by  at 2/9/2019  3:38 PM    Acceptance, E,TB, VU by  at 2/8/2019  9:12 AM    Acceptance, E, VU,NR by  at 2/6/2019  1:35 PM    Comment:  Educated about OT and POC. Educated to call for assist. Educated on safety throughout.    Acceptance, E, VU,NR by  at 2/6/2019 11:36 AM                    Point: Body mechanics (Done)     Description: Instruct learner(s) on proper positioning and spine alignment during self-care, functional mobility activities and/or exercises.    Learning Progress Summary           Patient Acceptance, E,TB, VU by  at 2/9/2019  3:38 PM    Acceptance, E,TB, VU by  at 2/8/2019  9:12 AM    Acceptance, E, VU,NR by  at 2/6/2019 11:36 AM                               User Key     Initials Effective Dates Name Provider Type Discipline     06/08/18 -  Maria Elena Barragan, OTR/L Occupational Therapist OT     10/17/16 -  Giana Chakraborty, COULTER/L Occupational Therapy Assistant OT     03/07/18 -  Irina Armstrong COTA/L Occupational Therapy Assistant OT                Outcome Measures     Row Name 02/09/19 1432 02/09/19 1300 02/09/19 0950       How much help from another person do you currently need...    Turning from your back to your side while in flat bed without using bedrails?  3  -TW  --  --    Moving from lying on back to sitting on the side of a flat bed without bedrails?  3  -TW  --  --    Moving to and from a bed to a chair (including a wheelchair)?  3  -TW  --  --    Standing up from a chair using your arms (e.g., wheelchair, bedside chair)?  3  -TW  --  --    Climbing 3-5 steps with a railing?  2  -TW  --  --    To walk in hospital room?  3  -TW  --  --    AM-PAC 6 Clicks Score  17  -TW  --  --       How much help from another is currently needed...    Putting on and taking off regular lower body clothing?  --  2  -LW  2  -RC    Bathing (including washing, rinsing, and drying)  --  2  -LW  2  -RC    Toileting (which includes using toilet bed pan or urinal)  --  3  -LW  3  -RC    Putting on and taking off regular upper body clothing  --  3  -LW  3  -RC    Taking care of personal grooming (such as brushing teeth)  --  3  -LW  3  -RC    Eating meals  --  4  -LW  4  -RC    Score  --  17  -LW  17  -RC       Functional Assessment    Outcome Measure Options  AM-PAC 6  Clicks Basic Mobility (PT)  -TW  --  --    Row Name 02/09/19 0819 02/08/19 0700 02/07/19 1000       How much help from another person do you currently need...    Turning from your back to your side while in flat bed without using bedrails?  3  -TW  --  --    Moving from lying on back to sitting on the side of a flat bed without bedrails?  3  -TW  --  --    Moving to and from a bed to a chair (including a wheelchair)?  3  -TW  --  --    Standing up from a chair using your arms (e.g., wheelchair, bedside chair)?  3  -TW  --  --    Climbing 3-5 steps with a railing?  2  -TW  --  --    To walk in hospital room?  2  -TW  --  --    AM-PAC 6 Clicks Score  16  -TW  --  --       How much help from another is currently needed...    Putting on and taking off regular lower body clothing?  --  2  -LW  2  -KD    Bathing (including washing, rinsing, and drying)  --  2  -LW  2  -KD    Toileting (which includes using toilet bed pan or urinal)  --  3  -LW  3  -KD    Putting on and taking off regular upper body clothing  --  3  -LW  3  -KD    Taking care of personal grooming (such as brushing teeth)  --  3  -LW  3  -KD    Eating meals  --  4  -LW  4  -KD    Score  --  17  -LW  17  -KD       Functional Assessment    Outcome Measure Options  AM-PAC 6 Clicks Basic Mobility (PT)  -TW  --  --    Row Name 02/07/19 0805             How much help from another person do you currently need...    Turning from your back to your side while in flat bed without using bedrails?  3  -TW      Moving from lying on back to sitting on the side of a flat bed without bedrails?  3  -TW      Moving to and from a bed to a chair (including a wheelchair)?  3  -TW      Standing up from a chair using your arms (e.g., wheelchair, bedside chair)?  3  -TW      Climbing 3-5 steps with a railing?  2  -TW      To walk in hospital room?  2  -TW      AM-PAC 6 Clicks Score  16  -TW         Functional Assessment    Outcome Measure Options  AM-PAC 6 Clicks Basic Mobility  (PT)  -TW        User Key  (r) = Recorded By, (t) = Taken By, (c) = Cosigned By    Initials Name Provider Type    TW Brant Palacios, KAREN Physical Therapy Assistant    RC Valeri Woodard COTA/L Occupational Therapy Assistant    Ele Chu COTA/L Occupational Therapy Assistant    Irina Lorenzo COTA/L Occupational Therapy Assistant             Time Calculation:     Time Calculation- OT     Row Name 02/09/19 1539 02/09/19 1356          Time Calculation- OT    OT Start Time  1300  -LW  0950  -RC     OT Stop Time  1340  -LW  1050  -RC     OT Time Calculation (min)  40 min  -LW  60 min  -RC     Total Timed Code Minutes- OT  40 minute(s)  -LW  60 minute(s)  -RC     OT Received On  02/09/19  -LW  02/09/19  -       User Key  (r) = Recorded By, (t) = Taken By, (c) = Cosigned By    Initials Name Provider Type     Valeri Woodard COTA/L Occupational Therapy Assistant    Irina Lorenzo COTA/L Occupational Therapy Assistant          Therapy Suggested Charges     Code   Minutes Charges    None              Therapy Charges for Today     Code Description Service Date Service Provider Modifiers Qty    00383304600 HC OT SELF CARE/MGMT/TRAIN EA 15 MIN 2/8/2019 Irina Armstrong COTA/L GO 4    01672083438 HC OT THERAPEUTIC ACT EA 15 MIN 2/8/2019 Irina Armstrong COTA/L GO 2    78722162815 HC OT THER PROC EA 15 MIN 2/9/2019 Irina Armstrong COTA/L GO 3      Non-skid socks and gait belt in place. Toileting offered. Call light and needs within reach. Pt advised to not get up alone and call the nurse for assistance.  Tag alarm on.                JOLIE Perez  2/9/2019

## 2019-02-09 NOTE — PLAN OF CARE
Problem: Patient Care Overview  Goal: Plan of Care Review  Outcome: Ongoing (interventions implemented as appropriate)   02/09/19 8319   Patient Care Overview   IRF Plan of Care Review progress ongoing, continue   Progress, Functional Goals demonstrating adequate progress   Coping/Psychosocial   Plan of Care Reviewed With patient   OTHER   Outcome Summary Pt agreed to perform 6 pk ex for L hand, and UB strengthening for aid independence with ADL's

## 2019-02-09 NOTE — THERAPY TREATMENT NOTE
Inpatient Rehabilitation - Physical Therapy Treatment Note  [facility]     Patient Name: Ventura Boggs  : 1960  MRN: [id number]    Today's Date: 2019                 Admit Date: 2019      Visit Dx:      ICD-10-CM ICD-9-CM   1. Uncontrolled type 2 diabetes mellitus with hyperglycemia (CMS/HCC) E11.65 250.02   2. Medically complex patient Z78.9 V49.9   3. Chronic intractable pain G89.29 338.29   4. Symbolic dysfunction R48.9 784.60   5. Impaired mobility and ADLs Z74.09 799.89   6. Impaired functional mobility, balance, gait, and endurance Z74.09 V49.89       Patient Active Problem List   Diagnosis   • Uncontrolled type 2 diabetes mellitus with hyperglycemia (CMS/HCC)   • Postoperative urinary retention   • Incisional hernia, without obstruction or gangrene   • Acute appendicitis with localized peritonitis   • Closed head injury   • Hyponatremia   • History of recurrent TIAs   • Ataxia   • Chronic pain syndrome   • Right shoulder pain   • Left-sided muscle weakness   • Medically complex patient   • Stroke-like symptoms       Therapy Treatment    IRF Treatment Summary     Row Name 19 1432 19 1057 19 0950       Evaluation/Treatment Time and Intent    Subjective Information  no complaints  -TW  --  complains of  -RC    Existing Precautions/Restrictions  fall;lifting  -TW  --  fall;lifting  -RC    Document Type  therapy note (daily note)  -TW  --  therapy note (daily note)  -RC    Mode of Treatment  physical therapy;individual therapy  -TW  individual therapy  -EA  occupational therapy;individual therapy  -RC    Start Time (Evaluation/Treatment)  1432  -TW  --  -TW  0950  -RC    Stop Time (Evaluation/Treatment)  1505  -TW  --  --    Recorded by [TW] Brant Palacios PTA [EA] Madison Abel MS CCC-SLP  [TW] Brant Palacios PTA [RC] Valeri Woodard COTA/L    Row Name 19 0819             Evaluation/Treatment Time and Intent    Subjective Information  no  complaints  -TW      Existing Precautions/Restrictions  fall;lifting  -TW      Document Type  therapy note (daily note)  -TW      Mode of Treatment  physical therapy;individual therapy  -TW      Start Time (Evaluation/Treatment)  0819  -TW      Stop Time (Evaluation/Treatment)  0915  -TW      Recorded by [TW] Brant Palacios PTA      Row Name 02/09/19 1432 02/09/19 0819          Cognition/Psychosocial- PT/OT    Affect/Mental Status (Cognitive)  WFL  -TW  WFL  -TW     Orientation Status (Cognition)  oriented x 4  -TW  oriented x 4  -TW     Follows Commands (Cognition)  WFL  -TW  WFL  -TW     Recorded by [TW] Brant Palacios PTA [TW] Brant Palacios PTA     Row Name 02/09/19 1432 02/09/19 0819          Bed Mobility Assessment/Treatment    Supine-Sit [place] (Bed Mobility)  not tested  -TW  supervision  -TW     Sit-Supine [place] (Bed Mobility)  not tested  -TW  not tested  -TW     Bed Mobility, Safety Issues  --  decreased use of legs for bridging/pushing  -TW     Assistive Device (Bed Mobility)  --  bed rails;head of bed elevated  -TW     Recorded by [TW] Brant Palacios PTA [TW] Brant Palacios PTA     Row Name 02/09/19 1432 02/09/19 0819          Bed-Chair Transfer    Bed-Chair [place] (Transfers)  minimum assist (75% patient effort) To mat  -TW  minimum assist (75% patient effort)  -TW     Assistive Device (Bed-Chair Transfers)  walker, front-wheeled  -TW  walker, front-wheeled  -TW     Recorded by [TW] Brant Palacios PTA [TW] Brant Palacios PTA     Row Name 02/09/19 1432 02/09/19 0819          Chair-Bed Transfer    Chair-Bed [place] (Transfers)  minimum assist (75% patient effort) To w/c.  -TW  minimum assist (75% patient effort)  -TW     Assistive Device (Chair-Bed Transfers)  walker, front-wheeled  -TW  walker, front-wheeled  -TW     Recorded by [TW] Brant Palacios PTA [TW] Brant Palacios PTA     Row Name 02/09/19 1432 02/09/19 0819           Sit-Stand Transfer    Sit-Stand [place] (Transfers)  minimum assist (75% patient effort)  -TW  minimum assist (75% patient effort)  -TW     Assistive Device (Sit-Stand Transfers)  walker, front-wheeled  -TW  walker, front-wheeled  -TW     Recorded by [TW] Brant Palacios PTA [TW] Brant Palacios PTA     Row Name 02/09/19 1432 02/09/19 0819          Stand-Sit Transfer    Stand-Sit [place] (Transfers)  minimum assist (75% patient effort)  -TW  minimum assist (75% patient effort)  -TW     Assistive Device (Stand-Sit Transfers)  walker, front-wheeled  -TW  walker, front-wheeled  -TW     Recorded by [TW] Brant Palacios PTA [TW] Brant Palacios PTA     Row Name 02/09/19 1432 02/09/19 0819          Gait/Stairs Assessment/Training    Gait/Stairs Assessment/Training  gait/ambulation assistive device  -TW  gait/ambulation assistive device  -TW     [place] Level (Gait)  contact guard;minimum assist (75% patient effort)  -TW  contact guard;minimum assist (75% patient effort)  -TW     Assistive Device (Gait)  walker, front-wheeled  -TW  walker, front-wheeled  -TW     Distance in Feet (Gait)  55ft  -TW  40ft then 48ft  -TW     Pattern (Gait)  step-to  -TW  step-to  -TW     Deviations/Abnormal Patterns (Gait)  left sided deviations;stride length decreased  -TW  left sided deviations;stride length decreased  -TW     Bilateral Gait Deviations  foot drop/toe drag;heel strike decreased;knee buckling, left side  -TW  foot drop/toe drag;heel strike decreased;knee buckling, left side  -TW     Recorded by [TW] Brant Palacios PTA [TW] Brant Palacios PTA     Row Name 02/09/19 1432 02/09/19 0950 02/09/19 0819       Pain Scale: Numbers Pre/Post-Treatment    Pain Scale: Numbers, Pretreatment  5/10  -TW  8/10  -RC  7/10  -TW    Pain Scale: Numbers, Post-Treatment  5/10  -TW  8/10  -RC  7/10  -TW    Pain Location - Side  Right  -TW  Right  -RC  Right  -TW    Pain Location - Orientation  upper  -TW   -- r shoulder l leg   -RC  upper  -TW    Pain Location  extremity  -TW  shoulder  -RC  extremity  -TW    Pain Intervention(s)  --  Medication (See MAR)  -RC  --    Recorded by [TW] Brant Palacios PTA [RC] Valeri Woodard COTA/MEI [TW] Brant Palacios PTA    Row Name 02/09/19 1432 02/09/19 0819          Static Standing Balance    Level of [place] (Supported Standing, Static Balance)  supervision;contact guard assist  -TW  supervision  -TW     Time Able to Maintain Position (Supported Standing, Static Balance)  3 to 4 minutes  -TW  3 to 4 minutes  -TW     Assistive Device Utilized (Supported Standing, Static Balance)  walker, rolling  -TW  walker, rolling  -TW     Recorded by [TW] Brant Palacios PTA [TW] Brant Palacios PTA     Row Name 02/09/19 0819             Lower Extremity Supine Therapeutic Exercise    Performed, Supine Lower Extremity (Therapeutic Exercise)  hip flexion/extension;hip abduction/adduction;hip external/internal rotation;ankle dorsiflexion/plantarflexion;SAQ (short arc quad) over bolster;quadriceps sets;gluteal sets;ankle pumps;heel slides;bridging (bilateral w/bolster)  -TW      Exercise Type, Supine Lower Extremity (Therapeutic Exercise)  AAROM (active assistive range of motion);AROM (active range of motion) AAROM with L LE and AROM with R LE.  -TW      Sets/Reps Detail, Supine Lower Extremity (Therapeutic Exercise)  1/15-20  -TW      Recorded by [TW] Brant Palacios PTA      Row Name 02/09/19 0819             Positioning and Restraints    Pre-Treatment Position  in bed  -TW      Post Treatment Position  wheelchair  -TW      In Wheelchair  sitting;encouraged to call for assist  -TW      Recorded by [TW] Brant Palacios PTA      Row Name 02/09/19 1432 02/09/19 0819          Daily Summary of Progress (PT)    Functional Goal Overall Progress: Physical Therapy  progressing toward functional goals as expected  -TW  progressing toward functional goals as expected   -TW     Impairments Continuing to Limit Function: Physical Therapy  strength decreased;impaired balance;coordination impaired;motor control impaired;pain  -TW  strength decreased;impaired balance;coordination impaired;motor control impaired;pain  -TW     Recommendations: Physical Therapy  cont to mobilize.  -TW  Cont  -TW     Recorded by [TW] Brant Palacios PTA [TW] Brant Palacios PTA       User Key  (r) = Recorded By, (t) = Taken By, (c) = Cosigned By    Initials Name Effective Dates    EA Bernadine Madison S, MS CCC-SLP 10/17/16 -     Brant Lancaster PTA 03/07/18 -     Valeri Lozoya COTA/MEI 03/07/18 -            Physical Therapy Education     Title: PT OT SLP Therapies (In Progress)     Topic: Physical Therapy (In Progress)     Point: Mobility training (In Progress)     Learning Progress Summary           Patient TA Bain, NR by BS at 2/8/2019 12:59 PM    Comment:  further addressed body position with weight shifting and sequencing of LE's with limb advancement in gait training w/ // bars. Tactile and verbal cues provided.    Acceptance, E, NR by GAL at 2/6/2019  2:39 PM                   Point: Home exercise program (In Progress)     Learning Progress Summary           Patient TA Bain, NR by BS at 2/8/2019 12:59 PM    Comment:  further addressed body position with weight shifting and sequencing of LE's with limb advancement in gait training w/ // bars. Tactile and verbal cues provided.                   Point: Body mechanics (In Progress)     Learning Progress Summary           Patient TA Bain NR by BS at 2/8/2019 12:59 PM    Comment:  further addressed body position with weight shifting and sequencing of LE's with limb advancement in gait training w/ // bars. Tactile and verbal cues provided.    Acceptance, E, NR by GAL at 2/6/2019  2:39 PM                   Point: Precautions (In Progress)     Learning Progress Summary           Patient TA Bain, NR by BS at 2/8/2019 12:59 PM     Comment:  further addressed body position with weight shifting and sequencing of LE's with limb advancement in gait training w/ // bars. Tactile and verbal cues provided.    Acceptance, E, NR by GAL at 2/6/2019  2:39 PM                               User Key     Initials Effective Dates Name Provider Type Discipline    GAL 04/03/18 -  Shilpa Dumont, PT Physical Therapist PT    BS 04/08/18 -  Jose Antonio Cuevas, PT Physical Therapist PT                  PT Recommendation and Plan     Plan of Care Reviewed With: patient  Daily Summary of Progress (PT)  Functional Goal Overall Progress: Physical Therapy: progressing toward functional goals as expected  Impairments Continuing to Limit Function: Physical Therapy: strength decreased, impaired balance, coordination impaired, motor control impaired, pain  Recommendations: Physical Therapy: cont to mobilize.  IRF Plan of Care Review: progress ongoing, continue  Progress, Functional Goals: demonstrating adequate progress  Outcome Summary: Pt able to amb 55ft with CGA/MIN A of 1 with VCs needed to prevent L knee from buckling. Pt showing good tolerance to gait and t/f's this tx but cont to have difficulty with dragging L foot during advancement.      PT IRF GOALS     Row Name 02/09/19 1432 02/09/19 0819          Bed Mobility Goal 1 (PT-IRF)    Activity/Assistive Device (Bed Mobility Goal 1, PT-IRF)  rolling to left;rolling to right;sit to supine;supine to sit  -TW  rolling to left;rolling to right;sit to supine;supine to sit  -TW     [place] Level (Bed Mobility Goal 1, PT-IRF)  independent  -TW  independent  -TW     Time Frame (Bed Mobility Goal 1, PT-IRF)  1 week  -TW  1 week  -TW     Progress/Outcomes (Bed Mobility Goal 1, PT-IRF)  goal not met  -TW  goal not met  -TW        Transfer Goal 1 (PT-IRF)    Activity/Assistive Device (Transfer Goal 1, PT-IRF)  sit-to-stand/stand-to-sit;bed-to-chair/chair-to-bed;wheelchair transfer  -TW   sit-to-stand/stand-to-sit;bed-to-chair/chair-to-bed;wheelchair transfer  -TW     [place] Level (Transfer Goal 1, PT-IRF)  conditional independence  -TW  conditional independence  -TW     Time Frame (Transfer Goal 1, PT-IRF)  1 week  -TW  1 week  -TW     Progress/Outcomes (Transfer Goal 1, PT-IRF)  goal not met  -TW  goal not met  -TW        Gait/Walking Locomotion Goal 1 (PT-IRF)    Activity/Assistive Device (Gait/Walking Locomotion Goal 1, PT-IRF)  gait (walking locomotion);assistive device use  -TW  gait (walking locomotion);assistive device use  -TW     Gait/Walking Locomotion Distance Goal 1 (PT-IRF)  50ft  -TW  50ft  -TW     [place] Level (Gait/Walking Locomotion Goal 1, PT-IRF)  contact guard assist  -TW  contact guard assist  -TW     Time Frame (Gait/Walking Locomotion Goal 1, PT-IRF)  1 week  -TW  1 week  -TW     Progress/Outcomes (Gait/Walking Locomotion Goal 1, PT-IRF)  goal not met  -TW  goal not met  -TW        Gait/Walking Locomotion Goal 2 (PT-IRF)    Activity/Assistive Device (Gait/Walking Locomotion Goal 2, PT-IRF)  gait (walking locomotion);assistive device use  -TW  gait (walking locomotion);assistive device use  -TW     Gait/Walking Locomotion Distance Goal 2 (PT-IRF)  150ft or more  -TW  150ft or more  -TW     [place] Level (Gait/Walking Locomotion Goal 2, PT-IRF)  conditional independence  -TW  conditional independence  -TW     Time Frame (Gait/Walking Locomotion Goal 2, PT-IRF)  2 weeks  -TW  2 weeks  -TW     Progress/Outcomes (Gait/Walking Locomotion Goal 2, PT-IRF)  goal not met  -TW  goal not met  -TW        Wheelchair Locomotion Goal 1 (PT-IRF)    Activity (Wheelchair Locomotion Goal 1, PT-IRF)  forward propulsion;backward propulsion;steering;turning;doorway navigation  -TW  forward propulsion;backward propulsion;steering;turning;doorway navigation  -TW     [place] Level (Wheelchair Locomotion Goal 1, PT-IRF)  independent  -TW  independent  -TW     Distance Goal 1  (Wheelchair Locomotion, PT-IRF)  150ft  -TW  150ft  -TW     Time Frame (Wheelchair Locomotion Goal 1, PT-IRF)  2 days  -TW  2 days  -TW     Progress/Outcomes (Wheelchair Locomotion Goal 1, PT-IRF)  goal not met  -TW  goal not met  -TW        Stairs Goal 1 (PT-IRF)    Activity/Assistive Device (Stairs Goal 1, PT-IRF)  ascending stairs;descending stairs;using handrail, right  -TW  ascending stairs;descending stairs;using handrail, right  -TW     Number of Stairs (Stairs Goal 1, PT-IRF)  5  -TW  5  -TW     [place] Level (Stairs Goal 1, PT-IRF)  standby assist;conditional independence  -TW  standby assist;conditional independence  -TW     Time Frame (Stairs Goal 1, PT-IRF)  2 weeks  -TW  2 weeks  -TW     Progress/Outcomes (Stairs Goal 1, PT-IRF)  goal not met  -TW  goal not met  -TW        Strength Goal 1 (PT-IRF)    Strength Goal 1 (PT-IRF)  Pt will perform 20 reps of AROM exercises with VSS  -TW  Pt will perform 20 reps of AROM exercises with VSS  -TW     Time Frame (Strength Goal 1, PT-IRF)  by discharge  -TW  by discharge  -TW     Progress/Outcomes (Strength Goal 1, PT-IRF)  goal not met  -TW  goal not met  -TW        Caregiver Training Goal 1 (PT-IRF)    Caregiver Training Goal 1 (PT-IRF)  Homecaregiver will demonstrate understanding assisting with mobility as needed, home safety and homecare instructions  -TW  Homecaregiver will demonstrate understanding assisting with mobility as needed, home safety and homecare instructions  -TW     Time Frame (Caregiver Training Goal 1, PT-IRF)  by discharge  -TW  by discharge  -TW     Progress/Outcomes (Caregiver Training Goal 1, PT-IRF)  goal not met  -TW  goal not met  -TW       User Key  (r) = Recorded By, (t) = Taken By, (c) = Cosigned By    Initials Name Provider Type    TW Brant Palacios PTA Physical Therapy Assistant        Outcome Measures     Row Name 02/09/19 1432 02/09/19 0950 02/09/19 0819       How much help from another person do you currently need...     Turning from your back to your side while in flat bed without using bedrails?  3  -TW  --  3  -TW    Moving from lying on back to sitting on the side of a flat bed without bedrails?  3  -TW  --  3  -TW    Moving to and from a bed to a chair (including a wheelchair)?  3  -TW  --  3  -TW    Standing up from a chair using your arms (e.g., wheelchair, bedside chair)?  3  -TW  --  3  -TW    Climbing 3-5 steps with a railing?  2  -TW  --  2  -TW    To walk in hospital room?  3  -TW  --  2  -TW    AM-PAC 6 Clicks Score  17  -TW  --  16  -TW       How much help from another is currently needed...    Putting on and taking off regular lower body clothing?  --  2  -RC  --    Bathing (including washing, rinsing, and drying)  --  2  -RC  --    Toileting (which includes using toilet bed pan or urinal)  --  3  -RC  --    Putting on and taking off regular upper body clothing  --  3  -RC  --    Taking care of personal grooming (such as brushing teeth)  --  3  -RC  --    Eating meals  --  4  -RC  --    Score  --  17  -RC  --       Functional Assessment    Outcome Measure Options  AM-PAC 6 Clicks Basic Mobility (PT)  -TW  --  AM-PAC 6 Clicks Basic Mobility (PT)  -TW    Row Name 02/08/19 0700 02/07/19 1000 02/07/19 0805       How much help from another person do you currently need...    Turning from your back to your side while in flat bed without using bedrails?  --  --  3  -TW    Moving from lying on back to sitting on the side of a flat bed without bedrails?  --  --  3  -TW    Moving to and from a bed to a chair (including a wheelchair)?  --  --  3  -TW    Standing up from a chair using your arms (e.g., wheelchair, bedside chair)?  --  --  3  -TW    Climbing 3-5 steps with a railing?  --  --  2  -TW    To walk in hospital room?  --  --  2  -TW    AM-PAC 6 Clicks Score  --  --  16  -TW       How much help from another is currently needed...    Putting on and taking off regular lower body clothing?  2  -LW  2  -KD  --    Bathing  (including washing, rinsing, and drying)  2  -LW  2  -KD  --    Toileting (which includes using toilet bed pan or urinal)  3  -LW  3  -KD  --    Putting on and taking off regular upper body clothing  3  -LW  3  -KD  --    Taking care of personal grooming (such as brushing teeth)  3  -LW  3  -KD  --    Eating meals  4  -LW  4  -KD  --    Score  17  -LW  17  -KD  --       Functional Assessment    Outcome Measure Options  --  --  AM-PAC 6 Clicks Basic Mobility (PT)  -TW      User Key  (r) = Recorded By, (t) = Taken By, (c) = Cosigned By    Initials Name Provider Type    TW Brant Palacios PTA Physical Therapy Assistant    RC Valeri Woodard, MARYLIN/L Occupational Therapy Assistant    KD Ele Avina, MARYLIN/L Occupational Therapy Assistant    LW Irina Armstrong, MARYLIN/L Occupational Therapy Assistant             Time Calculation:     PT Charges     Row Name 02/09/19 1518 02/09/19 1443          Time Calculation    Start Time  1432  -TW  0819  -TW     Stop Time  1505  -TW  0915  -TW     Time Calculation (min)  33 min  -TW  56 min  -TW     PT Received On  02/09/19  -TW  02/09/19  -TW     PT Goal Re-Cert Due Date  02/19/19  -TW  02/19/19  -TW        Time Calculation- PT    Total Timed Code Minutes- PT  33 minute(s)  -TW  56 minute(s)  -TW       User Key  (r) = Recorded By, (t) = Taken By, (c) = Cosigned By    Initials Name Provider Type     Brant Palacios PTA Physical Therapy Assistant            Therapy Charges for Today     Code Description Service Date Service Provider Modifiers Qty    79989628491 HC GAIT TRAINING EA 15 MIN 2/9/2019 Brant Palacios PTA GP 2    46850780212 HC PT THERAPEUTIC ACT EA 15 MIN 2/9/2019 Brant Palacios, PTA GP 1    06045303492 HC PT THER PROC EA 15 MIN 2/9/2019 Brant Palacios, PTA GP 1    00922349947 HC PT THERAPEUTIC ACT EA 15 MIN 2/9/2019 Brant Palacios, PTA GP 1    51218700121 HC GAIT TRAINING EA 15 MIN 2/9/2019 Brant Palacios, PTA GP 1            PT  G-Codes  Outcome Measure Options: AM-PAC 6 Clicks Basic Mobility (PT)  AM-PAC 6 Clicks Score: 17  Score: 17      Brant Palacios, PTA  2/9/2019

## 2019-02-09 NOTE — PLAN OF CARE
Problem: Patient Care Overview  Goal: Plan of Care Review  Outcome: Ongoing (interventions implemented as appropriate)   02/09/19 1432   Patient Care Overview   IRF Plan of Care Review progress ongoing, continue   Progress, Functional Goals demonstrating adequate progress   Coping/Psychosocial   Plan of Care Reviewed With patient   OTHER   Outcome Summary Pt able to amb 55ft with CGA/MIN A of 1 with VCs needed to prevent L knee from buckling. Pt showing good tolerance to gait and t/f's this tx but cont to have difficulty with dragging L foot during advancement.

## 2019-02-09 NOTE — PLAN OF CARE
Problem: Patient Care Overview  Goal: Plan of Care Review  Outcome: Ongoing (interventions implemented as appropriate)   02/09/19 1136   Patient Care Overview   IRF Plan of Care Review progress ongoing, continue   Progress, Functional Goals demonstrating adequate progress   Coping/Psychosocial   Plan of Care Reviewed With patient   OTHER   Outcome Summary Skilled ST therapy completed this date. Pt was alert and sitting in wc upon SLP arrival. Pt was oriented x4. Pt complaints loss of memory at times which has been noticed approximately 1 year. POC was reviewed and memory strategies provided. No goals met this date. Continue POC.

## 2019-02-09 NOTE — PROGRESS NOTES
[facility] Medicine Services  INPATIENT PROGRESS NOTE    Length of Stay: 4  Date of Admission: 2/5/2019  Primary Care Physician: Anita Faria APRN    Subjective   Chief Complaint/HPI: This 58-year-old  male was admitted to acute rehabilitation services secondary to worsening left-sided weakness.  Patient also experienced unsteady gait, slurred speech, and left-sided numbness.  He had several falls at home.  Patient did have a stroke workup at [facility] which was essentially negative.  Patient did have a past history of a closed head injury and states that his symptoms are similar to a TIA he has had in the past.    This time patient is comfortable with no distress.  He has been wheeling around the unit in his wheelchair all morning, active and interacting with staff members.  Last bowel movement was approximately 3 days ago.  Denies nausea, vomiting, chest pain, shortness of air.    Review of Systems   Constitutional: Negative for chills and fever.   Respiratory: Negative for chest tightness and shortness of breath.    Cardiovascular: Negative for chest pain and palpitations.   Gastrointestinal: Negative for abdominal pain, diarrhea, nausea and vomiting.   Neurological: Negative for dizziness, syncope and light-headedness.   Psychiatric/Behavioral: Negative for confusion.      All pertinent negatives and positives are as above. All other systems have been reviewed and are negative unless otherwise stated.     Objective    Temp:  [96.1 °F (35.6 °C)-96.6 °F (35.9 °C)] 96.5 °F (35.8 °C)  Heart Rate:  [] 84  Resp:  [18] 18  BP: (104-120)/(60-88) 120/88    Physical Exam   Constitutional: He is oriented to person, place, and time. He appears well-developed and well-nourished. No distress.   HENT:   Head: Normocephalic and atraumatic.   Eyes: Conjunctivae are normal.   Cardiovascular: Normal rate and regular rhythm.   Pulmonary/Chest: Effort  normal and breath sounds normal. No stridor. No respiratory distress.   Abdominal: Soft. Bowel sounds are normal. He exhibits no distension. There is no tenderness.   Neurological: He is alert and oriented to person, place, and time.   Skin: Skin is warm and dry. Capillary refill takes less than 2 seconds. He is not diaphoretic.   Psychiatric: He has a normal mood and affect. His behavior is normal.     Results Review:  I have reviewed the labs, radiology results, and diagnostic studies.    Laboratory Data:   Results from last 7 days   Lab Units 02/09/19  0531 02/06/19  0943   SODIUM mmol/L 136* 137   POTASSIUM mmol/L 4.8 4.2   CHLORIDE mmol/L 94* 97   CO2 mmol/L 33.0* 30.0   BUN mg/dL 14 11   CREATININE mg/dL 0.73 0.67*   GLUCOSE mg/dL 128* 140*   CALCIUM mg/dL 9.4 10.1   BILIRUBIN mg/dL  --  0.5   ALK PHOS U/L  --  114   ALT (SGPT) U/L  --  43   AST (SGOT) U/L  --  47   ANION GAP mmol/L 9.0 10.0     Estimated Creatinine Clearance: 124.5 mL/min (by C-G formula based on SCr of 0.73 mg/dL).  Results from last 7 days   Lab Units 02/09/19  0531 02/06/19  0943   MAGNESIUM mg/dL  --  1.8   PHOSPHORUS mg/dL 4.3  --          Results from last 7 days   Lab Units 02/09/19  0531 02/06/19  0546   WBC 10*3/mm3 7.23 7.89   HEMOGLOBIN g/dL 13.3* 13.9   HEMATOCRIT % 38.8* 40.4   PLATELETS 10*3/mm3 258 283           Culture Data:   No results found for: BLOODCX  No results found for: URINECX  No results found for: RESPCX  No results found for: WOUNDCX  No results found for: STOOLCX  No components found for: BODYFLD    Radiology Data:   Imaging Results (last 24 hours)     ** No results found for the last 24 hours. **          I have reviewed the patient's current medications.     Assessment/Plan     Active Hospital Problems    Diagnosis   • **Medically complex patient   • Stroke-like symptoms       He has chronic left-sided weakness but it is much worse after this episode.  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  But since his fall the pain is much worse.  X-rays were unremarkable yesterday at [facility].     • Uncontrolled type 2 diabetes mellitus with hyperglycemia (CMS/[facility])     -at presentation to ED glucose 704    Hemoglobin A1c is 15.1 admission to acute rehabilitation         Plan: Continue physical therapy and occupational therapy, continue his acute rehabilitation course dictates.                This document has been electronically signed by SUGAR Acosta on February 9, 2019 11:32 AM

## 2019-02-09 NOTE — PLAN OF CARE
Problem: Patient Care Overview  Goal: Plan of Care Review  Outcome: Ongoing (interventions implemented as appropriate)   02/09/19 1359 02/09/19 1453   Patient Care Overview   IRF Plan of Care Review --  progress ongoing, continue   Progress, Functional Goals demonstrating adequate progress --    Coping/Psychosocial   Plan of Care Reviewed With patient --        Problem: Fall Risk (Adult)  Goal: Absence of Fall  Outcome: Ongoing (interventions implemented as appropriate)      Problem: Diabetes, Type 2 (Adult)  Goal: Signs and Symptoms of Listed Potential Problems Will be Absent, Minimized or Managed (Diabetes, Type 2)  Outcome: Ongoing (interventions implemented as appropriate)

## 2019-02-09 NOTE — THERAPY TREATMENT NOTE
Inpatient Rehabilitation - Occupational Therapy Treatment Note    [facility]     Patient Name: Ventura Boggs  : 1960  MRN: [id number]    Today's Date: 2019                 Admit Date: 2019      Visit Dx:    ICD-10-CM ICD-9-CM   1. Uncontrolled type 2 diabetes mellitus with hyperglycemia (CMS/HCC) E11.65 250.02   2. Medically complex patient Z78.9 V49.9   3. Chronic intractable pain G89.29 338.29   4. Symbolic dysfunction R48.9 784.60   5. Impaired mobility and ADLs Z74.09 799.89   6. Impaired functional mobility, balance, gait, and endurance Z74.09 V49.89       Patient Active Problem List   Diagnosis   • Uncontrolled type 2 diabetes mellitus with hyperglycemia (CMS/HCC)   • Postoperative urinary retention   • Incisional hernia, without obstruction or gangrene   • Acute appendicitis with localized peritonitis   • Closed head injury   • Hyponatremia   • History of recurrent TIAs   • Ataxia   • Chronic pain syndrome   • Right shoulder pain   • Left-sided muscle weakness   • Medically complex patient   • Stroke-like symptoms         Therapy Treatment    IRF Treatment Summary     Row Name 19 1057 19 0950          Evaluation/Treatment Time and Intent    Subjective Information  --  complains of  -RC     Existing Precautions/Restrictions  --  fall;lifting  -RC     Document Type  --  therapy note (daily note)  -RC     Mode of Treatment  individual therapy  -EA  occupational therapy;individual therapy  -RC     Start Time (Evaluation/Treatment)  --  0950  -RC     Recorded by [EA] Madison Abel MS [facility]-SLP [RC] Valeri Woodard COTA/L     Row Name 19 0950             Pain Scale: Numbers Pre/Post-Treatment    Pain Scale: Numbers, Pretreatment  8/10  -RC      Pain Scale: Numbers, Post-Treatment  8/10  -RC      Pain Location - Side  Right  -RC      Pain Location - Orientation  -- r shoulder l leg   -RC      Pain Location  shoulder  -RC      Pain Intervention(s)  Medication (See MAR)   -RC      Recorded by [RC] Valeri Woodard COULTER/L        User Key  (r) = Recorded By, (t) = Taken By, (c) = Cosigned By    Initials Name Effective Dates    Madison Silverman, MS CCC-SLP 10/17/16 -     RC Valeri Woodard, COULTER/L 03/07/18 -                  OT Recommendation and Plan         Plan of Care Review  Plan of Care Reviewed With: patient  Plan of Care Reviewed With: patient  IRF Plan of Care Review: progress ongoing, continue  Progress, Functional Goals: demonstrating adequate progress  Outcome Summary: worked on increasing fx use of l ue this tx. pt reports hand is working better   cont poc     OT IRF GOALS     Row Name 02/09/19 0950 02/08/19 0700 02/07/19 1000       Transfer FIM Goals (OT-IRF)    Tub-Shower Transfer FIM Goal (OT-IRF)  Score of 6 (FIM)  -RC  Score of 6 (FIM)  -LW  Score of 6 (FIM)  -KD    Toilet Transfer FIM Goal (OT-IRF)  Score of 6 (FIM)  -RC  Score of 6 (FIM)  -LW  Score of 6 (FIM)  -KD    Time Frame (Transfer FIM Goals 1, OT-IRF)  long term goal (LTG);by discharge  -RC  long term goal (LTG);by discharge  -LW  long term goal (LTG);by discharge  -KD    Progress/Outcomes (Transfer FIM Goals, OT-IRF)  goal not met;continuing progress toward goal  -RC  goal not met  -LW  goal not met  -KD       Bathing FIM Goal (OT-IRF)    Bathing FIM Goal (OT-IRF)  Score of 6 (FIM)  -RC  Score of 6 (FIM)  -LW  Score of 6 (FIM)  -KD    Time Frame (Bathing FIM Goal, OT-IRF)  long term goal (LTG);by discharge  -RC  long term goal (LTG);by discharge  -LW  long term goal (LTG);by discharge  -KD    Progress/Outcomes (Bathing FIM Goal, OT-IRF)  goal not met;continuing progress toward goal  -RC  goal not met  -LW  goal not met  -KD       UB Dressing FIM Goal (OT-IRF)    Upper Body Dressing, FIM Goal, OT-IRF  Score of 6 (FIM)  -RC  Score of 6 (FIM)  -LW  Score of 6 (FIM)  -KD    Time Frame (UB Dressing FIM Goal, OT-IRF)  long term goal (LTG);by discharge  -RC  long term goal (LTG);by discharge  -LW  long  term goal (LTG);by discharge  -KD    Progress/Outcomes (UB Dressing FIM Goal, OT-IRF)  goal not met;continuing progress toward goal  -RC  goal not met  -LW  goal not met  -KD       LB Dressing FIM Goal (OT-IRF)    Lower Body Dressing, FIM Goal, OT-IRF  Score of 6 (FIM)  -RC  Score of 6 (FIM)  -LW  Score of 6 (FIM)  -KD    Time Frame (LB Dressing FIM Goal, OT-IRF)  long term goal (LTG);by discharge  -RC  long term goal (LTG);by discharge  -LW  long term goal (LTG);by discharge  -KD    Progress/Outcomes (LB Dressing FIM Goal, OT-IRF)  goal not met;continuing progress toward goal  -RC  goal not met  -LW  goal not met  -KD       Toileting Goal 1 (OT-IRF)    Activity/Device (Toileting Goal 1, OT-IRF)  toileting skills, all  -RC  toileting skills, all  -LW  toileting skills, all  -KD    [place] Level (Toileting Goal 1, OT-IRF)  supervision required  -RC  supervision required  -LW  supervision required  -KD    Time Frame (Toileting Goal 1, OT-IRF)  long term goal (LTG);by discharge  -RC  long term goal (LTG);by discharge  -LW  long term goal (LTG);by discharge  -KD    Progress/Outcomes (Toileting Goal 1, OT-IRF)  goal not met;continuing progress toward goal  -RC  goal not met  -LW  goal not met  -KD       ROM Goal 1 (OT-IRF)    ROM Goal 1 (OT-IRF)  Pt will display WFL with ROM in BUE with min reports of pain/increased pain level.   -RC  Pt will display WFL with ROM in BUE with min reports of pain/increased pain level.   -LW  Pt will display WFL with ROM in BUE with min reports of pain/increased pain level.   -KD    Time Frame (ROM Goal 1, OT-IRF)  long term goal (LTG);by discharge  -RC  long term goal (LTG);by discharge  -LW  long term goal (LTG);by discharge  -KD    Progress/Outcomes (ROM Goal 1, OT-IRF)  goal not met;continuing progress toward goal  -RC  goal not met  -LW  goal not met  -KD       Problem Specific Goal 1 (OT-IRF)    Problem Specific Goal 1 (OT-IRF)  Pt will be independent with BUE HEP and home safety  awarness.   -RC  Pt will be independent with BUE HEP and home safety awarness.   -LW  Pt will be independent with BUE HEP and home safety awarness.   -KD    Time Frame (Problem Specific Goal 1, OT-IRF)  long term goal (LTG);by discharge  -RC  long term goal (LTG);by discharge  -LW  long term goal (LTG);by discharge  -KD    Progress/Outcomes (Problem Specific Goal 1, OT-IRF)  goal not met;continuing progress toward goal  -RC  goal not met  -LW  goal not met  -KD    Row Name 02/06/19 1015 02/06/19 0732          Transfer FIM Goals (OT-IRF)    Tub-Shower Transfer FIM Goal (OT-IRF)  Score of 6 (FIM)  -BL  Score of 6 (FIM)  -BH     Toilet Transfer FIM Goal (OT-IRF)  Score of 6 (FIM)  -BL  Score of 6 (FIM)  -BH     Time Frame (Transfer FIM Goals 1, OT-IRF)  long term goal (LTG);by discharge  -BL  long term goal (LTG);by discharge  -BH     Progress/Outcomes (Transfer FIM Goals, OT-IRF)  goal not met  -BL  goal not met  -BH        Bathing FIM Goal (OT-IRF)    Bathing FIM Goal (OT-IRF)  Score of 6 (FIM)  -BL  Score of 6 (FIM)  -BH     Time Frame (Bathing FIM Goal, OT-IRF)  long term goal (LTG);by discharge  -BL  long term goal (LTG);by discharge  -BH     Progress/Outcomes (Bathing FIM Goal, OT-IRF)  goal not met  -BL  goal not met  -BH        UB Dressing FIM Goal (OT-IRF)    Upper Body Dressing, FIM Goal, OT-IRF  Score of 6 (FIM)  -BL  Score of 6 (FIM)  -BH     Time Frame (UB Dressing FIM Goal, OT-IRF)  long term goal (LTG);by discharge  -BL  long term goal (LTG);by discharge  -BH     Progress/Outcomes (UB Dressing FIM Goal, OT-IRF)  goal not met  -BL  goal not met  -BH        LB Dressing FIM Goal (OT-IRF)    Lower Body Dressing, FIM Goal, OT-IRF  Score of 6 (FIM)  -BL  Score of 6 (FIM)  -BH     Time Frame (LB Dressing FIM Goal, OT-IRF)  long term goal (LTG);by discharge  -BL  long term goal (LTG);by discharge  -BH     Progress/Outcomes (LB Dressing FIM Goal, OT-IRF)  goal not met  -BL  goal not met  -BH        Toileting Goal  1 (OT-IRF)    Activity/Device (Toileting Goal 1, OT-IRF)  toileting skills, all  -BL  toileting skills, all  -BH     [place] Level (Toileting Goal 1, OT-IRF)  supervision required  -BL  supervision required  -BH     Time Frame (Toileting Goal 1, OT-IRF)  long term goal (LTG);by discharge  -BL  long term goal (LTG);by discharge  -BH     Progress/Outcomes (Toileting Goal 1, OT-IRF)  goal not met  -BL  goal not met  -BH        ROM Goal 1 (OT-IRF)    ROM Goal 1 (OT-IRF)  Pt will display WFL with ROM in BUE with min reports of pain/increased pain level.   -BL  Pt will display WFL with ROM in BUE with min reports of pain/increased pain level.   -BH     Time Frame (ROM Goal 1, OT-IRF)  long term goal (LTG);by discharge  -BL  long term goal (LTG);by discharge  -BH     Progress/Outcomes (ROM Goal 1, OT-IRF)  goal not met  -BL  goal not met  -BH        Problem Specific Goal 1 (OT-IRF)    Problem Specific Goal 1 (OT-IRF)  Pt will be independent with BUE HEP and home safety awarness.   -BL  Pt will be independent with BUE HEP and home safety awarness.   -BH     Time Frame (Problem Specific Goal 1, OT-IRF)  long term goal (LTG);by discharge  -BL  long term goal (LTG);by discharge  -BH     Progress/Outcomes (Problem Specific Goal 1, OT-IRF)  goal not met  -BL  goal not met  -BH       User Key  (r) = Recorded By, (t) = Taken By, (c) = Cosigned By    Initials Name Provider Type     Maria Elena Barragan, OTR/L Occupational Therapist    Valeri Lozoya, COULTER/L Occupational Therapy Assistant    Ele Chu, COULTER/L Occupational Therapy Assistant    Giana Carrizales, COULTER/L Occupational Therapy Assistant    Irina Lorenzo, COLUTER/L Occupational Therapy Assistant          Occupational Therapy Education     Title: PT OT SLP Therapies (In Progress)     Topic: Occupational Therapy (Done)     Point: ADL training (Done)     Description: Instruct learner(s) on proper safety adaptation and remediation techniques during self  care or transfers.   Instruct in proper use of assistive devices.    Learning Progress Summary           Patient Acceptance, E,TB, VU by  at 2/8/2019  9:12 AM    Acceptance, E, VU,NR by  at 2/6/2019  1:35 PM    Comment:  Educated about OT and POC. Educated to call for assist. Educated on safety throughout.                   Point: Home exercise program (Done)     Description: Instruct learner(s) on appropriate technique for monitoring, assisting and/or progressing therapeutic exercises/activities.    Learning Progress Summary           Patient Acceptance, E,TB, VU by  at 2/8/2019  9:12 AM                   Point: Precautions (Done)     Description: Instruct learner(s) on prescribed precautions during self-care and functional transfers.    Learning Progress Summary           Patient Acceptance, E,TB, VU by  at 2/8/2019  9:12 AM    Acceptance, E, VU,NR by  at 2/6/2019  1:35 PM    Comment:  Educated about OT and POC. Educated to call for assist. Educated on safety throughout.    Acceptance, E, VU,NR by  at 2/6/2019 11:36 AM                   Point: Body mechanics (Done)     Description: Instruct learner(s) on proper positioning and spine alignment during self-care, functional mobility activities and/or exercises.    Learning Progress Summary           Patient Acceptance, E,TB, VU by  at 2/8/2019  9:12 AM    Acceptance, E, VU,NR by  at 2/6/2019 11:36 AM                               User Key     Initials Effective Dates Name Provider Type Discipline     06/08/18 -  Maria Elena Barragan OTR/L Occupational Therapist OT     10/17/16 -  Giana Chakraborty, COULTER/L Occupational Therapy Assistant OT     03/07/18 -  Irina Armstrong COTA/L Occupational Therapy Assistant OT                Outcome Measures     Row Name 02/09/19 0950 02/08/19 0700 02/07/19 1000       How much help from another is currently needed...    Putting on and taking off regular lower body clothing?  2  -RC  2  -LW  2  -KD    Bathing  (including washing, rinsing, and drying)  2  -RC  2  -LW  2  -KD    Toileting (which includes using toilet bed pan or urinal)  3  -RC  3  -LW  3  -KD    Putting on and taking off regular upper body clothing  3  -RC  3  -LW  3  -KD    Taking care of personal grooming (such as brushing teeth)  3  -RC  3  -LW  3  -KD    Eating meals  4  -RC  4  -LW  4  -KD    Score  17  -RC  17  -LW  17  -KD    Row Name 02/07/19 0805 02/06/19 1500          How much help from another person do you currently need...    Turning from your back to your side while in flat bed without using bedrails?  3  -TW  3  -TW     Moving from lying on back to sitting on the side of a flat bed without bedrails?  3  -TW  3  -TW     Moving to and from a bed to a chair (including a wheelchair)?  3  -TW  3  -TW     Standing up from a chair using your arms (e.g., wheelchair, bedside chair)?  3  -TW  3  -TW     Climbing 3-5 steps with a railing?  2  -TW  2  -TW     To walk in hospital room?  2  -TW  2  -TW     AM-PAC 6 Clicks Score  16  -TW  16  -TW        Functional Assessment    Outcome Measure Options  AM-PAC 6 Clicks Basic Mobility (PT)  -TW  AM-PAC 6 Clicks Basic Mobility (PT)  -TW       User Key  (r) = Recorded By, (t) = Taken By, (c) = Cosigned By    Initials Name Provider Type    TW Brant Palacios, KAREN Physical Therapy Assistant    RC Valeri Woodard COTA/L Occupational Therapy Assistant    KD Ele Avina, COULTER/L Occupational Therapy Assistant    Irina Lorenzo, COULTER/L Occupational Therapy Assistant             Time Calculation:     Time Calculation- OT     Row Name 02/09/19 1356             Time Calculation- OT    OT Start Time  0950  -      OT Stop Time  1050  -      OT Time Calculation (min)  60 min  -      Total Timed Code Minutes- OT  60 minute(s)  -      OT Received On  02/09/19  -        User Key  (r) = Recorded By, (t) = Taken By, (c) = Cosigned By    Initials Name Provider Type    RC Valeri Woodard COTA/MEI  Occupational Therapy Assistant          Therapy Suggested Charges     Code   Minutes Charges    None              Therapy Charges for Today     Code Description Service Date Service Provider Modifiers Qty    [id number]  OT THER PROC EA 15 MIN 2/9/2019 Yamilet, Valeri G, COULTER/L GO 1    [id number]  OT THERAPEUTIC ACT EA 15 MIN 2/9/2019 Yamilet, Valeri G, COULTER/L GO 3                   Valeri G Yamilet, COULTER/L  2/9/2019

## 2019-02-09 NOTE — THERAPY TREATMENT NOTE
Inpatient Rehabilitation - Physical Therapy Treatment Note  [facility]     Patient Name: Ventura Boggs  : 1960  MRN: [id number]    Today's Date: 2019                 Admit Date: 2019      Visit Dx:      ICD-10-CM ICD-9-CM   1. Uncontrolled type 2 diabetes mellitus with hyperglycemia (CMS/HCC) E11.65 250.02   2. Medically complex patient Z78.9 V49.9   3. Chronic intractable pain G89.29 338.29   4. Symbolic dysfunction R48.9 784.60   5. Impaired mobility and ADLs Z74.09 799.89   6. Impaired functional mobility, balance, gait, and endurance Z74.09 V49.89       Patient Active Problem List   Diagnosis   • Uncontrolled type 2 diabetes mellitus with hyperglycemia (CMS/HCC)   • Postoperative urinary retention   • Incisional hernia, without obstruction or gangrene   • Acute appendicitis with localized peritonitis   • Closed head injury   • Hyponatremia   • History of recurrent TIAs   • Ataxia   • Chronic pain syndrome   • Right shoulder pain   • Left-sided muscle weakness   • Medically complex patient   • Stroke-like symptoms       Therapy Treatment    IRF Treatment Summary     Row Name 19 1057 19 0950 19 0819       Evaluation/Treatment Time and Intent    Subjective Information  --  complains of  -RC  no complaints  -TW    Existing Precautions/Restrictions  --  fall;lifting  -RC  fall;lifting  -TW    Document Type  --  therapy note (daily note)  -RC  therapy note (daily note)  -TW    Mode of Treatment  individual therapy  -EA  occupational therapy;individual therapy  -RC  physical therapy;individual therapy  -TW    Start Time (Evaluation/Treatment)  0819  -TW  0950  -RC  0819  -TW    Stop Time (Evaluation/Treatment)  --  --  15  -TW    Recorded by [EA] Madison Abel MS CCC-SLP  [TW] Brant Palacios PTA [RC] Valeri Woodard COTA/MEI [TW] Brant Palacios PTA    Row Name 19 0819             Cognition/Psychosocial- PT/OT    Affect/Mental Status (Cognitive)  WFL   -TW      Orientation Status (Cognition)  oriented x 4  -TW      Follows Commands (Cognition)  WFL  -TW      Recorded by [TW] Brant Palacios PTA      Row Name 02/09/19 0819             Bed Mobility Assessment/Treatment    Supine-Sit [place] (Bed Mobility)  supervision  -TW      Sit-Supine [place] (Bed Mobility)  not tested  -TW      Bed Mobility, Safety Issues  decreased use of legs for bridging/pushing  -TW      Assistive Device (Bed Mobility)  bed rails;head of bed elevated  -TW      Recorded by [TW] Brant Palacios PTA      Row Name 02/09/19 0819             Bed-Chair Transfer    Bed-Chair [place] (Transfers)  minimum assist (75% patient effort)  -TW      Assistive Device (Bed-Chair Transfers)  walker, front-wheeled  -TW      Recorded by [TW] Brant Palacios PTA      Row Name 02/09/19 0819             Chair-Bed Transfer    Chair-Bed [place] (Transfers)  minimum assist (75% patient effort)  -TW      Assistive Device (Chair-Bed Transfers)  walker, front-wheeled  -TW      Recorded by [TW] Brant Palacios PTA      Row Name 02/09/19 0819             Sit-Stand Transfer    Sit-Stand [place] (Transfers)  minimum assist (75% patient effort)  -TW      Assistive Device (Sit-Stand Transfers)  walker, front-wheeled  -TW      Recorded by [TW] Brant Palacios PTA      Row Name 02/09/19 0819             Stand-Sit Transfer    Stand-Sit [place] (Transfers)  minimum assist (75% patient effort)  -TW      Assistive Device (Stand-Sit Transfers)  walker, front-wheeled  -TW      Recorded by [TW] Brant Palacios PTA      Row Name 02/09/19 0819             Gait/Stairs Assessment/Training    Gait/Stairs Assessment/Training  gait/ambulation assistive device  -TW      [place] Level (Gait)  contact guard;minimum assist (75% patient effort)  -TW      Assistive Device (Gait)  walker, front-wheeled  -TW      Distance in Feet (Gait)  40ft then 48ft  -TW      Pattern (Gait)  step-to   -TW      Deviations/Abnormal Patterns (Gait)  left sided deviations;stride length decreased  -TW      Bilateral Gait Deviations  foot drop/toe drag;heel strike decreased;knee buckling, left side  -TW      Recorded by [TW] Brant Palacios PTA      Row Name 02/09/19 0950 02/09/19 0819          Pain Scale: Numbers Pre/Post-Treatment    Pain Scale: Numbers, Pretreatment  8/10  -RC  7/10  -TW     Pain Scale: Numbers, Post-Treatment  8/10  -RC  7/10  -TW     Pain Location - Side  Right  -RC  Right  -TW     Pain Location - Orientation  -- r shoulder l leg   -RC  upper  -TW     Pain Location  shoulder  -RC  extremity  -TW     Pain Intervention(s)  Medication (See MAR)  -RC  --     Recorded by [RC] Valeri Woodard COTA/MEI [TW] Brant Palacios PTA     Row Name 02/09/19 0819             Static Standing Balance    Level of [place] (Supported Standing, Static Balance)  supervision  -TW      Time Able to Maintain Position (Supported Standing, Static Balance)  3 to 4 minutes  -TW      Assistive Device Utilized (Supported Standing, Static Balance)  walker, rolling  -TW      Recorded by [TW] Brant Palacios PTA      Row Name 02/09/19 0819             Lower Extremity Supine Therapeutic Exercise    Performed, Supine Lower Extremity (Therapeutic Exercise)  hip flexion/extension;hip abduction/adduction;hip external/internal rotation;ankle dorsiflexion/plantarflexion;SAQ (short arc quad) over bolster;quadriceps sets;gluteal sets;ankle pumps;heel slides;bridging (bilateral w/bolster)  -TW      Exercise Type, Supine Lower Extremity (Therapeutic Exercise)  AAROM (active assistive range of motion);AROM (active range of motion) AAROM with L LE and AROM with R LE.  -TW      Sets/Reps Detail, Supine Lower Extremity (Therapeutic Exercise)  1/15-20  -TW      Recorded by [TW] Brant Palacios PTA      Row Name 02/09/19 0819             Positioning and Restraints    Pre-Treatment Position  in bed  -TW      Post Treatment  Position  wheelchair  -TW      In Wheelchair  sitting;encouraged to call for assist  -TW      Recorded by [TW] Brant Palacios PTA      Row Name 02/09/19 0819             Daily Summary of Progress (PT)    Functional Goal Overall Progress: Physical Therapy  progressing toward functional goals as expected  -TW      Impairments Continuing to Limit Function: Physical Therapy  strength decreased;impaired balance;coordination impaired;motor control impaired;pain  -TW      Recommendations: Physical Therapy  Cont  -TW      Recorded by [TW] Brant Palacios PTA        User Key  (r) = Recorded By, (t) = Taken By, (c) = Cosigned By    Initials Name Effective Dates    EA Bernadine Madison S, MS CCC-SLP 10/17/16 -     TW Brant Palacios PTA 03/07/18 -     Valeri Lozoya COTA/L 03/07/18 -            Physical Therapy Education     Title: PT OT SLP Therapies (In Progress)     Topic: Physical Therapy (In Progress)     Point: Mobility training (In Progress)     Learning Progress Summary           Patient TA Bain, NR by BS at 2/8/2019 12:59 PM    Comment:  further addressed body position with weight shifting and sequencing of LE's with limb advancement in gait training w/ // bars. Tactile and verbal cues provided.    Acceptance, E, NR by GAL at 2/6/2019  2:39 PM                   Point: Home exercise program (In Progress)     Learning Progress Summary           Patient TA Bain, NR by BS at 2/8/2019 12:59 PM    Comment:  further addressed body position with weight shifting and sequencing of LE's with limb advancement in gait training w/ // bars. Tactile and verbal cues provided.                   Point: Body mechanics (In Progress)     Learning Progress Summary           Patient TA Bain, NR by BS at 2/8/2019 12:59 PM    Comment:  further addressed body position with weight shifting and sequencing of LE's with limb advancement in gait training w/ // bars. Tactile and verbal cues provided.    Acceptance, E, NR by GAL  at 2/6/2019  2:39 PM                   Point: Precautions (In Progress)     Learning Progress Summary           Patient TA Bain, NR by BS at 2/8/2019 12:59 PM    Comment:  further addressed body position with weight shifting and sequencing of LE's with limb advancement in gait training w/ // bars. Tactile and verbal cues provided.    Sarmad, E, NR by GAL at 2/6/2019  2:39 PM                               User Key     Initials Effective Dates Name Provider Type Discipline    GAL 04/03/18 -  Shilpa Dumont, PT Physical Therapist PT    BS 04/08/18 -  Jose Antonio Cuevas, PT Physical Therapist PT                  PT Recommendation and Plan     Plan of Care Reviewed With: patient  Daily Summary of Progress (PT)  Functional Goal Overall Progress: Physical Therapy: progressing toward functional goals as expected  Impairments Continuing to Limit Function: Physical Therapy: strength decreased, impaired balance, coordination impaired, motor control impaired, pain  Recommendations: Physical Therapy: Cont  IRF Plan of Care Review: progress ongoing, continue  Progress, Functional Goals: demonstrating adequate progress  Outcome Summary: Pt progressing well with gait but shows some inconsistencies with gait and t/f's. Pt performed majority of gait this tx on carpet but was able to compensate for toe drag by picking his toes up on L LE before attempting to advance L LE and this worked well for pt. Pt voiced approval of this after completion of tx.       PT IRF GOALS     Row Name 02/09/19 0819             Bed Mobility Goal 1 (PT-IRF)    Activity/Assistive Device (Bed Mobility Goal 1, PT-IRF)  rolling to left;rolling to right;sit to supine;supine to sit  -TW      [place] Level (Bed Mobility Goal 1, PT-IRF)  independent  -TW      Time Frame (Bed Mobility Goal 1, PT-IRF)  1 week  -TW      Progress/Outcomes (Bed Mobility Goal 1, PT-IRF)  goal not met  -TW         Transfer Goal 1 (PT-IRF)    Activity/Assistive Device (Transfer Goal 1,  PT-IRF)  sit-to-stand/stand-to-sit;bed-to-chair/chair-to-bed;wheelchair transfer  -TW      [place] Level (Transfer Goal 1, PT-IRF)  conditional independence  -TW      Time Frame (Transfer Goal 1, PT-IRF)  1 week  -TW      Progress/Outcomes (Transfer Goal 1, PT-IRF)  goal not met  -TW         Gait/Walking Locomotion Goal 1 (PT-IRF)    Activity/Assistive Device (Gait/Walking Locomotion Goal 1, PT-IRF)  gait (walking locomotion);assistive device use  -TW      Gait/Walking Locomotion Distance Goal 1 (PT-IRF)  50ft  -TW      [place] Level (Gait/Walking Locomotion Goal 1, PT-IRF)  contact guard assist  -TW      Time Frame (Gait/Walking Locomotion Goal 1, PT-IRF)  1 week  -TW      Progress/Outcomes (Gait/Walking Locomotion Goal 1, PT-IRF)  goal not met  -TW         Gait/Walking Locomotion Goal 2 (PT-IRF)    Activity/Assistive Device (Gait/Walking Locomotion Goal 2, PT-IRF)  gait (walking locomotion);assistive device use  -TW      Gait/Walking Locomotion Distance Goal 2 (PT-IRF)  150ft or more  -TW      [place] Level (Gait/Walking Locomotion Goal 2, PT-IRF)  conditional independence  -TW      Time Frame (Gait/Walking Locomotion Goal 2, PT-IRF)  2 weeks  -TW      Progress/Outcomes (Gait/Walking Locomotion Goal 2, PT-IRF)  goal not met  -TW         Wheelchair Locomotion Goal 1 (PT-IRF)    Activity (Wheelchair Locomotion Goal 1, PT-IRF)  forward propulsion;backward propulsion;steering;turning;doorway navigation  -TW      [place] Level (Wheelchair Locomotion Goal 1, PT-IRF)  independent  -TW      Distance Goal 1 (Wheelchair Locomotion, PT-IRF)  150ft  -TW      Time Frame (Wheelchair Locomotion Goal 1, PT-IRF)  2 days  -TW      Progress/Outcomes (Wheelchair Locomotion Goal 1, PT-IRF)  goal not met  -TW         Stairs Goal 1 (PT-IRF)    Activity/Assistive Device (Stairs Goal 1, PT-IRF)  ascending stairs;descending stairs;using handrail, right  -TW      Number of Stairs (Stairs Goal 1, PT-IRF)  5  -TW       [place] Level (Stairs Goal 1, PT-IRF)  standby assist;conditional independence  -TW      Time Frame (Stairs Goal 1, PT-IRF)  2 weeks  -TW      Progress/Outcomes (Stairs Goal 1, PT-IRF)  goal not met  -TW         Strength Goal 1 (PT-IRF)    Strength Goal 1 (PT-IRF)  Pt will perform 20 reps of AROM exercises with VSS  -TW      Time Frame (Strength Goal 1, PT-IRF)  by discharge  -TW      Progress/Outcomes (Strength Goal 1, PT-IRF)  goal not met  -TW         Caregiver Training Goal 1 (PT-IRF)    Caregiver Training Goal 1 (PT-IRF)  Homecaregiver will demonstrate understanding assisting with mobility as needed, home safety and homecare instructions  -TW      Time Frame (Caregiver Training Goal 1, PT-IRF)  by discharge  -TW      Progress/Outcomes (Caregiver Training Goal 1, PT-IRF)  goal not met  -TW        User Key  (r) = Recorded By, (t) = Taken By, (c) = Cosigned By    Initials Name Provider Type    TW Brant Palacios, PTA Physical Therapy Assistant        Outcome Measures     Row Name 02/09/19 0950 02/09/19 0819 02/08/19 0700       How much help from another person do you currently need...    Turning from your back to your side while in flat bed without using bedrails?  --  3  -TW  --    Moving from lying on back to sitting on the side of a flat bed without bedrails?  --  3  -TW  --    Moving to and from a bed to a chair (including a wheelchair)?  --  3  -TW  --    Standing up from a chair using your arms (e.g., wheelchair, bedside chair)?  --  3  -TW  --    Climbing 3-5 steps with a railing?  --  2  -TW  --    To walk in hospital room?  --  2  -TW  --    AM-PAC 6 Clicks Score  --  16  -TW  --       How much help from another is currently needed...    Putting on and taking off regular lower body clothing?  2  -RC  --  2  -LW    Bathing (including washing, rinsing, and drying)  2  -RC  --  2  -LW    Toileting (which includes using toilet bed pan or urinal)  3  -RC  --  3  -LW    Putting on and taking off  regular upper body clothing  3  -RC  --  3  -LW    Taking care of personal grooming (such as brushing teeth)  3  -RC  --  3  -LW    Eating meals  4  -RC  --  4  -LW    Score  17  -RC  --  17  -LW       Functional Assessment    Outcome Measure Options  --  AM-PAC 6 Clicks Basic Mobility (PT)  -TW  --    Row Name 02/07/19 1000 02/07/19 0805 02/06/19 1500       How much help from another person do you currently need...    Turning from your back to your side while in flat bed without using bedrails?  --  3  -TW  3  -TW    Moving from lying on back to sitting on the side of a flat bed without bedrails?  --  3  -TW  3  -TW    Moving to and from a bed to a chair (including a wheelchair)?  --  3  -TW  3  -TW    Standing up from a chair using your arms (e.g., wheelchair, bedside chair)?  --  3  -TW  3  -TW    Climbing 3-5 steps with a railing?  --  2  -TW  2  -TW    To walk in hospital room?  --  2  -TW  2  -TW    AM-PAC 6 Clicks Score  --  16  -TW  16  -TW       How much help from another is currently needed...    Putting on and taking off regular lower body clothing?  2  -KD  --  --    Bathing (including washing, rinsing, and drying)  2  -KD  --  --    Toileting (which includes using toilet bed pan or urinal)  3  -KD  --  --    Putting on and taking off regular upper body clothing  3  -KD  --  --    Taking care of personal grooming (such as brushing teeth)  3  -KD  --  --    Eating meals  4  -KD  --  --    Score  17  -KD  --  --       Functional Assessment    Outcome Measure Options  --  AM-PAC 6 Clicks Basic Mobility (PT)  -TW  AM-PAC 6 Clicks Basic Mobility (PT)  -TW      User Key  (r) = Recorded By, (t) = Taken By, (c) = Cosigned By    Initials Name Provider Type    TW Brant Palacios, PTA Physical Therapy Assistant    RC Valeri Woodard COULTER/L Occupational Therapy Assistant    KD Ele Avina, COULTER/L Occupational Therapy Assistant    Irina Lorenzo, COULTER/L Occupational Therapy Assistant             Time  Calculation:     PT Charges     Row Name 02/09/19 1443             Time Calculation    Start Time  0819  -TW      Stop Time  0915  -TW      Time Calculation (min)  56 min  -TW      PT Received On  02/09/19  -TW      PT Goal Re-Cert Due Date  02/19/19  -TW         Time Calculation- PT    Total Timed Code Minutes- PT  56 minute(s)  -TW        User Key  (r) = Recorded By, (t) = Taken By, (c) = Cosigned By    Initials Name Provider Type    TW Brant Palacios PTA Physical Therapy Assistant            Therapy Charges for Today     Code Description Service Date Service Provider Modifiers Qty    33579786954 HC GAIT TRAINING EA 15 MIN 2/9/2019 Brant Palacios, KAREN GP 2    83785389244 HC PT THERAPEUTIC ACT EA 15 MIN 2/9/2019 Brant Palacios, KAREN GP 1    04334789459 HC PT THER PROC EA 15 MIN 2/9/2019 Brant Palacios PTA GP 1            PT G-Codes  Outcome Measure Options: AM-PAC 6 Clicks Basic Mobility (PT)  AM-PAC 6 Clicks Score: 16  Score: 17      Brant Palacios PTA  2/9/2019

## 2019-02-10 LAB
GLUCOSE BLDC GLUCOMTR-MCNC: 102 MG/DL (ref 70–130)
GLUCOSE BLDC GLUCOMTR-MCNC: 112 MG/DL (ref 70–130)
GLUCOSE BLDC GLUCOMTR-MCNC: 200 MG/DL (ref 70–130)

## 2019-02-10 PROCEDURE — 63710000001 INSULIN DETEMIR PER 5 UNITS: Performed by: FAMILY MEDICINE

## 2019-02-10 PROCEDURE — 82962 GLUCOSE BLOOD TEST: CPT

## 2019-02-10 PROCEDURE — 25010000002 ENOXAPARIN PER 10 MG: Performed by: FAMILY MEDICINE

## 2019-02-10 PROCEDURE — 63710000001 INSULIN ASPART PER 5 UNITS: Performed by: FAMILY MEDICINE

## 2019-02-10 RX ORDER — BACLOFEN 10 MG/1
5 TABLET ORAL 3 TIMES DAILY
Status: DISCONTINUED | OUTPATIENT
Start: 2019-02-10 | End: 2019-02-15 | Stop reason: HOSPADM

## 2019-02-10 RX ADMIN — OXYCODONE HYDROCHLORIDE AND ACETAMINOPHEN 1 TABLET: 10; 325 TABLET ORAL at 22:15

## 2019-02-10 RX ADMIN — ENOXAPARIN SODIUM 40 MG: 40 INJECTION SUBCUTANEOUS at 15:08

## 2019-02-10 RX ADMIN — FAMOTIDINE 20 MG: 20 TABLET ORAL at 08:25

## 2019-02-10 RX ADMIN — Medication 5 MG: at 11:30

## 2019-02-10 RX ADMIN — LISINOPRIL 5 MG: 5 TABLET ORAL at 08:25

## 2019-02-10 RX ADMIN — METHADONE HYDROCHLORIDE 5 MG: 10 TABLET ORAL at 21:51

## 2019-02-10 RX ADMIN — OXYCODONE HYDROCHLORIDE AND ACETAMINOPHEN 1 TABLET: 10; 325 TABLET ORAL at 17:42

## 2019-02-10 RX ADMIN — INSULIN ASPART 7 UNITS: 100 INJECTION, SOLUTION INTRAVENOUS; SUBCUTANEOUS at 17:09

## 2019-02-10 RX ADMIN — DULOXETINE HYDROCHLORIDE 20 MG: 20 CAPSULE, DELAYED RELEASE ORAL at 08:25

## 2019-02-10 RX ADMIN — INSULIN DETEMIR 30 UNITS: 100 INJECTION, SOLUTION SUBCUTANEOUS at 22:00

## 2019-02-10 RX ADMIN — ATORVASTATIN CALCIUM 20 MG: 20 TABLET, FILM COATED ORAL at 21:51

## 2019-02-10 RX ADMIN — INSULIN ASPART 7 UNITS: 100 INJECTION, SOLUTION INTRAVENOUS; SUBCUTANEOUS at 07:00

## 2019-02-10 RX ADMIN — AMLODIPINE BESYLATE 5 MG: 5 TABLET ORAL at 08:25

## 2019-02-10 RX ADMIN — AMITRIPTYLINE HYDROCHLORIDE 10 MG: 10 TABLET, FILM COATED ORAL at 21:51

## 2019-02-10 RX ADMIN — INSULIN ASPART 2 UNITS: 100 INJECTION, SOLUTION INTRAVENOUS; SUBCUTANEOUS at 22:00

## 2019-02-10 RX ADMIN — METHADONE HYDROCHLORIDE 5 MG: 10 TABLET ORAL at 08:24

## 2019-02-10 RX ADMIN — INSULIN ASPART 4 UNITS: 100 INJECTION, SOLUTION INTRAVENOUS; SUBCUTANEOUS at 11:15

## 2019-02-10 RX ADMIN — OXYCODONE HYDROCHLORIDE AND ACETAMINOPHEN 1 TABLET: 10; 325 TABLET ORAL at 04:28

## 2019-02-10 RX ADMIN — OXYCODONE HYDROCHLORIDE AND ACETAMINOPHEN 1 TABLET: 10; 325 TABLET ORAL at 13:56

## 2019-02-10 RX ADMIN — OXYCODONE HYDROCHLORIDE AND ACETAMINOPHEN 1 TABLET: 10; 325 TABLET ORAL at 09:23

## 2019-02-10 RX ADMIN — METFORMIN HYDROCHLORIDE 1000 MG: 500 TABLET ORAL at 17:07

## 2019-02-10 RX ADMIN — METFORMIN HYDROCHLORIDE 1000 MG: 500 TABLET ORAL at 08:24

## 2019-02-10 RX ADMIN — Medication 5 MG: at 15:08

## 2019-02-10 RX ADMIN — FAMOTIDINE 20 MG: 20 TABLET ORAL at 21:51

## 2019-02-10 RX ADMIN — ASPIRIN 81 MG CHEWABLE TABLET 325 MG: 81 TABLET CHEWABLE at 08:24

## 2019-02-10 RX ADMIN — Medication 5 MG: at 21:52

## 2019-02-10 RX ADMIN — INSULIN ASPART 7 UNITS: 100 INJECTION, SOLUTION INTRAVENOUS; SUBCUTANEOUS at 11:16

## 2019-02-10 NOTE — PLAN OF CARE
Problem: Patient Care Overview  Goal: Plan of Care Review  Outcome: Ongoing (interventions implemented as appropriate)   02/09/19 1538 02/10/19 0430   Patient Care Overview   IRF Plan of Care Review progress ongoing, continue --    Progress, Functional Goals demonstrating adequate progress --    Coping/Psychosocial   Plan of Care Reviewed With --  patient       Problem: Fall Risk (Adult)  Goal: Absence of Fall  Outcome: Ongoing (interventions implemented as appropriate)      Problem: Diabetes, Type 2 (Adult)  Goal: Signs and Symptoms of Listed Potential Problems Will be Absent, Minimized or Managed (Diabetes, Type 2)  Outcome: Ongoing (interventions implemented as appropriate)

## 2019-02-10 NOTE — PLAN OF CARE
Problem: Patient Care Overview  Goal: Plan of Care Review  Outcome: Ongoing (interventions implemented as appropriate)   02/10/19 4698   Coping/Psychosocial   Plan of Care Reviewed With patient   OTHER   Outcome Summary Pt resting most of this shift. VSS. No new concerns.

## 2019-02-10 NOTE — PROGRESS NOTES
[facility] Medicine Services  INPATIENT PROGRESS NOTE    Length of Stay: 5  Date of Admission: 2/5/2019  Primary Care Physician: Anita Faria, APRN    Subjective   Please note that all previous progress notes, lab findings, radiographical findings, medication changes, and physical exam findings have been noted and updated as appropriate.    2/10/2019: Patient having more shoulder pain today and also having some cramping in his feet.  Baclofen has been added and as needed pain medication administered.  No chest pain, shortness of air, nausea, vomiting, fever, chills.    Chief Complaint/HPI: This 58-year-old  male was admitted to acute rehabilitation services secondary to worsening left-sided weakness.  Patient also experienced unsteady gait, slurred speech, and left-sided numbness.  He had several falls at home.  Patient did have a stroke workup at [facility] which was essentially negative.  Patient did have a past history of a closed head injury and states that his symptoms are similar to a TIA he has had in the past.    This time patient is comfortable with no distress.  He has been wheeling around the unit in his wheelchair all morning, active and interacting with staff members.  Last bowel movement was approximately 3 days ago.  Denies nausea, vomiting, chest pain, shortness of air.    Review of Systems   Constitutional: Negative for chills and fever.   Respiratory: Negative for chest tightness and shortness of breath.    Cardiovascular: Negative for chest pain and palpitations.   Gastrointestinal: Negative for abdominal pain, diarrhea, nausea and vomiting.   Musculoskeletal: Positive for arthralgias and myalgias.   Neurological: Negative for dizziness, syncope and light-headedness.   Psychiatric/Behavioral: Negative for confusion.      All pertinent negatives and positives are as above. All other systems have been reviewed and are negative  unless otherwise stated.     Objective    Temp:  [96.7 °F (35.9 °C)] 96.7 °F (35.9 °C)  Heart Rate:  [76] 76  Resp:  [18] 18  BP: (120)/(68) 120/68    Physical Exam   Constitutional: He is oriented to person, place, and time. He appears well-developed and well-nourished. No distress.   HENT:   Head: Normocephalic and atraumatic.   Eyes: Conjunctivae are normal.   Cardiovascular: Normal rate and regular rhythm.   Pulmonary/Chest: Effort normal and breath sounds normal. No stridor. No respiratory distress.   Abdominal: Soft. Bowel sounds are normal. He exhibits no distension. There is no tenderness.   Neurological: He is alert and oriented to person, place, and time.   Skin: Skin is warm and dry. Capillary refill takes less than 2 seconds. He is not diaphoretic.   Psychiatric: He has a normal mood and affect. His behavior is normal.     Results Review:  I have reviewed the labs, radiology results, and diagnostic studies.    Laboratory Data:   Results from last 7 days   Lab Units 02/09/19  0531 02/06/19  0943   SODIUM mmol/L 136* 137   POTASSIUM mmol/L 4.8 4.2   CHLORIDE mmol/L 94* 97   CO2 mmol/L 33.0* 30.0   BUN mg/dL 14 11   CREATININE mg/dL 0.73 0.67*   GLUCOSE mg/dL 128* 140*   CALCIUM mg/dL 9.4 10.1   BILIRUBIN mg/dL  --  0.5   ALK PHOS U/L  --  114   ALT (SGPT) U/L  --  43   AST (SGOT) U/L  --  47   ANION GAP mmol/L 9.0 10.0     Estimated Creatinine Clearance: 124.5 mL/min (by C-G formula based on SCr of 0.73 mg/dL).  Results from last 7 days   Lab Units 02/09/19  0531 02/06/19  0943   MAGNESIUM mg/dL  --  1.8   PHOSPHORUS mg/dL 4.3  --          Results from last 7 days   Lab Units 02/09/19  0531 02/06/19  0546   WBC 10*3/mm3 7.23 7.89   HEMOGLOBIN g/dL 13.3* 13.9   HEMATOCRIT % 38.8* 40.4   PLATELETS 10*3/mm3 258 283           Culture Data:   No results found for: BLOODCX  No results found for: URINECX  No results found for: RESPCX  No results found for: WOUNDCX  No results found for: STOOLCX  No components  found for: BODYFLD    Radiology Data:   Imaging Results (last 24 hours)     ** No results found for the last 24 hours. **          I have reviewed the patient's current medications.     Assessment/Plan     Active Hospital Problems    Diagnosis   • **Medically complex patient   • Stroke-like symptoms       He has chronic left-sided weakness but it is much worse after this episode.  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  But since his fall the pain is much worse.  X-rays were unremarkable yesterday at [facility].     • Uncontrolled type 2 diabetes mellitus with hyperglycemia (CMS/[facility])     -at presentation to ED glucose 704    Hemoglobin A1c is 15.1 admission to acute rehabilitation         Plan: Continue physical therapy and occupational therapy, continue his acute rehabilitation course dictates.  Baclofen added.  A.m. labs.  Attending returns tomorrow.              This document has been electronically signed by SUGAR Acosta on February 10, 2019 10:52 AM

## 2019-02-11 LAB
ANION GAP SERPL CALCULATED.3IONS-SCNC: 10 MMOL/L (ref 5–15)
BASOPHILS # BLD AUTO: 0.08 10*3/MM3 (ref 0–0.2)
BASOPHILS NFR BLD AUTO: 1.5 % (ref 0–2)
BUN BLD-MCNC: 16 MG/DL (ref 7–21)
BUN/CREAT SERPL: 24.2 (ref 7–25)
CALCIUM SPEC-SCNC: 9.5 MG/DL (ref 8.4–10.2)
CHLORIDE SERPL-SCNC: 94 MMOL/L (ref 95–110)
CO2 SERPL-SCNC: 32 MMOL/L (ref 22–31)
CREAT BLD-MCNC: 0.66 MG/DL (ref 0.7–1.3)
DEPRECATED RDW RBC AUTO: 41.4 FL (ref 35.1–43.9)
EOSINOPHIL # BLD AUTO: 0.27 10*3/MM3 (ref 0–0.7)
EOSINOPHIL NFR BLD AUTO: 5.2 % (ref 0–7)
ERYTHROCYTE [DISTWIDTH] IN BLOOD BY AUTOMATED COUNT: 13.3 % (ref 11.5–14.5)
GFR SERPL CREATININE-BSD FRML MDRD: 125 ML/MIN/1.73 (ref 56–130)
GLUCOSE BLD-MCNC: 213 MG/DL (ref 60–100)
GLUCOSE BLDC GLUCOMTR-MCNC: 136 MG/DL (ref 70–130)
GLUCOSE BLDC GLUCOMTR-MCNC: 161 MG/DL (ref 70–130)
GLUCOSE BLDC GLUCOMTR-MCNC: 163 MG/DL (ref 70–130)
GLUCOSE BLDC GLUCOMTR-MCNC: 172 MG/DL (ref 70–130)
GLUCOSE BLDC GLUCOMTR-MCNC: 202 MG/DL (ref 70–130)
GLUCOSE BLDC GLUCOMTR-MCNC: 96 MG/DL (ref 70–130)
HCT VFR BLD AUTO: 38.1 % (ref 39–49)
HGB BLD-MCNC: 13.5 G/DL (ref 13.7–17.3)
IMM GRANULOCYTES # BLD AUTO: 0.02 10*3/MM3 (ref 0–0.02)
IMM GRANULOCYTES NFR BLD AUTO: 0.4 % (ref 0–0.5)
LYMPHOCYTES # BLD AUTO: 2 10*3/MM3 (ref 0.6–4.2)
LYMPHOCYTES NFR BLD AUTO: 38.7 % (ref 10–50)
MCH RBC QN AUTO: 30.3 PG (ref 26.5–34)
MCHC RBC AUTO-ENTMCNC: 35.4 G/DL (ref 31.5–36.3)
MCV RBC AUTO: 85.4 FL (ref 80–98)
MONOCYTES # BLD AUTO: 0.85 10*3/MM3 (ref 0–0.9)
MONOCYTES NFR BLD AUTO: 16.4 % (ref 0–12)
NEUTROPHILS # BLD AUTO: 1.95 10*3/MM3 (ref 2–8.6)
NEUTROPHILS NFR BLD AUTO: 37.8 % (ref 37–80)
NRBC BLD AUTO-RTO: 0 /100 WBC (ref 0–0)
PLATELET # BLD AUTO: 250 10*3/MM3 (ref 150–450)
PMV BLD AUTO: 10.5 FL (ref 8–12)
POTASSIUM BLD-SCNC: 4.1 MMOL/L (ref 3.5–5.1)
RBC # BLD AUTO: 4.46 10*6/MM3 (ref 4.37–5.74)
SODIUM BLD-SCNC: 136 MMOL/L (ref 137–145)
WBC NRBC COR # BLD: 5.17 10*3/MM3 (ref 3.2–9.8)

## 2019-02-11 PROCEDURE — 97116 GAIT TRAINING THERAPY: CPT

## 2019-02-11 PROCEDURE — 63710000001 INSULIN DETEMIR PER 5 UNITS: Performed by: FAMILY MEDICINE

## 2019-02-11 PROCEDURE — 97110 THERAPEUTIC EXERCISES: CPT

## 2019-02-11 PROCEDURE — 97530 THERAPEUTIC ACTIVITIES: CPT

## 2019-02-11 PROCEDURE — 82962 GLUCOSE BLOOD TEST: CPT

## 2019-02-11 PROCEDURE — 99232 SBSQ HOSP IP/OBS MODERATE 35: CPT | Performed by: FAMILY MEDICINE

## 2019-02-11 PROCEDURE — 85025 COMPLETE CBC W/AUTO DIFF WBC: CPT | Performed by: PHYSICIAN ASSISTANT

## 2019-02-11 PROCEDURE — 80048 BASIC METABOLIC PNL TOTAL CA: CPT | Performed by: PHYSICIAN ASSISTANT

## 2019-02-11 PROCEDURE — 63710000001 INSULIN ASPART PER 5 UNITS: Performed by: FAMILY MEDICINE

## 2019-02-11 PROCEDURE — 25010000002 ENOXAPARIN PER 10 MG: Performed by: FAMILY MEDICINE

## 2019-02-11 PROCEDURE — 92507 TX SP LANG VOICE COMM INDIV: CPT | Performed by: SPEECH-LANGUAGE PATHOLOGIST

## 2019-02-11 RX ORDER — BISACODYL 10 MG
10 SUPPOSITORY, RECTAL RECTAL DAILY PRN
Status: DISCONTINUED | OUTPATIENT
Start: 2019-02-11 | End: 2019-02-15 | Stop reason: HOSPADM

## 2019-02-11 RX ADMIN — METHADONE HYDROCHLORIDE 5 MG: 10 TABLET ORAL at 20:15

## 2019-02-11 RX ADMIN — ENOXAPARIN SODIUM 40 MG: 40 INJECTION SUBCUTANEOUS at 14:05

## 2019-02-11 RX ADMIN — INSULIN ASPART 7 UNITS: 100 INJECTION, SOLUTION INTRAVENOUS; SUBCUTANEOUS at 08:12

## 2019-02-11 RX ADMIN — ATORVASTATIN CALCIUM 20 MG: 20 TABLET, FILM COATED ORAL at 20:09

## 2019-02-11 RX ADMIN — LISINOPRIL 5 MG: 5 TABLET ORAL at 08:10

## 2019-02-11 RX ADMIN — METFORMIN HYDROCHLORIDE 1000 MG: 500 TABLET ORAL at 08:11

## 2019-02-11 RX ADMIN — INSULIN ASPART 7 UNITS: 100 INJECTION, SOLUTION INTRAVENOUS; SUBCUTANEOUS at 17:06

## 2019-02-11 RX ADMIN — OXYCODONE HYDROCHLORIDE AND ACETAMINOPHEN 1 TABLET: 10; 325 TABLET ORAL at 06:27

## 2019-02-11 RX ADMIN — METHADONE HYDROCHLORIDE 5 MG: 10 TABLET ORAL at 08:11

## 2019-02-11 RX ADMIN — METFORMIN HYDROCHLORIDE 1000 MG: 500 TABLET ORAL at 17:06

## 2019-02-11 RX ADMIN — INSULIN ASPART 2 UNITS: 100 INJECTION, SOLUTION INTRAVENOUS; SUBCUTANEOUS at 16:45

## 2019-02-11 RX ADMIN — AMITRIPTYLINE HYDROCHLORIDE 10 MG: 10 TABLET, FILM COATED ORAL at 20:09

## 2019-02-11 RX ADMIN — FAMOTIDINE 20 MG: 20 TABLET ORAL at 20:09

## 2019-02-11 RX ADMIN — FAMOTIDINE 20 MG: 20 TABLET ORAL at 08:10

## 2019-02-11 RX ADMIN — ASPIRIN 81 MG CHEWABLE TABLET 324 MG: 81 TABLET CHEWABLE at 08:10

## 2019-02-11 RX ADMIN — AMLODIPINE BESYLATE 5 MG: 5 TABLET ORAL at 08:12

## 2019-02-11 RX ADMIN — Medication 5 MG: at 16:44

## 2019-02-11 RX ADMIN — INSULIN ASPART 4 UNITS: 100 INJECTION, SOLUTION INTRAVENOUS; SUBCUTANEOUS at 08:13

## 2019-02-11 RX ADMIN — ACETAMINOPHEN 650 MG: 325 TABLET, FILM COATED ORAL at 16:10

## 2019-02-11 RX ADMIN — Medication 5 MG: at 20:09

## 2019-02-11 RX ADMIN — OXYCODONE HYDROCHLORIDE AND ACETAMINOPHEN 1 TABLET: 10; 325 TABLET ORAL at 18:04

## 2019-02-11 RX ADMIN — Medication 5 MG: at 08:12

## 2019-02-11 RX ADMIN — DULOXETINE HYDROCHLORIDE 20 MG: 20 CAPSULE, DELAYED RELEASE ORAL at 08:12

## 2019-02-11 RX ADMIN — OXYCODONE HYDROCHLORIDE AND ACETAMINOPHEN 1 TABLET: 10; 325 TABLET ORAL at 10:07

## 2019-02-11 RX ADMIN — OXYCODONE HYDROCHLORIDE AND ACETAMINOPHEN 1 TABLET: 10; 325 TABLET ORAL at 14:04

## 2019-02-11 RX ADMIN — INSULIN DETEMIR 30 UNITS: 100 INJECTION, SOLUTION SUBCUTANEOUS at 20:10

## 2019-02-11 NOTE — CONSULTS
Adult Nutrition  Assessment    Patient Name:  Ventura Boggs  YOB: 1960  MRN: [id number]  Admit Date:  2/5/2019    Assessment Date:  2/11/2019    Comments:  Spoke with pt re diabetes ed.  He said his girlfriend cleaned out the refrig at home and will go grocery shopping when he goes home. She works at a nursing home and has info from there for them to follow at home re the diet. Pt has a good appetite. Instructed him on carb counting using the patient menu/asked him to save the menu slips at each meal/limit his carbs to 60 grams per meal.  Pt seems to understand well initially. Will continue to educate.                          Electronically signed by:  La Faye RD  02/11/19 10:06 AM

## 2019-02-11 NOTE — PLAN OF CARE
Problem: Patient Care Overview  Goal: Plan of Care Review  Outcome: Ongoing (interventions implemented as appropriate)   02/11/19 1507   Patient Care Overview   IRF Plan of Care Review progress ongoing, continue   Progress, Functional Goals demonstrating adequate progress   Coping/Psychosocial   Plan of Care Reviewed With patient   OTHER   Outcome Summary A.M. tx and P.M. tx performed this date. Pt was able to amb well this PM for 75ft x 2 and only took 15 minutes for first trial and 14 minutes for second trial. Pt took 28minutes to amb 78ft in am tx. Pt demonstrates improved bed mobility and t/f ability this date.

## 2019-02-11 NOTE — PLAN OF CARE
Problem: Patient Care Overview  Goal: Plan of Care Review  Outcome: Ongoing (interventions implemented as appropriate)   02/11/19 7763   Patient Care Overview   Progress, Functional Goals demonstrating adequate progress   OTHER   Outcome Summary pt resting comfortably at this time; vital signs stable; will continue to monitor

## 2019-02-11 NOTE — PLAN OF CARE
Problem: Patient Care Overview  Goal: Plan of Care Review  Outcome: Ongoing (interventions implemented as appropriate)   02/11/19 6758   Patient Care Overview   IRF Plan of Care Review progress ongoing, continue   Progress, Functional Goals demonstrating adequate progress   Coping/Psychosocial   Plan of Care Reviewed With patient   OTHER   Outcome Summary Pt seen for cognitive therapy this date. Pt participated well in all therapy tasks and demonstrated strong performance w/mental flexibility, memory and selective attention. Pt continues to struggle w/math related problems.

## 2019-02-11 NOTE — THERAPY TREATMENT NOTE
Inpatient Rehabilitation - Physical Therapy Treatment Note  [facility]     Patient Name: Ventura Boggs  : 1960  MRN: [id number]    Today's Date: 2019                 Admit Date: 2019      Visit Dx:      ICD-10-CM ICD-9-CM   1. Uncontrolled type 2 diabetes mellitus with hyperglycemia (CMS/HCC) E11.65 250.02   2. Medically complex patient Z78.9 V49.9   3. Chronic intractable pain G89.29 338.29   4. Symbolic dysfunction R48.9 784.60   5. Impaired mobility and ADLs Z74.09 799.89   6. Impaired functional mobility, balance, gait, and endurance Z74.09 V49.89       Patient Active Problem List   Diagnosis   • Uncontrolled type 2 diabetes mellitus with hyperglycemia (CMS/HCC)   • Postoperative urinary retention   • Incisional hernia, without obstruction or gangrene   • Acute appendicitis with localized peritonitis   • Closed head injury   • Hyponatremia   • History of recurrent TIAs   • Ataxia   • Chronic pain syndrome   • Right shoulder pain   • Left-sided muscle weakness   • Medically complex patient   • Stroke-like symptoms       Therapy Treatment    IRF Treatment Summary     Row Name 19 1507 19 1410 19 0905       Evaluation/Treatment Time and Intent    Subjective Information  complains of;pain In R shld  -TW  no complaints  -CS  no complaints  -CK    Existing Precautions/Restrictions  fall;lifting  -TW  fall;lifting  -CS  fall;lifting  -CK    Document Type  therapy note (daily note)  -TW  therapy note (daily note)  -CS  therapy note (daily note)  -CK    Mode of Treatment  physical therapy;individual therapy  -TW  occupational therapy  -CS  speech-language pathology;individual therapy  -CK    Patient/Family Observations  Pt up in w/c rolling around in hallway.  -TW  --  Pt sitting in w/c in room  -CK    Start Time (Evaluation/Treatment)  1507  -TW  1410  -CS  0905  -CK    Stop Time (Evaluation/Treatment)  1550  -TW  1505  -CS  1005  -CK    Recorded by [TW] Brant Palacios, PTA  [CS] Divya Ash COTA/L [CK] Divya Abarca, MS CCC-SLP    Row Name 02/11/19 0810             Evaluation/Treatment Time and Intent    Subjective Information  no complaints  -TW      Existing Precautions/Restrictions  fall;lifting  -TW      Document Type  therapy note (daily note)  -TW      Mode of Treatment  physical therapy;individual therapy  -TW      Patient/Family Observations  Pt supine in bed with nsg present.  -TW      Start Time (Evaluation/Treatment)  0810  -TW      Stop Time (Evaluation/Treatment)  0900  -TW      Recorded by [TW] Brant Palacios PTA      Row Name 02/11/19 1507 02/11/19 1410 02/11/19 0810       Cognition/Psychosocial- PT/OT    Affect/Mental Status (Cognitive)  WFL  -TW  WFL  -CS  WFL  -TW    Orientation Status (Cognition)  oriented x 4  -TW  oriented x 4  -CS  oriented x 4  -TW    Follows Commands (Cognition)  WFL  -TW  WFL  -CS  WFL  -TW    Personal Safety Interventions  fall prevention program maintained;gait belt;nonskid shoes/slippers when out of bed  -TW  --  fall prevention program maintained;gait belt;nonskid shoes/slippers when out of bed  -TW    Cognitive Function (Cognitive)  safety deficit  -TW  --  safety deficit  -TW    Recorded by [TW] Brant Palacios PTA [CS] Divya Ash COTA/MEI [TW] Brant Palacios PTA    Row Name 02/11/19 1507 02/11/19 1410 02/11/19 0810       Bed Mobility Assessment/Treatment    Supine-Sit [place] (Bed Mobility)  not tested  -TW  supervision  -CS  supervision  -TW    Sit-Supine [place] (Bed Mobility)  not tested  -TW  --  not tested  -TW    Bed Mobility, Safety Issues  --  decreased use of legs for bridging/pushing  -CS  decreased use of legs for bridging/pushing  -TW    Recorded by [TW] Brant Palacios PTA [CS] Divya Ash COTA/MEI [TW] Brant Palacios PTA    Row Name 02/11/19 1410             Transfer Assessment/Treatment    Transfer Assessment/Treatment  sit-stand transfer;stand-sit  transfer;bed-chair transfer  -CS      Recorded by [CS] Divya Ash COTA/MEI      Row Name 02/11/19 1507 02/11/19 1410 02/11/19 0810       Bed-Chair Transfer    Bed-Chair [place] (Transfers)  not tested  -TW  minimum assist (75% patient effort)  -CS  minimum assist (75% patient effort) Wheel chair  -TW    Assistive Device (Bed-Chair Transfers)  --  -- stand-pivot t/f  -CS  walker, front-wheeled  -TW    Recorded by [TW] Brant Palacios PTA [CS] Divya Ash, COULTER/MEI [TW] Brant Palacios PTA    Row Name 02/11/19 1507 02/11/19 0810          Chair-Bed Transfer    Chair-Bed [place] (Transfers)  not tested  -TW  not tested  -TW     Recorded by [TW] Brant Palacios PTA [TW] Brant Palacios PTA     Row Name 02/11/19 1507 02/11/19 1410 02/11/19 0810       Sit-Stand Transfer    Sit-Stand [place] (Transfers)  contact guard;verbal cues  -TW  minimum assist (75% patient effort)  -CS  contact guard;verbal cues  -TW    Assistive Device (Sit-Stand Transfers)  walker, front-wheeled  -TW  --  walker, front-wheeled  -TW    Recorded by [TW] Brant Palacios PTA [CS] Divya Ash, COULTER/L [TW] Brant Palacios PTA    Row Name 02/11/19 1507 02/11/19 1410 02/11/19 0810       Stand-Sit Transfer    Stand-Sit [place] (Transfers)  contact guard;verbal cues  -TW  minimum assist (75% patient effort)  -CS  contact guard;verbal cues  -TW    Assistive Device (Stand-Sit Transfers)  walker, front-wheeled  -TW  --  walker, front-wheeled  -TW    Recorded by [TW] Brant Palacios PTA [CS] Divya Ash, COULTER/L [TW] Brant Palacios PTA    Row Name 02/11/19 1507 02/11/19 1410 02/11/19 0810       Wheelchair Transfer    Type (Wheelchair Transfer)  --  stand pivot/stand step  -CS  stand pivot/stand step  -TW    [place] Level (Wheelchair Transfer)  contact guard  -TW  minimum assist (75% patient effort)  -CS  contact guard  -TW    Recorded by [TW] Brant Palacios PTA [CS]  Divya Ash, MARYLIN/L [TW] Brant Palacios PTA    Row Name 02/11/19 1507 02/11/19 0810          Gait/Stairs Assessment/Training    Gait/Stairs Assessment/Training  gait/ambulation assistive device  -TW  gait/ambulation assistive device  -TW     [place] Level (Gait)  contact guard  -TW  contact guard  -TW     Assistive Device (Gait)  walker, front-wheeled  -TW  walker, front-wheeled  -TW     Distance in Feet (Gait)  75ft x2 trials.  -TW  78ft  -TW     Pattern (Gait)  step-to  -TW  step-to  -TW     Deviations/Abnormal Patterns (Gait)  left sided deviations;stride length decreased  -TW  left sided deviations;stride length decreased  -TW     Bilateral Gait Deviations  foot drop/toe drag;heel strike decreased;knee buckling, left side  -TW  foot drop/toe drag;heel strike decreased;knee buckling, left side  -TW     Comment (Gait/Stairs)  Pt amb first 75ft in 15 minutes and the second 75ft in 14 minutes.  -TW  Pt took 28 minutes to complete gait distance this am.  -TW     Recorded by [TW] Brant Palacios PTA [TW] Brant Palacios PTA     Row Name 02/11/19 1507 02/11/19 1410 02/11/19 0810       Wheelchair Mobility/Management    Method of Wheelchair Locomotion (Mobility)  bimanual (upper extremity) propulsion  -TW  bimanual (upper extremity) propulsion  -CS  bimanual (upper extremity) propulsion  -TW    Mobility Activities (Wheelchair)  mobility (all) activities  -TW  mobility (all) activities  -CS  mobility (all) activities  -TW    Mobility [place] Level (Wheelchair)  independent  -TW  supervision  -CS  independent  -TW    Forward Propulsion [place] (Wheelchair)  independent  -TW  --  independent  -TW    Backward Propulsion [place] (Wheelchair)  independent  -TW  --  independent  -TW    Steering [place] (Wheelchair)  independent  -TW  --  independent  -TW    Stopping [place] (Wheelchair)  independent  -TW  --  independent  -TW    Turning [place] (Wheelchair)  independent   -TW  --  independent  -TW    Doorway Navigation [place] (Wheelchair)  independent  -TW  --  independent  -TW    Distance Propelled in Feet (Wheelchair)  300ft+  -TW  200  -CS  300ft+  -TW    Recorded by [TW] Brant Palacios PTA [CS] Divya Ash COTA/MEI [TW] Brant Palacios PTA    Row Name 02/11/19 1410             Motor Assessment/Intervention    Additional Documentation  Fine Motor Testing & Training (Group)  -CS      Recorded by [CS] Divya Ash COTA/MEI      Row Name 02/11/19 1410             Fine Motor Testing & Training    Training Activity, Fine Motor Coordination  manipulation of pegs;other (see comments) manipulation of clothes pins  -CS      Recorded by [CS] Divya Ash COTA/MEI      Row Name 02/11/19 0905             Pain Assessment    Additional Documentation  Pain Scale 2: Numbers Pre/Post-Treatment (Group)  -CK      Recorded by [CK] Divya Abarca MS CCC-SLP      Row Name 02/11/19 1507 02/11/19 1410 02/11/19 0905       Pain Scale: Numbers Pre/Post-Treatment    Pain Scale: Numbers, Pretreatment  8/10  -TW  8/10  -CS  8/10  -CK    Pain Scale: Numbers, Post-Treatment  8/10  -TW  8/10  -CS  8/10  -CK    Pain Location - Side  Right  -TW  Right  -CS  Right  -CK    Pain Location - Orientation  upper  -TW  upper  -CS  upper  -CK    Pain Location  extremity  -TW  shoulder  -CS  shoulder  -CK    Pain Intervention(s)  -- Pt states rolling his w/c helps with his shld pain.  -TW  --  -- meds d/t at 1030 per pt  -CK    Recorded by [TW] Brant Palacios PTA [CS] Divya Ash, COULTER/L [CK] Divya Abarca, MS CCC-SLP    Row Name 02/11/19 0810             Pain Scale: Numbers Pre/Post-Treatment    Pain Scale: Numbers, Pretreatment  8/10  -TW      Pain Scale: Numbers, Post-Treatment  8/10  -TW      Pain Location - Side  Right  -TW      Pain Location - Orientation  upper  -TW      Pain Location  extremity  -TW      Recorded by [TW] Brant Palacios PTA      Row Name  02/11/19 0905             Pain Scale 2: Numbers Pre/Post-Treatment    Pain Scale 2: Numbers, Pretreatment  8/10  -CK      Pain Scale 2: Numbers, Post-Treatment  8/10  -CK      Pain Location 2 - Side  Left  -CK      Pain Location 2 - Orientation  lower  -CK      Pain Location 2  extremity  -CK      Pain Intervention(s) 2  -- meds d/t at 1030 per pt  -CK      Recorded by [CK] Divya Abarca MS CCC-SLP      Row Name 02/11/19 1507 02/11/19 0810          Static Standing Balance    Level of [place] (Supported Standing, Static Balance)  supervision  -TW  supervision  -TW     Time Able to Maintain Position (Supported Standing, Static Balance)  more than 5 minutes  -TW  more than 5 minutes  -TW     Assistive Device Utilized (Supported Standing, Static Balance)  walker, rolling  -TW  walker, rolling  -TW     Recorded by [TW] Brant Palacios PTA [TW] Brant Palacios PTA     Row Name 02/11/19 1410             Upper Extremity Seated Therapeutic Exercise    Performed, Seated Upper Extremity (Therapeutic Exercise)  shoulder flexion/extension;shoulder external/internal rotation;scapular protraction/retraction;elbow flexion/extension;forearm supination/pronation;wrist flexion/extension;digit flexion/extension  -CS      Device, Seated Upper Extremity (Therapeutic Exercise)  free weights, barbell  -CS      Exercise Type, Seated Upper Extremity (Therapeutic Exercise)  AROM (active range of motion)  -CS      Expected Outcomes, Seated Upper Extremity (Therapeutic Exercise)  improve functional tolerance, self-care activity;improve performance, BADLs;improve performance, transfer skills;improve performance, propel a wheelchair  -CS      Sets/Reps Detail, Seated Upper Extremity (Therapeutic Exercise)  1/15-20  -CS      Transfers Skills, Training to Functional Activity, Seated Upper Extremity (Therapeutic Exercise)  beginning to transfer skills to functional activity  -CS      Recorded by [CS] Divya Ash COTA/MEI       Row Name 02/11/19 0810             Lower Extremity Seated Therapeutic Exercise    Performed, Seated Lower Extremity (Therapeutic Exercise)  hip flexion/extension;hip abduction/adduction;ankle dorsiflexion/plantarflexion;LAQ (long arc quad), knee extension with R LE  -TW      Exercise Type, Seated Lower Extremity (Therapeutic Exercise)  AROM (active range of motion)  -TW      Sets/Reps Detail, Seated Lower Extremity (Therapeutic Exercise)  1/20  -TW      Comment, Seated Lower Extremity (Therapeutic Exercise)  Pt completed L LE therex x 15-20 reps in sitting with AAROM.  -TW      Recorded by [TW] Brant Palacios PTA      Row Name 02/11/19 1507 02/11/19 1410 02/11/19 0810       Vital Signs    Pre Systolic BP Rehab  --  --  136  -TW    Pre Treatment Diastolic BP  --  --  80  -TW    Pretreatment Heart Rate (beats/min)  --  88  -CS  90  -TW    Posttreatment Heart Rate (beats/min)  --  92  -CS  --    Pre SpO2 (%)  --  95  -CS  98  -TW    O2 Delivery Pre Treatment  --  room air  -CS  room air  -TW    Post SpO2 (%)  --  95  -CS  --    O2 Delivery Post Treatment  --  room air  -CS  --    Pre Patient Position  Sitting  -TW  Supine  -CS  Supine  -TW    Intra Patient Position  Standing  -TW  Sitting  -CS  Standing  -TW    Post Patient Position  Sitting  -TW  Sitting  -CS  Sitting  -TW    Recorded by [TW] Brant Palacios PTA [CS] Divya Ash COTA/L [TW] Brant Palacios PTA    Row Name 02/11/19 1507 02/11/19 1410 02/11/19 0905       Positioning and Restraints    Pre-Treatment Position  sitting in chair/recliner  -TW  in bed  -CS  sitting in chair/recliner  -CK    Post Treatment Position  wheelchair  -TW  wheelchair  -CS  wheelchair  -CK    In Wheelchair  sitting;encouraged to call for assist  -TW  sitting;with PT  -CS  sitting;call light within reach;encouraged to call for assist  -CK    Recorded by [TW] Brant Palacios PTA [CS] Divya Ash COTA/L [CK] Divya Abarca, MS CCC-SLP    Row Name  02/11/19 0810             Positioning and Restraints    Pre-Treatment Position  in bed  -TW      Post Treatment Position  wheelchair  -TW      In Wheelchair  sitting;encouraged to call for assist;patient within staff view  -TW      Recorded by [TW] Brant Palacios PTA      Row Name 02/11/19 1507 02/11/19 0810          Daily Summary of Progress (PT)    Functional Goal Overall Progress: Physical Therapy  progressing toward functional goals as expected  -TW  progressing toward functional goals as expected  -TW     Impairments Continuing to Limit Function: Physical Therapy  strength decreased;impaired balance;coordination impaired;motor control impaired;pain  -TW  strength decreased;impaired balance;coordination impaired;motor control impaired;pain  -TW     Recommendations: Physical Therapy  Cont  -TW  Cont  -TW     Recorded by [TW] Brant Palacios PTA [TW] Brant Palacios PTA     Row Name 02/11/19 1410             Daily Summary of Progress (OT)    Functional Goal Overall Progress: Occupational Therapy  progressing toward functional goals as expected  -CS      Recommendations: Occupational Therapy  cont OT POC  -CS      Recorded by [CS] Divya Ash COTA/L      Row Name 02/11/19 0905             Daily Summary of Progress (SLP)    Functional Goal Overall Progress: Speech Language Pathology  progressing toward functional goals as expected  -CK      Impairments Continuing to Limit Function: Speech Language Pathology  high level attention  -CK      Recommendations: Speech Language Pathology  Continue POC  -CK      Recorded by [CK] Divya Abarca MS CCC-SLP        User Key  (r) = Recorded By, (t) = Taken By, (c) = Cosigned By    Initials Name Effective Dates    CK Divya Abarca MS CCC-SLP 04/03/18 -     TW Brant Palacios PTA 03/07/18 -     CS Divya Ash COTA/L 03/07/18 -            Physical Therapy Education     Title: PT OT SLP Therapies (In Progress)     Topic: Physical Therapy  (In Progress)     Point: Mobility training (In Progress)     Learning Progress Summary           Patient TA Bain, NR by BS at 2/8/2019 12:59 PM    Comment:  further addressed body position with weight shifting and sequencing of LE's with limb advancement in gait training w/ // bars. Tactile and verbal cues provided.    Acceptance, E, NR by GAL at 2/6/2019  2:39 PM                   Point: Home exercise program (In Progress)     Learning Progress Summary           Patient TA Bain, NR by BS at 2/8/2019 12:59 PM    Comment:  further addressed body position with weight shifting and sequencing of LE's with limb advancement in gait training w/ // bars. Tactile and verbal cues provided.                   Point: Body mechanics (In Progress)     Learning Progress Summary           Patient AT Bain, NR by BS at 2/8/2019 12:59 PM    Comment:  further addressed body position with weight shifting and sequencing of LE's with limb advancement in gait training w/ // bars. Tactile and verbal cues provided.    Acceptance, E, NR by GAL at 2/6/2019  2:39 PM                   Point: Precautions (In Progress)     Learning Progress Summary           Patient TA Bain, NR by BS at 2/8/2019 12:59 PM    Comment:  further addressed body position with weight shifting and sequencing of LE's with limb advancement in gait training w/ // bars. Tactile and verbal cues provided.    Acceptance, E, NR by GAL at 2/6/2019  2:39 PM                               User Key     Initials Effective Dates Name Provider Type Discipline     04/03/18 -  Shilpa Dumont, PT Physical Therapist PT    ERIC 04/08/18 -  Jose Antonio Cuevas, PT Physical Therapist PT                  PT Recommendation and Plan     Plan of Care Reviewed With: patient  Daily Summary of Progress (PT)  Functional Goal Overall Progress: Physical Therapy: progressing toward functional goals as expected  Impairments Continuing to Limit Function: Physical Therapy: strength decreased, impaired balance,  coordination impaired, motor control impaired, pain  Recommendations: Physical Therapy: Cont  IRF Plan of Care Review: progress ongoing, continue  Progress, Functional Goals: demonstrating adequate progress  Outcome Summary: A.M. tx and P.M. tx performed this date. Pt was able to amb well this PM for 75ft x 2 and only took 15 minutes for first trial and 14 minutes for second trial. Pt took 28minutes to amb 78ft in am tx. Pt demonstrates improved bed mobility and t/f ability this date.      PT IRF GOALS     Row Name 02/11/19 1507 02/11/19 0810          Bed Mobility Goal 1 (PT-IRF)    Activity/Assistive Device (Bed Mobility Goal 1, PT-IRF)  rolling to left;rolling to right;sit to supine;supine to sit  -TW  rolling to left;rolling to right;sit to supine;supine to sit  -TW     [place] Level (Bed Mobility Goal 1, PT-IRF)  independent  -TW  independent  -TW     Time Frame (Bed Mobility Goal 1, PT-IRF)  1 week  -TW  1 week  -TW     Progress/Outcomes (Bed Mobility Goal 1, PT-IRF)  goal not met  -TW  goal not met  -TW        Transfer Goal 1 (PT-IRF)    Activity/Assistive Device (Transfer Goal 1, PT-IRF)  sit-to-stand/stand-to-sit;bed-to-chair/chair-to-bed;wheelchair transfer  -TW  sit-to-stand/stand-to-sit;bed-to-chair/chair-to-bed;wheelchair transfer  -TW     [place] Level (Transfer Goal 1, PT-IRF)  conditional independence  -TW  conditional independence  -TW     Time Frame (Transfer Goal 1, PT-IRF)  1 week  -TW  1 week  -TW     Progress/Outcomes (Transfer Goal 1, PT-IRF)  goal not met  -TW  goal not met  -TW        Gait/Walking Locomotion Goal 1 (PT-IRF)    Activity/Assistive Device (Gait/Walking Locomotion Goal 1, PT-IRF)  gait (walking locomotion);assistive device use  -TW  gait (walking locomotion);assistive device use  -TW     Gait/Walking Locomotion Distance Goal 1 (PT-IRF)  50ft  -TW  50ft  -TW     [place] Level (Gait/Walking Locomotion Goal 1, PT-IRF)  contact guard assist  -TW  contact guard assist   -TW     Time Frame (Gait/Walking Locomotion Goal 1, PT-IRF)  1 week  -TW  1 week  -TW     Progress/Outcomes (Gait/Walking Locomotion Goal 1, PT-IRF)  goal not met  -TW  goal not met  -TW        Gait/Walking Locomotion Goal 2 (PT-IRF)    Activity/Assistive Device (Gait/Walking Locomotion Goal 2, PT-IRF)  gait (walking locomotion);assistive device use  -TW  gait (walking locomotion);assistive device use  -TW     Gait/Walking Locomotion Distance Goal 2 (PT-IRF)  150ft or more  -TW  150ft or more  -TW     [place] Level (Gait/Walking Locomotion Goal 2, PT-IRF)  conditional independence  -TW  conditional independence  -TW     Time Frame (Gait/Walking Locomotion Goal 2, PT-IRF)  2 weeks  -TW  2 weeks  -TW     Progress/Outcomes (Gait/Walking Locomotion Goal 2, PT-IRF)  goal not met  -TW  goal not met  -TW        Wheelchair Locomotion Goal 1 (PT-IRF)    Activity (Wheelchair Locomotion Goal 1, PT-IRF)  forward propulsion;backward propulsion;steering;turning;doorway navigation  -TW  forward propulsion;backward propulsion;steering;turning;doorway navigation  -TW     [place] Level (Wheelchair Locomotion Goal 1, PT-IRF)  independent  -TW  independent  -TW     Distance Goal 1 (Wheelchair Locomotion, PT-IRF)  150ft  -TW  150ft  -TW     Time Frame (Wheelchair Locomotion Goal 1, PT-IRF)  2 days  -TW  2 days  -TW     Progress/Outcomes (Wheelchair Locomotion Goal 1, PT-IRF)  goal met  (Significant)   -TW  goal met  (Significant)   -TW        Stairs Goal 1 (PT-IRF)    Activity/Assistive Device (Stairs Goal 1, PT-IRF)  ascending stairs;descending stairs;using handrail, right  -TW  ascending stairs;descending stairs;using handrail, right  -TW     Number of Stairs (Stairs Goal 1, PT-IRF)  5  -TW  5  -TW     [place] Level (Stairs Goal 1, PT-IRF)  standby assist;conditional independence  -TW  standby assist;conditional independence  -TW     Time Frame (Stairs Goal 1, PT-IRF)  2 weeks  -TW  2 weeks  -TW     Progress/Outcomes  (Stairs Goal 1, PT-IRF)  goal not met  -TW  goal not met  -TW        Strength Goal 1 (PT-IRF)    Strength Goal 1 (PT-IRF)  Pt will perform 20 reps of AROM exercises with VSS  -TW  Pt will perform 20 reps of AROM exercises with VSS  -TW     Time Frame (Strength Goal 1, PT-IRF)  by discharge  -TW  by discharge  -TW     Progress/Outcomes (Strength Goal 1, PT-IRF)  goal not met  -TW  goal not met  -TW        Caregiver Training Goal 1 (PT-IRF)    Caregiver Training Goal 1 (PT-IRF)  Homecaregiver will demonstrate understanding assisting with mobility as needed, home safety and homecare instructions  -TW  Homecaregiver will demonstrate understanding assisting with mobility as needed, home safety and homecare instructions  -TW     Time Frame (Caregiver Training Goal 1, PT-IRF)  by discharge  -TW  by discharge  -TW     Progress/Outcomes (Caregiver Training Goal 1, PT-IRF)  goal not met  -TW  goal not met  -TW       User Key  (r) = Recorded By, (t) = Taken By, (c) = Cosigned By    Initials Name Provider Type    TW Brant Palacios, PTA Physical Therapy Assistant        Outcome Measures     Row Name 02/11/19 1507 02/11/19 0810 02/09/19 1432       How much help from another person do you currently need...    Turning from your back to your side while in flat bed without using bedrails?  4  -TW  4  -TW  3  -TW    Moving from lying on back to sitting on the side of a flat bed without bedrails?  4  -TW  4  -TW  3  -TW    Moving to and from a bed to a chair (including a wheelchair)?  3  -TW  3  -TW  3  -TW    Standing up from a chair using your arms (e.g., wheelchair, bedside chair)?  3  -TW  3  -TW  3  -TW    Climbing 3-5 steps with a railing?  2  -TW  2  -TW  2  -TW    To walk in hospital room?  3  -TW  3  -TW  3  -TW    AM-PAC 6 Clicks Score  19  -TW  19  -TW  17  -TW       Functional Assessment    Outcome Measure Options  AM-PAC 6 Clicks Basic Mobility (PT)  -TW  AM-PAC 6 Clicks Basic Mobility (PT)  -TW  AM-PAC 6 Clicks Basic  Mobility (PT)  -TW    Row Name 02/09/19 1300 02/09/19 0950 02/09/19 0819       How much help from another person do you currently need...    Turning from your back to your side while in flat bed without using bedrails?  --  --  3  -TW    Moving from lying on back to sitting on the side of a flat bed without bedrails?  --  --  3  -TW    Moving to and from a bed to a chair (including a wheelchair)?  --  --  3  -TW    Standing up from a chair using your arms (e.g., wheelchair, bedside chair)?  --  --  3  -TW    Climbing 3-5 steps with a railing?  --  --  2  -TW    To walk in hospital room?  --  --  2  -TW    AM-PAC 6 Clicks Score  --  --  16  -TW       How much help from another is currently needed...    Putting on and taking off regular lower body clothing?  2  -LW  2  -RC  --    Bathing (including washing, rinsing, and drying)  2  -LW  2  -RC  --    Toileting (which includes using toilet bed pan or urinal)  3  -LW  3  -RC  --    Putting on and taking off regular upper body clothing  3  -LW  3  -RC  --    Taking care of personal grooming (such as brushing teeth)  3  -LW  3  -RC  --    Eating meals  4  -LW  4  -RC  --    Score  17  -LW  17  -RC  --       Functional Assessment    Outcome Measure Options  --  --  AM-PAC 6 Clicks Basic Mobility (PT)  -TW      User Key  (r) = Recorded By, (t) = Taken By, (c) = Cosigned By    Initials Name Provider Type    TW Brant Palacios, PTA Physical Therapy Assistant    RC Valeri Woodard, MARYLIN/L Occupational Therapy Assistant    Irina Lorenzo, MARYLIN/L Occupational Therapy Assistant             Time Calculation:     PT Charges     Row Name 02/11/19 1639 02/11/19 1614          Time Calculation    Start Time  1507  -TW  0810  -TW     Stop Time  1550  -TW  0900  -TW     Time Calculation (min)  43 min  -TW  50 min  -TW     PT Received On  02/11/19  -TW  02/11/19  -TW     PT Goal Re-Cert Due Date  02/19/19  -TW  02/19/19  -TW        Time Calculation- PT    Total Timed Code  Minutes- PT  43 minute(s)  -TW  50 minute(s)  -TW       User Key  (r) = Recorded By, (t) = Taken By, (c) = Cosigned By    Initials Name Provider Type     Brant Palacios PTA Physical Therapy Assistant            Therapy Charges for Today     Code Description Service Date Service Provider Modifiers Qty    51177132614 HC GAIT TRAINING EA 15 MIN 2/11/2019 Brant Palacios, KAREN GP 1    33253007843 HC PT THERAPEUTIC ACT EA 15 MIN 2/11/2019 Brant Palacios, KAREN GP 1    74220258322 HC PT THER PROC EA 15 MIN 2/11/2019 Brant Palacios, KAREN GP 1    17328958943 HC GAIT TRAINING EA 15 MIN 2/11/2019 Brant Palacios, KAREN GP 2    06366407838 HC PT THERAPEUTIC ACT EA 15 MIN 2/11/2019 Brant Palacios, KAREN GP 1            PT G-Codes  Outcome Measure Options: AM-PAC 6 Clicks Basic Mobility (PT)  AM-PAC 6 Clicks Score: 19  Score: 17      Brant Palacios PTA  2/11/2019

## 2019-02-11 NOTE — THERAPY TREATMENT NOTE
Inpatient Rehabilitation - Occupational Therapy Treatment Note    [facility]     Patient Name: Ventura Boggs  : 1960  MRN: [id number]    Today's Date: 2019                 Admit Date: 2019      Visit Dx:    ICD-10-CM ICD-9-CM   1. Uncontrolled type 2 diabetes mellitus with hyperglycemia (CMS/HCC) E11.65 250.02   2. Medically complex patient Z78.9 V49.9   3. Chronic intractable pain G89.29 338.29   4. Symbolic dysfunction R48.9 784.60   5. Impaired mobility and ADLs Z74.09 799.89   6. Impaired functional mobility, balance, gait, and endurance Z74.09 V49.89       Patient Active Problem List   Diagnosis   • Uncontrolled type 2 diabetes mellitus with hyperglycemia (CMS/HCC)   • Postoperative urinary retention   • Incisional hernia, without obstruction or gangrene   • Acute appendicitis with localized peritonitis   • Closed head injury   • Hyponatremia   • History of recurrent TIAs   • Ataxia   • Chronic pain syndrome   • Right shoulder pain   • Left-sided muscle weakness   • Medically complex patient   • Stroke-like symptoms         Therapy Treatment    IRF Treatment Summary     Row Name 19 1507 19 1410 19 1115       Evaluation/Treatment Time and Intent    Subjective Information  complains of;pain In R shld  -TW  no complaints  -CS  no complaints  -LM    Existing Precautions/Restrictions  fall;lifting  -TW  fall;lifting  -CS  fall  -LM    Document Type  therapy note (daily note)  -TW  therapy note (daily note)  -CS  therapy note (daily note)  -LM    Mode of Treatment  physical therapy;individual therapy  -TW  occupational therapy  -CS  individual therapy;occupational therapy  -LM    Patient/Family Observations  Pt up in w/c rolling around in hallway.  -TW  --  --    Start Time (Evaluation/Treatment)  1507  -TW  1410  -CS  1115  -LM    Stop Time (Evaluation/Treatment)  1550  -TW  1505  -CS  1200  -LM    Recorded by [TW] Brant Palacios, KAREN [CS] Divya Ash COTA/MEI  [LM] Ese Dodson COTA/L    Row Name 02/11/19 0905 02/11/19 0810          Evaluation/Treatment Time and Intent    Subjective Information  no complaints  -CK  no complaints  -TW     Existing Precautions/Restrictions  fall;lifting  -CK  fall;lifting  -TW     Document Type  therapy note (daily note)  -CK  therapy note (daily note)  -TW     Mode of Treatment  speech-language pathology;individual therapy  -CK  physical therapy;individual therapy  -TW     Patient/Family Observations  Pt sitting in w/c in room  -CK  Pt supine in bed with nsg present.  -TW     Start Time (Evaluation/Treatment)  0905  -CK  0810  -TW     Stop Time (Evaluation/Treatment)  1005  -CK  0900  -TW     Recorded by [CK] Divya Abarca MS CCC-SLP [TW] Brant Palacios PTA     Row Name 02/11/19 1507 02/11/19 1410 02/11/19 1115       Cognition/Psychosocial- PT/OT    Affect/Mental Status (Cognitive)  WFL  -TW  WFL  -CS  WFL  -LM    Orientation Status (Cognition)  oriented x 4  -TW  oriented x 4  -CS  oriented x 4  -LM    Follows Commands (Cognition)  WFL  -TW  WFL  -CS  WFL  -LM    Personal Safety Interventions  fall prevention program maintained;gait belt;nonskid shoes/slippers when out of bed  -TW  --  --    Cognitive Function (Cognitive)  safety deficit  -TW  --  --    Recorded by [TW] Brant Palacios PTA [CS] Divya Ash COTA/MEI [LM] Ese Dodson COTA/L    Row Name 02/11/19 0810             Cognition/Psychosocial- PT/OT    Affect/Mental Status (Cognitive)  WFL  -TW      Orientation Status (Cognition)  oriented x 4  -TW      Follows Commands (Cognition)  WFL  -TW      Personal Safety Interventions  fall prevention program maintained;gait belt;nonskid shoes/slippers when out of bed  -TW      Cognitive Function (Cognitive)  safety deficit  -TW      Recorded by [TW] Brant Palacios PTA      Row Name 02/11/19 1507 02/11/19 1410 02/11/19 0810       Bed Mobility Assessment/Treatment    Supine-Sit [place] (Bed  Mobility)  not tested  -TW  supervision  -CS  supervision  -TW    Sit-Supine [place] (Bed Mobility)  not tested  -TW  --  not tested  -TW    Bed Mobility, Safety Issues  --  decreased use of legs for bridging/pushing  -CS  decreased use of legs for bridging/pushing  -TW    Recorded by [TW] Brant Palacios PTA [CS] Divya Ash COTA/MEI [TW] Brant Palacios PTA    Row Name 02/11/19 1410             Transfer Assessment/Treatment    Transfer Assessment/Treatment  sit-stand transfer;stand-sit transfer;bed-chair transfer  -CS      Recorded by [CS] Divya Ash COTA/MEI      Row Name 02/11/19 1507 02/11/19 1410 02/11/19 0810       Bed-Chair Transfer    Bed-Chair [place] (Transfers)  not tested  -TW  minimum assist (75% patient effort)  -CS  minimum assist (75% patient effort) Wheel chair  -TW    Assistive Device (Bed-Chair Transfers)  --  -- stand-pivot t/f  -CS  walker, front-wheeled  -TW    Recorded by [TW] Brant Palacios PTA [CS] Divya Ash COULTER/L [TW] Brant Palacios PTA    Row Name 02/11/19 1507 02/11/19 0810          Chair-Bed Transfer    Chair-Bed [place] (Transfers)  not tested  -TW  not tested  -TW     Recorded by [TW] Brant Palacios PTA [TW] Brant Palacios PTA     Row Name 02/11/19 1507 02/11/19 1410 02/11/19 0810       Sit-Stand Transfer    Sit-Stand [place] (Transfers)  contact guard;verbal cues  -TW  minimum assist (75% patient effort)  -CS  contact guard;verbal cues  -TW    Assistive Device (Sit-Stand Transfers)  walker, front-wheeled  -TW  --  walker, front-wheeled  -TW    Recorded by [TW] Brant Palacios PTA [CS] Divya Ash COULTER/MEI [TW] Brant Palacios PTA    Row Name 02/11/19 1507 02/11/19 1410 02/11/19 0810       Stand-Sit Transfer    Stand-Sit [place] (Transfers)  contact guard;verbal cues  -TW  minimum assist (75% patient effort)  -CS  contact guard;verbal cues  -TW    Assistive Device (Stand-Sit Transfers)   walker, front-wheeled  -TW  --  walker, front-wheeled  -TW    Recorded by [TW] Brant Palacios, KAREN [CS] Divya Ash, MARYLIN/MEI [TW] Brant Palacios PTA    Row Name 02/11/19 1507 02/11/19 1410 02/11/19 0810       Wheelchair Transfer    Type (Wheelchair Transfer)  --  stand pivot/stand step  -CS  stand pivot/stand step  -TW    [place] Level (Wheelchair Transfer)  contact guard  -TW  minimum assist (75% patient effort)  -CS  contact guard  -TW    Recorded by [TW] Brant Palacios PTA [CS] Divya Ash COTA/MEI [TW] Brant Palacios PTA    Row Name 02/11/19 1507 02/11/19 0810          Gait/Stairs Assessment/Training    Gait/Stairs Assessment/Training  gait/ambulation assistive device  -TW  gait/ambulation assistive device  -TW     [place] Level (Gait)  contact guard  -TW  contact guard  -TW     Assistive Device (Gait)  walker, front-wheeled  -TW  walker, front-wheeled  -TW     Distance in Feet (Gait)  75ft x2 trials.  -TW  78ft  -TW     Pattern (Gait)  step-to  -TW  step-to  -TW     Deviations/Abnormal Patterns (Gait)  left sided deviations;stride length decreased  -TW  left sided deviations;stride length decreased  -TW     Bilateral Gait Deviations  foot drop/toe drag;heel strike decreased;knee buckling, left side  -TW  foot drop/toe drag;heel strike decreased;knee buckling, left side  -TW     Comment (Gait/Stairs)  Pt amb first 75ft in 15 minutes and the second 75ft in 14 minutes.  -TW  Pt took 28 minutes to complete gait distance this am.  -TW     Recorded by [TW] Brant Palacios PTA [TW] Brant Palacios PTA     Row Name 02/11/19 1507 02/11/19 1410 02/11/19 0810       Wheelchair Mobility/Management    Method of Wheelchair Locomotion (Mobility)  bimanual (upper extremity) propulsion  -TW  bimanual (upper extremity) propulsion  -CS  bimanual (upper extremity) propulsion  -TW    Mobility Activities (Wheelchair)  mobility (all) activities  -TW  mobility (all) activities  -CS   mobility (all) activities  -TW    Mobility [place] Level (Wheelchair)  independent  -TW  supervision  -CS  independent  -TW    Forward Propulsion [place] (Wheelchair)  independent  -TW  --  independent  -TW    Backward Propulsion [place] (Wheelchair)  independent  -TW  --  independent  -TW    Steering [place] (Wheelchair)  independent  -TW  --  independent  -TW    Stopping [place] (Wheelchair)  independent  -TW  --  independent  -TW    Turning [place] (Wheelchair)  independent  -TW  --  independent  -TW    Doorway Navigation [place] (Wheelchair)  independent  -TW  --  independent  -TW    Distance Propelled in Feet (Wheelchair)  300ft+  -TW  200  -CS  300ft+  -TW    Recorded by [TW] Brant Palacios, KAREN [CS] Divya Ash COTA/MEI [TW] Brant Palacios PTA    Row Name 02/11/19 1410             Motor Assessment/Intervention    Additional Documentation  Fine Motor Testing & Training (Group)  -CS      Recorded by [CS] Divya Ash COTA/MEI      Row Name 02/11/19 1115             Gross Motor Coordination    Gross Motor Impairments  -- gross motor act for control tasks  -LM      Recorded by [LM] Ese Dodson COTA/L      Row Name 02/11/19 1410 02/11/19 1115          Fine Motor Testing & Training    Fine Motor Tests  --  -- peg act using b ue   -LM     Training Activity, Fine Motor Coordination  manipulation of pegs;other (see comments) manipulation of clothes pins  -CS  --     Recorded by [CS] Divya Ash COTA/MEI [LM] Ese Dodson COTA/L     Row Name 02/11/19 0905             Pain Assessment    Additional Documentation  Pain Scale 2: Numbers Pre/Post-Treatment (Group)  -CK      Recorded by [CK] Divya Abarca, MS CCC-SLP      Row Name 02/11/19 1507 02/11/19 1410 02/11/19 1115       Pain Scale: Numbers Pre/Post-Treatment    Pain Scale: Numbers, Pretreatment  8/10  -TW  8/10  -CS  7/10  -LM    Pain Scale: Numbers, Post-Treatment  8/10  -TW  8/10  -CS   7/10  -LM    Pain Location - Side  Right  -TW  Right  -CS  Right  -LM    Pain Location - Orientation  upper  -TW  upper  -CS  upper  -LM    Pain Location  extremity  -TW  shoulder  -CS  shoulder  -LM    Pain Intervention(s)  -- Pt states rolling his w/c helps with his shld pain.  -TW  --  --    Recorded by [TW] Brant Palacios PTA [CS] Divya Ash COTA/MEI [LM] Ese Dodson COTA/L    Row Name 02/11/19 0905 02/11/19 0810          Pain Scale: Numbers Pre/Post-Treatment    Pain Scale: Numbers, Pretreatment  8/10  -CK  8/10  -TW     Pain Scale: Numbers, Post-Treatment  8/10  -CK  8/10  -TW     Pain Location - Side  Right  -CK  Right  -TW     Pain Location - Orientation  upper  -CK  upper  -TW     Pain Location  shoulder  -CK  extremity  -TW     Pain Intervention(s)  -- meds d/t at 1030 per pt  -CK  --     Recorded by [CK] Divya Abarca MS CCC-SLP [TW] Brant Palacios PTA     Row Name 02/11/19 0905             Pain Scale 2: Numbers Pre/Post-Treatment    Pain Scale 2: Numbers, Pretreatment  8/10  -CK      Pain Scale 2: Numbers, Post-Treatment  8/10  -CK      Pain Location 2 - Side  Left  -CK      Pain Location 2 - Orientation  lower  -CK      Pain Location 2  extremity  -CK      Pain Intervention(s) 2  -- meds d/t at 1030 per pt  -CK      Recorded by [CK] Divya Abarca MS CCC-SLP      Row Name 02/11/19 1507 02/11/19 0810          Static Standing Balance    Level of [place] (Supported Standing, Static Balance)  supervision  -TW  supervision  -TW     Time Able to Maintain Position (Supported Standing, Static Balance)  more than 5 minutes  -TW  more than 5 minutes  -TW     Assistive Device Utilized (Supported Standing, Static Balance)  walker, rolling  -TW  walker, rolling  -TW     Recorded by [TW] Brant Palacios PTA [TW] Brant Palacios PTA     Row Name 02/11/19 1410 02/11/19 1115          Upper Extremity Seated Therapeutic Exercise    Performed, Seated Upper Extremity  (Therapeutic Exercise)  shoulder flexion/extension;shoulder external/internal rotation;scapular protraction/retraction;elbow flexion/extension;forearm supination/pronation;wrist flexion/extension;digit flexion/extension  -CS  shoulder flexion/extension;shoulder abduction/adduction;shoulder external/internal rotation;shoulder horizontal abduction/adduction;elbow flexion/extension and perf b ue on arm bike  -LM     Device, Seated Upper Extremity (Therapeutic Exercise)  free weights, barbell  -CS  --     Exercise Type, Seated Upper Extremity (Therapeutic Exercise)  AROM (active range of motion)  -CS  --     Expected Outcomes, Seated Upper Extremity (Therapeutic Exercise)  improve functional tolerance, self-care activity;improve performance, BADLs;improve performance, transfer skills;improve performance, propel a wheelchair  -CS  --     Sets/Reps Detail, Seated Upper Extremity (Therapeutic Exercise)  1/15-20  -CS  --     Transfers Skills, Training to Functional Activity, Seated Upper Extremity (Therapeutic Exercise)  beginning to transfer skills to functional activity  -CS  --     Recorded by [CS] Divya Ash COTA/MEI [LM] Ese Dodson COTA/L     Row Name 02/11/19 1115             Aerobic Exercise Activity    Exercise Performed (Aerobic, Therapeutic Exercise)  arm bike  -      Time (Aerobic, Therapeutic Exercise)  10  -LM      Recorded by [LM] Ese Dodson COTA/L      Row Name 02/11/19 0810             Lower Extremity Seated Therapeutic Exercise    Performed, Seated Lower Extremity (Therapeutic Exercise)  hip flexion/extension;hip abduction/adduction;ankle dorsiflexion/plantarflexion;LAQ (long arc quad), knee extension with R LE  -TW      Exercise Type, Seated Lower Extremity (Therapeutic Exercise)  AROM (active range of motion)  -TW      Sets/Reps Detail, Seated Lower Extremity (Therapeutic Exercise)  1/20  -TW      Comment, Seated Lower Extremity (Therapeutic Exercise)  Pt completed L INEZ therex x  15-20 reps in sitting with AAROM.  -TW      Recorded by [TW] Brant Palacios PTA      Row Name 02/11/19 1507 02/11/19 1410 02/11/19 0810       Vital Signs    Pre Systolic BP Rehab  --  --  136  -TW    Pre Treatment Diastolic BP  --  --  80  -TW    Pretreatment Heart Rate (beats/min)  --  88  -CS  90  -TW    Posttreatment Heart Rate (beats/min)  --  92  -CS  --    Pre SpO2 (%)  --  95  -CS  98  -TW    O2 Delivery Pre Treatment  --  room air  -CS  room air  -TW    Post SpO2 (%)  --  95  -CS  --    O2 Delivery Post Treatment  --  room air  -CS  --    Pre Patient Position  Sitting  -TW  Supine  -CS  Supine  -TW    Intra Patient Position  Standing  -TW  Sitting  -CS  Standing  -TW    Post Patient Position  Sitting  -TW  Sitting  -CS  Sitting  -TW    Recorded by [TW] Brant Palacios PTA [CS] Divya Ash, MARYLIN/L [TW] Brant Palacios PTA    Row Name 02/11/19 1507 02/11/19 1410 02/11/19 0905       Positioning and Restraints    Pre-Treatment Position  sitting in chair/recliner  -TW  in bed  -CS  sitting in chair/recliner  -CK    Post Treatment Position  wheelchair  -TW  wheelchair  -CS  wheelchair  -CK    In Wheelchair  sitting;encouraged to call for assist  -TW  sitting;with PT  -CS  sitting;call light within reach;encouraged to call for assist  -CK    Recorded by [TW] Brant Palacios PTA [CS] Divya Ash, COULTER/L [CK] Divya Abarca, MS CCC-SLP    Row Name 02/11/19 0810             Positioning and Restraints    Pre-Treatment Position  in bed  -TW      Post Treatment Position  wheelchair  -TW      In Wheelchair  sitting;encouraged to call for assist;patient within staff view  -TW      Recorded by [TW] Brant Palacios PTA      Row Name 02/11/19 1507 02/11/19 0810          Daily Summary of Progress (PT)    Functional Goal Overall Progress: Physical Therapy  progressing toward functional goals as expected  -TW  progressing toward functional goals as expected  -TW     Impairments  Continuing to Limit Function: Physical Therapy  strength decreased;impaired balance;coordination impaired;motor control impaired;pain  -TW  strength decreased;impaired balance;coordination impaired;motor control impaired;pain  -TW     Recommendations: Physical Therapy  Cont  -TW  Cont  -TW     Recorded by [TW] Brant Palacios PTA [TW] Brant Palacios PTA     Row Name 02/11/19 1410 02/11/19 1115          Daily Summary of Progress (OT)    Functional Goal Overall Progress: Occupational Therapy  progressing toward functional goals as expected  -CS  progressing toward functional goals as expected  -LM     Recommendations: Occupational Therapy  cont OT POC  -CS  --     Recorded by [CS] Divya Ash COULTER/L [LM] Ese Dodson COULTER/L     Row Name 02/11/19 0905             Daily Summary of Progress (SLP)    Functional Goal Overall Progress: Speech Language Pathology  progressing toward functional goals as expected  -CK      Impairments Continuing to Limit Function: Speech Language Pathology  high level attention  -CK      Recommendations: Speech Language Pathology  Continue POC  -CK      Recorded by [CK] Divya Abarca MS CCC-SLP        User Key  (r) = Recorded By, (t) = Taken By, (c) = Cosigned By    Initials Name Effective Dates    CK Divya Abarca, MS CCC-SLP 04/03/18 -     TW Brant Palacios PTA 03/07/18 -     LM Ese Dodson COULTER/MEI 03/07/18 -     CS Divya Ash COULTER/L 03/07/18 -                  OT Recommendation and Plan            Daily Summary of Progress (OT)  Functional Goal Overall Progress: Occupational Therapy: progressing toward functional goals as expected  IRF Plan of Care Review: progress ongoing, continue  Progress, Functional Goals: demonstrating adequate progress  Outcome Summary: perf well with OT  cont towrd fmc and [facility] tasks to increase perf l ue    OT IRF GOALS     Row Name 02/11/19 1719 02/11/19 1410 02/09/19 1300       Transfer FIM Goals (OT-IRF)     Tub-Shower Transfer FIM Goal (OT-IRF)  Score of 6 (FIM)  -LM  Score of 6 (FIM)  -CS  Score of 6 (FIM)  -LW    Toilet Transfer FIM Goal (OT-IRF)  Score of 6 (FIM)  -LM  Score of 6 (FIM)  -CS  Score of 6 (FIM)  -LW    Time Frame (Transfer FIM Goals 1, OT-IRF)  long term goal (LTG);by discharge  -LM  long term goal (LTG);by discharge  -CS  long term goal (LTG);by discharge  -LW    Progress/Outcomes (Transfer FIM Goals, OT-IRF)  goal not met;continuing progress toward goal  -LM  goal not met;continuing progress toward goal  -CS  goal not met;continuing progress toward goal  -LW       Bathing FIM Goal (OT-IRF)    Bathing FIM Goal (OT-IRF)  Score of 6 (FIM)  -LM  Score of 6 (FIM)  -CS  Score of 6 (FIM)  -LW    Time Frame (Bathing FIM Goal, OT-IRF)  long term goal (LTG);by discharge  -LM  long term goal (LTG);by discharge  -CS  long term goal (LTG);by discharge  -LW    Progress/Outcomes (Bathing FIM Goal, OT-IRF)  goal not met;continuing progress toward goal  -LM  goal not met;continuing progress toward goal  -CS  goal not met;continuing progress toward goal  -LW       UB Dressing FIM Goal (OT-IRF)    Upper Body Dressing, FIM Goal, OT-IRF  Score of 6 (FIM)  -LM  Score of 6 (FIM)  -CS  Score of 6 (FIM)  -LW    Time Frame (UB Dressing FIM Goal, OT-IRF)  long term goal (LTG);by discharge  -LM  long term goal (LTG);by discharge  -CS  long term goal (LTG);by discharge  -LW    Progress/Outcomes (UB Dressing FIM Goal, OT-IRF)  goal not met;continuing progress toward goal  -LM  goal not met;continuing progress toward goal  -CS  goal not met;continuing progress toward goal  -LW       LB Dressing FIM Goal (OT-IRF)    Lower Body Dressing, FIM Goal, OT-IRF  Score of 6 (FIM)  -LM  Score of 6 (FIM)  -CS  Score of 6 (FIM)  -LW    Time Frame (LB Dressing FIM Goal, OT-IRF)  long term goal (LTG);by discharge  -LM  long term goal (LTG);by discharge  -CS  long term goal (LTG);by discharge  -LW    Progress/Outcomes (LB Dressing FIM Goal,  OT-IRF)  goal not met;continuing progress toward goal  -LM  goal not met;continuing progress toward goal  -CS  goal not met;continuing progress toward goal  -LW       Toileting Goal 1 (OT-IRF)    Activity/Device (Toileting Goal 1, OT-IRF)  toileting skills, all  -LM  toileting skills, all  -CS  toileting skills, all  -LW    [place] Level (Toileting Goal 1, OT-IRF)  supervision required  -LM  supervision required  -CS  supervision required  -LW    Time Frame (Toileting Goal 1, OT-IRF)  long term goal (LTG);by discharge  -LM  long term goal (LTG);by discharge  -CS  long term goal (LTG);by discharge  -LW    Progress/Outcomes (Toileting Goal 1, OT-IRF)  goal not met;continuing progress toward goal  -LM  goal not met;continuing progress toward goal  -CS  goal not met;continuing progress toward goal  -LW       ROM Goal 1 (OT-IRF)    ROM Goal 1 (OT-IRF)  Pt will display WFL with ROM in BUE with min reports of pain/increased pain level.   -LM  Pt will display WFL with ROM in BUE with min reports of pain/increased pain level.   -CS  Pt will display WFL with ROM in BUE with min reports of pain/increased pain level.   -LW    Time Frame (ROM Goal 1, OT-IRF)  long term goal (LTG);by discharge  -LM  long term goal (LTG);by discharge  -CS  long term goal (LTG);by discharge  -LW    Progress/Outcomes (ROM Goal 1, OT-IRF)  goal not met;continuing progress toward goal  -LM  goal not met;continuing progress toward goal  -CS  goal not met;continuing progress toward goal  -LW       Problem Specific Goal 1 (OT-IRF)    Problem Specific Goal 1 (OT-IRF)  Pt will be independent with BUE HEP and home safety awarness.   -LM  Pt will be independent with BUE HEP and home safety awarness.   -CS  Pt will be independent with BUE HEP and home safety awarness.   -LW    Time Frame (Problem Specific Goal 1, OT-IRF)  long term goal (LTG);by discharge  -LM  long term goal (LTG);by discharge  -CS  long term goal (LTG);by discharge  -LW     Progress/Outcomes (Problem Specific Goal 1, OT-IRF)  goal not met;continuing progress toward goal  -LM  goal not met;continuing progress toward goal  -CS  goal not met;continuing progress toward goal  -LW    Row Name 02/09/19 0950 02/08/19 0700 02/07/19 1000       Transfer FIM Goals (OT-IRF)    Tub-Shower Transfer FIM Goal (OT-IRF)  Score of 6 (FIM)  -RC  Score of 6 (FIM)  -LW  Score of 6 (FIM)  -KD    Toilet Transfer FIM Goal (OT-IRF)  Score of 6 (FIM)  -RC  Score of 6 (FIM)  -LW  Score of 6 (FIM)  -KD    Time Frame (Transfer FIM Goals 1, OT-IRF)  long term goal (LTG);by discharge  -RC  long term goal (LTG);by discharge  -LW  long term goal (LTG);by discharge  -KD    Progress/Outcomes (Transfer FIM Goals, OT-IRF)  goal not met;continuing progress toward goal  -RC  goal not met  -LW  goal not met  -KD       Bathing FIM Goal (OT-IRF)    Bathing FIM Goal (OT-IRF)  Score of 6 (FIM)  -RC  Score of 6 (FIM)  -LW  Score of 6 (FIM)  -KD    Time Frame (Bathing FIM Goal, OT-IRF)  long term goal (LTG);by discharge  -RC  long term goal (LTG);by discharge  -LW  long term goal (LTG);by discharge  -KD    Progress/Outcomes (Bathing FIM Goal, OT-IRF)  goal not met;continuing progress toward goal  -RC  goal not met  -LW  goal not met  -KD       UB Dressing FIM Goal (OT-IRF)    Upper Body Dressing, FIM Goal, OT-IRF  Score of 6 (FIM)  -RC  Score of 6 (FIM)  -LW  Score of 6 (FIM)  -KD    Time Frame (UB Dressing FIM Goal, OT-IRF)  long term goal (LTG);by discharge  -RC  long term goal (LTG);by discharge  -LW  long term goal (LTG);by discharge  -KD    Progress/Outcomes (UB Dressing FIM Goal, OT-IRF)  goal not met;continuing progress toward goal  -RC  goal not met  -LW  goal not met  -KD       LB Dressing FIM Goal (OT-IRF)    Lower Body Dressing, FIM Goal, OT-IRF  Score of 6 (FIM)  -RC  Score of 6 (FIM)  -LW  Score of 6 (FIM)  -KD    Time Frame (LB Dressing FIM Goal, OT-IRF)  long term goal (LTG);by discharge  -RC  long term goal (LTG);by  discharge  -LW  long term goal (LTG);by discharge  -KD    Progress/Outcomes (LB Dressing FIM Goal, OT-IRF)  goal not met;continuing progress toward goal  -RC  goal not met  -LW  goal not met  -KD       Toileting Goal 1 (OT-IRF)    Activity/Device (Toileting Goal 1, OT-IRF)  toileting skills, all  -RC  toileting skills, all  -LW  toileting skills, all  -KD    [place] Level (Toileting Goal 1, OT-IRF)  supervision required  -RC  supervision required  -LW  supervision required  -KD    Time Frame (Toileting Goal 1, OT-IRF)  long term goal (LTG);by discharge  -RC  long term goal (LTG);by discharge  -LW  long term goal (LTG);by discharge  -KD    Progress/Outcomes (Toileting Goal 1, OT-IRF)  goal not met;continuing progress toward goal  -RC  goal not met  -LW  goal not met  -KD       ROM Goal 1 (OT-IRF)    ROM Goal 1 (OT-IRF)  Pt will display WFL with ROM in BUE with min reports of pain/increased pain level.   -RC  Pt will display WFL with ROM in BUE with min reports of pain/increased pain level.   -LW  Pt will display WFL with ROM in BUE with min reports of pain/increased pain level.   -KD    Time Frame (ROM Goal 1, OT-IRF)  long term goal (LTG);by discharge  -RC  long term goal (LTG);by discharge  -LW  long term goal (LTG);by discharge  -KD    Progress/Outcomes (ROM Goal 1, OT-IRF)  goal not met;continuing progress toward goal  -RC  goal not met  -LW  goal not met  -KD       Problem Specific Goal 1 (OT-IRF)    Problem Specific Goal 1 (OT-IRF)  Pt will be independent with BUE HEP and home safety awarness.   -RC  Pt will be independent with BUE HEP and home safety awarness.   -LW  Pt will be independent with BUE HEP and home safety awarness.   -KD    Time Frame (Problem Specific Goal 1, OT-IRF)  long term goal (LTG);by discharge  -RC  long term goal (LTG);by discharge  -LW  long term goal (LTG);by discharge  -KD    Progress/Outcomes (Problem Specific Goal 1, OT-IRF)  goal not met;continuing progress toward goal  -RC   goal not met  -LW  goal not met  -KD    Row Name 02/06/19 1015 02/06/19 0732          Transfer FIM Goals (OT-IRF)    Tub-Shower Transfer FIM Goal (OT-IRF)  Score of 6 (FIM)  -BL  Score of 6 (FIM)  -BH     Toilet Transfer FIM Goal (OT-IRF)  Score of 6 (FIM)  -BL  Score of 6 (FIM)  -BH     Time Frame (Transfer FIM Goals 1, OT-IRF)  long term goal (LTG);by discharge  -BL  long term goal (LTG);by discharge  -BH     Progress/Outcomes (Transfer FIM Goals, OT-IRF)  goal not met  -BL  goal not met  -BH        Bathing FIM Goal (OT-IRF)    Bathing FIM Goal (OT-IRF)  Score of 6 (FIM)  -BL  Score of 6 (FIM)  -BH     Time Frame (Bathing FIM Goal, OT-IRF)  long term goal (LTG);by discharge  -BL  long term goal (LTG);by discharge  -BH     Progress/Outcomes (Bathing FIM Goal, OT-IRF)  goal not met  -BL  goal not met  -BH        UB Dressing FIM Goal (OT-IRF)    Upper Body Dressing, FIM Goal, OT-IRF  Score of 6 (FIM)  -BL  Score of 6 (FIM)  -BH     Time Frame (UB Dressing FIM Goal, OT-IRF)  long term goal (LTG);by discharge  -BL  long term goal (LTG);by discharge  -BH     Progress/Outcomes (UB Dressing FIM Goal, OT-IRF)  goal not met  -BL  goal not met  -BH        LB Dressing FIM Goal (OT-IRF)    Lower Body Dressing, FIM Goal, OT-IRF  Score of 6 (FIM)  -BL  Score of 6 (FIM)  -BH     Time Frame (LB Dressing FIM Goal, OT-IRF)  long term goal (LTG);by discharge  -BL  long term goal (LTG);by discharge  -BH     Progress/Outcomes (LB Dressing FIM Goal, OT-IRF)  goal not met  -BL  goal not met  -BH        Toileting Goal 1 (OT-IRF)    Activity/Device (Toileting Goal 1, OT-IRF)  toileting skills, all  -BL  toileting skills, all  -BH     [place] Level (Toileting Goal 1, OT-IRF)  supervision required  -BL  supervision required  -BH     Time Frame (Toileting Goal 1, OT-IRF)  long term goal (LTG);by discharge  -BL  long term goal (LTG);by discharge  -BH     Progress/Outcomes (Toileting Goal 1, OT-IRF)  goal not met  -BL  goal not met  -BH         ROM Goal 1 (OT-IRF)    ROM Goal 1 (OT-IRF)  Pt will display WFL with ROM in BUE with min reports of pain/increased pain level.   -BL  Pt will display WFL with ROM in BUE with min reports of pain/increased pain level.   -BH     Time Frame (ROM Goal 1, OT-IRF)  long term goal (LTG);by discharge  -BL  long term goal (LTG);by discharge  -     Progress/Outcomes (ROM Goal 1, OT-IRF)  goal not met  -BL  goal not met  -BH        Problem Specific Goal 1 (OT-IRF)    Problem Specific Goal 1 (OT-IRF)  Pt will be independent with BUE HEP and home safety awarness.   -BL  Pt will be independent with BUE HEP and home safety awarness.   -BH     Time Frame (Problem Specific Goal 1, OT-IRF)  long term goal (LTG);by discharge  -BL  long term goal (LTG);by discharge  -     Progress/Outcomes (Problem Specific Goal 1, OT-IRF)  goal not met  -BL  goal not met  -       User Key  (r) = Recorded By, (t) = Taken By, (c) = Cosigned By    Initials Name Provider Type     Maria Elena Barragan, OTR/L Occupational Therapist     Valeri Woodard, COULTER/L Occupational Therapy Assistant    KD Ele Avina, COULTER/L Occupational Therapy Assistant    BL Giana Chakraborty, COULTER/L Occupational Therapy Assistant    LM Ese Dodson, COULTER/L Occupational Therapy Assistant    CS Divya Ash, COULTER/L Occupational Therapy Assistant    Irina Lorenzo, COULTER/L Occupational Therapy Assistant          Occupational Therapy Education     Title: PT OT SLP Therapies (In Progress)     Topic: Occupational Therapy (Done)     Point: ADL training (Done)     Description: Instruct learner(s) on proper safety adaptation and remediation techniques during self care or transfers.   Instruct in proper use of assistive devices.    Learning Progress Summary           Patient Acceptance, E,TB, VU by  at 2/9/2019  3:38 PM    Acceptance, E,TB, VU by  at 2/8/2019  9:12 AM    Acceptance, E, VU,NR by  at 2/6/2019  1:35 PM    Comment:  Educated about OT and POC.  Educated to call for assist. Educated on safety throughout.                   Point: Home exercise program (Done)     Description: Instruct learner(s) on appropriate technique for monitoring, assisting and/or progressing therapeutic exercises/activities.    Learning Progress Summary           Patient Acceptance, E,TB, VU by  at 2/9/2019  3:38 PM    Acceptance, E,TB, VU by  at 2/8/2019  9:12 AM                   Point: Precautions (Done)     Description: Instruct learner(s) on prescribed precautions during self-care and functional transfers.    Learning Progress Summary           Patient Acceptance, E,TB, VU by  at 2/9/2019  3:38 PM    Acceptance, E,TB, VU by  at 2/8/2019  9:12 AM    Acceptance, E, VU,NR by  at 2/6/2019  1:35 PM    Comment:  Educated about OT and POC. Educated to call for assist. Educated on safety throughout.    Acceptance, E, VU,NR by  at 2/6/2019 11:36 AM                   Point: Body mechanics (Done)     Description: Instruct learner(s) on proper positioning and spine alignment during self-care, functional mobility activities and/or exercises.    Learning Progress Summary           Patient Acceptance, E,TB, VU by  at 2/9/2019  3:38 PM    Acceptance, E,TB, VU by  at 2/8/2019  9:12 AM    Acceptance, E, VU,NR by  at 2/6/2019 11:36 AM                               User Key     Initials Effective Dates Name Provider Type Discipline     06/08/18 -  Maria Elena Barragan, OTR/L Occupational Therapist OT     10/17/16 -  Giana Chakraborty, COULTER/L Occupational Therapy Assistant OT     03/07/18 -  Irina Armstrong COULTER/L Occupational Therapy Assistant OT                Outcome Measures     Row Name 02/11/19 1507 02/11/19 0810 02/09/19 1432       How much help from another person do you currently need...    Turning from your back to your side while in flat bed without using bedrails?  4  -TW  4  -TW  3  -TW    Moving from lying on back to sitting on the side of a flat bed without bedrails?  4   -TW  4  -TW  3  -TW    Moving to and from a bed to a chair (including a wheelchair)?  3  -TW  3  -TW  3  -TW    Standing up from a chair using your arms (e.g., wheelchair, bedside chair)?  3  -TW  3  -TW  3  -TW    Climbing 3-5 steps with a railing?  2  -TW  2  -TW  2  -TW    To walk in hospital room?  3  -TW  3  -TW  3  -TW    AM-PAC 6 Clicks Score  19  -TW  19  -TW  17  -TW       Functional Assessment    Outcome Measure Options  AM-PAC 6 Clicks Basic Mobility (PT)  -TW  AM-PAC 6 Clicks Basic Mobility (PT)  -TW  AM-PAC 6 Clicks Basic Mobility (PT)  -TW    Row Name 02/09/19 1300 02/09/19 0950 02/09/19 0819       How much help from another person do you currently need...    Turning from your back to your side while in flat bed without using bedrails?  --  --  3  -TW    Moving from lying on back to sitting on the side of a flat bed without bedrails?  --  --  3  -TW    Moving to and from a bed to a chair (including a wheelchair)?  --  --  3  -TW    Standing up from a chair using your arms (e.g., wheelchair, bedside chair)?  --  --  3  -TW    Climbing 3-5 steps with a railing?  --  --  2  -TW    To walk in hospital room?  --  --  2  -TW    AM-PAC 6 Clicks Score  --  --  16  -TW       How much help from another is currently needed...    Putting on and taking off regular lower body clothing?  2  -LW  2  -RC  --    Bathing (including washing, rinsing, and drying)  2  -LW  2  -RC  --    Toileting (which includes using toilet bed pan or urinal)  3  -LW  3  -RC  --    Putting on and taking off regular upper body clothing  3  -LW  3  -RC  --    Taking care of personal grooming (such as brushing teeth)  3  -LW  3  -RC  --    Eating meals  4  -LW  4  -RC  --    Score  17  -LW  17  -RC  --       Functional Assessment    Outcome Measure Options  --  --  AM-PAC 6 Clicks Basic Mobility (PT)  -TW      User Key  (r) = Recorded By, (t) = Taken By, (c) = Cosigned By    Initials Name Provider Type    TW Brant Palacios, PTA Physical  Therapy Assistant    Valeri Lozoya, COULTER/L Occupational Therapy Assistant    Irina Lorenzo, MAYRLIN/L Occupational Therapy Assistant             Time Calculation:     Time Calculation- OT     Row Name 02/11/19 1710 02/11/19 1549          Time Calculation- OT    OT Start Time  1115  -LM  1410  -CS     OT Stop Time  1200  -LM  1505  -CS     OT Time Calculation (min)  45 min  -LM  55 min  -CS     Total Timed Code Minutes- OT  45 minute(s)  -LM  55 minute(s)  -CS     OT Received On  02/11/19  -LM  02/11/19  -CS       User Key  (r) = Recorded By, (t) = Taken By, (c) = Cosigned By    Initials Name Provider Type     Ese Dodson, COULTER/L Occupational Therapy Assistant    CS Divya Ash COTA/L Occupational Therapy Assistant          Therapy Suggested Charges     Code   Minutes Charges    None              Therapy Charges for Today     Code Description Service Date Service Provider Modifiers Qty    25329671201 HC OT THER PROC EA 15 MIN 2/11/2019 Ese Dodson COTA/L GO 1    82342489200 HC OT THERAPEUTIC ACT EA 15 MIN 2/11/2019 Ese Dodson COTA/MEI GO 2                   JOLIE Novak  2/11/2019

## 2019-02-11 NOTE — PROGRESS NOTES
LOS: 6 days   Patient Care Team:  Anita Faria APRN as PCP - General (Family Medicine)    Chief Complaint:   Medically complex patient          Interval History:     SUBJECTIVE: He tells me his vision has changed since he has been in the hospital.  On reviewing his diabetes he says his diabetes is fairly well controlled but it usually ran around 300 in the morning  Continues to complain of chronic pain  He says his left arm and to a lesser extent left leg are improving.  He thinks he is getting ready to go home pretty soon  History taken from: patient chart RN    Objective     Vital Signs  Temp:  [96.1 °F (35.6 °C)-97.1 °F (36.2 °C)] 97.1 °F (36.2 °C)  Heart Rate:  [] 104  Resp:  [18] 18  BP: (114)/(74-77) 114/77      02/05/19  1404 02/09/19  0319 02/10/19  1900   Weight: 90.3 kg (199 lb) 88.7 kg (195 lb 9.6 oz) 93.1 kg (205 lb 4.8 oz)         Physical Exam:     General Appearance:    Alert, cooperative, in no acute distress   Head:    Normocephalic, without obvious abnormality, atraumatic   Eyes:            Lids and lashes normal, conjunctivae and sclerae normal, no   icterus, no pallor, corneas clear, PERRLA   Throat:   No oral lesions, no thrush, oral mucosa moist   Neck:   No adenopathy, supple, trachea midline, no thyromegaly, no   carotid bruit, no JVD       Lungs:     Clear to auscultation,respirations regular, even and                  Unlabored good bilateral air movement    Heart:    Regular rhythm and normal rate, normal S1 and S2, no            murmur, no gallop, no rub, no click      Chest Wall:    No abnormalities observed   Abdomen:     Normal bowel sounds, no masses, no organomegaly, soft        non-tender, non-distended, no guarding, no rebound                Tenderness      Extremities:   Moves all extremities well, no edema, no cyanosis, no             Redness ataxia and strength improving       Skin:   No bleeding, bruising or rash   Lymph nodes:   No palpable adenopathy    Neurologic:   Cranial nerves 2 - 12 grossly intact, sensation intact, DTR       present and equal bilaterally he does have left-sided weakness       RESULTS REVIEW:     Lab Results (last 24 hours)     Procedure Component Value Units Date/Time    POC Glucose Once [[id number]]  (Abnormal) Collected:  02/11/19 0522    Specimen:  Blood Updated:  02/11/19 0603     Glucose 202 mg/dL      Comment: RN NotifiedOperator: [id number] QUAN Kim ID: [id number]       Basic Metabolic Panel [[id number]]  (Abnormal) Collected:  02/11/19 0522    Specimen:  Blood Updated:  02/11/19 0553     Glucose 213 mg/dL      BUN 16 mg/dL      Creatinine 0.66 mg/dL      Sodium 136 mmol/L      Potassium 4.1 mmol/L      Chloride 94 mmol/L      CO2 32.0 mmol/L      Calcium 9.5 mg/dL      eGFR Non African Amer 125 mL/min/1.73      BUN/Creatinine Ratio 24.2     Anion Gap 10.0 mmol/L     CBC & Differential [[id number]] Collected:  02/11/19 0522    Specimen:  Blood Updated:  02/11/19 0534    Narrative:       The following orders were created for panel order CBC & Differential.  Procedure                               Abnormality         Status                     ---------                               -----------         ------                     CBC Auto Differential[[id number]]        Abnormal            Final result                 Please view results for these tests on the individual orders.    CBC Auto Differential [[id number]]  (Abnormal) Collected:  02/11/19 0522    Specimen:  Blood Updated:  02/11/19 0534     WBC 5.17 10*3/mm3      RBC 4.46 10*6/mm3      Hemoglobin 13.5 g/dL      Hematocrit 38.1 %      MCV 85.4 fL      MCH 30.3 pg      MCHC 35.4 g/dL      RDW 13.3 %      RDW-SD 41.4 fl      MPV 10.5 fL      Platelets 250 10*3/mm3      Neutrophil % 37.8 %      Lymphocyte % 38.7 %      Monocyte % 16.4 %      Eosinophil % 5.2 %      Basophil % 1.5 %      Immature Grans % 0.4 %      Neutrophils, Absolute 1.95 10*3/mm3      Lymphocytes,  Absolute 2.00 10*3/mm3      Monocytes, Absolute 0.85 10*3/mm3      Eosinophils, Absolute 0.27 10*3/mm3      Basophils, Absolute 0.08 10*3/mm3      Immature Grans, Absolute 0.02 10*3/mm3      nRBC 0.0 /100 WBC     POC Glucose Once [[id number]]  (Normal) Collected:  02/10/19 1614    Specimen:  Blood Updated:  02/10/19 1636     Glucose 112 mg/dL      Comment: RN NotifiedOperator: [id number] KING Esteban ID: [id number]       POC Glucose Once [[id number]]  (Abnormal) Collected:  02/10/19 1115    Specimen:  Blood Updated:  02/10/19 1129     Glucose 200 mg/dL      Comment: RN NotifiedOperator: [id number] KING Esteban ID: [id number]           Imaging Results (last 72 hours)     ** No results found for the last 72 hours. **          Assessment/Plan     Active Hospital Problems    Diagnosis   • **Medically complex patient   • Stroke-like symptoms       He has chronic left-sided weakness but it is much worse after this episode.  Specifically the left leg much  weaker than it was prior to this episode     • Uncontrolled type 2 diabetes mellitus with hyperglycemia (CMS/[facility])     -at presentation to ED glucose 704    Hemoglobin A1c is 15.1 admission to acute rehabilitation     • Left-sided muscle weakness     Due to prior Intracranial hemorrhage with closed head injury     • Right shoulder pain     He's had right shoulder pain for some time and has had steroid injections.  But since his fall the pain is much worse.  X-rays were unremarkable yesterday at [facility].     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  I do think that he had a stroke because his symptoms persist but they are improving  His vision changes based on his diabetic control.  At admission he had a hemoglobin A1c of 15.4 suggestive of very poorly controlled  diabetes.  Diabetes is better controlled now.  He said he did take his medicine every day prior to admission  Blood pressure is well controlled  Should be able to go home by the end of the week        This document has been electronically signed by Richard Galvez MD on February 11, 2019 10:13 AM

## 2019-02-11 NOTE — PLAN OF CARE
Problem: Patient Care Overview  Goal: Plan of Care Review  Outcome: Ongoing (interventions implemented as appropriate)   02/11/19 6218   Patient Care Overview   IRF Plan of Care Review progress ongoing, continue   Progress, Functional Goals demonstrating adequate progress   Coping/Psychosocial   Plan of Care Reviewed With patient   OTHER   Outcome Summary pt has worked well with theray.states his leg is cramping up today but pain is controlled,vss wnl-will continue monitor        Problem: Fall Risk (Adult)  Goal: Absence of Fall  Outcome: Ongoing (interventions implemented as appropriate)      Problem: Diabetes, Type 2 (Adult)  Goal: Signs and Symptoms of Listed Potential Problems Will be Absent, Minimized or Managed (Diabetes, Type 2)  Outcome: Ongoing (interventions implemented as appropriate)

## 2019-02-11 NOTE — THERAPY TREATMENT NOTE
Inpatient Rehabilitation - Occupational Therapy Treatment Note    [facility]     Patient Name: Ventura Boggs  : 1960  MRN: [id number]    Today's Date: 2019                 Admit Date: 2019      Visit Dx:    ICD-10-CM ICD-9-CM   1. Uncontrolled type 2 diabetes mellitus with hyperglycemia (CMS/HCC) E11.65 250.02   2. Medically complex patient Z78.9 V49.9   3. Chronic intractable pain G89.29 338.29   4. Symbolic dysfunction R48.9 784.60   5. Impaired mobility and ADLs Z74.09 799.89   6. Impaired functional mobility, balance, gait, and endurance Z74.09 V49.89       Patient Active Problem List   Diagnosis   • Uncontrolled type 2 diabetes mellitus with hyperglycemia (CMS/HCC)   • Postoperative urinary retention   • Incisional hernia, without obstruction or gangrene   • Acute appendicitis with localized peritonitis   • Closed head injury   • Hyponatremia   • History of recurrent TIAs   • Ataxia   • Chronic pain syndrome   • Right shoulder pain   • Left-sided muscle weakness   • Medically complex patient   • Stroke-like symptoms         Therapy Treatment    IRF Treatment Summary     Row Name 19 1410 19 09          Evaluation/Treatment Time and Intent    Subjective Information  no complaints  -CS  no complaints  -CK     Existing Precautions/Restrictions  fall;lifting  -CS  fall;lifting  -CK     Document Type  therapy note (daily note)  -CS  therapy note (daily note)  -CK     Mode of Treatment  occupational therapy  -CS  speech-language pathology;individual therapy  -CK     Patient/Family Observations  --  Pt sitting in w/c in room  -CK     Start Time (Evaluation/Treatment)  1410  -CS  0905  -CK     Stop Time (Evaluation/Treatment)  1505  -CS  1005  -CK     Recorded by [CS] Divya Ash COTA/L [CK] Divya Abarca, MS CCC-SLP     Row Name 19 1410             Cognition/Psychosocial- PT/OT    Affect/Mental Status (Cognitive)  WFL  -CS      Orientation Status (Cognition)   oriented x 4  -CS      Follows Commands (Cognition)  WFL  -CS      Recorded by [CS] Divya Ash COTA/MEI      Row Name 02/11/19 1410             Bed Mobility Assessment/Treatment    Supine-Sit Crane (Bed Mobility)  supervision  -CS      Bed Mobility, Safety Issues  decreased use of legs for bridging/pushing  -CS      Recorded by [CS] Divya Ash COTA/MEI      Row Name 02/11/19 1410             Transfer Assessment/Treatment    Transfer Assessment/Treatment  sit-stand transfer;stand-sit transfer;bed-chair transfer  -CS      Recorded by [CS] Divya Ash COULTER/MEI      Row Name 02/11/19 1410             Bed-Chair Transfer    Bed-Chair Crane (Transfers)  minimum assist (75% patient effort)  -CS      Assistive Device (Bed-Chair Transfers)  -- stand-pivot t/f  -CS      Recorded by [CS] Divya Ash COULTER/MEI      Row Name 02/11/19 1410             Sit-Stand Transfer    Sit-Stand Crane (Transfers)  minimum assist (75% patient effort)  -CS      Recorded by [CS] Divya Ash COTA/MEI      Row Name 02/11/19 1410             Stand-Sit Transfer    Stand-Sit Crane (Transfers)  minimum assist (75% patient effort)  -CS      Recorded by [CS] Divya Ash COULTER/MEI      Row Name 02/11/19 1410             Wheelchair Transfer    Type (Wheelchair Transfer)  stand pivot/stand step  -CS      Crane Level (Wheelchair Transfer)  minimum assist (75% patient effort)  -CS      Recorded by [CS] Divya Ash COTA/MEI      Row Name 02/11/19 1410             Wheelchair Mobility/Management    Method of Wheelchair Locomotion (Mobility)  bimanual (upper extremity) propulsion  -CS      Mobility Activities (Wheelchair)  mobility (all) activities  -CS      Mobility Crane Level (Wheelchair)  supervision  -CS      Distance Propelled in Feet (Wheelchair)  200  -CS      Recorded by [CS] Divya Ash COTA/MEI      Row Name 02/11/19 1410             Motor Assessment/Intervention     Additional Documentation  Fine Motor Testing & Training (Group)  -CS      Recorded by [CS] Divya Ash COTA/L      Row Name 02/11/19 1410             Fine Motor Testing & Training    Training Activity, Fine Motor Coordination  manipulation of pegs;other (see comments) manipulation of clothes pins  -CS      Recorded by [CS] Divya Ash COTA/MEI      Row Name 02/11/19 0905             Pain Assessment    Additional Documentation  Pain Scale 2: Numbers Pre/Post-Treatment (Group)  -CK      Recorded by [CK] Divya Abarca MS CCC-SLP      Row Name 02/11/19 1410 02/11/19 0905          Pain Scale: Numbers Pre/Post-Treatment    Pain Scale: Numbers, Pretreatment  8/10  -CS  8/10  -CK     Pain Scale: Numbers, Post-Treatment  8/10  -CS  8/10  -CK     Pain Location - Side  Right  -CS  Right  -CK     Pain Location - Orientation  upper  -CS  upper  -CK     Pain Location  shoulder  -CS  shoulder  -CK     Pain Intervention(s)  --  -- meds d/t at 1030 per pt  -CK     Recorded by [CS] Divya Ash COTA/L [CK] Divya Abarca, MS ARMENTA-SLP     Row Name 02/11/19 0905             Pain Scale 2: Numbers Pre/Post-Treatment    Pain Scale 2: Numbers, Pretreatment  8/10  -CK      Pain Scale 2: Numbers, Post-Treatment  8/10  -CK      Pain Location 2 - Side  Left  -CK      Pain Location 2 - Orientation  lower  -CK      Pain Location 2  extremity  -CK      Pain Intervention(s) 2  -- meds d/t at 1030 per pt  -CK      Recorded by [CK] Divya Abarca MS CCC-SLP      Row Name 02/11/19 1410             Upper Extremity Seated Therapeutic Exercise    Performed, Seated Upper Extremity (Therapeutic Exercise)  shoulder flexion/extension;shoulder external/internal rotation;scapular protraction/retraction;elbow flexion/extension;forearm supination/pronation;wrist flexion/extension;digit flexion/extension  -CS      Device, Seated Upper Extremity (Therapeutic Exercise)  free weights, barbell  -CS      Exercise Type, Seated Upper  Extremity (Therapeutic Exercise)  AROM (active range of motion)  -CS      Expected Outcomes, Seated Upper Extremity (Therapeutic Exercise)  improve functional tolerance, self-care activity;improve performance, BADLs;improve performance, transfer skills;improve performance, propel a wheelchair  -CS      Sets/Reps Detail, Seated Upper Extremity (Therapeutic Exercise)  1/15-20  -CS      Transfers Skills, Training to Functional Activity, Seated Upper Extremity (Therapeutic Exercise)  beginning to transfer skills to functional activity  -CS      Recorded by [CS] Divya Ash COTA/L      Row Name 02/11/19 1410             Vital Signs    Pretreatment Heart Rate (beats/min)  88  -CS      Posttreatment Heart Rate (beats/min)  92  -CS      Pre SpO2 (%)  95  -CS      O2 Delivery Pre Treatment  room air  -CS      Post SpO2 (%)  95  -CS      O2 Delivery Post Treatment  room air  -CS      Pre Patient Position  Supine  -CS      Intra Patient Position  Sitting  -CS      Post Patient Position  Sitting  -CS      Recorded by [CS] Divya Ash COTA/L      Row Name 02/11/19 1410 02/11/19 0905          Positioning and Restraints    Pre-Treatment Position  in bed  -CS  sitting in chair/recliner  -CK     Post Treatment Position  wheelchair  -CS  wheelchair  -CK     In Wheelchair  sitting;with PT  -CS  sitting;call light within reach;encouraged to call for assist  -CK     Recorded by [CS] Divya Ash COTA/L [CK] Divya Abarca MS CCC-SLP     Row Name 02/11/19 1410             Daily Summary of Progress (OT)    Functional Goal Overall Progress: Occupational Therapy  progressing toward functional goals as expected  -CS      Recommendations: Occupational Therapy  cont OT POC  -CS      Recorded by [CS] Divya Ash COTA/L      Row Name 02/11/19 0905             Daily Summary of Progress (SLP)    Functional Goal Overall Progress: Speech Language Pathology  progressing toward functional goals as expected  -CK       Impairments Continuing to Limit Function: Speech Language Pathology  high level attention  -CK      Recommendations: Speech Language Pathology  Continue POC  -CK      Recorded by [CK] Divya Abarca MS CCC-SLP        User Key  (r) = Recorded By, (t) = Taken By, (c) = Cosigned By    Initials Name Effective Dates    CK Divya Abarca, MS CCC-SLP 04/03/18 -     CS Divya Ash, [place]ARY[place]/MEI 03/07/18 -                  OT Recommendation and Plan            Daily Summary of Progress (OT)  Functional Goal Overall Progress: Occupational Therapy: progressing toward functional goals as expected  Recommendations: Occupational Therapy: cont OT POC    OT IRF GOALS     Row Name 02/11/19 1410 02/09/19 1300 02/09/19 0950       Transfer FIM Goals (OT-IRF)    Tub-Shower Transfer FIM Goal (OT-IRF)  Score of 6 (FIM)  -CS  Score of 6 (FIM)  -LW  Score of 6 (FIM)  -RC    Toilet Transfer FIM Goal (OT-IRF)  Score of 6 (FIM)  -CS  Score of 6 (FIM)  -LW  Score of 6 (FIM)  -RC    Time Frame (Transfer FIM Goals 1, OT-IRF)  long term goal (LTG);by discharge  -CS  long term goal (LTG);by discharge  -LW  long term goal (LTG);by discharge  -RC    Progress/Outcomes (Transfer FIM Goals, OT-IRF)  goal not met;continuing progress toward goal  -CS  goal not met;continuing progress toward goal  -LW  goal not met;continuing progress toward goal  -RC       Bathing FIM Goal (OT-IRF)    Bathing FIM Goal (OT-IRF)  Score of 6 (FIM)  -CS  Score of 6 (FIM)  -LW  Score of 6 (FIM)  -RC    Time Frame (Bathing FIM Goal, OT-IRF)  long term goal (LTG);by discharge  -CS  long term goal (LTG);by discharge  -LW  long term goal (LTG);by discharge  -RC    Progress/Outcomes (Bathing FIM Goal, OT-IRF)  goal not met;continuing progress toward goal  -CS  goal not met;continuing progress toward goal  -LW  goal not met;continuing progress toward goal  -RC       UB Dressing FIM Goal (OT-IRF)    Upper Body Dressing, FIM Goal, OT-IRF  Score of 6 (FIM)  -CS  Score of 6  (FIM)  -LW  Score of 6 (FIM)  -RC    Time Frame (UB Dressing FIM Goal, OT-IRF)  long term goal (LTG);by discharge  -CS  long term goal (LTG);by discharge  -LW  long term goal (LTG);by discharge  -RC    Progress/Outcomes (UB Dressing FIM Goal, OT-IRF)  goal not met;continuing progress toward goal  -CS  goal not met;continuing progress toward goal  -LW  goal not met;continuing progress toward goal  -RC       LB Dressing FIM Goal (OT-IRF)    Lower Body Dressing, FIM Goal, OT-IRF  Score of 6 (FIM)  -CS  Score of 6 (FIM)  -LW  Score of 6 (FIM)  -RC    Time Frame (LB Dressing FIM Goal, OT-IRF)  long term goal (LTG);by discharge  -CS  long term goal (LTG);by discharge  -LW  long term goal (LTG);by discharge  -RC    Progress/Outcomes (LB Dressing FIM Goal, OT-IRF)  goal not met;continuing progress toward goal  -CS  goal not met;continuing progress toward goal  -LW  goal not met;continuing progress toward goal  -RC       Toileting Goal 1 (OT-IRF)    Activity/Device (Toileting Goal 1, OT-IRF)  toileting skills, all  -CS  toileting skills, all  -LW  toileting skills, all  -RC    [place] Level (Toileting Goal 1, OT-IRF)  supervision required  -CS  supervision required  -LW  supervision required  -RC    Time Frame (Toileting Goal 1, OT-IRF)  long term goal (LTG);by discharge  -CS  long term goal (LTG);by discharge  -LW  long term goal (LTG);by discharge  -RC    Progress/Outcomes (Toileting Goal 1, OT-IRF)  goal not met;continuing progress toward goal  -CS  goal not met;continuing progress toward goal  -LW  goal not met;continuing progress toward goal  -RC       ROM Goal 1 (OT-IRF)    ROM Goal 1 (OT-IRF)  Pt will display WFL with ROM in BUE with min reports of pain/increased pain level.   -CS  Pt will display WFL with ROM in BUE with min reports of pain/increased pain level.   -LW  Pt will display WFL with ROM in BUE with min reports of pain/increased pain level.   -RC    Time Frame (ROM Goal 1, OT-IRF)  long term goal  (LTG);by discharge  -CS  long term goal (LTG);by discharge  -LW  long term goal (LTG);by discharge  -RC    Progress/Outcomes (ROM Goal 1, OT-IRF)  goal not met;continuing progress toward goal  -CS  goal not met;continuing progress toward goal  -LW  goal not met;continuing progress toward goal  -RC       Problem Specific Goal 1 (OT-IRF)    Problem Specific Goal 1 (OT-IRF)  Pt will be independent with BUE HEP and home safety awarness.   -CS  Pt will be independent with BUE HEP and home safety awarness.   -LW  Pt will be independent with BUE HEP and home safety awarness.   -RC    Time Frame (Problem Specific Goal 1, OT-IRF)  long term goal (LTG);by discharge  -CS  long term goal (LTG);by discharge  -LW  long term goal (LTG);by discharge  -RC    Progress/Outcomes (Problem Specific Goal 1, OT-IRF)  goal not met;continuing progress toward goal  -CS  goal not met;continuing progress toward goal  -LW  goal not met;continuing progress toward goal  -RC    Row Name 02/08/19 0700 02/07/19 1000 02/06/19 1015       Transfer FIM Goals (OT-IRF)    Tub-Shower Transfer FIM Goal (OT-IRF)  Score of 6 (FIM)  -LW  Score of 6 (FIM)  -KD  Score of 6 (FIM)  -BL    Toilet Transfer FIM Goal (OT-IRF)  Score of 6 (FIM)  -LW  Score of 6 (FIM)  -KD  Score of 6 (FIM)  -BL    Time Frame (Transfer FIM Goals 1, OT-IRF)  long term goal (LTG);by discharge  -LW  long term goal (LTG);by discharge  -KD  long term goal (LTG);by discharge  -BL    Progress/Outcomes (Transfer FIM Goals, OT-IRF)  goal not met  -LW  goal not met  -KD  goal not met  -BL       Bathing FIM Goal (OT-IRF)    Bathing FIM Goal (OT-IRF)  Score of 6 (FIM)  -LW  Score of 6 (FIM)  -KD  Score of 6 (FIM)  -BL    Time Frame (Bathing FIM Goal, OT-IRF)  long term goal (LTG);by discharge  -LW  long term goal (LTG);by discharge  -KD  long term goal (LTG);by discharge  -BL    Progress/Outcomes (Bathing FIM Goal, OT-IRF)  goal not met  -LW  goal not met  -KD  goal not met  -BL       UB Dressing  FIM Goal (OT-IRF)    Upper Body Dressing, FIM Goal, OT-IRF  Score of 6 (FIM)  -LW  Score of 6 (FIM)  -KD  Score of 6 (FIM)  -BL    Time Frame (UB Dressing FIM Goal, OT-IRF)  long term goal (LTG);by discharge  -LW  long term goal (LTG);by discharge  -KD  long term goal (LTG);by discharge  -BL    Progress/Outcomes (UB Dressing FIM Goal, OT-IRF)  goal not met  -LW  goal not met  -KD  goal not met  -BL       LB Dressing FIM Goal (OT-IRF)    Lower Body Dressing, FIM Goal, OT-IRF  Score of 6 (FIM)  -LW  Score of 6 (FIM)  -KD  Score of 6 (FIM)  -BL    Time Frame (LB Dressing FIM Goal, OT-IRF)  long term goal (LTG);by discharge  -LW  long term goal (LTG);by discharge  -KD  long term goal (LTG);by discharge  -BL    Progress/Outcomes (LB Dressing FIM Goal, OT-IRF)  goal not met  -LW  goal not met  -KD  goal not met  -BL       Toileting Goal 1 (OT-IRF)    Activity/Device (Toileting Goal 1, OT-IRF)  toileting skills, all  -LW  toileting skills, all  -KD  toileting skills, all  -BL    [place] Level (Toileting Goal 1, OT-IRF)  supervision required  -LW  supervision required  -KD  supervision required  -BL    Time Frame (Toileting Goal 1, OT-IRF)  long term goal (LTG);by discharge  -LW  long term goal (LTG);by discharge  -KD  long term goal (LTG);by discharge  -BL    Progress/Outcomes (Toileting Goal 1, OT-IRF)  goal not met  -LW  goal not met  -KD  goal not met  -BL       ROM Goal 1 (OT-IRF)    ROM Goal 1 (OT-IRF)  Pt will display WFL with ROM in BUE with min reports of pain/increased pain level.   -LW  Pt will display WFL with ROM in BUE with min reports of pain/increased pain level.   -KD  Pt will display WFL with ROM in BUE with min reports of pain/increased pain level.   -BL    Time Frame (ROM Goal 1, OT-IRF)  long term goal (LTG);by discharge  -LW  long term goal (LTG);by discharge  -KD  long term goal (LTG);by discharge  -BL    Progress/Outcomes (ROM Goal 1, OT-IRF)  goal not met  -LW  goal not met  -KD  goal not met   -BL       Problem Specific Goal 1 (OT-IRF)    Problem Specific Goal 1 (OT-IRF)  Pt will be independent with BUE HEP and home safety awarness.   -LW  Pt will be independent with BUE HEP and home safety awarness.   -KD  Pt will be independent with BUE HEP and home safety awarness.   -BL    Time Frame (Problem Specific Goal 1, OT-IRF)  long term goal (LTG);by discharge  -LW  long term goal (LTG);by discharge  -KD  long term goal (LTG);by discharge  -BL    Progress/Outcomes (Problem Specific Goal 1, OT-IRF)  goal not met  -LW  goal not met  -KD  goal not met  -BL    Row Name 02/06/19 0732             Transfer FIM Goals (OT-IRF)    Tub-Shower Transfer FIM Goal (OT-IRF)  Score of 6 (FIM)  -BH      Toilet Transfer FIM Goal (OT-IRF)  Score of 6 (FIM)  -      Time Frame (Transfer FIM Goals 1, OT-IRF)  long term goal (LTG);by discharge  -      Progress/Outcomes (Transfer FIM Goals, OT-IRF)  goal not met  -BH         Bathing FIM Goal (OT-IRF)    Bathing FIM Goal (OT-IRF)  Score of 6 (FIM)  -      Time Frame (Bathing FIM Goal, OT-IRF)  long term goal (LTG);by discharge  -      Progress/Outcomes (Bathing FIM Goal, OT-IRF)  goal not met  -BH         UB Dressing FIM Goal (OT-IRF)    Upper Body Dressing, FIM Goal, OT-IRF  Score of 6 (FIM)  -BH      Time Frame (UB Dressing FIM Goal, OT-IRF)  long term goal (LTG);by discharge  -      Progress/Outcomes (UB Dressing FIM Goal, OT-IRF)  goal not met  -BH         LB Dressing FIM Goal (OT-IRF)    Lower Body Dressing, FIM Goal, OT-IRF  Score of 6 (FIM)  -      Time Frame (LB Dressing FIM Goal, OT-IRF)  long term goal (LTG);by discharge  -      Progress/Outcomes (LB Dressing FIM Goal, OT-IRF)  goal not met  -         Toileting Goal 1 (OT-IRF)    Activity/Device (Toileting Goal 1, OT-IRF)  toileting skills, all  -      [place] Level (Toileting Goal 1, OT-IRF)  supervision required  -      Time Frame (Toileting Goal 1, OT-IRF)  long term goal (LTG);by discharge  -       Progress/Outcomes (Toileting Goal 1, OT-IRF)  goal not met  -         ROM Goal 1 (OT-IRF)    ROM Goal 1 (OT-IRF)  Pt will display WFL with ROM in BUE with min reports of pain/increased pain level.   -      Time Frame (ROM Goal 1, OT-IRF)  long term goal (LTG);by discharge  -      Progress/Outcomes (ROM Goal 1, OT-IRF)  goal not met  -         Problem Specific Goal 1 (OT-IRF)    Problem Specific Goal 1 (OT-IRF)  Pt will be independent with BUE HEP and home safety awarness.   -      Time Frame (Problem Specific Goal 1, OT-IRF)  long term goal (LTG);by discharge  -      Progress/Outcomes (Problem Specific Goal 1, OT-IRF)  goal not met  -        User Key  (r) = Recorded By, (t) = Taken By, (c) = Cosigned By    Initials Name Provider Type     Maria Elena Barragan, OTR/L Occupational Therapist    Valeri Lozoya, COULTER/L Occupational Therapy Assistant    KD Ele Avina, COULTER/L Occupational Therapy Assistant    BL Giana Chakraborty, COULTER/L Occupational Therapy Assistant    CS Divya Ash, COULTER/L Occupational Therapy Assistant    LW Irina Armstrong, COULTER/L Occupational Therapy Assistant          Occupational Therapy Education     Title: PT OT SLP Therapies (In Progress)     Topic: Occupational Therapy (Done)     Point: ADL training (Done)     Description: Instruct learner(s) on proper safety adaptation and remediation techniques during self care or transfers.   Instruct in proper use of assistive devices.    Learning Progress Summary           Patient Acceptance, E,TB, VU by  at 2/9/2019  3:38 PM    Acceptance, E,TB, VU by  at 2/8/2019  9:12 AM    Acceptance, E, VU,NR by  at 2/6/2019  1:35 PM    Comment:  Educated about OT and POC. Educated to call for assist. Educated on safety throughout.                   Point: Home exercise program (Done)     Description: Instruct learner(s) on appropriate technique for monitoring, assisting and/or progressing therapeutic exercises/activities.    Learning  Progress Summary           Patient Acceptance, E,TB, VU by  at 2/9/2019  3:38 PM    Acceptance, E,TB, VU by  at 2/8/2019  9:12 AM                   Point: Precautions (Done)     Description: Instruct learner(s) on prescribed precautions during self-care and functional transfers.    Learning Progress Summary           Patient Acceptance, E,TB, VU by  at 2/9/2019  3:38 PM    Acceptance, E,TB, VU by  at 2/8/2019  9:12 AM    Acceptance, E, VU,NR by  at 2/6/2019  1:35 PM    Comment:  Educated about OT and POC. Educated to call for assist. Educated on safety throughout.    Acceptance, E, VU,NR by  at 2/6/2019 11:36 AM                   Point: Body mechanics (Done)     Description: Instruct learner(s) on proper positioning and spine alignment during self-care, functional mobility activities and/or exercises.    Learning Progress Summary           Patient Acceptance, E,TB, VU by  at 2/9/2019  3:38 PM    Acceptance, E,TB, VU by  at 2/8/2019  9:12 AM    Acceptance, E, VU,NR by  at 2/6/2019 11:36 AM                               User Key     Initials Effective Dates Name Provider Type Discipline     06/08/18 -  Maria Elena Barragan, OTR/L Occupational Therapist OT     10/17/16 -  Giana Chakraborty, COULTER/L Occupational Therapy Assistant OT     03/07/18 -  Irina Armstrong COULTER/L Occupational Therapy Assistant OT                Outcome Measures     Row Name 02/09/19 1432 02/09/19 1300 02/09/19 0950       How much help from another person do you currently need...    Turning from your back to your side while in flat bed without using bedrails?  3  -TW  --  --    Moving from lying on back to sitting on the side of a flat bed without bedrails?  3  -TW  --  --    Moving to and from a bed to a chair (including a wheelchair)?  3  -TW  --  --    Standing up from a chair using your arms (e.g., wheelchair, bedside chair)?  3  -TW  --  --    Climbing 3-5 steps with a railing?  2  -TW  --  --    To walk in hospital room?  3   -TW  --  --    AM-PAC 6 Clicks Score  17  -TW  --  --       How much help from another is currently needed...    Putting on and taking off regular lower body clothing?  --  2  -LW  2  -RC    Bathing (including washing, rinsing, and drying)  --  2  -LW  2  -RC    Toileting (which includes using toilet bed pan or urinal)  --  3  -LW  3  -RC    Putting on and taking off regular upper body clothing  --  3  -LW  3  -RC    Taking care of personal grooming (such as brushing teeth)  --  3  -LW  3  -RC    Eating meals  --  4  -LW  4  -RC    Score  --  17  -LW  17  -RC       Functional Assessment    Outcome Measure Options  AM-PAC 6 Clicks Basic Mobility (PT)  -TW  --  --    Row Name 02/09/19 0819             How much help from another person do you currently need...    Turning from your back to your side while in flat bed without using bedrails?  3  -TW      Moving from lying on back to sitting on the side of a flat bed without bedrails?  3  -TW      Moving to and from a bed to a chair (including a wheelchair)?  3  -TW      Standing up from a chair using your arms (e.g., wheelchair, bedside chair)?  3  -TW      Climbing 3-5 steps with a railing?  2  -TW      To walk in hospital room?  2  -TW      AM-PAC 6 Clicks Score  16  -TW         Functional Assessment    Outcome Measure Options  AM-PAC 6 Clicks Basic Mobility (PT)  -TW        User Key  (r) = Recorded By, (t) = Taken By, (c) = Cosigned By    Initials Name Provider Type    TW Brant Palacios, KAREN Physical Therapy Assistant    RC Valeri Woodard, COULTER/L Occupational Therapy Assistant    Irina Lorenzo, COULTER/L Occupational Therapy Assistant             Time Calculation:     Time Calculation- OT     Row Name 02/11/19 1549             Time Calculation- OT    OT Start Time  1410  -CS      OT Stop Time  1505  -CS      OT Time Calculation (min)  55 min  -CS      Total Timed Code Minutes- OT  55 minute(s)  -CS      OT Received On  02/11/19  -CS        User Key  (r) =  Recorded By, (t) = Taken By, (c) = Cosigned By    Initials Name Provider Type    CS Divya Ash COTA/MEI Occupational Therapy Assistant          Therapy Suggested Charges     Code   Minutes Charges    None              Therapy Charges for Today     Code Description Service Date Service Provider Modifiers Qty    83351804520 HC OT THER PROC EA 15 MIN 2/11/2019 Divya Ash COTA/L GO 1    [id number]  OT THERAPEUTIC ACT EA 15 MIN 2/11/2019 Divya Ash COTA/L GO 3                   JOLIE Rosado  2/11/2019

## 2019-02-11 NOTE — PLAN OF CARE
Problem: Patient Care Overview  Goal: Plan of Care Review  Outcome: Ongoing (interventions implemented as appropriate)   02/11/19 3699   Patient Care Overview   IRF Plan of Care Review progress ongoing, continue   Progress, Functional Goals demonstrating adequate progress   OTHER   Outcome Summary perf well with OT cont towrd fmc and gmc tasks to increase perf l ue

## 2019-02-12 LAB
GLUCOSE BLDC GLUCOMTR-MCNC: 110 MG/DL (ref 70–130)
GLUCOSE BLDC GLUCOMTR-MCNC: 131 MG/DL (ref 70–130)
GLUCOSE BLDC GLUCOMTR-MCNC: 154 MG/DL (ref 70–130)
GLUCOSE BLDC GLUCOMTR-MCNC: 264 MG/DL (ref 70–130)
GLUCOSE BLDC GLUCOMTR-MCNC: 78 MG/DL (ref 70–130)

## 2019-02-12 PROCEDURE — 97530 THERAPEUTIC ACTIVITIES: CPT

## 2019-02-12 PROCEDURE — 63710000001 INSULIN ASPART PER 5 UNITS: Performed by: FAMILY MEDICINE

## 2019-02-12 PROCEDURE — 99232 SBSQ HOSP IP/OBS MODERATE 35: CPT | Performed by: FAMILY MEDICINE

## 2019-02-12 PROCEDURE — 92507 TX SP LANG VOICE COMM INDIV: CPT | Performed by: SPEECH-LANGUAGE PATHOLOGIST

## 2019-02-12 PROCEDURE — 63710000001 INSULIN DETEMIR PER 5 UNITS: Performed by: FAMILY MEDICINE

## 2019-02-12 PROCEDURE — 97116 GAIT TRAINING THERAPY: CPT

## 2019-02-12 PROCEDURE — 25010000002 ENOXAPARIN PER 10 MG: Performed by: FAMILY MEDICINE

## 2019-02-12 PROCEDURE — 82962 GLUCOSE BLOOD TEST: CPT

## 2019-02-12 PROCEDURE — 97110 THERAPEUTIC EXERCISES: CPT

## 2019-02-12 RX ADMIN — ASPIRIN 81 MG CHEWABLE TABLET 325 MG: 81 TABLET CHEWABLE at 08:37

## 2019-02-12 RX ADMIN — OXYCODONE HYDROCHLORIDE AND ACETAMINOPHEN 1 TABLET: 10; 325 TABLET ORAL at 05:45

## 2019-02-12 RX ADMIN — INSULIN ASPART 7 UNITS: 100 INJECTION, SOLUTION INTRAVENOUS; SUBCUTANEOUS at 11:20

## 2019-02-12 RX ADMIN — INSULIN ASPART 7 UNITS: 100 INJECTION, SOLUTION INTRAVENOUS; SUBCUTANEOUS at 17:08

## 2019-02-12 RX ADMIN — Medication 5 MG: at 20:00

## 2019-02-12 RX ADMIN — LISINOPRIL 5 MG: 5 TABLET ORAL at 08:38

## 2019-02-12 RX ADMIN — INSULIN DETEMIR 30 UNITS: 100 INJECTION, SOLUTION SUBCUTANEOUS at 20:10

## 2019-02-12 RX ADMIN — FAMOTIDINE 20 MG: 20 TABLET ORAL at 20:00

## 2019-02-12 RX ADMIN — AMLODIPINE BESYLATE 5 MG: 5 TABLET ORAL at 08:37

## 2019-02-12 RX ADMIN — Medication 5 MG: at 08:37

## 2019-02-12 RX ADMIN — METHADONE HYDROCHLORIDE 5 MG: 10 TABLET ORAL at 08:37

## 2019-02-12 RX ADMIN — OXYCODONE HYDROCHLORIDE AND ACETAMINOPHEN 1 TABLET: 10; 325 TABLET ORAL at 19:50

## 2019-02-12 RX ADMIN — OXYCODONE HYDROCHLORIDE AND ACETAMINOPHEN 1 TABLET: 10; 325 TABLET ORAL at 15:14

## 2019-02-12 RX ADMIN — AMITRIPTYLINE HYDROCHLORIDE 10 MG: 10 TABLET, FILM COATED ORAL at 20:00

## 2019-02-12 RX ADMIN — ENOXAPARIN SODIUM 40 MG: 40 INJECTION SUBCUTANEOUS at 15:03

## 2019-02-12 RX ADMIN — INSULIN ASPART 6 UNITS: 100 INJECTION, SOLUTION INTRAVENOUS; SUBCUTANEOUS at 11:19

## 2019-02-12 RX ADMIN — INSULIN ASPART 2 UNITS: 100 INJECTION, SOLUTION INTRAVENOUS; SUBCUTANEOUS at 06:31

## 2019-02-12 RX ADMIN — Medication 5 MG: at 15:03

## 2019-02-12 RX ADMIN — DULOXETINE HYDROCHLORIDE 20 MG: 20 CAPSULE, DELAYED RELEASE ORAL at 08:38

## 2019-02-12 RX ADMIN — OXYCODONE HYDROCHLORIDE AND ACETAMINOPHEN 1 TABLET: 10; 325 TABLET ORAL at 11:19

## 2019-02-12 RX ADMIN — METFORMIN HYDROCHLORIDE 1000 MG: 500 TABLET ORAL at 08:37

## 2019-02-12 RX ADMIN — OXYCODONE HYDROCHLORIDE AND ACETAMINOPHEN 1 TABLET: 10; 325 TABLET ORAL at 01:38

## 2019-02-12 RX ADMIN — METFORMIN HYDROCHLORIDE 1000 MG: 500 TABLET ORAL at 17:08

## 2019-02-12 RX ADMIN — FAMOTIDINE 20 MG: 20 TABLET ORAL at 08:38

## 2019-02-12 RX ADMIN — METHADONE HYDROCHLORIDE 5 MG: 10 TABLET ORAL at 20:00

## 2019-02-12 RX ADMIN — ATORVASTATIN CALCIUM 20 MG: 20 TABLET, FILM COATED ORAL at 20:00

## 2019-02-12 NOTE — PLAN OF CARE
Problem: Patient Care Overview  Goal: Plan of Care Review  Outcome: Ongoing (interventions implemented as appropriate)   02/12/19 3591   Patient Care Overview   IRF Plan of Care Review progress ongoing, continue   Progress, Functional Goals demonstrating adequate progress   Coping/Psychosocial   Plan of Care Reviewed With patient   OTHER   Outcome Summary pt perf well with OT cont toward ue strenfvthening and fmc as well as adl perf

## 2019-02-12 NOTE — THERAPY TREATMENT NOTE
Inpatient Rehabilitation - Speech Language Pathology Treatment Note    [facility]       Patient Name: Ventura Boggs  : 1960  MRN: [id number]    Today's Date: 2019           Admit Date: 2019     Pt seen for cognitive therapy this date and pt participated well in all therapy tasks. Pt performed well w/divided attention and mental manipulation tasks and word problems involving time. Pt struggled w/word problems involving dates and paragraph recall. Overall, pt is progressing well and moving toward meeting goals.    Cognitive Goals:  1.  Pt will complete mental manipulation task w/90% acc: Pt completed visual shape mental manipulation task on ipad this date.  Pt was 68.5% acc on trial one.  When cued to slow down he was able to raise score to 87.3% acc on trial two.     3.  Pt will complete word problems involving time w/90% acc:  Pt was 80% acc w/time related word problems and 66% acc w/words problems relating to dates.      4.  Pt will complete selective and divided attention task w/90% acc: Pt completed divided attention task of identifying two targets that were constantly changing from a 4x4 grid.     5. Pt will listen to a paragraph and answer questions with 90% accuracy:  Pt was 66% acc w/3-4 sentence paragraph recall of nonfiction information.        Visit Dx:        ICD-10-CM ICD-9-CM   1. Stroke-like symptoms R29.90 781.99   2. Uncontrolled type 2 diabetes mellitus with hyperglycemia (CMS/HCC) E11.65 250.02   3. Medically complex patient Z78.9 V49.9   4. Chronic intractable pain G89.29 338.29   5. Symbolic dysfunction R48.9 784.60   6. Impaired mobility and ADLs Z74.09 799.89   7. Impaired functional mobility, balance, gait, and endurance Z74.09 V49.89   8. Left-sided muscle weakness M62.81 728.87   9. Ataxia R27.0 781.3       Patient Active Problem List   Diagnosis   • Uncontrolled type 2 diabetes mellitus with hyperglycemia (CMS/HCC)   • Postoperative urinary retention   • Incisional  hernia, without obstruction or gangrene   • Acute appendicitis with localized peritonitis   • Closed head injury   • Hyponatremia   • History of recurrent TIAs   • Ataxia   • Chronic pain syndrome   • Right shoulder pain   • Left-sided muscle weakness   • Medically complex patient   • Stroke-like symptoms          Therapy Treatment    Evaluation/Coping    Evaluation/Treatment Time and Intent  Subjective Information: no complaints (02/12/19 1339 : Divya Abarca, MS CCC-SLP)  Existing Precautions/Restrictions: fall, lifting (02/12/19 1339 : Divya Abarca, MS CCC-SLP)  Document Type: therapy note (daily note) (02/12/19 1339 : Divya Abarca, MS CCC-SLP)  Mode of Treatment: speech-language pathology, individual therapy (02/12/19 1339 : Divya Abarca, MS CCC-SLP)  Patient/Family Observations: Pt sitting in w/c in room; no family present (02/12/19 1339 : Divya Abarca, MS CCC-SLP)  Start Time (Evaluation/Treatment): 1339 (02/12/19 1339 : Divya Abarca, MS CCC-SLP)  Stop Time (Evaluation/Treatment): 1423 (02/12/19 1339 : Divya Abarca, MS CCC-SLP)    Vitals/Pain/Safety    Pain Scale: Numbers Pre/Post-Treatment  Pain Scale: Numbers, Pretreatment: 8/10 (02/12/19 1339 : Divya Abarca, MS CCC-SLP)  Pain Scale: Numbers, Post-Treatment: 8/10 (02/12/19 1339 : Divya Abarca, MS CCC-SLP)  Pain Location - Side: Left (02/12/19 1339 : Divya Abarca, MS CCC-SLP)  Pain Location - Orientation: lower (02/12/19 1339 : Divya Abarca, MS CCC-SLP)  Pain Location: extremity (02/12/19 1339 : Divya Abarca, MS CCC-SLP)  Pain Intervention(s): Medication (See MAR), Repositioned (02/12/19 1339 : Divya Abarca, MS CCC-SLP)  Pain Scale: Word Pre/Post-Treatment  Pain Location - Side: Left (02/12/19 1339 : Divya Abarca, MS CCC-SLP)  Pain Location - Orientation: lower (02/12/19 1339 : Divya Abarca, MS CCC-SLP)  Pain Location: extremity (02/12/19 1339 : Divya Abarca, MS  CCC-SLP)  Pain Intervention(s): Medication (See MAR), Repositioned (02/12/19 1339 : Divya Abarca, MS CCC-SLP)  Pain Scale: FACES Pre/Post-Treatment  Pain Location - Side: Left (02/12/19 1339 : Divya Abarca, MS CCC-SLP)  Pain Location - Orientation: lower (02/12/19 1339 : Divya Abarca, MS CCC-SLP)  Pain Location: extremity (02/12/19 1339 : Divya Abarca, MS CCC-SLP)  Pain Intervention(s): Medication (See MAR), Repositioned (02/12/19 1339 : Divya Abarca, MS CCC-SLP)    Cognition/Communication         Oral Motor/Eating         Mobility/Basic Activities/Instrumental Activities/Motor/Modality                   ROM/MMT                   Sensory/Myotome/Dermatome/Edema               Posture/Balance/Special Tests/Exercise/Transportation/Sexual Function                   Orthotics/Residual Limb/Prosthetic Management              Outcome Summary    Daily Summary of Progress (SLP)  Functional Goal Overall Progress: Speech Language Pathology: progressing toward functional goals as expected (02/12/19 1339 : Divya Abarca MS CCC-SLP)  Recommendations: Speech Language Pathology: Continue POC (02/12/19 1339 : Divya Abarca MS CCC-SLP)    EDUCATION    The patient has been educated in the following areas:     Cognitive Impairment.    SLP Recommendation and Plan    SLP Diagnosis: symbolic dysfunction    SLP Diagnosis: symbolic dysfunction    Rehab Potential/Prognosis: good         Anticipated Dischage Disposition: home with home health              Predicted Duration Therapy Intervention (Days): until discharge           Plan of Care Reviewed With: patient      SLP GOALS     Row Name 02/12/19 1339 02/11/19 0905          Attention Goal 1 (SLP)    Improve Attention by Goal 1 (SLP)  complete selective attention task;complete divided attention task;90%  -CK  complete selective attention task;complete divided attention task;90%  -CK     Time Frame (Attention Goal 1, SLP)  by discharge  -CK  by  discharge  -CK     Barriers (Attention Goal 1, SLP)  hx of CHI  -CK  hx of CHI  -CK     Progress (Attention Goal 1, SLP)  70%  -CK  80%  -CK     Progress/Outcomes (Attention Goal 1, SLP)  goal ongoing  -CK  goal ongoing  -CK     Comment (Attention Goal 1, SLP)  divided attention  -CK  --        Memory Skills Goal 1 (SLP)    Improve Memory Skills Through Goal 1 (SLP)  recalling unrelated word lists with an imposed delay;listen to a paragraph and answer questions;90%  -CK  recalling unrelated word lists with an imposed delay;listen to a paragraph and answer questions;90%  -CK     Time Frame (Memory Skills Goal 1, SLP)  by discharge  -CK  by discharge  -CK     Barriers (Memory Skills Goal 1, SLP)  hx of CHI  -CK  hx of CHI  -CK     Progress (Memory Skills Goal 1, SLP)  60%  -CK  80%  -CK     Progress/Outcomes (Memory Skills Goal 1, SLP)  goal ongoing  -CK  goal ongoing  -CK     Comment (Memory Skills Goal 1, SLP)  paragraph recall  -CK  --        Organizational Skills Goal 1 (SLP)    Improve Thought Organization Through Goal 1 (SLP)  completing mental manipulation task;90%  -CK  completing mental manipulation task;90%  -CK     Time Frame (Thought Organization Skills Goal 1, SLP)  by discharge  -CK  by discharge  -CK     Barriers (Thought Organization Skills Goal 1, SLP)  Hx of CHI  -CK  Hx of CHI  -CK     Progress (Thought Organization Skills Goal 1, SLP)  70%  -CK  100%  -CK     Progress/Outcomes (Thought Organization Skills Goal 1, SLP)  goal ongoing  -CK  goal ongoing  -CK     Comment (Thought Organization Skills Goal 1, SLP)  ipad task  -CK  --        Functional Math Skills Goal 1 (SLP)    Improve Functional Math Skills Through Goal 1 (SLP)  complete functional math task;complete word problems involving time;90%  -CK  complete functional math task;complete word problems involving time;90%  -CK     Time Frame (Functional Math Skills Goal 1, SLP)  by discharge  -CK  by discharge  -CK     Barriers (Functional Math  Skills Goal 1, SLP)  Hx of CHI  -CK  Hx of CHI  -CK     Progress (Functional Math Skills Goal 1, SLP)  70%  -CK  60%  -CK     Progress/Outcomes (Functional Math Skills Goal 1, SLP)  goal ongoing  -CK  goal ongoing  -CK     Comment (Functional Math Skills Goal 1, SLP)  word problems  -CK  --       User Key  (r) = Recorded By, (t) = Taken By, (c) = Cosigned By    Initials Name Provider Type    Divya Santana MS CCC-SLP Speech and Language Pathologist                 Eval FIM FIM Goal  Discharge FIM Daily FIM           Grooming        Bathing        Dressing - Upper Body        Dressing - Lower Body        Toilet        Tub, Shower        Problem Solving         Social Interaction                 Bed, Chair, W/C Transfer        Walking        Wheelchair Locomotion        Stairs                Comprehension    5    Expression    5    Memory     5            Time Calculation:       Time Calculation- SLP     Row Name 02/12/19 1431             Time Calculation- SLP    SLP Start Time  1339  -CK      SLP Stop Time  1423  -CK      SLP Time Calculation (min)  44 min  -CK      Total Timed Code Minutes- SLP  44 minute(s)  -CK      SLP Received On  02/12/19  -CK      SLP Goal Re-Cert Due Date  02/20/19  -CK        User Key  (r) = Recorded By, (t) = Taken By, (c) = Cosigned By    Initials Name Provider Type    Divya Santana MS CCC-SLP Speech and Language Pathologist            Therapy Charges for Today     Code Description Service Date Service Provider Modifiers Qty    25429135351 HC ST TREATMENT SPEECH 4 2/11/2019 Divya Abarca MS CCC-SLP GN 1    14098964370 HC ST TREATMENT SPEECH 3 2/12/2019 Divya Abarca MS CCC-SLP GN 1                           Divya Abarca MS CCC-RAINA  2/12/2019

## 2019-02-12 NOTE — THERAPY TREATMENT NOTE
Inpatient Rehabilitation - Physical Therapy Treatment Note  [facility]     Patient Name: Ventura Boggs  : 1960  MRN: [id number]    Today's Date: 2019                 Admit Date: 2019      Visit Dx:      ICD-10-CM ICD-9-CM   1. Stroke-like symptoms R29.90 781.99   2. Uncontrolled type 2 diabetes mellitus with hyperglycemia (CMS/HCC) E11.65 250.02   3. Medically complex patient Z78.9 V49.9   4. Chronic intractable pain G89.29 338.29   5. Symbolic dysfunction R48.9 784.60   6. Impaired mobility and ADLs Z74.09 799.89   7. Impaired functional mobility, balance, gait, and endurance Z74.09 V49.89   8. Left-sided muscle weakness M62.81 728.87   9. Ataxia R27.0 781.3       Patient Active Problem List   Diagnosis   • Uncontrolled type 2 diabetes mellitus with hyperglycemia (CMS/HCC)   • Postoperative urinary retention   • Incisional hernia, without obstruction or gangrene   • Acute appendicitis with localized peritonitis   • Closed head injury   • Hyponatremia   • History of recurrent TIAs   • Ataxia   • Chronic pain syndrome   • Right shoulder pain   • Left-sided muscle weakness   • Medically complex patient   • Stroke-like symptoms       Therapy Treatment    IRF Treatment Summary     Row Name 19 1545 19 1339 19 0810       Evaluation/Treatment Time and Intent    Subjective Information  no complaints  -JA  no complaints  -CK  complains of;pain  -JA    Existing Precautions/Restrictions  fall;lifting  -JA  fall;lifting  -CK  fall;lifting  -JA    Document Type  therapy note (daily note)  -JA  therapy note (daily note)  -CK  therapy note (daily note)  -JA    Mode of Treatment  physical therapy;individual therapy  -JA  speech-language pathology;individual therapy  -CK  physical therapy;individual therapy  -JA    Patient/Family Observations  Pt. supine no family present  -JA  Pt sitting in w/c in room; no family present  -CK  Pt. sitting up in w/c  -JA    Start Time (Evaluation/Treatment)   1545  -JA  1339  -CK  0810  -JA    Stop Time (Evaluation/Treatment)  1620  -JA  1423  -CK  0905  -JA    Recorded by [SASHA] Robinson Reina PTA [CK] Divya Abarca MS CCC-SLP [JA] Robinson Reina PTA    Row Name 02/12/19 1545 02/12/19 0810          Cognition/Psychosocial- PT/OT    Affect/Mental Status (Cognitive)  WFL  -JA  WFL  -JA     Orientation Status (Cognition)  oriented x 4  -JA  oriented x 4  -JA     Follows Commands (Cognition)  WFL  -JA  WFL  -JA     Cognitive Function (Cognitive)  safety deficit  -JA  safety deficit  -JA     Recorded by [SASHA] Robinson Reina PTA [JA] Robinson Reina PTA     Row Name 02/12/19 0810             Bed Mobility Assessment/Treatment    Rolling Left [place] (Bed Mobility)  supervision  -JA      Rolling Right [place] (Bed Mobility)  supervision  -JA      Supine-Sit [place] (Bed Mobility)  supervision  -JA      Sit-Supine [place] (Bed Mobility)  supervision  -JA      Bed Mobility, Safety Issues  decreased use of arms for pushing/pulling;decreased use of legs for bridging/pushing  -JA      Comment (Bed Mobility)  HOB down no bedrails on mat   -JA      Recorded by [JA] Robinson Reina PTA      Row Name 02/12/19 0810             Transfer Assessment/Treatment    Transfer Assessment/Treatment  bed-chair transfer;chair-bed transfer;squat pivot transfer  -JA      Comment (Transfers)  Pt. practice sit/squat pivot transfer w/c-mat mulitple times for practice   -JA      Recorded by [JA] Robinson Reina PTA      Row Name 02/12/19 0810             Bed-Chair Transfer    Bed-Chair [place] (Transfers)  contact guard  -JA      Assistive Device (Bed-Chair Transfers)  wheelchair sit-pivot   -JA      Recorded by [JA] Robinson Reina PTA      Row Name 02/12/19 0810             Chair-Bed Transfer    Chair-Bed [place] (Transfers)  contact guard  -JA      Assistive Device (Chair-Bed Transfers)  wheelchair sit-pivot   -JA      Recorded by  [JA] Robinson Reina, PTA      Row Name 02/12/19 0810             Sit-Stand Transfer    Sit-Stand [place] (Transfers)  contact guard;verbal cues  -SASHA      Assistive Device (Sit-Stand Transfers)  walker, front-wheeled  -JA      Recorded by [JA] Robinson Reina, PTA      Row Name 02/12/19 0810             Stand-Sit Transfer    Stand-Sit [place] (Transfers)  contact guard;verbal cues  -SASHA      Assistive Device (Stand-Sit Transfers)  walker, front-wheeled  -JA      Recorded by [JA] Robinson Reina, PTA      Row Name 02/12/19 0810             Wheelchair Transfer    [place] Level (Wheelchair Transfer)  contact guard  -SASHA      Assistive Device (Wheelchair Transfer)  other (see comments) sit-pivot   -JA      Recorded by [JA] Robinson Reina, KAREN      Row Name 02/12/19 0810             Gait/Stairs Assessment/Training    Gait/Stairs Assessment/Training  gait/ambulation assistive device  -JA      [place] Level (Gait)  contact guard  -SASHA      Assistive Device (Gait)  walker, front-wheeled  -JA      Distance in Feet (Gait)  24', 30'  -JA      Pattern (Gait)  step-to  -JA      Deviations/Abnormal Patterns (Gait)  left sided deviations;stride length decreased;ataxic  -JA      Bilateral Gait Deviations  knee buckling, left side  -JA      Left Sided Gait Deviations  heel strike decreased;weight shift ability decreased;foot drop/toe drag  -JA      Recorded by [JA] Robinson Reina, [facility]      Row Name 02/12/19 0810             Wheelchair Mobility/Management    Method of Wheelchair Locomotion (Mobility)  bimanual (upper extremity) propulsion  -JA      Mobility Activities (Wheelchair)  mobility (all) activities  -JA      Mobility [place] Level (Wheelchair)  independent  -JA      Forward Propulsion [place] (Wheelchair)  independent  -JA      Backward Propulsion [place] (Wheelchair)  independent  -JA      Steering [place] (Wheelchair)  independent  -JA      Stopping [place]  (Wheelchair)  independent  -JA      Turning [place] (Wheelchair)  independent  -JA      Doorway Navigation [place] (Wheelchair)  independent  -JA      Distance Propelled in Feet (Wheelchair)  100'  -JA      Recorded by [SASHA] Robinson Reina PTA      Row Name 02/12/19 1545 02/12/19 1339 02/12/19 0810       Pain Scale: Numbers Pre/Post-Treatment    Pain Scale: Numbers, Pretreatment  8/10  -JA  8/10  -CK  5/10  -JA    Pain Scale: Numbers, Post-Treatment  8/10  -JA  8/10  -CK  8/10  -JA    Pain Location - Side  Left  -JA  Left  -CK  Right  -JA    Pain Location - Orientation  --  lower  -CK  upper  -JA    Pain Location  shoulder  -JA  extremity  -CK  extremity  -JA    Pain Intervention(s)  Medication (See MAR)  -JA  Medication (See MAR);Repositioned  -CK  Medication (See MAR);Repositioned;Ambulation/increased activity  -JA    Recorded by [SASHA] Robinson Reina PTA [CK] Divya Abarca MS CCC-SLP [JA] Robinson Reina PTA    Row Name 02/12/19 0810             Lower Extremity Seated Therapeutic Exercise    Performed, Seated Lower Extremity (Therapeutic Exercise)  hip flexion/extension;ankle dorsiflexion/plantarflexion;LAQ (long arc quad), knee extension  -      Exercise Type, Seated Lower Extremity (Therapeutic Exercise)  AROM (active range of motion);AAROM (active assistive range of motion)  -      Sets/Reps Detail, Seated Lower Extremity (Therapeutic Exercise)  DFx10L AA/x20 R, [id number]/ x20AA L  -JA      Recorded by [SASHA] Robinson Reina PTA      Row Name 02/12/19 1545             Lower Extremity Supine Therapeutic Exercise    Performed, Supine Lower Extremity (Therapeutic Exercise)  hip abduction/adduction;quadriceps sets;heel slides;bridging (bilateral w/bolster);other (see comments) LTR, hooklying single leg hip A/A,   -      Exercise Type, Supine Lower Extremity (Therapeutic Exercise)  AAROM (active assistive range of motion);AROM (active range of motion);isometric contraction,  static AA L & AROM R   -JA      Sets/Reps Detail, Supine Lower Extremity (Therapeutic Exercise)  x20  -JA      Comment, Supine Lower Extremity (Therapeutic Exercise)  Pt. with difficulty with performance at L due to muscle spasms at L with performance   -JA      Recorded by [SASHA] Robinson Reina PTA      Row Name 02/12/19 1545 02/12/19 0810          Vital Signs    Pre Systolic BP Rehab  119  -JA  --     Pre Treatment Diastolic BP  71  -JA  --     Pretreatment Heart Rate (beats/min)  79  -JA  --     Pre Patient Position  Supine  -JA  Sitting  -JA     Intra Patient Position  Supine  -JA  Standing  -JA     Post Patient Position  Supine  -JA  Sitting  -JA     Recorded by [SASHA] Robinson Reina PTA [SASHA] Robinson Reina PTA     Row Name 02/12/19 1545 02/12/19 0810          Positioning and Restraints    Pre-Treatment Position  in bed  -JA  sitting in chair/recliner  -JA     Post Treatment Position  bed  -JA  wheelchair  -JA     In Bed  supine;call light within reach;encouraged to call for assist  -JA  --     In Wheelchair  --  sitting;encouraged to call for assist  -JA     Recorded by [SASHA] Robinson Reina PTA [SASHA] Robinson Reina, KAREN     Row Name 02/12/19 1545 02/12/19 0810          Daily Summary of Progress (PT)    Functional Goal Overall Progress: Physical Therapy  progressing toward functional goals as expected  -JA  progressing toward functional goals as expected  -JA     Impairments Continuing to Limit Function: Physical Therapy  strength decreased;impaired balance;coordination impaired;motor control impaired;pain  -JA  strength decreased;impaired balance;coordination impaired;motor control impaired;pain  -JA     Recommendations: Physical Therapy  Cont. gt, therex, transfers   -JA  Neuro therex, gt. quality  -JA     Recorded by [SASHA] Robinson Reina PTA [SASHA] Robinson Reina PTA     Row Name 02/12/19 1339             Daily Summary of Progress (SLP)    Functional Goal Overall Progress:  Speech Language Pathology  progressing toward functional goals as expected  -CK      Recommendations: Speech Language Pathology  Continue POC  -CK      Recorded by [CK] Divya Abarca, MS CCC-SLP      Row Name 19 0810             Weekly Summary of Progress (PT)    Weekly Outcome Summary: Physical Therapy  FIMS: t/f-4, gt-1, steps-0, w/c-6  -JA      Recorded by [SASHA] Robinson Reina, PTA      Row Name 19 1339             Weekly Summary of Progress (SLP)    Weekly Outcome Summary: Speech Language Pathology  Comprehension: 5; Expression: 5; Memory: 5  -CK      Recorded by [CK] Divya Abarca, MS CCC-SLP      Row Name 19 0942             Nursing Weekly Outcome Summary    Weekly Progress Summary: Nursing  improving, more independent in daily activity  -      Weekly Outcome Statement: Nursing  Eatin; Toiletin; Bladder: 5/6; Bowel: 5/6  -      Recorded by [] Julee Aguillon, RN        User Key  (r) = Recorded By, (t) = Taken By, (c) = Cosigned By    Initials Name Effective Dates    CK Divya Abarca MEI, MS CCC-SLP 18 -     Julee Chisholm RN 10/17/16 -     Robinson Gary, PTA 18 -            Physical Therapy Education     Title: PT OT SLP Therapies (In Progress)     Topic: Physical Therapy (In Progress)     Point: Mobility training (In Progress)     Learning Progress Summary           Patient Eager, D, NR by BS at 2019 12:59 PM    Comment:  further addressed body position with weight shifting and sequencing of LE's with limb advancement in gait training w/ // bars. Tactile and verbal cues provided.    Acceptance, E, NR by GAL at 2019  2:39 PM                   Point: Home exercise program (In Progress)     Learning Progress Summary           Patient Eager, D, NR by BS at 2019 12:59 PM    Comment:  further addressed body position with weight shifting and sequencing of LE's with limb advancement in gait training w/ // bars. Tactile and  verbal cues provided.                   Point: Body mechanics (In Progress)     Learning Progress Summary           Patient TA Bain, NR by BS at 2/8/2019 12:59 PM    Comment:  further addressed body position with weight shifting and sequencing of LE's with limb advancement in gait training w/ // bars. Tactile and verbal cues provided.    Acceptance, E, NR by GAL at 2/6/2019  2:39 PM                   Point: Precautions (In Progress)     Learning Progress Summary           Patient TA Bain, NR by BS at 2/8/2019 12:59 PM    Comment:  further addressed body position with weight shifting and sequencing of LE's with limb advancement in gait training w/ // bars. Tactile and verbal cues provided.    Acceptance, E, NR by GAL at 2/6/2019  2:39 PM                               User Key     Initials Effective Dates Name Provider Type Discipline    GAL 04/03/18 -  Shilpa Dumont, PT Physical Therapist PT    ERIC 04/08/18 -  Jose Antonio Cuevas, PT Physical Therapist PT                 Juan FIM FIM Goal  Discharge FIM Daily FIM           Grooming        Bathing        Dressing - Upper Body        Dressing - Lower Body        Toilet        Tub, Shower        Problem Solving         Social Interaction                 Bed, Chair, W/C Transfer    4    Walking    1    Wheelchair Locomotion    6    Stairs    0            Comprehension        Expression        Memory                 PT Recommendation and Plan     Plan of Care Reviewed With: patient  Daily Summary of Progress (PT)  Functional Goal Overall Progress: Physical Therapy: progressing toward functional goals as expected  Impairments Continuing to Limit Function: Physical Therapy: strength decreased, impaired balance, coordination impaired, motor control impaired, pain  Recommendations: Physical Therapy: Cont. gt, therex, transfers   IRF Plan of Care Review: progress ongoing, continue  Progress, Functional Goals: demonstrating adequate progress  Outcome Summary: Pt. transferred with  CGA, amb. 30' with RW Kenneth, performed x20 reps therex AROM R & AAROM L, pt.cont's with increase muscle spasms and decrease control at R with therex & moblity. Cont. gt, therex, transfers       PT IRF GOALS     Row Name 02/12/19 1545             Bed Mobility Goal 1 (PT-IRF)    Activity/Assistive Device (Bed Mobility Goal 1, PT-IRF)  rolling to left;rolling to right;sit to supine;supine to sit  -JA      [place] Level (Bed Mobility Goal 1, PT-IRF)  independent  -JA      Time Frame (Bed Mobility Goal 1, PT-IRF)  1 week  -JA      Progress/Outcomes (Bed Mobility Goal 1, PT-IRF)  goal not met  -JA         Transfer Goal 1 (PT-IRF)    Activity/Assistive Device (Transfer Goal 1, PT-IRF)  sit-to-stand/stand-to-sit;bed-to-chair/chair-to-bed;wheelchair transfer  -JA      [place] Level (Transfer Goal 1, PT-IRF)  conditional independence  -JA      Time Frame (Transfer Goal 1, PT-IRF)  1 week  -JA      Progress/Outcomes (Transfer Goal 1, PT-IRF)  goal not met  -JA         Gait/Walking Locomotion Goal 1 (PT-IRF)    Activity/Assistive Device (Gait/Walking Locomotion Goal 1, PT-IRF)  gait (walking locomotion);assistive device use  -JA      Gait/Walking Locomotion Distance Goal 1 (PT-IRF)  50ft  -JA      [place] Level (Gait/Walking Locomotion Goal 1, PT-IRF)  contact guard assist  -JA      Time Frame (Gait/Walking Locomotion Goal 1, PT-IRF)  1 week  -JA      Progress/Outcomes (Gait/Walking Locomotion Goal 1, PT-IRF)  goal not met  -JA         Gait/Walking Locomotion Goal 2 (PT-IRF)    Activity/Assistive Device (Gait/Walking Locomotion Goal 2, PT-IRF)  gait (walking locomotion);assistive device use  -JA      Gait/Walking Locomotion Distance Goal 2 (PT-IRF)  150ft or more  -JA      [place] Level (Gait/Walking Locomotion Goal 2, PT-IRF)  conditional independence  -JA      Time Frame (Gait/Walking Locomotion Goal 2, PT-IRF)  2 weeks  -JA      Progress/Outcomes (Gait/Walking Locomotion Goal 2, PT-IRF)  goal not met  -JA          Wheelchair Locomotion Goal 1 (PT-IRF)    Activity (Wheelchair Locomotion Goal 1, PT-IRF)  forward propulsion;backward propulsion;steering;turning;doorway navigation  -JA      [place] Level (Wheelchair Locomotion Goal 1, PT-IRF)  independent  -JA      Distance Goal 1 (Wheelchair Locomotion, PT-IRF)  150ft  -JA      Time Frame (Wheelchair Locomotion Goal 1, PT-IRF)  2 days  -JA      Progress/Outcomes (Wheelchair Locomotion Goal 1, PT-IRF)  goal met  (Significant)   -JA         Stairs Goal 1 (PT-IRF)    Activity/Assistive Device (Stairs Goal 1, PT-IRF)  ascending stairs;descending stairs;using handrail, right  -JA      Number of Stairs (Stairs Goal 1, PT-IRF)  5  -JA      [place] Level (Stairs Goal 1, PT-IRF)  standby assist;conditional independence  -      Time Frame (Stairs Goal 1, PT-IRF)  2 weeks  -JA      Progress/Outcomes (Stairs Goal 1, PT-IRF)  goal not met  -JA         Strength Goal 1 (PT-IRF)    Strength Goal 1 (PT-IRF)  Pt will perform 20 reps of AROM exercises with VSS  -JA      Time Frame (Strength Goal 1, PT-IRF)  by discharge  -JA      Progress/Outcomes (Strength Goal 1, PT-IRF)  goal not met  -JA         Caregiver Training Goal 1 (PT-IRF)    Caregiver Training Goal 1 (PT-IRF)  Homecaregiver will demonstrate understanding assisting with mobility as needed, home safety and homecare instructions  -      Time Frame (Caregiver Training Goal 1, PT-IRF)  by discharge  -      Progress/Outcomes (Caregiver Training Goal 1, PT-IRF)  goal not met  -        User Key  (r) = Recorded By, (t) = Taken By, (c) = Cosigned By    Initials Name Provider Type    Robinson Gary PTA Physical Therapy Assistant        Outcome Measures     Row Name 02/12/19 0810 02/11/19 1507 02/11/19 0810       How much help from another person do you currently need...    Turning from your back to your side while in flat bed without using bedrails?  4  -JA  4  -TW  4  -TW    Moving from lying on back to sitting  on the side of a flat bed without bedrails?  4  -JA  4  -TW  4  -TW    Moving to and from a bed to a chair (including a wheelchair)?  3  -JA  3  -TW  3  -TW    Standing up from a chair using your arms (e.g., wheelchair, bedside chair)?  3  -JA  3  -TW  3  -TW    Climbing 3-5 steps with a railing?  2  -JA  2  -TW  2  -TW    To walk in hospital room?  3  -JA  3  -TW  3  -TW    AM-PAC 6 Clicks Score  19  -JA  19  -TW  19  -TW       Functional Assessment    Outcome Measure Options  AM-PAC 6 Clicks Basic Mobility (PT)  -JA  AM-PAC 6 Clicks Basic Mobility (PT)  -TW  AM-PAC 6 Clicks Basic Mobility (PT)  -TW      User Key  (r) = Recorded By, (t) = Taken By, (c) = Cosigned By    Initials Name Provider Type    Robinson Gary PTA Physical Therapy Assistant    Brant [place] PTA Physical Therapy Assistant             Time Calculation:     PT Charges     Row Name 02/12/19 1639 02/12/19 1010          Time Calculation    Start Time  1545  -SASHA  0810  -JA     Stop Time  1620  -JA  0905  -JA     Time Calculation (min)  35 min  -JA  55 min  -JA        Time Calculation- PT    Total Timed Code Minutes- PT  35 minute(s)  -JA  55 minute(s)  -JA        Timed Charges    76525 - PT Therapeutic Exercise Minutes  35  -JA  15  -SASHA     18038 - Gait Training Minutes   --  20  -SASHA     30865 - PT Therapeutic Activity Minutes  --  25  -JA       User Key  (r) = Recorded By, (t) = Taken By, (c) = Cosigned By    Initials Name Provider Type    Robinson Gary PTA Physical Therapy Assistant            Therapy Charges for Today     Code Description Service Date Service Provider Modifiers Qty    86012888159 HC PT THER PROC EA 15 MIN 2/12/2019 Robinson Reina, PTA GP 1    07647260307 HC GAIT TRAINING EA 15 MIN 2/12/2019 Robinson Reina, PTA GP 1    51522098650 HC PT THERAPEUTIC ACT EA 15 MIN 2/12/2019 Robinson Reina, PTA GP 2    81258868600 HC PT THER PROC EA 15 MIN 2/12/2019 Robinson Reina, PTA GP 2            PT  G-Codes  Outcome Measure Options: AM-PAC 6 Clicks Basic Mobility (PT)  AM-PAC 6 Clicks Score: 19  Score: 17      Robinson Reina, PTA  2/12/2019

## 2019-02-12 NOTE — PLAN OF CARE
Problem: Patient Care Overview  Goal: Plan of Care Review  Outcome: Ongoing (interventions implemented as appropriate)   02/12/19 0249   Patient Care Overview   IRF Plan of Care Review progress ongoing, continue   Progress, Functional Goals demonstrating adequate progress   Coping/Psychosocial   Plan of Care Reviewed With patient       Problem: Fall Risk (Adult)  Goal: Absence of Fall  Outcome: Ongoing (interventions implemented as appropriate)      Problem: Diabetes, Type 2 (Adult)  Goal: Signs and Symptoms of Listed Potential Problems Will be Absent, Minimized or Managed (Diabetes, Type 2)  Outcome: Ongoing (interventions implemented as appropriate)

## 2019-02-12 NOTE — PROGRESS NOTES
LOS: 7 days   Patient Care Team:  Anita Faria APRN as PCP - General (Family Medicine)    Chief Complaint:   Medically complex patient          Interval History:     SUBJECTIVE:  No complaints today. Pleased with progress and anxious to go home end of week  History taken from: patient chart RN reviewed with therapy    Objective     Vital Signs  Temp:  [96.4 °F (35.8 °C)] 96.4 °F (35.8 °C)  Heart Rate:  [91] 91  Resp:  [18] 18  BP: (135)/(76) 135/76      02/05/19  1404 02/09/19  0319 02/10/19  1900   Weight: 90.3 kg (199 lb) 88.7 kg (195 lb 9.6 oz) 93.1 kg (205 lb 4.8 oz)         Physical Exam:     General Appearance:    Alert, cooperative, in no acute distress   Head:    Normocephalic, without obvious abnormality, atraumatic   Eyes:            Lids and lashes normal, conjunctivae and sclerae normal, no   icterus, no pallor, corneas clear, PERRLA   Throat:   No oral lesions, no thrush, oral mucosa moist   Neck:   No adenopathy, supple, trachea midline, no thyromegaly, no   carotid bruit, no JVD       Lungs:     Clear to auscultation,respirations regular, even and                  Unlabored     Heart:    Regular rhythm and normal rate, normal S1 and S2, no            murmur, no gallop, no rub, no click    Chest Wall:    No abnormalities observed   Abdomen:     Normal bowel sounds, no masses, no organomegaly, soft        non-tender, non-distended, no guarding, no rebound                Tenderness    Extremities:   Moves all extremities well, no edema, no cyanosis, no             Redness left weakness present and slowly improving       Skin:   No bleeding, bruising or rash   Lymph nodes:   No palpable adenopathy   Neurologic:   Cranial nerves 2 - 12 grossly intact, sensation intact, DTR       present and equal bilaterally left weakness and ataxia slowly improving       RESULTS REVIEW:     Lab Results (last 24 hours)     Procedure Component Value Units Date/Time    POC Glucose Once [[id number]]  (Normal)  Collected:  02/12/19 0733    Specimen:  Blood Updated:  02/12/19 0745     Glucose 78 mg/dL      Comment: : [id number] VALERIA TORREZNMeter ID: [id number]       POC Glucose Once [[id number]]  (Abnormal) Collected:  02/12/19 0542    Specimen:  Blood Updated:  02/12/19 0617     Glucose 154 mg/dL      Comment: RN NotifiedOperator: [id number] QUAN TAOYLNMeter ID: [id number]       POC Glucose Once [[id number]]  (Abnormal) Collected:  02/11/19 1906    Specimen:  Blood Updated:  02/11/19 1957     Glucose 136 mg/dL      Comment: RN NotifiedOperator: [id number] QUAN TAOYLNMeter ID: [id number]       POC Glucose Once [[id number]]  (Abnormal) Collected:  02/11/19 1612    Specimen:  Blood Updated:  02/11/19 1631     Glucose 161 mg/dL      Comment: RN NotifiedOperator: [id number] LESLIE CANTUMeter ID: [id number]       POC Glucose Once [[id number]]  (Normal) Collected:  02/11/19 1112    Specimen:  Blood Updated:  02/11/19 1137     Glucose 96 mg/dL      Comment: RN NotifiedOperator: [id number] SOPHIE WILCOXMeter ID: [id number]       POC Glucose Once [[id number]]  (Abnormal) Collected:  02/10/19 1958    Specimen:  Blood Updated:  02/11/19 1123     Glucose 163 mg/dL      Comment: RN NotifiedOperator: [id number] QUAN TAOYLNMeter ID: [id number]       POC Glucose Once [[id number]]  (Abnormal) Collected:  02/10/19 1955    Specimen:  Blood Updated:  02/11/19 1123     Glucose 172 mg/dL      Comment: RN NotifiedOperator: [id number] QUAN TAOYLNMeter ID: [id number]           Imaging Results (last 72 hours)     ** No results found for the last 72 hours. **          Assessment/Plan     Active Hospital Problems    Diagnosis   • **Medically complex patient   • Stroke-like symptoms       He has chronic left-sided weakness but it is much worse after this episode.  Specifically the left leg much  weaker than it was prior to this episode     • Uncontrolled type 2 diabetes mellitus with hyperglycemia (CMS/HCC)     -at presentation to  ED glucose 704    Hemoglobin A1c is 15.1 admission to acute rehabilitation     • Left-sided muscle weakness     Due to prior Intracranial hemorrhage with closed head injury     • Right shoulder pain     He's had right shoulder pain for some time and has had steroid injections.  But since his fall the pain is much worse.  X-rays were unremarkable yesterday at [facility].     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 Home health and wheelchair ordered.  Also ordered shower chair at his request    Continue diabetic control which is better with current meds given daily  bp ok  Chronic pain controlled        This document has been electronically signed by Richard Galvez MD on February 12, 2019 8:25 AM

## 2019-02-12 NOTE — DISCHARGE PLACEMENT REQUEST
"Ventura Boggs (58 y.o. Male)     Date of Birth Social Security Number Address Home Phone MRN    1960  [address] [address] [phone] [id number]    [employer] Marital Status          None        Admission Date Admission Type Admitting Provider Attending Provider Department, Room/Bed    2/5/19 Urgent Richard Galvez MD Hargrove, Kenneth R, MD [facility], 533/1    Discharge Date Discharge Disposition Discharge Destination                       Attending Provider:  Richard Galvez MD    Allergies:  Naproxen, Tramadol    Isolation:  None   Infection:  None   Code Status:  CPR    Ht:  175.3 cm (69\")   Wt:  93.1 kg (205 lb 4.8 oz)    Admission Cmt:  None   Principal Problem:  Medically complex patient [Z78.9]                 Active Insurance as of 2/5/2019     Primary Coverage     Payor Plan Insurance Group Employer/Plan Group    HUMANA MEDICAID HU[employer]NA [employer] [id number]     Payor Plan Address Payor Plan Phone Number Payor Plan Fax Number Effective Dates    PO  [phone]  5/19/2017 - None Entered    [facility] [id number]       Subscriber Name Subscriber Birth Date Member ID       VENTURA BOGGS 1960 [id number]                 Emergency Contacts      (Rel.) Home Phone Work Phone Mobile Phone    Sonia Boggs (Significant Other) [phone]            Emergency Contact Information     Name Relation Home Work Mobile    Sonia Boggs Significant Other [phone]          Insurance Information                HUMANA MEDICAID/HUMANA [employer] Phone: [phone]    Subscriber: Ventura Boggs Subscriber#: [id number]    Group#: [id number] Precert#:              History & Physical      Richard Galvez MD at 2/5/2019  3:05 PM           [address], [place]. [address]  T - [phone]     H/P NOTE         SUBJECTIVE:   Patient " Care Team:  Anita Faria APRN as PCP - General (Family Medicine)    Chief Complaint:   Acute worsening of left-sided weakness.    Subjective     Patient is 58 y.o. male presents with onset at about 4 AM on 1/31/19 of left-sided numbness, weakness, unsteady gait, slurred speech when he awoke.  He fell several times going to the bathroom after calling the emergency he was some brought to the [facility] emergency Department for evaluation.  CT was reportedly unremarkable there and he was transferred to [facility] for evaluation and treatment.  At [facility] he did see neurology and they felt like his symptoms had resolved by the time he was evaluated.  However the patient says that his symptoms have worsened since that time and he has left arm and especially left leg weakness.  Tells me that his legs buckle when he tries to walk.  Of note at the time of admission to the emergency room his glucose was 704 his sodium was 125 TSH was 5.8 and his free T4 was mildly low.  Thyroid functions were repeated at [facility] and they were normal and felt that he did not need medication for thyroid.  He had a workup for acute stroke and it was essentially negative at the [facility].    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  Subsequently he did have a TIA similar to this episode and he says that he's had left-sided weakness since the original head injury.    He does have long-standing diabetes hypertension is had kidney stones he has osteoarthritis.  He has chronic back pain and right shoulder pain and is followed by pain management in [facility]..      ROS/HISTORY/ CURRENT MEDICATIONS/OBJECTIVE/VS/PE:       History:     Past Medical History:   Diagnosis Date   • Arthritis    • Carpal tunnel syndrome    • Chronic pain syndrome    • Depressive disorder    • GERD (gastroesophageal reflux disease)    • Hernia    • History of  closed head injury     with ICH and left weakness   • History of urinary system disease    • Hypertension    • Migraine    • Right shoulder pain    • Rotator cuff syndrome    • Type 2 diabetes mellitus (CMS/HCC)    • Ureteric stone      Past Surgical History:   Procedure Laterality Date   • ABDOMINAL SURGERY  08/2016   • APPENDECTOMY N/A 1/27/2018    Procedure: APPENDECTOMY;  Surgeon: Rusty Palacio MD;  Location: [facility];  Service:    • CHOLECYSTECTOMY     • CIRCUMCISION     • CYSTOSCOPY  08/13/2003    Cystoscopy and removal of J stent. Right ureteral stent.   • CYSTOSCOPY  08/13/2003    Cystoscopy and right stone extraction and placement of 26x 6 J-stent.   • INCISION AND DRAINAGE ABSCESS N/A 7/26/2017    Procedure: INCISION AND DRAINAGE ABD.ABSCESS;  Surgeon: Rui Shaver MD;  Location: [facility];  Service:    • VENTRAL/INCISIONAL HERNIA REPAIR N/A 5/31/2017    Procedure: LAPAROSCOPIC POSSIBLE OPEN ADHESIOLYSIS AND VENTRAL/INCISIONAL HERNIA REPAIR ;  Surgeon: Rui Shaver MD;  Location: [facility];  Service:      No family history on file.  Social History     Tobacco Use   • Smoking status: Never Smoker   • Smokeless tobacco: Never Used   Substance Use Topics   • Alcohol use: No   • Drug use: No   He has lived alone but says at discharge his girlfriend will be staying with him to help if needed      Medications Prior to Admission   Medication Sig Dispense Refill Last Dose   • aspirin 81 MG chewable tablet Chew 81 mg Daily.   Taking   • atorvastatin (LIPITOR) 20 MG tablet Take 20 mg by mouth Every Night.   Taking   • cyclobenzaprine (FLEXERIL) 10 MG tablet Take 10 mg by mouth 2 (Two) Times a Day As Needed.   Taking   • dicyclomine (BENTYL) 20 MG tablet Take 1 tablet by mouth Every 6 (Six) Hours As Needed (abdominal pain). 10 tablet 0    • DULoxetine (CYMBALTA) 20 MG capsule Take 20 mg by mouth Daily.   Taking   • famotidine (PEPCID) 20 MG tablet Take 1 tablet by mouth 2 (Two) Times a Day. 20 tablet 0     • FREESTYLE TEST STRIPS test strip    Taking   • insulin lispro (humaLOG) 100 UNIT/ML injection Inject 8-12 Units under the skin 3 (Three) Times a Day As Needed (sliding scale). As needed per sliding scale   Taking   • lidocaine-prilocaine (EMLA) 2.5-2.5 % cream Apply 1 application topically As Needed.   Taking   • lisinopril (PRINIVIL,ZESTRIL) 5 MG tablet Take 5 mg by mouth Daily.   Taking   • metFORMIN (GLUCOPHAGE) 500 MG tablet Take 500 mg by mouth 2 (Two) Times a Day With Meals.   Taking   • methadone (DOLOPHINE) 5 MG tablet Take 5 mg by mouth 2 (Two) Times a Day.   Taking   • ondansetron ODT (ZOFRAN-ODT) 4 MG disintegrating tablet Take 1 tablet by mouth Every 8 (Eight) Hours. 15 tablet 1 Taking   • ondansetron ODT (ZOFRAN-ODT) 4 MG disintegrating tablet Take 1 tablet by mouth Every 8 (Eight) Hours As Needed for Nausea or Vomiting. 15 tablet 0    • oxyCODONE-acetaminophen (PERCOCET)  MG per tablet Take 1 tablet by mouth Every 4 (Four) Hours.  0 Taking   • oxyCODONE-acetaminophen (PERCOCET)  MG per tablet Take 1 tablet by mouth Every 4 (Four) Hours. 30 tablet 0 Not Taking   • prochlorperazine (COMPAZINE) 10 MG tablet Take 1 tablet by mouth Every 6 (Six) Hours As Needed for Nausea or Vomiting. 10 tablet 0    • promethazine (PHENERGAN) 12.5 MG tablet Take 2 tablets by mouth Every 6 (Six) Hours As Needed for Nausea. 30 tablet 1 Taking   • promethazine (PHENERGAN) 25 MG tablet Take 1 tablet by mouth Every 6 (Six) Hours As Needed for Nausea or Vomiting. 20 tablet 0    • SITagliptin (JANUVIA) 50 MG tablet Take 50 mg by mouth Daily.   Taking     Allergies:  Naproxen and Tramadol    Current Medications:     Current Facility-Administered Medications:   •  acetaminophen (TYLENOL) tablet 650 mg, 650 mg, Oral, Q4H PRN, Richard Galvez MD  •  amitriptyline (ELAVIL) tablet 10 mg, 10 mg, Oral, Nightly, Richard Galvez MD  •  [START ON 2/6/2019] amLODIPine (NORVASC) tablet 5 mg, 5 mg, Oral, Q24H,  Richard Galvez MD  •  aspirin chewable tablet 325 mg, 325 mg, Oral, Daily, Richard Galvez MD  •  atorvastatin (LIPITOR) tablet 20 mg, 20 mg, Oral, Nightly, Richard Galvez MD  •  calcium carbonate (TUMS) chewable tablet 500 mg (200 mg elemental), 2 tablet, Oral, BID PRN, Richard Galvez MD  •  dextrose (GLUTOSE) oral gel 15 g, 15 g, Oral, Q15 Min PRN, Richard Galvez MD  •  dicyclomine (BENTYL) tablet 20 mg, 20 mg, Oral, Q6H PRN, Richard Galvez MD  •  DULoxetine (CYMBALTA) DR capsule 20 mg, 20 mg, Oral, Daily, Richard Galvez MD  •  enoxaparin (LOVENOX) syringe 40 mg, 40 mg, Subcutaneous, Q24H, Richard Galvez MD  •  famotidine (PEPCID) tablet 20 mg, 20 mg, Oral, BID, Richard Galvez MD  •  insulin aspart (novoLOG) injection 0-9 Units, 0-9 Units, Subcutaneous, 4x Daily AC & at Bedtime, Richard Galvez MD  •  insulin aspart (novoLOG) injection 7 Units, 7 Units, Subcutaneous, TID With Meals, Richard Galvez MD  •  insulin detemir (LEVEMIR) injection 30 Units, 30 Units, Subcutaneous, Nightly, Richard Galvez MD  •  lisinopril (PRINIVIL,ZESTRIL) tablet 5 mg, 5 mg, Oral, Daily, Richard Galvez MD  •  magnesium hydroxide (MILK OF MAGNESIA) suspension 2400 mg/10mL 10 mL, 10 mL, Oral, Daily PRN, Richard Galvez MD  •  metFORMIN (GLUCOPHAGE) tablet 1,000 mg, 1,000 mg, Oral, BID With Meals, Richard Galvez MD  •  sennosides-docusate sodium (SENOKOT-S) 8.6-50 MG tablet 2 tablet, 2 tablet, Oral, Nightly, Richard Galvez MD      REVIEW OF SYSTEMS:  Review of Systems   Constitutional: Positive for fatigue.   HENT: Negative.    Eyes: Negative.    Respiratory: Negative.    Cardiovascular: Negative.    Gastrointestinal: Positive for abdominal distention and abdominal pain. Negative for anal bleeding and blood in stool.        He has chronic abdominal pain due to prior surgery and he says he sleeps in a recliner at home   Endocrine: Negative.     Genitourinary: Negative.    Musculoskeletal: Positive for arthralgias, back pain, gait problem, joint swelling and myalgias.        Specifically he has a lot of pain in his right shoulder   Skin: Negative.    Allergic/Immunologic: Negative.    Neurological: Positive for weakness and numbness. Negative for speech difficulty.        He does complain of weakness in his right arm and right leg.  He has some chronic weakness but it is much worse since this episode   Hematological: Negative.    Psychiatric/Behavioral: Negative.        Objective     Physical Exam:   Temp:  [97.3 °F (36.3 °C)] 97.3 °F (36.3 °C)  Heart Rate:  [90] 90  Resp:  [18] 18  BP: (120)/(74) 120/74    Physical Exam:    Physical Exam   Constitutional: He is oriented to person, place, and time. He appears well-developed and well-nourished. No distress.   HENT:   Head: Normocephalic.   Eyes: Conjunctivae are normal. Pupils are equal, round, and reactive to light. Left eye exhibits no discharge. No scleral icterus.   Neck: Normal range of motion. Neck supple. No tracheal deviation present. No thyromegaly present.   Cardiovascular: Normal rate, regular rhythm and normal heart sounds. Exam reveals no gallop and no friction rub.   No murmur heard.  Pulmonary/Chest: Effort normal and breath sounds normal. No stridor. No respiratory distress. He has no wheezes. He has no rales. He exhibits no tenderness.   Abdominal: Soft. Bowel sounds are normal. He exhibits no distension and no mass. There is no tenderness. There is no guarding.   Musculoskeletal: Normal range of motion. He exhibits no edema, tenderness or deformity.   Neurological: He is alert and oriented to person, place, and time. A cranial nerve deficit is present. He exhibits abnormal muscle tone.   He does have decreased strength in his left arm and left leg.  Strength is about 4/5.  He does have some drift in the left arms.  Strength is not symmetrical   Skin: Skin is warm and dry. Capillary refill  takes less than 2 seconds. No rash noted. He is not diaphoretic. No erythema. No pallor.   Psychiatric: He has a normal mood and affect. His behavior is normal. Judgment and thought content normal.   Nursing note and vitals reviewed.           Results Review:      Lab Results (last 24 hours)     Procedure Component Value Units Date/Time    CRE Screen by PCR - Swab, Large Intestine, Rectum [[id number]] Collected:  02/05/19 1343    Specimen:  Swab from Large Intestine, Rectum Updated:  02/05/19 1346       initially at admission his sodium was 125  Glucose was 704  Renal function was normal                         Imaging Results (last 24 hours)     ** No results found for the last 24 hours. **      MRI of the head was reviewed and was did not show acute changes    CTA did not show acute changes either  X-rays of the right shoulder were unremarkable yesterday  He needs assistance with ambulation and activities of daily living transfers  He has participated with PT OT and speech therapy  I reviewed the patient's  clinical results.  I reviewed the patient's  imaging results and agree with the interpretation.   I reviewed the therapy notes   ASSESSMENT/PLAN:   Assessment/Plan     Medically complex patient    Uncontrolled type 2 diabetes mellitus with hyperglycemia (CMS/HCC)    Stroke-like symptoms    Right shoulder pain    Left-sided muscle weakness    Closed head injury    Hyponatremia    History of recurrent TIAs    Chronic pain syndrome        He will be dmitted to the acute rehabilitation unit for intensive rehabilitation.  I did review his records from [facility] and neurology felt that he did not have a stroke but thought this was rigid visual from his old cranial hemorrhage.  However the patient says his symptoms have worsened significantly in the last week since this episode and he feels that he's had a stroke.  PT and OT felt that he had acute changes well and recommended inpatient rehabilitation.  For now  we will continue intensive rehabilitation with PT OT and speech to try to improve his strength and ambulation.  He does have chronic pain and I did review his Kaspar  He has uncontrolled diabetes and we will  Continue insulin and adjust as needed also continue metformin.  Will continue his home medicines for hypertension as well  X-rays of the right shoulder were negative yesterday  It is expected he'll participate 3 hours a day make measurable improvement and be able to return home at discharge  I discussed the patients findings and my recommendations with patient.          This document has been electronically signed by Richard Galvez MD on February 5, 2019 3:05 PM                   Electronically signed by Richard Galvez MD at 2/5/2019  3:19 PM          Physician Progress Notes (last 24 hours) (Notes from 2/11/2019  1:51 PM through 2/12/2019  1:51 PM)      Richard Galvez MD at 2/12/2019  8:25 AM               LOS: 7 days   Patient Care Team:  Anita Faria APRN as PCP - General (Family Medicine)    Chief Complaint:   Medically complex patient          Interval History:     SUBJECTIVE:  No complaints today. Pleased with progress and anxious to go home end of week  History taken from: patient chart RN reviewed with therapy    Objective     Vital Signs  Temp:  [96.4 °F (35.8 °C)] 96.4 °F (35.8 °C)  Heart Rate:  [91] 91  Resp:  [18] 18  BP: (135)/(76) 135/76      02/05/19  1404 02/09/19  0319 02/10/19  1900   Weight: 90.3 kg (199 lb) 88.7 kg (195 lb 9.6 oz) 93.1 kg (205 lb 4.8 oz)         Physical Exam:     General Appearance:    Alert, cooperative, in no acute distress   Head:    Normocephalic, without obvious abnormality, atraumatic   Eyes:            Lids and lashes normal, conjunctivae and sclerae normal, no   icterus, no pallor, corneas clear, PERRLA   Throat:   No oral lesions, no thrush, oral mucosa moist   Neck:   No adenopathy, supple, trachea midline, no thyromegaly, no   carotid  bruit, no JVD       Lungs:     Clear to auscultation,respirations regular, even and                  Unlabored     Heart:    Regular rhythm and normal rate, normal S1 and S2, no            murmur, no gallop, no rub, no click    Chest Wall:    No abnormalities observed   Abdomen:     Normal bowel sounds, no masses, no organomegaly, soft        non-tender, non-distended, no guarding, no rebound                Tenderness    Extremities:   Moves all extremities well, no edema, no cyanosis, no             Redness left weakness present and slowly improving       Skin:   No bleeding, bruising or rash   Lymph nodes:   No palpable adenopathy   Neurologic:   Cranial nerves 2 - 12 grossly intact, sensation intact, DTR       present and equal bilaterally left weakness and ataxia slowly improving       RESULTS REVIEW:     Lab Results (last 24 hours)     Procedure Component Value Units Date/Time    POC Glucose Once [[id number]]  (Normal) Collected:  02/12/19 0733    Specimen:  Blood Updated:  02/12/19 0745     Glucose 78 mg/dL      Comment: : [id number] VALERIA TORREZNMeter ID: [id number]       POC Glucose Once [[id number]]  (Abnormal) Collected:  02/12/19 0542    Specimen:  Blood Updated:  02/12/19 0617     Glucose 154 mg/dL      Comment: RN NotifiedOperator: [id number] QUAN DINHYLNMeter ID: [id number]       POC Glucose Once [[id number]]  (Abnormal) Collected:  02/11/19 1906    Specimen:  Blood Updated:  02/11/19 1957     Glucose 136 mg/dL      Comment: RN NotifiedOperator: [id number] QUAN KAYLNMeter ID: [id number]       POC Glucose Once [[id number]]  (Abnormal) Collected:  02/11/19 1612    Specimen:  Blood Updated:  02/11/19 1631     Glucose 161 mg/dL      Comment: RN NotifiedOperator: [id number] LESLIE OMAR CANTUMeter ID: [id number]       POC Glucose Once [[id number]]  (Normal) Collected:  02/11/19 1112    Specimen:  Blood Updated:  02/11/19 1137     Glucose 96 mg/dL      Comment: RN NotifiedOperator: [id number]  SOPHIE Diego ID: [id number]       POC Glucose Once [[id number]]  (Abnormal) Collected:  02/10/19 1958    Specimen:  Blood Updated:  02/11/19 1123     Glucose 163 mg/dL      Comment: RN NotifiedOperator: [id number] QUAN Kim ID: [id number]       POC Glucose Once [[id number]]  (Abnormal) Collected:  02/10/19 1955    Specimen:  Blood Updated:  02/11/19 1123     Glucose 172 mg/dL      Comment: RN NotifiedOperator: [id number] QUAN Kim ID: [id number]           Imaging Results (last 72 hours)     ** No results found for the last 72 hours. **          Assessment/Plan     Active Hospital Problems    Diagnosis   • **Medically complex patient   • Stroke-like symptoms       He has chronic left-sided weakness but it is much worse after this episode.  Specifically the left leg much  weaker than it was prior to this episode     • Uncontrolled type 2 diabetes mellitus with hyperglycemia (CMS/[facility])     -at presentation to ED glucose 704    Hemoglobin A1c is 15.1 admission to acute rehabilitation     • Left-sided muscle weakness     Due to prior Intracranial hemorrhage with closed head injury     • Right shoulder pain     He's had right shoulder pain for some time and has had steroid injections.  But since his fall the pain is much worse.  X-rays were unremarkable yesterday at [facility].     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 Home health and wheelchair ordered.  Also ordered shower chair at his request    Continue diabetic control which is better with current meds given daily  bp ok  Chronic pain controlled        This document has been electronically signed by Richard Galvez MD on February 12, 2019 8:25 AM                    Electronically signed by Richard Galvez MD at 2/12/2019  8:28 AM       [facility]  [facility]  [address] [place] [address]  Dept. Phone:  [phone]  Dept. Fax:   Date Ordered: 2019         Patient:  Ventura Boggs MRN:  [id number]   [address] [facility] [address] :  1960  SSN:    Phone: [phone] Sex:  M     Weight: 93.1 kg (205 lb 4.8 oz)         Ht Readings from Last 1 Encounters:   19 175.3 cm (69\")         Wheelchair      (Order ID: [id number])    Diagnosis:  Impaired mobility and ADLs (Z74.09 [ICD-10-CM] 799.89 [ICD-9-CM])  Left-sided muscle weakness (M62.81 [ICD-10-CM] 728.87 [ICD-9-CM])  Stroke-like symptoms (R29.90 [ICD-10-CM] 781.99 [ICD-9-CM])  Ataxia (R27.0 [ICD-10-CM] 781.3 [ICD-9-CM])   Quantity:  1     Equipment:  Lightweight Wheelchair  Wheelchair accessories:  Manual W/C Seat Widths 20-23 inches  Wheelchair accessories:  Anti-Tipping Device (x2)  Wheelchair accessories:  Gen Use W/C Cushion <22\" Gel or Foam  Wheelchair accessories:  Wheel Lock Break Extensions - Handles (x2)  The face to face evaluation was performed on: 2019  Length of Need (99 Months = Lifetime): 99 Months = Lifetime        Authorizing Provider's Phone: [phone]   Authorizing Provider:Richard Galvez MD  Authorizing Provider's NPI: [id number]  Order Entered By: Richard Galvez MD 2019  8:23 AM     Electronically signed by: Richard Galvez MD 2019  8:23 AM          "

## 2019-02-12 NOTE — PLAN OF CARE
Problem: Patient Care Overview  Goal: Plan of Care Review  Outcome: Ongoing (interventions implemented as appropriate)   02/12/19 1545   Patient Care Overview   IRF Plan of Care Review progress ongoing, continue   Progress, Functional Goals demonstrating adequate progress   Coping/Psychosocial   Plan of Care Reviewed With patient   OTHER   Outcome Summary Pt. transferred with CGA, amb. 30' with RW Kenneth, performed x20 reps therex AROM R & AAROM L, pt.cont's with increase muscle spasms and decrease control at R with therex & moblity. Cont. gt, therex, transfers      Goal: Discharge Needs Assessment  Outcome: Ongoing (interventions implemented as appropriate)   02/05/19 1400   Disability   Equipment Currently Used at Home none   Discharge Needs Assessment   Patient/Family Anticipates Transition to home with family   Patient/Family Anticipated Services at Transition home health care   Transportation Concerns car, none   Transportation Anticipated family or friend will provide

## 2019-02-12 NOTE — THERAPY TREATMENT NOTE
Inpatient Rehabilitation - Occupational Therapy Treatment Note    [facility]     Patient Name: Ventura Boggs  : 1960  MRN: [id number]    Today's Date: 2019                 Admit Date: 2019      Visit Dx:    ICD-10-CM ICD-9-CM   1. Stroke-like symptoms R29.90 781.99   2. Uncontrolled type 2 diabetes mellitus with hyperglycemia (CMS/HCC) E11.65 250.02   3. Medically complex patient Z78.9 V49.9   4. Chronic intractable pain G89.29 338.29   5. Symbolic dysfunction R48.9 784.60   6. Impaired mobility and ADLs Z74.09 799.89   7. Impaired functional mobility, balance, gait, and endurance Z74.09 V49.89   8. Left-sided muscle weakness M62.81 728.87   9. Ataxia R27.0 781.3       Patient Active Problem List   Diagnosis   • Uncontrolled type 2 diabetes mellitus with hyperglycemia (CMS/HCC)   • Postoperative urinary retention   • Incisional hernia, without obstruction or gangrene   • Acute appendicitis with localized peritonitis   • Closed head injury   • Hyponatremia   • History of recurrent TIAs   • Ataxia   • Chronic pain syndrome   • Right shoulder pain   • Left-sided muscle weakness   • Medically complex patient   • Stroke-like symptoms         Therapy Treatment    IRF Treatment Summary     Row Name 19 1545 19 1425 19 1339       Evaluation/Treatment Time and Intent    Subjective Information  no complaints  -JA  no complaints  -LM  no complaints  -CK    Existing Precautions/Restrictions  fall;lifting  -JA  fall  -LM  fall;lifting  -CK    Document Type  therapy note (daily note)  -JA  therapy note (daily note)  -LM  therapy note (daily note)  -CK    Mode of Treatment  physical therapy;individual therapy  -JA  individual therapy;occupational therapy  -LM  speech-language pathology;individual therapy  -CK    Patient/Family Observations  Pt. supine no family present  -SASHA  --  Pt sitting in w/c in room; no family present  -CK    Start Time (Evaluation/Treatment)  1545  -  1425  -   1339  -CK    Stop Time (Evaluation/Treatment)  1620  -JA  1450  -LM  1423  -CK    Recorded by [JA] Robinson Reina PTA [LM] Ese Dodson COTA/L [CK] Divya Abarca, MS CCC-SLP    Row Name 02/12/19 1115 02/12/19 0810          Evaluation/Treatment Time and Intent    Subjective Information  no complaints  -LM  complains of;pain  -JA     Existing Precautions/Restrictions  fall  -LM  fall;lifting  -JA     Document Type  therapy note (daily note)  -LM  therapy note (daily note)  -JA     Mode of Treatment  occupational therapy  -LM  physical therapy;individual therapy  -JA     Patient/Family Observations  --  Pt. sitting up in w/c  -JA     Start Time (Evaluation/Treatment)  1115  -LM  0810  -JA     Stop Time (Evaluation/Treatment)  1200  -LM  0905  -JA     Recorded by [LM] Ese Dodson COTA/L [JA] Robinson Reina PTA     Row Name 02/12/19 1545 02/12/19 1425 02/12/19 1115       Cognition/Psychosocial- PT/OT    Affect/Mental Status (Cognitive)  WFL  -JA  WFL  -LM  WFL  -LM    Orientation Status (Cognition)  oriented x 4  -JA  oriented x 4  -LM  oriented x 3  -LM    Follows Commands (Cognition)  WFL  -JA  WFL  -LM  WFL  -LM    Cognitive Function (Cognitive)  safety deficit  -JA  safety deficit  -LM  --    Recorded by [JA] Robinson Reina PTA [LM] Ese Dodson COULTER/L [LM] Ese Dodson, COULTER/L    Row Name 02/12/19 0810             Cognition/Psychosocial- PT/OT    Affect/Mental Status (Cognitive)  WFL  -JA      Orientation Status (Cognition)  oriented x 4  -JA      Follows Commands (Cognition)  WFL  -JA      Cognitive Function (Cognitive)  safety deficit  -JA      Recorded by [JA] Robinson Reina PTA      Row Name 02/12/19 1425 02/12/19 0810          Bed Mobility Assessment/Treatment    Bed Mobility Assessment/Treatment  supine-sit;sit-supine  -LM  --     Rolling Left [place] (Bed Mobility)  supervision  -LM  supervision  -JA     Rolling Right [place] (Bed Mobility)   supervision  -LM  supervision  -JA     Scooting/Bridging [place] (Bed Mobility)  supervision  -LM  --     Supine-Sit [place] (Bed Mobility)  supervision  -LM  supervision  -JA     Sit-Supine [place] (Bed Mobility)  supervision  -LM  supervision  -JA     Bed Mobility, Safety Issues  --  decreased use of arms for pushing/pulling;decreased use of legs for bridging/pushing  -JA     Comment (Bed Mobility)  --  HOB down no bedrails on mat   -JA     Recorded by [LM] Ese Dodson COTA/L [JA] Robinson Reina, PTA     Row Name 02/12/19 1425             Functional Mobility    Functional Mobility- Ind. Level  contact guard assist  -LM      Functional Mobility- Device  rolling walker  -LM      Recorded by [LM] Ese Dodson COTA/L      Row Name 02/12/19 0810             Transfer Assessment/Treatment    Transfer Assessment/Treatment  bed-chair transfer;chair-bed transfer;squat pivot transfer  -JA      Comment (Transfers)  Pt. practice sit/squat pivot transfer w/c-mat mulitple times for practice   -JA      Recorded by [JA] Robinson Reina, PTA      Row Name 02/12/19 1115 02/12/19 0810          Bed-Chair Transfer    Bed-Chair [place] (Transfers)  contact guard  -GWYN  contact guard  -JA     Assistive Device (Bed-Chair Transfers)  --  wheelchair sit-pivot   -JA     Recorded by [LM] Ese Dodson COTA/L [JA] Robinson Reina, PTA     Row Name 02/12/19 1115 02/12/19 0810          Chair-Bed Transfer    Chair-Bed [place] (Transfers)  contact guard  -LM  contact guard  -JA     Assistive Device (Chair-Bed Transfers)  --  wheelchair sit-pivot   -JA     Recorded by [LM] Ese Dodson COTA/L [JA] Robinson Reina, PTA     Row Name 02/12/19 1115 02/12/19 0810          Sit-Stand Transfer    Sit-Stand [place] (Transfers)  contact guard  -LM  contact guard;verbal cues  -JA     Assistive Device (Sit-Stand Transfers)  --  walker, front-wheeled  -JA     Recorded by [LM] Ivory  MARYLIN Milner/L [JA] Robinson Reina, PTA     Row Name 02/12/19 1115 02/12/19 0810          Stand-Sit Transfer    Stand-Sit [place] (Transfers)  contact guard  -LM  contact guard;verbal cues  -JA     Assistive Device (Stand-Sit Transfers)  --  walker, front-wheeled  -JA     Recorded by [LM] Ese Dodson COTA/L [JA] Robinson Reina, PTA     Row Name 02/12/19 1425 02/12/19 0810          Wheelchair Transfer    Type (Wheelchair Transfer)  sit-stand;stand-sit  -LM  --     [place] Level (Wheelchair Transfer)  contact guard  -LM  contact guard  -JA     Assistive Device (Wheelchair Transfer)  --  other (see comments) sit-pivot   -JA     Recorded by [LM] Ese Dodson COTA/L [JA] Robinson Reina, PTA     Row Name 02/12/19 0810             Gait/Stairs Assessment/Training    Gait/Stairs Assessment/Training  gait/ambulation assistive device  -JA      [place] Level (Gait)  contact guard  -JA      Assistive Device (Gait)  walker, front-wheeled  -JA      Distance in Feet (Gait)  24', 30'  -JA      Pattern (Gait)  step-to  -JA      Deviations/Abnormal Patterns (Gait)  left sided deviations;stride length decreased;ataxic  -JA      Bilateral Gait Deviations  knee buckling, left side  -JA      Left Sided Gait Deviations  heel strike decreased;weight shift ability decreased;foot drop/toe drag  -JA      Recorded by [JA] Robinson Reina, PTA      Row Name 02/12/19 1425 02/12/19 1115 02/12/19 0810       Wheelchair Mobility/Management    Method of Wheelchair Locomotion (Mobility)  bimanual (upper extremity) propulsion  -LM  bimanual (upper extremity) propulsion  -LM  bimanual (upper extremity) propulsion  -JA    Mobility Activities (Wheelchair)  mobility (all) activities  -LM  mobility (all) activities  -LM  mobility (all) activities  -JA    Mobility [place] Level (Wheelchair)  independent  -LM  independent  -LM  independent  -JA    Forward Propulsion [place] (Wheelchair)  --  --   independent  -JA    Backward Propulsion [place] (Wheelchair)  --  --  independent  -JA    Steering [place] (Wheelchair)  --  --  independent  -JA    Stopping [place] (Wheelchair)  --  --  independent  -JA    Turning [place] (Wheelchair)  --  --  independent  -JA    Doorway Navigation [place] (Wheelchair)  --  --  independent  -JA    Distance Propelled in Feet (Wheelchair)  --  --  100'  -JA    Recorded by [LM] Ese Dodson COULTER/L [LM] Ese Dodson COULTER/L [JA] Robinson Reina, PTA    Row Name 02/12/19 1425 02/12/19 1115          Fine Motor Testing & Training    Fine Motor Tests  -- nut bolt board   -LM  --     Comment, Fine Motor Coordination  --  -- fmc at table top to increase ue funct   -LM     Recorded by [LM] Ese Dodson COTA/MEI [LM] Ese Dodson COULTER/L     Row Name 02/12/19 1545 02/12/19 1425 02/12/19 1339       Pain Scale: Numbers Pre/Post-Treatment    Pain Scale: Numbers, Pretreatment  8/10  -JA  4/10  -LM  8/10  -CK    Pain Scale: Numbers, Post-Treatment  8/10  -JA  4/10  -LM  8/10  -CK    Pain Location - Side  Left  -JA  -- shoulder   -LM  Left  -CK    Pain Location - Orientation  --  --  lower  -CK    Pain Location  shoulder  -JA  --  extremity  -CK    Pain Intervention(s)  Medication (See MAR)  -JA  --  Medication (See MAR);Repositioned  -CK    Recorded by [JA] Robinson Reina, PTA [LM] Ese Dodson COULTER/L [CK] Divya Abarca, MS CCC-SLP    Row Name 02/12/19 0810             Pain Scale: Numbers Pre/Post-Treatment    Pain Scale: Numbers, Pretreatment  5/10  -JA      Pain Scale: Numbers, Post-Treatment  8/10  -JA      Pain Location - Side  Right  -JA      Pain Location - Orientation  upper  -JA      Pain Location  extremity  -JA      Pain Intervention(s)  Medication (See MAR);Repositioned;Ambulation/increased activity  -JA      Recorded by [JA] Robinson Reina, PTA      Row Name 02/12/19 1425             Upper Extremity Seated  Therapeutic Exercise    Performed, Seated Upper Extremity (Therapeutic Exercise)  -- faustina ex 5 sets 10 with 10 lbs  -LM      Device, Seated Upper Extremity (Therapeutic Exercise)  -- also perf over door pulley ex b ue with for stretch   -LM      Recorded by [LM] Ese Dodson COTA/L      Row Name 02/12/19 1425 02/12/19 1115          Aerobic Exercise Activity    Exercise Performed (Aerobic, Therapeutic Exercise)  arm bike  -LM  arm bike  -LM     Expected Outcome (Aerobic, Therapeutic Exercise)  --  improve functional tolerance, community activity  -LM     Time (Aerobic, Therapeutic Exercise)  10  -LM  20  -LM     Recorded by [LM] Ese Dodson COTA/MEI [LM] Ese Dodson COTA/L     Row Name 02/12/19 0810             Lower Extremity Seated Therapeutic Exercise    Performed, Seated Lower Extremity (Therapeutic Exercise)  hip flexion/extension;ankle dorsiflexion/plantarflexion;LAQ (long arc quad), knee extension  -JA      Exercise Type, Seated Lower Extremity (Therapeutic Exercise)  AROM (active range of motion);AAROM (active assistive range of motion)  -JA      Sets/Reps Detail, Seated Lower Extremity (Therapeutic Exercise)  DFx10L AA/x20 R, [id number]/ x20AA L  -JA      Recorded by [JA] Robinson Reina PTA      Row Name 02/12/19 1545             Lower Extremity Supine Therapeutic Exercise    Performed, Supine Lower Extremity (Therapeutic Exercise)  hip abduction/adduction;quadriceps sets;heel slides;bridging (bilateral w/bolster);other (see comments) LTR, hooklying single leg hip A/A,   -JA      Exercise Type, Supine Lower Extremity (Therapeutic Exercise)  AAROM (active assistive range of motion);AROM (active range of motion);isometric contraction, static AA L & AROM R   -JA      Sets/Reps Detail, Supine Lower Extremity (Therapeutic Exercise)  x20  -JA      Comment, Supine Lower Extremity (Therapeutic Exercise)  Pt. with difficulty with performance at L due to muscle spasms at L with performance    -JA      Recorded by [JA] Robinson Reina, KAREN      Row Name 02/12/19 1545 02/12/19 0810          Vital Signs    Pre Systolic BP Rehab  119  -JA  --     Pre Treatment Diastolic BP  71  -JA  --     Pretreatment Heart Rate (beats/min)  79  -JA  --     Pre Patient Position  Supine  -JA  Sitting  -JA     Intra Patient Position  Supine  -JA  Standing  -JA     Post Patient Position  Supine  -JA  Sitting  -JA     Recorded by [SASHA] Robinson Reina, KAREN [JA] Robinson Reina, PTA     Row Name 02/12/19 1545 02/12/19 0810          Positioning and Restraints    Pre-Treatment Position  in bed  -JA  sitting in chair/recliner  -JA     Post Treatment Position  bed  -JA  wheelchair  -JA     In Bed  supine;call light within reach;encouraged to call for assist  -JA  --     In Wheelchair  --  sitting;encouraged to call for assist  -JA     Recorded by [SASHA] Robinson Reina PTA [JA] Robinson Reina, KAREN     Row Name 02/12/19 1545 02/12/19 0810          Daily Summary of Progress (PT)    Functional Goal Overall Progress: Physical Therapy  progressing toward functional goals as expected  -JA  progressing toward functional goals as expected  -JA     Impairments Continuing to Limit Function: Physical Therapy  strength decreased;impaired balance;coordination impaired;motor control impaired;pain  -JA  strength decreased;impaired balance;coordination impaired;motor control impaired;pain  -JA     Recommendations: Physical Therapy  Cont. gt, therex, transfers   -JA  Neuro therex, gt. quality  -JA     Recorded by [JA] Robinson Reina PTA [JA] Robinson Reina, PTA     Row Name 02/12/19 1425 02/12/19 1115          Daily Summary of Progress (OT)    Functional Goal Overall Progress: Occupational Therapy  progressing toward functional goals as expected  -LM  progressing toward functional goals as expected  -LM     Recorded by [LM] Ese Dodson COTA/L [LM] Ese Dodson COTA/L     Row Name 02/12/19 3855              Daily Summary of Progress (SLP)    Functional Goal Overall Progress: Speech Language Pathology  progressing toward functional goals as expected  -CK      Recommendations: Speech Language Pathology  Continue POC  -CK      Recorded by [CK] Divya Abarca, MS CCC-SLP      Row Name 19 0810             Weekly Summary of Progress (PT)    Weekly Outcome Summary: Physical Therapy  FIMS: t/f-4, gt-1, steps-0, w/c-6  -JA      Recorded by [SASHA] Robinson Reina, PTA      Row Name 19 1339             Weekly Summary of Progress (SLP)    Weekly Outcome Summary: Speech Language Pathology  Comprehension: 5; Expression: 5; Memory: 5  -CK      Recorded by [CK] Divya Abarca, MS CCC-SLP      Row Name 19 0942             Nursing Weekly Outcome Summary    Weekly Progress Summary: Nursing  improving, more independent in daily activity  -      Weekly Outcome Statement: Nursing  Eatin; Toiletin; Bladder: 5/6; Bowel: 5/6  -MC      Recorded by [MC] Julee gAuillon, RN        User Key  (r) = Recorded By, (t) = Taken By, (c) = Cosigned By    Initials Name Effective Dates    CK Divya Abarca, MS CCC-SLP 18 -     Julee Chisholm RN 10/17/16 -     Robinson Gary, PTA 18 -     Ese Huang COTA/MEI 18 -                  OT Recommendation and Plan         Plan of Care Review  Plan of Care Reviewed With: patient  Daily Summary of Progress (OT)  Functional Goal Overall Progress: Occupational Therapy: progressing toward functional goals as expected  Plan of Care Reviewed With: patient  IRF Plan of Care Review: progress ongoing, continue  Progress, Functional Goals: demonstrating adequate progress  Outcome Summary: pt perf well with OT  cont toward ue strenfvthening and fmc as well as adl perf     OT IRF GOALS     Row Name 19 1741 19 1719 19 1410       Transfer FIM Goals (OT-IRF)    Tub-Shower Transfer FIM Goal (OT-IRF)  Score of 6 (FIM)  -   Score of 6 (FIM)  -LM  Score of 6 (FIM)  -CS    Toilet Transfer FIM Goal (OT-IRF)  Score of 6 (FIM)  -LM  Score of 6 (FIM)  -LM  Score of 6 (FIM)  -CS    Time Frame (Transfer FIM Goals 1, OT-IRF)  long term goal (LTG);by discharge  -LM  long term goal (LTG);by discharge  -LM  long term goal (LTG);by discharge  -CS    Progress/Outcomes (Transfer FIM Goals, OT-IRF)  goal not met;continuing progress toward goal  -LM  goal not met;continuing progress toward goal  -LM  goal not met;continuing progress toward goal  -CS       Bathing FIM Goal (OT-IRF)    Bathing FIM Goal (OT-IRF)  Score of 6 (FIM)  -LM  Score of 6 (FIM)  -LM  Score of 6 (FIM)  -CS    Time Frame (Bathing FIM Goal, OT-IRF)  long term goal (LTG);by discharge  -LM  long term goal (LTG);by discharge  -LM  long term goal (LTG);by discharge  -CS    Progress/Outcomes (Bathing FIM Goal, OT-IRF)  goal not met;continuing progress toward goal  -LM  goal not met;continuing progress toward goal  -LM  goal not met;continuing progress toward goal  -CS       UB Dressing FIM Goal (OT-IRF)    Upper Body Dressing, FIM Goal, OT-IRF  Score of 6 (FIM)  -LM  Score of 6 (FIM)  -LM  Score of 6 (FIM)  -CS    Time Frame (UB Dressing FIM Goal, OT-IRF)  long term goal (LTG);by discharge  -LM  long term goal (LTG);by discharge  -LM  long term goal (LTG);by discharge  -CS    Progress/Outcomes (UB Dressing FIM Goal, OT-IRF)  goal not met;continuing progress toward goal  -LM  goal not met;continuing progress toward goal  -LM  goal not met;continuing progress toward goal  -CS       LB Dressing FIM Goal (OT-IRF)    Lower Body Dressing, FIM Goal, OT-IRF  Score of 6 (FIM)  -LM  Score of 6 (FIM)  -LM  Score of 6 (FIM)  -CS    Time Frame (LB Dressing FIM Goal, OT-IRF)  long term goal (LTG);by discharge  -LM  long term goal (LTG);by discharge  -LM  long term goal (LTG);by discharge  -CS    Progress/Outcomes (LB Dressing FIM Goal, OT-IRF)  goal not met;continuing progress toward goal  -LM  goal  not met;continuing progress toward goal  -LM  goal not met;continuing progress toward goal  -CS       Toileting Goal 1 (OT-IRF)    Activity/Device (Toileting Goal 1, OT-IRF)  toileting skills, all  -LM  toileting skills, all  -LM  toileting skills, all  -CS    [place] Level (Toileting Goal 1, OT-IRF)  supervision required  -LM  supervision required  -LM  supervision required  -CS    Time Frame (Toileting Goal 1, OT-IRF)  long term goal (LTG);by discharge  -LM  long term goal (LTG);by discharge  -LM  long term goal (LTG);by discharge  -CS    Progress/Outcomes (Toileting Goal 1, OT-IRF)  goal not met;continuing progress toward goal  -LM  goal not met;continuing progress toward goal  -LM  goal not met;continuing progress toward goal  -CS       ROM Goal 1 (OT-IRF)    ROM Goal 1 (OT-IRF)  Pt will display WFL with ROM in BUE with min reports of pain/increased pain level.   -LM  Pt will display WFL with ROM in BUE with min reports of pain/increased pain level.   -LM  Pt will display WFL with ROM in BUE with min reports of pain/increased pain level.   -CS    Time Frame (ROM Goal 1, OT-IRF)  long term goal (LTG);by discharge  -LM  long term goal (LTG);by discharge  -LM  long term goal (LTG);by discharge  -CS    Progress/Outcomes (ROM Goal 1, OT-IRF)  goal not met;continuing progress toward goal  -LM  goal not met;continuing progress toward goal  -LM  goal not met;continuing progress toward goal  -CS       Problem Specific Goal 1 (OT-IRF)    Problem Specific Goal 1 (OT-IRF)  Pt will be independent with BUE HEP and home safety awarness.   -LM  Pt will be independent with BUE HEP and home safety awarness.   -LM  Pt will be independent with BUE HEP and home safety awarness.   -CS    Time Frame (Problem Specific Goal 1, OT-IRF)  long term goal (LTG);by discharge  -LM  long term goal (LTG);by discharge  -LM  long term goal (LTG);by discharge  -CS    Progress/Outcomes (Problem Specific Goal 1, OT-IRF)  goal not met;continuing  progress toward goal  -LM  goal not met;continuing progress toward goal  -LM  goal not met;continuing progress toward goal  -CS    Row Name 02/09/19 1300 02/09/19 0950 02/08/19 0700       Transfer FIM Goals (OT-IRF)    Tub-Shower Transfer FIM Goal (OT-IRF)  Score of 6 (FIM)  -LW  Score of 6 (FIM)  -RC  Score of 6 (FIM)  -LW    Toilet Transfer FIM Goal (OT-IRF)  Score of 6 (FIM)  -LW  Score of 6 (FIM)  -RC  Score of 6 (FIM)  -LW    Time Frame (Transfer FIM Goals 1, OT-IRF)  long term goal (LTG);by discharge  -LW  long term goal (LTG);by discharge  -RC  long term goal (LTG);by discharge  -LW    Progress/Outcomes (Transfer FIM Goals, OT-IRF)  goal not met;continuing progress toward goal  -LW  goal not met;continuing progress toward goal  -RC  goal not met  -LW       Bathing FIM Goal (OT-IRF)    Bathing FIM Goal (OT-IRF)  Score of 6 (FIM)  -LW  Score of 6 (FIM)  -RC  Score of 6 (FIM)  -LW    Time Frame (Bathing FIM Goal, OT-IRF)  long term goal (LTG);by discharge  -LW  long term goal (LTG);by discharge  -RC  long term goal (LTG);by discharge  -LW    Progress/Outcomes (Bathing FIM Goal, OT-IRF)  goal not met;continuing progress toward goal  -LW  goal not met;continuing progress toward goal  -RC  goal not met  -LW       UB Dressing FIM Goal (OT-IRF)    Upper Body Dressing, FIM Goal, OT-IRF  Score of 6 (FIM)  -LW  Score of 6 (FIM)  -RC  Score of 6 (FIM)  -LW    Time Frame (UB Dressing FIM Goal, OT-IRF)  long term goal (LTG);by discharge  -LW  long term goal (LTG);by discharge  -RC  long term goal (LTG);by discharge  -LW    Progress/Outcomes (UB Dressing FIM Goal, OT-IRF)  goal not met;continuing progress toward goal  -LW  goal not met;continuing progress toward goal  -RC  goal not met  -LW       LB Dressing FIM Goal (OT-IRF)    Lower Body Dressing, FIM Goal, OT-IRF  Score of 6 (FIM)  -LW  Score of 6 (FIM)  -RC  Score of 6 (FIM)  -LW    Time Frame (LB Dressing FIM Goal, OT-IRF)  long term goal (LTG);by discharge  -LW  long  term goal (LTG);by discharge  -RC  long term goal (LTG);by discharge  -LW    Progress/Outcomes (LB Dressing FIM Goal, OT-IRF)  goal not met;continuing progress toward goal  -LW  goal not met;continuing progress toward goal  -RC  goal not met  -LW       Toileting Goal 1 (OT-IRF)    Activity/Device (Toileting Goal 1, OT-IRF)  toileting skills, all  -LW  toileting skills, all  -RC  toileting skills, all  -LW    [place] Level (Toileting Goal 1, OT-IRF)  supervision required  -LW  supervision required  -RC  supervision required  -LW    Time Frame (Toileting Goal 1, OT-IRF)  long term goal (LTG);by discharge  -LW  long term goal (LTG);by discharge  -RC  long term goal (LTG);by discharge  -LW    Progress/Outcomes (Toileting Goal 1, OT-IRF)  goal not met;continuing progress toward goal  -LW  goal not met;continuing progress toward goal  -RC  goal not met  -LW       ROM Goal 1 (OT-IRF)    ROM Goal 1 (OT-IRF)  Pt will display WFL with ROM in BUE with min reports of pain/increased pain level.   -LW  Pt will display WFL with ROM in BUE with min reports of pain/increased pain level.   -RC  Pt will display WFL with ROM in BUE with min reports of pain/increased pain level.   -LW    Time Frame (ROM Goal 1, OT-IRF)  long term goal (LTG);by discharge  -LW  long term goal (LTG);by discharge  -RC  long term goal (LTG);by discharge  -LW    Progress/Outcomes (ROM Goal 1, OT-IRF)  goal not met;continuing progress toward goal  -LW  goal not met;continuing progress toward goal  -RC  goal not met  -LW       Problem Specific Goal 1 (OT-IRF)    Problem Specific Goal 1 (OT-IRF)  Pt will be independent with BUE HEP and home safety awarness.   -LW  Pt will be independent with BUE HEP and home safety awarness.   -RC  Pt will be independent with BUE HEP and home safety awarness.   -LW    Time Frame (Problem Specific Goal 1, OT-IRF)  long term goal (LTG);by discharge  -LW  long term goal (LTG);by discharge  -RC  long term goal (LTG);by  discharge  -LW    Progress/Outcomes (Problem Specific Goal 1, OT-IRF)  goal not met;continuing progress toward goal  -LW  goal not met;continuing progress toward goal  -RC  goal not met  -LW    Row Name 02/07/19 1000 02/06/19 1015 02/06/19 0732       Transfer FIM Goals (OT-IRF)    Tub-Shower Transfer FIM Goal (OT-IRF)  Score of 6 (FIM)  -KD  Score of 6 (FIM)  -BL  Score of 6 (FIM)  -BH    Toilet Transfer FIM Goal (OT-IRF)  Score of 6 (FIM)  -KD  Score of 6 (FIM)  -BL  Score of 6 (FIM)  -BH    Time Frame (Transfer FIM Goals 1, OT-IRF)  long term goal (LTG);by discharge  -KD  long term goal (LTG);by discharge  -BL  long term goal (LTG);by discharge  -BH    Progress/Outcomes (Transfer FIM Goals, OT-IRF)  goal not met  -KD  goal not met  -BL  goal not met  -BH       Bathing FIM Goal (OT-IRF)    Bathing FIM Goal (OT-IRF)  Score of 6 (FIM)  -KD  Score of 6 (FIM)  -BL  Score of 6 (FIM)  -BH    Time Frame (Bathing FIM Goal, OT-IRF)  long term goal (LTG);by discharge  -KD  long term goal (LTG);by discharge  -BL  long term goal (LTG);by discharge  -BH    Progress/Outcomes (Bathing FIM Goal, OT-IRF)  goal not met  -KD  goal not met  -BL  goal not met  -BH       UB Dressing FIM Goal (OT-IRF)    Upper Body Dressing, FIM Goal, OT-IRF  Score of 6 (FIM)  -KD  Score of 6 (FIM)  -BL  Score of 6 (FIM)  -BH    Time Frame (UB Dressing FIM Goal, OT-IRF)  long term goal (LTG);by discharge  -KD  long term goal (LTG);by discharge  -BL  long term goal (LTG);by discharge  -BH    Progress/Outcomes (UB Dressing FIM Goal, OT-IRF)  goal not met  -KD  goal not met  -BL  goal not met  -BH       LB Dressing FIM Goal (OT-IRF)    Lower Body Dressing, FIM Goal, OT-IRF  Score of 6 (FIM)  -KD  Score of 6 (FIM)  -BL  Score of 6 (FIM)  -BH    Time Frame (LB Dressing FIM Goal, OT-IRF)  long term goal (LTG);by discharge  -KD  long term goal (LTG);by discharge  -BL  long term goal (LTG);by discharge  -BH    Progress/Outcomes (LB Dressing FIM Goal, OT-IRF)   goal not met  -KD  goal not met  -BL  goal not met  -BH       Toileting Goal 1 (OT-IRF)    Activity/Device (Toileting Goal 1, OT-IRF)  toileting skills, all  -KD  toileting skills, all  -BL  toileting skills, all  -BH    [place] Level (Toileting Goal 1, OT-IRF)  supervision required  -KD  supervision required  -BL  supervision required  -BH    Time Frame (Toileting Goal 1, OT-IRF)  long term goal (LTG);by discharge  -KD  long term goal (LTG);by discharge  -BL  long term goal (LTG);by discharge  -BH    Progress/Outcomes (Toileting Goal 1, OT-IRF)  goal not met  -KD  goal not met  -BL  goal not met  -BH       ROM Goal 1 (OT-IRF)    ROM Goal 1 (OT-IRF)  Pt will display WFL with ROM in BUE with min reports of pain/increased pain level.   -KD  Pt will display WFL with ROM in BUE with min reports of pain/increased pain level.   -BL  Pt will display WFL with ROM in BUE with min reports of pain/increased pain level.   -BH    Time Frame (ROM Goal 1, OT-IRF)  long term goal (LTG);by discharge  -KD  long term goal (LTG);by discharge  -BL  long term goal (LTG);by discharge  -BH    Progress/Outcomes (ROM Goal 1, OT-IRF)  goal not met  -KD  goal not met  -BL  goal not met  -BH       Problem Specific Goal 1 (OT-IRF)    Problem Specific Goal 1 (OT-IRF)  Pt will be independent with BUE HEP and home safety awarness.   -KD  Pt will be independent with BUE HEP and home safety awarness.   -BL  Pt will be independent with BUE HEP and home safety awarness.   -BH    Time Frame (Problem Specific Goal 1, OT-IRF)  long term goal (LTG);by discharge  -KD  long term goal (LTG);by discharge  -BL  long term goal (LTG);by discharge  -BH    Progress/Outcomes (Problem Specific Goal 1, OT-IRF)  goal not met  -KD  goal not met  -BL  goal not met  -BH      User Key  (r) = Recorded By, (t) = Taken By, (c) = Cosigned By    Initials Name Provider Type    Maria Elena Reno, OTR/L Occupational Therapist    Valeri Lozoya COTA/MEI Occupational  Therapy Assistant    Ele Chu, COULTER/L Occupational Therapy Assistant    Giana Carrizales, COULTER/L Occupational Therapy Assistant    Ese Huang, COULTER/L Occupational Therapy Assistant    Divya Khan, COULTER/L Occupational Therapy Assistant    Irina Lorenzo, COULTER/L Occupational Therapy Assistant          Occupational Therapy Education     Title: PT OT SLP Therapies (In Progress)     Topic: Occupational Therapy (Done)     Point: ADL training (Done)     Description: Instruct learner(s) on proper safety adaptation and remediation techniques during self care or transfers.   Instruct in proper use of assistive devices.    Learning Progress Summary           Patient Acceptance, E, VU by GWYN at 2/12/2019  5:42 PM    Acceptance, E,TB, VU by BARBIE at 2/9/2019  3:38 PM    Acceptance, E,TB, VU by BARBIE at 2/8/2019  9:12 AM    Acceptance, E, VU,NR by  at 2/6/2019  1:35 PM    Comment:  Educated about OT and POC. Educated to call for assist. Educated on safety throughout.                   Point: Home exercise program (Done)     Description: Instruct learner(s) on appropriate technique for monitoring, assisting and/or progressing therapeutic exercises/activities.    Learning Progress Summary           Patient Acceptance, E, VU by GWYN at 2/12/2019  5:42 PM    Acceptance, E,TB, VU by BARBIE at 2/9/2019  3:38 PM    Acceptance, E,TB, VU by BARBIE at 2/8/2019  9:12 AM                   Point: Precautions (Done)     Description: Instruct learner(s) on prescribed precautions during self-care and functional transfers.    Learning Progress Summary           Patient Acceptance, E, VU by GWYN at 2/12/2019  5:42 PM    Acceptance, E,TB, VU by BARBIE at 2/9/2019  3:38 PM    Acceptance, E,TB, VU by BARBIE at 2/8/2019  9:12 AM    Acceptance, E, VU,NR by  at 2/6/2019  1:35 PM    Comment:  Educated about OT and POC. Educated to call for assist. Educated on safety throughout.    Acceptance, E, VU,NR by  at 2/6/2019 11:36 AM                    Point: Body mechanics (Done)     Description: Instruct learner(s) on proper positioning and spine alignment during self-care, functional mobility activities and/or exercises.    Learning Progress Summary           Patient Acceptance, E, VU by LM at 2/12/2019  5:42 PM    Acceptance, E,TB, VU by LW at 2/9/2019  3:38 PM    Acceptance, E,TB, VU by LW at 2/8/2019  9:12 AM    Acceptance, E, VU,NR by BL at 2/6/2019 11:36 AM                               User Key     Initials Effective Dates Name Provider Type Discipline     06/08/18 -  Maria Elena Barragan OTR/L Occupational Therapist OT    BL 10/17/16 -  Giana Chakraborty COULTER/L Occupational Therapy Assistant OT    LM 03/07/18 -  Ese Dodson COULTER/L Occupational Therapy Assistant OT    LW 03/07/18 -  Irina Armstrong COULTER/L Occupational Therapy Assistant OT                Outcome Measures     Row Name 02/12/19 0810 02/11/19 1507 02/11/19 0810       How much help from another person do you currently need...    Turning from your back to your side while in flat bed without using bedrails?  4  -JA  4  -TW  4  -TW    Moving from lying on back to sitting on the side of a flat bed without bedrails?  4  -JA  4  -TW  4  -TW    Moving to and from a bed to a chair (including a wheelchair)?  3  -JA  3  -TW  3  -TW    Standing up from a chair using your arms (e.g., wheelchair, bedside chair)?  3  -JA  3  -TW  3  -TW    Climbing 3-5 steps with a railing?  2  -JA  2  -TW  2  -TW    To walk in hospital room?  3  -JA  3  -TW  3  -TW    AM-PAC 6 Clicks Score  19  -JA  19  -TW  19  -TW       Functional Assessment    Outcome Measure Options  AM-PAC 6 Clicks Basic Mobility (PT)  -JA  AM-PAC 6 Clicks Basic Mobility (PT)  -TW  AM-PAC 6 Clicks Basic Mobility (PT)  -TW      User Key  (r) = Recorded By, (t) = Taken By, (c) = Cosigned By    Initials Name Provider Type    Robinson Gary PTA Physical Therapy Assistant    TW Brant Palacios, KAREN Physical Therapy Assistant              Time Calculation:     Time Calculation- OT     Row Name 02/12/19 1732 02/12/19 1731 02/12/19 1010       Time Calculation- OT    OT Start Time  1425  -LM  1115  -LM  --    OT Stop Time  1450  -LM  1200  -LM  --    OT Time Calculation (min)  25 min  -LM  45 min  -LM  --    Total Timed Code Minutes- OT  25 minute(s)  -LM  45 minute(s)  -LM  --    OT Received On  02/12/19  -LM  02/12/19  -LM  --       Timed Charges    00006 - Gait Training Minutes   --  --  20  -JA      User Key  (r) = Recorded By, (t) = Taken By, (c) = Cosigned By    Initials Name Provider Type    Robinson Gary, PTA Physical Therapy Assistant    Ese Huang, COULTER/L Occupational Therapy Assistant          Therapy Suggested Charges     Code   Minutes Charges    None              Therapy Charges for Today     Code Description Service Date Service Provider Modifiers Qty    65645759003 HC OT THER PROC EA 15 MIN 2/11/2019 Ese Dodson COULTER/L GO 1    54550465711 HC OT THERAPEUTIC ACT EA 15 MIN 2/11/2019 Ese Dodson COULTER/L GO 2    02446135922 HC OT THER PROC EA 15 MIN 2/12/2019 Ese Dodson COULTER/L GO 2    14233608722 HC OT THERAPEUTIC ACT EA 15 MIN 2/12/2019 Ese Dodson COULTER/L GO 1    48905016714 HC OT THER PROC EA 15 MIN 2/12/2019 Ese Dodson COULTER/L GO 2    41770022661 HC OT THERAPEUTIC ACT EA 15 MIN 2/12/2019 Ese Dodson COULTER/L GO 1                 Eval FIM FIM Goal  Discharge FIM Daily FIM           Grooming    6    Bathing    4    Dressing - Upper Body    5    Dressing - Lower Body    4    Toilet    4    Tub, Shower    0    Problem Solving    5     Social Interaction     6            Bed, Chair, W/C Transfer        Walking        Wheelchair Locomotion        Stairs                Comprehension        Expression        Memory                 SACHI NovakA/MEI  2/12/2019

## 2019-02-12 NOTE — PLAN OF CARE
Problem: Patient Care Overview  Goal: Plan of Care Review  Outcome: Ongoing (interventions implemented as appropriate)   02/12/19 1433   Patient Care Overview   IRF Plan of Care Review progress ongoing, continue   Progress, Functional Goals demonstrating adequate progress   Coping/Psychosocial   Plan of Care Reviewed With patient   OTHER   Outcome Summary Pt seen for cognitive therapy this date and pt participated well in all therapy tasks. Pt performed well w/divided attention and mental manipulation tasks and word problems involving time. Pt struggled w/word problems involving dates and paragraph recall. Overall, pt is progressing well and moving toward meeting goals.

## 2019-02-12 NOTE — PATIENT CARE CONFERENCE
Attendance of weekly team conference:   Dr. Richard Galvez, Maria Elena Barragan, Maria Elena Abarca, Ese Dodson, Fan Jolly, Sariah Zheng, Julee Aguillon, Elizabeth Dubois and Pratima Walker    Patient's progress reviewed, FIMs reviewed, discharge date discussed and equipment needs addressed.     Expected Discharge Date: 2-15-19  Anticipated discharge orders/equipment: Home health PT/OT; wheelchair

## 2019-02-13 LAB
GLUCOSE BLDC GLUCOMTR-MCNC: 103 MG/DL (ref 70–130)
GLUCOSE BLDC GLUCOMTR-MCNC: 173 MG/DL (ref 70–130)
GLUCOSE BLDC GLUCOMTR-MCNC: 175 MG/DL (ref 70–130)
GLUCOSE BLDC GLUCOMTR-MCNC: 192 MG/DL (ref 70–130)

## 2019-02-13 PROCEDURE — 63710000001 INSULIN DETEMIR PER 5 UNITS: Performed by: FAMILY MEDICINE

## 2019-02-13 PROCEDURE — 25010000002 ENOXAPARIN PER 10 MG: Performed by: FAMILY MEDICINE

## 2019-02-13 PROCEDURE — 97530 THERAPEUTIC ACTIVITIES: CPT

## 2019-02-13 PROCEDURE — 97110 THERAPEUTIC EXERCISES: CPT

## 2019-02-13 PROCEDURE — 99232 SBSQ HOSP IP/OBS MODERATE 35: CPT | Performed by: FAMILY MEDICINE

## 2019-02-13 PROCEDURE — 92507 TX SP LANG VOICE COMM INDIV: CPT | Performed by: SPEECH-LANGUAGE PATHOLOGIST

## 2019-02-13 PROCEDURE — 82962 GLUCOSE BLOOD TEST: CPT

## 2019-02-13 PROCEDURE — 63710000001 INSULIN ASPART PER 5 UNITS: Performed by: FAMILY MEDICINE

## 2019-02-13 PROCEDURE — 97116 GAIT TRAINING THERAPY: CPT

## 2019-02-13 RX ADMIN — INSULIN ASPART 7 UNITS: 100 INJECTION, SOLUTION INTRAVENOUS; SUBCUTANEOUS at 17:09

## 2019-02-13 RX ADMIN — METHADONE HYDROCHLORIDE 5 MG: 10 TABLET ORAL at 08:10

## 2019-02-13 RX ADMIN — AMLODIPINE BESYLATE 5 MG: 5 TABLET ORAL at 08:10

## 2019-02-13 RX ADMIN — POLYETHYLENE GLYCOL 3350 17 G: 17 POWDER, FOR SOLUTION ORAL at 07:10

## 2019-02-13 RX ADMIN — OXYCODONE HYDROCHLORIDE AND ACETAMINOPHEN 1 TABLET: 10; 325 TABLET ORAL at 15:18

## 2019-02-13 RX ADMIN — FAMOTIDINE 20 MG: 20 TABLET ORAL at 20:25

## 2019-02-13 RX ADMIN — Medication 5 MG: at 20:25

## 2019-02-13 RX ADMIN — LISINOPRIL 5 MG: 5 TABLET ORAL at 08:10

## 2019-02-13 RX ADMIN — INSULIN ASPART 2 UNITS: 100 INJECTION, SOLUTION INTRAVENOUS; SUBCUTANEOUS at 20:27

## 2019-02-13 RX ADMIN — METHADONE HYDROCHLORIDE 5 MG: 10 TABLET ORAL at 20:25

## 2019-02-13 RX ADMIN — Medication 5 MG: at 15:15

## 2019-02-13 RX ADMIN — DULOXETINE HYDROCHLORIDE 20 MG: 20 CAPSULE, DELAYED RELEASE ORAL at 08:10

## 2019-02-13 RX ADMIN — INSULIN ASPART 2 UNITS: 100 INJECTION, SOLUTION INTRAVENOUS; SUBCUTANEOUS at 16:32

## 2019-02-13 RX ADMIN — OXYCODONE HYDROCHLORIDE AND ACETAMINOPHEN 1 TABLET: 10; 325 TABLET ORAL at 11:11

## 2019-02-13 RX ADMIN — ATORVASTATIN CALCIUM 20 MG: 20 TABLET, FILM COATED ORAL at 20:25

## 2019-02-13 RX ADMIN — OXYCODONE HYDROCHLORIDE AND ACETAMINOPHEN 1 TABLET: 10; 325 TABLET ORAL at 05:35

## 2019-02-13 RX ADMIN — AMITRIPTYLINE HYDROCHLORIDE 10 MG: 10 TABLET, FILM COATED ORAL at 20:25

## 2019-02-13 RX ADMIN — OXYCODONE HYDROCHLORIDE AND ACETAMINOPHEN 1 TABLET: 10; 325 TABLET ORAL at 19:38

## 2019-02-13 RX ADMIN — ASPIRIN 81 MG CHEWABLE TABLET 325 MG: 81 TABLET CHEWABLE at 08:10

## 2019-02-13 RX ADMIN — INSULIN ASPART 2 UNITS: 100 INJECTION, SOLUTION INTRAVENOUS; SUBCUTANEOUS at 07:10

## 2019-02-13 RX ADMIN — FAMOTIDINE 20 MG: 20 TABLET ORAL at 08:10

## 2019-02-13 RX ADMIN — INSULIN ASPART 7 UNITS: 100 INJECTION, SOLUTION INTRAVENOUS; SUBCUTANEOUS at 07:10

## 2019-02-13 RX ADMIN — METFORMIN HYDROCHLORIDE 1000 MG: 500 TABLET ORAL at 08:10

## 2019-02-13 RX ADMIN — Medication 5 MG: at 08:10

## 2019-02-13 RX ADMIN — INSULIN ASPART 7 UNITS: 100 INJECTION, SOLUTION INTRAVENOUS; SUBCUTANEOUS at 11:58

## 2019-02-13 RX ADMIN — INSULIN DETEMIR 30 UNITS: 100 INJECTION, SOLUTION SUBCUTANEOUS at 21:31

## 2019-02-13 RX ADMIN — ENOXAPARIN SODIUM 40 MG: 40 INJECTION SUBCUTANEOUS at 15:15

## 2019-02-13 RX ADMIN — METFORMIN HYDROCHLORIDE 1000 MG: 500 TABLET ORAL at 17:09

## 2019-02-13 NOTE — PLAN OF CARE
Problem: Patient Care Overview  Goal: Plan of Care Review  Outcome: Ongoing (interventions implemented as appropriate)   02/13/19 8537   Patient Care Overview   IRF Plan of Care Review progress ongoing, continue   Progress, Functional Goals demonstrating adequate progress   Coping/Psychosocial   Plan of Care Reviewed With patient   OTHER   Outcome Summary Pt able to amb longer distance with gait with shorter time span. Pt cont to display deviations throughout gait but was able to gain control of balance without assistance from PTA. Pt tolerated 20 reps of LE ther ex with AAROM on L LE and AROM on R LE.

## 2019-02-13 NOTE — THERAPY TREATMENT NOTE
Inpatient Rehabilitation - Speech Language Pathology Treatment Note    [facility]       Patient Name: Ventura Boggs  : 1960  MRN: [id number]    Today's Date: 2019           Admit Date: 2019  Pt seen for cognitive therapy this date. Pt presented w/a headache; however, participated well in all therapy tasks. Pt continues to improve in all goal areas. Pt's vision does impact pt's performance at times; however, he is able to work around barrier. SLP recommends continue ST services upon discharge d/t functional impact pt's attention deficits have on his ADL's and home activities.  Although pt is improving in attention, pt continues to have areas of attention that need to be improved upon or have not been addressed yet. (divided, selective, alternating).      Cognitive Goals:  1.  Pt will complete mental manipulation task w/90% acc: Pt completed mental flexibility task of developing sentences using target letters (constantly changing) as initial letter for words in sentence w/90% acc.  Pt began w/3 letters and advanced to 5 letters.  Pt developed stories from target pictures (constantly changing) w/70% acc.     3.  Pt will complete word problems involving time w/90% acc:  Pt was 90% acc w/time related word problems.   Pt's speed of processing w/word problems was much improved this date.     4.  Pt will complete selective and divided attention task w/90% acc:   Pt completed selective attention task of determining direction of target arrow amid visual distractions.  Pt was 63.3% acc on trial one and 85.5% acc on trial two.  Pt completed divided attention task of identifying two targets that were constantly changing from a 4x4 grid.  Pt was 81.7% acc on trial one and 80.5% acc on trial two.     5. Pt will listen to a paragraph and answer questions with 90% accuracy:  Pt was 40% acc w/3-4 sentence paragraph recall of nonfiction information.  Pt struggled w/structured questions; however, could give  some information from free recall.         Visit Dx:        ICD-10-CM ICD-9-CM   1. Stroke-like symptoms R29.90 781.99   2. Uncontrolled type 2 diabetes mellitus with hyperglycemia (CMS/HCC) E11.65 250.02   3. Medically complex patient Z78.9 V49.9   4. Chronic intractable pain G89.29 338.29   5. Symbolic dysfunction R48.9 784.60   6. Impaired mobility and ADLs Z74.09 799.89   7. Impaired functional mobility, balance, gait, and endurance Z74.09 V49.89   8. Left-sided muscle weakness M62.81 728.87   9. Ataxia R27.0 781.3       Patient Active Problem List   Diagnosis   • Uncontrolled type 2 diabetes mellitus with hyperglycemia (CMS/HCC)   • Postoperative urinary retention   • Incisional hernia, without obstruction or gangrene   • Acute appendicitis with localized peritonitis   • Closed head injury   • Hyponatremia   • History of recurrent TIAs   • Ataxia   • Chronic pain syndrome   • Right shoulder pain   • Left-sided muscle weakness   • Medically complex patient   • Stroke-like symptoms          Therapy Treatment    Evaluation/Coping    Evaluation/Treatment Time and Intent  Subjective Information: no complaints (02/13/19 0831 : Divya Abarca, MS CCC-SLP)  Existing Precautions/Restrictions: fall, lifting (02/13/19 0831 : Divya Abarca, MS CCC-SLP)  Document Type: therapy note (daily note) (02/13/19 0831 : Divya Abarca, MS CCC-SLP)  Mode of Treatment: speech-language pathology, individual therapy (02/13/19 0831 : Divya Abarca, MS CCC-SLP)  Patient/Family Observations: Pt sitting in w/c in front of windows. (02/13/19 0831 : Divya Abarca, MS CCC-SLP)  Start Time (Evaluation/Treatment): 0831 (02/13/19 0831 : Divya Abarca, MS CCC-SLP)  Stop Time (Evaluation/Treatment): 0925 (02/13/19 0831 : Divya Abarca, MS CCC-SLP)    Vitals/Pain/Safety    Pain Scale: Numbers Pre/Post-Treatment  Pain Scale: Numbers, Pretreatment: 8/10 (02/13/19 0831 : Divya Abarca, MS CCC-SLP)  Pain Scale:  Numbers, Post-Treatment: 7/10 (02/13/19 0831 : Divya Abarca, MS CCC-SLP)  Pain Location: head (02/13/19 0831 : Divya Abarca, MS CCC-SLP)  Pain Intervention(s): Declines (02/13/19 0831 : Divya Abarca, MS CCC-SLP)  Pain Scale: Word Pre/Post-Treatment  Pain Location: head (02/13/19 0831 : Divya Abarca, MS CCC-SLP)  Pain Intervention(s): Declines (02/13/19 0831 : Divya Abarca, MS CCC-SLP)  Pain Scale: FACES Pre/Post-Treatment  Pain Location: head (02/13/19 0831 : Divya Abarca, MS CCC-SLP)  Pain Intervention(s): Declines (02/13/19 0831 : Divya Abarca, MS CCC-SLP)  Positioning and Restraints  Pre-Treatment Position: sitting in chair/recliner (02/13/19 0831 : Divya Abarca, MS CCC-SLP)  Post Treatment Position: wheelchair (02/13/19 0831 : Divya Abarca, MS CCC-SLP)  In Wheelchair: sitting, with PT (02/13/19 0831 : Divya Abarca, MS CCC-SLP)    Cognition/Communication         Oral Motor/Eating         Mobility/Basic Activities/Instrumental Activities/Motor/Modality                   ROM/MMT                   Sensory/Myotome/Dermatome/Edema               Posture/Balance/Special Tests/Exercise/Transportation/Sexual Function                   Orthotics/Residual Limb/Prosthetic Management              Outcome Summary    Daily Summary of Progress (SLP)  Functional Goal Overall Progress: Speech Language Pathology: progressing toward functional goals as expected (02/13/19 0831 : Divya Abarca MS CCC-SLP)  Impairments Continuing to Limit Function: Speech Language Pathology: vision (02/13/19 0831 : Divya Abarca MS CCC-SLP)  Recommendations: Speech Language Pathology: Continue POC (02/13/19 0831 : Divya Abarca MS CCC-SLP)    EDUCATION    The patient has been educated in the following areas:     Cognitive Impairment.    SLP Recommendation and Plan    SLP Diagnosis: symbolic dysfunction    SLP Diagnosis: symbolic dysfunction    Rehab Potential/Prognosis:  good         Anticipated Dischage Disposition: home with home health              Predicted Duration Therapy Intervention (Days): until discharge           Plan of Care Reviewed With: patient      SLP GOALS     Row Name 02/13/19 0831 02/12/19 1339 02/11/19 0905       Attention Goal 1 (SLP)    Improve Attention by Goal 1 (SLP)  complete selective attention task;complete divided attention task;90%  -CK  complete selective attention task;complete divided attention task;90%  -CK  complete selective attention task;complete divided attention task;90%  -CK    Time Frame (Attention Goal 1, SLP)  by discharge  -CK  by discharge  -CK  by discharge  -CK    Barriers (Attention Goal 1, SLP)  hx of CHI  -CK  hx of CHI  -CK  hx of CHI  -CK    Progress (Attention Goal 1, SLP)  70%;80%  -CK  70%  -CK  80%  -CK    Progress/Outcomes (Attention Goal 1, SLP)  goal ongoing  -CK  goal ongoing  -CK  goal ongoing  -CK    Comment (Attention Goal 1, SLP)  selective attention: approx. 70%; divided 80%  -CK  divided attention  -CK  --       Memory Skills Goal 1 (SLP)    Improve Memory Skills Through Goal 1 (SLP)  recalling unrelated word lists with an imposed delay;listen to a paragraph and answer questions;90%  -CK  recalling unrelated word lists with an imposed delay;listen to a paragraph and answer questions;90%  -CK  recalling unrelated word lists with an imposed delay;listen to a paragraph and answer questions;90%  -CK    Time Frame (Memory Skills Goal 1, SLP)  by discharge  -CK  by discharge  -CK  by discharge  -CK    Barriers (Memory Skills Goal 1, SLP)  hx of CHI  -CK  hx of CHI  -CK  hx of CHI  -CK    Progress (Memory Skills Goal 1, SLP)  40%  -CK  60%  -CK  80%  -CK    Progress/Outcomes (Memory Skills Goal 1, SLP)  goal ongoing  -CK  goal ongoing  -CK  goal ongoing  -CK    Comment (Memory Skills Goal 1, SLP)  paragraph recall  -CK  paragraph recall  -CK  --       Organizational Skills Goal 1 (SLP)    Improve Thought Organization  Through Goal 1 (SLP)  completing mental manipulation task;90%  -CK  completing mental manipulation task;90%  -CK  completing mental manipulation task;90%  -CK    Time Frame (Thought Organization Skills Goal 1, SLP)  by discharge  -CK  by discharge  -CK  by discharge  -CK    Barriers (Thought Organization Skills Goal 1, SLP)  Hx of CHI  -CK  Hx of CHI  -CK  Hx of CHI  -CK    Progress (Thought Organization Skills Goal 1, SLP)  70%  -CK  70%  -CK  100%  -CK    Progress/Outcomes (Thought Organization Skills Goal 1, SLP)  goal ongoing  -CK  goal ongoing  -CK  goal ongoing  -CK    Comment (Thought Organization Skills Goal 1, SLP)  letter dice w/sentence construction  -CK  ipad task  -CK  --       Functional Math Skills Goal 1 (SLP)    Improve Functional Math Skills Through Goal 1 (SLP)  complete word problems involving time;90%  -CK  complete functional math task;complete word problems involving time;90%  -CK  complete functional math task;complete word problems involving time;90%  -CK    Time Frame (Functional Math Skills Goal 1, SLP)  by discharge  -CK  by discharge  -CK  by discharge  -CK    Barriers (Functional Math Skills Goal 1, SLP)  Hx of CHI  -CK  Hx of CHI  -CK  Hx of CHI  -CK    Progress (Functional Math Skills Goal 1, SLP)  90%  -CK  70%  -CK  60%  -CK    Progress/Outcomes (Functional Math Skills Goal 1, SLP)  goal revised this date;goal partially met  -CK  goal ongoing  -CK  goal ongoing  -CK    Comment (Functional Math Skills Goal 1, SLP)  --  word problems  -CK  --      User Key  (r) = Recorded By, (t) = Taken By, (c) = Cosigned By    Initials Name Provider Type    CK Divya Abarca MS CCC-SLP Speech and Language Pathologist                  Time Calculation:       Time Calculation- SLP     Row Name 02/13/19 1118             Time Calculation- SLP    SLP Start Time  0831  -CK      SLP Stop Time  0925  -CK      SLP Time Calculation (min)  54 min  -CK      Total Timed Code Minutes- SLP  54 minute(s)  -CK       SLP Received On  02/13/19  -TIAN      SLP Goal Re-Cert Due Date  02/20/19  -TIAN        User Key  (r) = Recorded By, (t) = Taken By, (c) = Cosigned By    Initials Name Provider Type    Divya Santana, MS CCC-SLP Speech and Language Pathologist            Therapy Charges for Today     Code Description Service Date Service Provider Modifiers Qty    06571010006 HC ST TREATMENT SPEECH 3 2/12/2019 Divya Abarca MS CCC-SLP GN 1    27681672721 HC ST TREATMENT SPEECH 4 2/13/2019 Divya Abarca, MS ARMENTA-SLP GN 1                           Divya Abarca MS CCC-RAINA  2/13/2019

## 2019-02-13 NOTE — PLAN OF CARE
Problem: Patient Care Overview  Goal: Plan of Care Review  Outcome: Ongoing (interventions implemented as appropriate)   02/13/19 0922   Patient Care Overview   IRF Plan of Care Review progress ongoing, continue   Progress, Functional Goals demonstrating adequate progress   Coping/Psychosocial   Plan of Care Reviewed With patient   OTHER   Outcome Summary Pt seen for cognitive therapy this date. Pt presented w/a headache; however, participated well in all therapy tasks. Pt continues to improve in all goal areas. Pt's vision does impact pt's performance at times; however, he is able to work around barrier.

## 2019-02-13 NOTE — PLAN OF CARE
Problem: Patient Care Overview  Goal: Plan of Care Review  Outcome: Ongoing (interventions implemented as appropriate)   02/13/19 0146   Patient Care Overview   Progress, Functional Goals demonstrating adequate progress   OTHER   Outcome Summary pt resting comfortably at this time; vital signs stable; will continue to monitor

## 2019-02-13 NOTE — PLAN OF CARE
Problem: Patient Care Overview  Goal: Plan of Care Review  Outcome: Ongoing (interventions implemented as appropriate)   02/13/19 1301   Patient Care Overview   IRF Plan of Care Review progress ongoing, continue   Progress, Functional Goals demonstrating adequate progress   Coping/Psychosocial   Plan of Care Reviewed With patient   OTHER   Outcome Summary Pt was able to perform safe step manuever with 1 step x 2 trials with CGA used to prevent injury or LOB. Pt struggled with step but had no LOB noted. Pt also able to perform 5-10 reps of standing ex's with CGA using RW to assist with maintain balance. Pt amb 78ft this tx in 8 minutes without incident noted.

## 2019-02-13 NOTE — THERAPY TREATMENT NOTE
Inpatient Rehabilitation - Physical Therapy Treatment Note  [facility]     Patient Name: Ventura Boggs  : 1960  MRN: [id number]    Today's Date: 2019                 Admit Date: 2019      Visit Dx:      ICD-10-CM ICD-9-CM   1. Stroke-like symptoms R29.90 781.99   2. Uncontrolled type 2 diabetes mellitus with hyperglycemia (CMS/HCC) E11.65 250.02   3. Medically complex patient Z78.9 V49.9   4. Chronic intractable pain G89.29 338.29   5. Symbolic dysfunction R48.9 784.60   6. Impaired mobility and ADLs Z74.09 799.89   7. Impaired functional mobility, balance, gait, and endurance Z74.09 V49.89   8. Left-sided muscle weakness M62.81 728.87   9. Ataxia R27.0 781.3       Patient Active Problem List   Diagnosis   • Uncontrolled type 2 diabetes mellitus with hyperglycemia (CMS/HCC)   • Postoperative urinary retention   • Incisional hernia, without obstruction or gangrene   • Acute appendicitis with localized peritonitis   • Closed head injury   • Hyponatremia   • History of recurrent TIAs   • Ataxia   • Chronic pain syndrome   • Right shoulder pain   • Left-sided muscle weakness   • Medically complex patient   • Stroke-like symptoms       Therapy Treatment    IRF Treatment Summary     Row Name 19 1301 19 0925 19 0831       Evaluation/Treatment Time and Intent    Subjective Information  no complaints  -TW  complains of;pain Head ache pain  -TW  no complaints  -CK    Existing Precautions/Restrictions  fall;lifting  -TW  fall;lifting  -TW  fall;lifting  -CK    Document Type  therapy note (daily note)  -TW  therapy note (daily note)  -TW  therapy note (daily note)  -CK    Mode of Treatment  physical therapy;individual therapy  -TW  physical therapy;individual therapy  -TW  speech-language pathology;individual therapy  -CK    Patient/Family Observations  Pt in room in w/c  and agreeable to work with PTA.  -TW  Pt sitting in w/c in room with Speech therapy.  -TW  Pt sitting in w/c in  front of windows.  -CK    Start Time (Evaluation/Treatment)  1301  -TW  0925  -TW  0831  -CK    Stop Time (Evaluation/Treatment)  1345  -TW  1020  -TW  0925  -CK    Recorded by [TW] Brant Palacios PTA [TW] Brant Palacios PTA [CK] Divya Abarca MS CCC-SLP    Row Name 02/13/19 1301 02/13/19 0925          Cognition/Psychosocial- PT/OT    Affect/Mental Status (Cognitive)  WFL  -TW  WFL  -TW     Orientation Status (Cognition)  oriented x 4  -TW  oriented x 4  -TW     Follows Commands (Cognition)  WFL  -TW  WFL  -TW     Personal Safety Interventions  fall prevention program maintained;gait belt;nonskid shoes/slippers when out of bed  -TW  fall prevention program maintained;gait belt;nonskid shoes/slippers when out of bed  -TW     Cognitive Function (Cognitive)  safety deficit  -TW  safety deficit  -TW     Recorded by [TW] Brant Palacios PTA [TW] Brant Palacios PTA     Row Name 02/13/19 1301 02/13/19 0925          Sit-Stand Transfer    Sit-Stand [place] (Transfers)  stand by assist;contact guard  -TW  stand by assist  -TW     Assistive Device (Sit-Stand Transfers)  walker, front-wheeled  -TW  walker, front-wheeled  -TW     Recorded by [TW] Brant Palacios PTA [TW] Brant Palacios PTA     Row Name 02/13/19 1301 02/13/19 0925          Stand-Sit Transfer    Stand-Sit [place] (Transfers)  stand by assist;contact guard  -TW  stand by assist  -TW     Assistive Device (Stand-Sit Transfers)  walker, front-wheeled  -TW  walker, front-wheeled  -TW     Recorded by [TW] Brant Palacios PTA [TW] Brant Palacios PTA     Row Name 02/13/19 0925             Toilet Transfer    [place] Level (Toilet Transfer)  contact guard  -TW      Assistive Device (Toilet Transfer)  commode;grab bars/safety frame;walker, front-wheeled  -TW      Recorded by [TW] Brant Palacios PTA      Row Name 02/13/19 0925             Wheelchair Transfer    [place] Level (Wheelchair Transfer)   contact guard  -TW      Assistive Device (Wheelchair Transfer)  other (see comments) sit pivot  -TW      Recorded by [TW] Brant Palacios PTA      Row Name 02/13/19 1301 02/13/19 0907          Gait/Stairs Assessment/Training    Gait/Stairs Assessment/Training  gait/ambulation assistive device  -TW  gait/ambulation assistive device  -TW     [place] Level (Gait)  contact guard  -TW  contact guard  -TW     Assistive Device (Gait)  walker, front-wheeled  -TW  walker, front-wheeled  -TW     Distance in Feet (Gait)  78ft  -TW  68ft and 75ft  -TW     Pattern (Gait)  step-to  -TW  step-to  -TW     Deviations/Abnormal Patterns (Gait)  left sided deviations;stride length decreased  -TW  left sided deviations;stride length decreased  -TW     Bilateral Gait Deviations  foot drop/toe drag;knee buckling, left side  -TW  foot drop/toe drag;knee buckling, left side  -TW     Left Sided Gait Deviations  heel strike decreased;weight shift ability decreased  -TW  heel strike decreased;weight shift ability decreased  -TW     Negotiation (Stairs)  stairs independence  -TW  --     [place] Level (Stairs)  contact guard  -TW  --     Handrail Location (Stairs)  both sides  -TW  --     Number of Steps (Stairs)  1 x2 trials  -TW  --     Ascending Technique (Stairs)  step-to-step  -TW  --     Descending Technique (Stairs)  step-to-step  -TW  --     Stairs, Safety Issues  weight-shifting ability decreased  -TW  --     Stairs, Impairments  impaired balance;coordination impaired Pt only has 1 hand rail at home.  -TW  --     Comment (Gait/Stairs)  Pt completed gait within 8 minutes without issue.  -TW  Pt amb both distances in 13 minutes each with good steadiness noted but cont to display deviations.  -TW     Recorded by [TW] Brant Palacios PTA [TW] Brant Palacios PTA     Row Name 02/13/19 1301 02/13/19 0919          Wheelchair Mobility/Management    Mobility [place] Level (Wheelchair)  independent  -TW  independent   -TW     Forward Propulsion [place] (Wheelchair)  independent  -TW  independent  -TW     Backward Propulsion [place] (Wheelchair)  independent  -TW  independent  -TW     Steering [place] (Wheelchair)  independent  -TW  independent  -TW     Stopping [place] (Wheelchair)  independent  -TW  independent  -TW     Turning [place] (Wheelchair)  independent  -TW  independent  -TW     Doorway Navigation [place] (Wheelchair)  independent  -TW  independent  -TW     Distance Propelled in Feet (Wheelchair)  150ft  -TW  150ft  -TW     Recorded by [TW] Brant Palacios PTA [TW] Brant Palacios PTA     Row Name 02/13/19 1400             Motor Assessment/Intervention    Additional Documentation  Fine Motor Testing & Training (Group)  -LM      Recorded by [LM] Ese Dodson COTA/L      Row Name 02/13/19 1400             Gross Motor Coordination    Gross Motor Impairments  coordination;motor control  -LM      Recorded by [LM] Ese Dodson COTA/L      Row Name 02/13/19 1301 02/13/19 0925 02/13/19 0831       Pain Scale: Numbers Pre/Post-Treatment    Pain Scale: Numbers, Pretreatment  7/10  -TW  7/10  -TW  8/10  -CK    Pain Scale: Numbers, Post-Treatment  7/10  -TW  7/10  -TW  7/10  -CK    Pain Location  head  -TW  head  -TW  head  -CK    Pain Intervention(s)  --  --  Declines  -CK    Recorded by [TW] Brant Palacios PTA [TW] Brant Palacios PTA [CK] Divya Abarca, MS CCC-SLP    Row Name 02/13/19 1301             Lower Extremity Standing Therapeutic Exercise    Performed, Standing Lower Extremity (Therapeutic Exercise)  hip flexion/extension;hip abduction/adduction;shallow squats  -TW      Device, Standing Lower Extremity (Therapeutic Exercise)  -- Pt performed ex's using RW to assist with maintaining balanc  -TW      Exercise Type, Standing Lower Extremity (Therapeutic Exercise)  AROM (active range of motion)  -TW      Sets/Reps Detail, Standing Lower Extremity  (Therapeutic Exercise)  1/5-10  -TW      Recorded by [TW] Brant Palacios PTA      Row Name 02/13/19 0925             Lower Extremity Seated Therapeutic Exercise    Performed, Seated Lower Extremity (Therapeutic Exercise)  hip flexion/extension;ankle dorsiflexion/plantarflexion;LAQ (long arc quad), knee extension  -TW      Exercise Type, Seated Lower Extremity (Therapeutic Exercise)  AROM (active range of motion);AAROM (active assistive range of motion)  -TW      Sets/Reps Detail, Seated Lower Extremity (Therapeutic Exercise)  1/20  -TW      Comment, Seated Lower Extremity (Therapeutic Exercise)  Pt requires AAROM for L LE and frequent rest breaks.  -TW      Recorded by [TW] Brant Palacios PTA      Row Name 02/13/19 1301 02/13/19 0925 02/13/19 0831       Positioning and Restraints    Pre-Treatment Position  sitting in chair/recliner  -TW  sitting in chair/recliner  -TW  sitting in chair/recliner  -CK    Post Treatment Position  wheelchair  -TW  wheelchair  -TW  wheelchair  -CK    In Wheelchair  sitting;encouraged to call for assist;with OT  -TW  sitting;encouraged to call for assist  -TW  sitting;with PT  -CK    Recorded by [TW] Brant Palacios PTA [TW] Brant Palacios PTA [CK] Divya Abarca MS CCC-SLP    Row Name 02/13/19 1301 02/13/19 0925          Daily Summary of Progress (PT)    Functional Goal Overall Progress: Physical Therapy  progressing toward functional goals as expected  -TW  progressing toward functional goals as expected  -TW     Impairments Continuing to Limit Function: Physical Therapy  strength decreased;impaired balance;coordination impaired;motor control impaired;pain  -TW  strength decreased;impaired balance;coordination impaired;motor control impaired;pain  -TW     Recommendations: Physical Therapy  Cont  -TW  Cont  -TW     Recorded by [TW] Brant Palacios PTA [TW] Brant Palacios PTA     Row Name 02/13/19 0831             Daily Summary of Progress (SLP)     Functional Goal Overall Progress: Speech Language Pathology  progressing toward functional goals as expected  -CK      Impairments Continuing to Limit Function: Speech Language Pathology  vision  -CK      Recommendations: Speech Language Pathology  Continue POC  -CK      Recorded by [CK] Divya Abarca, MS CCC-SLP        User Key  (r) = Recorded By, (t) = Taken By, (c) = Cosigned By    Initials Name Effective Dates    CK Divya Abarca, MS CCC-SLP 04/03/18 -     TW Brant Palacios, PTA 03/07/18 -     Ese Huang, COULTER/L 03/07/18 -            Physical Therapy Education     Title: PT OT SLP Therapies (In Progress)     Topic: Physical Therapy (In Progress)     Point: Mobility training (In Progress)     Learning Progress Summary           Patient TA Bain, NR by BS at 2/8/2019 12:59 PM    Comment:  further addressed body position with weight shifting and sequencing of LE's with limb advancement in gait training w/ // bars. Tactile and verbal cues provided.    Acceptance, E, NR by GAL at 2/6/2019  2:39 PM                   Point: Home exercise program (In Progress)     Learning Progress Summary           Patient TA Bain, NR by BS at 2/8/2019 12:59 PM    Comment:  further addressed body position with weight shifting and sequencing of LE's with limb advancement in gait training w/ // bars. Tactile and verbal cues provided.                   Point: Body mechanics (In Progress)     Learning Progress Summary           Patient TA Bain, NR by BS at 2/8/2019 12:59 PM    Comment:  further addressed body position with weight shifting and sequencing of LE's with limb advancement in gait training w/ // bars. Tactile and verbal cues provided.    Acceptance, E, NR by GAL at 2/6/2019  2:39 PM                   Point: Precautions (In Progress)     Learning Progress Summary           Patient TA Bain, NR by BS at 2/8/2019 12:59 PM    Comment:  further addressed body position with weight shifting and sequencing of  LE's with limb advancement in gait training w/ // bars. Tactile and verbal cues provided.    Acceptance, E, NR by GAL at 2/6/2019  2:39 PM                               User Key     Initials Effective Dates Name Provider Type Discipline    GAL 04/03/18 -  Shilpa Dumont, PT Physical Therapist PT    BS 04/08/18 -  Jose Antonio Cuevas, PT Physical Therapist PT                  PT Recommendation and Plan     Plan of Care Reviewed With: patient  Daily Summary of Progress (PT)  Functional Goal Overall Progress: Physical Therapy: progressing toward functional goals as expected  Impairments Continuing to Limit Function: Physical Therapy: strength decreased, impaired balance, coordination impaired, motor control impaired, pain  Recommendations: Physical Therapy: Cont  IRF Plan of Care Review: progress ongoing, continue  Progress, Functional Goals: demonstrating adequate progress  Outcome Summary: Pt was able to perform safe step manuever with 1 step x 2 trials with CGA used to prevent injury or LOB. Pt struggled with step but had no LOB noted. Pt also able to perform 5-10 reps of standing ex's with CGA using RW to assist with maintain balance. Pt amb 78ft this tx in 8 minutes without incident noted.      PT IRF GOALS     Row Name 02/13/19 1301 02/13/19 0925          Bed Mobility Goal 1 (PT-IRF)    Activity/Assistive Device (Bed Mobility Goal 1, PT-IRF)  rolling to left;rolling to right;sit to supine;supine to sit  -TW  rolling to left;rolling to right;sit to supine;supine to sit  -TW     [place] Level (Bed Mobility Goal 1, PT-IRF)  independent  -TW  independent  -TW     Time Frame (Bed Mobility Goal 1, PT-IRF)  1 week  -TW  1 week  -TW     Progress/Outcomes (Bed Mobility Goal 1, PT-IRF)  goal not met  -TW  goal not met  -TW        Transfer Goal 1 (PT-IRF)    Activity/Assistive Device (Transfer Goal 1, PT-IRF)  sit-to-stand/stand-to-sit;bed-to-chair/chair-to-bed;wheelchair transfer  -TW   sit-to-stand/stand-to-sit;bed-to-chair/chair-to-bed;wheelchair transfer  -TW     [place] Level (Transfer Goal 1, PT-IRF)  conditional independence  -TW  conditional independence  -TW     Time Frame (Transfer Goal 1, PT-IRF)  1 week  -TW  1 week  -TW     Progress/Outcomes (Transfer Goal 1, PT-IRF)  goal not met  -TW  goal not met  -TW        Gait/Walking Locomotion Goal 1 (PT-IRF)    Activity/Assistive Device (Gait/Walking Locomotion Goal 1, PT-IRF)  gait (walking locomotion);assistive device use  -TW  gait (walking locomotion);assistive device use  -TW     Gait/Walking Locomotion Distance Goal 1 (PT-IRF)  50ft  -TW  50ft  -TW     [place] Level (Gait/Walking Locomotion Goal 1, PT-IRF)  contact guard assist  -TW  contact guard assist  -TW     Time Frame (Gait/Walking Locomotion Goal 1, PT-IRF)  1 week  -TW  1 week  -TW     Progress/Outcomes (Gait/Walking Locomotion Goal 1, PT-IRF)  goal met  (Significant)   -TW  goal met  (Significant)   -TW        Gait/Walking Locomotion Goal 2 (PT-IRF)    Activity/Assistive Device (Gait/Walking Locomotion Goal 2, PT-IRF)  gait (walking locomotion);assistive device use  -TW  gait (walking locomotion);assistive device use  -TW     Gait/Walking Locomotion Distance Goal 2 (PT-IRF)  150ft or more  -TW  150ft or more  -TW     [place] Level (Gait/Walking Locomotion Goal 2, PT-IRF)  conditional independence  -TW  conditional independence  -TW     Time Frame (Gait/Walking Locomotion Goal 2, PT-IRF)  2 weeks  -TW  2 weeks  -TW     Progress/Outcomes (Gait/Walking Locomotion Goal 2, PT-IRF)  goal not met  -TW  goal not met  -TW        Wheelchair Locomotion Goal 1 (PT-IRF)    Activity (Wheelchair Locomotion Goal 1, PT-IRF)  forward propulsion;backward propulsion;steering;turning;doorway navigation  -TW  forward propulsion;backward propulsion;steering;turning;doorway navigation  -TW     [place] Level (Wheelchair Locomotion Goal 1, PT-IRF)  independent  -TW  independent  -TW      Distance Goal 1 (Wheelchair Locomotion, PT-IRF)  150ft  -TW  150ft  -TW     Time Frame (Wheelchair Locomotion Goal 1, PT-IRF)  2 days  -TW  2 days  -TW     Progress/Outcomes (Wheelchair Locomotion Goal 1, PT-IRF)  goal met  (Significant)   -TW  goal met  (Significant)   -TW        Stairs Goal 1 (PT-IRF)    Activity/Assistive Device (Stairs Goal 1, PT-IRF)  ascending stairs;descending stairs;using handrail, right  -TW  ascending stairs;descending stairs;using handrail, right  -TW     Number of Stairs (Stairs Goal 1, PT-IRF)  5  -TW  5  -TW     [place] Level (Stairs Goal 1, PT-IRF)  standby assist;conditional independence  -TW  standby assist;conditional independence  -TW     Time Frame (Stairs Goal 1, PT-IRF)  2 weeks  -TW  2 weeks  -TW     Progress/Outcomes (Stairs Goal 1, PT-IRF)  goal not met  -TW  goal not met  -TW        Strength Goal 1 (PT-IRF)    Strength Goal 1 (PT-IRF)  Pt will perform 20 reps of AROM exercises with VSS  -TW  Pt will perform 20 reps of AROM exercises with VSS  -TW     Time Frame (Strength Goal 1, PT-IRF)  by discharge  -TW  by discharge  -TW     Progress/Outcomes (Strength Goal 1, PT-IRF)  goal not met  -TW  goal not met  -TW        Caregiver Training Goal 1 (PT-IRF)    Caregiver Training Goal 1 (PT-IRF)  Homecaregiver will demonstrate understanding assisting with mobility as needed, home safety and homecare instructions  -TW  Homecaregiver will demonstrate understanding assisting with mobility as needed, home safety and homecare instructions  -TW     Time Frame (Caregiver Training Goal 1, PT-IRF)  by discharge  -TW  by discharge  -TW     Progress/Outcomes (Caregiver Training Goal 1, PT-IRF)  goal not met  -TW  goal not met  -TW       User Key  (r) = Recorded By, (t) = Taken By, (c) = Cosigned By    Initials Name Provider Type    TW Brant Palacios PTA Physical Therapy Assistant        Outcome Measures     Row Name 02/13/19 0925 02/12/19 0810 02/11/19 1506       How much help  from another person do you currently need...    Turning from your back to your side while in flat bed without using bedrails?  4  -TW  4  -JA  4  -TW    Moving from lying on back to sitting on the side of a flat bed without bedrails?  4  -TW  4  -JA  4  -TW    Moving to and from a bed to a chair (including a wheelchair)?  3  -TW  3  -JA  3  -TW    Standing up from a chair using your arms (e.g., wheelchair, bedside chair)?  3  -TW  3  -JA  3  -TW    Climbing 3-5 steps with a railing?  2  -TW  2  -JA  2  -TW    To walk in hospital room?  3  -TW  3  -JA  3  -TW    AM-PAC 6 Clicks Score  19  -TW  19  -JA  19  -TW       Functional Assessment    Outcome Measure Options  AM-PAC 6 Clicks Basic Mobility (PT)  -TW  AM-PAC 6 Clicks Basic Mobility (PT)  -JA  AM-PAC 6 Clicks Basic Mobility (PT)  -TW    Row Name 02/11/19 0810             How much help from another person do you currently need...    Turning from your back to your side while in flat bed without using bedrails?  4  -TW      Moving from lying on back to sitting on the side of a flat bed without bedrails?  4  -TW      Moving to and from a bed to a chair (including a wheelchair)?  3  -TW      Standing up from a chair using your arms (e.g., wheelchair, bedside chair)?  3  -TW      Climbing 3-5 steps with a railing?  2  -TW      To walk in hospital room?  3  -TW      AM-PAC 6 Clicks Score  19  -TW         Functional Assessment    Outcome Measure Options  AM-PAC 6 Clicks Basic Mobility (PT)  -TW        User Key  (r) = Recorded By, (t) = Taken By, (c) = Cosigned By    Initials Name Provider Type    Robinson Gary, KAREN Physical Therapy Assistant    Brant Lancaster, KAREN Physical Therapy Assistant             Time Calculation:     PT Charges     Row Name 02/13/19 1556 02/13/19 1136          Time Calculation    Start Time  1301  -TW  0925  -TW     Stop Time  1345  -TW  1020  -TW     Time Calculation (min)  44 min  -TW  55 min  -TW     PT Received On  02/13/19   -TW  02/13/19  -TW     PT Goal Re-Cert Due Date  02/19/19  -TW  02/19/19  -TW        Time Calculation- PT    Total Timed Code Minutes- PT  44 minute(s)  -TW  55 minute(s)  -TW       User Key  (r) = Recorded By, (t) = Taken By, (c) = Cosigned By    Initials Name Provider Type    TW Brant Palacios, KAREN Physical Therapy Assistant            Therapy Charges for Today     Code Description Service Date Service Provider Modifiers Qty    13520345401 HC GAIT TRAINING EA 15 MIN 2/13/2019 Brant Palacios, PTA GP 1    66331874260 HC PT THERAPEUTIC ACT EA 15 MIN 2/13/2019 Brant Palacios, PTA GP 2    64292659560 HC PT THER PROC EA 15 MIN 2/13/2019 Brant Palacios, PTA GP 1    97313604823 HC GAIT TRAINING EA 15 MIN 2/13/2019 Brant Palacios, PTA GP 1    31482230934 HC PT THERAPEUTIC ACT EA 15 MIN 2/13/2019 Brant Palacios, PTA GP 1    30999647334 HC PT THER PROC EA 15 MIN 2/13/2019 Brant Palacios, PTA GP 1            PT G-Codes  Outcome Measure Options: AM-PAC 6 Clicks Basic Mobility (PT)  AM-PAC 6 Clicks Score: 19  Score: 17      Brant Palacios PTA  2/13/2019

## 2019-02-13 NOTE — THERAPY TREATMENT NOTE
Inpatient Rehabilitation - Physical Therapy Treatment Note  [facility]     Patient Name: Ventura Boggs  : 1960  MRN: [id number]    Today's Date: 2019                 Admit Date: 2019      Visit Dx:      ICD-10-CM ICD-9-CM   1. Stroke-like symptoms R29.90 781.99   2. Uncontrolled type 2 diabetes mellitus with hyperglycemia (CMS/HCC) E11.65 250.02   3. Medically complex patient Z78.9 V49.9   4. Chronic intractable pain G89.29 338.29   5. Symbolic dysfunction R48.9 784.60   6. Impaired mobility and ADLs Z74.09 799.89   7. Impaired functional mobility, balance, gait, and endurance Z74.09 V49.89   8. Left-sided muscle weakness M62.81 728.87   9. Ataxia R27.0 781.3       Patient Active Problem List   Diagnosis   • Uncontrolled type 2 diabetes mellitus with hyperglycemia (CMS/HCC)   • Postoperative urinary retention   • Incisional hernia, without obstruction or gangrene   • Acute appendicitis with localized peritonitis   • Closed head injury   • Hyponatremia   • History of recurrent TIAs   • Ataxia   • Chronic pain syndrome   • Right shoulder pain   • Left-sided muscle weakness   • Medically complex patient   • Stroke-like symptoms       Therapy Treatment    IRF Treatment Summary     Row Name 19 0925 19          Evaluation/Treatment Time and Intent    Subjective Information  complains of;pain Head ache pain  -TW  no complaints  -CK     Existing Precautions/Restrictions  fall;lifting  -TW  fall;lifting  -CK     Document Type  therapy note (daily note)  -TW  therapy note (daily note)  -CK     Mode of Treatment  physical therapy;individual therapy  -TW  speech-language pathology;individual therapy  -CK     Patient/Family Observations  Pt sitting in w/c in room with Speech therapy.  -TW  Pt sitting in w/c in front of windows.  -CK     Start Time (Evaluation/Treatment)  0925  -TW  0831  -CK     Stop Time (Evaluation/Treatment)  1020  -TW  09  -CK     Recorded by [TW] Brant Palacios  MEI, PTA [CK] Divya Abarca, MS CCC-SLP     Row Name 02/13/19 0925             Cognition/Psychosocial- PT/OT    Affect/Mental Status (Cognitive)  WFL  -TW      Orientation Status (Cognition)  oriented x 4  -TW      Follows Commands (Cognition)  WFL  -TW      Personal Safety Interventions  fall prevention program maintained;gait belt;nonskid shoes/slippers when out of bed  -TW      Cognitive Function (Cognitive)  safety deficit  -TW      Recorded by [TW] Brant Palacios PTA      Row Name 02/13/19 0925             Sit-Stand Transfer    Sit-Stand Pilot (Transfers)  stand by assist  -TW      Assistive Device (Sit-Stand Transfers)  walker, front-wheeled  -TW      Recorded by [TW] Brant Palacios PTA      Row Name 02/13/19 0925             Stand-Sit Transfer    Stand-Sit Pilot (Transfers)  stand by assist  -TW      Assistive Device (Stand-Sit Transfers)  walker, front-wheeled  -TW      Recorded by [TW] Brant Palacios PTA      Row Name 02/13/19 0925             Toilet Transfer    Pilot Level (Toilet Transfer)  contact guard  -TW      Assistive Device (Toilet Transfer)  commode;grab bars/safety frame;walker, front-wheeled  -TW      Recorded by [TW] Brant Palacios PTA      Row Name 02/13/19 0925             Wheelchair Transfer    Pilot Level (Wheelchair Transfer)  contact guard  -TW      Assistive Device (Wheelchair Transfer)  other (see comments) sit pivot  -TW      Recorded by [TW] Brant Palacios PTA      Row Name 02/13/19 0925             Gait/Stairs Assessment/Training    Gait/Stairs Assessment/Training  gait/ambulation assistive device  -TW      [place] Level (Gait)  contact guard  -TW      Assistive Device (Gait)  walker, front-wheeled  -TW      Distance in Feet (Gait)  68ft and 75ft  -TW      Pattern (Gait)  step-to  -TW      Deviations/Abnormal Patterns (Gait)  left sided deviations;stride length decreased  -TW      Bilateral Gait Deviations  foot  drop/toe drag;knee buckling, left side  -TW      Left Sided Gait Deviations  heel strike decreased;weight shift ability decreased  -TW      Comment (Gait/Stairs)  Pt amb both distances in 13 minutes each with good steadiness noted but cont to display deviations.  -TW      Recorded by [TW] Brant Palacios PTA      Row Name 02/13/19 0925             Wheelchair Mobility/Management    Mobility [place] Level (Wheelchair)  independent  -TW      Forward Propulsion [place] (Wheelchair)  independent  -TW      Backward Propulsion [place] (Wheelchair)  independent  -TW      Steering [place] (Wheelchair)  independent  -TW      Stopping [place] (Wheelchair)  independent  -TW      Turning [place] (Wheelchair)  independent  -TW      Doorway Navigation [place] (Wheelchair)  independent  -TW      Distance Propelled in Feet (Wheelchair)  150ft  -TW      Recorded by [TW] Brant Palacios PTA      Row Name 02/13/19 0925 02/13/19 0831          Pain Scale: Numbers Pre/Post-Treatment    Pain Scale: Numbers, Pretreatment  7/10  -TW  8/10  -CK     Pain Scale: Numbers, Post-Treatment  7/10  -TW  7/10  -CK     Pain Location  head  -TW  head  -CK     Pain Intervention(s)  --  Declines  -CK     Recorded by [TW] Brant Palacios PTA [CK] Divya Abarca MS CCC-SLP     Row Name 02/13/19 0925             Lower Extremity Seated Therapeutic Exercise    Performed, Seated Lower Extremity (Therapeutic Exercise)  hip flexion/extension;ankle dorsiflexion/plantarflexion;LAQ (long arc quad), knee extension  -TW      Exercise Type, Seated Lower Extremity (Therapeutic Exercise)  AROM (active range of motion);AAROM (active assistive range of motion)  -TW      Sets/Reps Detail, Seated Lower Extremity (Therapeutic Exercise)  1/20  -TW      Comment, Seated Lower Extremity (Therapeutic Exercise)  Pt requires AAROM for L LE and frequent rest breaks.  -TW      Recorded by [TW] Brant Palacios PTA Row  Name 02/13/19 0925 02/13/19 0831          Positioning and Restraints    Pre-Treatment Position  sitting in chair/recliner  -TW  sitting in chair/recliner  -CK     Post Treatment Position  wheelchair  -TW  wheelchair  -CK     In Wheelchair  sitting;encouraged to call for assist  -TW  sitting;with PT  -CK     Recorded by [TW] Brant Palacios PTA [CK] Divya Abarca MS CCC-SLP     Row Name 02/13/19 0925             Daily Summary of Progress (PT)    Functional Goal Overall Progress: Physical Therapy  progressing toward functional goals as expected  -TW      Impairments Continuing to Limit Function: Physical Therapy  strength decreased;impaired balance;coordination impaired;motor control impaired;pain  -TW      Recommendations: Physical Therapy  Cont  -TW      Recorded by [TW] Brant Palacios PTA      Row Name 02/13/19 0831             Daily Summary of Progress (SLP)    Functional Goal Overall Progress: Speech Language Pathology  progressing toward functional goals as expected  -CK      Impairments Continuing to Limit Function: Speech Language Pathology  vision  -CK      Recommendations: Speech Language Pathology  Continue POC  -CK      Recorded by [CK] Divya Abarca MS CCC-SLP        User Key  (r) = Recorded By, (t) = Taken By, (c) = Cosigned By    Initials Name Effective Dates    CK Divya Abarca, MS CCC-SLP 04/03/18 -     TW Brant Palacios PTA 03/07/18 -            Physical Therapy Education     Title: PT OT SLP Therapies (In Progress)     Topic: Physical Therapy (In Progress)     Point: Mobility training (In Progress)     Learning Progress Summary           Patient TA Bain, NR by ERIC at 2/8/2019 12:59 PM    Comment:  further addressed body position with weight shifting and sequencing of LE's with limb advancement in gait training w/ // bars. Tactile and verbal cues provided.    ROSCOE Bañuelos, NR by GAL at 2/6/2019  2:39 PM                   Point: Home exercise program (In Progress)      Learning Progress Summary           Patient TA Bain, NR by BS at 2/8/2019 12:59 PM    Comment:  further addressed body position with weight shifting and sequencing of LE's with limb advancement in gait training w/ // bars. Tactile and verbal cues provided.                   Point: Body mechanics (In Progress)     Learning Progress Summary           Patient TA Bain, NR by BS at 2/8/2019 12:59 PM    Comment:  further addressed body position with weight shifting and sequencing of LE's with limb advancement in gait training w/ // bars. Tactile and verbal cues provided.    Acceptance, E, NR by GAL at 2/6/2019  2:39 PM                   Point: Precautions (In Progress)     Learning Progress Summary           Patient TA Bain, NR by BS at 2/8/2019 12:59 PM    Comment:  further addressed body position with weight shifting and sequencing of LE's with limb advancement in gait training w/ // bars. Tactile and verbal cues provided.    Acceptance, E, NR by GAL at 2/6/2019  2:39 PM                               User Key     Initials Effective Dates Name Provider Type Discipline    GAL 04/03/18 -  Shilpa Dumont, PT Physical Therapist PT    ERIC 04/08/18 -  Jose Antonio Cuevas, PT Physical Therapist PT                  PT Recommendation and Plan     Plan of Care Reviewed With: patient  Daily Summary of Progress (PT)  Functional Goal Overall Progress: Physical Therapy: progressing toward functional goals as expected  Impairments Continuing to Limit Function: Physical Therapy: strength decreased, impaired balance, coordination impaired, motor control impaired, pain  Recommendations: Physical Therapy: Cont  IRF Plan of Care Review: progress ongoing, continue  Progress, Functional Goals: demonstrating adequate progress  Outcome Summary: Pt able to amb longer distance with gait with shorter time span. Pt cont to display deviations throughout gait but was able to gain control of balance without assistance from PTA. Pt tolerated 20 reps of LE  ther ex with AAROM on L LE and AROM on R LE.      PT IRF GOALS     Row Name 02/13/19 0925             Bed Mobility Goal 1 (PT-IRF)    Activity/Assistive Device (Bed Mobility Goal 1, PT-IRF)  rolling to left;rolling to right;sit to supine;supine to sit  -TW      [place] Level (Bed Mobility Goal 1, PT-IRF)  independent  -TW      Time Frame (Bed Mobility Goal 1, PT-IRF)  1 week  -TW      Progress/Outcomes (Bed Mobility Goal 1, PT-IRF)  goal not met  -TW         Transfer Goal 1 (PT-IRF)    Activity/Assistive Device (Transfer Goal 1, PT-IRF)  sit-to-stand/stand-to-sit;bed-to-chair/chair-to-bed;wheelchair transfer  -TW      [place] Level (Transfer Goal 1, PT-IRF)  conditional independence  -TW      Time Frame (Transfer Goal 1, PT-IRF)  1 week  -TW      Progress/Outcomes (Transfer Goal 1, PT-IRF)  goal not met  -TW         Gait/Walking Locomotion Goal 1 (PT-IRF)    Activity/Assistive Device (Gait/Walking Locomotion Goal 1, PT-IRF)  gait (walking locomotion);assistive device use  -TW      Gait/Walking Locomotion Distance Goal 1 (PT-IRF)  50ft  -TW      [place] Level (Gait/Walking Locomotion Goal 1, PT-IRF)  contact guard assist  -TW      Time Frame (Gait/Walking Locomotion Goal 1, PT-IRF)  1 week  -TW      Progress/Outcomes (Gait/Walking Locomotion Goal 1, PT-IRF)  goal met  (Significant)   -TW         Gait/Walking Locomotion Goal 2 (PT-IRF)    Activity/Assistive Device (Gait/Walking Locomotion Goal 2, PT-IRF)  gait (walking locomotion);assistive device use  -TW      Gait/Walking Locomotion Distance Goal 2 (PT-IRF)  150ft or more  -TW      [place] Level (Gait/Walking Locomotion Goal 2, PT-IRF)  conditional independence  -TW      Time Frame (Gait/Walking Locomotion Goal 2, PT-IRF)  2 weeks  -TW      Progress/Outcomes (Gait/Walking Locomotion Goal 2, PT-IRF)  goal not met  -TW         Wheelchair Locomotion Goal 1 (PT-IRF)    Activity (Wheelchair Locomotion Goal 1, PT-IRF)  forward propulsion;backward  propulsion;steering;turning;doorway navigation  -TW      [place] Level (Wheelchair Locomotion Goal 1, PT-IRF)  independent  -TW      Distance Goal 1 (Wheelchair Locomotion, PT-IRF)  150ft  -TW      Time Frame (Wheelchair Locomotion Goal 1, PT-IRF)  2 days  -TW      Progress/Outcomes (Wheelchair Locomotion Goal 1, PT-IRF)  goal met  (Significant)   -TW         Stairs Goal 1 (PT-IRF)    Activity/Assistive Device (Stairs Goal 1, PT-IRF)  ascending stairs;descending stairs;using handrail, right  -TW      Number of Stairs (Stairs Goal 1, PT-IRF)  5  -TW      [place] Level (Stairs Goal 1, PT-IRF)  standby assist;conditional independence  -TW      Time Frame (Stairs Goal 1, PT-IRF)  2 weeks  -TW      Progress/Outcomes (Stairs Goal 1, PT-IRF)  goal not met  -TW         Strength Goal 1 (PT-IRF)    Strength Goal 1 (PT-IRF)  Pt will perform 20 reps of AROM exercises with VSS  -TW      Time Frame (Strength Goal 1, PT-IRF)  by discharge  -TW      Progress/Outcomes (Strength Goal 1, PT-IRF)  goal not met  -TW         Caregiver Training Goal 1 (PT-IRF)    Caregiver Training Goal 1 (PT-IRF)  Homecaregiver will demonstrate understanding assisting with mobility as needed, home safety and homecare instructions  -TW      Time Frame (Caregiver Training Goal 1, PT-IRF)  by discharge  -TW      Progress/Outcomes (Caregiver Training Goal 1, PT-IRF)  goal not met  -TW        User Key  (r) = Recorded By, (t) = Taken By, (c) = Cosigned By    Initials Name Provider Type    TW Brant Palacios PTA Physical Therapy Assistant        Outcome Measures     Row Name 02/13/19 0925 02/12/19 0810 02/11/19 1507       How much help from another person do you currently need...    Turning from your back to your side while in flat bed without using bedrails?  4  -TW  4  -JA  4  -TW    Moving from lying on back to sitting on the side of a flat bed without bedrails?  4  -TW  4  -JA  4  -TW    Moving to and from a bed to a chair (including a  wheelchair)?  3  -TW  3  -JA  3  -TW    Standing up from a chair using your arms (e.g., wheelchair, bedside chair)?  3  -TW  3  -JA  3  -TW    Climbing 3-5 steps with a railing?  2  -TW  2  -JA  2  -TW    To walk in hospital room?  3  -TW  3  -JA  3  -TW    AM-PAC 6 Clicks Score  19  -TW  19  -JA  19  -TW       Functional Assessment    Outcome Measure Options  AM-PAC 6 Clicks Basic Mobility (PT)  -TW  AM-PAC 6 Clicks Basic Mobility (PT)  -JA  AM-PAC 6 Clicks Basic Mobility (PT)  -TW    Row Name 02/11/19 0810             How much help from another person do you currently need...    Turning from your back to your side while in flat bed without using bedrails?  4  -TW      Moving from lying on back to sitting on the side of a flat bed without bedrails?  4  -TW      Moving to and from a bed to a chair (including a wheelchair)?  3  -TW      Standing up from a chair using your arms (e.g., wheelchair, bedside chair)?  3  -TW      Climbing 3-5 steps with a railing?  2  -TW      To walk in hospital room?  3  -TW      AM-PAC 6 Clicks Score  19  -TW         Functional Assessment    Outcome Measure Options  AM-PAC 6 Clicks Basic Mobility (PT)  -TW        User Key  (r) = Recorded By, (t) = Taken By, (c) = Cosigned By    Initials Name Provider Type    Robinson Gary PTA Physical Therapy Assistant    Brant Lancaster PTA Physical Therapy Assistant             Time Calculation:     PT Charges     Row Name 02/13/19 1136             Time Calculation    Start Time  0925  -TW      Stop Time  1020  -TW      Time Calculation (min)  55 min  -TW      PT Received On  02/13/19  -TW      PT Goal Re-Cert Due Date  02/19/19  -TW         Time Calculation- PT    Total Timed Code Minutes- PT  55 minute(s)  -TW        User Key  (r) = Recorded By, (t) = Taken By, (c) = Cosigned By    Initials Name Provider Type    Brant Lancaster PTA Physical Therapy Assistant            Therapy Charges for Today     Code Description Service  Date Service Provider Modifiers Qty    69525558297 HC GAIT TRAINING EA 15 MIN 2/13/2019 Brant Palacios, PTA GP 1    51694581179 HC PT THERAPEUTIC ACT EA 15 MIN 2/13/2019 Brant Palacios, PTA GP 2    59373079781 HC PT THER PROC EA 15 MIN 2/13/2019 Brant Palacios, PTA GP 1            PT G-Codes  Outcome Measure Options: AM-PAC 6 Clicks Basic Mobility (PT)  AM-PAC 6 Clicks Score: 19  Score: 17      Brant Palacios PTA  2/13/2019

## 2019-02-13 NOTE — PROGRESS NOTES
LOS: 8 days   Patient Care Team:  Anita Faria APRN as PCP - General (Family Medicine)    Chief Complaint:   Medically complex patient          Interval History:     SUBJECTIVE: No complaints today.  Anxious to go home.  He is participating with therapy  Working toward control of his diabetes  He says his left arm is improving quicker than his left leg   history taken from: patient chart RN Therapy staff and case management    Objective     Vital Signs  Temp:  [96.2 °F (35.7 °C)-96.7 °F (35.9 °C)] 96.2 °F (35.7 °C)  Heart Rate:  [80-87] 87  Resp:  [16-18] 16  BP: (119-123)/(71-77) 123/77      02/05/19  1404 02/09/19  0319 02/10/19  1900   Weight: 90.3 kg (199 lb) 88.7 kg (195 lb 9.6 oz) 93.1 kg (205 lb 4.8 oz)         Physical Exam:     General Appearance:    Alert, cooperative, in no acute distress   Head:    Normocephalic, without obvious abnormality, atraumatic   Eyes:            Lids and lashes normal, conjunctivae and sclerae normal, no   icterus, no pallor, corneas clear, PERRLA   Throat:   No oral lesions, no thrush, oral mucosa moist   Neck:   No adenopathy, supple, trachea midline, no thyromegaly, no   carotid bruit, no JVD       Lungs:     Clear to auscultation,respirations regular, even and                  Unlabored     Heart:    Regular rhythm and normal rate, normal S1 and S2, no            murmur, no gallop, no rub, no click    Chest Wall:    No abnormalities observed   Abdomen:     Normal bowel sounds, no masses, no organomegaly, soft        non-tender, non-distended, no guarding, no rebound                Tenderness    Extremities:   Moves all extremities well, no edema, no cyanosis, no             Redness left-sided weakness is improving more in the upper extremities and lower extremity       Skin:   No bleeding, bruising or rash   Lymph nodes:   No palpable adenopathy   Neurologic:   Cranial nerves 2 - 12 grossly intact, sensation intact, DTR       present and equal bilaterally         RESULTS REVIEW:     Lab Results (last 24 hours)     Procedure Component Value Units Date/Time    POC Glucose Once [[id number]]  (Abnormal) Collected:  02/13/19 0535    Specimen:  Blood Updated:  02/13/19 0631     Glucose 192 mg/dL      Comment: : [id number] DEMARCUS PEYTONMeter ID: [id number]       POC Glucose Once [[id number]]  (Abnormal) Collected:  02/12/19 1954    Specimen:  Blood Updated:  02/12/19 2046     Glucose 131 mg/dL      Comment: : [id number] DEMARCUS PEYTONMeter ID: [id number]       POC Glucose Once [[id number]]  (Normal) Collected:  02/12/19 1601    Specimen:  Blood Updated:  02/12/19 1620     Glucose 110 mg/dL      Comment: : [phone] VALERIA MEGANMeter ID: [id number]       POC Glucose Once [[id number]]  (Abnormal) Collected:  02/12/19 1044    Specimen:  Blood Updated:  02/12/19 1106     Glucose 264 mg/dL      Comment: RN NotifiedOperator: [id number] VALERIA MEGANMeter ID: [id number]           Imaging Results (last 72 hours)     ** No results found for the last 72 hours. **          Assessment/Plan     Active Hospital Problems    Diagnosis   • **Medically complex patient   • Stroke-like symptoms       He has chronic left-sided weakness but it is much worse after this episode.  Specifically the left leg much  weaker than it was prior to this episode     • Uncontrolled type 2 diabetes mellitus with hyperglycemia (CMS/[facility])     -at presentation to ED glucose 704    Hemoglobin A1c is 15.1 admission to acute rehabilitation     • Left-sided muscle weakness     Due to prior Intracranial hemorrhage with closed head injury     • Right shoulder pain     He's had right shoulder pain for some time and has had steroid injections.  But since his fall the pain is much worse.  X-rays were unremarkable yesterday at [facility].     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  Equipment has been ordered.  Continue to work to control his diabetes.  I suspect compliance issues were a problem prior to admission with his diabetes          This document has been electronically signed by Richard Galvez MD on February 13, 2019 10:08 AM

## 2019-02-14 LAB
GLUCOSE BLDC GLUCOMTR-MCNC: 144 MG/DL (ref 70–130)
GLUCOSE BLDC GLUCOMTR-MCNC: 82 MG/DL (ref 70–130)
GLUCOSE BLDC GLUCOMTR-MCNC: 83 MG/DL (ref 70–130)
GLUCOSE BLDC GLUCOMTR-MCNC: 90 MG/DL (ref 70–130)

## 2019-02-14 PROCEDURE — 92507 TX SP LANG VOICE COMM INDIV: CPT | Performed by: SPEECH-LANGUAGE PATHOLOGIST

## 2019-02-14 PROCEDURE — 25010000002 ENOXAPARIN PER 10 MG: Performed by: FAMILY MEDICINE

## 2019-02-14 PROCEDURE — 97110 THERAPEUTIC EXERCISES: CPT

## 2019-02-14 PROCEDURE — 97530 THERAPEUTIC ACTIVITIES: CPT

## 2019-02-14 PROCEDURE — 99232 SBSQ HOSP IP/OBS MODERATE 35: CPT | Performed by: FAMILY MEDICINE

## 2019-02-14 PROCEDURE — 97535 SELF CARE MNGMENT TRAINING: CPT

## 2019-02-14 PROCEDURE — 82962 GLUCOSE BLOOD TEST: CPT

## 2019-02-14 PROCEDURE — 63710000001 INSULIN ASPART PER 5 UNITS: Performed by: FAMILY MEDICINE

## 2019-02-14 PROCEDURE — 97116 GAIT TRAINING THERAPY: CPT

## 2019-02-14 PROCEDURE — 63710000001 INSULIN DETEMIR PER 5 UNITS: Performed by: FAMILY MEDICINE

## 2019-02-14 RX ADMIN — Medication 5 MG: at 15:02

## 2019-02-14 RX ADMIN — ATORVASTATIN CALCIUM 20 MG: 20 TABLET, FILM COATED ORAL at 20:37

## 2019-02-14 RX ADMIN — AMITRIPTYLINE HYDROCHLORIDE 10 MG: 10 TABLET, FILM COATED ORAL at 20:38

## 2019-02-14 RX ADMIN — OXYCODONE HYDROCHLORIDE AND ACETAMINOPHEN 1 TABLET: 10; 325 TABLET ORAL at 20:37

## 2019-02-14 RX ADMIN — INSULIN ASPART 7 UNITS: 100 INJECTION, SOLUTION INTRAVENOUS; SUBCUTANEOUS at 08:56

## 2019-02-14 RX ADMIN — ENOXAPARIN SODIUM 40 MG: 40 INJECTION SUBCUTANEOUS at 15:02

## 2019-02-14 RX ADMIN — METFORMIN HYDROCHLORIDE 1000 MG: 500 TABLET ORAL at 17:49

## 2019-02-14 RX ADMIN — METFORMIN HYDROCHLORIDE 1000 MG: 500 TABLET ORAL at 08:55

## 2019-02-14 RX ADMIN — FAMOTIDINE 20 MG: 20 TABLET ORAL at 08:55

## 2019-02-14 RX ADMIN — INSULIN ASPART 7 UNITS: 100 INJECTION, SOLUTION INTRAVENOUS; SUBCUTANEOUS at 17:08

## 2019-02-14 RX ADMIN — LISINOPRIL 5 MG: 5 TABLET ORAL at 08:55

## 2019-02-14 RX ADMIN — AMLODIPINE BESYLATE 5 MG: 5 TABLET ORAL at 08:56

## 2019-02-14 RX ADMIN — FAMOTIDINE 20 MG: 20 TABLET ORAL at 20:37

## 2019-02-14 RX ADMIN — OXYCODONE HYDROCHLORIDE AND ACETAMINOPHEN 1 TABLET: 10; 325 TABLET ORAL at 05:23

## 2019-02-14 RX ADMIN — Medication 5 MG: at 08:54

## 2019-02-14 RX ADMIN — Medication 5 MG: at 20:37

## 2019-02-14 RX ADMIN — INSULIN ASPART 7 UNITS: 100 INJECTION, SOLUTION INTRAVENOUS; SUBCUTANEOUS at 11:26

## 2019-02-14 RX ADMIN — OXYCODONE HYDROCHLORIDE AND ACETAMINOPHEN 1 TABLET: 10; 325 TABLET ORAL at 10:25

## 2019-02-14 RX ADMIN — METHADONE HYDROCHLORIDE 5 MG: 10 TABLET ORAL at 20:38

## 2019-02-14 RX ADMIN — INSULIN DETEMIR 30 UNITS: 100 INJECTION, SOLUTION SUBCUTANEOUS at 20:45

## 2019-02-14 RX ADMIN — ASPIRIN 81 MG CHEWABLE TABLET 325 MG: 81 TABLET CHEWABLE at 08:54

## 2019-02-14 RX ADMIN — METHADONE HYDROCHLORIDE 5 MG: 10 TABLET ORAL at 08:55

## 2019-02-14 RX ADMIN — OXYCODONE HYDROCHLORIDE AND ACETAMINOPHEN 1 TABLET: 10; 325 TABLET ORAL at 14:50

## 2019-02-14 RX ADMIN — DULOXETINE HYDROCHLORIDE 20 MG: 20 CAPSULE, DELAYED RELEASE ORAL at 08:54

## 2019-02-14 NOTE — THERAPY TREATMENT NOTE
Inpatient Rehabilitation - Occupational Therapy Treatment Note    [facility]     Patient Name: Ventura Boggs  : 1960  MRN: [id number]    Today's Date: 2019                 Admit Date: 2019      Visit Dx:    ICD-10-CM ICD-9-CM   1. Stroke-like symptoms R29.90 781.99   2. Uncontrolled type 2 diabetes mellitus with hyperglycemia (CMS/HCC) E11.65 250.02   3. Medically complex patient Z78.9 V49.9   4. Chronic intractable pain G89.29 338.29   5. Symbolic dysfunction R48.9 784.60   6. Impaired mobility and ADLs Z74.09 799.89   7. Impaired functional mobility, balance, gait, and endurance Z74.09 V49.89   8. Left-sided muscle weakness M62.81 728.87   9. Ataxia R27.0 781.3       Patient Active Problem List   Diagnosis   • Uncontrolled type 2 diabetes mellitus with hyperglycemia (CMS/HCC)   • Postoperative urinary retention   • Incisional hernia, without obstruction or gangrene   • Acute appendicitis with localized peritonitis   • Closed head injury   • Hyponatremia   • History of recurrent TIAs   • Ataxia   • Chronic pain syndrome   • Right shoulder pain   • Left-sided muscle weakness   • Medically complex patient   • Stroke-like symptoms         Therapy Treatment    IRF Treatment Summary     Row Name 19 1345 19 1301 19 1115       Evaluation/Treatment Time and Intent    Subjective Information  no complaints  -LM  no complaints  -TW  no complaints  -LM    Existing Precautions/Restrictions  fall  -LM  fall;lifting  -TW  fall  -LM    Document Type  therapy note (daily note)  -LM  therapy note (daily note)  -TW  therapy note (daily note)  -LM    Mode of Treatment  individual therapy;occupational therapy  -LM  physical therapy;individual therapy  -TW  individual therapy;occupational therapy  -LM    Patient/Family Observations  --  Pt in room in w/c  and agreeable to work with PTA.  -TW  --    Start Time (Evaluation/Treatment)  1345  -LM  1301  -TW  1115  -LM    Stop Time  (Evaluation/Treatment)  1430  -LM  1345  -TW  1200  -LM    Recorded by [LM] Ese Dodson COTA/MEI [TW] Brant Palacios PTA [LM] Ese Dodson COTA/L    Row Name 02/13/19 0925 02/13/19 0831          Evaluation/Treatment Time and Intent    Subjective Information  complains of;pain Head ache pain  -TW  no complaints  -CK     Existing Precautions/Restrictions  fall;lifting  -TW  fall;lifting  -CK     Document Type  therapy note (daily note)  -TW  therapy note (daily note)  -CK     Mode of Treatment  physical therapy;individual therapy  -TW  speech-language pathology;individual therapy  -CK     Patient/Family Observations  Pt sitting in w/c in room with Speech therapy.  -TW  Pt sitting in w/c in front of windows.  -CK     Start Time (Evaluation/Treatment)  0925  -TW  0831  -CK     Stop Time (Evaluation/Treatment)  1020  -TW  0925  -CK     Recorded by [TW] Brant Palacios PTA [CK] Divya Abarca, MS CCC-SLP     Row Name 02/13/19 1345 02/13/19 1301 02/13/19 1115       Cognition/Psychosocial- PT/OT    Affect/Mental Status (Cognitive)  WFL  -LM  WFL  -TW  WFL  -LM    Orientation Status (Cognition)  oriented x 4  -LM  oriented x 4  -TW  oriented x 4  -LM    Follows Commands (Cognition)  WFL  -LM  WFL  -TW  WFL  -LM    Personal Safety Interventions  fall prevention program maintained  -LM  fall prevention program maintained;gait belt;nonskid shoes/slippers when out of bed  -TW  fall prevention program maintained  -LM    Cognitive Function (Cognitive)  --  safety deficit  -TW  safety deficit  -LM    Recorded by [LM] Ese Dodson COTA/MEI [TW] Brant Palacios PTA [LM] Ese Dodson COTA/L    Row Name 02/13/19 0925             Cognition/Psychosocial- PT/OT    Affect/Mental Status (Cognitive)  WFL  -TW      Orientation Status (Cognition)  oriented x 4  -TW      Follows Commands (Cognition)  WFL  -TW      Personal Safety Interventions  fall prevention program maintained;gait belt;nonskid  shoes/slippers when out of bed  -TW      Cognitive Function (Cognitive)  safety deficit  -TW      Recorded by [TW] Brant Palacios PTA      Row Name 02/13/19 1301 02/13/19 1115 02/13/19 0925       Sit-Stand Transfer    Sit-Stand [place] (Transfers)  stand by assist;contact guard  -TW  supervision  -LM  stand by assist  -TW    Assistive Device (Sit-Stand Transfers)  walker, front-wheeled  -TW  walker, front-wheeled  -LM  walker, front-wheeled  -TW    Recorded by [TW] Brant Palacios PTA [LM] Ese Dodson, COULTER/L [TW] Brant Palacios, KAREN    Row Name 02/13/19 1301 02/13/19 1115 02/13/19 0925       Stand-Sit Transfer    Stand-Sit [place] (Transfers)  stand by assist;contact guard  -TW  supervision  -LM  stand by assist  -TW    Assistive Device (Stand-Sit Transfers)  walker, front-wheeled  -TW  walker, front-wheeled  -LM  walker, front-wheeled  -TW    Recorded by [TW] Brant Palacios PTA [LM] Ese Dodson, COULTER/L [TW] Brant Palacios PTA    Row Name 02/13/19 1115 02/13/19 0925          Toilet Transfer    Type (Toilet Transfer)  sit-stand;stand-sit  -LM  --     [place] Level (Toilet Transfer)  contact guard  -LM  contact guard  -TW     Assistive Device (Toilet Transfer)  --  commode;grab bars/safety frame;walker, front-wheeled  -TW     Recorded by [LM] Ese Dodson, COULTER/L [TW] Brant Palacios PTA     Row Name 02/13/19 1115 02/13/19 0925          Wheelchair Transfer    Type (Wheelchair Transfer)  sit-stand;stand-sit  -LM  --     [place] Level (Wheelchair Transfer)  --  contact guard  -TW     Assistive Device (Wheelchair Transfer)  --  other (see comments) sit pivot  -TW     Recorded by [LM] Ese Dodson, COULTER/L [TW] Brant Palacios PTA     Row Name 02/13/19 1301 02/13/19 0925          Gait/Stairs Assessment/Training    Gait/Stairs Assessment/Training  gait/ambulation assistive device  -TW  gait/ambulation assistive device  -TW     [place]  Level (Gait)  contact guard  -TW  contact guard  -TW     Assistive Device (Gait)  walker, front-wheeled  -TW  walker, front-wheeled  -TW     Distance in Feet (Gait)  78ft  -TW  68ft and 75ft  -TW     Pattern (Gait)  step-to  -TW  step-to  -TW     Deviations/Abnormal Patterns (Gait)  left sided deviations;stride length decreased  -TW  left sided deviations;stride length decreased  -TW     Bilateral Gait Deviations  foot drop/toe drag;knee buckling, left side  -TW  foot drop/toe drag;knee buckling, left side  -TW     Left Sided Gait Deviations  heel strike decreased;weight shift ability decreased  -TW  heel strike decreased;weight shift ability decreased  -TW     Negotiation (Stairs)  stairs independence  -TW  --     [place] Level (Stairs)  contact guard  -TW  --     Handrail Location (Stairs)  both sides  -TW  --     Number of Steps (Stairs)  1 x2 trials  -TW  --     Ascending Technique (Stairs)  step-to-step  -TW  --     Descending Technique (Stairs)  step-to-step  -TW  --     Stairs, Safety Issues  weight-shifting ability decreased  -TW  --     Stairs, Impairments  impaired balance;coordination impaired Pt only has 1 hand rail at home.  -TW  --     Comment (Gait/Stairs)  Pt completed gait within 8 minutes without issue.  -TW  Pt amb both distances in 13 minutes each with good steadiness noted but cont to display deviations.  -TW     Recorded by [TW] Brant Palacios PTA [TW] Brant Palacios PTA     Row Name 02/13/19 1345 02/13/19 1301 02/13/19 1115       Wheelchair Mobility/Management    Method of Wheelchair Locomotion (Mobility)  bimanual (upper extremity) propulsion  -LM  --  bimanual (upper extremity) propulsion  -LM    Mobility [place] Level (Wheelchair)  --  independent  -TW  --    Forward Propulsion [place] (Wheelchair)  --  independent  -TW  --    Backward Propulsion [place] (Wheelchair)  --  independent  -TW  --    Steering [place] (Wheelchair)  --  independent  -TW  --     Stopping [place] (Wheelchair)  --  independent  -TW  --    Turning [place] (Wheelchair)  --  independent  -TW  --    Doorway Navigation [place] (Wheelchair)  --  independent  -TW  --    Distance Propelled in Feet (Wheelchair)  --  150ft  -TW  --    Recorded by [LM] Ese Dodson COTA/MEI [TW] Brant Palacios PTA [LM] Ese Dodson COULTER/L    Row Name 02/13/19 0925             Wheelchair Mobility/Management    Mobility [place] Level (Wheelchair)  independent  -TW      Forward Propulsion [place] (Wheelchair)  independent  -TW      Backward Propulsion [place] (Wheelchair)  independent  -TW      Steering [place] (Wheelchair)  independent  -TW      Stopping [place] (Wheelchair)  independent  -TW      Turning [place] (Wheelchair)  independent  -TW      Doorway Navigation [place] (Wheelchair)  independent  -TW      Distance Propelled in Feet (Wheelchair)  150ft  -TW      Recorded by [TW] Brant Palacios PTA      Row Name 02/13/19 1400             Motor Assessment/Intervention    Additional Documentation  Fine Motor Testing & Training (Group)  -LM      Recorded by [LM] Ese Dodson COTA/L      Row Name 02/13/19 1400 02/13/19 1345          Gross Motor Coordination    Gross Motor Impairments  coordination;motor control  -LM  coordination  -LM     Recorded by [LM] Ese Dodson COTA/MEI [LM] Ese Dodson COULTER/L     Row Name 02/13/19 1345 02/13/19 1301 02/13/19 1115       Pain Scale: Numbers Pre/Post-Treatment    Pain Scale: Numbers, Pretreatment  7/10  -LM  7/10  -TW  7/10  -LM    Pain Scale: Numbers, Post-Treatment  7/10  -LM  7/10  -TW  7/10  -LM    Pain Location  --  head  -TW  neck  -LM    Recorded by [LM] Ese Dodson COTA/MEI [TW] Brant Palacios PTA [LM] Ese Dodson COULTER/L    Row Name 02/13/19 0925 02/13/19 0831          Pain Scale: Numbers Pre/Post-Treatment    Pain Scale: Numbers, Pretreatment  7/10  -TW  8/10  -CK      Pain Scale: Numbers, Post-Treatment  7/10  -TW  7/10  -CK     Pain Location  head  -TW  head  -CK     Pain Intervention(s)  --  Declines  -CK     Recorded by [TW] Brant Palacios PTA [CK] Divya Abarca MS CCC-SLP     Row Name 02/13/19 1345 02/13/19 1115          Pain Scale 2: Numbers Pre/Post-Treatment    Pain Scale 2: Numbers, Pretreatment  8/10  -LM  8/10  -LM     Pain Scale 2: Numbers, Post-Treatment  8/10  -LM  8/10  -LM     Pain Location 2 - Side  --  Left  -LM     Recorded by [LM] Ese Dodson COTA/MEI [LM] Ese Dodson COTA/L     Row Name 02/13/19 1345             Static Standing Balance    Level of [place] (Supported Standing, Static Balance)  supervision  -LM      Time Able to Maintain Position (Supported Standing, Static Balance)  4 to 5 minutes  -LM      Recorded by [LM] Ese Dodson COTA/L      Row Name 02/13/19 1345             Therapeutic Exercise    Therapeutic Exercise  aerobic exercise  -LM      Recorded by [LM] Ese Dodson COTA/L      Row Name 02/13/19 1345 02/13/19 1115          Upper Extremity Seated Therapeutic Exercise    Performed, Seated Upper Extremity (Therapeutic Exercise)  -- rickshaw ex 10 lbs 5 sets 10 rps  -LM  shoulder flexion/extension;shoulder abduction/adduction;shoulder horizontal abduction/adduction;scapular protraction/retraction;elbow flexion/extension level 3 t band   -LM     Device, Seated Upper Extremity (Therapeutic Exercise)  elastic bands/tubing level 3 tband all planes   -LM  --     Recorded by [LM] Ese Dodson COTA/MEI [LM] Ese Dodson COTA/L     Row Name 02/13/19 1345 02/13/19 1115          Aerobic Exercise Activity    Exercise Performed (Aerobic, Therapeutic Exercise)  arm bike  -LM  arm bike pro ll  -LM     Expected Outcome (Aerobic, Therapeutic Exercise)  improve functional tolerance, community activity  -LM  --     Time (Aerobic, Therapeutic Exercise)  10  -LM  20  -LM     Comment (Aerobic, Therapeutic  Exercise)  --  10 b ue b le and 10 bue only   -LM     Recorded by [LM] Ese Dodson COTA/L [LM] Ese Dodson COTA/L     Row Name 02/13/19 1345 02/13/19 1301          Lower Extremity Standing Therapeutic Exercise    Performed, Standing Lower Extremity (Therapeutic Exercise)  --  hip flexion/extension;hip abduction/adduction;shallow squats  -TW     Device, Standing Lower Extremity (Therapeutic Exercise)  --  -- Pt performed ex's using RW to assist with maintaining balanc  -TW     Exercise Type, Standing Lower Extremity (Therapeutic Exercise)  AROM (active range of motion)  -LM  AROM (active range of motion)  -TW     Sets/Reps Detail, Standing Lower Extremity (Therapeutic Exercise)  --  1/5-10  -TW     Recorded by [LM] Ese Dodson COTA/MEI [TW] Brant Palacios PTA     Row Name 02/13/19 0954             Lower Extremity Seated Therapeutic Exercise    Performed, Seated Lower Extremity (Therapeutic Exercise)  hip flexion/extension;ankle dorsiflexion/plantarflexion;LAQ (long arc quad), knee extension  -TW      Exercise Type, Seated Lower Extremity (Therapeutic Exercise)  AROM (active range of motion);AAROM (active assistive range of motion)  -TW      Sets/Reps Detail, Seated Lower Extremity (Therapeutic Exercise)  1/20  -TW      Comment, Seated Lower Extremity (Therapeutic Exercise)  Pt requires AAROM for L LE and frequent rest breaks.  -TW      Recorded by [TW] Brant Palacios PTA      Row Name 02/13/19 1345 02/13/19 1301 02/13/19 1115       Positioning and Restraints    Pre-Treatment Position  in bed  -LM  sitting in chair/recliner  -TW  sitting in chair/recliner  -LM    Post Treatment Position  wheelchair  -LM  wheelchair  -TW  wheelchair  -LM    In Wheelchair  notified nsg  -LM  sitting;encouraged to call for assist;with OT  -TW  sitting  -LM    Recorded by [LM] Ese Dodson COTA/MEI [TW] Brant Palacios PTA [LM] Ese Dodson COTA/L    Row Name 02/13/19 0925 02/13/19 0897           Positioning and Restraints    Pre-Treatment Position  sitting in chair/recliner  -TW  sitting in chair/recliner  -CK     Post Treatment Position  wheelchair  -TW  wheelchair  -CK     In Wheelchair  sitting;encouraged to call for assist  -TW  sitting;with PT  -CK     Recorded by [TW] Brant Palacios PTA [CK] Divya Abarca MS CCC-SLP     Row Name 02/13/19 1301 02/13/19 0925          Daily Summary of Progress (PT)    Functional Goal Overall Progress: Physical Therapy  progressing toward functional goals as expected  -TW  progressing toward functional goals as expected  -TW     Impairments Continuing to Limit Function: Physical Therapy  strength decreased;impaired balance;coordination impaired;motor control impaired;pain  -TW  strength decreased;impaired balance;coordination impaired;motor control impaired;pain  -TW     Recommendations: Physical Therapy  Cont  -TW  Cont  -TW     Recorded by [TW] Brant Palacios PTA [TW] Brant Palacios PTA     Row Name 02/13/19 1345 02/13/19 1115          Daily Summary of Progress (OT)    Functional Goal Overall Progress: Occupational Therapy  progressing toward functional goals as expected  -LM  progressing toward functional goals as expected  -LM     Recorded by [LM] Ese Dodson COTA/L [LM] Ese Dodson COULTER/L     Row Name 02/13/19 0831             Daily Summary of Progress (SLP)    Functional Goal Overall Progress: Speech Language Pathology  progressing toward functional goals as expected  -CK      Impairments Continuing to Limit Function: Speech Language Pathology  vision  -CK      Recommendations: Speech Language Pathology  Continue POC  -CK      Recorded by [CK] Divya Abarca MS CCC-SLP        User Key  (r) = Recorded By, (t) = Taken By, (c) = Cosigned By    Initials Name Effective Dates    CK Divya Abarca, MS CCC-SLP 04/03/18 -     TW Brant Palacios PTA 03/07/18 -     LM Ese Dodson COTA/MEI 03/07/18 -                   OT Recommendation and Plan         Plan of Care Review  Plan of Care Reviewed With: patient  Daily Summary of Progress (OT)  Functional Goal Overall Progress: Occupational Therapy: progressing toward functional goals as expected  Plan of Care Reviewed With: patient  IRF Plan of Care Review: progress ongoing, continue  Progress, Functional Goals: demonstrating adequate progress  Outcome Summary: pt already perf adl past date and this date attempt again next date perf well with tf with cca and increased strength b ue although demo some safety issues with tf     OT IRF GOALS     Row Name 02/13/19 1801 02/12/19 1741 02/11/19 1719       Transfer FIM Goals (OT-IRF)    Tub-Shower Transfer FIM Goal (OT-IRF)  Score of 6 (FIM)  -LM  Score of 6 (FIM)  -LM  Score of 6 (FIM)  -LM    Toilet Transfer FIM Goal (OT-IRF)  Score of 6 (FIM)  -LM  Score of 6 (FIM)  -LM  Score of 6 (FIM)  -LM    Time Frame (Transfer FIM Goals 1, OT-IRF)  long term goal (LTG);by discharge  -LM  long term goal (LTG);by discharge  -LM  long term goal (LTG);by discharge  -LM    Progress/Outcomes (Transfer FIM Goals, OT-IRF)  goal not met;continuing progress toward goal  -LM  goal not met;continuing progress toward goal  -LM  goal not met;continuing progress toward goal  -LM       Bathing FIM Goal (OT-IRF)    Bathing FIM Goal (OT-IRF)  Score of 6 (FIM)  -LM  Score of 6 (FIM)  -LM  Score of 6 (FIM)  -LM    Time Frame (Bathing FIM Goal, OT-IRF)  long term goal (LTG);by discharge  -LM  long term goal (LTG);by discharge  -LM  long term goal (LTG);by discharge  -LM    Progress/Outcomes (Bathing FIM Goal, OT-IRF)  goal not met;continuing progress toward goal  -LM  goal not met;continuing progress toward goal  -LM  goal not met;continuing progress toward goal  -LM       UB Dressing FIM Goal (OT-IRF)    Upper Body Dressing, FIM Goal, OT-IRF  Score of 6 (FIM)  -LM  Score of 6 (FIM)  -LM  Score of 6 (FIM)  -LM    Time Frame (UB Dressing FIM Goal, OT-IRF)   long term goal (LTG);by discharge  -LM  long term goal (LTG);by discharge  -LM  long term goal (LTG);by discharge  -LM    Progress/Outcomes (UB Dressing FIM Goal, OT-IRF)  goal not met;continuing progress toward goal  -LM  goal not met;continuing progress toward goal  -LM  goal not met;continuing progress toward goal  -LM       LB Dressing FIM Goal (OT-IRF)    Lower Body Dressing, FIM Goal, OT-IRF  Score of 6 (FIM)  -LM  Score of 6 (FIM)  -LM  Score of 6 (FIM)  -LM    Time Frame (LB Dressing FIM Goal, OT-IRF)  long term goal (LTG);by discharge  -LM  long term goal (LTG);by discharge  -LM  long term goal (LTG);by discharge  -LM    Progress/Outcomes (LB Dressing FIM Goal, OT-IRF)  goal not met;continuing progress toward goal  -LM  goal not met;continuing progress toward goal  -LM  goal not met;continuing progress toward goal  -LM       Toileting Goal 1 (OT-IRF)    Activity/Device (Toileting Goal 1, OT-IRF)  toileting skills, all  -LM  toileting skills, all  -LM  toileting skills, all  -LM    [place] Level (Toileting Goal 1, OT-IRF)  supervision required  -LM  supervision required  -LM  supervision required  -LM    Time Frame (Toileting Goal 1, OT-IRF)  long term goal (LTG);by discharge  -LM  long term goal (LTG);by discharge  -LM  long term goal (LTG);by discharge  -LM    Progress/Outcomes (Toileting Goal 1, OT-IRF)  goal not met;continuing progress toward goal  -LM  goal not met;continuing progress toward goal  -LM  goal not met;continuing progress toward goal  -LM       ROM Goal 1 (OT-IRF)    ROM Goal 1 (OT-IRF)  Pt will display WFL with ROM in BUE with min reports of pain/increased pain level.   -LM  Pt will display WFL with ROM in BUE with min reports of pain/increased pain level.   -LM  Pt will display WFL with ROM in BUE with min reports of pain/increased pain level.   -LM    Time Frame (ROM Goal 1, OT-IRF)  long term goal (LTG);by discharge  -LM  long term goal (LTG);by discharge  -LM  long term goal  (LTG);by discharge  -LM    Progress/Outcomes (ROM Goal 1, OT-IRF)  goal not met;continuing progress toward goal  -LM  goal not met;continuing progress toward goal  -LM  goal not met;continuing progress toward goal  -LM       Problem Specific Goal 1 (OT-IRF)    Problem Specific Goal 1 (OT-IRF)  Pt will be independent with BUE HEP and home safety awarness.   -LM  Pt will be independent with BUE HEP and home safety awarness.   -LM  Pt will be independent with BUE HEP and home safety awarness.   -LM    Time Frame (Problem Specific Goal 1, OT-IRF)  long term goal (LTG);by discharge  -LM  long term goal (LTG);by discharge  -LM  long term goal (LTG);by discharge  -LM    Progress/Outcomes (Problem Specific Goal 1, OT-IRF)  goal not met;continuing progress toward goal  -LM  goal not met;continuing progress toward goal  -LM  goal not met;continuing progress toward goal  -LM    Row Name 02/11/19 1410 02/09/19 1300 02/09/19 0950       Transfer FIM Goals (OT-IRF)    Tub-Shower Transfer FIM Goal (OT-IRF)  Score of 6 (FIM)  -CS  Score of 6 (FIM)  -LW  Score of 6 (FIM)  -RC    Toilet Transfer FIM Goal (OT-IRF)  Score of 6 (FIM)  -CS  Score of 6 (FIM)  -LW  Score of 6 (FIM)  -RC    Time Frame (Transfer FIM Goals 1, OT-IRF)  long term goal (LTG);by discharge  -CS  long term goal (LTG);by discharge  -LW  long term goal (LTG);by discharge  -RC    Progress/Outcomes (Transfer FIM Goals, OT-IRF)  goal not met;continuing progress toward goal  -CS  goal not met;continuing progress toward goal  -LW  goal not met;continuing progress toward goal  -RC       Bathing FIM Goal (OT-IRF)    Bathing FIM Goal (OT-IRF)  Score of 6 (FIM)  -CS  Score of 6 (FIM)  -LW  Score of 6 (FIM)  -RC    Time Frame ([place]athing FIM Goal, OT-IRF)  long term goal (LTG);by discharge  -CS  long term goal (LTG);by discharge  -LW  long term goal (LTG);by discharge  -RC    Progress/Outcomes ([place]athing FIM Goal, OT-IRF)  goal not met;continuing progress toward goal  -CS  goal not  met;continuing progress toward goal  -LW  goal not met;continuing progress toward goal  -RC       UB Dressing FIM Goal (OT-IRF)    Upper Body Dressing, FIM Goal, OT-IRF  Score of 6 (FIM)  -CS  Score of 6 (FIM)  -LW  Score of 6 (FIM)  -RC    Time Frame (UB Dressing FIM Goal, OT-IRF)  long term goal (LTG);by discharge  -CS  long term goal (LTG);by discharge  -LW  long term goal (LTG);by discharge  -RC    Progress/Outcomes (UB Dressing FIM Goal, OT-IRF)  goal not met;continuing progress toward goal  -CS  goal not met;continuing progress toward goal  -LW  goal not met;continuing progress toward goal  -RC       LB Dressing FIM Goal (OT-IRF)    Lower Body Dressing, FIM Goal, OT-IRF  Score of 6 (FIM)  -CS  Score of 6 (FIM)  -LW  Score of 6 (FIM)  -RC    Time Frame (LB Dressing FIM Goal, OT-IRF)  long term goal (LTG);by discharge  -CS  long term goal (LTG);by discharge  -LW  long term goal (LTG);by discharge  -RC    Progress/Outcomes (LB Dressing FIM Goal, OT-IRF)  goal not met;continuing progress toward goal  -CS  goal not met;continuing progress toward goal  -LW  goal not met;continuing progress toward goal  -RC       Toileting Goal 1 (OT-IRF)    Activity/Device (Toileting Goal 1, OT-IRF)  toileting skills, all  -CS  toileting skills, all  -LW  toileting skills, all  -RC    [place] Level (Toileting Goal 1, OT-IRF)  supervision required  -CS  supervision required  -LW  supervision required  -RC    Time Frame (Toileting Goal 1, OT-IRF)  long term goal (LTG);by discharge  -CS  long term goal (LTG);by discharge  -LW  long term goal (LTG);by discharge  -RC    Progress/Outcomes (Toileting Goal 1, OT-IRF)  goal not met;continuing progress toward goal  -CS  goal not met;continuing progress toward goal  -LW  goal not met;continuing progress toward goal  -RC       ROM Goal 1 (OT-IRF)    ROM Goal 1 (OT-IRF)  Pt will display WFL with ROM in BUE with min reports of pain/increased pain level.   -CS  Pt will display WFL with ROM in  BUE with min reports of pain/increased pain level.   -LW  Pt will display WFL with ROM in BUE with min reports of pain/increased pain level.   -RC    Time Frame (ROM Goal 1, OT-IRF)  long term goal (LTG);by discharge  -CS  long term goal (LTG);by discharge  -LW  long term goal (LTG);by discharge  -RC    Progress/Outcomes (ROM Goal 1, OT-IRF)  goal not met;continuing progress toward goal  -CS  goal not met;continuing progress toward goal  -LW  goal not met;continuing progress toward goal  -RC       Problem Specific Goal 1 (OT-IRF)    Problem Specific Goal 1 (OT-IRF)  Pt will be independent with BUE HEP and home safety awarness.   -CS  Pt will be independent with BUE HEP and home safety awarness.   -LW  Pt will be independent with BUE HEP and home safety awarness.   -RC    Time Frame (Problem Specific Goal 1, OT-IRF)  long term goal (LTG);by discharge  -CS  long term goal (LTG);by discharge  -LW  long term goal (LTG);by discharge  -RC    Progress/Outcomes (Problem Specific Goal 1, OT-IRF)  goal not met;continuing progress toward goal  -CS  goal not met;continuing progress toward goal  -LW  goal not met;continuing progress toward goal  -RC    Row Name 02/08/19 0700 02/07/19 1000 02/06/19 1015       Transfer FIM Goals (OT-IRF)    Tub-Shower Transfer FIM Goal (OT-IRF)  Score of 6 (FIM)  -LW  Score of 6 (FIM)  -KD  Score of 6 (FIM)  -BL    Toilet Transfer FIM Goal (OT-IRF)  Score of 6 (FIM)  -LW  Score of 6 (FIM)  -KD  Score of 6 (FIM)  -BL    Time Frame (Transfer FIM Goals 1, OT-IRF)  long term goal (LTG);by discharge  -LW  long term goal (LTG);by discharge  -KD  long term goal (LTG);by discharge  -BL    Progress/Outcomes (Transfer FIM Goals, OT-IRF)  goal not met  -LW  goal not met  -KD  goal not met  -BL       Bathing FIM Goal (OT-IRF)    Bathing FIM Goal (OT-IRF)  Score of 6 (FIM)  -LW  Score of 6 (FIM)  -KD  Score of 6 (FIM)  -BL    Time Frame (Bathing FIM Goal, OT-IRF)  long term goal (LTG);by discharge  -LW  long  term goal (LTG);by discharge  -KD  long term goal (LTG);by discharge  -BL    Progress/Outcomes (Bathing FIM Goal, OT-IRF)  goal not met  -LW  goal not met  -KD  goal not met  -BL       UB Dressing FIM Goal (OT-IRF)    Upper Body Dressing, FIM Goal, OT-IRF  Score of 6 (FIM)  -LW  Score of 6 (FIM)  -KD  Score of 6 (FIM)  -BL    Time Frame (UB Dressing FIM Goal, OT-IRF)  long term goal (LTG);by discharge  -LW  long term goal (LTG);by discharge  -KD  long term goal (LTG);by discharge  -BL    Progress/Outcomes (UB Dressing FIM Goal, OT-IRF)  goal not met  -LW  goal not met  -KD  goal not met  -BL       LB Dressing FIM Goal (OT-IRF)    Lower Body Dressing, FIM Goal, OT-IRF  Score of 6 (FIM)  -LW  Score of 6 (FIM)  -KD  Score of 6 (FIM)  -BL    Time Frame (LB Dressing FIM Goal, OT-IRF)  long term goal (LTG);by discharge  -LW  long term goal (LTG);by discharge  -KD  long term goal (LTG);by discharge  -BL    Progress/Outcomes (LB Dressing FIM Goal, OT-IRF)  goal not met  -LW  goal not met  -KD  goal not met  -BL       Toileting Goal 1 (OT-IRF)    Activity/Device (Toileting Goal 1, OT-IRF)  toileting skills, all  -LW  toileting skills, all  -KD  toileting skills, all  -BL    [place] Level (Toileting Goal 1, OT-IRF)  supervision required  -LW  supervision required  -KD  supervision required  -BL    Time Frame (Toileting Goal 1, OT-IRF)  long term goal (LTG);by discharge  -LW  long term goal (LTG);by discharge  -KD  long term goal (LTG);by discharge  -BL    Progress/Outcomes (Toileting Goal 1, OT-IRF)  goal not met  -LW  goal not met  -KD  goal not met  -BL       ROM Goal 1 (OT-IRF)    ROM Goal 1 (OT-IRF)  Pt will display WFL with ROM in BUE with min reports of pain/increased pain level.   -LW  Pt will display WFL with ROM in BUE with min reports of pain/increased pain level.   -KD  Pt will display WFL with ROM in BUE with min reports of pain/increased pain level.   -BL    Time Frame (ROM Goal 1, OT-IRF)  long term goal  (LTG);by discharge  -LW  long term goal (LTG);by discharge  -KD  long term goal (LTG);by discharge  -BL    Progress/Outcomes (ROM Goal 1, OT-IRF)  goal not met  -LW  goal not met  -KD  goal not met  -BL       Problem Specific Goal 1 (OT-IRF)    Problem Specific Goal 1 (OT-IRF)  Pt will be independent with BUE HEP and home safety awarness.   -LW  Pt will be independent with BUE HEP and home safety awarness.   -KD  Pt will be independent with BUE HEP and home safety awarness.   -BL    Time Frame (Problem Specific Goal 1, OT-IRF)  long term goal (LTG);by discharge  -LW  long term goal (LTG);by discharge  -KD  long term goal (LTG);by discharge  -BL    Progress/Outcomes (Problem Specific Goal 1, OT-IRF)  goal not met  -LW  goal not met  -KD  goal not met  -BL    Row Name 02/06/19 0732             Transfer FIM Goals (OT-IRF)    Tub-Shower Transfer FIM Goal (OT-IRF)  Score of 6 (FIM)  -BH      Toilet Transfer FIM Goal (OT-IRF)  Score of 6 (FIM)  -      Time Frame (Transfer FIM Goals 1, OT-IRF)  long term goal (LTG);by discharge  -      Progress/Outcomes (Transfer FIM Goals, OT-IRF)  goal not met  -BH         Bathing FIM Goal (OT-IRF)    Bathing FIM Goal (OT-IRF)  Score of 6 (FIM)  -      Time Frame (Bathing FIM Goal, OT-IRF)  long term goal (LTG);by discharge  -      Progress/Outcomes (Bathing FIM Goal, OT-IRF)  goal not met  -BH         UB Dressing FIM Goal (OT-IRF)    Upper Body Dressing, FIM Goal, OT-IRF  Score of 6 (FIM)  -BH      Time Frame (UB Dressing FIM Goal, OT-IRF)  long term goal (LTG);by discharge  -      Progress/Outcomes (UB Dressing FIM Goal, OT-IRF)  goal not met  -BH         LB Dressing FIM Goal (OT-IRF)    Lower Body Dressing, FIM Goal, OT-IRF  Score of 6 (FIM)  -      Time Frame (LB Dressing FIM Goal, OT-IRF)  long term goal (LTG);by discharge  -      Progress/Outcomes (LB Dressing FIM Goal, OT-IRF)  goal not met  -BH         Toileting Goal 1 (OT-IRF)    Activity/Device (Toileting Goal 1,  OT-IRF)  toileting skills, all  -      [place] Level (Toileting Goal 1, OT-IRF)  supervision required  -      Time Frame (Toileting Goal 1, OT-IRF)  long term goal (LTG);by discharge  -      Progress/Outcomes (Toileting Goal 1, OT-IRF)  goal not met  -BH         ROM Goal 1 (OT-IRF)    ROM Goal 1 (OT-IRF)  Pt will display WFL with ROM in BUE with min reports of pain/increased pain level.   -      Time Frame (ROM Goal 1, OT-IRF)  long term goal (LTG);by discharge  -      Progress/Outcomes (ROM Goal 1, OT-IRF)  goal not met  -BH         Problem Specific Goal 1 (OT-IRF)    Problem Specific Goal 1 (OT-IRF)  Pt will be independent with BUE HEP and home safety awarness.   -      Time Frame (Problem Specific Goal 1, OT-IRF)  long term goal (LTG);by discharge  -      Progress/Outcomes (Problem Specific Goal 1, OT-IRF)  goal not met  -BH        User Key  (r) = Recorded By, (t) = Taken By, (c) = Cosigned By    Initials Name Provider Type     Maria Elena Barragan, OTR/L Occupational Therapist    Valeri Lozoya, COULTER/L Occupational Therapy Assistant    Ele Chu, COULTER/L Occupational Therapy Assistant    Giana Carrizales, COULTER/L Occupational Therapy Assistant    Ese Huang, COULTER/L Occupational Therapy Assistant    Divya Khan, COULTER/L Occupational Therapy Assistant    Irina Lorenzo, COULTER/L Occupational Therapy Assistant          Occupational Therapy Education     Title: PT OT SLP Therapies (In Progress)     Topic: Occupational Therapy (Done)     Point: ADL training (Done)     Description: Instruct learner(s) on proper safety adaptation and remediation techniques during self care or transfers.   Instruct in proper use of assistive devices.    Learning Progress Summary           Patient Acceptance, E, VU by GWYN at 2/12/2019  5:42 PM    Acceptance, E,TB, VU by BARBIE at 2/9/2019  3:38 PM    Acceptance, E,TB, VU by BARBIE at 2/8/2019  9:12 AM    Acceptance, E, VU,NR by  at 2/6/2019   1:35 PM    Comment:  Educated about OT and POC. Educated to call for assist. Educated on safety throughout.                   Point: Home exercise program (Done)     Description: Instruct learner(s) on appropriate technique for monitoring, assisting and/or progressing therapeutic exercises/activities.    Learning Progress Summary           Patient Acceptance, E, VU by  at 2/12/2019  5:42 PM    Acceptance, E,TB, VU by  at 2/9/2019  3:38 PM    Acceptance, E,TB, VU by  at 2/8/2019  9:12 AM                   Point: Precautions (Done)     Description: Instruct learner(s) on prescribed precautions during self-care and functional transfers.    Learning Progress Summary           Patient Acceptance, E, VU by  at 2/12/2019  5:42 PM    Acceptance, E,TB, VU by  at 2/9/2019  3:38 PM    Acceptance, E,TB, VU by  at 2/8/2019  9:12 AM    Acceptance, E, VU,NR by  at 2/6/2019  1:35 PM    Comment:  Educated about OT and POC. Educated to call for assist. Educated on safety throughout.    Acceptance, E, VU,NR by  at 2/6/2019 11:36 AM                   Point: Body mechanics (Done)     Description: Instruct learner(s) on proper positioning and spine alignment during self-care, functional mobility activities and/or exercises.    Learning Progress Summary           Patient Acceptance, E, VU by  at 2/12/2019  5:42 PM    Acceptance, E,TB, VU by LW at 2/9/2019  3:38 PM    Acceptance, E,TB, VU by  at 2/8/2019  9:12 AM    Acceptance, E, VU,NR by  at 2/6/2019 11:36 AM                               User Key     Initials Effective Dates Name Provider Type Discipline     06/08/18 -  Maria Elena Barragan OTR/L Occupational Therapist OT     10/17/16 -  Giana Chakraborty COULTER/L Occupational Therapy Assistant OT     03/07/18 -  Ese Dodson COULTER/L Occupational Therapy Assistant OT     03/07/18 -  Irina Armstrong COTA/L Occupational Therapy Assistant OT                Outcome Measures     Row Name 02/13/19 0925 02/12/19  0810 02/11/19 1507       How much help from another person do you currently need...    Turning from your back to your side while in flat bed without using bedrails?  4  -TW  4  -JA  4  -TW    Moving from lying on back to sitting on the side of a flat bed without bedrails?  4  -TW  4  -JA  4  -TW    Moving to and from a bed to a chair (including a wheelchair)?  3  -TW  3  -JA  3  -TW    Standing up from a chair using your arms (e.g., wheelchair, bedside chair)?  3  -TW  3  -JA  3  -TW    Climbing 3-5 steps with a railing?  2  -TW  2  -JA  2  -TW    To walk in hospital room?  3  -TW  3  -JA  3  -TW    AM-PAC 6 Clicks Score  19  -TW  19  -JA  19  -TW       Functional Assessment    Outcome Measure Options  AM-PAC 6 Clicks Basic Mobility (PT)  -TW  AM-PAC 6 Clicks Basic Mobility (PT)  -JA  AM-PAC 6 Clicks Basic Mobility (PT)  -TW    Row Name 02/11/19 0810             How much help from another person do you currently need...    Turning from your back to your side while in flat bed without using bedrails?  4  -TW      Moving from lying on back to sitting on the side of a flat bed without bedrails?  4  -TW      Moving to and from a bed to a chair (including a wheelchair)?  3  -TW      Standing up from a chair using your arms (e.g., wheelchair, bedside chair)?  3  -TW      Climbing 3-5 steps with a railing?  2  -TW      To walk in hospital room?  3  -TW      AM-PAC 6 Clicks Score  19  -TW         Functional Assessment    Outcome Measure Options  AM-PAC 6 Clicks Basic Mobility (PT)  -TW        User Key  (r) = Recorded By, (t) = Taken By, (c) = Cosigned By    Initials Name Provider Type    Robinson Gary PTA Physical Therapy Assistant    TW Brant Palacios PTA Physical Therapy Assistant             Time Calculation:     Time Calculation- OT     Row Name 02/13/19 1743 02/13/19 1742 02/13/19 1132       Time Calculation- OT    OT Start Time  1345  -LM  1115  -LM  --  -BB    OT Stop Time  1430  -LM  1200  -LM  --   -BB    OT Time Calculation (min)  45 min  -LM  45 min  -LM  --  -BB    Total Timed Code Minutes- OT  45 minute(s)  -LM  45 minute(s)  -LM  --  -BB    OT Received On  02/13/19  -LM  02/13/19  -LM  --  -BB      User Key  (r) = Recorded By, (t) = Taken By, (c) = Cosigned By    Initials Name Provider Type    Kenya Bearden COULTER/L Occupational Therapy Assistant    LM Ese Dodson COTA/L Occupational Therapy Assistant          Therapy Suggested Charges     Code   Minutes Charges    None              Therapy Charges for Today     Code Description Service Date Service Provider Modifiers Qty    02285159935 HC OT THER PROC EA 15 MIN 2/12/2019 Ese Dodson COTA/L GO 2    94918748236 HC OT THERAPEUTIC ACT EA 15 MIN 2/12/2019 Ese Dodson COULTER/L GO 1    45371156153 HC OT THER PROC EA 15 MIN 2/12/2019 Ese Dodson COULTER/L GO 2    36674474145 HC OT THERAPEUTIC ACT EA 15 MIN 2/12/2019 Ese Dodson COULTER/L GO 1    54976050628 HC OT THER PROC EA 15 MIN 2/13/2019 Ese Dodson COULTER/L GO 2    17774563046 HC OT THERAPEUTIC ACT EA 15 MIN 2/13/2019 Ese Dodson COULTER/L GO 1    22878824026 HC OT THER PROC EA 15 MIN 2/13/2019 Ese Dodson COULTER/L GO 2    28065116751 HC OT THERAPEUTIC ACT EA 15 MIN 2/13/2019 Ese Dodson COULTER/L GO 1                   MARYLIN Novak/MEI  2/13/2019

## 2019-02-14 NOTE — PLAN OF CARE
Problem: Patient Care Overview  Goal: Plan of Care Review  Outcome: Ongoing (interventions implemented as appropriate)   02/14/19 0878   Patient Care Overview   IRF Plan of Care Review progress ongoing, continue   Progress, Functional Goals demonstrating adequate progress   Coping/Psychosocial   Plan of Care Reviewed With patient   OTHER   Outcome Summary working well towards goals. d/c tomorrow     Goal: Individualization and Mutuality  Outcome: Ongoing (interventions implemented as appropriate)    Goal: Discharge Needs Assessment  Outcome: Ongoing (interventions implemented as appropriate)    Goal: Home Safety Plan  Outcome: Ongoing (interventions implemented as appropriate)    Goal: Coping Plan  Outcome: Ongoing (interventions implemented as appropriate)    Goal: Community Reintegration Plan  Outcome: Ongoing (interventions implemented as appropriate)      Problem: Fall Risk (Adult)  Goal: Absence of Fall  Outcome: Ongoing (interventions implemented as appropriate)      Problem: Diabetes, Type 2 (Adult)  Goal: Signs and Symptoms of Listed Potential Problems Will be Absent, Minimized or Managed (Diabetes, Type 2)  Outcome: Ongoing (interventions implemented as appropriate)

## 2019-02-14 NOTE — PLAN OF CARE
Problem: Patient Care Overview  Goal: Plan of Care Review  Outcome: Ongoing (interventions implemented as appropriate)   02/13/19 1802 02/13/19 1938 02/14/19 0428   Patient Care Overview   IRF Plan of Care Review progress ongoing, continue --  --    Progress, Functional Goals demonstrating adequate progress --  --    Coping/Psychosocial   Plan of Care Reviewed With --  patient --    OTHER   Outcome Summary --  --  vss. resting between care. pain controlled with interventions. continue to monitor.       Problem: Fall Risk (Adult)  Goal: Absence of Fall  Outcome: Ongoing (interventions implemented as appropriate)      Problem: Diabetes, Type 2 (Adult)  Goal: Signs and Symptoms of Listed Potential Problems Will be Absent, Minimized or Managed (Diabetes, Type 2)  Outcome: Ongoing (interventions implemented as appropriate)

## 2019-02-14 NOTE — PATIENT CARE CONFERENCE
Attendance of weekly team conference:   Robinson Reina, Dr. Richard Galvez, Maria Elena Abarca, Ese Dodson and Pratima Deng    Patient's progress reviewed, FIMs reviewed, discharge date discussed and equipment needs addressed.     Expected Discharge Date: 2-15-19  Anticipated discharge orders/equipment: Home

## 2019-02-14 NOTE — PLAN OF CARE
Problem: Patient Care Overview  Goal: Plan of Care Review  Outcome: Ongoing (interventions implemented as appropriate)   02/14/19 1183   Patient Care Overview   IRF Plan of Care Review progress ongoing, continue   Progress, Functional Goals demonstrating adequate progress   Coping/Psychosocial   Plan of Care Reviewed With patient   OTHER   Outcome Summary perf well with OT pt denied adl secondary to sig other assisting him this pm preferred by pt increased strength end and [facility] c

## 2019-02-14 NOTE — THERAPY TREATMENT NOTE
Inpatient Rehabilitation - Physical Therapy Treatment Note  [facility]     Patient Name: Ventura Boggs  : 1960  MRN: [id number]    Today's Date: 2019                 Admit Date: 2019      Visit Dx:      ICD-10-CM ICD-9-CM   1. Stroke-like symptoms R29.90 781.99   2. Uncontrolled type 2 diabetes mellitus with hyperglycemia (CMS/HCC) E11.65 250.02   3. Medically complex patient Z78.9 V49.9   4. Chronic intractable pain G89.29 338.29   5. Symbolic dysfunction R48.9 784.60   6. Impaired mobility and ADLs Z74.09 799.89   7. Impaired functional mobility, balance, gait, and endurance Z74.09 V49.89   8. Left-sided muscle weakness M62.81 728.87   9. Ataxia R27.0 781.3       Patient Active Problem List   Diagnosis   • Uncontrolled type 2 diabetes mellitus with hyperglycemia (CMS/HCC)   • Postoperative urinary retention   • Incisional hernia, without obstruction or gangrene   • Acute appendicitis with localized peritonitis   • Closed head injury   • Hyponatremia   • History of recurrent TIAs   • Ataxia   • Chronic pain syndrome   • Right shoulder pain   • Left-sided muscle weakness   • Medically complex patient   • Stroke-like symptoms       Therapy Treatment    IRF Treatment Summary     Row Name 19          Evaluation/Treatment Time and Intent    Subjective Information  no complaints  -TW  no complaints  -EC     Existing Precautions/Restrictions  fall;lifting  -TW  fall  -EC     Document Type  therapy note (daily note)  -TW  therapy note (daily note)  -EC     Mode of Treatment  physical therapy;individual therapy  -TW  individual therapy;speech-language pathology  -EC     Patient/Family Observations  --  in room in   -EC     Start Time (Evaluation/Treatment)  0920  -TW  0800  -EC     Stop Time (Evaluation/Treatment)  1015  -TW  0838  -EC     Recorded by [TW] Brant Palacios PTA [EC] Madison Hall CCC-SLP     Row Name 19              Cognition/Psychosocial- PT/OT    Affect/Mental Status (Cognitive)  WFL  -TW      Orientation Status (Cognition)  oriented x 4  -TW      Follows Commands (Cognition)  WFL  -TW      Personal Safety Interventions  fall prevention program maintained;gait belt;nonskid shoes/slippers when out of bed  -TW      Cognitive Function (Cognitive)  safety deficit  -TW      Recorded by [TW] Brant Palacios, KAREN      Row Name 02/14/19 0920             Bed Mobility Assessment/Treatment    Comment (Bed Mobility)  Not tested pt already up in chair.  -TW      Recorded by [TW] Brant Palacios PTA      Row Name 02/14/19 0920             Sit-Stand Transfer    Sit-Stand [place] (Transfers)  stand by assist Pt perrformed 3 sets of 5 consecutive t/f's for practice.  -TW      Assistive Device (Sit-Stand Transfers)  walker, front-wheeled  -TW      Recorded by [TW] Brant Palacios PTA      Row Name 02/14/19 0920             Stand-Sit Transfer    Stand-Sit [place] (Transfers)  stand by assist  -TW      Assistive Device (Stand-Sit Transfers)  walker, front-wheeled  -TW      Recorded by [TW] Brant Palacios, KAREN      Row Name 02/14/19 0920             Gait/Stairs Assessment/Training    Gait/Stairs Assessment/Training  gait/ambulation assistive device  -TW      [place] Level (Gait)  contact guard  -TW      Assistive Device (Gait)  walker, front-wheeled  -TW      Distance in Feet (Gait)  144ft  -TW      Pattern (Gait)  step-to  -TW      Deviations/Abnormal Patterns (Gait)  left sided deviations;stride length decreased  -TW      Bilateral Gait Deviations  foot drop/toe drag  -TW      Left Sided Gait Deviations  heel strike decreased;weight shift ability decreased  -TW      Negotiation (Stairs)  stairs independence  -TW      [place] Level (Stairs)  contact guard  -TW      Handrail Location (Stairs)  right side (ascending);both sides Pt completed 1st trial with B HR's then 2nd trial using R HR  -TW      Number of  Steps (Stairs)  5x2  -TW      Ascending Technique (Stairs)  step-to-step  -TW      Descending Technique (Stairs)  step-to-step  -TW      Stairs, Impairments  impaired balance;coordination impaired Pt only has 1 hand rail at home.  -TW      Recorded by [TW] Brant Palacios PTA      Row Name 02/14/19 0920             Wheelchair Mobility/Management    Mobility [place] Level (Wheelchair)  independent  -TW      Forward Propulsion [place] (Wheelchair)  independent  -TW      Backward Propulsion [place] (Wheelchair)  independent  -TW      Steering [place] (Wheelchair)  independent  -TW      Stopping [place] (Wheelchair)  independent  -TW      Turning [place] (Wheelchair)  independent  -TW      Doorway Navigation [place] (Wheelchair)  independent  -TW      Distance Propelled in Feet (Wheelchair)  344ft  -TW      Comment, Parts Management (Wheelchair)  Pt manuevering w/c throughout hallways all through the day without incident observed.  -TW      Recorded by [TW] Brant Palacios PTA      Row Name 02/14/19 0920 02/14/19 0800          Pain Scale: Numbers Pre/Post-Treatment    Pain Scale: Numbers, Pretreatment  5/10  -TW  6/10  -EC     Pain Scale: Numbers, Post-Treatment  6/10  -TW  7/10  -EC     Pain Location - Side  Right  -TW  Left  -EC     Pain Location - Orientation  --  upper  -EC     Pain Location  shoulder  -TW  hand  -EC     Recorded by [TW] Brant Palacios PTA [EC] Madison Hall CCC-SLP     Row Name 02/14/19 0920             Lower Extremity Standing Therapeutic Exercise    Performed, Standing Lower Extremity (Therapeutic Exercise)  hip flexion/extension;hip abduction/adduction;shallow squats  -TW      Device, Standing Lower Extremity (Therapeutic Exercise)  -- Pt used RW to assist with balance.  -TW      Exercise Type, Standing Lower Extremity (Therapeutic Exercise)  AROM (active range of motion)  -TW      Sets/Reps Detail, Standing Lower Extremity (Therapeutic  Exercise)  1/10  -TW      Recorded by [TW] Brant Palacios PTA      Row Name 19 09             Vital Signs    Pre Patient Position  Sitting  -TW      Intra Patient Position  Standing  -TW      Post Patient Position  Sitting  -TW      Recorded by [TW] Brant Palacios PTA      Row Name 19 0920 19 0800          Positioning and Restraints    Pre-Treatment Position  sitting in chair/recliner  -TW  sitting in chair/recliner  -EC     Post Treatment Position  wheelchair  -TW  wheelchair  -EC     In Wheelchair  sitting;encouraged to call for assist  -TW  exit alarm on;encouraged to call for assist;call light within reach;sitting  -EC     Recorded by [TW] Brant Palacios PTA [] Madison Hall CCC-SLP     Row Name 19 09             Daily Summary of Progress (PT)    Functional Goal Overall Progress: Physical Therapy  progressing toward functional goals as expected  -TW      Impairments Continuing to Limit Function: Physical Therapy  strength decreased;impaired balance;coordination impaired;motor control impaired;pain  -TW      Recommendations: Physical Therapy  Cont  -TW      Recorded by [TW] Brant Palacios PTA      Row Name 19 0912             Nursing Weekly Outcome Summary    Weekly Progress Summary: Nursing  improving, more independent in daily activity  -      Weekly Outcome Statement: Nursing  Eatin; Toiletin; Bladder: 6/6: Bowel: 66  -      Recorded by [] Julee Aguillon RN        User Key  (r) = Recorded By, (t) = Taken By, (c) = Cosigned By    Initials Name Effective Dates     Madison Hall CCC-SLP 18 -      Julee Aguillon RN 10/17/16 -      Brant Palacios PTA 18 -            Physical Therapy Education     Title: PT OT SLP Therapies (In Progress)     Topic: Physical Therapy (In Progress)     Point: Mobility training (In Progress)     Learning Progress Summary           Patient TA Bain, NR by BS at  2/8/2019 12:59 PM    Comment:  further addressed body position with weight shifting and sequencing of LE's with limb advancement in gait training w/ // bars. Tactile and verbal cues provided.    Acceptance, E, NR by GAL at 2/6/2019  2:39 PM                   Point: Home exercise program (In Progress)     Learning Progress Summary           Patient TA Bain, NR by BS at 2/8/2019 12:59 PM    Comment:  further addressed body position with weight shifting and sequencing of LE's with limb advancement in gait training w/ // bars. Tactile and verbal cues provided.                   Point: Body mechanics (In Progress)     Learning Progress Summary           Patient TA Bain, NR by BS at 2/8/2019 12:59 PM    Comment:  further addressed body position with weight shifting and sequencing of LE's with limb advancement in gait training w/ // bars. Tactile and verbal cues provided.    Acceptance, E, NR by GAL at 2/6/2019  2:39 PM                   Point: Precautions (In Progress)     Learning Progress Summary           Patient TA Bain, NR by BS at 2/8/2019 12:59 PM    Comment:  further addressed body position with weight shifting and sequencing of LE's with limb advancement in gait training w/ // bars. Tactile and verbal cues provided.    Acceptance, E, NR by GAL at 2/6/2019  2:39 PM                               User Key     Initials Effective Dates Name Provider Type Discipline    GAL 04/03/18 -  Shilpa Dumont, PT Physical Therapist PT    BS 04/08/18 -  Jose Antonio Cuevas, PT Physical Therapist PT                  PT Recommendation and Plan     Plan of Care Reviewed With: patient  Daily Summary of Progress (PT)  Functional Goal Overall Progress: Physical Therapy: progressing toward functional goals as expected  Impairments Continuing to Limit Function: Physical Therapy: strength decreased, impaired balance, coordination impaired, motor control impaired, pain  Recommendations: Physical Therapy: Cont  IRF Plan of Care Review: progress  ongoing, continue  Progress, Functional Goals: demonstrating adequate progress  Outcome Summary: Pt demonstrates good ability to perform step training on 5 steps x 2 trials initially with B HR's then with only using 1 HR. Pt amb 144ft with RW and CGA with no episode of knee buckling or LOB in 16 minute time frame.       PT IRF GOALS     Row Name 02/14/19 0988             Bed Mobility Goal 1 (PT-IRF)    Activity/Assistive Device (Bed Mobility Goal 1, PT-IRF)  rolling to left;rolling to right;sit to supine;supine to sit  -TW      [place] Level (Bed Mobility Goal 1, PT-IRF)  independent  -TW      Time Frame (Bed Mobility Goal 1, PT-IRF)  1 week  -TW      Progress/Outcomes (Bed Mobility Goal 1, PT-IRF)  goal not met  -TW         Transfer Goal 1 (PT-IRF)    Activity/Assistive Device (Transfer Goal 1, PT-IRF)  sit-to-stand/stand-to-sit;bed-to-chair/chair-to-bed;wheelchair transfer  -TW      [place] Level (Transfer Goal 1, PT-IRF)  conditional independence  -TW      Time Frame (Transfer Goal 1, PT-IRF)  1 week  -TW      Progress/Outcomes (Transfer Goal 1, PT-IRF)  goal not met  -TW         Gait/Walking Locomotion Goal 1 (PT-IRF)    Activity/Assistive Device (Gait/Walking Locomotion Goal 1, PT-IRF)  gait (walking locomotion);assistive device use  -TW      Gait/Walking Locomotion Distance Goal 1 (PT-IRF)  50ft  -TW      [place] Level (Gait/Walking Locomotion Goal 1, PT-IRF)  contact guard assist  -TW      Time Frame (Gait/Walking Locomotion Goal 1, PT-IRF)  1 week  -TW      Progress/Outcomes (Gait/Walking Locomotion Goal 1, PT-IRF)  goal met  (Significant)   -TW         Gait/Walking Locomotion Goal 2 (PT-IRF)    Activity/Assistive Device (Gait/Walking Locomotion Goal 2, PT-IRF)  gait (walking locomotion);assistive device use  -TW      Gait/Walking Locomotion Distance Goal 2 (PT-IRF)  150ft or more  -TW      [place] Level (Gait/Walking Locomotion Goal 2, PT-IRF)  conditional independence  -TW      Time  Frame (Gait/Walking Locomotion Goal 2, PT-IRF)  2 weeks  -TW      Progress/Outcomes (Gait/Walking Locomotion Goal 2, PT-IRF)  goal not met  -TW         Wheelchair Locomotion Goal 1 (PT-IRF)    Activity (Wheelchair Locomotion Goal 1, PT-IRF)  forward propulsion;backward propulsion;steering;turning;doorway navigation  -TW      [place] Level (Wheelchair Locomotion Goal 1, PT-IRF)  independent  -TW      Distance Goal 1 (Wheelchair Locomotion, PT-IRF)  150ft  -TW      Time Frame (Wheelchair Locomotion Goal 1, PT-IRF)  2 days  -TW      Progress/Outcomes (Wheelchair Locomotion Goal 1, PT-IRF)  goal met  (Significant)   -TW         Stairs Goal 1 (PT-IRF)    Activity/Assistive Device (Stairs Goal 1, PT-IRF)  ascending stairs;descending stairs;using handrail, right  -TW      Number of Stairs (Stairs Goal 1, PT-IRF)  5  -TW      [place] Level (Stairs Goal 1, PT-IRF)  standby assist;conditional independence  -TW      Time Frame (Stairs Goal 1, PT-IRF)  2 weeks  -TW      Progress/Outcomes (Stairs Goal 1, PT-IRF)  goal not met  -TW         Strength Goal 1 (PT-IRF)    Strength Goal 1 (PT-IRF)  Pt will perform 20 reps of AROM exercises with VSS  -TW      Time Frame (Strength Goal 1, PT-IRF)  by discharge  -TW      Progress/Outcomes (Strength Goal 1, PT-IRF)  goal not met  -TW         Caregiver Training Goal 1 (PT-IRF)    Caregiver Training Goal 1 (PT-IRF)  Homecaregiver will demonstrate understanding assisting with mobility as needed, home safety and homecare instructions  -TW      Time Frame (Caregiver Training Goal 1, PT-IRF)  by discharge  -TW      Progress/Outcomes (Caregiver Training Goal 1, PT-IRF)  goal not met  -TW        User Key  (r) = Recorded By, (t) = Taken By, (c) = Cosigned By    Initials Name Provider Type    TW Brant Palacios PTA Physical Therapy Assistant        Outcome Measures     Row Name 02/14/19 0920 02/13/19 0925 02/12/19 0810       How much help from another person do you currently need...     Turning from your back to your side while in flat bed without using bedrails?  4  -TW  4  -TW  4  -JA    Moving from lying on back to sitting on the side of a flat bed without bedrails?  4  -TW  4  -TW  4  -JA    Moving to and from a bed to a chair (including a wheelchair)?  3  -TW  3  -TW  3  -JA    Standing up from a chair using your arms (e.g., wheelchair, bedside chair)?  3  -TW  3  -TW  3  -JA    Climbing 3-5 steps with a railing?  2  -TW  2  -TW  2  -JA    To walk in hospital room?  3  -TW  3  -TW  3  -JA    AM-PAC 6 Clicks Score  19  -TW  19  -TW  19  -JA       Functional Assessment    Outcome Measure Options  AM-PAC 6 Clicks Basic Mobility (PT)  -TW  AM-PAC 6 Clicks Basic Mobility (PT)  -TW  AM-PAC 6 Clicks Basic Mobility (PT)  -    Row Name 02/11/19 1507             How much help from another person do you currently need...    Turning from your back to your side while in flat bed without using bedrails?  4  -TW      Moving from lying on back to sitting on the side of a flat bed without bedrails?  4  -TW      Moving to and from a bed to a chair (including a wheelchair)?  3  -TW      Standing up from a chair using your arms (e.g., wheelchair, bedside chair)?  3  -TW      Climbing 3-5 steps with a railing?  2  -TW      To walk in hospital room?  3  -TW      AM-PAC 6 Clicks Score  19  -TW         Functional Assessment    Outcome Measure Options  AM-PAC 6 Clicks Basic Mobility (PT)  -TW        User Key  (r) = Recorded By, (t) = Taken By, (c) = Cosigned By    Initials Name Provider Type    Robinson Gary PTA Physical Therapy Assistant    Brant Lancaster PTA Physical Therapy Assistant             Time Calculation:     PT Charges     Row Name 02/14/19 1148             Time Calculation    Start Time  0920  -TW      Stop Time  1015  -TW      Time Calculation (min)  55 min  -TW      PT Received On  02/14/19  -TW      PT Goal Re-Cert Due Date  02/19/19  -TW         Time Calculation- PT    Total  Timed Code Minutes- PT  55 minute(s)  -        User Key  (r) = Recorded By, (t) = Taken By, (c) = Cosigned By    Initials Name Provider Type     Brant Palacios PTA Physical Therapy Assistant            Therapy Charges for Today     Code Description Service Date Service Provider Modifiers Qty    12912070315 HC GAIT TRAINING EA 15 MIN 2/13/2019 Brant Palacios, PTA GP 1    88028606106 HC PT THERAPEUTIC ACT EA 15 MIN 2/13/2019 Brant Palacios, PTA GP 2    74354209653 HC PT THER PROC EA 15 MIN 2/13/2019 Brant Palacios, PTA GP 1    55719026580 HC GAIT TRAINING EA 15 MIN 2/13/2019 Brant Palacios, PTA GP 1    56679626963 HC PT THERAPEUTIC ACT EA 15 MIN 2/13/2019 Brant Palacios, PTA GP 1    75712880794 HC PT THER PROC EA 15 MIN 2/13/2019 Brant Palacios, PTA GP 1    57202942280 HC GAIT TRAINING EA 15 MIN 2/14/2019 Brant Palacios, PTA GP 1    01616907528 HC PT THERAPEUTIC ACT EA 15 MIN 2/14/2019 Brant Palacios, PTA GP 2    77434867896 HC PT THER PROC EA 15 MIN 2/14/2019 Brant Palacios, PTA GP 1            PT G-Codes  Outcome Measure Options: AM-PAC 6 Clicks Basic Mobility (PT)  AM-PAC 6 Clicks Score: 19  Score: 17      Brant Palacios PTA  2/14/2019

## 2019-02-14 NOTE — THERAPY TREATMENT NOTE
Inpatient Rehabilitation - Physical Therapy Treatment Note  [facility]     Patient Name: Ventura Boggs  : 1960  MRN: [id number]    Today's Date: 2019                 Admit Date: 2019      Visit Dx:      ICD-10-CM ICD-9-CM   1. Stroke-like symptoms R29.90 781.99   2. Uncontrolled type 2 diabetes mellitus with hyperglycemia (CMS/HCC) E11.65 250.02   3. Medically complex patient Z78.9 V49.9   4. Chronic intractable pain G89.29 338.29   5. Symbolic dysfunction R48.9 784.60   6. Impaired mobility and ADLs Z74.09 799.89   7. Impaired functional mobility, balance, gait, and endurance Z74.09 V49.89   8. Left-sided muscle weakness M62.81 728.87   9. Ataxia R27.0 781.3       Patient Active Problem List   Diagnosis   • Uncontrolled type 2 diabetes mellitus with hyperglycemia (CMS/HCC)   • Postoperative urinary retention   • Incisional hernia, without obstruction or gangrene   • Acute appendicitis with localized peritonitis   • Closed head injury   • Hyponatremia   • History of recurrent TIAs   • Ataxia   • Chronic pain syndrome   • Right shoulder pain   • Left-sided muscle weakness   • Medically complex patient   • Stroke-like symptoms       Therapy Treatment    IRF Treatment Summary     Row Name 19 1300 19 0920 19 0800       Evaluation/Treatment Time and Intent    Subjective Information  no complaints  -TW  no complaints  -TW  no complaints  -EC    Existing Precautions/Restrictions  fall;lifting  -TW  fall;lifting  -TW  fall  -EC    Document Type  therapy note (daily note)  -TW  therapy note (daily note)  -TW  therapy note (daily note)  -EC    Mode of Treatment  physical therapy;individual therapy  -TW  physical therapy;individual therapy  -TW  individual therapy;speech-language pathology  -EC    Patient/Family Observations  --  --  in room in   -EC    Start Time (Evaluation/Treatment)  1300  -TW  0920  -TW  0800  -EC    Stop Time (Evaluation/Treatment)  1340  -TW  1015  -TW  0838   -EC    Recorded by [TW] Brant Palacios PTA [TW] Brant Palacios PTA [EC] Madison Hall CCC-SLP    Row Name 02/14/19 1300 02/14/19 0920          Cognition/Psychosocial- PT/OT    Affect/Mental Status (Cognitive)  WFL  -TW  WFL  -TW     Orientation Status (Cognition)  oriented x 4  -TW  oriented x 4  -TW     Follows Commands (Cognition)  WFL  -TW  WFL  -TW     Personal Safety Interventions  fall prevention program maintained;gait belt;nonskid shoes/slippers when out of bed  -TW  fall prevention program maintained;gait belt;nonskid shoes/slippers when out of bed  -TW     Cognitive Function (Cognitive)  safety deficit  -TW  safety deficit  -TW     Recorded by [TW] Brant Palacios PTA [TW] Brant Palacios PTA     Row Name 02/14/19 1300 02/14/19 0920          Bed Mobility Assessment/Treatment    Rolling Left [place] (Bed Mobility)  conditional independence  -TW  --     Rolling Right [place] (Bed Mobility)  conditional independence  -TW  --     Scooting/Bridging [place] (Bed Mobility)  conditional independence  -TW  --     Supine-Sit [place] (Bed Mobility)  conditional independence  -TW  --     Sit-Supine [place] (Bed Mobility)  conditional independence  -TW  --     Bed Mobility, Safety Issues  decreased use of legs for bridging/pushing  -TW  --     Assistive Device (Bed Mobility)  bed rails  -TW  --     Comment (Bed Mobility)  --  Not tested pt already up in chair.  -TW     Recorded by [TW] Brant Palacios PTA [TW] Brant Palacios PTA     Row Name 02/14/19 1300 02/14/19 0920          Sit-Stand Transfer    Sit-Stand [place] (Transfers)  stand by assist Pt perrformed 3 sets of 5 consecutive t/f's for practice.  -TW  stand by assist Pt perrformed 3 sets of 5 consecutive t/f's for practice.  -TW     Assistive Device (Sit-Stand Transfers)  walker, front-wheeled  -TW  walker, front-wheeled  -TW     Recorded by [TW] Brant Palacios PTA [TW] Brant Palacios  L, PTA     Row Name 02/14/19 1300 02/14/19 0920          Stand-Sit Transfer    Stand-Sit [place] (Transfers)  stand by assist  -TW  stand by assist  -TW     Assistive Device (Stand-Sit Transfers)  walker, front-wheeled  -TW  walker, front-wheeled  -TW     Recorded by [TW] Brant Palacios PTA [TW] Brant Palacios PTA     Row Name 02/14/19 1300 02/14/19 0920          Gait/Stairs Assessment/Training    Gait/Stairs Assessment/Training  gait/ambulation assistive device  -TW  gait/ambulation assistive device  -TW     [place] Level (Gait)  stand by assist  -TW  contact guard  -TW     Assistive Device (Gait)  walker, front-wheeled  -TW  walker, front-wheeled  -TW     Distance in Feet (Gait)  122ft  -TW  144ft  -TW     Pattern (Gait)  step-to  -TW  step-to  -TW     Deviations/Abnormal Patterns (Gait)  left sided deviations;stride length decreased  -TW  left sided deviations;stride length decreased  -TW     Bilateral Gait Deviations  foot drop/toe drag  -TW  foot drop/toe drag  -TW     Left Sided Gait Deviations  heel strike decreased;weight shift ability decreased  -TW  heel strike decreased;weight shift ability decreased  -TW     Negotiation (Stairs)  stairs independence  -TW  stairs independence  -TW     [place] Level (Stairs)  contact guard  -TW  contact guard  -TW     Handrail Location (Stairs)  right side (ascending);both sides Pt completed 1st trial with B HR's then 2nd trial using R HR  -TW  right side (ascending);both sides Pt completed 1st trial with B HR's then 2nd trial using R HR  -TW     Number of Steps (Stairs)  5x2  -TW  5x2  -TW     Ascending Technique (Stairs)  step-to-step  -TW  step-to-step  -TW     Descending Technique (Stairs)  step-to-step  -TW  step-to-step  -TW     Stairs, Impairments  impaired balance;coordination impaired Pt only has 1 hand rail at home.  -TW  impaired balance;coordination impaired Pt only has 1 hand rail at home.  -TW     Recorded by [TW] Brant Palacios  MEI, KAREN [TW] Brant Palacios PTA     Row Name 02/14/19 1300 02/14/19 0920          Wheelchair Mobility/Management    Mobility [place] Level (Wheelchair)  independent  -TW  independent  -TW     Forward Propulsion [place] (Wheelchair)  independent  -TW  independent  -TW     Backward Propulsion [place] (Wheelchair)  independent  -TW  independent  -TW     Steering [place] (Wheelchair)  independent  -TW  independent  -TW     Stopping [place] (Wheelchair)  independent  -TW  independent  -TW     Turning [place] (Wheelchair)  independent  -TW  independent  -TW     Doorway Navigation [place] (Wheelchair)  independent  -TW  independent  -TW     Distance Propelled in Feet (Wheelchair)  --  344ft  -TW     Comment, Parts Management (Wheelchair)  --  Pt manuevering w/c throughout hallways all through the day without incident observed.  -TW     Recorded by [TW] Brant Palacios PTA [TW] Brant Palacios PTA     Row Name 02/14/19 1300 02/14/19 0920 02/14/19 0800       Pain Scale: Numbers Pre/Post-Treatment    Pain Scale: Numbers, Pretreatment  3/10  -TW  5/10  -TW  6/10  -EC    Pain Scale: Numbers, Post-Treatment  4/10  -TW  6/10  -TW  7/10  -EC    Pain Location - Side  Right  -TW  Right  -TW  Left  -EC    Pain Location - Orientation  --  --  upper  -EC    Pain Location  shoulder  -TW  shoulder  -TW  hand  -EC    Recorded by [TW] Brant Palacios PTA [TW] Brant Palacios PTA [EC] Madison Hall CCC-SLP    Row Name 02/14/19 0920             Lower Extremity Standing Therapeutic Exercise    Performed, Standing Lower Extremity (Therapeutic Exercise)  hip flexion/extension;hip abduction/adduction;shallow squats  -TW      Device, Standing Lower Extremity (Therapeutic Exercise)  -- Pt used RW to assist with balance.  -TW      Exercise Type, Standing Lower Extremity (Therapeutic Exercise)  AROM (active range of motion)  -TW      Sets/Reps Detail, Standing Lower Extremity  (Therapeutic Exercise)  1/10  -TW      Recorded by [TW] Brant Palacios PTA      Row Name 19 1300 19 0920          Vital Signs    Pre Patient Position  Sitting  -TW  Sitting  -TW     Intra Patient Position  Standing  -TW  Standing  -TW     Post Patient Position  Sitting  -TW  Sitting  -TW     Recorded by [TW] Brant Palacios PTA [TW] Brant Palacios PTA     Row Name 19 1300 19 0920 19 0800       Positioning and Restraints    Pre-Treatment Position  sitting in chair/recliner  -TW  sitting in chair/recliner  -TW  sitting in chair/recliner  -EC    Post Treatment Position  wheelchair  -TW  wheelchair  -TW  wheelchair  -EC    In Wheelchair  sitting;encouraged to call for assist  -TW  sitting;encouraged to call for assist  -TW  exit alarm on;encouraged to call for assist;call light within reach;sitting  -EC    Recorded by [TW] Brant Palacios PTA [TW] Brant Palacios PTA [EC] Madison Hall CCC-SLP    Row Name 19 1300 19 0920          Daily Summary of Progress (PT)    Functional Goal Overall Progress: Physical Therapy  progressing toward functional goals as expected  -TW  progressing toward functional goals as expected  -TW     Impairments Continuing to Limit Function: Physical Therapy  strength decreased;impaired balance;coordination impaired;motor control impaired;pain  -TW  strength decreased;impaired balance;coordination impaired;motor control impaired;pain  -TW     Recommendations: Physical Therapy  Cont  -TW  Cont  -TW     Recorded by [TW] Brant Palacios PTA [TW] Brant Palacios PTA     Row Name 19 0912             Nursing Weekly Outcome Summary    Weekly Progress Summary: Nursing  improving, more independent in daily activity  -      Weekly Outcome Statement: Nursing  Eatin; Toiletin; Bladder: /6: Bowel: 66  -      Recorded by [MC] Julee Aguillon RN        User Key  (r) = Recorded By, (t) = Taken By, (c) =  Cosigned By    Initials Name Effective Dates    EC Madison Hall CCC-SLP 03/07/18 -     Julee Chisholm RN 10/17/16 -     Brant [place] PTA 03/07/18 -            Physical Therapy Education     Title: PT OT SLP Therapies (In Progress)     Topic: Physical Therapy (In Progress)     Point: Mobility training (In Progress)     Learning Progress Summary           Patient Eaggrover, D, NR by BS at 2/8/2019 12:59 PM    Comment:  further addressed body position with weight shifting and sequencing of LE's with limb advancement in gait training w/ // bars. Tactile and verbal cues provided.    Acceptance, E, NR by GAL at 2/6/2019  2:39 PM                   Point: Home exercise program (In Progress)     Learning Progress Summary           Patient Eaggrover, TA, NR by BS at 2/8/2019 12:59 PM    Comment:  further addressed body position with weight shifting and sequencing of LE's with limb advancement in gait training w/ // bars. Tactile and verbal cues provided.                   Point: Body mechanics (In Progress)     Learning Progress Summary           Patient TA Bain, NR by BS at 2/8/2019 12:59 PM    Comment:  further addressed body position with weight shifting and sequencing of LE's with limb advancement in gait training w/ // bars. Tactile and verbal cues provided.    Acceptance, E, NR by GAL at 2/6/2019  2:39 PM                   Point: Precautions (In Progress)     Learning Progress Summary           Patient TA Bain, NR by BS at 2/8/2019 12:59 PM    Comment:  further addressed body position with weight shifting and sequencing of LE's with limb advancement in gait training w/ // bars. Tactile and verbal cues provided.    Acceptance, E, NR by GAL at 2/6/2019  2:39 PM                               User Key     Initials Effective Dates Name Provider Type Discipline    GAL 04/03/18 -  Shilpa Dumont, PT Physical Therapist PT    BS 04/08/18 -  Jose Antonio Cuevas, PT Physical Therapist PT                  PT  Recommendation and Plan     Plan of Care Reviewed With: patient  Daily Summary of Progress (PT)  Functional Goal Overall Progress: Physical Therapy: progressing toward functional goals as expected  Impairments Continuing to Limit Function: Physical Therapy: strength decreased, impaired balance, coordination impaired, motor control impaired, pain  Recommendations: Physical Therapy: Cont  IRF Plan of Care Review: progress ongoing, continue  Progress, Functional Goals: demonstrating adequate progress  Outcome Summary: Pt able to amb with SBA for 122ft this pm and performed step training this pm 5 steps x 2 with CGA. Pt cont to require bed rails to assist with sup to sit but able to perform sit to sup without assistance.       PT IRF GOALS     Row Name 02/14/19 1300 02/14/19 0920          Bed Mobility Goal 1 (PT-IRF)    Activity/Assistive Device (Bed Mobility Goal 1, PT-IRF)  rolling to left;rolling to right;sit to supine;supine to sit  -TW  rolling to left;rolling to right;sit to supine;supine to sit  -TW     [place] Level (Bed Mobility Goal 1, PT-IRF)  independent  -TW  independent  -TW     Time Frame (Bed Mobility Goal 1, PT-IRF)  1 week  -TW  1 week  -TW     Progress/Outcomes (Bed Mobility Goal 1, PT-IRF)  goal not met  -TW  goal not met  -TW        Transfer Goal 1 (PT-IRF)    Activity/Assistive Device (Transfer Goal 1, PT-IRF)  sit-to-stand/stand-to-sit;bed-to-chair/chair-to-bed;wheelchair transfer  -TW  sit-to-stand/stand-to-sit;bed-to-chair/chair-to-bed;wheelchair transfer  -TW     [place] Level (Transfer Goal 1, PT-IRF)  conditional independence  -TW  conditional independence  -TW     Time Frame (Transfer Goal 1, PT-IRF)  1 week  -TW  1 week  -TW     Progress/Outcomes (Transfer Goal 1, PT-IRF)  goal not met  -TW  goal not met  -TW        Gait/Walking Locomotion Goal 1 (PT-IRF)    Activity/Assistive Device (Gait/Walking Locomotion Goal 1, PT-IRF)  gait (walking locomotion);assistive device use  -TW   gait (walking locomotion);assistive device use  -TW     Gait/Walking Locomotion Distance Goal 1 (PT-IRF)  50ft  -TW  50ft  -TW     [place] Level (Gait/Walking Locomotion Goal 1, PT-IRF)  contact guard assist  -TW  contact guard assist  -TW     Time Frame (Gait/Walking Locomotion Goal 1, PT-IRF)  1 week  -TW  1 week  -TW     Progress/Outcomes (Gait/Walking Locomotion Goal 1, PT-IRF)  goal met  (Significant)   -TW  goal met  (Significant)   -TW        Gait/Walking Locomotion Goal 2 (PT-IRF)    Activity/Assistive Device (Gait/Walking Locomotion Goal 2, PT-IRF)  gait (walking locomotion);assistive device use  -TW  gait (walking locomotion);assistive device use  -TW     Gait/Walking Locomotion Distance Goal 2 (PT-IRF)  150ft or more  -TW  150ft or more  -TW     [place] Level (Gait/Walking Locomotion Goal 2, PT-IRF)  conditional independence  -TW  conditional independence  -TW     Time Frame (Gait/Walking Locomotion Goal 2, PT-IRF)  2 weeks  -TW  2 weeks  -TW     Progress/Outcomes (Gait/Walking Locomotion Goal 2, PT-IRF)  goal not met  -TW  goal not met  -TW        Wheelchair Locomotion Goal 1 (PT-IRF)    Activity (Wheelchair Locomotion Goal 1, PT-IRF)  forward propulsion;backward propulsion;steering;turning;doorway navigation  -TW  forward propulsion;backward propulsion;steering;turning;doorway navigation  -TW     [place] Level (Wheelchair Locomotion Goal 1, PT-IRF)  independent  -TW  independent  -TW     Distance Goal 1 (Wheelchair Locomotion, PT-IRF)  150ft  -TW  150ft  -TW     Time Frame (Wheelchair Locomotion Goal 1, PT-IRF)  2 days  -TW  2 days  -TW     Progress/Outcomes (Wheelchair Locomotion Goal 1, PT-IRF)  goal met  (Significant)   -TW  goal met  (Significant)   -TW        Stairs Goal 1 (PT-IRF)    Activity/Assistive Device (Stairs Goal 1, PT-IRF)  ascending stairs;descending stairs;using handrail, right  -TW  ascending stairs;descending stairs;using handrail, right  -TW     Number of Stairs  (Stairs Goal 1, PT-IRF)  5  -TW  5  -TW     [place] Level (Stairs Goal 1, PT-IRF)  standby assist;conditional independence  -TW  standby assist;conditional independence  -TW     Time Frame (Stairs Goal 1, PT-IRF)  2 weeks  -TW  2 weeks  -TW     Progress/Outcomes (Stairs Goal 1, PT-IRF)  goal not met  -TW  goal not met  -TW        Strength Goal 1 (PT-IRF)    Strength Goal 1 (PT-IRF)  Pt will perform 20 reps of AROM exercises with VSS  -TW  Pt will perform 20 reps of AROM exercises with VSS  -TW     Time Frame (Strength Goal 1, PT-IRF)  by discharge  -TW  by discharge  -TW     Progress/Outcomes (Strength Goal 1, PT-IRF)  goal not met  -TW  goal not met  -TW        Caregiver Training Goal 1 (PT-IRF)    Caregiver Training Goal 1 (PT-IRF)  Homecaregiver will demonstrate understanding assisting with mobility as needed, home safety and homecare instructions  -TW  Homecaregiver will demonstrate understanding assisting with mobility as needed, home safety and homecare instructions  -TW     Time Frame (Caregiver Training Goal 1, PT-IRF)  by discharge  -TW  by discharge  -TW     Progress/Outcomes (Caregiver Training Goal 1, PT-IRF)  goal not met  -TW  goal not met  -TW       User Key  (r) = Recorded By, (t) = Taken By, (c) = Cosigned By    Initials Name Provider Type    TW Brant Palacios, KAREN Physical Therapy Assistant        Outcome Measures     Row Name 02/14/19 0920 02/13/19 0925 02/12/19 0810       How much help from another person do you currently need...    Turning from your back to your side while in flat bed without using bedrails?  4  -TW  4  -TW  4  -JA    Moving from lying on back to sitting on the side of a flat bed without bedrails?  4  -TW  4  -TW  4  -JA    Moving to and from a bed to a chair (including a wheelchair)?  3  -TW  3  -TW  3  -JA    Standing up from a chair using your arms (e.g., wheelchair, bedside chair)?  3  -TW  3  -TW  3  -JA    Climbing 3-5 steps with a railing?  2  -TW  2  -TW  2   -JA    To walk in hospital room?  3  -TW  3  -TW  3  -JA    AM-PAC 6 Clicks Score  19  -TW  19  -TW  19  -JA       Functional Assessment    Outcome Measure Options  AM-PAC 6 Clicks Basic Mobility (PT)  -TW  AM-PAC 6 Clicks Basic Mobility (PT)  -TW  AM-PAC 6 Clicks Basic Mobility (PT)  -JA      User Key  (r) = Recorded By, (t) = Taken By, (c) = Cosigned By    Initials Name Provider Type    Robinson Gary, KAREN Physical Therapy Assistant    Brant [place] PTA Physical Therapy Assistant             Time Calculation:     PT Charges     Row Name 02/14/19 1542 02/14/19 1148          Time Calculation    Start Time  1300  -TW  0920  -TW     Stop Time  1340  -TW  1015  -TW     Time Calculation (min)  40 min  -TW  55 min  -TW     PT Received On  02/14/19  -TW  02/14/19  -TW     PT Goal Re-Cert Due Date  02/19/19  -TW  02/19/19  -TW        Time Calculation- PT    Total Timed Code Minutes- PT  40 minute(s)  -TW  55 minute(s)  -TW       User Key  (r) = Recorded By, (t) = Taken By, (c) = Cosigned By    Initials Name Provider Type    TW Brant Palacios PTA Physical Therapy Assistant            Therapy Charges for Today     Code Description Service Date Service Provider Modifiers Qty    94289842302 HC GAIT TRAINING EA 15 MIN 2/13/2019 Brant Palacios PTA GP 1    78441355989 HC PT THERAPEUTIC ACT EA 15 MIN 2/13/2019 Brant Palacios PTA GP 2    45348797462 HC PT THER PROC EA 15 MIN 2/13/2019 Brant Palacios PTA GP 1    83455436479 HC GAIT TRAINING EA 15 MIN 2/13/2019 Brant Palacios, PTA GP 1    04769342821 HC PT THERAPEUTIC ACT EA 15 MIN 2/13/2019 Brant Palacios, PTA GP 1    73169764408 HC PT THER PROC EA 15 MIN 2/13/2019 Brant Palacios, PTA GP 1    33001321235 HC GAIT TRAINING EA 15 MIN 2/14/2019 Brant Palacios, PTA GP 1    96289815644 HC PT THERAPEUTIC ACT EA 15 MIN 2/14/2019 Brant Palacios, PTA GP 2    50750107256 HC PT THER PROC EA 15 MIN 2/14/2019 Brant Palacios,  PTA GP 1    28526249308 HC GAIT TRAINING EA 15 MIN 2/14/2019 Brant Palacios, KAREN GP 1    31761127159 HC PT THERAPEUTIC ACT EA 15 MIN 2/14/2019 Brant Palacios, KAREN GP 2            PT G-Codes  Outcome Measure Options: AM-PAC 6 Clicks Basic Mobility (PT)  AM-PAC 6 Clicks Score: 19  Score: 17      Brant Palacios PTA  2/14/2019

## 2019-02-14 NOTE — PLAN OF CARE
Problem: Patient Care Overview  Goal: Plan of Care Review  Outcome: Ongoing (interventions implemented as appropriate)   02/13/19 1802   Patient Care Overview   IRF Plan of Care Review progress ongoing, continue   Progress, Functional Goals demonstrating adequate progress   OTHER   Outcome Summary pt already perf adl past date and this date attempt again next date perf well with tf with cca and increased strength b ue although demo some safety issues with tf

## 2019-02-14 NOTE — THERAPY TREATMENT NOTE
Inpatient Rehabilitation - Occupational Therapy Treatment Note    [facility]     Patient Name: Ventura Boggs  : 1960  MRN: [id number]    Today's Date: 2019                 Admit Date: 2019      Visit Dx:    ICD-10-CM ICD-9-CM   1. Stroke-like symptoms R29.90 781.99   2. Uncontrolled type 2 diabetes mellitus with hyperglycemia (CMS/HCC) E11.65 250.02   3. Medically complex patient Z78.9 V49.9   4. Chronic intractable pain G89.29 338.29   5. Symbolic dysfunction R48.9 784.60   6. Impaired mobility and ADLs Z74.09 799.89   7. Impaired functional mobility, balance, gait, and endurance Z74.09 V49.89   8. Left-sided muscle weakness M62.81 728.87   9. Ataxia R27.0 781.3       Patient Active Problem List   Diagnosis   • Uncontrolled type 2 diabetes mellitus with hyperglycemia (CMS/HCC)   • Postoperative urinary retention   • Incisional hernia, without obstruction or gangrene   • Acute appendicitis with localized peritonitis   • Closed head injury   • Hyponatremia   • History of recurrent TIAs   • Ataxia   • Chronic pain syndrome   • Right shoulder pain   • Left-sided muscle weakness   • Medically complex patient   • Stroke-like symptoms         Therapy Treatment    IRF Treatment Summary     Row Name 19 1415 19 1300 19 1115       Evaluation/Treatment Time and Intent    Subjective Information  no complaints  -LM  no complaints  -TW  no complaints  -LM    Existing Precautions/Restrictions  fall  -LM  fall;lifting  -TW  fall  -LM    Document Type  therapy note (daily note)  -LM  therapy note (daily note)  -TW  therapy note (daily note)  -LM    Mode of Treatment  individual therapy;occupational therapy  -LM  physical therapy;individual therapy  -TW  individual therapy;occupational therapy  -LM    Start Time (Evaluation/Treatment)  --  1300  -TW  1115  -LM    Stop Time (Evaluation/Treatment)  --  1340  -TW  1200  -LM    Recorded by [LM] Ese Dodson COTA/MEI [TW] Brant Palacios  KAREN HURLEY [LM] Ese Dodson COTA/L    Row Name 02/14/19 0920 02/14/19 0800          Evaluation/Treatment Time and Intent    Subjective Information  no complaints  -TW  no complaints  -EC     Existing Precautions/Restrictions  fall;lifting  -TW  fall  -EC     Document Type  therapy note (daily note)  -TW  therapy note (daily note)  -EC     Mode of Treatment  physical therapy;individual therapy  -TW  individual therapy;speech-language pathology  -EC     Patient/Family Observations  --  in room in   -EC     Start Time (Evaluation/Treatment)  0920  -TW  0800  -EC     Stop Time (Evaluation/Treatment)  1015  -TW  0838  -EC     Recorded by [TW] Brant Palacios PTA [EC] Madison Hall CCC-SLP     Row Name 02/14/19 1415 02/14/19 1300 02/14/19 1115       Cognition/Psychosocial- PT/OT    Affect/Mental Status (Cognitive)  WFL  -LM  WFL  -TW  WFL  -LM    Orientation Status (Cognition)  oriented x 3  -LM  oriented x 4  -TW  oriented x 4  -LM    Follows Commands (Cognition)  WFL  -LM  WFL  -TW  WFL  -LM    Personal Safety Interventions  --  fall prevention program maintained;gait belt;nonskid shoes/slippers when out of bed  -TW  --    Cognitive Function (Cognitive)  --  safety deficit  -TW  --    Recorded by [LM] Ese Dodson COTA/MEI [TW] Brant Palacios PTA [LM] Ese Dodson COTA/L    Row Name 02/14/19 0920             Cognition/Psychosocial- PT/OT    Affect/Mental Status (Cognitive)  WFL  -TW      Orientation Status (Cognition)  oriented x 4  -TW      Follows Commands (Cognition)  WFL  -TW      Personal Safety Interventions  fall prevention program maintained;gait belt;nonskid shoes/slippers when out of bed  -TW      Cognitive Function (Cognitive)  safety deficit  -TW      Recorded by [TW] Brant Palacios PTA      Row Name 02/14/19 1300 02/14/19 1115 02/14/19 0920       Bed Mobility Assessment/Treatment    Rolling Left [place] (Bed Mobility)  conditional independence  -TW  conditional  independence  -LM  --    Rolling Right [place] (Bed Mobility)  conditional independence  -TW  conditional independence  -LM  --    Scooting/Bridging [place] (Bed Mobility)  conditional independence  -TW  conditional independence  -LM  --    Supine-Sit [place] (Bed Mobility)  conditional independence  -TW  conditional independence  -LM  --    Sit-Supine [place] (Bed Mobility)  conditional independence  -TW  conditional independence  -LM  --    Bed Mobility, Safety Issues  decreased use of legs for bridging/pushing  -TW  --  --    Assistive Device (Bed Mobility)  bed rails  -TW  --  --    Comment (Bed Mobility)  --  --  Not tested pt already up in chair.  -TW    Recorded by [TW] Brant Palacios, KAREN [LM] Ese Dodson, COULTER/L [TW] Brant Palacios PTA    Row Name 02/14/19 1415             Functional Mobility    Functional Mobility- Ind. Level  contact guard assist  -LM      Functional Mobility- Device  rolling walker  -LM      Recorded by [LM] Ese Dodson, COULTER/L      Row Name 02/14/19 1300 02/14/19 1115 02/14/19 0920       Sit-Stand Transfer    Sit-Stand [place] (Transfers)  stand by assist Pt perrformed 3 sets of 5 consecutive t/f's for practice.  -TW  stand by assist  -LM  stand by assist Pt perrformed 3 sets of 5 consecutive t/f's for practice.  -TW    Assistive Device (Sit-Stand Transfers)  walker, front-wheeled  -TW  walker, front-wheeled  -LM  walker, front-wheeled  -TW    Recorded by [TW] Brant Palacios PTA [LM] Ese Dodson, COULTER/L [TW] Brant Palacios PTA    Row Name 02/14/19 1300 02/14/19 1115 02/14/19 0920       Stand-Sit Transfer    Stand-Sit [place] (Transfers)  stand by assist  -TW  supervision  -LM  stand by assist  -TW    Assistive Device (Stand-Sit Transfers)  walker, front-wheeled  -TW  walker, front-wheeled  -LM  walker, front-wheeled  -TW    Recorded by [TW] Brant Palacios, KAREN [LM] Ese Dodson, COULTER/L [TW] Philip  Brant HURLEY PTA    Row Name 02/14/19 1300 02/14/19 0920          Gait/Stairs Assessment/Training    Gait/Stairs Assessment/Training  gait/ambulation assistive device  -TW  gait/ambulation assistive device  -TW     [place] Level (Gait)  stand by assist  -TW  contact guard  -TW     Assistive Device (Gait)  walker, front-wheeled  -TW  walker, front-wheeled  -TW     Distance in Feet (Gait)  122ft  -TW  144ft  -TW     Pattern (Gait)  step-to  -TW  step-to  -TW     Deviations/Abnormal Patterns (Gait)  left sided deviations;stride length decreased  -TW  left sided deviations;stride length decreased  -TW     Bilateral Gait Deviations  foot drop/toe drag  -TW  foot drop/toe drag  -TW     Left Sided Gait Deviations  heel strike decreased;weight shift ability decreased  -TW  heel strike decreased;weight shift ability decreased  -TW     Negotiation (Stairs)  stairs independence  -TW  stairs independence  -TW     [place] Level (Stairs)  contact guard  -TW  contact guard  -TW     Handrail Location (Stairs)  right side (ascending);both sides Pt completed 1st trial with B HR's then 2nd trial using R HR  -TW  right side (ascending);both sides Pt completed 1st trial with B HR's then 2nd trial using R HR  -TW     Number of Steps (Stairs)  5x2  -TW  5x2  -TW     Ascending Technique (Stairs)  step-to-step  -TW  step-to-step  -TW     Descending Technique (Stairs)  step-to-step  -TW  step-to-step  -TW     Stairs, Impairments  impaired balance;coordination impaired Pt only has 1 hand rail at home.  -TW  impaired balance;coordination impaired Pt only has 1 hand rail at home.  -TW     Recorded by [TW] Brant Palacios PTA [TW] Brant Palacios PTA     Row Name 02/14/19 1415 02/14/19 1300 02/14/19 1115       Wheelchair Mobility/Management    Method of Wheelchair Locomotion (Mobility)  bimanual (upper extremity) propulsion  -LM  --  bimanual (upper extremity) propulsion  -LM    Mobility [place] Level (Wheelchair)  --   independent  -TW  --    Forward Propulsion [place] (Wheelchair)  --  independent  -TW  --    Backward Propulsion [place] (Wheelchair)  --  independent  -TW  --    Steering [place] (Wheelchair)  --  independent  -TW  --    Stopping [place] (Wheelchair)  --  independent  -TW  --    Turning [place] (Wheelchair)  --  independent  -TW  --    Doorway Navigation [place] (Wheelchair)  --  independent  -TW  --    Recorded by [LM] Ese Dodson COTA/MEI [TW] Barnt Palacios PTA [LM] Ese Dodson COTA/L    Row Name 02/14/19 0920             Wheelchair Mobility/Management    Mobility [place] Level (Wheelchair)  independent  -TW      Forward Propulsion [place] (Wheelchair)  independent  -TW      Backward Propulsion [place] (Wheelchair)  independent  -TW      Steering [place] (Wheelchair)  independent  -TW      Stopping [place] (Wheelchair)  independent  -TW      Turning [place] (Wheelchair)  independent  -TW      Doorway Navigation [place] (Wheelchair)  independent  -TW      Distance Propelled in Feet (Wheelchair)  344ft  -TW      Comment, Parts Management (Wheelchair)  Pt manuevering w/c throughout hallways all through the day without incident observed.  -TW      Recorded by [TW] Brant Palacios PTA      Row Name 02/14/19 1415             Grooming Assessment/Treatment    Grooming [place] Level  grooming skills;independent  -LM      Recorded by [LM] Ese Dodson COTA/L      Row Name 02/14/19 1415 02/14/19 1300 02/14/19 1115       Pain Scale: Numbers Pre/Post-Treatment    Pain Scale: Numbers, Pretreatment  3/10  -LM  3/10  -TW  5/10  -LM    Pain Scale: Numbers, Post-Treatment  4/10  -LM  4/10  -TW  5/10  -LM    Pain Location - Side  --  Right  -TW  --    Pain Location  shoulder  -LM  shoulder  -TW  shoulder  -LM    Recorded by [LM] Ese Dodson COTA/L [TW] Brant Palacios PTA [LM] Ese Dodson COTA/MEI    Row Name  02/14/19 0920 02/14/19 0800          Pain Scale: Numbers Pre/Post-Treatment    Pain Scale: Numbers, Pretreatment  5/10  -TW  6/10  -EC     Pain Scale: Numbers, Post-Treatment  6/10  -TW  7/10  -EC     Pain Location - Side  Right  -TW  Left  -EC     Pain Location - Orientation  --  upper  -EC     Pain Location  shoulder  -TW  hand  -EC     Recorded by [TW] Brant Palacios PTA [EC] Madison Hall CCC-SLP     Row Name 02/14/19 1415 02/14/19 1115          Upper Extremity Seated Therapeutic Exercise    Performed, Seated Upper Extremity (Therapeutic Exercise)  -- rickshaw ex b ue 10 lb 5 sets 10  -LM  -- level 3 tband all planes chandrika x 20 reps each   -LM     Recorded by [LM] Ese Dodson COTA/MEI [LM] Ese Dodson COTA/L     Row Name 02/14/19 1415 02/14/19 1115          Aerobic Exercise Activity    Exercise Performed (Aerobic, Therapeutic Exercise)  arm bike  -LM  arm bike  -LM     Expected Outcome (Aerobic, Therapeutic Exercise)  improve functional tolerance, community activity  -LM  --     Time (Aerobic, Therapeutic Exercise)  20  -LM  10  -LM     Recorded by [LM] Ese Dodson COTA/MEI [LM] Ese Dodson COTA/L     Row Name 02/14/19 0920             Lower Extremity Standing Therapeutic Exercise    Performed, Standing Lower Extremity (Therapeutic Exercise)  hip flexion/extension;hip abduction/adduction;shallow squats  -TW      Device, Standing Lower Extremity (Therapeutic Exercise)  -- Pt used RW to assist with balance.  -TW      Exercise Type, Standing Lower Extremity (Therapeutic Exercise)  AROM (active range of motion)  -TW      Sets/Reps Detail, Standing Lower Extremity (Therapeutic Exercise)  1/10  -TW      Recorded by [TW] Brant Palacios PTA      Row Name 02/14/19 1300 02/14/19 0920          Vital Signs    Pre Patient Position  Sitting  -TW  Sitting  -TW     Intra Patient Position  Standing  -TW  Standing  -TW     Post Patient Position  Sitting  -TW  Sitting  -TW     Recorded  by [TW] Brant Palacios PTA [TW] Brant Palacios PTA     Row Name 02/14/19 1415 02/14/19 1300 02/14/19 1115       Positioning and Restraints    Pre-Treatment Position  in bed  -LM  sitting in chair/recliner  -TW  sitting in chair/recliner  -LM    Post Treatment Position  wheelchair  -LM  wheelchair  -TW  wheelchair  -LM    In Wheelchair  --  sitting;encouraged to call for assist  -TW  call light within reach;notified nsg  -LM    Recorded by [LM] Ese Dodson COTA/L [TW] Brant Palacios PTA [LM] Ese Dodson COTA/L    Row Name 02/14/19 0920 02/14/19 0800          Positioning and Restraints    Pre-Treatment Position  sitting in chair/recliner  -TW  sitting in chair/recliner  -EC     Post Treatment Position  wheelchair  -TW  wheelchair  -EC     In Wheelchair  sitting;encouraged to call for assist  -TW  exit alarm on;encouraged to call for assist;call light within reach;sitting  -EC     Recorded by [TW] Brant Palacios PTA [EC] Madison Hall CCC-SLP     Row Name 02/14/19 1300 02/14/19 0920          Daily Summary of Progress (PT)    Functional Goal Overall Progress: Physical Therapy  progressing toward functional goals as expected  -TW  progressing toward functional goals as expected  -TW     Impairments Continuing to Limit Function: Physical Therapy  strength decreased;impaired balance;coordination impaired;motor control impaired;pain  -TW  strength decreased;impaired balance;coordination impaired;motor control impaired;pain  -TW     Recommendations: Physical Therapy  Cont  -TW  Cont  -TW     Recorded by [TW] Brant Palacios PTA [TW] Brant Palacios PTA     Row Name 02/14/19 1415 02/14/19 1115          Daily Summary of Progress (OT)    Functional Goal Overall Progress: Occupational Therapy  progressing toward functional goals as expected  -LM  progressing toward functional goals as expected  -LM     Recorded by [LM] Ese Dodson COULTER/L [LM] Ese Dodson,  COULTER/L     Row Name 19 0912             Nursing Weekly Outcome Summary    Weekly Progress Summary: Nursing  improving, more independent in daily activity  -      Weekly Outcome Statement: Nursing  Eatin; Toiletin; Bladder: : Bowel:   -      Recorded by [MC] Julee Aguillon, RN        User Key  (r) = Recorded By, (t) = Taken By, (c) = Cosigned By    Initials Name Effective Dates     Madison Hall, CCC-SLP 18 -     Julee Chisholm, RN 10/17/16 -     TW Brant Palacios PTA 18 -     LM Ese Dodson, COULTER/L 18 -                  OT Recommendation and Plan         Plan of Care Review  Plan of Care Reviewed With: patient  Daily Summary of Progress (OT)  Functional Goal Overall Progress: Occupational Therapy: progressing toward functional goals as expected  Plan of Care Reviewed With: patient  IRF Plan of Care Review: progress ongoing, continue  Progress, Functional Goals: demonstrating adequate progress  Outcome Summary: perf well with OT  pt denied adl secondary to sig other assisting him this pm preferred by pt  increased strength end and [facility]     OT IRF GOALS     Row Name 19 1713 19 1801 19 1741       Transfer FIM Goals (OT-IRF)    Tub-Shower Transfer FIM Goal (OT-IRF)  Score of 6 (FIM)  -LM  Score of 6 (FIM)  -LM  Score of 6 (FIM)  -LM    Toilet Transfer FIM Goal (OT-IRF)  Score of 6 (FIM)  -LM  Score of 6 (FIM)  -LM  Score of 6 (FIM)  -LM    Time Frame (Transfer FIM Goals 1, OT-IRF)  long term goal (LTG);by discharge  -LM  long term goal (LTG);by discharge  -LM  long term goal (LTG);by discharge  -LM    Progress/Outcomes (Transfer FIM Goals, OT-IRF)  goal not met;continuing progress toward goal  -LM  goal not met;continuing progress toward goal  -LM  goal not met;continuing progress toward goal  -LM       Bathing FIM Goal (OT-IRF)    Bathing FIM Goal (OT-IRF)  Score of 6 (FIM)  -LM  Score of 6 (FIM)  -LM  Score of 6 (FIM)  -LM     Time Frame (Bathing FIM Goal, OT-IRF)  long term goal (LTG);by discharge  -LM  long term goal (LTG);by discharge  -LM  long term goal (LTG);by discharge  -LM    Progress/Outcomes (Bathing FIM Goal, OT-IRF)  goal not met;continuing progress toward goal  -LM  goal not met;continuing progress toward goal  -LM  goal not met;continuing progress toward goal  -LM       UB Dressing FIM Goal (OT-IRF)    Upper Body Dressing, FIM Goal, OT-IRF  Score of 6 (FIM)  -LM  Score of 6 (FIM)  -LM  Score of 6 (FIM)  -LM    Time Frame (UB Dressing FIM Goal, OT-IRF)  long term goal (LTG);by discharge  -LM  long term goal (LTG);by discharge  -LM  long term goal (LTG);by discharge  -LM    Progress/Outcomes (UB Dressing FIM Goal, OT-IRF)  goal not met;continuing progress toward goal  -LM  goal not met;continuing progress toward goal  -LM  goal not met;continuing progress toward goal  -LM       LB Dressing FIM Goal (OT-IRF)    Lower Body Dressing, FIM Goal, OT-IRF  Score of 6 (FIM)  -LM  Score of 6 (FIM)  -LM  Score of 6 (FIM)  -LM    Time Frame (LB Dressing FIM Goal, OT-IRF)  long term goal (LTG);by discharge  -LM  long term goal (LTG);by discharge  -LM  long term goal (LTG);by discharge  -LM    Progress/Outcomes (LB Dressing FIM Goal, OT-IRF)  goal not met;continuing progress toward goal  -LM  goal not met;continuing progress toward goal  -LM  goal not met;continuing progress toward goal  -LM       Toileting Goal 1 (OT-IRF)    Activity/Device (Toileting Goal 1, OT-IRF)  toileting skills, all  -LM  toileting skills, all  -LM  toileting skills, all  -LM    [place] Level (Toileting Goal 1, OT-IRF)  supervision required  -LM  supervision required  -LM  supervision required  -LM    Time Frame (Toileting Goal 1, OT-IRF)  long term goal (LTG);by discharge  -LM  long term goal (LTG);by discharge  -LM  long term goal (LTG);by discharge  -LM    Progress/Outcomes (Toileting Goal 1, OT-IRF)  goal not met;continuing progress toward goal  -LM   goal not met;continuing progress toward goal  -LM  goal not met;continuing progress toward goal  -LM       ROM Goal 1 (OT-IRF)    ROM Goal 1 (OT-IRF)  Pt will display WFL with ROM in BUE with min reports of pain/increased pain level.   -LM  Pt will display WFL with ROM in BUE with min reports of pain/increased pain level.   -LM  Pt will display WFL with ROM in BUE with min reports of pain/increased pain level.   -LM    Time Frame (ROM Goal 1, OT-IRF)  long term goal (LTG);by discharge  -LM  long term goal (LTG);by discharge  -LM  long term goal (LTG);by discharge  -LM    Progress/Outcomes (ROM Goal 1, OT-IRF)  goal not met;continuing progress toward goal  -LM  goal not met;continuing progress toward goal  -LM  goal not met;continuing progress toward goal  -LM       Problem Specific Goal 1 (OT-IRF)    Problem Specific Goal 1 (OT-IRF)  Pt will be independent with BUE HEP and home safety awarness.   -LM  Pt will be independent with BUE HEP and home safety awarness.   -LM  Pt will be independent with BUE HEP and home safety awarness.   -LM    Time Frame (Problem Specific Goal 1, OT-IRF)  long term goal (LTG);by discharge  -LM  long term goal (LTG);by discharge  -LM  long term goal (LTG);by discharge  -LM    Progress/Outcomes (Problem Specific Goal 1, OT-IRF)  goal not met;continuing progress toward goal  -LM  goal not met;continuing progress toward goal  -LM  goal not met;continuing progress toward goal  -LM    Row Name 02/11/19 1719 02/11/19 1410 02/09/19 1300       Transfer FIM Goals (OT-IRF)    Tub-Shower Transfer FIM Goal (OT-IRF)  Score of 6 (FIM)  -LM  Score of 6 (FIM)  -CS  Score of 6 (FIM)  -LW    Toilet Transfer FIM Goal (OT-IRF)  Score of 6 (FIM)  -LM  Score of 6 (FIM)  -CS  Score of 6 (FIM)  -LW    Time Frame (Transfer FIM Goals 1, OT-IRF)  long term goal (LTG);by discharge  -LM  long term goal (LTG);by discharge  -CS  long term goal (LTG);by discharge  -LW    Progress/Outcomes (Transfer FIM Goals, OT-IRF)   goal not met;continuing progress toward goal  -LM  goal not met;continuing progress toward goal  -CS  goal not met;continuing progress toward goal  -LW       Bathing FIM Goal (OT-IRF)    Bathing FIM Goal (OT-IRF)  Score of 6 (FIM)  -LM  Score of 6 (FIM)  -CS  Score of 6 (FIM)  -LW    Time Frame (Bathing FIM Goal, OT-IRF)  long term goal (LTG);by discharge  -LM  long term goal (LTG);by discharge  -CS  long term goal (LTG);by discharge  -LW    Progress/Outcomes (Bathing FIM Goal, OT-IRF)  goal not met;continuing progress toward goal  -LM  goal not met;continuing progress toward goal  -CS  goal not met;continuing progress toward goal  -LW       UB Dressing FIM Goal (OT-IRF)    Upper Body Dressing, FIM Goal, OT-IRF  Score of 6 (FIM)  -LM  Score of 6 (FIM)  -CS  Score of 6 (FIM)  -LW    Time Frame (UB Dressing FIM Goal, OT-IRF)  long term goal (LTG);by discharge  -LM  long term goal (LTG);by discharge  -CS  long term goal (LTG);by discharge  -LW    Progress/Outcomes (UB Dressing FIM Goal, OT-IRF)  goal not met;continuing progress toward goal  -LM  goal not met;continuing progress toward goal  -CS  goal not met;continuing progress toward goal  -LW       LB Dressing FIM Goal (OT-IRF)    Lower Body Dressing, FIM Goal, OT-IRF  Score of 6 (FIM)  -LM  Score of 6 (FIM)  -CS  Score of 6 (FIM)  -LW    Time Frame (LB Dressing FIM Goal, OT-IRF)  long term goal (LTG);by discharge  -LM  long term goal (LTG);by discharge  -CS  long term goal (LTG);by discharge  -LW    Progress/Outcomes (LB Dressing FIM Goal, OT-IRF)  goal not met;continuing progress toward goal  -LM  goal not met;continuing progress toward goal  -CS  goal not met;continuing progress toward goal  -LW       Toileting Goal 1 (OT-IRF)    Activity/Device (Toileting Goal 1, OT-IRF)  toileting skills, all  -LM  toileting skills, all  -CS  toileting skills, all  -LW    [place] Level (Toileting Goal 1, OT-IRF)  supervision required  -LM  supervision required  -CS   supervision required  -LW    Time Frame (Toileting Goal 1, OT-IRF)  long term goal (LTG);by discharge  -LM  long term goal (LTG);by discharge  -CS  long term goal (LTG);by discharge  -LW    Progress/Outcomes (Toileting Goal 1, OT-IRF)  goal not met;continuing progress toward goal  -LM  goal not met;continuing progress toward goal  -CS  goal not met;continuing progress toward goal  -LW       ROM Goal 1 (OT-IRF)    ROM Goal 1 (OT-IRF)  Pt will display WFL with ROM in BUE with min reports of pain/increased pain level.   -LM  Pt will display WFL with ROM in BUE with min reports of pain/increased pain level.   -CS  Pt will display WFL with ROM in BUE with min reports of pain/increased pain level.   -LW    Time Frame (ROM Goal 1, OT-IRF)  long term goal (LTG);by discharge  -LM  long term goal (LTG);by discharge  -CS  long term goal (LTG);by discharge  -LW    Progress/Outcomes (ROM Goal 1, OT-IRF)  goal not met;continuing progress toward goal  -LM  goal not met;continuing progress toward goal  -CS  goal not met;continuing progress toward goal  -LW       Problem Specific Goal 1 (OT-IRF)    Problem Specific Goal 1 (OT-IRF)  Pt will be independent with BUE HEP and home safety awarness.   -LM  Pt will be independent with BUE HEP and home safety awarness.   -CS  Pt will be independent with BUE HEP and home safety awarness.   -LW    Time Frame (Problem Specific Goal 1, OT-IRF)  long term goal (LTG);by discharge  -LM  long term goal (LTG);by discharge  -CS  long term goal (LTG);by discharge  -LW    Progress/Outcomes (Problem Specific Goal 1, OT-IRF)  goal not met;continuing progress toward goal  -LM  goal not met;continuing progress toward goal  -CS  goal not met;continuing progress toward goal  -LW    Row Name 02/09/19 0950 02/08/19 0700 02/07/19 1000       Transfer FIM Goals (OT-IRF)    Tub-Shower Transfer FIM Goal (OT-IRF)  Score of 6 (FIM)  -RC  Score of 6 (FIM)  -LW  Score of 6 (FIM)  -KD    Toilet Transfer FIM Goal (OT-IRF)   Score of 6 (FIM)  -RC  Score of 6 (FIM)  -LW  Score of 6 (FIM)  -KD    Time Frame (Transfer FIM Goals 1, OT-IRF)  long term goal (LTG);by discharge  -RC  long term goal (LTG);by discharge  -LW  long term goal (LTG);by discharge  -KD    Progress/Outcomes (Transfer FIM Goals, OT-IRF)  goal not met;continuing progress toward goal  -RC  goal not met  -LW  goal not met  -KD       Bathing FIM Goal (OT-IRF)    Bathing FIM Goal (OT-IRF)  Score of 6 (FIM)  -RC  Score of 6 (FIM)  -LW  Score of 6 (FIM)  -KD    Time Frame (Bathing FIM Goal, OT-IRF)  long term goal (LTG);by discharge  -RC  long term goal (LTG);by discharge  -LW  long term goal (LTG);by discharge  -KD    Progress/Outcomes (Bathing FIM Goal, OT-IRF)  goal not met;continuing progress toward goal  -RC  goal not met  -LW  goal not met  -KD       UB Dressing FIM Goal (OT-IRF)    Upper Body Dressing, FIM Goal, OT-IRF  Score of 6 (FIM)  -RC  Score of 6 (FIM)  -LW  Score of 6 (FIM)  -KD    Time Frame (UB Dressing FIM Goal, OT-IRF)  long term goal (LTG);by discharge  -RC  long term goal (LTG);by discharge  -LW  long term goal (LTG);by discharge  -KD    Progress/Outcomes (UB Dressing FIM Goal, OT-IRF)  goal not met;continuing progress toward goal  -RC  goal not met  -LW  goal not met  -KD       LB Dressing FIM Goal (OT-IRF)    Lower Body Dressing, FIM Goal, OT-IRF  Score of 6 (FIM)  -RC  Score of 6 (FIM)  -LW  Score of 6 (FIM)  -KD    Time Frame (LB Dressing FIM Goal, OT-IRF)  long term goal (LTG);by discharge  -RC  long term goal (LTG);by discharge  -LW  long term goal (LTG);by discharge  -KD    Progress/Outcomes (LB Dressing FIM Goal, OT-IRF)  goal not met;continuing progress toward goal  -RC  goal not met  -LW  goal not met  -KD       Toileting Goal 1 (OT-IRF)    Activity/Device (Toileting Goal 1, OT-IRF)  toileting skills, all  -RC  toileting skills, all  -LW  toileting skills, all  -KD    [place] Level (Toileting Goal 1, OT-IRF)  supervision required  -RC   supervision required  -LW  supervision required  -KD    Time Frame (Toileting Goal 1, OT-IRF)  long term goal (LTG);by discharge  -RC  long term goal (LTG);by discharge  -LW  long term goal (LTG);by discharge  -KD    Progress/Outcomes (Toileting Goal 1, OT-IRF)  goal not met;continuing progress toward goal  -RC  goal not met  -LW  goal not met  -KD       ROM Goal 1 (OT-IRF)    ROM Goal 1 (OT-IRF)  Pt will display WFL with ROM in BUE with min reports of pain/increased pain level.   -RC  Pt will display WFL with ROM in BUE with min reports of pain/increased pain level.   -LW  Pt will display WFL with ROM in BUE with min reports of pain/increased pain level.   -KD    Time Frame (ROM Goal 1, OT-IRF)  long term goal (LTG);by discharge  -RC  long term goal (LTG);by discharge  -LW  long term goal (LTG);by discharge  -KD    Progress/Outcomes (ROM Goal 1, OT-IRF)  goal not met;continuing progress toward goal  -RC  goal not met  -LW  goal not met  -KD       Problem Specific Goal 1 (OT-IRF)    Problem Specific Goal 1 (OT-IRF)  Pt will be independent with BUE HEP and home safety awarness.   -RC  Pt will be independent with BUE HEP and home safety awarness.   -LW  Pt will be independent with BUE HEP and home safety awarness.   -KD    Time Frame (Problem Specific Goal 1, OT-IRF)  long term goal (LTG);by discharge  -RC  long term goal (LTG);by discharge  -LW  long term goal (LTG);by discharge  -KD    Progress/Outcomes (Problem Specific Goal 1, OT-IRF)  goal not met;continuing progress toward goal  -RC  goal not met  -LW  goal not met  -KD    Row Name 02/06/19 1015 02/06/19 0732          Transfer FIM Goals (OT-IRF)    Tub-Shower Transfer FIM Goal (OT-IRF)  Score of 6 (FIM)  -BL  Score of 6 (FIM)  -BH     Toilet Transfer FIM Goal (OT-IRF)  Score of 6 (FIM)  -BL  Score of 6 (FIM)  -BH     Time Frame (Transfer FIM Goals 1, OT-IRF)  long term goal (LTG);by discharge  -BL  long term goal (LTG);by discharge  -BH     Progress/Outcomes  (Transfer FIM Goals, OT-IRF)  goal not met  -BL  goal not met  -BH        Bathing FIM Goal (OT-IRF)    Bathing FIM Goal (OT-IRF)  Score of 6 (FIM)  -BL  Score of 6 (FIM)  -BH     Time Frame (Bathing FIM Goal, OT-IRF)  long term goal (LTG);by discharge  -BL  long term goal (LTG);by discharge  -BH     Progress/Outcomes (Bathing FIM Goal, OT-IRF)  goal not met  -BL  goal not met  -BH        UB Dressing FIM Goal (OT-IRF)    Upper Body Dressing, FIM Goal, OT-IRF  Score of 6 (FIM)  -BL  Score of 6 (FIM)  -BH     Time Frame (UB Dressing FIM Goal, OT-IRF)  long term goal (LTG);by discharge  -BL  long term goal (LTG);by discharge  -BH     Progress/Outcomes (UB Dressing FIM Goal, OT-IRF)  goal not met  -BL  goal not met  -BH        LB Dressing FIM Goal (OT-IRF)    Lower Body Dressing, FIM Goal, OT-IRF  Score of 6 (FIM)  -BL  Score of 6 (FIM)  -BH     Time Frame (LB Dressing FIM Goal, OT-IRF)  long term goal (LTG);by discharge  -BL  long term goal (LTG);by discharge  -BH     Progress/Outcomes (LB Dressing FIM Goal, OT-IRF)  goal not met  -BL  goal not met  -BH        Toileting Goal 1 (OT-IRF)    Activity/Device (Toileting Goal 1, OT-IRF)  toileting skills, all  -BL  toileting skills, all  -BH     [place] Level (Toileting Goal 1, OT-IRF)  supervision required  -BL  supervision required  -BH     Time Frame (Toileting Goal 1, OT-IRF)  long term goal (LTG);by discharge  -BL  long term goal (LTG);by discharge  -BH     Progress/Outcomes (Toileting Goal 1, OT-IRF)  goal not met  -BL  goal not met  -BH        ROM Goal 1 (OT-IRF)    ROM Goal 1 (OT-IRF)  Pt will display WFL with ROM in BUE with min reports of pain/increased pain level.   -BL  Pt will display WFL with ROM in BUE with min reports of pain/increased pain level.   -BH     Time Frame (ROM Goal 1, OT-IRF)  long term goal (LTG);by discharge  -BL  long term goal (LTG);by discharge  -BH     Progress/Outcomes (ROM Goal 1, OT-IRF)  goal not met  -BL  goal not met  -BH         Problem Specific Goal 1 (OT-IRF)    Problem Specific Goal 1 (OT-IRF)  Pt will be independent with BUE HEP and home safety awarness.   -BL  Pt will be independent with BUE HEP and home safety awarness.   -     Time Frame (Problem Specific Goal 1, OT-IRF)  long term goal (LTG);by discharge  -  long term goal (LTG);by discharge  -     Progress/Outcomes (Problem Specific Goal 1, OT-IRF)  goal not met  -BL  goal not met  -       User Key  (r) = Recorded By, (t) = Taken By, (c) = Cosigned By    Initials Name Provider Type     Maria Elena Barragan, OTR/L Occupational Therapist     Valeri Woodard, COULTER/L Occupational Therapy Assistant    KD Ele Avina, COULTER/L Occupational Therapy Assistant    BL Giana Chakraborty, COULTER/L Occupational Therapy Assistant    Ese Huang, COULTER/L Occupational Therapy Assistant    Divya Khan, COULTER/L Occupational Therapy Assistant    Irina Lorenzo, COULTER/L Occupational Therapy Assistant          Occupational Therapy Education     Title: PT OT SLP Therapies (In Progress)     Topic: Occupational Therapy (Done)     Point: ADL training (Done)     Description: Instruct learner(s) on proper safety adaptation and remediation techniques during self care or transfers.   Instruct in proper use of assistive devices.    Learning Progress Summary           Patient Acceptance, E, VU by GWYN at 2/12/2019  5:42 PM    Acceptance, E,TB, VU by BARBIE at 2/9/2019  3:38 PM    Acceptance, E,TB, VU by BARBIE at 2/8/2019  9:12 AM    Acceptance, E, VU,NR by  at 2/6/2019  1:35 PM    Comment:  Educated about OT and POC. Educated to call for assist. Educated on safety throughout.                   Point: Home exercise program (Done)     Description: Instruct learner(s) on appropriate technique for monitoring, assisting and/or progressing therapeutic exercises/activities.    Learning Progress Summary           Patient Acceptance, E, VU by GWYN at 2/12/2019  5:42 PM    Acceptance, E,TB, VU by BARBIE at  2/9/2019  3:38 PM    Acceptance, E,TB, VU by  at 2/8/2019  9:12 AM                   Point: Precautions (Done)     Description: Instruct learner(s) on prescribed precautions during self-care and functional transfers.    Learning Progress Summary           Patient Acceptance, E, VU by  at 2/12/2019  5:42 PM    Acceptance, E,TB, VU by  at 2/9/2019  3:38 PM    Acceptance, E,TB, VU by  at 2/8/2019  9:12 AM    Acceptance, E, VU,NR by  at 2/6/2019  1:35 PM    Comment:  Educated about OT and POC. Educated to call for assist. Educated on safety throughout.    Acceptance, E, VU,NR by  at 2/6/2019 11:36 AM                   Point: Body mechanics (Done)     Description: Instruct learner(s) on proper positioning and spine alignment during self-care, functional mobility activities and/or exercises.    Learning Progress Summary           Patient Acceptance, E, VU by  at 2/12/2019  5:42 PM    Acceptance, E,TB, VU by  at 2/9/2019  3:38 PM    Acceptance, E,TB, VU by  at 2/8/2019  9:12 AM    Acceptance, E, VU,NR by  at 2/6/2019 11:36 AM                               User Key     Initials Effective Dates Name Provider Type Discipline     06/08/18 -  Maria Elena Barragan, OTR/L Occupational Therapist OT     10/17/16 -  Giana Chakraborty, COULTER/L Occupational Therapy Assistant OT     03/07/18 -  Ese Dodson, COULTER/L Occupational Therapy Assistant OT     03/07/18 -  Irina Armstrong COULTER/L Occupational Therapy Assistant OT                Outcome Measures     Row Name 02/14/19 0920 02/13/19 0925 02/12/19 0810       How much help from another person do you currently need...    Turning from your back to your side while in flat bed without using bedrails?  4  -TW  4  -TW  4  -JA    Moving from lying on back to sitting on the side of a flat bed without bedrails?  4  -TW  4  -TW  4  -JA    Moving to and from a bed to a chair (including a wheelchair)?  3  -TW  3  -TW  3  -JA    Standing up from a chair using your arms  (e.g., wheelchair, bedside chair)?  3  -TW  3  -TW  3  -JA    Climbing 3-5 steps with a railing?  2  -TW  2  -TW  2  -JA    To walk in hospital room?  3  -TW  3  -TW  3  -JA    AM-PAC 6 Clicks Score  19  -TW  19  -TW  19  -JA       Functional Assessment    Outcome Measure Options  AM-PAC 6 Clicks Basic Mobility (PT)  -TW  AM-PAC 6 Clicks Basic Mobility (PT)  -TW  AM-PAC 6 Clicks Basic Mobility (PT)  -JA      User Key  (r) = Recorded By, (t) = Taken By, (c) = Cosigned By    Initials Name Provider Type    Robinson Gary, PTA Physical Therapy Assistant    Brant Lancaster, KAREN Physical Therapy Assistant             Time Calculation:     Time Calculation- OT     Row Name 02/14/19 1651 02/14/19 1649          Time Calculation- OT    OT Start Time  1415  -LM  1115  -LM     OT Stop Time  1500  -LM  1200  -LM     OT Time Calculation (min)  45 min  -LM  45 min  -LM     Total Timed Code Minutes- OT  45 minute(s)  -LM  45 minute(s)  -LM     OT Received On  02/14/19  -LM  02/14/19  -LM       User Key  (r) = Recorded By, (t) = Taken By, (c) = Cosigned By    Initials Name Provider Type    Ese Huang COTA/L Occupational Therapy Assistant          Therapy Suggested Charges     Code   Minutes Charges    None              Therapy Charges for Today     Code Description Service Date Service Provider Modifiers Qty    34117503725 HC OT THER PROC EA 15 MIN 2/13/2019 Ese Dodson COULTER/L GO 2    20513316369 HC OT THERAPEUTIC ACT EA 15 MIN 2/13/2019 Ese Dodson COULTER/L GO 1    58428807847 HC OT THER PROC EA 15 MIN 2/13/2019 Ese Dodson COULTER/L GO 2    80919702865 HC OT THERAPEUTIC ACT EA 15 MIN 2/13/2019 Ese Dodson COULTER/L GO 1    36922797532 HC OT THER PROC EA 15 MIN 2/14/2019 Ese Dodson COULTER/L GO 1    91513962177 HC OT THERAPEUTIC ACT EA 15 MIN 2/14/2019 Ese Dodson COULTER/L GO 1    26657540994 HC OT SELF CARE/MGMT/TRAIN EA 15 MIN 2/14/2019 Ese Dodson, MARYLIN/MEI GO 1     [id number]  OT THER PROC EA 15 MIN 2/14/2019 Ese Dodson, COULTER/MEI GO 3                 Eval FIM FIM Goal  Discharge FIM Daily FIM           Grooming    5    Bathing    5    Dressing - Upper Body    5    Dressing - Lower Body    4    Toilet    4    Tub, Shower    4    Problem Solving    5     Social Interaction     5            Bed, Chair, W/C Transfer        Walking        Wheelchair Locomotion        Stairs                Comprehension        Expression        Memory                  MARYLIN Novak/MEI  2/14/2019

## 2019-02-14 NOTE — PROGRESS NOTES
LOS: 9 days   Patient Care Team:  Anita Faria APRN as PCP - General (Family Medicine)    Chief Complaint:   Medically complex patient          Interval History:     SUBJECTIVE: No new  complaints today.  He says his vision is still not as good as it was prior to coming into the hospital.  Explained again the problem related to his diabetic control.  Pain is fairly well controlled.  He does say now that his left leg is getting stronger.  Anxious to go home tomorrow.  History taken from: patient chart RN    Objective     Vital Signs  Temp:  [96 °F (35.6 °C)] 96 °F (35.6 °C)  Heart Rate:  [80-83] 80  Resp:  [18] 18  BP: (119-129)/(63-79) 129/79      02/05/19  1404 02/09/19  0319 02/10/19  1900   Weight: 90.3 kg (199 lb) 88.7 kg (195 lb 9.6 oz) 93.1 kg (205 lb 4.8 oz)         Physical Exam:     General Appearance:    Alert, cooperative, in no acute distress   Head:    Normocephalic, without obvious abnormality, atraumatic   Eyes:            Lids and lashes normal, conjunctivae and sclerae normal, no   icterus, no pallor, corneas clear, PERRLA   Throat:   No oral lesions, no thrush, oral mucosa moist   Neck:   No adenopathy, supple, trachea midline, no thyromegaly, no   carotid bruit, no JVD       Lungs:     Clear to auscultation,respirations regular, even and                  Unlabored good bilateral air movement    Heart:    Regular rhythm and normal rate, normal S1 and S2, no            murmur, no gallop, no rub, no click no ectopy   Chest Wall:    No abnormalities observed   Abdomen:     Normal bowel sounds, no masses, no organomegaly, soft        non-tender, non-distended, no guarding, no rebound                Tenderness normal exam   Extremities:   Moves all extremities well, no edema, no cyanosis, no             Redness strength on the left side is improving       Skin:   No bleeding, bruising or rash   Lymph nodes:   No palpable adenopathy   Neurologic:   Cranial nerves 2 - 12 grossly intact,  sensation intact, DTR       present and equal bilaterally he is improving his strength on the left side       RESULTS REVIEW:     Lab Results (last 24 hours)     Procedure Component Value Units Date/Time    POC Glucose Once [[id number]]  (Normal) Collected:  02/14/19 0523    Specimen:  Blood Updated:  02/14/19 0551     Glucose 83 mg/dL      Comment: : [id number] MANJIT BENOITMeter ID: [id number]       POC Glucose Once [[id number]]  (Abnormal) Collected:  02/13/19 2023    Specimen:  Blood Updated:  02/13/19 2111     Glucose 175 mg/dL      Comment: Sliding Scale AdminOperator: [id number] MANJIT BENOITMeter ID: [id number]       POC Glucose Once [[id number]]  (Abnormal) Collected:  02/13/19 1631    Specimen:  Blood Updated:  02/13/19 1645     Glucose 173 mg/dL      Comment: RN NotifiedOperator: [id number] Lutheran MEGANMeter ID: [id number]       POC Glucose Once [[id number]]  (Normal) Collected:  02/13/19 1110    Specimen:  Blood Updated:  02/13/19 1145     Glucose 103 mg/dL      Comment: : [id number] Lutheran MEGANMeter ID: [id number]           Imaging Results (last 72 hours)     ** No results found for the last 72 hours. **          Assessment/Plan     Active Hospital Problems    Diagnosis   • **Medically complex patient   • Stroke-like symptoms       He has chronic left-sided weakness but it is much worse after this episode.  Specifically the left leg much  weaker than it was prior to this episode     • Uncontrolled type 2 diabetes mellitus with hyperglycemia (CMS/[facility])     -at presentation to ED glucose 704    Hemoglobin A1c is 15.1 admission to acute rehabilitation     • Left-sided muscle weakness     Due to prior Intracranial hemorrhage with closed head injury     • Right shoulder pain     He's had right shoulder pain for some time and has had steroid injections.  But since his fall the pain is much worse.  X-rays were unremarkable yesterday at [facility].     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  He does not wear his glasses he uses over-the-counter glasses for reading but does not wear his prescription glasses        This document has been electronically signed by Richard Galvez MD on February 14, 2019 11:01 AM

## 2019-02-14 NOTE — THERAPY TREATMENT NOTE
Inpatient Rehabilitation - Speech Language Pathology Treatment Note    [facility]       Patient Name: Ventura Boggs  : 1960  MRN: [id number]    Today's Date: 2019           Admit Date: 2019  Cognitive Goals:  1.  Pt will complete mental manipulation task w/90% acc: deferred.       3.  Pt will complete word problems involving time w/90% acc:  Pt was 50% acc w/time related word problems.        4.  Pt will complete selective and divided attention task w/90% acc:   pt completed divided attention task with 80% accuracy.  He obtained level 23 with no checkpoints required or restarts on the counting ducks game for 7 mintutes.       5. Pt will listen to a paragraph and answer questions with 90% accuracy:  Pt was 90% acc w/3-4 sentence paragraph recall of nonfiction information.            Eval FIM FIM Goal  Discharge FIM Daily FIM           Grooming        Bathing        Dressing - Upper Body        Dressing - Lower Body        Toilet        Tub, Shower        Problem Solving         Social Interaction                 Bed, Chair, W/C Transfer        Walking        Wheelchair Locomotion        Stairs                Comprehension  6  5    Expression  6  5    Memory   6  5          Visit Dx:        ICD-10-CM ICD-9-CM   1. Stroke-like symptoms R29.90 781.99   2. Uncontrolled type 2 diabetes mellitus with hyperglycemia (CMS/HCC) E11.65 250.02   3. Medically complex patient Z78.9 V49.9   4. Chronic intractable pain G89.29 338.29   5. Symbolic dysfunction R48.9 784.60   6. Impaired mobility and ADLs Z74.09 799.89   7. Impaired functional mobility, balance, gait, and endurance Z74.09 V49.89   8. Left-sided muscle weakness M62.81 728.87   9. Ataxia R27.0 781.3       Patient Active Problem List   Diagnosis   • Uncontrolled type 2 diabetes mellitus with hyperglycemia (CMS/HCC)   • Postoperative urinary retention   • Incisional hernia, without obstruction or gangrene   • Acute appendicitis with localized  peritonitis   • Closed head injury   • Hyponatremia   • History of recurrent TIAs   • Ataxia   • Chronic pain syndrome   • Right shoulder pain   • Left-sided muscle weakness   • Medically complex patient   • Stroke-like symptoms          Therapy Treatment    Evaluation/Coping    Evaluation/Treatment Time and Intent  Subjective Information: no complaints (02/14/19 0800 : Madison Hall CCC-SLP)  Existing Precautions/Restrictions: fall (02/14/19 0800 : Madison Hall CCC-SLP)  Document Type: therapy note (daily note) (02/14/19 0800 : Madison Hall CCC-SLP)  Mode of Treatment: individual therapy, speech-language pathology (02/14/19 0800 : Madison Hall CCC-SLP)  Patient/Family Observations: in room in  (02/14/19 0800 : Madison Hall CCC-SLP)  Start Time (Evaluation/Treatment): 0800 (02/14/19 0800 : Madison Hall CCC-SLP)  Stop Time (Evaluation/Treatment): 0838 (02/14/19 0800 : Madison Hall CCC-SLP)    Vitals/Pain/Safety    Pain Scale: Numbers Pre/Post-Treatment  Pain Scale: Numbers, Pretreatment: 6/10 (02/14/19 0800 : Madison Hall CCC-SLP)  Pain Scale: Numbers, Post-Treatment: 7/10 (02/14/19 0800 : Madison Hall CCC-SLP)  Pain Location - Side: Left (02/14/19 0800 : Madison Hall CCC-SLP)  Pain Location - Orientation: upper (02/14/19 0800 : Madison Hall CCC-SLP)  Pain Location: hand (02/14/19 0800 : Madison Hall CCC-SLP)  Pain Scale: Word Pre/Post-Treatment  Pain Location - Side: Left (02/14/19 0800 : Madison Hall CCC-SLP)  Pain Location - Orientation: upper (02/14/19 0800 : Madison Hall CCC-SLP)  Pain Location: hand (02/14/19 0800 : Madison Hall CCC-SLP)  Pain Scale: FACES Pre/Post-Treatment  Pain Location - Side: Left (02/14/19 0800 : Madison Hall CCC-SLP)  Pain Location - Orientation: upper (02/14/19 0800 : Madison Hall CCC-SLP)  Pain Location: hand (02/14/19 0800 : Madison Hall,  CCC-SLP)  Positioning and Restraints  Pre-Treatment Position: sitting in chair/recliner (02/14/19 0800 : Madison Hall CCC-SLP)  Post Treatment Position: wheelchair (02/14/19 0800 : Madison Hall CCC-SLP)  In Wheelchair: exit alarm on, encouraged to call for assist, call light within reach, sitting (02/14/19 0800 : Madison Hall CCC-SLP)    Cognition/Communication         Oral Motor/Eating         Mobility/Basic Activities/Instrumental Activities/Motor/Modality                   ROM/MMT                   Sensory/Myotome/Dermatome/Edema               Posture/Balance/Special Tests/Exercise/Transportation/Sexual Function                   Orthotics/Residual Limb/Prosthetic Management              Outcome Summary         EDUCATION    The patient has been educated in the following areas:     Cognitive Impairment.    SLP Recommendation and Plan                                                          SLP GOALS     Row Name 02/14/19 0800 02/13/19 0831 02/12/19 2299       Attention Goal 1 (SLP)    Improve Attention by Goal 1 (SLP)  complete selective attention task;complete divided attention task;90%  -EC  complete selective attention task;complete divided attention task;90%  -CK  complete selective attention task;complete divided attention task;90%  -CK    Time Frame (Attention Goal 1, SLP)  by discharge  -EC  by discharge  -CK  by discharge  -CK    Barriers (Attention Goal 1, SLP)  hx of CHI  -EC  hx of CHI  -CK  hx of CHI  -CK    Progress (Attention Goal 1, SLP)  80%  -EC  70%;80%  -CK  70%  -CK    Progress/Outcomes (Attention Goal 1, SLP)  goal ongoing  -EC  goal ongoing  -CK  goal ongoing  -CK    Comment (Attention Goal 1, SLP)  divided attention task for counting ducts juan.    -EC  selective attention: approx. 70%; divided 80%  -CK  divided attention  -CK       Memory Skills Goal 1 (SLP)    Improve Memory Skills Through Goal 1 (SLP)  listen to a paragraph and answer questions;90%  -EC  recalling  unrelated word lists with an imposed delay;listen to a paragraph and answer questions;90%  -CK  recalling unrelated word lists with an imposed delay;listen to a paragraph and answer questions;90%  -CK    Time Frame (Memory Skills Goal 1, SLP)  by discharge  -EC  by discharge  -CK  by discharge  -CK    Barriers (Memory Skills Goal 1, SLP)  hx of CHI  -EC  hx of CHI  -CK  hx of CHI  -CK    Progress (Memory Skills Goal 1, SLP)  90%  -EC  40%  -CK  60%  -CK    Progress/Outcomes (Memory Skills Goal 1, SLP)  goal partially met  -EC  goal ongoing  -CK  goal ongoing  -CK    Comment (Memory Skills Goal 1, SLP)  paragraph recll.    -EC  paragraph recall  -CK  paragraph recall  -CK       Organizational Skills Goal 1 (SLP)    Improve Thought Organization Through Goal 1 (SLP)  completing mental manipulation task;90%  -EC  completing mental manipulation task;90%  -CK  completing mental manipulation task;90%  -CK    Time Frame (Thought Organization Skills Goal 1, SLP)  by discharge  -EC  by discharge  -CK  by discharge  -CK    Barriers (Thought Organization Skills Goal 1, SLP)  Hx of CHI  -EC  Hx of CHI  -CK  Hx of CHI  -CK    Progress (Thought Organization Skills Goal 1, SLP)  --  70%  -CK  70%  -CK    Progress/Outcomes (Thought Organization Skills Goal 1, SLP)  --  goal ongoing  -CK  goal ongoing  -CK    Comment (Thought Organization Skills Goal 1, SLP)  --  letter dice w/sentence construction  -CK  ipad task  -CK       Functional Math Skills Goal 1 (SLP)    Improve Functional Math Skills Through Goal 1 (SLP)  complete word problems involving time;90%  -EC  complete word problems involving time;90%  -CK  complete functional math task;complete word problems involving time;90%  -CK    Time Frame (Functional Math Skills Goal 1, SLP)  by discharge  -EC  by discharge  -CK  by discharge  -CK    Barriers (Functional Math Skills Goal 1, SLP)  Hx of CHI  -EC  Hx of CHI  -CK  Hx of CHI  -CK    Progress (Functional Math Skills Goal 1, SLP)   50%  -EC  90%  -CK  70%  -CK    Progress/Outcomes (Functional Math Skills Goal 1, SLP)  goal partially met  -EC  goal revised this date;goal partially met  -CK  goal ongoing  -CK    Comment (Functional Math Skills Goal 1, SLP)  --  --  word problems  -CK    Row Name 02/11/19 0905             Attention Goal 1 (SLP)    Improve Attention by Goal 1 (SLP)  complete selective attention task;complete divided attention task;90%  -CK      Time Frame (Attention Goal 1, SLP)  by discharge  -CK      Barriers (Attention Goal 1, SLP)  hx of CHI  -CK      Progress (Attention Goal 1, SLP)  80%  -CK      Progress/Outcomes (Attention Goal 1, SLP)  goal ongoing  -CK         Memory Skills Goal 1 (SLP)    Improve Memory Skills Through Goal 1 (SLP)  recalling unrelated word lists with an imposed delay;listen to a paragraph and answer questions;90%  -CK      Time Frame (Memory Skills Goal 1, SLP)  by discharge  -CK      Barriers (Memory Skills Goal 1, SLP)  hx of CHI  -CK      Progress (Memory Skills Goal 1, SLP)  80%  -CK      Progress/Outcomes (Memory Skills Goal 1, SLP)  goal ongoing  -CK         Organizational Skills Goal 1 (SLP)    Improve Thought Organization Through Goal 1 (SLP)  completing mental manipulation task;90%  -CK      Time Frame (Thought Organization Skills Goal 1, SLP)  by discharge  -CK      Barriers (Thought Organization Skills Goal 1, SLP)  Hx of CHI  -CK      Progress (Thought Organization Skills Goal 1, SLP)  100%  -CK      Progress/Outcomes (Thought Organization Skills Goal 1, SLP)  goal ongoing  -CK         Functional Math Skills Goal 1 (SLP)    Improve Functional Math Skills Through Goal 1 (SLP)  complete functional math task;complete word problems involving time;90%  -CK      Time Frame (Functional Math Skills Goal 1, SLP)  by discharge  -CK      Barriers (Functional Math Skills Goal 1, SLP)  Hx of CHI  -CK      Progress (Functional Math Skills Goal 1, SLP)  60%  -CK      Progress/Outcomes (Functional Math  Skills Goal 1, SLP)  goal ongoing  -CK        User Key  (r) = Recorded By, (t) = Taken By, (c) = Cosigned By    Initials Name Provider Type    Madison Alicea CCC-SLP Speech and Language Pathologist    CK Divya Abarca, MS CCC-SLP Speech and Language Pathologist                  Time Calculation:       Time Calculation- SLP     Row Name 02/14/19 0834             Time Calculation- SLP    SLP Start Time  0800  -EC      SLP Stop Time  0838  -EC      SLP Time Calculation (min)  38 min  -EC      Total Timed Code Minutes- SLP  38 minute(s)  -EC      SLP Received On  02/14/19  -EC        User Key  (r) = Recorded By, (t) = Taken By, (c) = Cosigned By    Initials Name Provider Type    Madison Alicea CCC-SLP Speech and Language Pathologist            Therapy Charges for Today     Code Description Service Date Service Provider Modifiers Qty    [id number]  ST TREATMENT SPEECH 3 2/14/2019 Madison Hall CCC-SLP GN 1                           FRED Soliman  2/14/2019

## 2019-02-14 NOTE — PLAN OF CARE
Problem: Patient Care Overview  Goal: Plan of Care Review  Outcome: Ongoing (interventions implemented as appropriate)   02/14/19 9635   Patient Care Overview   IRF Plan of Care Review progress ongoing, continue   Progress, Functional Goals demonstrating adequate progress   OTHER   Outcome Summary speech therapy: pt plans to return home tomorrow with family to help.

## 2019-02-14 NOTE — PROGRESS NOTES
Patient Name: Ventura Boggs  MRN: [id number]  Today's Date: 2/14/2019    Admit Date: 2/5/2019        Demographic Summary     Row Name 02/14/19 1648       General Information    Admission Type  -- Acute Rehab Unit     Reason for Consult  -- LCSW psychosocial assessment     Preferred Language  English       Contact Information    Contact Information Comments  assessment obtained from direct contact and feedback with patient, review of EMR and SW participation in weekly team meeting        Functional Status     Row Name 02/14/19 1649       Mental Status    General Appearance WDL  WDL       Mental Status Summary    Recent Changes in Mental Status/Cognitive Functioning  no changes    Mental Status Comments  alert and oriented x 3, mood and affect within normal limits, speech clear, pt engaging and rapport easily established.         Employment/    Employment Status  unemployed;other (see comments) discussed disability process     Current or Previous Occupation  traveling/driving        Psychosocial     Row Name 02/14/19 3041       Values/Beliefs    Spiritual, Cultural Beliefs, Zoroastrian Practices, Values that Affect Care  no       Behavior WDL    Behavior WDL  WDL       Emotion Mood WDL    Emotion/Mood/Affect WDL  WDL       Speech WDL    Speech WDL  WDL       Perceptual State WDL    Perceptual State WDL  WDL       Thought Process WDL    Thought Process WDL  WDL       Intellectual Performance WDL    Intellectual Performance WDL  WDL    Level of Consciousness  Alert       Coping/Stress    Major Change/Loss/Stressor  illness;financial;employment concerns    Patient Personal Strengths  expressive of emotions;successful coping history    Sources of Support  significant other    Techniques to [place] with Loss/Stress/Change  diversional activities;medication    Reaction to Health Status  hopeful    Understanding of Condition and Treatment  adequate understanding of medical condition;adequate understanding of treatment     Coping/Stress Comments  describes history of adaptive coping, adequate and positive support from s/o.         Developmental Stage (Eriksson's)    Developmental Stage  Stage 7 (35-65 years/Middle Adulthood) Generativity vs. Stagnation       C-SSRS (Recent)    Wish to be Dead  no    Suicidal Thoughts  no    Suicide Behavior  no        Abuse/Neglect     Row Name 02/14/19 2923       Personal Safety    Feels Unsafe at Home or Work/School  no    Feels Threatened by Someone  no    Does Anyone Try to Keep You From Having Contact with Others or Doing Things Outside Your Home?  no    Physical Signs of Abuse Present  no     DANIW met with patient in his room on ARU.  Pt. Admitted to unit for intensive rehab secondary to symptoms of weakness and stroke like impairment.  SW introduced self and explained role and encouraged pt to share thoughts, feelings, needs and concerns. Pt. States plan is to discharge to home next day. He lives with significant other who is described as attentive and supportive.  Pt. States eagerness to return to home reporting that he has experienced significant improvement with therapy.  Pt shared emotions consistent with his history and adjustments with pt having a complex medical history.  Disability process discussed as pt indicates he is unable to work and has been for many years.  S/o is employed.  Pt. Stated no immediate needs or concerns and responded receptive and appreciative of encounter.  Ongoing support of SW and team reinforced.   Elizabeth Dubois LCSW

## 2019-02-15 VITALS
HEART RATE: 85 BPM | WEIGHT: 205.3 LBS | SYSTOLIC BLOOD PRESSURE: 126 MMHG | HEIGHT: 69 IN | BODY MASS INDEX: 30.41 KG/M2 | TEMPERATURE: 97.1 F | RESPIRATION RATE: 18 BRPM | DIASTOLIC BLOOD PRESSURE: 60 MMHG | OXYGEN SATURATION: 97 %

## 2019-02-15 LAB
GLUCOSE BLDC GLUCOMTR-MCNC: 116 MG/DL (ref 70–130)
GLUCOSE BLDC GLUCOMTR-MCNC: 95 MG/DL (ref 70–130)

## 2019-02-15 PROCEDURE — 63710000001 INSULIN ASPART PER 5 UNITS: Performed by: FAMILY MEDICINE

## 2019-02-15 PROCEDURE — 97530 THERAPEUTIC ACTIVITIES: CPT

## 2019-02-15 PROCEDURE — 99238 HOSP IP/OBS DSCHRG MGMT 30/<: CPT | Performed by: FAMILY MEDICINE

## 2019-02-15 PROCEDURE — 82962 GLUCOSE BLOOD TEST: CPT

## 2019-02-15 RX ORDER — BACLOFEN 5 MG/1
5 TABLET ORAL 3 TIMES DAILY
Qty: 45 TABLET | Refills: 0 | Status: ON HOLD | OUTPATIENT
Start: 2019-02-15 | End: 2021-02-16

## 2019-02-15 RX ORDER — SENNA AND DOCUSATE SODIUM 50; 8.6 MG/1; MG/1
2 TABLET, FILM COATED ORAL NIGHTLY
Qty: 60 TABLET | Refills: 0 | Status: ON HOLD | OUTPATIENT
Start: 2019-02-15 | End: 2021-02-16

## 2019-02-15 RX ORDER — AMLODIPINE BESYLATE 5 MG/1
5 TABLET ORAL
Qty: 30 TABLET | Refills: 0 | Status: ON HOLD | OUTPATIENT
Start: 2019-02-15 | End: 2021-02-16

## 2019-02-15 RX ORDER — AMITRIPTYLINE HYDROCHLORIDE 10 MG/1
10 TABLET, FILM COATED ORAL NIGHTLY
Qty: 30 TABLET | Refills: 0 | Status: ON HOLD | OUTPATIENT
Start: 2019-02-15 | End: 2021-07-14

## 2019-02-15 RX ADMIN — OXYCODONE HYDROCHLORIDE AND ACETAMINOPHEN 1 TABLET: 10; 325 TABLET ORAL at 03:41

## 2019-02-15 RX ADMIN — LISINOPRIL 5 MG: 5 TABLET ORAL at 08:36

## 2019-02-15 RX ADMIN — AMLODIPINE BESYLATE 5 MG: 5 TABLET ORAL at 08:36

## 2019-02-15 RX ADMIN — METFORMIN HYDROCHLORIDE 1000 MG: 500 TABLET ORAL at 08:35

## 2019-02-15 RX ADMIN — FAMOTIDINE 20 MG: 20 TABLET ORAL at 08:35

## 2019-02-15 RX ADMIN — OXYCODONE HYDROCHLORIDE AND ACETAMINOPHEN 1 TABLET: 10; 325 TABLET ORAL at 10:30

## 2019-02-15 RX ADMIN — METHADONE HYDROCHLORIDE 5 MG: 10 TABLET ORAL at 08:35

## 2019-02-15 RX ADMIN — Medication 5 MG: at 08:35

## 2019-02-15 RX ADMIN — DULOXETINE HYDROCHLORIDE 20 MG: 20 CAPSULE, DELAYED RELEASE ORAL at 08:36

## 2019-02-15 RX ADMIN — INSULIN ASPART 7 UNITS: 100 INJECTION, SOLUTION INTRAVENOUS; SUBCUTANEOUS at 08:37

## 2019-02-15 RX ADMIN — ASPIRIN 81 MG CHEWABLE TABLET 325 MG: 81 TABLET CHEWABLE at 08:36

## 2019-02-15 NOTE — PLAN OF CARE
Problem: Patient Care Overview  Goal: Plan of Care Review  Outcome: Ongoing (interventions implemented as appropriate)   02/15/19 1030   Patient Care Overview   IRF Plan of Care Review progress ongoing, continue   Progress, Functional Goals demonstrating adequate progress   Coping/Psychosocial   Plan of Care Reviewed With patient   OTHER   Outcome Summary Pt to [place] home with HH to f/u.

## 2019-02-15 NOTE — NURSING NOTE
FIM FIM Goal  Discharge FIM     Evaluation Score        Eating 4 6 6     Toileting 3 6 6     Bladder Management 4 6 6     Accident in Past 7 days 6 6 6     Bowel Management 4 6 6     Accident in Past 7 days: 6 6 6       Accident in Past 7 Days:   7= 0, 6=device, 5=1, 4=2, 3=3,  2=4, 1=5+

## 2019-02-15 NOTE — THERAPY DISCHARGE NOTE
Inpatient Rehabilitation - IRF Physical Therapy Discharge Summary  [facility]       Patient Name: Ventura Boggs  : 1960  MRN: [id number]    Today's Date: 2/15/2019                 Admit Date: 2019      PT Recommendation and Plan    Visit Dx:    ICD-10-CM ICD-9-CM   1. Stroke-like symptoms R29.90 781.99   2. Uncontrolled type 2 diabetes mellitus with hyperglycemia (CMS/[facility]) E11.65 250.02   3. Medically complex patient Z78.9 V49.9   4. Chronic intractable pain G89.29 338.29   5. Symbolic dysfunction R48.9 784.60   6. Impaired mobility and ADLs Z74.09 799.89   7. Impaired functional mobility, balance, gait, and endurance Z74.09 V49.89   8. Left-sided muscle weakness M62.81 728.87   9. Ataxia R27.0 781.3       Outcome Measures     Row Name 02/15/19 1030 19 0920 19 0925       How much help from another person do you currently need...    Turning from your back to your side while in flat bed without using bedrails?  4  -TW  4  -TW  4  -TW    Moving from lying on back to sitting on the side of a flat bed without bedrails?  4  -TW  4  -TW  4  -TW    Moving to and from a bed to a chair (including a wheelchair)?  4  -TW  3  -TW  3  -TW    Standing up from a chair using your arms (e.g., wheelchair, bedside chair)?  3  -TW  3  -TW  3  -TW    Climbing 3-5 steps with a railing?  3  -TW  2  -TW  2  -TW    To walk in hospital room?  3  -TW  3  -TW  3  -TW    AM-PAC 6 Clicks Score  21  -TW  19  -TW  19  -TW       Functional Assessment    Outcome Measure Options  AM-PAC 6 Clicks Basic Mobility (PT)  -TW  AM-PAC 6 Clicks Basic Mobility (PT)  -TW  AM-PAC 6 Clicks Basic Mobility (PT)  -TW      User Key  (r) = Recorded By, (t) = Taken By, (c) = Cosigned By    Initials Name Provider Type    TW Brant Palacios PTA Physical Therapy Assistant          PT Charges     Row Name 02/15/19 1146             Time Calculation    Start Time  1030  -TW      Stop Time  1100  -TW      Time Calculation (min)  30 min   -TW      PT Received On  02/15/19  -TW         Time Calculation- PT    Total Timed Code Minutes- PT  30 minute(s)  -TW        User Key  (r) = Recorded By, (t) = Taken By, (c) = Cosigned By    Initials Name Provider Type    TW Brant Palacios PTA Physical Therapy Assistant            PT IRF GOALS     Row Name 02/15/19 1030             Bed Mobility Goal 1 (PT-IRF)    Activity/Assistive Device (Bed Mobility Goal 1, PT-IRF)  rolling to left;rolling to right;sit to supine;supine to sit  -TW      [place] Level (Bed Mobility Goal 1, PT-IRF)  independent  -TW      Time Frame (Bed Mobility Goal 1, PT-IRF)  1 week  -TW      Progress/Outcomes (Bed Mobility Goal 1, PT-IRF)  goal met  (Significant)   -TW         Transfer Goal 1 (PT-IRF)    Activity/Assistive Device (Transfer Goal 1, PT-IRF)  sit-to-stand/stand-to-sit;bed-to-chair/chair-to-bed;wheelchair transfer  -TW      [place] Level (Transfer Goal 1, PT-IRF)  conditional independence  -TW      Time Frame (Transfer Goal 1, PT-IRF)  1 week  -TW      Progress/Outcomes (Transfer Goal 1, PT-IRF)  goal partially met  (Significant)   -TW         Gait/Walking Locomotion Goal 1 (PT-IRF)    Activity/Assistive Device (Gait/Walking Locomotion Goal 1, PT-IRF)  gait (walking locomotion);assistive device use  -TW      Gait/Walking Locomotion Distance Goal 1 (PT-IRF)  50ft  -TW      [place]oks Level (Gait/Walking Locomotion Goal 1, PT-IRF)  contact guard assist  -TW      Time Frame (Gait/Walking Locomotion Goal 1, PT-IRF)  1 week  -TW      Progress/Outcomes (Gait/Walking Locomotion Goal 1, PT-IRF)  goal met  (Significant)   -TW         Gait/Walking Locomotion Goal 2 (PT-IRF)    Activity/Assistive Device (Gait/Walking Locomotion Goal 2, PT-IRF)  gait (walking locomotion);assistive device use  -TW      Gait/Walking Locomotion Distance Goal 2 (PT-IRF)  150ft or more  -TW      [place] Level (Gait/Walking Locomotion Goal 2, PT-IRF)  conditional independence  -TW      Time  Frame (Gait/Walking Locomotion Goal 2, PT-IRF)  2 weeks  -TW      Progress/Outcomes (Gait/Walking Locomotion Goal 2, PT-IRF)  goal not met  -TW         Wheelchair Locomotion Goal 1 (PT-IRF)    Activity (Wheelchair Locomotion Goal 1, PT-IRF)  forward propulsion;backward propulsion;steering;turning;doorway navigation  -TW      [place] Level (Wheelchair Locomotion Goal 1, PT-IRF)  independent  -TW      Distance Goal 1 (Wheelchair Locomotion, PT-IRF)  150ft  -TW      Time Frame (Wheelchair Locomotion Goal 1, PT-IRF)  2 days  -TW      Progress/Outcomes (Wheelchair Locomotion Goal 1, PT-IRF)  goal met  (Significant)   -TW         Stairs Goal 1 (PT-IRF)    Activity/Assistive Device (Stairs Goal 1, PT-IRF)  ascending stairs;descending stairs;using handrail, right  -TW      Number of Stairs (Stairs Goal 1, PT-IRF)  5  -TW      [place] Level (Stairs Goal 1, PT-IRF)  standby assist;conditional independence  -TW      Time Frame (Stairs Goal 1, PT-IRF)  2 weeks  -TW      Progress/Outcomes (Stairs Goal 1, PT-IRF)  goal not met  -TW         Strength Goal 1 (PT-IRF)    Strength Goal 1 (PT-IRF)  Pt will perform 20 reps of AROM exercises with VSS  -TW      Time Frame (Strength Goal 1, PT-IRF)  by discharge  -TW      Progress/Outcomes (Strength Goal 1, PT-IRF)  goal not met  -TW         Caregiver Training Goal 1 (PT-IRF)    Caregiver Training Goal 1 (PT-IRF)  Homecaregiver will demonstrate understanding assisting with mobility as needed, home safety and homecare instructions  -TW      Time Frame (Caregiver Training Goal 1, PT-IRF)  by discharge  -TW      Progress/Outcomes (Caregiver Training Goal 1, PT-IRF)  goal not met  -TW        User Key  (r) = Recorded By, (t) = Taken By, (c) = Cosigned By    Initials Name Provider Type    TW Brant Palacios, PTA Physical Therapy Assistant          Therapy Suggested Charges     Code   Minutes Charges    71673 (CPT®) Hc Pt Neuromusc Re Education Ea 15 Min      99994 (CPT®) Hc Pt Ther  Proc Ea 15 Min 35 2    91203 (CPT®) Hc Gait Training Ea 15 Min      20498 (CPT®) Hc Pt Therapeutic Act Ea 15 Min      36186 (CPT®) Hc Pt Manual Therapy Ea 15 Min      72098 (CPT®) Hc Pt Iontophoresis Ea 15 Min      81993 (CPT®) Hc Pt Elec Stim Ea-Per 15 Min      68257 (CPT®) Hc Pt Ultrasound Ea 15 Min      93122 (CPT®) Hc Pt Self Care/Mgmt/Train Ea 15 Min      13152 (CPT®) Hc Pt Prosthetic (S) Train Initial Encounter, Each 15 Min      35604 (CPT®) Hc Pt Orthotic(S)/Prosthetic(S) Encounter, Each 15 Min      17464 (CPT®) Hc Orthotic(S) Mgmt/Train Initial Encounter, Each 15min      Total  35 2          Therapy Charges for Today     Code Description Service Date Service Provider Modifiers Qty    20716623913 HC GAIT TRAINING EA 15 MIN 2/14/2019 Brant Palacios, PTA GP 1    21766511044 HC PT THERAPEUTIC ACT EA 15 MIN 2/14/2019 Brant Palacios, PTA GP 2    77616611712 HC PT THER PROC EA 15 MIN 2/14/2019 Brant Palacios, PTA GP 1    75234540161 HC GAIT TRAINING EA 15 MIN 2/14/2019 Brant Palacios, PTA GP 1    43012678563 HC PT THERAPEUTIC ACT EA 15 MIN 2/14/2019 Brant Palacios, PTA GP 2    76354920308 HC PT THERAPEUTIC ACT EA 15 MIN 2/15/2019 Brant Palacios, PTA GP 2          PT Discharge Summary  Reason for Discharge: Maximum functional potential achieved  Outcomes Achieved: Patient able to partially acheive established goals  Discharge Destination: Home with home health      Brant Palacios PTA   2/15/2019

## 2019-02-15 NOTE — THERAPY DISCHARGE NOTE
Inpatient Rehabilitation - Speech Language Pathology Discharge Summary  [facility]       Patient Name: Ventura Boggs  : 1960  MRN: [id number]    Today's Date: 2/15/2019                   Admit Date: 2019    Pt d/c home today.  He will have assistance from family and friends.  He made some progress on cogntive goals.  Please see note dated yesterday for progress.       Eval FIM FIM Goal  Discharge FIM Daily FIM           Grooming        Bathing        Dressing - Upper Body        Dressing - Lower Body        Toilet        Tub, Shower        Problem Solving         Social Interaction                 Bed, Chair, W/C Transfer        Walking        Wheelchair Locomotion        Stairs                Comprehension   5     Expression   5     Memory    5           SLP Recommendation and Plan    Visit Dx:    ICD-10-CM ICD-9-CM   1. Stroke-like symptoms R29.90 781.99   2. Uncontrolled type 2 diabetes mellitus with hyperglycemia (CMS/[facility]) E11.65 250.02   3. Medically complex patient Z78.9 V49.9   4. Chronic intractable pain G89.29 338.29   5. Symbolic dysfunction R48.9 784.60   6. Impaired mobility and ADLs Z74.09 799.89   7. Impaired functional mobility, balance, gait, and endurance Z74.09 V49.89   8. Left-sided muscle weakness M62.81 728.87   9. Ataxia R27.0 781.3               SLP GOALS     Row Name 19 0800 19 0831 19 1339       Attention Goal 1 (SLP)    Improve Attention by Goal 1 (SLP)  complete selective attention task;complete divided attention task;90%  -EC  complete selective attention task;complete divided attention task;90%  -CK  complete selective attention task;complete divided attention task;90%  -CK    Time Frame (Attention Goal 1, SLP)  by discharge  -EC  by discharge  -CK  by discharge  -CK    Barriers (Attention Goal 1, SLP)  hx of CHI  -EC  hx of CHI  -CK  hx of CHI  -CK    Progress (Attention Goal 1, SLP)  80%  -EC  70%;80%  -CK  70%  -CK    Progress/Outcomes (Attention  Goal 1, SLP)  goal ongoing  -EC  goal ongoing  -CK  goal ongoing  -CK    Comment (Attention Goal 1, SLP)  divided attention task for counting ducts juan.    -EC  selective attention: approx. 70%; divided 80%  -CK  divided attention  -CK       Memory Skills Goal 1 (SLP)    Improve Memory Skills Through Goal 1 (SLP)  listen to a paragraph and answer questions;90%  -EC  recalling unrelated word lists with an imposed delay;listen to a paragraph and answer questions;90%  -CK  recalling unrelated word lists with an imposed delay;listen to a paragraph and answer questions;90%  -CK    Time Frame (Memory Skills Goal 1, SLP)  by discharge  -EC  by discharge  -CK  by discharge  -CK    Barriers (Memory Skills Goal 1, SLP)  hx of CHI  -EC  hx of CHI  -CK  hx of CHI  -CK    Progress (Memory Skills Goal 1, SLP)  90%  -EC  40%  -CK  60%  -CK    Progress/Outcomes (Memory Skills Goal 1, SLP)  goal partially met  -EC  goal ongoing  -CK  goal ongoing  -CK    Comment (Memory Skills Goal 1, SLP)  paragraph recll.    -EC  paragraph recall  -CK  paragraph recall  -CK       Organizational Skills Goal 1 (SLP)    Improve Thought Organization Through Goal 1 (SLP)  completing mental manipulation task;90%  -EC  completing mental manipulation task;90%  -CK  completing mental manipulation task;90%  -CK    Time Frame (Thought Organization Skills Goal 1, SLP)  by discharge  -EC  by discharge  -CK  by discharge  -CK    Barriers (Thought Organization Skills Goal 1, SLP)  Hx of CHI  -EC  Hx of CHI  -CK  Hx of CHI  -CK    Progress (Thought Organization Skills Goal 1, SLP)  --  70%  -CK  70%  -CK    Progress/Outcomes (Thought Organization Skills Goal 1, SLP)  --  goal ongoing  -CK  goal ongoing  -CK    Comment (Thought Organization Skills Goal 1, SLP)  --  letter dice w/sentence construction  -CK  ipad task  -CK       Functional Math Skills Goal 1 (SLP)    Improve Functional Math Skills Through Goal 1 (SLP)  complete word problems involving time;90%  -EC   complete word problems involving time;90%  -CK  complete functional math task;complete word problems involving time;90%  -CK    Time Frame (Functional Math Skills Goal 1, SLP)  by discharge  -EC  by discharge  -CK  by discharge  -CK    Barriers (Functional Math Skills Goal 1, SLP)  Hx of CHI  -EC  Hx of CHI  -CK  Hx of CHI  -CK    Progress (Functional Math Skills Goal 1, SLP)  50%  -EC  90%  -CK  70%  -CK    Progress/Outcomes (Functional Math Skills Goal 1, SLP)  goal partially met  -EC  goal revised this date;goal partially met  -CK  goal ongoing  -CK    Comment (Functional Math Skills Goal 1, SLP)  --  --  word problems  -CK      User Key  (r) = Recorded By, (t) = Taken By, (c) = Cosigned By    Initials Name Provider Type    Madison Alicea, CCC-SLP Speech and Language Pathologist    CK Divya Abarca, MS CCC-SLP Speech and Language Pathologist            Therapy Charges for Today     Code Description Service Date Service Provider Modifiers Qty    [id number]  ST TREATMENT SPEECH 3 2/14/2019 Madison Hall CCC-SLP GN 1            SLP Discharge Summary  Anticipated Dischage Disposition: home with assist  Reason for Discharge: discharge from this facility  Progress Toward Achieving Short/long Term Goals: goals partially met within established timelines  Discharge Destination: home w/ assist      Madison Hall CCC-SLP  2/15/2019

## 2019-02-15 NOTE — DISCHARGE INSTR - APPOINTMENTS
Thursday 2/21/19 @ 10:00am  Anita Faria, APRN   [address]  [facility] [address]  [phone]

## 2019-02-15 NOTE — DISCHARGE SUMMARY
[address] [address]  T - [phone]     Rehabilitation Discharge Summary       BRIEF OVERVIEW   PATIENT NAME: Ventura Boggs                      PHYSICIAN: Richard Galvez MD  : 1960  MRN: [id number]    ADMISSION/DISCHARGE INFO   Admitting Provider: Richard Galvez MD  Discharge Provider: Richard Galvez MD  ADMISSION DATE: 2019   DISCHARGE DATE: February 15, 2019    ADMISSION DIAGNOSES: Medically complex patient [Z78.9]  DISCHARGE DIAGNOSES:   Medically complex patient    Uncontrolled type 2 diabetes mellitus with hyperglycemia (CMS/[facility])    Stroke-like symptoms    Right shoulder pain    Left-sided muscle weakness    Closed head injury    Hyponatremia    History of recurrent TIAs    Chronic pain syndrome      Primary Care Physician at Discharge: Anita Faria, APRN [phone]      Secondary Discharge Diagnosis  Patient Active Problem List   Diagnosis Code   • Uncontrolled type 2 diabetes mellitus with hyperglycemia (CMS/[facility]) E11.65   • Postoperative urinary retention N99.89, R33.8   • Incisional hernia, without obstruction or gangrene K43.2   • Acute appendicitis with localized peritonitis K35.30   • Closed head injury S09.90XA   • Hyponatremia E87.1   • History of recurrent TIAs Z86.73   • Ataxia R27.0   • Chronic pain syndrome G89.4   • Right shoulder pain M25.511   • Left-sided muscle weakness M62.81   • Medically complex patient Z78.9   • Stroke-like symptoms R29.90       Discharge Disposition  Home or Self Care       Code Status at Discharge: Full code  Discharge condition:   Good     CONSULTS   Consult Orders (all) (From admission, onward)    Start     Ordered    19 0825  Inpatient Case Management  Consult  Once     Provider:  (Not yet assigned)    19 0825    19 1405  Inpatient consult to Nutrition  Once     Provider:  (Not yet assigned)    19 1404          DETAILS OF HOSPITAL  STAY    Functional Status:                ADMIT             Discharge   Eating                                          4                        6   Grooming                                    5                        5  Bathing                                         4                        4  Dressing upper body                    5                        5  Dressing lower body                     3                        5  Toileting                                        3                        5  Bladder Management                   4                        6  Bowel Management                     4                        6  Transfers:bed chair wheelchair    3                        4  Transfers: toilet                            3                        4  Transfers: tub/shower                  0                        1  Locomotion: walking                    3                        2  Locomotion: Wheelchair              5                        6  Locomotion: stairs                       0                         2  Comprehension                           5                         5  Expression                                   5                         5  Social interaction                         5                        6  Problem solving                           4                       5  Memory                                        5                        5                                          Rehabilitation Course admitted to the acute rehab unit and participated well.  He made good progress throughout his stay.  Transfers activities of daily living and ambulation have improved as his strength has improved and ataxia has decreased.  Pain control has been adequate.  He has chronic pain and takes methadone and Percocet on a scheduled basis at home.  These were continued here.  He will resume his prescriptions that he has at home for pain control  At admission his hemoglobin A1c was 14 and his  sugars were very high.  We worked to control his sugar and not diabetic control has improved with consistent use of his medication    Blood pressure has been well controlled      Heart Rate: 72  Resp: 18  BP: 127/77  Temp: 97 °F (36.1 °C)   Weight: 93.1 kg (205 lb 4.8 oz)    Pertinent Test Results: Hemoglobin A1c at admission was 15.1  Lab Results (last 72 hours)     Procedure Component Value Units Date/Time    POC Glucose Once [[id number]]  (Normal) Collected:  02/14/19 2027    Specimen:  Blood Updated:  02/14/19 2216     Glucose 82 mg/dL      Comment: RN NotifiedOperator: [id number] QUAN VONNMeter ID: [id number]       POC Glucose Once [[id number]]  (Normal) Collected:  02/14/19 1619    Specimen:  Blood Updated:  02/14/19 1638     Glucose 90 mg/dL      Comment: : [id number] VALERIA MEGANMeter ID: [id number]       POC Glucose Once [[id number]]  (Abnormal) Collected:  02/14/19 1125    Specimen:  Blood Updated:  02/14/19 1140     Glucose 144 mg/dL      Comment: : [id number] TANISHA WATTSMeter ID: [id number]       POC Glucose Once [[id number]]  (Normal) Collected:  02/14/19 0523    Specimen:  Blood Updated:  02/14/19 0551     Glucose 83 mg/dL      Comment: : [id number] MANJIT CYMeter ID: [id number]       POC Glucose Once [[id number]]  (Abnormal) Collected:  02/13/19 2023    Specimen:  Blood Updated:  02/13/19 2111     Glucose 175 mg/dL      Comment: Sliding Scale AdminOperator: [id number] MANJIT CYMeter ID: [id number]       POC Glucose Once [[id number]]  (Abnormal) Collected:  02/13/19 1631    Specimen:  Blood Updated:  02/13/19 1645     Glucose 173 mg/dL      Comment: RN NotifiedOperator: [id number] VALERIA MEGANMeter ID: [id number]       POC Glucose Once [[id number]]  (Normal) Collected:  02/13/19 1110    Specimen:  Blood Updated:  02/13/19 1145     Glucose 103 mg/dL      Comment: : [id number] VALERIA Hirsch ID: [id number]       POC Glucose Once [[id number]]   (Abnormal) Collected:  02/13/19 0535    Specimen:  Blood Updated:  02/13/19 0631     Glucose 192 mg/dL      Comment: : [id number] DEMARCUS PEYTONMeter ID: [id number]       POC Glucose Once [[id number]]  (Abnormal) Collected:  02/12/19 1954    Specimen:  Blood Updated:  02/12/19 2046     Glucose 131 mg/dL      Comment: : [id number] DEMARCUS PEYTONMeter ID: [id number]       POC Glucose Once [[id number]]  (Normal) Collected:  02/12/19 1601    Specimen:  Blood Updated:  02/12/19 1620     Glucose 110 mg/dL      Comment: : [id number] Sabianist MEGANMeter ID: [id number]       POC Glucose Once [[id number]]  (Abnormal) Collected:  02/12/19 1044    Specimen:  Blood Updated:  02/12/19 1106     Glucose 264 mg/dL      Comment: RN NotifiedOperator: [id number] Sabianist MEGANMeter ID: [id number]       POC Glucose Once [[id number]]  (Normal) Collected:  02/12/19 0733    Specimen:  Blood Updated:  02/12/19 0745     Glucose 78 mg/dL      Comment: : [id number] Sabianist MEGANMeter ID: [id number]           Imaging Results (all)     None          Presenting Problem/History of Present Illness     Patient is 58 y.o. male presents with onset at about 4 AM on 1/31/19 of left-sided numbness, weakness, unsteady gait, slurred speech when he awoke.  He fell several times going to the bathroom after calling the emergency he was some brought to the [facility] emergency Department for evaluation.  CT was reportedly unremarkable there and he was transferred to [facility] for evaluation and treatment.  At [facility] he did see neurology and they felt like his symptoms had resolved by the time he was evaluated.  However the patient says that his symptoms have worsened since that time and he has left arm and especially left leg weakness.  Tells me that his legs buckle when he tries to walk.  Of note at the time of admission to the emergency room his glucose was 704 his sodium was 125 TSH was 5.8 and his  free T4 was mildly low.  Thyroid functions were repeated at [facility] and they were normal and felt that he did not need medication for thyroid.  He had a workup for acute stroke and it was essentially negative at the [facility].     This Gentleman has a past history of a fall with a closed head injury and intracranial hemorrhage about 7 months ago.  Subsequently he did have a TIA similar to this episode and he says that he's had left-sided weakness since the original head injury.     He does have long-standing diabetes hypertension is had kidney stones he has osteoarthritis.  He has chronic back pain and right shoulder pain and is followed by pain management in [facility]..     Physical Exam at Discharge      He is alert and oriented in no distress  ENT is unremarkable  Neck supple no carotid bruits no JVD  His lungs are clear without rales rhonchi or wheezes  His heart rate is regular without murmurs gallops or rubs  His abdomen is soft nontender good bowel sounds no rebound guarding or rigidity  He does have full range of motion of all extremities and seems to have fairly good strength at discharge.  He does still complain of some left-sided weakness especially in his lower extremity  Cranial nerves are intact  Speech is intact, swallowing is intact            DISPOSITION   He will be returning home and he will have home health for PT and OT.  He will be staying with his significant other     DISCHARGE MEDICATIONS        Your medication list      START taking these medications      Instructions Last Dose Given Next Dose Due   amitriptyline 10 MG tablet  Commonly known as:  ELAVIL      Take 1 tablet by mouth Every Night.       amLODIPine 5 MG tablet  Commonly known as:  NORVASC      Take 1 tablet by mouth Daily.       Baclofen 5 MG tablet      Take 5 mg by mouth 3 (Three) Times a Day.       polyethylene glycol pack packet  Commonly known as:  MIRALAX      Take 17 g by mouth Daily With Breakfast.        sennosides-docusate sodium 8.6-50 MG tablet  Commonly known as:  SENOKOT-S      Take 2 tablets by mouth Every Night.          CHANGE how you take these medications      Instructions Last Dose Given Next Dose Due   oxyCODONE-acetaminophen  MG per tablet  Commonly known as:  PERCOCET  What changed:    · when to take this  · reasons to take this      Take 1 tablet by mouth Every 4 (Four) Hours.          CONTINUE taking these medications      Instructions Last Dose Given Next Dose Due   aspirin 81 MG chewable tablet      Chew 81 mg Daily.       atorvastatin 20 MG tablet  Commonly known as:  LIPITOR      Take 20 mg by mouth Every Night.       dicyclomine 20 MG tablet  Commonly known as:  BENTYL      Take 1 tablet by mouth Every 6 (Six) Hours As Needed (abdominal pain).       DULoxetine 20 MG capsule  Commonly known as:  CYMBALTA      Take 20 mg by mouth Daily.       famotidine 20 MG tablet  Commonly known as:  PEPCID      Take 1 tablet by mouth 2 (Two) Times a Day.       FREESTYLE TEST STRIPS test strip  Generic drug:  glucose blood           Insulin Glargine 100 UNIT/ML injection pen  Commonly known as:  BASAGLAR KWIKPEN      Inject 30 Units under the skin into the appropriate area as directed Every Night.       insulin lispro 100 UNIT/ML injection  Commonly known as:  humaLOG      Inject 8-12 Units under the skin 3 (Three) Times a Day As Needed (sliding scale). As needed per sliding scale       lisinopril 5 MG tablet  Commonly known as:  PRINIVIL,ZESTRIL      Take 5 mg by mouth Daily.       metFORMIN 1000 MG tablet  Commonly known as:  GLUCOPHAGE      Take 1,000 mg by mouth 2 (Two) Times a Day With Meals.       methadone 5 MG tablet  Commonly known as:  DOLOPHINE      Take 5 mg by mouth 2 (Two) Times a Day.       ondansetron ODT 4 MG disintegrating tablet  Commonly known as:  ZOFRAN-ODT      Take 1 tablet by mouth Every 8 (Eight) Hours As Needed for Nausea or Vomiting.          STOP taking these medications     cyclobenzaprine 10 MG tablet  Commonly known as:  FLEXERIL        lidocaine-prilocaine 2.5-2.5 % cream  Commonly known as:  EMLA        prochlorperazine 10 MG tablet  Commonly known as:  COMPAZINE        promethazine 25 MG tablet  Commonly known as:  PHENERGAN              Where to Get Your Medications      These medications were sent to [facility] [address]  [place], [place] - [address] AT [facility] OF [address] - [phone]  - [phone] FX  [address], [place] [address]    Hours:  24-hours Phone:  [phone] ·   amitriptyline 10 MG tablet  · amLODIPine 5 MG tablet  · Baclofen 5 MG tablet  · polyethylene glycol pack packet  · sennosides-docusate sodium 8.6-50 MG tablet         INSTRUCTIONS   Activity: As tolerated    Diet: Diabetic diet    Special Instructions:   Wheel Chair    FOLLOW UP   Additional Instructions for the Follow-ups that You Need to Schedule     Ambulatory Referral to Home Health   As directed      Face to Face Visit Date:  2/12/2019    Follow-up Provider for Plan of Care?:  I treated the patient in an acute care facility and will not continue treatment after discharge.    Follow-up Provider:  ANITA SANABRIA [[id number]]    Reason/Clinical Findings:  stroke poor ambulation due to ataxia and weakness    Describe mobility limitations that make leaving home difficult:  ataxia and left sided weakness    Nursing/Therapeutic Services Requested:  Physical Therapy Occupational Therapy Speech Therapy    PT orders:  Therapeutic exercise Gait Training Transfer training Strengthening    Weight Bearing Status:  As Tolerated    Occupational orders:  Activities of daily living Strengthening Home safety assessment Fine motor    SLP orders:  Articulation Language    Frequency:  1 Week 1            Contact information for follow-up providers     Anita Sanabria, APRN Follow up in 1 week(s).    Specialty:  Family Medicine  Why:  follow up post discharge  Contact information:  [address]  [facility] [address]  [phone]                   Contact information for after-discharge care     Durable Medical Equipment     [facility] .    Service:  Durable Medical Equipment  Contact information:  [address] [facility] [address] [phone]                 Home Medical Care     [facility] Follow up.    Service:  Home Health Services  Contact information:  [address]   [place] [address]  [phone]                                          This document has been electronically signed by Rcihard Galvez MD on February 15, 2019 7:24 AM

## 2019-02-15 NOTE — PLAN OF CARE
Problem: Patient Care Overview  Goal: Plan of Care Review  Outcome: Ongoing (interventions implemented as appropriate)   02/14/19 1713 02/14/19 2038 02/15/19 0028   Patient Care Overview   IRF Plan of Care Review progress ongoing, continue --  --    Progress, Functional Goals demonstrating adequate progress --  --    Coping/Psychosocial   Plan of Care Reviewed With --  patient --    OTHER   Outcome Summary --  --  Patient resting in bed. Vs stable. Patient up in wheelchair doing laps during shift. C/o pain to shoulder with PRN pain medication given per order. No s/s of distress at this time. Will continue to monitor this patient.      Goal: Home Safety Plan  Outcome: Ongoing (interventions implemented as appropriate)    Goal: Coping Plan  Outcome: Ongoing (interventions implemented as appropriate)    Goal: Community Reintegration Plan  Outcome: Ongoing (interventions implemented as appropriate)      Problem: Fall Risk (Adult)  Goal: Absence of Fall  Outcome: Ongoing (interventions implemented as appropriate)

## 2019-02-15 NOTE — THERAPY DISCHARGE NOTE
Inpatient Rehabilitation - Occupational Therapy Treatment Note/Discharge  [facility]     Patient Name: Ventura Boggs  : 1960  MRN: [id number]  Today's Date: 2/15/2019               Admit Date: 2019    Visit Dx:     ICD-10-CM ICD-9-CM   1. Stroke-like symptoms R29.90 781.99   2. Uncontrolled type 2 diabetes mellitus with hyperglycemia (CMS/HCC) E11.65 250.02   3. Medically complex patient Z78.9 V49.9   4. Chronic intractable pain G89.29 338.29   5. Symbolic dysfunction R48.9 784.60   6. Impaired mobility and ADLs Z74.09 799.89   7. Impaired functional mobility, balance, gait, and endurance Z74.09 V49.89   8. Left-sided muscle weakness M62.81 728.87   9. Ataxia R27.0 781.3     Patient Active Problem List   Diagnosis   • Uncontrolled type 2 diabetes mellitus with hyperglycemia (CMS/HCC)   • Postoperative urinary retention   • Incisional hernia, without obstruction or gangrene   • Acute appendicitis with localized peritonitis   • Closed head injury   • Hyponatremia   • History of recurrent TIAs   • Ataxia   • Chronic pain syndrome   • Right shoulder pain   • Left-sided muscle weakness   • Medically complex patient   • Stroke-like symptoms       Therapy Treatment  IRF Treatment Summary     Row Name 02/15/19 1030             Evaluation/Treatment Time and Intent    Subjective Information  no complaints  -TW      Existing Precautions/Restrictions  fall;lifting  -TW      Document Type  therapy note (daily note)  -TW      Mode of Treatment  physical therapy;individual therapy  -TW      Start Time (Evaluation/Treatment)  1030  -TW      Stop Time (Evaluation/Treatment)  1100  -TW      Recorded by [TW] Brant Palacios PTA      Row Name 02/15/19 1030             Cognition/Psychosocial- PT/OT    Affect/Mental Status (Cognitive)  WFL  -TW      Orientation Status (Cognition)  oriented x 4  -TW      Follows Commands (Cognition)  WFL  -TW      Personal Safety Interventions  fall prevention program  maintained;gait belt;nonskid shoes/slippers when out of bed  -TW      Cognitive Function (Cognitive)  safety deficit  -TW      Recorded by [TW] Brant Palacios PTA      Row Name 02/15/19 1030             Bed Mobility Assessment/Treatment    Rolling Left [place] (Bed Mobility)  independent  -TW      Rolling Right [place] (Bed Mobility)  independent  -TW      Scooting/Bridging [place] (Bed Mobility)  independent  -TW      Supine-Sit [place] (Bed Mobility)  independent  -TW      Sit-Supine [place] (Bed Mobility)  independent  -TW      Bed Mobility, Safety Issues  decreased use of legs for bridging/pushing  -TW      Comment (Bed Mobility)  Bed was flat and pt did not use HRs to complete t/f.  -TW      Recorded by [TW] Brant Palacios PTA      Row Name 02/15/19 1030             Bed-Chair Transfer    Bed-Chair [place] (Transfers)  conditional independence  -TW      Assistive Device (Bed-Chair Transfers)  wheelchair  -TW      Recorded by [TW] Brant Palacios PTA      Row Name 02/15/19 1030             Chair-Bed Transfer    Chair-Bed [place] (Transfers)  conditional independence  -TW      Assistive Device (Chair-Bed Transfers)  wheelchair  -TW      Recorded by [TW] Brant Palacios PTA      Row Name 02/15/19 1030             Sit-Stand Transfer    Sit-Stand [place] (Transfers)  stand by assist Pt perrformed 3 sets of 5 consecutive t/f's for practice.  -TW      Assistive Device (Sit-Stand Transfers)  walker, front-wheeled  -TW      Recorded by [TW] Brant Palacios PTA      Row Name 02/15/19 1030             Stand-Sit Transfer    Stand-Sit [place] (Transfers)  stand by assist  -TW      Assistive Device (Stand-Sit Transfers)  walker, front-wheeled  -TW      Recorded by [TW] Brant Palacios PTA      Row Name 02/15/19 1030             Gait/Stairs Assessment/Training    Gait/Stairs Assessment/Training  gait/ambulation assistive device  -TW      [place]  Level (Gait)  stand by assist  -TW      Assistive Device (Gait)  walker, front-wheeled  -TW      Distance in Feet (Gait)  44ft  -TW      Pattern (Gait)  step-to  -TW      Deviations/Abnormal Patterns (Gait)  left sided deviations;stride length decreased  -TW      Bilateral Gait Deviations  foot drop/toe drag  -TW      Left Sided Gait Deviations  heel strike decreased;weight shift ability decreased  -TW      Negotiation (Stairs)  stairs independence  -TW      Ward Level (Stairs)  contact guard  -TW      Handrail Location (Stairs)  right side (ascending);both sides Pt completed 1st trial with B HR's then 2nd trial using R HR  -TW      Number of Steps (Stairs)  4 x 4 trials  -TW      Ascending Technique (Stairs)  step-to-step  -TW      Descending Technique (Stairs)  step-to-step  -TW      Stairs, Impairments  impaired balance;coordination impaired Pt only has 1 hand rail at home.  -TW      Recorded by [TW] Brant Palacios PTA      Row Name 02/15/19 1030             Wheelchair Mobility/Management    Mobility Ward Level (Wheelchair)  independent  -TW      Forward Propulsion Ward (Wheelchair)  independent  -TW      Backward Propulsion Ward (Wheelchair)  independent  -TW      Steering Ward (Wheelchair)  independent  -TW      Stopping Ward (Wheelchair)  independent  -TW      Turning Ward (Wheelchair)  independent  -TW      Doorway Navigation Ward (Wheelchair)  independent  -TW      Recorded by [TW] Brant Palacios PTA      Row Name 02/15/19 1030             Pain Scale: Numbers Pre/Post-Treatment    Pain Scale: Numbers, Pretreatment  3/10  -TW      Pain Scale: Numbers, Post-Treatment  4/10  -TW      Pain Location - Side  Right  -TW      Pain Location  shoulder  -TW      Recorded by [TW] Brant Palacios PTA      Row Name 02/15/19 1030             Vital Signs    Pre Patient Position  Sitting  -TW      Intra Patient Position  Standing  -TW      Post  Patient Position  Sitting  -TW      Recorded by [TW] Brant Palacios PTA      Row Name 02/15/19 1030             Positioning and Restraints    Pre-Treatment Position  sitting in chair/recliner  -TW      Post Treatment Position  wheelchair  -TW      In Wheelchair  sitting  -TW      Recorded by [TW] Brant Palacios PTA      Row Name 02/15/19 1030             Daily Summary of Progress (PT)    Functional Goal Overall Progress: Physical Therapy  progressing toward functional goals as expected  -TW      Impairments Continuing to Limit Function: Physical Therapy  strength decreased;impaired balance;coordination impaired;motor control impaired;pain  -TW      Recommendations: Physical Therapy  Pt to [place] home with HH to f/u.  -TW      Recorded by [TW] Brant Palacios PTA        User Key  (r) = Recorded By, (t) = Taken By, (c) = Cosigned By    Initials Name Effective Dates    TW Brant Palacios PTA 03/07/18 -                  OT IRF GOALS     Row Name 02/14/19 1713 02/13/19 1801 02/12/19 1741       Transfer FIM Goals (OT-IRF)    Tub-Shower Transfer FIM Goal (OT-IRF)  Score of 6 (FIM)  -LM  Score of 6 (FIM)  -LM  Score of 6 (FIM)  -LM    Toilet Transfer FIM Goal (OT-IRF)  Score of 6 (FIM)  -LM  Score of 6 (FIM)  -LM  Score of 6 (FIM)  -LM    Time Frame (Transfer FIM Goals 1, OT-IRF)  long term goal (LTG);by discharge  -LM  long term goal (LTG);by discharge  -LM  long term goal (LTG);by discharge  -LM    Progress/Outcomes (Transfer FIM Goals, OT-IRF)  goal not met;continuing progress toward goal  -LM  goal not met;continuing progress toward goal  -LM  goal not met;continuing progress toward goal  -LM       Bathing FIM Goal (OT-IRF)    Bathing FIM Goal (OT-IRF)  Score of 6 (FIM)  -LM  Score of 6 (FIM)  -LM  Score of 6 (FIM)  -LM    Time Frame (Bathing FIM Goal, OT-IRF)  long term goal (LTG);by discharge  -LM  long term goal (LTG);by discharge  -LM  long term goal (LTG);by discharge  -LM    Progress/Outcomes  ([place]athing FIM Goal, OT-IRF)  goal not met;continuing progress toward goal  -LM  goal not met;continuing progress toward goal  -LM  goal not met;continuing progress toward goal  -LM       UB Dressing FIM Goal (OT-IRF)    Upper Body Dressing, FIM Goal, OT-IRF  Score of 6 (FIM)  -LM  Score of 6 (FIM)  -LM  Score of 6 (FIM)  -LM    Time Frame (UB Dressing FIM Goal, OT-IRF)  long term goal (LTG);by discharge  -LM  long term goal (LTG);by discharge  -LM  long term goal (LTG);by discharge  -LM    Progress/Outcomes (UB Dressing FIM Goal, OT-IRF)  goal not met;continuing progress toward goal  -LM  goal not met;continuing progress toward goal  -LM  goal not met;continuing progress toward goal  -LM       LB Dressing FIM Goal (OT-IRF)    Lower Body Dressing, FIM Goal, OT-IRF  Score of 6 (FIM)  -LM  Score of 6 (FIM)  -LM  Score of 6 (FIM)  -LM    Time Frame (LB Dressing FIM Goal, OT-IRF)  long term goal (LTG);by discharge  -LM  long term goal (LTG);by discharge  -LM  long term goal (LTG);by discharge  -LM    Progress/Outcomes (LB Dressing FIM Goal, OT-IRF)  goal not met;continuing progress toward goal  -LM  goal not met;continuing progress toward goal  -LM  goal not met;continuing progress toward goal  -LM       Toileting Goal 1 (OT-IRF)    Activity/Device (Toileting Goal 1, OT-IRF)  toileting skills, all  -LM  toileting skills, all  -LM  toileting skills, all  -LM    [place] Level (Toileting Goal 1, OT-IRF)  supervision required  -LM  supervision required  -LM  supervision required  -LM    Time Frame (Toileting Goal 1, OT-IRF)  long term goal (LTG);by discharge  -LM  long term goal (LTG);by discharge  -LM  long term goal (LTG);by discharge  -LM    Progress/Outcomes (Toileting Goal 1, OT-IRF)  goal not met;continuing progress toward goal  -LM  goal not met;continuing progress toward goal  -LM  goal not met;continuing progress toward goal  -LM       ROM Goal 1 (OT-IRF)    ROM Goal 1 (OT-IRF)  Pt will display WFL with ROM in  BUE with min reports of pain/increased pain level.   -LM  Pt will display WFL with ROM in BUE with min reports of pain/increased pain level.   -LM  Pt will display WFL with ROM in BUE with min reports of pain/increased pain level.   -LM    Time Frame (ROM Goal 1, OT-IRF)  long term goal (LTG);by discharge  -LM  long term goal (LTG);by discharge  -LM  long term goal (LTG);by discharge  -LM    Progress/Outcomes (ROM Goal 1, OT-IRF)  goal not met;continuing progress toward goal  -LM  goal not met;continuing progress toward goal  -LM  goal not met;continuing progress toward goal  -LM       Problem Specific Goal 1 (OT-IRF)    Problem Specific Goal 1 (OT-IRF)  Pt will be independent with BUE HEP and home safety awarness.   -LM  Pt will be independent with BUE HEP and home safety awarness.   -LM  Pt will be independent with BUE HEP and home safety awarness.   -LM    Time Frame (Problem Specific Goal 1, OT-IRF)  long term goal (LTG);by discharge  -LM  long term goal (LTG);by discharge  -LM  long term goal (LTG);by discharge  -LM    Progress/Outcomes (Problem Specific Goal 1, OT-IRF)  goal not met;continuing progress toward goal  -LM  goal not met;continuing progress toward goal  -LM  goal not met;continuing progress toward goal  -LM    Row Name 02/11/19 1719 02/11/19 1410 02/09/19 1300       Transfer FIM Goals (OT-IRF)    Tub-Shower Transfer FIM Goal (OT-IRF)  Score of 6 (FIM)  -LM  Score of 6 (FIM)  -CS  Score of 6 (FIM)  -LW    Toilet Transfer FIM Goal (OT-IRF)  Score of 6 (FIM)  -LM  Score of 6 (FIM)  -CS  Score of 6 (FIM)  -LW    Time Frame (Transfer FIM Goals 1, OT-IRF)  long term goal (LTG);by discharge  -LM  long term goal (LTG);by discharge  -CS  long term goal (LTG);by discharge  -LW    Progress/Outcomes (Transfer FIM Goals, OT-IRF)  goal not met;continuing progress toward goal  -LM  goal not met;continuing progress toward goal  -CS  goal not met;continuing progress toward goal  -LW       Bathing FIM Goal (OT-IRF)     Bathing FIM Goal (OT-IRF)  Score of 6 (FIM)  -LM  Score of 6 (FIM)  -CS  Score of 6 (FIM)  -LW    Time Frame (Bathing FIM Goal, OT-IRF)  long term goal (LTG);by discharge  -LM  long term goal (LTG);by discharge  -CS  long term goal (LTG);by discharge  -LW    Progress/Outcomes (Bathing FIM Goal, OT-IRF)  goal not met;continuing progress toward goal  -LM  goal not met;continuing progress toward goal  -CS  goal not met;continuing progress toward goal  -LW       UB Dressing FIM Goal (OT-IRF)    Upper Body Dressing, FIM Goal, OT-IRF  Score of 6 (FIM)  -LM  Score of 6 (FIM)  -CS  Score of 6 (FIM)  -LW    Time Frame (UB Dressing FIM Goal, OT-IRF)  long term goal (LTG);by discharge  -LM  long term goal (LTG);by discharge  -CS  long term goal (LTG);by discharge  -LW    Progress/Outcomes (UB Dressing FIM Goal, OT-IRF)  goal not met;continuing progress toward goal  -LM  goal not met;continuing progress toward goal  -CS  goal not met;continuing progress toward goal  -LW       LB Dressing FIM Goal (OT-IRF)    Lower Body Dressing, FIM Goal, OT-IRF  Score of 6 (FIM)  -LM  Score of 6 (FIM)  -CS  Score of 6 (FIM)  -LW    Time Frame (LB Dressing FIM Goal, OT-IRF)  long term goal (LTG);by discharge  -LM  long term goal (LTG);by discharge  -CS  long term goal (LTG);by discharge  -LW    Progress/Outcomes (LB Dressing FIM Goal, OT-IRF)  goal not met;continuing progress toward goal  -LM  goal not met;continuing progress toward goal  -CS  goal not met;continuing progress toward goal  -LW       Toileting Goal 1 (OT-IRF)    Activity/Device (Toileting Goal 1, OT-IRF)  toileting skills, all  -LM  toileting skills, all  -CS  toileting skills, all  -LW    [place] Level (Toileting Goal 1, OT-IRF)  supervision required  -LM  supervision required  -CS  supervision required  -LW    Time Frame (Toileting Goal 1, OT-IRF)  long term goal (LTG);by discharge  -LM  long term goal (LTG);by discharge  -CS  long term goal (LTG);by discharge  -LW     Progress/Outcomes (Toileting Goal 1, OT-IRF)  goal not met;continuing progress toward goal  -LM  goal not met;continuing progress toward goal  -CS  goal not met;continuing progress toward goal  -LW       ROM Goal 1 (OT-IRF)    ROM Goal 1 (OT-IRF)  Pt will display WFL with ROM in BUE with min reports of pain/increased pain level.   -LM  Pt will display WFL with ROM in BUE with min reports of pain/increased pain level.   -CS  Pt will display WFL with ROM in BUE with min reports of pain/increased pain level.   -LW    Time Frame (ROM Goal 1, OT-IRF)  long term goal (LTG);by discharge  -LM  long term goal (LTG);by discharge  -CS  long term goal (LTG);by discharge  -LW    Progress/Outcomes (ROM Goal 1, OT-IRF)  goal not met;continuing progress toward goal  -LM  goal not met;continuing progress toward goal  -CS  goal not met;continuing progress toward goal  -LW       Problem Specific Goal 1 (OT-IRF)    Problem Specific Goal 1 (OT-IRF)  Pt will be independent with BUE HEP and home safety awarness.   -LM  Pt will be independent with BUE HEP and home safety awarness.   -CS  Pt will be independent with BUE HEP and home safety awarness.   -LW    Time Frame (Problem Specific Goal 1, OT-IRF)  long term goal (LTG);by discharge  -LM  long term goal (LTG);by discharge  -CS  long term goal (LTG);by discharge  -LW    Progress/Outcomes (Problem Specific Goal 1, OT-IRF)  goal not met;continuing progress toward goal  -LM  goal not met;continuing progress toward goal  -CS  goal not met;continuing progress toward goal  -LW    Row Name 02/09/19 0950 02/08/19 0700 02/07/19 1000       Transfer FIM Goals (OT-IRF)    Tub-Shower Transfer FIM Goal (OT-IRF)  Score of 6 (FIM)  -RC  Score of 6 (FIM)  -LW  Score of 6 (FIM)  -KD    Toilet Transfer FIM Goal (OT-IRF)  Score of 6 (FIM)  -RC  Score of 6 (FIM)  -LW  Score of 6 (FIM)  -KD    Time Frame (Transfer FIM Goals 1, OT-IRF)  long term goal (LTG);by discharge  -RC  long term goal (LTG);by discharge   -LW  long term goal (LTG);by discharge  -KD    Progress/Outcomes (Transfer FIM Goals, OT-IRF)  goal not met;continuing progress toward goal  -RC  goal not met  -LW  goal not met  -KD       Bathing FIM Goal (OT-IRF)    Bathing FIM Goal (OT-IRF)  Score of 6 (FIM)  -RC  Score of 6 (FIM)  -LW  Score of 6 (FIM)  -KD    Time Frame (Bathing FIM Goal, OT-IRF)  long term goal (LTG);by discharge  -RC  long term goal (LTG);by discharge  -LW  long term goal (LTG);by discharge  -KD    Progress/Outcomes (Bathing FIM Goal, OT-IRF)  goal not met;continuing progress toward goal  -RC  goal not met  -LW  goal not met  -KD       UB Dressing FIM Goal (OT-IRF)    Upper Body Dressing, FIM Goal, OT-IRF  Score of 6 (FIM)  -RC  Score of 6 (FIM)  -LW  Score of 6 (FIM)  -KD    Time Frame (UB Dressing FIM Goal, OT-IRF)  long term goal (LTG);by discharge  -RC  long term goal (LTG);by discharge  -LW  long term goal (LTG);by discharge  -KD    Progress/Outcomes (UB Dressing FIM Goal, OT-IRF)  goal not met;continuing progress toward goal  -RC  goal not met  -LW  goal not met  -KD       LB Dressing FIM Goal (OT-IRF)    Lower Body Dressing, FIM Goal, OT-IRF  Score of 6 (FIM)  -RC  Score of 6 (FIM)  -LW  Score of 6 (FIM)  -KD    Time Frame (LB Dressing FIM Goal, OT-IRF)  long term goal (LTG);by discharge  -RC  long term goal (LTG);by discharge  -LW  long term goal (LTG);by discharge  -KD    Progress/Outcomes (LB Dressing FIM Goal, OT-IRF)  goal not met;continuing progress toward goal  -RC  goal not met  -LW  goal not met  -KD       Toileting Goal 1 (OT-IRF)    Activity/Device (Toileting Goal 1, OT-IRF)  toileting skills, all  -RC  toileting skills, all  -LW  toileting skills, all  -KD    [place] Level (Toileting Goal 1, OT-IRF)  supervision required  -RC  supervision required  -LW  supervision required  -KD    Time Frame (Toileting Goal 1, OT-IRF)  long term goal (LTG);by discharge  -RC  long term goal (LTG);by discharge  -LW  long term goal  (LTG);by discharge  -KD    Progress/Outcomes (Toileting Goal 1, OT-IRF)  goal not met;continuing progress toward goal  -RC  goal not met  -LW  goal not met  -KD       ROM Goal 1 (OT-IRF)    ROM Goal 1 (OT-IRF)  Pt will display WFL with ROM in BUE with min reports of pain/increased pain level.   -RC  Pt will display WFL with ROM in BUE with min reports of pain/increased pain level.   -LW  Pt will display WFL with ROM in BUE with min reports of pain/increased pain level.   -KD    Time Frame (ROM Goal 1, OT-IRF)  long term goal (LTG);by discharge  -RC  long term goal (LTG);by discharge  -LW  long term goal (LTG);by discharge  -KD    Progress/Outcomes (ROM Goal 1, OT-IRF)  goal not met;continuing progress toward goal  -RC  goal not met  -LW  goal not met  -KD       Problem Specific Goal 1 (OT-IRF)    Problem Specific Goal 1 (OT-IRF)  Pt will be independent with BUE HEP and home safety awarness.   -RC  Pt will be independent with BUE HEP and home safety awarness.   -LW  Pt will be independent with BUE HEP and home safety awarness.   -KD    Time Frame (Problem Specific Goal 1, OT-IRF)  long term goal (LTG);by discharge  -RC  long term goal (LTG);by discharge  -LW  long term goal (LTG);by discharge  -KD    Progress/Outcomes (Problem Specific Goal 1, OT-IRF)  goal not met;continuing progress toward goal  -RC  goal not met  -LW  goal not met  -KD    Row Name 02/06/19 1015 02/06/19 0732          Transfer FIM Goals (OT-IRF)    Tub-Shower Transfer FIM Goal (OT-IRF)  Score of 6 (FIM)  -BL  Score of 6 (FIM)  -BH     Toilet Transfer FIM Goal (OT-IRF)  Score of 6 (FIM)  -BL  Score of 6 (FIM)  -     Time Frame (Transfer FIM Goals 1, OT-IRF)  long term goal (LTG);by discharge  -BL  long term goal (LTG);by discharge  -BH     Progress/Outcomes (Transfer FIM Goals, OT-IRF)  goal not met  -BL  goal not met  -BH        Bathing FIM Goal (OT-IRF)    Bathing FIM Goal (OT-IRF)  Score of 6 (FIM)  -BL  Score of 6 (FIM)  -BH     Time Frame  (Bathing FIM Goal, OT-IRF)  long term goal (LTG);by discharge  -BL  long term goal (LTG);by discharge  -BH     Progress/Outcomes (Bathing FIM Goal, OT-IRF)  goal not met  -BL  goal not met  -BH        UB Dressing FIM Goal (OT-IRF)    Upper Body Dressing, FIM Goal, OT-IRF  Score of 6 (FIM)  -BL  Score of 6 (FIM)  -BH     Time Frame (UB Dressing FIM Goal, OT-IRF)  long term goal (LTG);by discharge  -BL  long term goal (LTG);by discharge  -BH     Progress/Outcomes (UB Dressing FIM Goal, OT-IRF)  goal not met  -BL  goal not met  -BH        LB Dressing FIM Goal (OT-IRF)    Lower Body Dressing, FIM Goal, OT-IRF  Score of 6 (FIM)  -BL  Score of 6 (FIM)  -BH     Time Frame (LB Dressing FIM Goal, OT-IRF)  long term goal (LTG);by discharge  -BL  long term goal (LTG);by discharge  -BH     Progress/Outcomes (LB Dressing FIM Goal, OT-IRF)  goal not met  -BL  goal not met  -BH        Toileting Goal 1 (OT-IRF)    Activity/Device (Toileting Goal 1, OT-IRF)  toileting skills, all  -BL  toileting skills, all  -BH     [place] Level (Toileting Goal 1, OT-IRF)  supervision required  -BL  supervision required  -BH     Time Frame (Toileting Goal 1, OT-IRF)  long term goal (LTG);by discharge  -BL  long term goal (LTG);by discharge  -BH     Progress/Outcomes (Toileting Goal 1, OT-IRF)  goal not met  -BL  goal not met  -BH        ROM Goal 1 (OT-IRF)    ROM Goal 1 (OT-IRF)  Pt will display WFL with ROM in BUE with min reports of pain/increased pain level.   -BL  Pt will display WFL with ROM in BUE with min reports of pain/increased pain level.   -BH     Time Frame (ROM Goal 1, OT-IRF)  long term goal (LTG);by discharge  -BL  long term goal (LTG);by discharge  -BH     Progress/Outcomes (ROM Goal 1, OT-IRF)  goal not met  -BL  goal not met  -BH        Problem Specific Goal 1 (OT-IRF)    Problem Specific Goal 1 (OT-IRF)  Pt will be independent with BUE HEP and home safety awarness.   -BL  Pt will be independent with BUE HEP and home safety  awaraugusto.   -     Time Frame (Problem Specific Goal 1, OT-IRF)  long term goal (LTG);by discharge  -  long term goal (LTG);by discharge  -     Progress/Outcomes (Problem Specific Goal 1, OT-IRF)  goal not met  -BL  goal not met  -       User Key  (r) = Recorded By, (t) = Taken By, (c) = Cosigned By    Initials Name Provider Type     Maria Elena Barragan, OTR/L Occupational Therapist     Valeri Woodard, COULTER/L Occupational Therapy Assistant    KD Ele Avina, COULTER/L Occupational Therapy Assistant    BL Giana Chakraborty, COULTER/L Occupational Therapy Assistant    Ese Huang, COULTER/L Occupational Therapy Assistant    CS Divya Ash, COULTER/L Occupational Therapy Assistant    Irina Lorenzo, COULTER/L Occupational Therapy Assistant          Occupational Therapy Education     Title: PT OT SLP Therapies (Resolved)     Topic: Occupational Therapy (Resolved)     Point: ADL training (Resolved)     Description: Instruct learner(s) on proper safety adaptation and remediation techniques during self care or transfers.   Instruct in proper use of assistive devices.    Learning Progress Summary           Patient Acceptance, E, VU by GWYN at 2/12/2019  5:42 PM    Acceptance, E,TB, VU by BARBIE at 2/9/2019  3:38 PM    Acceptance, E,TB, VU by BARBIE at 2/8/2019  9:12 AM    Acceptance, E, VU,NR by  at 2/6/2019  1:35 PM    Comment:  Educated about OT and POC. Educated to call for assist. Educated on safety throughout.                   Point: Home exercise program (Resolved)     Description: Instruct learner(s) on appropriate technique for monitoring, assisting and/or progressing therapeutic exercises/activities.    Learning Progress Summary           Patient Acceptance, E, VU by GWYN at 2/12/2019  5:42 PM    Acceptance, E,TB, VU by BARBIE at 2/9/2019  3:38 PM    Acceptance, E,TB, VU by BARBIE at 2/8/2019  9:12 AM                   Point: Precautions (Resolved)     Description: Instruct learner(s) on prescribed precautions  during self-care and functional transfers.    Learning Progress Summary           Patient Acceptance, E, VU by  at 2/12/2019  5:42 PM    Acceptance, E,TB, VU by  at 2/9/2019  3:38 PM    Acceptance, E,TB, VU by  at 2/8/2019  9:12 AM    Acceptance, E, VU,NR by  at 2/6/2019  1:35 PM    Comment:  Educated about OT and POC. Educated to call for assist. Educated on safety throughout.    Acceptance, E, VU,NR by  at 2/6/2019 11:36 AM                   Point: Body mechanics (Resolved)     Description: Instruct learner(s) on proper positioning and spine alignment during self-care, functional mobility activities and/or exercises.    Learning Progress Summary           Patient Acceptance, E, VU by  at 2/12/2019  5:42 PM    Acceptance, E,TB, VU by  at 2/9/2019  3:38 PM    Acceptance, E,TB, VU by  at 2/8/2019  9:12 AM    Acceptance, E, VU,NR by  at 2/6/2019 11:36 AM                               User Key     Initials Effective Dates Name Provider Type Discipline     06/08/18 -  Maria Elena Barragan OTR/L Occupational Therapist OT     10/17/16 -  Giana Chakraborty, COULTER/L Occupational Therapy Assistant OT     03/07/18 -  Ese Dodson COULTER/L Occupational Therapy Assistant OT     03/07/18 -  Irina Armstrong COULTER/L Occupational Therapy Assistant OT               Eval FIM FIM Goal  Discharge FIM Daily FIM           Grooming   5     Bathing   4     Dressing - Upper Body   5     Dressing - Lower Body   5     Toilet   4     Tub, Shower   0     Problem Solving   0      Social Interaction    6             Bed, Chair, W/C Transfer        Walking        Wheelchair Locomotion        Stairs                Comprehension        Expression        Memory                 OT Recommendation and Plan             Outcome Measures     Row Name 02/15/19 1030 02/14/19 0920 02/13/19 0925       How much help from another person do you currently need...    Turning from your back to your side while in flat bed without using  bedrails?  4  -TW  4  -TW  4  -TW    Moving from lying on back to sitting on the side of a flat bed without bedrails?  4  -TW  4  -TW  4  -TW    Moving to and from a bed to a chair (including a wheelchair)?  4  -TW  3  -TW  3  -TW    Standing up from a chair using your arms (e.g., wheelchair, bedside chair)?  3  -TW  3  -TW  3  -TW    Climbing 3-5 steps with a railing?  3  -TW  2  -TW  2  -TW    To walk in hospital room?  3  -TW  3  -TW  3  -TW    AM-PAC 6 Clicks Score  21  -TW  19  -TW  19  -TW       Functional Assessment    Outcome Measure Options  AM-PAC 6 Clicks Basic Mobility (PT)  -TW  AM-PAC 6 Clicks Basic Mobility (PT)  -TW  AM-PAC 6 Clicks Basic Mobility (PT)  -TW      User Key  (r) = Recorded By, (t) = Taken By, (c) = Cosigned By    Initials Name Provider Type    TW Brant Palacios, PTA Physical Therapy Assistant           Time Calculation:        Therapy Suggested Charges     Code   Minutes Charges    None                           JOLIE Mauricio  2/15/2019

## 2019-02-17 NOTE — THERAPY DISCHARGE NOTE
Inpatient Rehabilitation - Occupational Therapy Discharge Summary  [facility]     Patient Name: Ventura Boggs  : 1960  MRN: [id number]    Today's Date: 2019                 Admit Date: 2019        OT Recommendation and Plan    Visit Dx:    ICD-10-CM ICD-9-CM   1. Stroke-like symptoms R29.90 781.99   2. Uncontrolled type 2 diabetes mellitus with hyperglycemia (CMS/[facility]) E11.65 250.02   3. Medically complex patient Z78.9 V49.9   4. Chronic intractable pain G89.29 338.29   5. Symbolic dysfunction R48.9 784.60   6. Impaired mobility and ADLs Z74.09 799.89   7. Impaired functional mobility, balance, gait, and endurance Z74.09 V49.89   8. Left-sided muscle weakness M62.81 728.87   9. Ataxia R27.0 781.3                   Outcome Measures     Row Name 02/15/19 1030 19 0920          How much help from another person do you currently need...    Turning from your back to your side while in flat bed without using bedrails?  4  -TW  4  -TW     Moving from lying on back to sitting on the side of a flat bed without bedrails?  4  -TW  4  -TW     Moving to and from a bed to a chair (including a wheelchair)?  4  -TW  3  -TW     Standing up from a chair using your arms (e.g., wheelchair, bedside chair)?  3  -TW  3  -TW     Climbing 3-5 steps with a railing?  3  -TW  2  -TW     To walk in hospital room?  3  -TW  3  -TW     AM-PAC 6 Clicks Score  21  -TW  19  -TW        Functional Assessment    Outcome Measure Options  AM-PAC 6 Clicks Basic Mobility (PT)  -TW  AM-PAC 6 Clicks Basic Mobility (PT)  -TW       User Key  (r) = Recorded By, (t) = Taken By, (c) = Cosigned By    Initials Name Provider Type    TW Brant Palacios PTA Physical Therapy Assistant          Therapy Suggested Charges     Code   Minutes Charges    None                 OT Discharge Summary  Anticipated Discharge Disposition (OT): home with home health  Reason for Discharge: Discharge from facility, Per MD order  Outcomes Achieved: Able  to achieve all goals within established timeline, Refer to plan of care for updates on goals achieved  Discharge Destination: Home with home health       Eval FIM FIM Goal  Discharge FIM Daily FIM           Grooming   5     Bathing   4     Dressing - Upper Body   5     Dressing - Lower Body   5     Toilet   4     Tub, Shower   0     Problem Solving   0      Social Interaction    6             Bed, Chair, W/C Transfer        Walking        Wheelchair Locomotion        Stairs                Comprehension        Expression        Memory               Ja Stevenson, OT  2/17/2019

## 2019-02-18 NOTE — PAYOR COMM NOTE
"Ventura Boggs (58 y.o. Male)     Date of Birth Social Security Number Address Home Phone MRN    1960  [address] [address] [phone] [id number]    Jewish Marital Status          None        Admission Date Admission Type Admitting Provider Attending Provider Department, Room/Bed    19 Urgent Richard Galvez MD  [facility], 533/1    Discharge Date Discharge Disposition Discharge Destination        2/15/2019 Home-Health Care [facility] [place]              Attending Provider:  (none)   Allergies:  Naproxen, Tramadol    Isolation:  None   Infection:  None   Code Status:  Prior    Ht:  175.3 cm (69\")   Wt:  93.1 kg (205 lb 4.8 oz)    Admission Cmt:  None   Principal Problem:  Medically complex patient [Z78.9]                 Active Insurance as of 2019     Primary Coverage     Payor Plan Insurance Group Employer/Plan Group    [employer] MEDICAID [employer] [id number]     Payor Plan Address Payor Plan Phone Number Payor Plan Fax Number Effective Dates    PO  [phone]  2017 - None Entered    [facility] [id number]       Subscriber Name Subscriber Birth Date Member ID       VENTURA BOGGS 1960 [id number]                 Emergency Contacts      (Rel.) Home Phone Work Phone Mobile Phone    Sonia Boggs (Significant Other) [phone]               Discharge Summary      Richard Galvez MD at 2/15/2019  7:24 AM             [address] [address]  T - [phone]     Rehabilitation Discharge Summary       BRIEF OVERVIEW   PATIENT NAME: Ventura Boggs                      PHYSICIAN: Richard Galvez MD  : 1960  MRN: [id number]    ADMISSION/DISCHARGE INFO   Admitting Provider: Richard Galvez MD  Discharge Provider: Richard Galvez MD  ADMISSION DATE: 2019   DISCHARGE DATE: February 15, 2019    ADMISSION " DIAGNOSES: Medically complex patient [Z78.9]  DISCHARGE DIAGNOSES:   Medically complex patient    Uncontrolled type 2 diabetes mellitus with hyperglycemia (CMS/[facility])    Stroke-like symptoms    Right shoulder pain    Left-sided muscle weakness    Closed head injury    Hyponatremia    History of recurrent TIAs    Chronic pain syndrome      Primary Care Physician at Discharge: Anita Faria, APRN [phone]      Secondary Discharge Diagnosis  Patient Active Problem List   Diagnosis Code   • Uncontrolled type 2 diabetes mellitus with hyperglycemia (CMS/[facility]) E11.65   • Postoperative urinary retention N99.89, R33.8   • Incisional hernia, without obstruction or gangrene K43.2   • Acute appendicitis with localized peritonitis K35.30   • Closed head injury S09.90XA   • Hyponatremia E87.1   • History of recurrent TIAs Z86.73   • Ataxia R27.0   • Chronic pain syndrome G89.4   • Right shoulder pain M25.511   • Left-sided muscle weakness M62.81   • Medically complex patient Z78.9   • Stroke-like symptoms R29.90       Discharge Disposition  Home or Self Care       Code Status at Discharge: Full code  Discharge condition:   Good     CONSULTS   Consult Orders (all) (From admission, onward)    Start     Ordered    02/12/19 0825  Inpatient Case Management  Consult  Once     Provider:  (Not yet assigned)    02/12/19 0825    02/05/19 1405  Inpatient consult to Nutrition  Once     Provider:  (Not yet assigned)    02/05/19 1404          DETAILS OF HOSPITAL STAY    Functional Status:                ADMIT             Discharge   Eating                                          4                        6   Grooming                                    5                        5  Bathing                                         4                        4  Dressing upper body                    5                        5  Dressing lower body                     3                        5  Toileting                                         3                        5  Bladder Management                   4                        6  Bowel Management                     4                        6  Transfers:bed chair wheelchair    3                        4  Transfers: toilet                            3                        4  Transfers: tub/shower                  0                        1  Locomotion: walking                    3                        2  Locomotion: Wheelchair              5                        6  Locomotion: stairs                       0                         2  Comprehension                           5                         5  Expression                                   5                         5  Social interaction                         5                        6  Problem solving                           4                       5  Memory                                        5                        5                                          Rehabilitation Course admitted to the acute rehab unit and participated well.  He made good progress throughout his stay.  Transfers activities of daily living and ambulation have improved as his strength has improved and ataxia has decreased.  Pain control has been adequate.  He has chronic pain and takes methadone and Percocet on a scheduled basis at home.  These were continued here.  He will resume his prescriptions that he has at home for pain control  At admission his hemoglobin A1c was 14 and his sugars were very high.  We worked to control his sugar and not diabetic control has improved with consistent use of his medication    Blood pressure has been well controlled      Heart Rate: 72  Resp: 18  BP: 127/77  Temp: 97 °F (36.1 °C)   Weight: 93.1 kg (205 lb 4.8 oz)    Pertinent Test Results: Hemoglobin A1c at admission was 15.1  Lab Results (last 72 hours)     Procedure Component Value Units Date/Time    POC Glucose Once [[id number]]  (Normal) Collected:   02/14/19 2027    Specimen:  Blood Updated:  02/14/19 2216     Glucose 82 mg/dL      Comment: RN NotifiedOperator: [id number] QUAN LONGORIANMeter ID: [id number]       POC Glucose Once [[id number]]  (Normal) Collected:  02/14/19 1619    Specimen:  Blood Updated:  02/14/19 1638     Glucose 90 mg/dL      Comment: : [id number] [employer]ist MEGANMeter ID: [id number]       POC Glucose Once [[id number]]  (Abnormal) Collected:  02/14/19 1125    Specimen:  Blood Updated:  02/14/19 1140     Glucose 144 mg/dL      Comment: : [id number] TANISHA WATTSMeter ID: [id number]       POC Glucose Once [[id number]]  (Normal) Collected:  02/14/19 0523    Specimen:  Blood Updated:  02/14/19 0551     Glucose 83 mg/dL      Comment: : [id number] MANJIT CYMeter ID: [id number]       POC Glucose Once [[id number]]  (Abnormal) Collected:  02/13/19 2023    Specimen:  Blood Updated:  02/13/19 2111     Glucose 175 mg/dL      Comment: Sliding Scale AdminOperator: [id number] MANJIT CYMeter ID: [id number]       POC Glucose Once [[id number]]  (Abnormal) Collected:  02/13/19 1631    Specimen:  Blood Updated:  02/13/19 1645     Glucose 173 mg/dL      Comment: RN NotifiedOperator: [id number] [employer] MEGANMeter ID: [id number]       POC Glucose Once [[id number]]  (Normal) Collected:  02/13/19 1110    Specimen:  Blood Updated:  02/13/19 1145     Glucose 103 mg/dL      Comment: : [id number] [employer] MEGANMeter ID: [id number]       POC Glucose Once [[id number]]  (Abnormal) Collected:  02/13/19 0535    Specimen:  Blood Updated:  02/13/19 0631     Glucose 192 mg/dL      Comment: : [id number] DEMARCUS HUIZARONMeter ID: [id number]       POC Glucose Once [[id number]]  (Abnormal) Collected:  02/12/19 1954    Specimen:  Blood Updated:  02/12/19 2046     Glucose 131 mg/dL      Comment: : [id number] DEMARCUS PEYTONMeter ID: [id number]       POC Glucose Once [[id number]]  (Normal) Collected:  02/12/19 1601    Specimen:  Blood  Updated:  02/12/19 1620     Glucose 110 mg/dL      Comment: : [id number] Jainism MEGANMeter ID: [id number]       POC Glucose Once [[id number]]  (Abnormal) Collected:  02/12/19 1044    Specimen:  Blood Updated:  02/12/19 1106     Glucose 264 mg/dL      Comment: RN NotifiedOperator: [id number] Jainism MEGANMeter ID: [id number]       POC Glucose Once [[id number]]  (Normal) Collected:  02/12/19 0733    Specimen:  Blood Updated:  02/12/19 0745     Glucose 78 mg/dL      Comment: : [id number] Jainism MEGANMeter ID: [id number]           Imaging Results (all)     None          Presenting Problem/History of Present Illness     Patient is 58 y.o. male presents with onset at about 4 AM on 1/31/19 of left-sided numbness, weakness, unsteady gait, slurred speech when he awoke.  He fell several times going to the bathroom after calling the emergency he was some brought to the [facility] emergency Department for evaluation.  CT was reportedly unremarkable there and he was transferred to [facility] for evaluation and treatment.  At [facility] he did see neurology and they felt like his symptoms had resolved by the time he was evaluated.  However the patient says that his symptoms have worsened since that time and he has left arm and especially left leg weakness.  Tells me that his legs buckle when he tries to walk.  Of note at the time of admission to the emergency room his glucose was 704 his sodium was 125 TSH was 5.8 and his free T4 was mildly low.  Thyroid functions were repeated at [facility] and they were normal and felt that he did not need medication for thyroid.  He had a workup for acute stroke and it was essentially negative at the [facility].     This Gentleman has a past history of a fall with a closed head injury and intracranial hemorrhage about 7 months ago.  Subsequently he did have a TIA similar to this episode and he says that he's had left-sided weakness since the original head  injury.     He does have long-standing diabetes hypertension is had kidney stones he has osteoarthritis.  He has chronic back pain and right shoulder pain and is followed by pain management in [facility]..     Physical Exam at Discharge      He is alert and oriented in no distress  ENT is unremarkable  Neck supple no carotid bruits no JVD  His lungs are clear without rales rhonchi or wheezes  His heart rate is regular without murmurs gallops or rubs  His abdomen is soft nontender good bowel sounds no rebound guarding or rigidity  He does have full range of motion of all extremities and seems to have fairly good strength at discharge.  He does still complain of some left-sided weakness especially in his lower extremity  Cranial nerves are intact  Speech is intact, swallowing is intact            DISPOSITION   He will be returning home and he will have home health for PT and OT.  He will be staying with his significant other     DISCHARGE MEDICATIONS        Your medication list      START taking these medications      Instructions Last Dose Given Next Dose Due   amitriptyline 10 MG tablet  Commonly known as:  ELAVIL      Take 1 tablet by mouth Every Night.       amLODIPine 5 MG tablet  Commonly known as:  NORVASC      Take 1 tablet by mouth Daily.       Baclofen 5 MG tablet      Take 5 mg by mouth 3 (Three) Times a Day.       polyethylene glycol pack packet  Commonly known as:  MIRALAX      Take 17 g by mouth Daily With Breakfast.       sennosides-docusate sodium 8.6-50 MG tablet  Commonly known as:  SENOKOT-S      Take 2 tablets by mouth Every Night.          CHANGE how you take these medications      Instructions Last Dose Given Next Dose Due   oxyCODONE-acetaminophen  MG per tablet  Commonly known as:  PERCOCET  What changed:    · when to take this  · reasons to take this      Take 1 tablet by mouth Every 4 (Four) Hours.          CONTINUE taking these medications      Instructions Last Dose Given  Next Dose Due   aspirin 81 MG chewable tablet      Chew 81 mg Daily.       atorvastatin 20 MG tablet  Commonly known as:  LIPITOR      Take 20 mg by mouth Every Night.       dicyclomine 20 MG tablet  Commonly known as:  BENTYL      Take 1 tablet by mouth Every 6 (Six) Hours As Needed (abdominal pain).       DULoxetine 20 MG capsule  Commonly known as:  CYMBALTA      Take 20 mg by mouth Daily.       famotidine 20 MG tablet  Commonly known as:  PEPCID      Take 1 tablet by mouth 2 (Two) Times a Day.       FREESTYLE TEST STRIPS test strip  Generic drug:  glucose blood           Insulin Glargine 100 UNIT/ML injection pen  Commonly known as:  BASAGLAR KWIKPEN      Inject 30 Units under the skin into the appropriate area as directed Every Night.       insulin lispro 100 UNIT/ML injection  Commonly known as:  humaLOG      Inject 8-12 Units under the skin 3 (Three) Times a Day As Needed (sliding scale). As needed per sliding scale       lisinopril 5 MG tablet  Commonly known as:  PRINIVIL,ZESTRIL      Take 5 mg by mouth Daily.       metFORMIN 1000 MG tablet  Commonly known as:  GLUCOPHAGE      Take 1,000 mg by mouth 2 (Two) Times a Day With Meals.       methadone 5 MG tablet  Commonly known as:  DOLOPHINE      Take 5 mg by mouth 2 (Two) Times a Day.       ondansetron ODT 4 MG disintegrating tablet  Commonly known as:  ZOFRAN-ODT      Take 1 tablet by mouth Every 8 (Eight) Hours As Needed for Nausea or Vomiting.          STOP taking these medications    cyclobenzaprine 10 MG tablet  Commonly known as:  FLEXERIL        lidocaine-prilocaine 2.5-2.5 % cream  Commonly known as:  EMLA        prochlorperazine 10 MG tablet  Commonly known as:  COMPAZINE        promethazine 25 MG tablet  Commonly known as:  PHENERGAN              Where to Get Your Medications      These medications were sent to [id number] - [place], [place] - [address] AT [facility] [address] - [phone]  - [phone]   [address] [facility] [place]  [place] [address]    Hours:  24-hours Phone:  [phone] ·   amitriptyline 10 MG tablet  · amLODIPine 5 MG tablet  · Baclofen 5 MG tablet  · polyethylene glycol pack packet  · sennosides-docusate sodium 8.6-50 MG tablet         INSTRUCTIONS   Activity: As tolerated    Diet: Diabetic diet    Special Instructions:   Wheel Chair    FOLLOW UP   Additional Instructions for the Follow-ups that You Need to Schedule     Ambulatory Referral to Home Health   As directed      Face to Face Visit Date:  2/12/2019    Follow-up Provider for Plan of Care?:  I treated the patient in an acute care facility and will not continue treatment after discharge.    Follow-up Provider:  ANITA SANABRIA [[id number]]    Reason/Clinical Findings:  stroke poor ambulation due to ataxia and weakness    Describe mobility limitations that make leaving home difficult:  ataxia and left sided weakness    Nursing/Therapeutic Services Requested:  Physical Therapy Occupational Therapy Speech Therapy    PT orders:  Therapeutic exercise Gait Training Transfer training Strengthening    Weight Bearing Status:  As Tolerated    Occupational orders:  Activities of daily living Strengthening Home safety assessment Fine motor    SLP orders:  Articulation Language    Frequency:  1 Week 1            Contact information for follow-up providers     Anita Sanabria, GIA Follow up in 1 week(s).    Specialty:  Family Medicine  Why:  follow up post discharge  Contact information:  [address] at the [facility] [address]  [facility] [address] [phone]                   Contact information for after-discharge care     Durable Medical Equipment     [facility]     Service:  Durable Medical Equipment  Contact information:  [address] [facility] [address] [phone]                 Home Medical Care     [facility] Follow up.    Service:  Home Health Services  Contact information:  [address] [place] [address] [phone]                                           This document has been electronically signed by Richard Galvez MD on February 15, 2019 7:24 AM            Electronically signed by Richard Galvez MD at 2/15/2019  7:32 AM

## 2019-08-20 ENCOUNTER — APPOINTMENT (OUTPATIENT)
Dept: GENERAL RADIOLOGY | Facility: HOSPITAL | Age: 59
End: 2019-08-20

## 2019-08-20 ENCOUNTER — APPOINTMENT (OUTPATIENT)
Dept: CT IMAGING | Facility: HOSPITAL | Age: 59
End: 2019-08-20

## 2019-08-20 ENCOUNTER — HOSPITAL ENCOUNTER (EMERGENCY)
Facility: HOSPITAL | Age: 59
Discharge: ANOTHER HOSPITAL | End: 2019-08-20
Attending: FAMILY MEDICINE | Admitting: FAMILY MEDICINE

## 2019-08-20 VITALS
TEMPERATURE: 98.1 F | DIASTOLIC BLOOD PRESSURE: 86 MMHG | BODY MASS INDEX: 30.36 KG/M2 | OXYGEN SATURATION: 96 % | HEART RATE: 92 BPM | RESPIRATION RATE: 20 BRPM | HEIGHT: 69 IN | WEIGHT: 205 LBS | SYSTOLIC BLOOD PRESSURE: 124 MMHG

## 2019-08-20 DIAGNOSIS — I61.9 CEREBRAL HEMORRHAGE (HCC): Primary | ICD-10-CM

## 2019-08-20 DIAGNOSIS — R29.898 WEAKNESS OF LEFT LOWER EXTREMITY: ICD-10-CM

## 2019-08-20 DIAGNOSIS — R29.898 WEAKNESS OF LEFT UPPER EXTREMITY: ICD-10-CM

## 2019-08-20 LAB
ALBUMIN SERPL-MCNC: 4.9 G/DL (ref 3.5–5.2)
ALBUMIN/GLOB SERPL: 1.2 G/DL
ALP SERPL-CCNC: 116 U/L (ref 39–117)
ALT SERPL W P-5'-P-CCNC: 7 U/L (ref 1–41)
ANION GAP SERPL CALCULATED.3IONS-SCNC: 17 MMOL/L (ref 5–15)
APTT PPP: 29.9 SECONDS (ref 20–40.3)
ARTERIAL PATENCY WRIST A: POSITIVE
AST SERPL-CCNC: 14 U/L (ref 1–40)
ATMOSPHERIC PRESS: 749 MMHG
BASE EXCESS BLDA CALC-SCNC: 0.4 MMOL/L (ref 0–2)
BASOPHILS # BLD AUTO: 0.07 10*3/MM3 (ref 0–0.2)
BASOPHILS NFR BLD AUTO: 0.7 % (ref 0–1.5)
BDY SITE: NORMAL
BILIRUB SERPL-MCNC: 0.8 MG/DL (ref 0.2–1.2)
BILIRUB UR QL STRIP: NEGATIVE
BUN BLD-MCNC: 10 MG/DL (ref 6–20)
BUN/CREAT SERPL: 12.8 (ref 7–25)
CALCIUM SPEC-SCNC: 9.9 MG/DL (ref 8.6–10.5)
CHLORIDE SERPL-SCNC: 93 MMOL/L (ref 98–107)
CK SERPL-CCNC: 30 U/L (ref 20–200)
CLARITY UR: CLEAR
CO2 SERPL-SCNC: 23 MMOL/L (ref 22–29)
COLOR UR: YELLOW
CREAT BLD-MCNC: 0.78 MG/DL (ref 0.76–1.27)
DEPRECATED RDW RBC AUTO: 35.5 FL (ref 37–54)
EOSINOPHIL # BLD AUTO: 0.02 10*3/MM3 (ref 0–0.4)
EOSINOPHIL NFR BLD AUTO: 0.2 % (ref 0.3–6.2)
ERYTHROCYTE [DISTWIDTH] IN BLOOD BY AUTOMATED COUNT: 12.1 % (ref 12.3–15.4)
GFR SERPL CREATININE-BSD FRML MDRD: 103 ML/MIN/1.73
GLOBULIN UR ELPH-MCNC: 4 GM/DL
GLUCOSE BLD-MCNC: 466 MG/DL (ref 65–99)
GLUCOSE BLDC GLUCOMTR-MCNC: 322 MG/DL (ref 70–130)
GLUCOSE BLDC GLUCOMTR-MCNC: 357 MG/DL (ref 70–130)
GLUCOSE UR STRIP-MCNC: ABNORMAL MG/DL
HCO3 BLDA-SCNC: 24.6 MMOL/L (ref 20–26)
HCT VFR BLD AUTO: 45.4 % (ref 37.5–51)
HGB BLD-MCNC: 16.2 G/DL (ref 13–17.7)
HGB UR QL STRIP.AUTO: NEGATIVE
HOLD SPECIMEN: NORMAL
HOLD SPECIMEN: NORMAL
HOROWITZ INDEX BLD+IHG-RTO: 21 %
IMM GRANULOCYTES # BLD AUTO: 0.04 10*3/MM3 (ref 0–0.05)
IMM GRANULOCYTES NFR BLD AUTO: 0.4 % (ref 0–0.5)
INR PPP: 1.15 (ref 0.8–1.2)
KETONES UR QL STRIP: ABNORMAL
LEUKOCYTE ESTERASE UR QL STRIP.AUTO: NEGATIVE
LYMPHOCYTES # BLD AUTO: 1.54 10*3/MM3 (ref 0.7–3.1)
LYMPHOCYTES NFR BLD AUTO: 15.1 % (ref 19.6–45.3)
Lab: NORMAL
MAGNESIUM SERPL-MCNC: 1.9 MG/DL (ref 1.6–2.6)
MCH RBC QN AUTO: 28.9 PG (ref 26.6–33)
MCHC RBC AUTO-ENTMCNC: 35.7 G/DL (ref 31.5–35.7)
MCV RBC AUTO: 80.9 FL (ref 79–97)
MODALITY: NORMAL
MONOCYTES # BLD AUTO: 0.75 10*3/MM3 (ref 0.1–0.9)
MONOCYTES NFR BLD AUTO: 7.3 % (ref 5–12)
NEUTROPHILS # BLD AUTO: 7.8 10*3/MM3 (ref 1.7–7)
NEUTROPHILS NFR BLD AUTO: 76.3 % (ref 42.7–76)
NITRITE UR QL STRIP: NEGATIVE
NRBC BLD AUTO-RTO: 0 /100 WBC (ref 0–0.2)
PCO2 BLDA: 37.7 MM HG (ref 35–45)
PH BLDA: 7.42 PH UNITS (ref 7.35–7.45)
PH UR STRIP.AUTO: <=5 [PH] (ref 5–9)
PLATELET # BLD AUTO: 360 10*3/MM3 (ref 140–450)
PMV BLD AUTO: 11.3 FL (ref 6–12)
PO2 BLDA: 84.9 MM HG (ref 83–108)
POTASSIUM BLD-SCNC: 3.7 MMOL/L (ref 3.5–5.2)
PROT SERPL-MCNC: 8.9 G/DL (ref 6–8.5)
PROT UR QL STRIP: NEGATIVE
PROTHROMBIN TIME: 14.5 SECONDS (ref 11.1–15.3)
RBC # BLD AUTO: 5.61 10*6/MM3 (ref 4.14–5.8)
SAO2 % BLDCOA: 97.1 % (ref 94–99)
SODIUM BLD-SCNC: 133 MMOL/L (ref 136–145)
SP GR UR STRIP: 1.04 (ref 1–1.03)
TROPONIN T SERPL-MCNC: <0.01 NG/ML (ref 0–0.03)
UROBILINOGEN UR QL STRIP: ABNORMAL
VENTILATOR MODE: NORMAL
WBC NRBC COR # BLD: 10.22 10*3/MM3 (ref 3.4–10.8)
WHOLE BLOOD HOLD SPECIMEN: NORMAL
WHOLE BLOOD HOLD SPECIMEN: NORMAL

## 2019-08-20 PROCEDURE — 82550 ASSAY OF CK (CPK): CPT | Performed by: FAMILY MEDICINE

## 2019-08-20 PROCEDURE — 73030 X-RAY EXAM OF SHOULDER: CPT

## 2019-08-20 PROCEDURE — 99291 CRITICAL CARE FIRST HOUR: CPT

## 2019-08-20 PROCEDURE — 25010000002 BUTORPHANOL PER 1 MG: Performed by: FAMILY MEDICINE

## 2019-08-20 PROCEDURE — 25010000002 MORPHINE PER 10 MG: Performed by: FAMILY MEDICINE

## 2019-08-20 PROCEDURE — 93005 ELECTROCARDIOGRAM TRACING: CPT | Performed by: FAMILY MEDICINE

## 2019-08-20 PROCEDURE — 80053 COMPREHEN METABOLIC PANEL: CPT | Performed by: FAMILY MEDICINE

## 2019-08-20 PROCEDURE — 70450 CT HEAD/BRAIN W/O DYE: CPT

## 2019-08-20 PROCEDURE — 71045 X-RAY EXAM CHEST 1 VIEW: CPT

## 2019-08-20 PROCEDURE — 81003 URINALYSIS AUTO W/O SCOPE: CPT | Performed by: FAMILY MEDICINE

## 2019-08-20 PROCEDURE — 83735 ASSAY OF MAGNESIUM: CPT | Performed by: FAMILY MEDICINE

## 2019-08-20 PROCEDURE — 99285 EMERGENCY DEPT VISIT HI MDM: CPT

## 2019-08-20 PROCEDURE — 96374 THER/PROPH/DIAG INJ IV PUSH: CPT

## 2019-08-20 PROCEDURE — 85025 COMPLETE CBC W/AUTO DIFF WBC: CPT | Performed by: FAMILY MEDICINE

## 2019-08-20 PROCEDURE — 85730 THROMBOPLASTIN TIME PARTIAL: CPT | Performed by: FAMILY MEDICINE

## 2019-08-20 PROCEDURE — 82803 BLOOD GASES ANY COMBINATION: CPT

## 2019-08-20 PROCEDURE — 84484 ASSAY OF TROPONIN QUANT: CPT | Performed by: FAMILY MEDICINE

## 2019-08-20 PROCEDURE — 93010 ELECTROCARDIOGRAM REPORT: CPT | Performed by: INTERNAL MEDICINE

## 2019-08-20 PROCEDURE — 36600 WITHDRAWAL OF ARTERIAL BLOOD: CPT

## 2019-08-20 PROCEDURE — 96376 TX/PRO/DX INJ SAME DRUG ADON: CPT

## 2019-08-20 PROCEDURE — 85610 PROTHROMBIN TIME: CPT | Performed by: FAMILY MEDICINE

## 2019-08-20 PROCEDURE — 63710000001 INSULIN REGULAR HUMAN PER 5 UNITS: Performed by: FAMILY MEDICINE

## 2019-08-20 PROCEDURE — 82962 GLUCOSE BLOOD TEST: CPT

## 2019-08-20 PROCEDURE — 96375 TX/PRO/DX INJ NEW DRUG ADDON: CPT

## 2019-08-20 PROCEDURE — 96361 HYDRATE IV INFUSION ADD-ON: CPT

## 2019-08-20 RX ORDER — SODIUM CHLORIDE 0.9 % (FLUSH) 0.9 %
10 SYRINGE (ML) INJECTION AS NEEDED
Status: DISCONTINUED | OUTPATIENT
Start: 2019-08-20 | End: 2019-08-20 | Stop reason: HOSPADM

## 2019-08-20 RX ORDER — SODIUM CHLORIDE 9 MG/ML
125 INJECTION, SOLUTION INTRAVENOUS CONTINUOUS
Status: DISCONTINUED | OUTPATIENT
Start: 2019-08-20 | End: 2019-08-20 | Stop reason: HOSPADM

## 2019-08-20 RX ORDER — HYDROCODONE BITARTRATE AND ACETAMINOPHEN 7.5; 325 MG/1; MG/1
1 TABLET ORAL ONCE
Status: DISCONTINUED | OUTPATIENT
Start: 2019-08-20 | End: 2019-08-20 | Stop reason: HOSPADM

## 2019-08-20 RX ORDER — BUTORPHANOL TARTRATE 1 MG/ML
1 INJECTION, SOLUTION INTRAMUSCULAR; INTRAVENOUS ONCE
Status: COMPLETED | OUTPATIENT
Start: 2019-08-20 | End: 2019-08-20

## 2019-08-20 RX ADMIN — MORPHINE SULFATE 4 MG: 4 INJECTION, SOLUTION INTRAMUSCULAR; INTRAVENOUS at 11:18

## 2019-08-20 RX ADMIN — HUMAN INSULIN 8 UNITS: 100 INJECTION, SOLUTION SUBCUTANEOUS at 14:35

## 2019-08-20 RX ADMIN — HUMAN INSULIN 8 UNITS: 100 INJECTION, SOLUTION SUBCUTANEOUS at 12:51

## 2019-08-20 RX ADMIN — SODIUM CHLORIDE 125 ML/HR: 9 INJECTION, SOLUTION INTRAVENOUS at 14:34

## 2019-08-20 RX ADMIN — BUTORPHANOL TARTRATE 1 MG: 1 INJECTION, SOLUTION INTRAMUSCULAR; INTRAVENOUS at 14:43

## 2019-08-20 RX ADMIN — MORPHINE SULFATE 4 MG: 4 INJECTION INTRAVENOUS at 12:51

## 2019-08-20 NOTE — ED PROVIDER NOTES
Subjective   NV x 2 days. Pt states that he is unable to swallow his home meds. Left upper and lower extremity weakness since 4:30 am when patient woke up to use the rest room. Last normal was at 10 pm last night. Strokes x 3 with one being a hemorrhagic stroke (1.5 years ago).         Stroke   Presenting symptoms: weakness    Presenting symptoms: no confusion and no headaches    Onset quality:  Unable to specify  Last known well:  10 pm last night  Timing:  Constant  Progression:  Unchanged  Similar to previous episodes: yes    Associated symptoms: chest pain, nausea and vomiting    Associated symptoms: no difficulty swallowing, no dizziness, no fever, no neck pain and no seizures        Review of Systems   Constitutional: Positive for activity change. Negative for appetite change, chills, diaphoresis, fatigue and fever.   HENT: Negative for congestion, ear discharge, ear pain, nosebleeds, rhinorrhea, sinus pressure, sore throat and trouble swallowing.    Eyes: Negative for discharge and redness.   Respiratory: Positive for cough and shortness of breath. Negative for apnea, chest tightness and wheezing.    Cardiovascular: Positive for chest pain.   Gastrointestinal: Positive for nausea and vomiting. Negative for abdominal pain and diarrhea.   Endocrine: Negative for polyuria.   Genitourinary: Negative for dysuria, frequency and urgency.   Musculoskeletal: Negative for myalgias and neck pain.   Skin: Negative for color change and rash.   Allergic/Immunologic: Negative for immunocompromised state.   Neurological: Positive for weakness. Negative for dizziness, seizures, syncope, light-headedness and headaches.   Hematological: Negative for adenopathy. Does not bruise/bleed easily.   Psychiatric/Behavioral: Negative for behavioral problems and confusion.   All other systems reviewed and are negative.      Past Medical History:   Diagnosis Date   • Arthritis    • Carpal tunnel syndrome    • Chronic pain syndrome    •  Depressive disorder    • GERD (gastroesophageal reflux disease)    • Hernia    • History of closed head injury     with ICH and left weakness   • History of urinary system disease    • Hypertension    • Migraine    • Right shoulder pain    • Rotator cuff syndrome    • Type 2 diabetes mellitus (CMS/HCC)    • Ureteric stone        Allergies   Allergen Reactions   • Naproxen Other (See Comments) and Rash     Blisters in mouth   • Tramadol Rash     Patient states he gets a rash in his mouth.        Past Surgical History:   Procedure Laterality Date   • ABDOMINAL SURGERY  08/2016   • APPENDECTOMY N/A 1/27/2018    Procedure: APPENDECTOMY;  Surgeon: Rusty Palacio MD;  Location: [facility];  Service:    • CHOLECYSTECTOMY     • CIRCUMCISION     • CYSTOSCOPY  08/13/2003    Cystoscopy and removal of J stent. Right ureteral stent.   • CYSTOSCOPY  08/13/2003    Cystoscopy and right stone extraction and placement of 26x 6 J-stent.   • INCISION AND DRAINAGE ABSCESS N/A 7/26/2017    Procedure: INCISION AND DRAINAGE ABD.ABSCESS;  Surgeon: Rui Shaver MD;  Location: [facility];  Service:    • VENTRAL/INCISIONAL HERNIA REPAIR N/A 5/31/2017    Procedure: LAPAROSCOPIC POSSIBLE OPEN ADHESIOLYSIS AND VENTRAL/INCISIONAL HERNIA REPAIR ;  Surgeon: Rui Shaver MD;  Location: [facility];  Service:        History reviewed. No pertinent family history.    Social History     Socioeconomic History   • Marital status:      Spouse name: Not on file   • Number of children: Not on file   • Years of education: Not on file   • Highest education level: Not on file   Tobacco Use   • Smoking status: Never Smoker   • Smokeless tobacco: Never Used   Substance and Sexual Activity   • Alcohol use: No   • Drug use: No   • Sexual activity: Defer           Objective   Physical Exam   Constitutional: He is oriented to person, place, and time. He appears well-developed and well-nourished.   HENT:   Head: Normocephalic and atraumatic.   Nose: Nose  normal.   Mouth/Throat: Oropharynx is clear and moist.   Eyes: Conjunctivae and EOM are normal. Pupils are equal, round, and reactive to light. Right eye exhibits no discharge. Left eye exhibits no discharge. No scleral icterus.   Neck: Normal range of motion. Neck supple. No tracheal deviation present.   Cardiovascular: Normal rate, regular rhythm and normal heart sounds.   No murmur heard.  Pulmonary/Chest: Effort normal and breath sounds normal. No stridor. No respiratory distress. He has no wheezes. He has no rales.   Abdominal: Soft. Bowel sounds are normal. He exhibits no distension and no mass. There is no tenderness. There is no rebound and no guarding.   Musculoskeletal: He exhibits no edema.   Neurological: He is alert and oriented to person, place, and time. He displays no atrophy and no tremor. He displays no seizure activity. Coordination normal. GCS eye subscore is 4. GCS verbal subscore is 5. GCS motor subscore is 6.   Muscle strength 4/5 left upper and lower extremities.  Patient has decreased coordination of the left upper extremity with the finger-to-nose test.  He is able to follow commands.   Skin: Skin is warm and dry. No rash noted. No erythema.   Psychiatric: He has a normal mood and affect. His behavior is normal. Thought content normal.   Nursing note and vitals reviewed.      ECG 12 Lead    Date/Time: 8/20/2019 10:46 AM  Performed by: Blaine Coley MD  Authorized by: Blaine Coley MD   Interpreted by physician  Rhythm: sinus tachycardia  BPM: 108  ST Segments: ST segments normal                     ED Course  ED Course as of Aug 20 1443   Tue Aug 20, 2019   1433 Findings discussed with Dr. Bridger Mathias at [facility] who agrees to accept patient for further care.  Patient will go to their [place] campus.  He is currently stable at time of transfer and will go by ground EMS.  [CB]      ED Course User Index  [CB] Blaine Coley MD          Labs Reviewed    COMPREHENSIVE METABOLIC PANEL - Abnormal; Notable for the following components:       Result Value    Glucose 466 (*)     Sodium 133 (*)     Chloride 93 (*)     Total Protein 8.9 (*)     Anion Gap 17.0 (*)     All other components within normal limits    Narrative:     GFR Normal >60  Chronic Kidney Disease <60  Kidney Failure <15   CBC WITH AUTO DIFFERENTIAL - Abnormal; Notable for the following components:    RDW 12.1 (*)     RDW-SD 35.5 (*)     Neutrophil % 76.3 (*)     Lymphocyte % 15.1 (*)     Eosinophil % 0.2 (*)     Neutrophils, Absolute 7.80 (*)     All other components within normal limits   URINALYSIS W/ CULTURE IF INDICATED - Abnormal; Notable for the following components:    Specific Gravity, UA 1.040 (*)     Glucose, UA >=1000 mg/dL (3+) (*)     Ketones, UA 15 mg/dL (1+) (*)     All other components within normal limits    Narrative:     Urine microscopic not indicated.   POCT GLUCOSE FINGERSTICK - Abnormal; Notable for the following components:    Glucose 357 (*)     All other components within normal limits   PROTIME-INR - Normal    Narrative:     Therapeutic range for most indications is 2.0-3.0 INR,  or 2.5-3.5 for mechanical heart valves.   APTT - Normal    Narrative:     The recommended Heparin therapeutic range is 68-97 seconds.   TROPONIN (IN-HOUSE) - Normal    Narrative:     Troponin T Reference Range:  <= 0.03 ng/mL-   Negative for AMI  >0.03 ng/mL-     Abnormal for myocardial necrosis.  Clinicians would have to utilize clinical acumen, EKG, Troponin and serial changes to determine if it is an Acute Myocardial Infarction or myocardial injury due to an underlying chronic condition.    CK - Normal   MAGNESIUM - Normal   RAINBOW DRAW    Narrative:     The following orders were created for panel order [place] Draw.  Procedure                               Abnormality         Status                     ---------                               -----------         ------                     Light  Blue Top[[id number]]                                   Final result               Green Top (Gel)[[id number]]                                  Final result               Lavender Top[[id number]]                                     Final result               Gold Top - SST[[id number]]                                   Final result                 Please view results for these tests on the individual orders.   BLOOD GAS, ARTERIAL   BLOOD GAS, ARTERIAL   POCT GLUCOSE FINGERSTICK   POCT GLUCOSE FINGERSTICK   TYPE AND SCREEN   CBC AND DIFFERENTIAL    Narrative:     The following orders were created for panel order CBC & Differential.  Procedure                               Abnormality         Status                     ---------                               -----------         ------                     CBC Auto Differential[[id number]]        Abnormal            Final result                 Please view results for these tests on the individual orders.   LIGHT BLUE TOP   GREEN TOP   LAVENDER TOP   GOLD TOP - SST       XR Shoulder 2+ View Right   Final Result   CONCLUSION:   No fracture or dislocation.   Minimal hypertrophic change acromioclavicular joint.      [id number]      Electronically signed by:  Travon Do MD  8/20/2019 1:09 PM CDT   Workstation: [employer]      CT Head Without Contrast Stroke Protocol   Final Result   CONCLUSION:   4.7 mm focus of increased density right basal ganglia inferiorly.   Uncertain whether his represents a physiologic calcification or a   small hypertensive hemorrhage.   Consider 24-hour follow-up CT.   Minimal cerebral atrophy.      Report called at approximately 12:30 PM CST.      [id number]      Electronically signed by:  Travon Do MD  8/20/2019 12:21 PM   CDT Workstation: [employer]      XR Chest 1 View   Final Result   CONCLUSION:          1. No evidence of an active cardiopulmonary process.                                                                    Electronically signed  by:  HALI Jean MD  8/20/2019 10:41   AM CDT Workstation: [id number]                    [facility]  Number of Diagnoses or Management Options     Amount and/or Complexity of Data Reviewed  Clinical lab tests: reviewed and ordered  Tests in the radiology section of CPT®: reviewed and ordered  Tests in the medicine section of CPT®: reviewed and ordered  Discuss the patient with other providers: yes  Independent visualization of images, tracings, or specimens: yes    Risk of Complications, Morbidity, and/or Mortality  Presenting problems: high  Diagnostic procedures: moderate  Management options: moderate    Critical Care  Total time providing critical care: 30-74 minutes (55 min)        Final diagnoses:   Cerebral hemorrhage (CMS/HCC)   Weakness of left lower extremity   Weakness of left upper extremity            Blaine Coley MD  08/20/19 [id number]

## 2019-08-20 NOTE — ED NOTES
Patient is aware he will be transferred; agreeable.  Remains alert and oriented.       Mary Jane Thiboedaux RN  08/20/19 9384

## 2019-08-20 NOTE — ED NOTES
Pt belives that he is having another stroke. Pt stated that he is weak on the left side, Having numbness in head and shoulders.      Fletcher Woodard  08/20/19 6751

## 2019-12-04 ENCOUNTER — HOSPITAL ENCOUNTER (EMERGENCY)
Facility: HOSPITAL | Age: 59
Discharge: HOME | End: 2019-12-04
Attending: FAMILY MEDICINE | Admitting: FAMILY MEDICINE

## 2019-12-04 ENCOUNTER — APPOINTMENT (OUTPATIENT)
Dept: GENERAL RADIOLOGY | Facility: HOSPITAL | Age: 59
End: 2019-12-04

## 2019-12-04 ENCOUNTER — APPOINTMENT (OUTPATIENT)
Dept: CT IMAGING | Facility: HOSPITAL | Age: 59
End: 2019-12-04

## 2019-12-04 VITALS
SYSTOLIC BLOOD PRESSURE: 147 MMHG | WEIGHT: 205 LBS | DIASTOLIC BLOOD PRESSURE: 97 MMHG | HEIGHT: 68 IN | OXYGEN SATURATION: 96 % | HEART RATE: 84 BPM | RESPIRATION RATE: 16 BRPM | TEMPERATURE: 97.7 F | BODY MASS INDEX: 31.07 KG/M2

## 2019-12-04 DIAGNOSIS — R10.9 RIGHT FLANK PAIN: Primary | ICD-10-CM

## 2019-12-04 DIAGNOSIS — R05.9 COUGH: ICD-10-CM

## 2019-12-04 DIAGNOSIS — R30.0 DYSURIA: ICD-10-CM

## 2019-12-04 LAB
ALBUMIN SERPL-MCNC: 4.2 G/DL (ref 3.5–5.2)
ALBUMIN/GLOB SERPL: 1.4 G/DL
ALP SERPL-CCNC: 164 U/L (ref 39–117)
ALT SERPL W P-5'-P-CCNC: 9 U/L (ref 1–41)
ANION GAP SERPL CALCULATED.3IONS-SCNC: 10 MMOL/L (ref 5–15)
AST SERPL-CCNC: 12 U/L (ref 1–40)
BASOPHILS # BLD AUTO: 0.05 10*3/MM3 (ref 0–0.2)
BASOPHILS NFR BLD AUTO: 0.8 % (ref 0–1.5)
BILIRUB SERPL-MCNC: 0.2 MG/DL (ref 0.2–1.2)
BILIRUB UR QL STRIP: NEGATIVE
BUN BLD-MCNC: 6 MG/DL (ref 6–20)
BUN/CREAT SERPL: 8.2 (ref 7–25)
CALCIUM SPEC-SCNC: 9.2 MG/DL (ref 8.6–10.5)
CHLORIDE SERPL-SCNC: 98 MMOL/L (ref 98–107)
CLARITY UR: CLEAR
CO2 SERPL-SCNC: 31 MMOL/L (ref 22–29)
COLOR UR: YELLOW
CREAT BLD-MCNC: 0.73 MG/DL (ref 0.76–1.27)
DEPRECATED RDW RBC AUTO: 38.8 FL (ref 37–54)
EOSINOPHIL # BLD AUTO: 0.36 10*3/MM3 (ref 0–0.4)
EOSINOPHIL NFR BLD AUTO: 5.4 % (ref 0.3–6.2)
ERYTHROCYTE [DISTWIDTH] IN BLOOD BY AUTOMATED COUNT: 12 % (ref 12.3–15.4)
GFR SERPL CREATININE-BSD FRML MDRD: 111 ML/MIN/1.73
GLOBULIN UR ELPH-MCNC: 3.1 GM/DL
GLUCOSE BLD-MCNC: 306 MG/DL (ref 65–99)
GLUCOSE UR STRIP-MCNC: ABNORMAL MG/DL
HCT VFR BLD AUTO: 41.5 % (ref 37.5–51)
HGB BLD-MCNC: 13.9 G/DL (ref 13–17.7)
HGB UR QL STRIP.AUTO: NEGATIVE
IMM GRANULOCYTES # BLD AUTO: 0.01 10*3/MM3 (ref 0–0.05)
IMM GRANULOCYTES NFR BLD AUTO: 0.2 % (ref 0–0.5)
KETONES UR QL STRIP: NEGATIVE
LEUKOCYTE ESTERASE UR QL STRIP.AUTO: NEGATIVE
LYMPHOCYTES # BLD AUTO: 1.54 10*3/MM3 (ref 0.7–3.1)
LYMPHOCYTES NFR BLD AUTO: 23.2 % (ref 19.6–45.3)
MCH RBC QN AUTO: 29.3 PG (ref 26.6–33)
MCHC RBC AUTO-ENTMCNC: 33.5 G/DL (ref 31.5–35.7)
MCV RBC AUTO: 87.4 FL (ref 79–97)
MONOCYTES # BLD AUTO: 0.74 10*3/MM3 (ref 0.1–0.9)
MONOCYTES NFR BLD AUTO: 11.1 % (ref 5–12)
NEUTROPHILS # BLD AUTO: 3.94 10*3/MM3 (ref 1.7–7)
NEUTROPHILS NFR BLD AUTO: 59.3 % (ref 42.7–76)
NITRITE UR QL STRIP: NEGATIVE
NRBC BLD AUTO-RTO: 0 /100 WBC (ref 0–0.2)
PH UR STRIP.AUTO: <=5 [PH] (ref 5–9)
PLATELET # BLD AUTO: 270 10*3/MM3 (ref 140–450)
PMV BLD AUTO: 11.1 FL (ref 6–12)
POTASSIUM BLD-SCNC: 4 MMOL/L (ref 3.5–5.2)
PROT SERPL-MCNC: 7.3 G/DL (ref 6–8.5)
PROT UR QL STRIP: NEGATIVE
RBC # BLD AUTO: 4.75 10*6/MM3 (ref 4.14–5.8)
SODIUM BLD-SCNC: 139 MMOL/L (ref 136–145)
SP GR UR STRIP: 1.04 (ref 1–1.03)
UROBILINOGEN UR QL STRIP: ABNORMAL
WBC NRBC COR # BLD: 6.64 10*3/MM3 (ref 3.4–10.8)

## 2019-12-04 PROCEDURE — 71045 X-RAY EXAM CHEST 1 VIEW: CPT

## 2019-12-04 PROCEDURE — 96375 TX/PRO/DX INJ NEW DRUG ADDON: CPT

## 2019-12-04 PROCEDURE — 99284 EMERGENCY DEPT VISIT MOD MDM: CPT

## 2019-12-04 PROCEDURE — 81003 URINALYSIS AUTO W/O SCOPE: CPT | Performed by: PHYSICIAN ASSISTANT

## 2019-12-04 PROCEDURE — 25010000002 MORPHINE PER 10 MG: Performed by: FAMILY MEDICINE

## 2019-12-04 PROCEDURE — 85025 COMPLETE CBC W/AUTO DIFF WBC: CPT | Performed by: PHYSICIAN ASSISTANT

## 2019-12-04 PROCEDURE — 80053 COMPREHEN METABOLIC PANEL: CPT | Performed by: PHYSICIAN ASSISTANT

## 2019-12-04 PROCEDURE — 74176 CT ABD & PELVIS W/O CONTRAST: CPT

## 2019-12-04 PROCEDURE — 25010000002 KETOROLAC TROMETHAMINE PER 15 MG: Performed by: PHYSICIAN ASSISTANT

## 2019-12-04 PROCEDURE — 96374 THER/PROPH/DIAG INJ IV PUSH: CPT

## 2019-12-04 RX ORDER — KETOROLAC TROMETHAMINE 15 MG/ML
15 INJECTION, SOLUTION INTRAMUSCULAR; INTRAVENOUS ONCE
Status: COMPLETED | OUTPATIENT
Start: 2019-12-04 | End: 2019-12-04

## 2019-12-04 RX ORDER — HYDROCODONE BITARTRATE AND ACETAMINOPHEN 10; 325 MG/1; MG/1
10 TABLET ORAL EVERY 4 HOURS PRN
Status: ON HOLD | COMMUNITY
Start: 2017-03-03 | End: 2021-02-16

## 2019-12-04 RX ORDER — SODIUM CHLORIDE 0.9 % (FLUSH) 0.9 %
10 SYRINGE (ML) INJECTION AS NEEDED
Status: DISCONTINUED | OUTPATIENT
Start: 2019-12-04 | End: 2019-12-04 | Stop reason: HOSPADM

## 2019-12-04 RX ORDER — TRAZODONE HYDROCHLORIDE 150 MG/1
150 TABLET ORAL DAILY
Status: ON HOLD | COMMUNITY
End: 2021-02-16

## 2019-12-04 RX ORDER — PROMETHAZINE HYDROCHLORIDE 25 MG/1
25 TABLET ORAL EVERY 6 HOURS PRN
Status: ON HOLD | COMMUNITY
End: 2021-02-16

## 2019-12-04 RX ORDER — MORPHINE SULFATE 2 MG/ML
2 INJECTION, SOLUTION INTRAMUSCULAR; INTRAVENOUS ONCE
Status: COMPLETED | OUTPATIENT
Start: 2019-12-04 | End: 2019-12-04

## 2019-12-04 RX ADMIN — MORPHINE SULFATE 2 MG: 2 INJECTION, SOLUTION INTRAMUSCULAR; INTRAVENOUS at 11:21

## 2019-12-04 RX ADMIN — KETOROLAC TROMETHAMINE 15 MG: 15 INJECTION, SOLUTION INTRAMUSCULAR; INTRAVENOUS at 11:21

## 2019-12-04 RX ADMIN — MORPHINE SULFATE 4 MG: 4 INJECTION INTRAVENOUS at 12:37

## 2019-12-04 NOTE — ED PROVIDER NOTES
Subjective   Patient presents to emergency department for right flank pain x 2 weeks and difficulty urinating x 3 weeks.  Patient also reports mild nonproductive cough x 3 weeks.  Patient had hemorrhagic stroke 8/20/19.  Hx of ureteral stones.          History provided by:  Patient   used: No    Flank Pain   Pain location:  R flank  Pain quality: aching and sharp    Pain radiates to:  Does not radiate  Pain severity:  Moderate  Duration:  2 weeks  Timing:  Constant  Progression:  Unchanged  Chronicity:  New  Associated symptoms: cough and dysuria    Associated symptoms: no chest pain, no chills, no fever, no nausea, no sore throat and no vomiting    Dysuria   Associated symptoms: cough    Associated symptoms: no abdominal pain, no chest pain, no fever, no nausea, no sore throat and no vomiting    Cough   Cough characteristics:  Non-productive  Severity:  Mild  Duration:  3 weeks  Associated symptoms: no chest pain, no chills, no fever and no sore throat        Review of Systems   Constitutional: Negative for chills and fever.   HENT: Negative for sore throat and trouble swallowing.    Eyes: Negative for visual disturbance.   Respiratory: Positive for cough.    Cardiovascular: Negative for chest pain.   Gastrointestinal: Negative for abdominal pain, nausea and vomiting.   Genitourinary: Positive for dysuria and flank pain.   Musculoskeletal: Negative for back pain.   Skin: Negative for color change.   Neurological: Negative for syncope and weakness.   Hematological: Does not bruise/bleed easily.   Psychiatric/Behavioral: Negative for confusion.       Past Medical History:   Diagnosis Date   • Arthritis    • Carpal tunnel syndrome    • Chronic pain syndrome    • Depressive disorder    • GERD (gastroesophageal reflux disease)    • Hernia    • History of closed head injury     with ICH and left weakness   • History of urinary system disease    • Hypertension    • Migraine    • Right shoulder pain   "  • Rotator cuff syndrome    • Type 2 diabetes mellitus (CMS/HCC)    • Ureteric stone        Allergies   Allergen Reactions   • Naproxen Other (See Comments) and Rash     Blisters in mouth   • Tramadol Rash     Patient states he gets a rash in his mouth.        Past Surgical History:   Procedure Laterality Date   • ABDOMINAL SURGERY  08/2016   • APPENDECTOMY N/A 1/27/2018    Procedure: APPENDECTOMY;  Surgeon: Rusty Palacio MD;  Location: [facility];  Service:    • CHOLECYSTECTOMY     • CIRCUMCISION     • CYSTOSCOPY  08/13/2003    Cystoscopy and removal of J stent. Right ureteral stent.   • CYSTOSCOPY  08/13/2003    Cystoscopy and right stone extraction and placement of 26x 6 J-stent.   • INCISION AND DRAINAGE ABSCESS N/A 7/26/2017    Procedure: INCISION AND DRAINAGE ABD.ABSCESS;  Surgeon: Rui Shaver MD;  Location: [facility];  Service:    • VENTRAL/INCISIONAL HERNIA REPAIR N/A 5/31/2017    Procedure: LAPAROSCOPIC POSSIBLE OPEN ADHESIOLYSIS AND VENTRAL/INCISIONAL HERNIA REPAIR ;  Surgeon: Rui Shaver MD;  Location: [facility];  Service:        History reviewed. No pertinent family history.    Social History     Socioeconomic History   • Marital status:      Spouse name: Not on file   • Number of children: Not on file   • Years of education: Not on file   • Highest education level: Not on file   Tobacco Use   • Smoking status: Never Smoker   • Smokeless tobacco: Never Used   Substance and Sexual Activity   • Alcohol use: No   • Drug use: No   • Sexual activity: Defer           Objective      /87 (BP Location: Right arm, Patient Position: Sitting)   Pulse 88   Temp 97.7 °F (36.5 °C) (Oral)   Resp 16   Ht 172.7 cm (68\")   Wt 93 kg (205 lb)   SpO2 96%   BMI 31.17 kg/m²     Physical Exam   Constitutional: He is oriented to person, place, and time. He appears well-developed and well-nourished. No distress.   HENT:   Head: Normocephalic and atraumatic.   Eyes: Conjunctivae are normal. "   Cardiovascular: Normal rate, regular rhythm and normal heart sounds.   Pulmonary/Chest: Effort normal and breath sounds normal.   Abdominal: Soft. Bowel sounds are normal. He exhibits no distension and no mass. There is tenderness ( (R) flank). There is no rebound and no guarding.   Musculoskeletal: He exhibits no edema.   Neurological: He is alert and oriented to person, place, and time.   Skin: Skin is warm and dry.   Psychiatric: He has a normal mood and affect. His behavior is normal. Thought content normal.   Nursing note and vitals reviewed.      Procedures           ED Course      Results for orders placed or performed during the hospital encounter of 12/04/19   Comprehensive Metabolic Panel   Result Value Ref Range    Glucose 306 (H) 65 - 99 mg/dL    BUN 6 6 - 20 mg/dL    Creatinine 0.73 (L) 0.76 - 1.27 mg/dL    Sodium 139 136 - 145 mmol/L    Potassium 4.0 3.5 - 5.2 mmol/L    Chloride 98 98 - 107 mmol/L    CO2 31.0 (H) 22.0 - 29.0 mmol/L    Calcium 9.2 8.6 - 10.5 mg/dL    Total Protein 7.3 6.0 - 8.5 g/dL    Albumin 4.20 3.50 - 5.20 g/dL    ALT (SGPT) 9 1 - 41 U/L    AST (SGOT) 12 1 - 40 U/L    Alkaline Phosphatase 164 (H) 39 - 117 U/L    Total Bilirubin 0.2 0.2 - 1.2 mg/dL    eGFR Non African Amer 111 >60 mL/min/1.73    Globulin 3.1 gm/dL    A/G Ratio 1.4 g/dL    BUN/Creatinine Ratio 8.2 7.0 - 25.0    Anion Gap 10.0 5.0 - 15.0 mmol/L   Urinalysis With Culture If Indicated - Urine, Clean Catch   Result Value Ref Range    Color, UA Yellow Yellow, Straw, Dark Yellow, Jessie    Appearance, UA Clear Clear    pH, UA <=5.0 5.0 - 9.0    Specific Gravity, UA 1.040 (H) 1.003 - 1.030    Glucose, UA >=1000 mg/dL (3+) (A) Negative    Ketones, UA Negative Negative    Bilirubin, UA Negative Negative    Blood, UA Negative Negative    Protein, UA Negative Negative    Leuk Esterase, UA Negative Negative    Nitrite, UA Negative Negative    Urobilinogen, UA 0.2 E.U./dL 0.2 - 1.0 E.U./dL   CBC Auto Differential   Result Value  Ref Range    WBC 6.64 3.40 - 10.80 10*3/mm3    RBC 4.75 4.14 - 5.80 10*6/mm3    Hemoglobin 13.9 13.0 - 17.7 g/dL    Hematocrit 41.5 37.5 - 51.0 %    MCV 87.4 79.0 - 97.0 fL    MCH 29.3 26.6 - 33.0 pg    MCHC 33.5 31.5 - 35.7 g/dL    RDW 12.0 (L) 12.3 - 15.4 %    RDW-SD 38.8 37.0 - 54.0 fl    MPV 11.1 6.0 - 12.0 fL    Platelets 270 140 - 450 10*3/mm3    Neutrophil % 59.3 42.7 - 76.0 %    Lymphocyte % 23.2 19.6 - 45.3 %    Monocyte % 11.1 5.0 - 12.0 %    Eosinophil % 5.4 0.3 - 6.2 %    Basophil % 0.8 0.0 - 1.5 %    Immature Grans % 0.2 0.0 - 0.5 %    Neutrophils, Absolute 3.94 1.70 - 7.00 10*3/mm3    Lymphocytes, Absolute 1.54 0.70 - 3.10 10*3/mm3    Monocytes, Absolute 0.74 0.10 - 0.90 10*3/mm3    Eosinophils, Absolute 0.36 0.00 - 0.40 10*3/mm3    Basophils, Absolute 0.05 0.00 - 0.20 10*3/mm3    Immature Grans, Absolute 0.01 0.00 - 0.05 10*3/mm3    nRBC 0.0 0.0 - 0.2 /100 WBC     Xr Chest 1 View    Result Date: 12/4/2019  Narrative: PROCEDURE: Single chest view AP REASON FOR EXAM:cough FINDINGS: Expiratory chest. Cardiac and pulmonary vasculature are normal. Left lung base small linear opacity. Lungs are otherwise clear. Pleural spaces are normal. No acute osseous abnormality.     Impression: 1.  Left lung base small focus of discoid atelectasis. 2.  Otherwise unremarkable chest. Electronically signed by:  Jose Antonio Sewell MD  12/4/2019 12:37 PM CST Workstation: [id number]    Ct Abdomen Pelvis Stone Protocol    Result Date: 12/4/2019  Narrative: PROCEDURE: Ct abdomen and pelvis without contrast REASON FOR EXAM: Flank pain, stone disease suspected, right flank pain x2 weeks. FINDINGS: Comparison study dated  July 7, 2018. Axial computer tomography sequential imaging was performed from the diaphragms through the symphysis pubis without IV contrast administration. Sagittal and coronal reformates was performed. This exam was performed according to our departmental dose optimization program, which includes automated exposure  control, adjustment of the mA and/or KV according to patient size and/or use of iterative reconstruction technique.  Left lower lobe lateral basilar segment small linear opacity. Lung bases are otherwise unremarkable. The liver is normal. The gallbladder surgically absent. The biliary system appears within normal limits status post cholecystectomy. Marked fatty involutional changes of the head and neck of the pancreas with body and tail pancreatic atrophy. The pancreas is otherwise unremarkable. The spleen is normal. Bilateral adrenal glands are normal. Right kidney and ureter are normal. Left kidney and ureter are normal. The bladder is normal. The prostate appears within normal limits. The hollow viscera is normal. No lymphadenopathy in the abdomen or the pelvis. No acute osseous abnormality. Stable postsurgical changes of the lower anterior abdominal wall.     Impression: 1. Left lower lobe lateral basilar segment small focus of discoid atelectasis.. 2.Marked fatty involutional changes of the head and neck of the pancreas with body and tail pancreatic atrophy. The pancreas is otherwise unremarkable. 3.Stable postsurgical changes of the lower anterior abdominal wall. 4. Otherwise unremarkable unenhanced CT abdomen pelvis study.. Electronically signed by:  Jose Antonio Sweell MD  12/4/2019 12:05 PM [facility] Workstation: [id number]                MDM  Number of Diagnoses or Management Options  Cough:   Dysuria:   Right flank pain:   Diagnosis management comments: Patient's vitals within normal limits, pain controlled adequately, no hematuria or signs of infection on UA.  CT abdomen unremarkable for any acute findings.  Labs show poorly controlled diabetes.  Advised patient to follow-up outpatient with urology and PCP.       Amount and/or Complexity of Data Reviewed  Clinical lab tests: reviewed  Tests in the radiology section of CPT®: reviewed        Final diagnoses:   Right flank pain   Dysuria   Cough              Joelle  Guillaume RITCHIE PA-C  12/04/19 [id number]

## 2019-12-04 NOTE — ED NOTES
Pt presents to ED with C/O difficulty urinating, with a week stream X3 months, lower back pain/ right flank pain for 2 weeks. Pt also reports a cough for 3 weeks. Pt was seen at [facility] at sent to the ED for further evaluation. [facility] reported pain had a fever, but no fever at triage and rechecked temp at this time and no fever: 97.7. Pt reports he has not taken any medications. Pt reports he had a stroke in January.      Marbella Escobar, RN  12/04/19 1024       Marbella Escobar, RN  12/04/19 1025       Marbella Escobar, RN  12/04/19 1032

## 2021-02-16 ENCOUNTER — APPOINTMENT (OUTPATIENT)
Dept: CT IMAGING | Facility: HOSPITAL | Age: 61
End: 2021-02-16

## 2021-02-16 ENCOUNTER — HOSPITAL ENCOUNTER (OUTPATIENT)
Facility: HOSPITAL | Age: 61
Setting detail: OBSERVATION
Discharge: HOME | End: 2021-02-19
Attending: FAMILY MEDICINE | Admitting: STUDENT IN AN ORGANIZED HEALTH CARE EDUCATION/TRAINING PROGRAM

## 2021-02-16 DIAGNOSIS — K56.609 SBO (SMALL BOWEL OBSTRUCTION) (HCC): Primary | ICD-10-CM

## 2021-02-16 DIAGNOSIS — G89.4 CHRONIC PAIN SYNDROME: ICD-10-CM

## 2021-02-16 DIAGNOSIS — R73.9 HYPERGLYCEMIA: ICD-10-CM

## 2021-02-16 LAB
ALBUMIN SERPL-MCNC: 5.2 G/DL (ref 3.5–5.2)
ALBUMIN/GLOB SERPL: 1.5 G/DL
ALP SERPL-CCNC: 110 U/L (ref 39–117)
ALT SERPL W P-5'-P-CCNC: 14 U/L (ref 1–41)
ANION GAP SERPL CALCULATED.3IONS-SCNC: 12 MMOL/L (ref 5–15)
AST SERPL-CCNC: 19 U/L (ref 1–40)
BASOPHILS # BLD AUTO: 0.07 10*3/MM3 (ref 0–0.2)
BASOPHILS NFR BLD AUTO: 0.6 % (ref 0–1.5)
BILIRUB SERPL-MCNC: 0.8 MG/DL (ref 0–1.2)
BILIRUB UR QL STRIP: NEGATIVE
BUN SERPL-MCNC: 10 MG/DL (ref 6–20)
BUN/CREAT SERPL: 11.5 (ref 7–25)
CALCIUM SPEC-SCNC: 10.6 MG/DL (ref 8.6–10.5)
CHLORIDE SERPL-SCNC: 92 MMOL/L (ref 98–107)
CLARITY UR: CLEAR
CO2 SERPL-SCNC: 28 MMOL/L (ref 22–29)
COLOR UR: YELLOW
CREAT SERPL-MCNC: 0.87 MG/DL (ref 0.76–1.27)
DEPRECATED RDW RBC AUTO: 38.9 FL (ref 37–54)
EOSINOPHIL # BLD AUTO: 0.05 10*3/MM3 (ref 0–0.4)
EOSINOPHIL NFR BLD AUTO: 0.5 % (ref 0.3–6.2)
ERYTHROCYTE [DISTWIDTH] IN BLOOD BY AUTOMATED COUNT: 12 % (ref 12.3–15.4)
FLUAV RNA RESP QL NAA+PROBE: NOT DETECTED
FLUBV RNA RESP QL NAA+PROBE: NOT DETECTED
GFR SERPL CREATININE-BSD FRML MDRD: 90 ML/MIN/1.73
GLOBULIN UR ELPH-MCNC: 3.5 GM/DL
GLUCOSE BLDC GLUCOMTR-MCNC: 186 MG/DL (ref 70–130)
GLUCOSE BLDC GLUCOMTR-MCNC: 257 MG/DL (ref 70–130)
GLUCOSE BLDC GLUCOMTR-MCNC: 327 MG/DL (ref 70–130)
GLUCOSE SERPL-MCNC: 441 MG/DL (ref 65–99)
GLUCOSE UR STRIP-MCNC: ABNORMAL MG/DL
HCT VFR BLD AUTO: 47.4 % (ref 37.5–51)
HGB BLD-MCNC: 16.9 G/DL (ref 13–17.7)
HGB UR QL STRIP.AUTO: NEGATIVE
HOLD SPECIMEN: NORMAL
IMM GRANULOCYTES # BLD AUTO: 0.04 10*3/MM3 (ref 0–0.05)
IMM GRANULOCYTES NFR BLD AUTO: 0.4 % (ref 0–0.5)
KETONES UR QL STRIP: ABNORMAL
LEUKOCYTE ESTERASE UR QL STRIP.AUTO: NEGATIVE
LIPASE SERPL-CCNC: 16 U/L (ref 13–60)
LYMPHOCYTES # BLD AUTO: 1.84 10*3/MM3 (ref 0.7–3.1)
LYMPHOCYTES NFR BLD AUTO: 16.6 % (ref 19.6–45.3)
MCH RBC QN AUTO: 31.7 PG (ref 26.6–33)
MCHC RBC AUTO-ENTMCNC: 35.7 G/DL (ref 31.5–35.7)
MCV RBC AUTO: 88.9 FL (ref 79–97)
MONOCYTES # BLD AUTO: 0.75 10*3/MM3 (ref 0.1–0.9)
MONOCYTES NFR BLD AUTO: 6.8 % (ref 5–12)
NEUTROPHILS NFR BLD AUTO: 75.1 % (ref 42.7–76)
NEUTROPHILS NFR BLD AUTO: 8.36 10*3/MM3 (ref 1.7–7)
NITRITE UR QL STRIP: NEGATIVE
NRBC BLD AUTO-RTO: 0 /100 WBC (ref 0–0.2)
PH UR STRIP.AUTO: <=5 [PH] (ref 5–9)
PLATELET # BLD AUTO: 464 10*3/MM3 (ref 140–450)
PMV BLD AUTO: 10.7 FL (ref 6–12)
POTASSIUM SERPL-SCNC: 3.7 MMOL/L (ref 3.5–5.2)
PROT SERPL-MCNC: 8.7 G/DL (ref 6–8.5)
PROT UR QL STRIP: NEGATIVE
RBC # BLD AUTO: 5.33 10*6/MM3 (ref 4.14–5.8)
SARS-COV-2 RNA RESP QL NAA+PROBE: NOT DETECTED
SODIUM SERPL-SCNC: 132 MMOL/L (ref 136–145)
SP GR UR STRIP: 1.03 (ref 1–1.03)
UROBILINOGEN UR QL STRIP: ABNORMAL
WBC # BLD AUTO: 11.11 10*3/MM3 (ref 3.4–10.8)
WHOLE BLOOD HOLD SPECIMEN: NORMAL

## 2021-02-16 PROCEDURE — 25010000002 MORPHINE PER 10 MG: Performed by: HOSPITALIST

## 2021-02-16 PROCEDURE — 25010000002 HYDROMORPHONE 1 MG/ML SOLUTION: Performed by: HOSPITALIST

## 2021-02-16 PROCEDURE — 96376 TX/PRO/DX INJ SAME DRUG ADON: CPT

## 2021-02-16 PROCEDURE — G0378 HOSPITAL OBSERVATION PER HR: HCPCS

## 2021-02-16 PROCEDURE — 96374 THER/PROPH/DIAG INJ IV PUSH: CPT

## 2021-02-16 PROCEDURE — 99283 EMERGENCY DEPT VISIT LOW MDM: CPT

## 2021-02-16 PROCEDURE — 82962 GLUCOSE BLOOD TEST: CPT

## 2021-02-16 PROCEDURE — 85025 COMPLETE CBC W/AUTO DIFF WBC: CPT | Performed by: FAMILY MEDICINE

## 2021-02-16 PROCEDURE — 63710000001 INSULIN ASPART PER 5 UNITS: Performed by: HOSPITALIST

## 2021-02-16 PROCEDURE — C9803 HOPD COVID-19 SPEC COLLECT: HCPCS

## 2021-02-16 PROCEDURE — 96375 TX/PRO/DX INJ NEW DRUG ADDON: CPT

## 2021-02-16 PROCEDURE — 25010000002 IOPAMIDOL 61 % SOLUTION: Performed by: FAMILY MEDICINE

## 2021-02-16 PROCEDURE — 25010000002 ONDANSETRON PER 1 MG: Performed by: HOSPITALIST

## 2021-02-16 PROCEDURE — 83690 ASSAY OF LIPASE: CPT | Performed by: FAMILY MEDICINE

## 2021-02-16 PROCEDURE — 25010000002 MORPHINE PER 10 MG: Performed by: FAMILY MEDICINE

## 2021-02-16 PROCEDURE — 87636 SARSCOV2 & INF A&B AMP PRB: CPT | Performed by: FAMILY MEDICINE

## 2021-02-16 PROCEDURE — 25010000002 PROCHLORPERAZINE 10 MG/2ML SOLUTION: Performed by: INTERNAL MEDICINE

## 2021-02-16 PROCEDURE — 74177 CT ABD & PELVIS W/CONTRAST: CPT

## 2021-02-16 PROCEDURE — 81003 URINALYSIS AUTO W/O SCOPE: CPT | Performed by: FAMILY MEDICINE

## 2021-02-16 PROCEDURE — 99213 OFFICE O/P EST LOW 20 MIN: CPT | Performed by: SURGERY

## 2021-02-16 PROCEDURE — 80053 COMPREHEN METABOLIC PANEL: CPT | Performed by: FAMILY MEDICINE

## 2021-02-16 RX ORDER — DEXTROSE MONOHYDRATE 25 G/50ML
25 INJECTION, SOLUTION INTRAVENOUS
Status: DISCONTINUED | OUTPATIENT
Start: 2021-02-16 | End: 2021-02-19 | Stop reason: HOSPADM

## 2021-02-16 RX ORDER — ONDANSETRON 2 MG/ML
4 INJECTION INTRAMUSCULAR; INTRAVENOUS EVERY 6 HOURS PRN
Status: DISCONTINUED | OUTPATIENT
Start: 2021-02-16 | End: 2021-02-16 | Stop reason: SDUPTHER

## 2021-02-16 RX ORDER — SODIUM CHLORIDE 0.9 % (FLUSH) 0.9 %
10 SYRINGE (ML) INJECTION EVERY 12 HOURS SCHEDULED
Status: DISCONTINUED | OUTPATIENT
Start: 2021-02-16 | End: 2021-02-19 | Stop reason: HOSPADM

## 2021-02-16 RX ORDER — SODIUM CHLORIDE 0.9 % (FLUSH) 0.9 %
10 SYRINGE (ML) INJECTION AS NEEDED
Status: DISCONTINUED | OUTPATIENT
Start: 2021-02-16 | End: 2021-02-19 | Stop reason: HOSPADM

## 2021-02-16 RX ORDER — ONDANSETRON 2 MG/ML
4 INJECTION INTRAMUSCULAR; INTRAVENOUS EVERY 6 HOURS PRN
Status: DISCONTINUED | OUTPATIENT
Start: 2021-02-16 | End: 2021-02-19 | Stop reason: HOSPADM

## 2021-02-16 RX ORDER — PROCHLORPERAZINE EDISYLATE 5 MG/ML
5 INJECTION INTRAMUSCULAR; INTRAVENOUS EVERY 6 HOURS PRN
Status: DISCONTINUED | OUTPATIENT
Start: 2021-02-16 | End: 2021-02-19 | Stop reason: HOSPADM

## 2021-02-16 RX ORDER — AMITRIPTYLINE HYDROCHLORIDE 10 MG/1
10 TABLET, FILM COATED ORAL NIGHTLY
Status: DISCONTINUED | OUTPATIENT
Start: 2021-02-16 | End: 2021-02-19 | Stop reason: HOSPADM

## 2021-02-16 RX ORDER — LIDOCAINE 50 MG/G
1 PATCH TOPICAL
Status: DISCONTINUED | OUTPATIENT
Start: 2021-02-16 | End: 2021-02-19 | Stop reason: HOSPADM

## 2021-02-16 RX ORDER — NICOTINE POLACRILEX 4 MG
15 LOZENGE BUCCAL
Status: DISCONTINUED | OUTPATIENT
Start: 2021-02-16 | End: 2021-02-19 | Stop reason: HOSPADM

## 2021-02-16 RX ORDER — OXYCODONE AND ACETAMINOPHEN 7.5; 325 MG/1; MG/1
1 TABLET ORAL
Status: ON HOLD | COMMUNITY
End: 2021-02-19 | Stop reason: SDUPTHER

## 2021-02-16 RX ORDER — GABAPENTIN 100 MG/1
100 CAPSULE ORAL 3 TIMES DAILY
Status: ON HOLD | COMMUNITY
End: 2021-02-16

## 2021-02-16 RX ADMIN — LIDOCAINE 1 PATCH: 50 PATCH CUTANEOUS at 17:47

## 2021-02-16 RX ADMIN — HYDROMORPHONE HYDROCHLORIDE 1 MG: 1 INJECTION, SOLUTION INTRAMUSCULAR; INTRAVENOUS; SUBCUTANEOUS at 17:47

## 2021-02-16 RX ADMIN — HYDROMORPHONE HYDROCHLORIDE 1 MG: 1 INJECTION, SOLUTION INTRAMUSCULAR; INTRAVENOUS; SUBCUTANEOUS at 20:27

## 2021-02-16 RX ADMIN — PROCHLORPERAZINE EDISYLATE 5 MG: 5 INJECTION INTRAMUSCULAR; INTRAVENOUS at 21:31

## 2021-02-16 RX ADMIN — MORPHINE SULFATE 4 MG: 4 INJECTION INTRAVENOUS at 09:28

## 2021-02-16 RX ADMIN — IOPAMIDOL 90 ML: 612 INJECTION, SOLUTION INTRAVENOUS at 10:28

## 2021-02-16 RX ADMIN — ONDANSETRON 4 MG: 2 INJECTION INTRAMUSCULAR; INTRAVENOUS at 20:24

## 2021-02-16 RX ADMIN — MORPHINE SULFATE 4 MG: 4 INJECTION INTRAVENOUS at 14:35

## 2021-02-16 RX ADMIN — MORPHINE SULFATE 4 MG: 4 INJECTION INTRAVENOUS at 12:39

## 2021-02-16 RX ADMIN — MORPHINE SULFATE 4 MG: 4 INJECTION INTRAVENOUS at 11:12

## 2021-02-16 RX ADMIN — INSULIN ASPART 8 UNITS: 100 INJECTION, SOLUTION INTRAVENOUS; SUBCUTANEOUS at 17:53

## 2021-02-16 RX ADMIN — ONDANSETRON 4 MG: 2 INJECTION INTRAMUSCULAR; INTRAVENOUS at 14:35

## 2021-02-16 RX ADMIN — AMITRIPTYLINE HYDROCHLORIDE 10 MG: 10 TABLET, FILM COATED ORAL at 20:27

## 2021-02-16 RX ADMIN — SODIUM CHLORIDE, PRESERVATIVE FREE 10 ML: 5 INJECTION INTRAVENOUS at 20:27

## 2021-02-16 NOTE — H&P
[facility] Medicine Admission      Date of Admission: 2021      Primary Care Physician: Provider, No Known      Chief Complaint: Fall, left-sided abdominal pain    HPI: Patient is a 60-year-old male past medical history of gastroesophageal reflux disorder, hypertension, diabetes, chronic pain Syndrome, remote small bowel obstruction with repair and hernia repair.  He presented to the emergency department after 2 falls from ground-level sustained secondary to slipping on ice.  He states that the first fall he was walking down the steps off his front deck and slipped on the steps and fell landed on his left side.  He was able to pull himself back up with me sterile and immediately go back into the house.  He then went outside to turn the flashers off on his jeep when he slipped on a another patch of ice and fell hard on his left side.  He states he got into the jeep and leaned across and pressed his left upper quadrant on the console and developed significant pain in his left upper quadrant.    Concurrent Medical History:  has a past medical history of Arthritis, Carpal tunnel syndrome, Chronic pain syndrome, Depressive disorder, GERD (gastroesophageal reflux disease), Hernia, History of closed head injury, History of urinary system disease, Hypertension, Migraine, Right shoulder pain, Rotator cuff syndrome, Type 2 diabetes mellitus (CMS/[facility]), and Ureteric stone.    Past Surgical History:  has a past surgical history that includes Cystoscopy (2003); Cystoscopy (2003); Abdominal surgery (2016); Cholecystectomy; Circumcision, non-; ventral/incisional hernia repair (N/A, 2017); Incision and Drainage Abscess (N/A, 2017); and Appendectomy (N/A, 2018).    Family History: Hypertension    Social History:  reports that he has never smoked. He has never used smokeless tobacco. He reports that he does not drink alcohol or use  drugs.    Allergies:   Allergies   Allergen Reactions   • Naproxen Other (See Comments) and Rash     Blisters in mouth   • Tramadol Rash     Patient states he gets a rash in his mouth.        Medications:   Prior to Admission medications    Medication Sig Start Date End Date Taking? Authorizing Provider   amitriptyline (ELAVIL) 10 MG tablet Take 1 tablet by mouth Every Night. 2/15/19  Yes Richard Galvez MD   DULoxetine (CYMBALTA) 20 MG capsule Take 20 mg by mouth Daily.   Yes John Resendiz MD   Insulin Glargine (BASAGLAR KWIKPEN) 100 UNIT/ML injection pen Inject 30 Units under the skin into the appropriate area as directed Every Night.   Yes John Resendiz MD   insulin lispro (humaLOG) 100 UNIT/ML injection Inject 8-12 Units under the skin 3 (Three) Times a Day As Needed (sliding scale). As needed per sliding scale   Yes John Resendiz MD   oxyCODONE-acetaminophen (PERCOCET) 7.5-325 MG per tablet Take 1 tablet by mouth 5 (Five) Times a Day.   Yes John Resendiz MD   FREESTYLE TEST STRIPS test strip  8/24/17   John Resendiz MD   amLODIPine (NORVASC) 5 MG tablet Take 1 tablet by mouth Daily. 2/15/19 2/16/21  Richard Galvez MD   aspirin 81 MG chewable tablet Chew 81 mg Daily.  2/16/21  John Resendiz MD   atorvastatin (LIPITOR) 20 MG tablet Take 20 mg by mouth Every Night.  2/16/21  John Resendiz MD   baclofen 5 MG tablet Take 5 mg by mouth 3 (Three) Times a Day. 2/15/19 2/16/21  Richard Galvez MD   dicyclomine (BENTYL) 20 MG tablet Take 1 tablet by mouth Every 6 (Six) Hours As Needed (abdominal pain). 6/25/18 2/16/21  Baljit Griffiths MD   famotidine (PEPCID) 20 MG tablet Take 1 tablet by mouth 2 (Two) Times a Day. 6/25/18 2/16/21  Baljit Griffiths MD   gabapentin (NEURONTIN) 100 MG capsule Take 100 mg by mouth 3 (Three) Times a Day.  2/16/21  John Resendiz MD   HYDROcodone-acetaminophen (NORCO)  MG per tablet Take 10 tablets by mouth  Every 4 (Four) Hours As Needed. 3/3/17 2/16/21  John Resendiz MD   lisinopril (PRINIVIL,ZESTRIL) 5 MG tablet Take 5 mg by mouth Daily.  2/16/21  John Resendiz MD   metFORMIN (GLUCOPHAGE) 1000 MG tablet Take 1,000 mg by mouth 2 (Two) Times a Day With Meals.  2/16/21  John Resendiz MD   methadone (DOLOPHINE) 5 MG tablet Take 5 mg by mouth 2 (Two) Times a Day. 11/13/17 2/16/21  John Resendiz MD   ondansetron ODT (ZOFRAN-ODT) 4 MG disintegrating tablet Take 1 tablet by mouth Every 8 (Eight) Hours As Needed for Nausea or Vomiting. 7/7/18 2/16/21  Silvano Pino APRN   oxyCODONE-acetaminophen (PERCOCET)  MG per tablet Take 1 tablet by mouth Every 4 (Four) Hours.  Patient taking differently: Take 1 tablet by mouth 5 (Five) Times a Day. 1/30/18 2/16/21  Rusty Palacio MD   polyethylene glycol (MIRALAX) pack packet Take 17 g by mouth Daily With Breakfast. 2/15/19 2/16/21  Richard Galvez MD   promethazine (PHENERGAN) 25 MG tablet Take 25 mg by mouth Every 6 (Six) Hours As Needed.  2/16/21  John Resendiz MD   sennosides-docusate sodium (SENOKOT-S) 8.6-50 MG tablet Take 2 tablets by mouth Every Night. 2/15/19 2/16/21  Richard Galvez MD   traZODone (DESYREL) 150 MG tablet Take 150 mg by mouth Daily.  2/16/21  John Resendiz MD   vitamin E 100 UNIT capsule Take 100 Units by mouth Daily.  2/16/21  John Resendiz MD       Review of Systems:  Review of Systems   Constitutional: Positive for activity change. Negative for appetite change, chills, fatigue, fever and unexpected weight change.   HENT: Negative for congestion, facial swelling, hearing loss, nosebleeds, rhinorrhea, sneezing, trouble swallowing and voice change.    Eyes: Negative for photophobia and visual disturbance.   Respiratory: Negative for apnea, cough, choking, chest tightness, shortness of breath, wheezing and stridor.    Cardiovascular: Negative for chest pain, palpitations and  leg swelling.   Gastrointestinal: Positive for abdominal pain, blood in stool and diarrhea. Negative for constipation, nausea and vomiting.   Endocrine: Negative for cold intolerance, heat intolerance, polydipsia, polyphagia and polyuria.   Genitourinary: Negative for dysuria, flank pain and hematuria.   Musculoskeletal: Positive for arthralgias and myalgias. Negative for back pain and neck pain.   Skin: Negative for rash and wound.   Allergic/Immunologic: Negative for immunocompromised state.   Neurological: Negative for dizziness, seizures, syncope, speech difficulty, weakness, light-headedness, numbness and headaches.   Hematological: Does not bruise/bleed easily.   Psychiatric/Behavioral: Negative for agitation, behavioral problems, confusion, decreased concentration, hallucinations, self-injury and suicidal ideas. The patient is not nervous/anxious.       Otherwise complete ROS is negative except as mentioned above.    Physical Exam:   Temp:  [96.6 °F (35.9 °C)-97.8 °F (36.6 °C)] 97.3 °F (36.3 °C)  Heart Rate:  [] 103  Resp:  [14-20] 14  BP: (130-160)/(75-99) 130/83  Physical Exam  Constitutional:       Appearance: He is well-developed.   HENT:      Head: Normocephalic and atraumatic.      Nose: Nose normal.   Eyes:      General: Lids are normal. No scleral icterus.     Conjunctiva/sclera: Conjunctivae normal.      Pupils: Pupils are equal, round, and reactive to light.   Neck:      Musculoskeletal: Normal range of motion and neck supple. Normal range of motion. No edema, neck rigidity or spinous process tenderness.      Vascular: No JVD.      Trachea: No tracheal tenderness or tracheal deviation.   Cardiovascular:      Rate and Rhythm: Normal rate and regular rhythm.      Pulses: Normal pulses.      Heart sounds: Normal heart sounds, S1 normal and S2 normal. No murmur. No friction rub. No gallop.    Pulmonary:      Effort: Pulmonary effort is normal. No accessory muscle usage or respiratory distress.       Breath sounds: Normal breath sounds. No decreased breath sounds, wheezing or rales.   Chest:      Chest wall: No tenderness.   Abdominal:      General: Bowel sounds are decreased. There is no distension.      Palpations: Abdomen is soft. There is no mass.      Tenderness: There is abdominal tenderness in the left upper quadrant. There is guarding (voluntary). There is no rebound.   Musculoskeletal:         General: No tenderness.   Skin:     General: Skin is warm.      Coloration: Skin is not pale.      Findings: No rash.   Neurological:      Mental Status: He is alert and oriented to person, place, and time.      Cranial Nerves: No cranial nerve deficit.      Sensory: No sensory deficit.      Motor: No atrophy, abnormal muscle tone or seizure activity.      Coordination: Coordination normal.      Deep Tendon Reflexes: Reflexes are normal and symmetric. Reflexes normal.   Psychiatric:         Behavior: Behavior normal.         Thought Content: Thought content normal.         Judgment: Judgment normal.       Results Reviewed:  I have personally reviewed current lab, radiology, and data and agree with results.  Lab Results (last 24 hours)     Procedure Component Value Units Date/Time    POC Glucose Once [[id number]]  (Abnormal) Collected: 02/16/21 1348    Specimen: Blood Updated: 02/16/21 1400     Glucose 327 mg/dL      Comment: RN NotifiedOperator: [id number] [facility] ID: [id number]       COVID-19 and FLU A/B PCR - Swab, Nasopharynx [[id number]]  (Normal) Collected: 02/16/21 1241    Specimen: Swab from Nasopharynx Updated: 02/16/21 1329     COVID19 Not Detected     Influenza A PCR Not Detected     Influenza B PCR Not Detected    Narrative:      Fact sheet for providers: https://www.fda.gov/media/546717/download    Fact sheet for patients: https://www.fda.gov/media/684507/download    Test performed by PCR.    Extra Tubes [[id number]] Collected: 02/16/21 0923    Specimen: Blood, Venous Line Updated: 02/16/21  1035    Narrative:      The following orders were created for panel order Extra Tubes.  Procedure                               Abnormality         Status                     ---------                               -----------         ------                     Light Blue Top[[id number]]                                   Final result               Gold Top - SST[[id number]]                                   Final result                 Please view results for these tests on the individual orders.    Light Blue Top [[id number]] Collected: 02/16/21 0923    Specimen: Blood Updated: 02/16/21 1031     Extra Tube hold for add-on     Comment: Auto resulted       Gold Top - SST [[id number]] Collected: 02/16/21 0923    Specimen: Blood Updated: 02/16/21 1031     Extra Tube Hold for add-ons.     Comment: Auto resulted.       Comprehensive Metabolic Panel [[id number]]  (Abnormal) Collected: 02/16/21 0916    Specimen: Blood Updated: 02/16/21 0956     Glucose 441 mg/dL      Comment: Result confirmed by repeat analysis        BUN 10 mg/dL      Creatinine 0.87 mg/dL      Sodium 132 mmol/L      Potassium 3.7 mmol/L      Chloride 92 mmol/L      CO2 28.0 mmol/L      Calcium 10.6 mg/dL      Total Protein 8.7 g/dL      Albumin 5.20 g/dL      ALT (SGPT) 14 U/L      AST (SGOT) 19 U/L      Alkaline Phosphatase 110 U/L      Total Bilirubin 0.8 mg/dL      eGFR Non African Amer 90 mL/min/1.73      Globulin 3.5 gm/dL      A/G Ratio 1.5 g/dL      BUN/Creatinine Ratio 11.5     Anion Gap 12.0 mmol/L     Narrative:      GFR Normal >60  Chronic Kidney Disease <60  Kidney Failure <15      Urinalysis With Culture If Indicated - Urine, Clean Catch [[id number]]  (Abnormal) Collected: 02/16/21 0916    Specimen: Urine, Clean Catch Updated: 02/16/21 0955     Color, UA Yellow     Appearance, UA Clear     pH, UA <=5.0     Specific Gravity, UA 1.028     Comment: Result obtained by Refractometer        Glucose, UA >=1000 mg/dL (3+)     Ketones, UA Trace      Bilirubin, UA Negative     Blood, UA Negative     Protein, UA Negative     Leuk Esterase, UA Negative     Nitrite, UA Negative     Urobilinogen, UA 0.2 E.U./dL    Narrative:      Urine microscopic not indicated.    Lipase [[id number]]  (Normal) Collected: 02/16/21 0916    Specimen: Blood Updated: 02/16/21 0942     Lipase 16 U/L     CBC & Differential [[id number]]  (Abnormal) Collected: 02/16/21 0916    Specimen: Blood Updated: 02/16/21 0927    Narrative:      The following orders were created for panel order CBC & Differential.  Procedure                               Abnormality         Status                     ---------                               -----------         ------                     CBC Auto Differential[[id number]]        Abnormal            Final result                 Please view results for these tests on the individual orders.    CBC Auto Differential [[id number]]  (Abnormal) Collected: 02/16/21 0916    Specimen: Blood Updated: 02/16/21 0927     WBC 11.11 10*3/mm3      RBC 5.33 10*6/mm3      Hemoglobin 16.9 g/dL      Hematocrit 47.4 %      MCV 88.9 fL      MCH 31.7 pg      MCHC 35.7 g/dL      RDW 12.0 %      RDW-SD 38.9 fl      MPV 10.7 fL      Platelets 464 10*3/mm3      Neutrophil % 75.1 %      Lymphocyte % 16.6 %      Monocyte % 6.8 %      Eosinophil % 0.5 %      Basophil % 0.6 %      Immature Grans % 0.4 %      Neutrophils, Absolute 8.36 10*3/mm3      Lymphocytes, Absolute 1.84 10*3/mm3      Monocytes, Absolute 0.75 10*3/mm3      Eosinophils, Absolute 0.05 10*3/mm3      Basophils, Absolute 0.07 10*3/mm3      Immature Grans, Absolute 0.04 10*3/mm3      nRBC 0.0 /100 WBC         Imaging Results (Last 24 Hours)     Procedure Component Value Units Date/Time    CT Abdomen Pelvis With Contrast [[id number]] Collected: 02/16/21 1019     Updated: 02/16/21 1056    Addenda:         ADDENDUM   ADDENDUM #1       Addendum: Referring clinician notified of findings by phone at  10:54 AM on 2/16/2021.     Electronically signed by:  Jose Antonio Sewell MD  2/16/2021 10:54 AM CST  Workstation: [id number]    Signed: 02/16/21 1054 by Dominguez Sewell MD    Narrative:        PROCEDURE: Ct abdomen and pelvis with contrast    REASON FOR EXAM: Abdominal trauma  fall, left flank/left abdominal wall pain    FINDINGS: Comparison study dated  December 4, 2019. Axial  computer tomography sequential imaging was performed from the  diaphragms through the symphysis pubis with IV contrast  administration. Sagittal and coronal reformates was performed.  This exam was performed according to our departmental dose  optimization program, which includes automated exposure control,  adjustment of the mA and/or KV according to patient size and/or  use of iterative reconstruction technique.     Imaging through lung bases reveals no abnormality.    The liver is normal. The gallbladder surgically absent. The  biliary system appears within normal limits status post  cholecystectomy. The pancreas is normal. The spleen is normal.  Bilateral adrenal glands are normal. The right kidney and ureter  are normal. The left kidney and ureter are normal. The bladder is  normal. Prostate is normal. Left upper abdominal quadrant small  bowel loop which is gas and fluid-filled distended and has a  focal caliber change in the left lower abdomen. Postsurgical  changes of the small bowel loops in the left abdomen. The hollow  viscera is otherwise unremarkable. No lymphadenopathy in the  abdomen or the pelvis. No acute osseous abnormality.      Impression:      1.Left upper abdominal quadrant small bowel loop which is gas and  fluid-filled distended and has a focal caliber change in the left  lower abdomen. This would be suspicious for early small bowel  obstruction at this level possibly from adhesion. Recommend  clinical correlation.  2. Remainder CT abdomen pelvis study with contrast is  unremarkable.    Electronically signed by:  Jose Antonio Sewell MD  2/16/2021 10:46  AM CST  Workstation: [id number]            Assessment:    Active Hospital Problems    Diagnosis   • SBO (small bowel obstruction) (CMS/[facility])   • Chronic pain syndrome   • Uncontrolled type 2 diabetes mellitus with hyperglycemia (CMS/[facility])             Plan:  1.  Small bowel obstruction: Seen on CT scan.  Continue bowel rest, pain medication, antiemetics.  Currently nausea is controlled with antiemetics so no NG tube is in place.  Patient was instructed that if nausea were to worsen, NG would be required.  I have consulted Dr. Shaver, who is graciously agreed to see the patient in consultation.  2.  Diabetes mellitus: Poorly controlled.  Patient glucose on arrival was greater than 400 with significant glucosuria.  We will take patient off schedule insulin and place on sliding scale for now.  May need to add a long-acting schedule insulin to his regimen if control is difficult.  3.  Chronic pain syndrome: Patient on Percocet chronically at home.  As patient is n.p.o., this will be held.  We will continue on IV morphine for now.  4.  Abdominal wall pain: Secondary to fall.  CT Q scan does not show any abdominal organ injury or significant abdominal wall injury.  5.  DVT prophylaxis as needed.    I discussed the patient's findings and my recommendations with: Patient        This document has been electronically signed by Vicente Palacios MD on February 16, 2021 16:50 CST

## 2021-02-16 NOTE — PLAN OF CARE
Patient arrived to [facility] from ED. Patient complaining of nausea and has improved with zofran. VAS 7 after pain intervention. MD aware and will be seeing patient.   Goal Outcome Evaluation:

## 2021-02-16 NOTE — ED PROVIDER NOTES
Subjective   Patient states he had a ground-level fall yesterday when he slipped on ice, not sustaining an injury.  Later on he went out to his car and while leaning through the car to unlock the other door, he noticed nontraumatic sharp left-sided abdominal pain.      Fall  Mechanism of injury: fall    Incident location:  Home  Fall:     Fall occurred:  Walking    Height of fall:  GLF    Impact surface:  Ice    Entrapped after fall: no    Associated symptoms: abdominal pain, nausea and vomiting    Associated symptoms: no chest pain, no headaches, no neck pain and no seizures    Flank Pain  Associated symptoms: nausea and vomiting    Associated symptoms: no chest pain, no chills, no cough, no diarrhea, no dysuria, no fatigue, no fever, no shortness of breath and no sore throat        Review of Systems   Constitutional: Positive for activity change. Negative for appetite change, chills, diaphoresis, fatigue and fever.   HENT: Negative for congestion, ear discharge, ear pain, nosebleeds, rhinorrhea, sinus pressure, sore throat and trouble swallowing.    Eyes: Negative for discharge and redness.   Respiratory: Negative for apnea, cough, chest tightness, shortness of breath and wheezing.    Cardiovascular: Negative for chest pain.   Gastrointestinal: Positive for abdominal pain, nausea and vomiting. Negative for diarrhea.   Endocrine: Negative for polyuria.   Genitourinary: Positive for flank pain. Negative for dysuria, frequency and urgency.   Musculoskeletal: Negative for myalgias and neck pain.   Skin: Negative for color change and rash.   Allergic/Immunologic: Negative for immunocompromised state.   Neurological: Negative for dizziness, seizures, syncope, weakness, light-headedness and headaches.   Hematological: Negative for adenopathy. Does not bruise/bleed easily.   Psychiatric/Behavioral: Negative for behavioral problems and confusion.   All other systems reviewed and are negative.      Past Medical History:    Diagnosis Date   • Arthritis    • Carpal tunnel syndrome    • Chronic pain syndrome    • Depressive disorder    • GERD (gastroesophageal reflux disease)    • Hernia    • History of closed head injury     with ICH and left weakness   • History of urinary system disease    • Hypertension    • Migraine    • Right shoulder pain    • Rotator cuff syndrome    • Type 2 diabetes mellitus (CMS/HCC)    • Ureteric stone        Allergies   Allergen Reactions   • Naproxen Other (See Comments) and Rash     Blisters in mouth   • Tramadol Rash     Patient states he gets a rash in his mouth.        Past Surgical History:   Procedure Laterality Date   • ABDOMINAL SURGERY  08/2016   • APPENDECTOMY N/A 1/27/2018    Procedure: APPENDECTOMY;  Surgeon: Rusty Palacio MD;  Location: [facility];  Service:    • CHOLECYSTECTOMY     • CIRCUMCISION     • CYSTOSCOPY  08/13/2003    Cystoscopy and removal of J stent. Right ureteral stent.   • CYSTOSCOPY  08/13/2003    Cystoscopy and right stone extraction and placement of 26x 6 J-stent.   • INCISION AND DRAINAGE ABSCESS N/A 7/26/2017    Procedure: INCISION AND DRAINAGE ABD.ABSCESS;  Surgeon: Rui Shaver MD;  Location: [facility];  Service:    • VENTRAL/INCISIONAL HERNIA REPAIR N/A 5/31/2017    Procedure: LAPAROSCOPIC POSSIBLE OPEN ADHESIOLYSIS AND VENTRAL/INCISIONAL HERNIA REPAIR ;  Surgeon: Rui Shaver MD;  Location: [facility];  Service:        History reviewed. No pertinent family history.    Social History     Socioeconomic History   • Marital status:      Spouse name: Not on file   • Number of children: Not on file   • Years of education: Not on file   • Highest education level: Not on file   Tobacco Use   • Smoking status: Never Smoker   • Smokeless tobacco: Never Used   Substance and Sexual Activity   • Alcohol use: No   • Drug use: No   • Sexual activity: Defer           Objective   Physical Exam  Vitals signs and nursing note reviewed.   Constitutional:       Appearance:  He is well-developed.   HENT:      Head: Normocephalic and atraumatic.      Nose: Nose normal.   Eyes:      General: No scleral icterus.        Right eye: No discharge.         Left eye: No discharge.      Conjunctiva/sclera: Conjunctivae normal.      Pupils: Pupils are equal, round, and reactive to light.   Neck:      Musculoskeletal: Normal range of motion and neck supple.      Trachea: No tracheal deviation.   Cardiovascular:      Rate and Rhythm: Normal rate and regular rhythm.      Heart sounds: Normal heart sounds. No murmur.   Pulmonary:      Effort: Pulmonary effort is normal. No respiratory distress.      Breath sounds: Normal breath sounds. No stridor. No wheezing or rales.   Abdominal:      General: Bowel sounds are normal. There is no distension.      Palpations: Abdomen is soft. There is no mass.      Tenderness: There is abdominal tenderness in the left upper quadrant and left lower quadrant. There is no guarding or rebound.   Skin:     General: Skin is warm and dry.      Findings: No erythema or rash.   Neurological:      Mental Status: He is alert and oriented to person, place, and time.      Coordination: Coordination normal.   Psychiatric:         Behavior: Behavior normal.         Thought Content: Thought content normal.         Procedures           ED Course  ED Course as of Feb 16 1458   Tue Feb 16, 2021   1457 Patient admits to a history of small bowel obstruction with surgical repair, secondary to adhesions.  Findings were discussed with patient today regarding his current evolving small bowel obstruction.    [CB]      ED Course User Index  [CB] Blaine Coley MD                   Labs Reviewed   COMPREHENSIVE METABOLIC PANEL - Abnormal; Notable for the following components:       Result Value    Glucose 441 (*)     Sodium 132 (*)     Chloride 92 (*)     Calcium 10.6 (*)     Total Protein 8.7 (*)     All other components within normal limits    Narrative:     GFR Normal >60  Chronic  Kidney Disease <60  Kidney Failure <15     URINALYSIS W/ CULTURE IF INDICATED - Abnormal; Notable for the following components:    Glucose, UA >=1000 mg/dL (3+) (*)     Ketones, UA Trace (*)     All other components within normal limits    Narrative:     Urine microscopic not indicated.   CBC WITH AUTO DIFFERENTIAL - Abnormal; Notable for the following components:    WBC 11.11 (*)     RDW 12.0 (*)     Platelets 464 (*)     Lymphocyte % 16.6 (*)     Neutrophils, Absolute 8.36 (*)     All other components within normal limits   POCT GLUCOSE FINGERSTICK - Abnormal; Notable for the following components:    Glucose 327 (*)     All other components within normal limits   COVID-19 AND FLU A/B, NP SWAB IN TRANSPORT MEDIA 8-12 HR TAT - Normal    Narrative:     Fact sheet for providers: https://www.fda.gov/media/313749/download    Fact sheet for patients: https://www.fda.gov/media/278949/download    Test performed by PCR.   LIPASE - Normal   CBC AND DIFFERENTIAL    Narrative:     The following orders were created for panel order CBC & Differential.  Procedure                               Abnormality         Status                     ---------                               -----------         ------                     CBC Auto Differential[[id number]]        Abnormal            Final result                 Please view results for these tests on the individual orders.   EXTRA TUBES    Narrative:     The following orders were created for panel order Extra Tubes.  Procedure                               Abnormality         Status                     ---------                               -----------         ------                     Light Blue Top[[id number]]                                   Final result               Gold Top - SST[[id number]]                                   Final result                 Please view results for these tests on the individual orders.   LIGHT BLUE TOP   GOLD TOP - SST       CT Abdomen Pelvis  With Contrast   Final Result   Addendum 1 of 1    ADDENDUM    ADDENDUM #1          Addendum: Referring clinician notified of findings by phone at   10:54 AM on 2/16/2021.      Electronically signed by:  Jose Antonio Sewell MD  2/16/2021 10:54 AM CST   Workstation: [id number]         Final                                       MDM    Final diagnoses:   SBO (small bowel obstruction) (CMS/HCC)   Hyperglycemia            Blaine Coley MD  02/16/21 1324

## 2021-02-16 NOTE — ED NOTES
Pt presents to the ED with c/o left flank pain after a fall that occurred 3 days ago.      Mulu Obrien RN  02/16/21 0919

## 2021-02-17 ENCOUNTER — APPOINTMENT (OUTPATIENT)
Dept: GENERAL RADIOLOGY | Facility: HOSPITAL | Age: 61
End: 2021-02-17

## 2021-02-17 LAB
ANION GAP SERPL CALCULATED.3IONS-SCNC: 12 MMOL/L (ref 5–15)
BASOPHILS # BLD AUTO: 0.07 10*3/MM3 (ref 0–0.2)
BASOPHILS NFR BLD AUTO: 0.7 % (ref 0–1.5)
BUN SERPL-MCNC: 15 MG/DL (ref 6–20)
BUN/CREAT SERPL: 19.5 (ref 7–25)
CALCIUM SPEC-SCNC: 10.1 MG/DL (ref 8.6–10.5)
CHLORIDE SERPL-SCNC: 97 MMOL/L (ref 98–107)
CO2 SERPL-SCNC: 29 MMOL/L (ref 22–29)
CREAT SERPL-MCNC: 0.77 MG/DL (ref 0.76–1.27)
DEPRECATED RDW RBC AUTO: 39.8 FL (ref 37–54)
EOSINOPHIL # BLD AUTO: 0.12 10*3/MM3 (ref 0–0.4)
EOSINOPHIL NFR BLD AUTO: 1.2 % (ref 0.3–6.2)
ERYTHROCYTE [DISTWIDTH] IN BLOOD BY AUTOMATED COUNT: 12.1 % (ref 12.3–15.4)
GFR SERPL CREATININE-BSD FRML MDRD: 104 ML/MIN/1.73
GLUCOSE BLDC GLUCOMTR-MCNC: 163 MG/DL (ref 70–130)
GLUCOSE BLDC GLUCOMTR-MCNC: 200 MG/DL (ref 70–130)
GLUCOSE BLDC GLUCOMTR-MCNC: 223 MG/DL (ref 70–130)
GLUCOSE BLDC GLUCOMTR-MCNC: 264 MG/DL (ref 70–130)
GLUCOSE SERPL-MCNC: 229 MG/DL (ref 65–99)
HCT VFR BLD AUTO: 44.2 % (ref 37.5–51)
HGB BLD-MCNC: 15.9 G/DL (ref 13–17.7)
IMM GRANULOCYTES # BLD AUTO: 0.03 10*3/MM3 (ref 0–0.05)
IMM GRANULOCYTES NFR BLD AUTO: 0.3 % (ref 0–0.5)
LYMPHOCYTES # BLD AUTO: 2.68 10*3/MM3 (ref 0.7–3.1)
LYMPHOCYTES NFR BLD AUTO: 27.5 % (ref 19.6–45.3)
MCH RBC QN AUTO: 32.2 PG (ref 26.6–33)
MCHC RBC AUTO-ENTMCNC: 36 G/DL (ref 31.5–35.7)
MCV RBC AUTO: 89.5 FL (ref 79–97)
MONOCYTES # BLD AUTO: 1.12 10*3/MM3 (ref 0.1–0.9)
MONOCYTES NFR BLD AUTO: 11.5 % (ref 5–12)
NEUTROPHILS NFR BLD AUTO: 5.74 10*3/MM3 (ref 1.7–7)
NEUTROPHILS NFR BLD AUTO: 58.8 % (ref 42.7–76)
NRBC BLD AUTO-RTO: 0 /100 WBC (ref 0–0.2)
PLATELET # BLD AUTO: 417 10*3/MM3 (ref 140–450)
PMV BLD AUTO: 11 FL (ref 6–12)
POTASSIUM SERPL-SCNC: 3.4 MMOL/L (ref 3.5–5.2)
RBC # BLD AUTO: 4.94 10*6/MM3 (ref 4.14–5.8)
SODIUM SERPL-SCNC: 138 MMOL/L (ref 136–145)
WBC # BLD AUTO: 9.76 10*3/MM3 (ref 3.4–10.8)

## 2021-02-17 PROCEDURE — 96361 HYDRATE IV INFUSION ADD-ON: CPT

## 2021-02-17 PROCEDURE — 80048 BASIC METABOLIC PNL TOTAL CA: CPT | Performed by: HOSPITALIST

## 2021-02-17 PROCEDURE — 0 DIATRIZOATE MEGLUMINE & SODIUM PER 1 ML: Performed by: HOSPITALIST

## 2021-02-17 PROCEDURE — 25010000003 POTASSIUM CHLORIDE 10 MEQ/100ML SOLUTION: Performed by: STUDENT IN AN ORGANIZED HEALTH CARE EDUCATION/TRAINING PROGRAM

## 2021-02-17 PROCEDURE — 25010000002 PROCHLORPERAZINE 10 MG/2ML SOLUTION: Performed by: INTERNAL MEDICINE

## 2021-02-17 PROCEDURE — 25010000002 ONDANSETRON PER 1 MG: Performed by: HOSPITALIST

## 2021-02-17 PROCEDURE — 96376 TX/PRO/DX INJ SAME DRUG ADON: CPT

## 2021-02-17 PROCEDURE — 74250 X-RAY XM SM INT 1CNTRST STD: CPT

## 2021-02-17 PROCEDURE — 63710000001 INSULIN ASPART PER 5 UNITS: Performed by: HOSPITALIST

## 2021-02-17 PROCEDURE — 25010000002 HYDROMORPHONE 1 MG/ML SOLUTION: Performed by: HOSPITALIST

## 2021-02-17 PROCEDURE — 85025 COMPLETE CBC W/AUTO DIFF WBC: CPT | Performed by: HOSPITALIST

## 2021-02-17 PROCEDURE — G0378 HOSPITAL OBSERVATION PER HR: HCPCS

## 2021-02-17 PROCEDURE — 82962 GLUCOSE BLOOD TEST: CPT

## 2021-02-17 RX ORDER — POTASSIUM CHLORIDE 7.45 MG/ML
10 INJECTION INTRAVENOUS
Status: DISCONTINUED | OUTPATIENT
Start: 2021-02-17 | End: 2021-02-19 | Stop reason: HOSPADM

## 2021-02-17 RX ORDER — MAGNESIUM SULFATE HEPTAHYDRATE 40 MG/ML
2 INJECTION, SOLUTION INTRAVENOUS AS NEEDED
Status: DISCONTINUED | OUTPATIENT
Start: 2021-02-17 | End: 2021-02-19 | Stop reason: HOSPADM

## 2021-02-17 RX ORDER — POTASSIUM CHLORIDE 750 MG/1
40 CAPSULE, EXTENDED RELEASE ORAL AS NEEDED
Status: DISCONTINUED | OUTPATIENT
Start: 2021-02-17 | End: 2021-02-19 | Stop reason: HOSPADM

## 2021-02-17 RX ORDER — MAGNESIUM SULFATE HEPTAHYDRATE 40 MG/ML
4 INJECTION, SOLUTION INTRAVENOUS AS NEEDED
Status: DISCONTINUED | OUTPATIENT
Start: 2021-02-17 | End: 2021-02-19 | Stop reason: HOSPADM

## 2021-02-17 RX ORDER — POTASSIUM CHLORIDE 1.5 G/1.77G
40 POWDER, FOR SOLUTION ORAL AS NEEDED
Status: DISCONTINUED | OUTPATIENT
Start: 2021-02-17 | End: 2021-02-19 | Stop reason: HOSPADM

## 2021-02-17 RX ORDER — SODIUM CHLORIDE 9 MG/ML
100 INJECTION, SOLUTION INTRAVENOUS CONTINUOUS
Status: DISCONTINUED | OUTPATIENT
Start: 2021-02-17 | End: 2021-02-19

## 2021-02-17 RX ADMIN — HYDROMORPHONE HYDROCHLORIDE 1 MG: 1 INJECTION, SOLUTION INTRAMUSCULAR; INTRAVENOUS; SUBCUTANEOUS at 19:43

## 2021-02-17 RX ADMIN — ONDANSETRON 4 MG: 2 INJECTION INTRAMUSCULAR; INTRAVENOUS at 13:41

## 2021-02-17 RX ADMIN — SODIUM CHLORIDE 100 ML/HR: 900 INJECTION, SOLUTION INTRAVENOUS at 12:33

## 2021-02-17 RX ADMIN — PROCHLORPERAZINE EDISYLATE 5 MG: 5 INJECTION INTRAMUSCULAR; INTRAVENOUS at 22:46

## 2021-02-17 RX ADMIN — HYDROMORPHONE HYDROCHLORIDE 1 MG: 1 INJECTION, SOLUTION INTRAMUSCULAR; INTRAVENOUS; SUBCUTANEOUS at 16:24

## 2021-02-17 RX ADMIN — POTASSIUM CHLORIDE 10 MEQ: 7.46 INJECTION, SOLUTION INTRAVENOUS at 12:33

## 2021-02-17 RX ADMIN — DIATRIZOATE MEGLUMINE AND DIATRIZOATE SODIUM 100 ML: 660; 100 LIQUID ORAL; RECTAL at 09:30

## 2021-02-17 RX ADMIN — SODIUM CHLORIDE, PRESERVATIVE FREE 10 ML: 5 INJECTION INTRAVENOUS at 03:51

## 2021-02-17 RX ADMIN — ONDANSETRON 4 MG: 2 INJECTION INTRAMUSCULAR; INTRAVENOUS at 07:35

## 2021-02-17 RX ADMIN — HYDROMORPHONE HYDROCHLORIDE 1 MG: 1 INJECTION, SOLUTION INTRAMUSCULAR; INTRAVENOUS; SUBCUTANEOUS at 10:27

## 2021-02-17 RX ADMIN — HYDROMORPHONE HYDROCHLORIDE 1 MG: 1 INJECTION, SOLUTION INTRAMUSCULAR; INTRAVENOUS; SUBCUTANEOUS at 07:32

## 2021-02-17 RX ADMIN — ONDANSETRON 4 MG: 2 INJECTION INTRAMUSCULAR; INTRAVENOUS at 19:43

## 2021-02-17 RX ADMIN — INSULIN ASPART 3 UNITS: 100 INJECTION, SOLUTION INTRAVENOUS; SUBCUTANEOUS at 16:28

## 2021-02-17 RX ADMIN — SODIUM CHLORIDE, PRESERVATIVE FREE 10 ML: 5 INJECTION INTRAVENOUS at 07:33

## 2021-02-17 RX ADMIN — PROCHLORPERAZINE EDISYLATE 5 MG: 5 INJECTION INTRAMUSCULAR; INTRAVENOUS at 10:26

## 2021-02-17 RX ADMIN — SODIUM CHLORIDE, PRESERVATIVE FREE 10 ML: 5 INJECTION INTRAVENOUS at 00:40

## 2021-02-17 RX ADMIN — HYDROMORPHONE HYDROCHLORIDE 1 MG: 1 INJECTION, SOLUTION INTRAMUSCULAR; INTRAVENOUS; SUBCUTANEOUS at 03:52

## 2021-02-17 RX ADMIN — SODIUM CHLORIDE, PRESERVATIVE FREE 10 ML: 5 INJECTION INTRAVENOUS at 22:46

## 2021-02-17 RX ADMIN — POTASSIUM CHLORIDE 10 MEQ: 7.46 INJECTION, SOLUTION INTRAVENOUS at 16:24

## 2021-02-17 RX ADMIN — HYDROMORPHONE HYDROCHLORIDE 1 MG: 1 INJECTION, SOLUTION INTRAMUSCULAR; INTRAVENOUS; SUBCUTANEOUS at 22:46

## 2021-02-17 RX ADMIN — HYDROMORPHONE HYDROCHLORIDE 1 MG: 1 INJECTION, SOLUTION INTRAMUSCULAR; INTRAVENOUS; SUBCUTANEOUS at 00:40

## 2021-02-17 RX ADMIN — PROCHLORPERAZINE EDISYLATE 5 MG: 5 INJECTION INTRAMUSCULAR; INTRAVENOUS at 16:24

## 2021-02-17 RX ADMIN — POTASSIUM CHLORIDE 10 MEQ: 7.46 INJECTION, SOLUTION INTRAVENOUS at 18:14

## 2021-02-17 RX ADMIN — INSULIN ASPART 5 UNITS: 100 INJECTION, SOLUTION INTRAVENOUS; SUBCUTANEOUS at 07:32

## 2021-02-17 RX ADMIN — POTASSIUM CHLORIDE 10 MEQ: 7.46 INJECTION, SOLUTION INTRAVENOUS at 13:42

## 2021-02-17 RX ADMIN — AMITRIPTYLINE HYDROCHLORIDE 10 MG: 10 TABLET, FILM COATED ORAL at 20:59

## 2021-02-17 RX ADMIN — LIDOCAINE 1 PATCH: 50 PATCH CUTANEOUS at 16:24

## 2021-02-17 RX ADMIN — HYDROMORPHONE HYDROCHLORIDE 1 MG: 1 INJECTION, SOLUTION INTRAMUSCULAR; INTRAVENOUS; SUBCUTANEOUS at 13:41

## 2021-02-17 RX ADMIN — INSULIN ASPART 5 UNITS: 100 INJECTION, SOLUTION INTRAVENOUS; SUBCUTANEOUS at 10:37

## 2021-02-17 NOTE — PROGRESS NOTES
GENERAL SURGERY PROGRESS NOTE     LOS: 0 days     Chief Complaint:     Abdominal pain     Interval History:     Naeo. + nausea, emesis. Still some left sided flank pain. Passing large amounts of flatus he says, last BM yesterday morning but lots of vomiting.     Medication Review:   amitriptyline, 10 mg, Oral, Nightly  insulin aspart, 0-14 Units, Subcutaneous, TID AC  lidocaine, 1 patch, Transdermal, Q24H  sodium chloride, 10 mL, Intravenous, Q12H         Objective     Vital Signs:  Temp:  [96.6 °F (35.9 °C)-97.8 °F (36.6 °C)] 97 °F (36.1 °C)  Heart Rate:  [] 109  Resp:  [14-20] 18  BP: (111-160)/(73-99) 111/73    Intake/Output Summary (Last 24 hours) at 2/17/2021 0729  Last data filed at 2/17/2021 0554  Gross per 24 hour   Intake 60 ml   Output 1000 ml   Net -940 ml       Physical Exam    NAD  NCAT, MMM  Non labored on room air  RRR, 2+ pulses   Soft, previously well healed surgical incisions, minimally distended, left flank ttp           Results Review:    Results from last 7 days   Lab Units 02/17/21  0547 02/16/21  0916   SODIUM mmol/L 138 132*   POTASSIUM mmol/L 3.4* 3.7   CHLORIDE mmol/L 97* 92*   CO2 mmol/L 29.0 28.0   BUN mg/dL 15 10   CREATININE mg/dL 0.77 0.87   GLUCOSE mg/dL 229* 441*   CALCIUM mg/dL 10.1 10.6*     Results from last 7 days   Lab Units 02/17/21  0547 02/16/21  0916   WBC 10*3/mm3 9.76 11.11*   HEMOGLOBIN g/dL 15.9 16.9   HEMATOCRIT % 44.2 47.4   PLATELETS 10*3/mm3 417 464*       Assessment:    Uncontrolled type 2 diabetes mellitus with hyperglycemia (CMS/HCC)    Chronic pain syndrome    SBO (small bowel obstruction) (CMS/HCC)      61yo gentleman with history of multiple abdominal surgeries s/p fall x2 with CT concerning for partial SBO. No clinical signs of SBO, continues passing flatus and +BM yesterday. Hyperglycemic on arrival     Plan:    NPO, IVF  PRN pain, nausea control  Plan SBFT today   Rest per primary  Needs NG tube             This document has been electronically signed  by Venkatesh Bateman, Surgical resident on February 17, 2021 07:29 CST

## 2021-02-17 NOTE — PROGRESS NOTES
[facility] Medicine Services  INPATIENT PROGRESS NOTE    Length of Stay: 0  Date of Admission: 2/16/2021  Primary Care Physician: Provider, No Known    Subjective   Chief Complaint: abdominal pain   HPI:      He is passing flatus. BM yesterday. Still having nausea and vomiting.     Review of Systems     All pertinent negatives and positives are as above. All other systems have been reviewed and are negative unless otherwise stated.     Objective    Temp:  [96.6 °F (35.9 °C)-97.6 °F (36.4 °C)] 97.6 °F (36.4 °C)  Heart Rate:  [] 113  Resp:  [14-20] 18  BP: (111-146)/(73-99) 130/86    Physical Exam  Vitals signs reviewed.   Constitutional:       General: He is not in acute distress.     Appearance: He is well-developed.   HENT:      Head: Normocephalic and atraumatic.      Nose: Nose normal.   Eyes:      Conjunctiva/sclera: Conjunctivae normal.   Neck:      Musculoskeletal: Normal range of motion and neck supple.   Cardiovascular:      Rate and Rhythm: Normal rate and regular rhythm.   Pulmonary:      Effort: Pulmonary effort is normal. No respiratory distress.      Breath sounds: Normal breath sounds. No wheezing or rales.   Abdominal:      Tenderness: There is abdominal tenderness.   Musculoskeletal: Normal range of motion.   Skin:     General: Skin is warm and dry.   Neurological:      Mental Status: He is alert and oriented to person, place, and time.   Psychiatric:         Behavior: Behavior normal.             Results Review:  I have reviewed the labs, radiology results, and diagnostic studies.    Laboratory Data:   Results from last 7 days   Lab Units 02/17/21  0547 02/16/21  0916   SODIUM mmol/L 138 132*   POTASSIUM mmol/L 3.4* 3.7   CHLORIDE mmol/L 97* 92*   CO2 mmol/L 29.0 28.0   BUN mg/dL 15 10   CREATININE mg/dL 0.77 0.87   GLUCOSE mg/dL 229* 441*   CALCIUM mg/dL 10.1 10.6*   BILIRUBIN mg/dL  --  0.8   ALK PHOS U/L  --  110   ALT (SGPT) U/L  --  14   AST  (SGOT) U/L  --  19   ANION GAP mmol/L 12.0 12.0     Estimated Creatinine Clearance: 119.8 mL/min (by C-G formula based on SCr of 0.77 mg/dL).          Results from last 7 days   Lab Units 02/17/21  0547 02/16/21  0916   WBC 10*3/mm3 9.76 11.11*   HEMOGLOBIN g/dL 15.9 16.9   HEMATOCRIT % 44.2 47.4   PLATELETS 10*3/mm3 417 464*           Culture Data:   No results found for: BLOODCX  No results found for: URINECX  No results found for: RESPCX  No results found for: WOUNDCX  No results found for: STOOLCX  No components found for: BODYFLD    Radiology Data:   Imaging Results (Last 24 Hours)     Procedure Component Value Units Date/Time    FL Small Bowel Follow Through Single-Contrast [[id number]] Resulted: 02/17/21 0840     Updated: 02/17/21 0840    CT Abdomen Pelvis With Contrast [[id number]] Collected: 02/16/21 1019     Updated: 02/16/21 1056    Addenda:         ADDENDUM   ADDENDUM #1       Addendum: Referring clinician notified of findings by phone at  10:54 AM on 2/16/2021.    Electronically signed by:  Jose Antonio Sewell MD  2/16/2021 10:54 AM CST  Workstation: [id number]    Signed: 02/16/21 1054 by Dominguez Sewell MD    Narrative:        PROCEDURE: Ct abdomen and pelvis with contrast    REASON FOR EXAM: Abdominal trauma  fall, left flank/left abdominal wall pain    FINDINGS: Comparison study dated  December 4, 2019. Axial  computer tomography sequential imaging was performed from the  diaphragms through the symphysis pubis with IV contrast  administration. Sagittal and coronal reformates was performed.  This exam was performed according to our departmental dose  optimization program, which includes automated exposure control,  adjustment of the mA and/or KV according to patient size and/or  use of iterative reconstruction technique.     Imaging through lung bases reveals no abnormality.    The liver is normal. The gallbladder surgically absent. The  biliary system appears within normal limits status  post  cholecystectomy. The pancreas is normal. The spleen is normal.  Bilateral adrenal glands are normal. The right kidney and ureter  are normal. The left kidney and ureter are normal. The bladder is  normal. Prostate is normal. Left upper abdominal quadrant small  bowel loop which is gas and fluid-filled distended and has a  focal caliber change in the left lower abdomen. Postsurgical  changes of the small bowel loops in the left abdomen. The hollow  viscera is otherwise unremarkable. No lymphadenopathy in the  abdomen or the pelvis. No acute osseous abnormality.      Impression:      1.Left upper abdominal quadrant small bowel loop which is gas and  fluid-filled distended and has a focal caliber change in the left  lower abdomen. This would be suspicious for early small bowel  obstruction at this level possibly from adhesion. Recommend  clinical correlation.  2. Remainder CT abdomen pelvis study with contrast is  unremarkable.    Electronically signed by:  Jose Antonio Sewell MD  2/16/2021 10:46 AM CST  Workstation: [id number]          I have reviewed the patient current medications.     Assessment/Plan     Active Hospital Problems    Diagnosis   • SBO (small bowel obstruction) (CMS/HCC)   • Chronic pain syndrome   • Uncontrolled type 2 diabetes mellitus with hyperglycemia (CMS/HCC)       Plan:      1.  Small bowel obstruction: Seen on CT scan.  Dr. Shaver following. Plans for SBFT today. Place NG tube. IVF.   2.  Diabetes mellitus: continue SSI   3.  Chronic pain syndrome: Patient on Percocet chronically at home.  As patient is n.p.o., this will be held.  We will continue on IV morphine for now.  4.  Abdominal wall pain: Secondary to fall.    5.  DVT prophylaxis as needed.              Discharge Planning: I expect patient to be discharged in 1-3 days         This document has been electronically signed by Madison Ridley MD on February 17, 2021 10:48 CST

## 2021-02-17 NOTE — NURSING NOTE
Spoke with Dr Shaver regarding placement of NG tube. Stated can hold off until after imaging or if not more vomiting can reevaluate. More imaging to be done in 6 hours and 12 hours.

## 2021-02-17 NOTE — PLAN OF CARE
Goal Outcome Evaluation:  Plan of Care Reviewed With: patient  Progress: no change   Pt has had nausea and 2 episodes of vomiting with about 700 cc of emesis. Zofran ineffective called MD compazine was ordered and it was effective per patient. VAS for patient has been high. With patient usually reporting a 7-9 pain scale.

## 2021-02-17 NOTE — NURSING NOTE
Patient has been a no void this shift.  Patient bladder scanned. Amount 518. Instructed patient to go to restroom and attempt to void. Relaxation promoted with water and privacy.

## 2021-02-18 LAB
ANION GAP SERPL CALCULATED.3IONS-SCNC: 12 MMOL/L (ref 5–15)
BASOPHILS # BLD AUTO: 0.09 10*3/MM3 (ref 0–0.2)
BASOPHILS NFR BLD AUTO: 0.9 % (ref 0–1.5)
BUN SERPL-MCNC: 15 MG/DL (ref 6–20)
BUN/CREAT SERPL: 20 (ref 7–25)
CALCIUM SPEC-SCNC: 9.4 MG/DL (ref 8.6–10.5)
CHLORIDE SERPL-SCNC: 99 MMOL/L (ref 98–107)
CO2 SERPL-SCNC: 27 MMOL/L (ref 22–29)
CREAT SERPL-MCNC: 0.75 MG/DL (ref 0.76–1.27)
DEPRECATED RDW RBC AUTO: 39.8 FL (ref 37–54)
EOSINOPHIL # BLD AUTO: 0.26 10*3/MM3 (ref 0–0.4)
EOSINOPHIL NFR BLD AUTO: 2.5 % (ref 0.3–6.2)
ERYTHROCYTE [DISTWIDTH] IN BLOOD BY AUTOMATED COUNT: 12.1 % (ref 12.3–15.4)
GFR SERPL CREATININE-BSD FRML MDRD: 107 ML/MIN/1.73
GLUCOSE BLDC GLUCOMTR-MCNC: 196 MG/DL (ref 70–130)
GLUCOSE BLDC GLUCOMTR-MCNC: 199 MG/DL (ref 70–130)
GLUCOSE BLDC GLUCOMTR-MCNC: 256 MG/DL (ref 70–130)
GLUCOSE BLDC GLUCOMTR-MCNC: 92 MG/DL (ref 70–130)
GLUCOSE SERPL-MCNC: 223 MG/DL (ref 65–99)
HCT VFR BLD AUTO: 41.1 % (ref 37.5–51)
HGB BLD-MCNC: 14.6 G/DL (ref 13–17.7)
IMM GRANULOCYTES # BLD AUTO: 0.02 10*3/MM3 (ref 0–0.05)
IMM GRANULOCYTES NFR BLD AUTO: 0.2 % (ref 0–0.5)
LYMPHOCYTES # BLD AUTO: 1.84 10*3/MM3 (ref 0.7–3.1)
LYMPHOCYTES NFR BLD AUTO: 18 % (ref 19.6–45.3)
MAGNESIUM SERPL-MCNC: 1.8 MG/DL (ref 1.6–2.6)
MCH RBC QN AUTO: 31.9 PG (ref 26.6–33)
MCHC RBC AUTO-ENTMCNC: 35.5 G/DL (ref 31.5–35.7)
MCV RBC AUTO: 89.9 FL (ref 79–97)
MONOCYTES # BLD AUTO: 1.01 10*3/MM3 (ref 0.1–0.9)
MONOCYTES NFR BLD AUTO: 9.9 % (ref 5–12)
NEUTROPHILS NFR BLD AUTO: 68.5 % (ref 42.7–76)
NEUTROPHILS NFR BLD AUTO: 7 10*3/MM3 (ref 1.7–7)
NRBC BLD AUTO-RTO: 0 /100 WBC (ref 0–0.2)
PLATELET # BLD AUTO: 349 10*3/MM3 (ref 140–450)
PMV BLD AUTO: 10.5 FL (ref 6–12)
POTASSIUM SERPL-SCNC: 3.5 MMOL/L (ref 3.5–5.2)
POTASSIUM SERPL-SCNC: 4 MMOL/L (ref 3.5–5.2)
RBC # BLD AUTO: 4.57 10*6/MM3 (ref 4.14–5.8)
SODIUM SERPL-SCNC: 138 MMOL/L (ref 136–145)
WBC # BLD AUTO: 10.22 10*3/MM3 (ref 3.4–10.8)

## 2021-02-18 PROCEDURE — 25010000002 ONDANSETRON PER 1 MG: Performed by: HOSPITALIST

## 2021-02-18 PROCEDURE — 84132 ASSAY OF SERUM POTASSIUM: CPT | Performed by: STUDENT IN AN ORGANIZED HEALTH CARE EDUCATION/TRAINING PROGRAM

## 2021-02-18 PROCEDURE — 25010000002 PROCHLORPERAZINE 10 MG/2ML SOLUTION: Performed by: INTERNAL MEDICINE

## 2021-02-18 PROCEDURE — 83735 ASSAY OF MAGNESIUM: CPT | Performed by: STUDENT IN AN ORGANIZED HEALTH CARE EDUCATION/TRAINING PROGRAM

## 2021-02-18 PROCEDURE — 96376 TX/PRO/DX INJ SAME DRUG ADON: CPT

## 2021-02-18 PROCEDURE — 85025 COMPLETE CBC W/AUTO DIFF WBC: CPT | Performed by: HOSPITALIST

## 2021-02-18 PROCEDURE — 25010000003 MAGNESIUM SULFATE 4 GM/100ML SOLUTION: Performed by: STUDENT IN AN ORGANIZED HEALTH CARE EDUCATION/TRAINING PROGRAM

## 2021-02-18 PROCEDURE — 82962 GLUCOSE BLOOD TEST: CPT

## 2021-02-18 PROCEDURE — 25010000002 HYDROMORPHONE 1 MG/ML SOLUTION: Performed by: HOSPITALIST

## 2021-02-18 PROCEDURE — G0378 HOSPITAL OBSERVATION PER HR: HCPCS

## 2021-02-18 PROCEDURE — 96361 HYDRATE IV INFUSION ADD-ON: CPT

## 2021-02-18 PROCEDURE — 63710000001 INSULIN ASPART PER 5 UNITS: Performed by: HOSPITALIST

## 2021-02-18 PROCEDURE — 80048 BASIC METABOLIC PNL TOTAL CA: CPT | Performed by: HOSPITALIST

## 2021-02-18 RX ORDER — OXYCODONE AND ACETAMINOPHEN 7.5; 325 MG/1; MG/1
1 TABLET ORAL
Status: DISCONTINUED | OUTPATIENT
Start: 2021-02-18 | End: 2021-02-19 | Stop reason: HOSPADM

## 2021-02-18 RX ORDER — DULOXETIN HYDROCHLORIDE 20 MG/1
20 CAPSULE, DELAYED RELEASE ORAL DAILY
Status: DISCONTINUED | OUTPATIENT
Start: 2021-02-18 | End: 2021-02-19 | Stop reason: HOSPADM

## 2021-02-18 RX ADMIN — DULOXETINE HYDROCHLORIDE 20 MG: 20 CAPSULE, DELAYED RELEASE ORAL at 11:50

## 2021-02-18 RX ADMIN — HYDROMORPHONE HYDROCHLORIDE 1 MG: 1 INJECTION, SOLUTION INTRAMUSCULAR; INTRAVENOUS; SUBCUTANEOUS at 15:13

## 2021-02-18 RX ADMIN — MAGNESIUM SULFATE IN WATER 4 G: 40 INJECTION, SOLUTION INTRAVENOUS at 15:23

## 2021-02-18 RX ADMIN — SODIUM CHLORIDE 100 ML/HR: 900 INJECTION, SOLUTION INTRAVENOUS at 03:59

## 2021-02-18 RX ADMIN — POTASSIUM CHLORIDE 40 MEQ: 10 CAPSULE, COATED, EXTENDED RELEASE ORAL at 18:25

## 2021-02-18 RX ADMIN — HYDROMORPHONE HYDROCHLORIDE 1 MG: 1 INJECTION, SOLUTION INTRAMUSCULAR; INTRAVENOUS; SUBCUTANEOUS at 03:57

## 2021-02-18 RX ADMIN — HYDROMORPHONE HYDROCHLORIDE 1 MG: 1 INJECTION, SOLUTION INTRAMUSCULAR; INTRAVENOUS; SUBCUTANEOUS at 09:24

## 2021-02-18 RX ADMIN — ONDANSETRON 4 MG: 2 INJECTION INTRAMUSCULAR; INTRAVENOUS at 03:57

## 2021-02-18 RX ADMIN — POTASSIUM CHLORIDE 40 MEQ: 10 CAPSULE, COATED, EXTENDED RELEASE ORAL at 15:23

## 2021-02-18 RX ADMIN — OXYCODONE HYDROCHLORIDE AND ACETAMINOPHEN 1 TABLET: 7.5; 325 TABLET ORAL at 13:58

## 2021-02-18 RX ADMIN — LIDOCAINE 1 PATCH: 50 PATCH CUTANEOUS at 17:31

## 2021-02-18 RX ADMIN — SODIUM CHLORIDE 100 ML/HR: 900 INJECTION, SOLUTION INTRAVENOUS at 08:16

## 2021-02-18 RX ADMIN — PROCHLORPERAZINE EDISYLATE 5 MG: 5 INJECTION INTRAMUSCULAR; INTRAVENOUS at 06:27

## 2021-02-18 RX ADMIN — OXYCODONE HYDROCHLORIDE AND ACETAMINOPHEN 1 TABLET: 7.5; 325 TABLET ORAL at 21:04

## 2021-02-18 RX ADMIN — HYDROMORPHONE HYDROCHLORIDE 1 MG: 1 INJECTION, SOLUTION INTRAMUSCULAR; INTRAVENOUS; SUBCUTANEOUS at 19:32

## 2021-02-18 RX ADMIN — HYDROMORPHONE HYDROCHLORIDE 1 MG: 1 INJECTION, SOLUTION INTRAMUSCULAR; INTRAVENOUS; SUBCUTANEOUS at 06:27

## 2021-02-18 RX ADMIN — SODIUM CHLORIDE 100 ML/HR: 900 INJECTION, SOLUTION INTRAVENOUS at 00:16

## 2021-02-18 RX ADMIN — OXYCODONE HYDROCHLORIDE AND ACETAMINOPHEN 1 TABLET: 7.5; 325 TABLET ORAL at 17:31

## 2021-02-18 RX ADMIN — AMITRIPTYLINE HYDROCHLORIDE 10 MG: 10 TABLET, FILM COATED ORAL at 21:04

## 2021-02-18 RX ADMIN — OXYCODONE HYDROCHLORIDE AND ACETAMINOPHEN 1 TABLET: 7.5; 325 TABLET ORAL at 11:50

## 2021-02-18 RX ADMIN — SODIUM CHLORIDE, PRESERVATIVE FREE 10 ML: 5 INJECTION INTRAVENOUS at 21:04

## 2021-02-18 RX ADMIN — INSULIN ASPART 8 UNITS: 100 INJECTION, SOLUTION INTRAVENOUS; SUBCUTANEOUS at 11:52

## 2021-02-18 RX ADMIN — INSULIN ASPART 5 UNITS: 100 INJECTION, SOLUTION INTRAVENOUS; SUBCUTANEOUS at 08:13

## 2021-02-18 NOTE — PROGRESS NOTES
[facility] Medicine Services  INPATIENT PROGRESS NOTE    Length of Stay: 0  Date of Admission: 2/16/2021  Primary Care Physician: Provider, No Known    Subjective   Chief Complaint: abdominal pain   HPI:      Had 2 BMs. Abdominal pain still present in LLQ. Pain is worse when he moves around. SBFT was done yesterday - no obstruction. Started on clear liquid diet.     Review of Systems     All pertinent negatives and positives are as above. All other systems have been reviewed and are negative unless otherwise stated.     Objective    Temp:  [96.9 °F (36.1 °C)-98.2 °F (36.8 °C)] 96.9 °F (36.1 °C)  Heart Rate:  [] 99  Resp:  [18] 18  BP: (121-138)/(76-95) 125/78    Physical Exam  Vitals signs reviewed.   Constitutional:       General: He is not in acute distress.     Appearance: He is well-developed.   HENT:      Head: Normocephalic and atraumatic.      Nose: Nose normal.   Eyes:      Conjunctiva/sclera: Conjunctivae normal.   Neck:      Musculoskeletal: Normal range of motion and neck supple.   Cardiovascular:      Rate and Rhythm: Normal rate and regular rhythm.   Pulmonary:      Effort: Pulmonary effort is normal. No respiratory distress.      Breath sounds: Normal breath sounds. No wheezing or rales.   Abdominal:      Tenderness: There is abdominal tenderness (LLQ).   Musculoskeletal: Normal range of motion.   Skin:     General: Skin is warm and dry.   Neurological:      Mental Status: He is alert and oriented to person, place, and time.   Psychiatric:         Behavior: Behavior normal.             Results Review:  I have reviewed the labs, radiology results, and diagnostic studies.    Laboratory Data:   Results from last 7 days   Lab Units 02/18/21  0714 02/17/21  0547 02/16/21  0916   SODIUM mmol/L 138 138 132*   POTASSIUM mmol/L 3.5 3.4* 3.7   CHLORIDE mmol/L 99 97* 92*   CO2 mmol/L 27.0 29.0 28.0   BUN mg/dL 15 15 10   CREATININE mg/dL 0.75* 0.77 0.87   GLUCOSE  mg/dL 223* 229* 441*   CALCIUM mg/dL 9.4 10.1 10.6*   BILIRUBIN mg/dL  --   --  0.8   ALK PHOS U/L  --   --  110   ALT (SGPT) U/L  --   --  14   AST (SGOT) U/L  --   --  19   ANION GAP mmol/L 12.0 12.0 12.0     Estimated Creatinine Clearance: 112.7 mL/min (A) (by C-G formula based on SCr of 0.75 mg/dL (L)).          Results from last 7 days   Lab Units 02/18/21  0714 02/17/21  0547 02/16/21  0916   WBC 10*3/mm3 10.22 9.76 11.11*   HEMOGLOBIN g/dL 14.6 15.9 16.9   HEMATOCRIT % 41.1 44.2 47.4   PLATELETS 10*3/mm3 349 417 464*           Culture Data:   No results found for: BLOODCX  No results found for: URINECX  No results found for: RESPCX  No results found for: WOUNDCX  No results found for: STOOLCX  No components found for: BODYFLD    Radiology Data:   Imaging Results (Last 24 Hours)     Procedure Component Value Units Date/Time    FL Small Bowel Follow Through Single-Contrast [[id number]] Collected: 02/17/21 0840     Updated: 02/17/21 1554    Narrative:      PROCEDURE: FL SMALL BOWEL FOLLOW THROUGH    TECHNIQUE: Frontal abdominal radiographs were obtained before the  administration of 90 mL of oral Gastrografin. Postcontrast x-rays  were obtained at zero and six hours.    COMPARISON: CT abdomen pelvis dated 2/16/2021.    NUMBER OF FILMS: Five    HISTORY: poss SBO, K56.609 Unspecified intestinal obstruction,  unspecified as to partial versus complete obstruction R73.9  Hyperglycemia, unspecified    FINDINGS  Contrast in the urinary bladder on the  image is consistent  with yesterday's contrast-enhanced CT.  Contrast is noted to be throughout the colon on the six hour  images.      Impression:      CONCLUSION:   Contrast is noted to be throughout the colon on the six hour  images.    Electronically signed by:  Kirt Galvez MD  2/17/2021 3:53 PM CST  Workstation: [id number]          I have reviewed the patient current medications.     Assessment/Plan     Active Hospital Problems    Diagnosis   • SBO (small  bowel obstruction) (CMS/HCC)   • Chronic pain syndrome   • Uncontrolled type 2 diabetes mellitus with hyperglycemia (CMS/HCC)       Plan:      1.  Small bowel obstruction: resolving. Seen on CT scan.  Dr. Shaver following. SBFT no obstruction. Continue IVF until tolerating a diet. Clear liquid diet until general surgery sees him.  2.  Diabetes mellitus: continue SSI   3.  Chronic pain syndrome: Patient on Percocet chronically at home.  Will restart home pain medication. Will restart Cymbalta today as well. Could help with his pain.  4.  Abdominal wall pain: Secondary to fall.    5.  DVT prophylaxis as needed.              Discharge Planning: I expect patient to be discharged in 1-2 days         This document has been electronically signed by Madison Ridley MD on February 18, 2021 10:39 CST

## 2021-02-18 NOTE — PLAN OF CARE
Patient continues  to complain of pain.  Patient medication  administered. Educated patient on the need  to  get out of bed  and sit in  chair and ambulate in hallway. Patient stated it just hurts when he moves.     Goal Outcome Evaluation:

## 2021-02-18 NOTE — PLAN OF CARE
Patient continues to complain of pain and nausea. No emesis this shift  Goal Outcome Evaluation:

## 2021-02-19 ENCOUNTER — APPOINTMENT (OUTPATIENT)
Dept: GENERAL RADIOLOGY | Facility: HOSPITAL | Age: 61
End: 2021-02-19

## 2021-02-19 VITALS
HEIGHT: 68 IN | TEMPERATURE: 97.1 F | OXYGEN SATURATION: 98 % | RESPIRATION RATE: 18 BRPM | HEART RATE: 80 BPM | BODY MASS INDEX: 28.28 KG/M2 | DIASTOLIC BLOOD PRESSURE: 83 MMHG | SYSTOLIC BLOOD PRESSURE: 131 MMHG | WEIGHT: 186.6 LBS

## 2021-02-19 LAB
ANION GAP SERPL CALCULATED.3IONS-SCNC: 7 MMOL/L (ref 5–15)
BASOPHILS # BLD AUTO: 0.08 10*3/MM3 (ref 0–0.2)
BASOPHILS NFR BLD AUTO: 1.3 % (ref 0–1.5)
BUN SERPL-MCNC: 8 MG/DL (ref 6–20)
BUN/CREAT SERPL: 12.7 (ref 7–25)
CALCIUM SPEC-SCNC: 9 MG/DL (ref 8.6–10.5)
CHLORIDE SERPL-SCNC: 102 MMOL/L (ref 98–107)
CO2 SERPL-SCNC: 26 MMOL/L (ref 22–29)
CREAT SERPL-MCNC: 0.63 MG/DL (ref 0.76–1.27)
DEPRECATED RDW RBC AUTO: 38.5 FL (ref 37–54)
EOSINOPHIL # BLD AUTO: 0.43 10*3/MM3 (ref 0–0.4)
EOSINOPHIL NFR BLD AUTO: 6.9 % (ref 0.3–6.2)
ERYTHROCYTE [DISTWIDTH] IN BLOOD BY AUTOMATED COUNT: 11.9 % (ref 12.3–15.4)
GFR SERPL CREATININE-BSD FRML MDRD: 131 ML/MIN/1.73
GLUCOSE BLDC GLUCOMTR-MCNC: 159 MG/DL (ref 70–130)
GLUCOSE BLDC GLUCOMTR-MCNC: 258 MG/DL (ref 70–130)
GLUCOSE SERPL-MCNC: 172 MG/DL (ref 65–99)
HCT VFR BLD AUTO: 37.7 % (ref 37.5–51)
HGB BLD-MCNC: 13.4 G/DL (ref 13–17.7)
IMM GRANULOCYTES # BLD AUTO: 0.02 10*3/MM3 (ref 0–0.05)
IMM GRANULOCYTES NFR BLD AUTO: 0.3 % (ref 0–0.5)
LYMPHOCYTES # BLD AUTO: 1.72 10*3/MM3 (ref 0.7–3.1)
LYMPHOCYTES NFR BLD AUTO: 27.6 % (ref 19.6–45.3)
MCH RBC QN AUTO: 31.9 PG (ref 26.6–33)
MCHC RBC AUTO-ENTMCNC: 35.5 G/DL (ref 31.5–35.7)
MCV RBC AUTO: 89.8 FL (ref 79–97)
MONOCYTES # BLD AUTO: 0.83 10*3/MM3 (ref 0.1–0.9)
MONOCYTES NFR BLD AUTO: 13.3 % (ref 5–12)
NEUTROPHILS NFR BLD AUTO: 3.16 10*3/MM3 (ref 1.7–7)
NEUTROPHILS NFR BLD AUTO: 50.6 % (ref 42.7–76)
NRBC BLD AUTO-RTO: 0 /100 WBC (ref 0–0.2)
PLATELET # BLD AUTO: 330 10*3/MM3 (ref 140–450)
PMV BLD AUTO: 10.8 FL (ref 6–12)
POTASSIUM SERPL-SCNC: 3.9 MMOL/L (ref 3.5–5.2)
RBC # BLD AUTO: 4.2 10*6/MM3 (ref 4.14–5.8)
SODIUM SERPL-SCNC: 135 MMOL/L (ref 136–145)
WBC # BLD AUTO: 6.24 10*3/MM3 (ref 3.4–10.8)

## 2021-02-19 PROCEDURE — 96361 HYDRATE IV INFUSION ADD-ON: CPT

## 2021-02-19 PROCEDURE — 96376 TX/PRO/DX INJ SAME DRUG ADON: CPT

## 2021-02-19 PROCEDURE — 80048 BASIC METABOLIC PNL TOTAL CA: CPT | Performed by: HOSPITALIST

## 2021-02-19 PROCEDURE — 82962 GLUCOSE BLOOD TEST: CPT

## 2021-02-19 PROCEDURE — 71046 X-RAY EXAM CHEST 2 VIEWS: CPT

## 2021-02-19 PROCEDURE — G0378 HOSPITAL OBSERVATION PER HR: HCPCS

## 2021-02-19 PROCEDURE — 25010000002 HYDROMORPHONE 1 MG/ML SOLUTION: Performed by: HOSPITALIST

## 2021-02-19 PROCEDURE — 85025 COMPLETE CBC W/AUTO DIFF WBC: CPT | Performed by: HOSPITALIST

## 2021-02-19 PROCEDURE — 63710000001 INSULIN ASPART PER 5 UNITS: Performed by: HOSPITALIST

## 2021-02-19 RX ORDER — OXYCODONE AND ACETAMINOPHEN 7.5; 325 MG/1; MG/1
1 TABLET ORAL
Qty: 15 TABLET | Refills: 0 | Status: SHIPPED | OUTPATIENT
Start: 2021-02-19 | End: 2021-07-27

## 2021-02-19 RX ORDER — LIDOCAINE 50 MG/G
1 PATCH TOPICAL
Qty: 30 EACH | Refills: 0 | Status: SHIPPED | OUTPATIENT
Start: 2021-02-19 | End: 2021-07-27

## 2021-02-19 RX ADMIN — HYDROMORPHONE HYDROCHLORIDE 1 MG: 1 INJECTION, SOLUTION INTRAMUSCULAR; INTRAVENOUS; SUBCUTANEOUS at 07:56

## 2021-02-19 RX ADMIN — INSULIN ASPART 8 UNITS: 100 INJECTION, SOLUTION INTRAVENOUS; SUBCUTANEOUS at 10:41

## 2021-02-19 RX ADMIN — LIDOCAINE 1 PATCH: 50 PATCH CUTANEOUS at 10:22

## 2021-02-19 RX ADMIN — OXYCODONE HYDROCHLORIDE AND ACETAMINOPHEN 1 TABLET: 7.5; 325 TABLET ORAL at 10:22

## 2021-02-19 RX ADMIN — DULOXETINE HYDROCHLORIDE 20 MG: 20 CAPSULE, DELAYED RELEASE ORAL at 08:00

## 2021-02-19 RX ADMIN — HYDROMORPHONE HYDROCHLORIDE 1 MG: 1 INJECTION, SOLUTION INTRAMUSCULAR; INTRAVENOUS; SUBCUTANEOUS at 05:16

## 2021-02-19 RX ADMIN — INSULIN ASPART 3 UNITS: 100 INJECTION, SOLUTION INTRAVENOUS; SUBCUTANEOUS at 07:56

## 2021-02-19 RX ADMIN — OXYCODONE HYDROCHLORIDE AND ACETAMINOPHEN 1 TABLET: 7.5; 325 TABLET ORAL at 13:48

## 2021-02-19 RX ADMIN — OXYCODONE HYDROCHLORIDE AND ACETAMINOPHEN 1 TABLET: 7.5; 325 TABLET ORAL at 06:18

## 2021-02-19 RX ADMIN — SODIUM CHLORIDE 100 ML/HR: 900 INJECTION, SOLUTION INTRAVENOUS at 02:25

## 2021-02-19 RX ADMIN — SODIUM CHLORIDE, PRESERVATIVE FREE 10 ML: 5 INJECTION INTRAVENOUS at 08:01

## 2021-02-19 RX ADMIN — HYDROMORPHONE HYDROCHLORIDE 1 MG: 1 INJECTION, SOLUTION INTRAMUSCULAR; INTRAVENOUS; SUBCUTANEOUS at 02:25

## 2021-02-19 NOTE — PROGRESS NOTES
Tolerating diet and good bowel function. No evidence of SBO  Still C/O left flank pain- I have reviewed my exam and films and cannot give a clear reason.   Dr. Philip is here for me if needed.          This document has been electronically signed by Rui Shaver MD on February 19, 2021 11:24 CST

## 2021-02-19 NOTE — DISCHARGE SUMMARY
[facility] Medicine Services  DISCHARGE SUMMARY       Date of Admission: 2/16/2021  Date of Discharge:  2/19/2021  Primary Care Physician: Provider, No Known    Presenting Problem/History of Present Illness:  SBO (small bowel obstruction) (CMS/HCC) [K56.609]  Hyperglycemia [R73.9]       Final Discharge Diagnoses:  Active Hospital Problems    Diagnosis   • SBO (small bowel obstruction) (CMS/HCC)   • Chronic pain syndrome   • Uncontrolled type 2 diabetes mellitus with hyperglycemia (CMS/HCC)       Consults:   Consults     Date and Time Order Name Status Description    2/16/2021 1625 Inpatient General Surgery Consult      2/16/2021 1320 Hospitalist (on-call MD unless specified)            Procedures Performed:                 Pertinent Test Results:   Imaging Results (Last 72 Hours)     Procedure Component Value Units Date/Time    XR Chest PA & Lateral [[id number]] Collected: 02/19/21 1108     Updated: 02/19/21 1151    Narrative:      EXAM: XR CHEST 2 VIEWS    COMPARISONS: Radiograph 12/4/2002.    INDICATION: rib pain, K56.609 Unspecified intestinal obstruction,  unspecified as to partial versus complete obstruction R73.9  Hyperglycemia, unspecified    FINDINGS:   Lungs are symmetric and adequately expanded. No focal  consolidation, effusion, or pneumothorax. Thin curvilinear  opacities at the bilateral lower lungs are essentially unchanged  from 2019 given differences in technique, favoring scarring  and/or subsegmental atelectasis. Cardiomediastinal silhouette is  within normal limits. No acute osseous abnormalities.      Impression:      No acute cardiopulmonary process.    Electronically signed by:  Yuniel España MD  2/19/2021  11:50 AM CST Workstation: [id number]    FL Small Bowel Follow Through Single-Contrast [[id number]] Collected: 02/17/21 0810     Updated: 02/17/21 8380    Narrative:      PROCEDURE: FL SMALL BOWEL FOLLOW THROUGH    TECHNIQUE: Frontal abdominal  "radiographs were obtained before the  administration of 90 mL of oral Gastrografin. Postcontrast x-rays  were obtained at zero and six hours.    COMPARISON: CT abdomen pelvis dated 2/16/2021.    NUMBER OF FILMS: Five    HISTORY: poss SBO, K56.609 Unspecified intestinal obstruction,  unspecified as to partial versus complete obstruction R73.9  Hyperglycemia, unspecified    FINDINGS  Contrast in the urinary bladder on the  image is consistent  with yesterday's contrast-enhanced CT.  Contrast is noted to be throughout the colon on the six hour  images.      Impression:      CONCLUSION:   Contrast is noted to be throughout the colon on the six hour  images.    Electronically signed by:  Kirt Galvez MD  2/17/2021 3:53 PM CST  Workstation: [id number]            Chief Complaint on Day of Discharge: abdominal wall pain     Hospital Course:  The patient is a 60 y.o. male who presented to [facility] with SBO seen on CT scan. He presented after two falls after he fell onto the ice on his left side. General surgery was consulted. He was made NPO and started on IVF. SBFT was negative. He continued to complain of abdominal pain. He was tender to palpation and likely musculoskeletal related to his pain. Chest XR was repeat and no evidence of rib fractures. He continued to improve and was able to tolerate a diet without any issues. He can follow up with PCP in 2 days. Will go home on oral pain medication that he takes chronically. He missed his pain management appointment due to weather. Will give a 3 day supply and let him follow up with pain mgt and/or PCP.     Condition on Discharge:  Stable     Physical Exam on Discharge:  /83 (BP Location: Right arm, Patient Position: Lying)   Pulse 80   Temp 97.1 °F (36.2 °C) (Temporal)   Resp 18   Ht 172.7 cm (68\")   Wt 84.6 kg (186 lb 9.6 oz)   SpO2 98%   BMI 28.37 kg/m²   Physical Exam  Vitals signs reviewed.   Constitutional:       General: He is not " in acute distress.     Appearance: He is well-developed.   HENT:      Head: Normocephalic and atraumatic.      Nose: Nose normal.   Eyes:      Conjunctiva/sclera: Conjunctivae normal.   Neck:      Musculoskeletal: Normal range of motion and neck supple.   Cardiovascular:      Rate and Rhythm: Normal rate and regular rhythm.   Pulmonary:      Effort: Pulmonary effort is normal. No respiratory distress.      Breath sounds: Normal breath sounds. No wheezing or rales.   Abdominal:      Tenderness: There is abdominal tenderness.   Musculoskeletal: Normal range of motion.   Skin:     General: Skin is warm and dry.   Neurological:      Mental Status: He is alert and oriented to person, place, and time.   Psychiatric:         Behavior: Behavior normal.           Discharge Disposition:  Home or Self Care    Discharge Medications:     Discharge Medications      New Medications      Instructions Start Date   lidocaine 5 %  Commonly known as: LIDODERM   1 patch, Transdermal, Every 24 Hours Scheduled, Remove & Discard patch within 12 hours or as directed by MD         Continue These Medications      Instructions Start Date   amitriptyline 10 MG tablet  Commonly known as: ELAVIL   10 mg, Oral, Nightly      DULoxetine 20 MG capsule  Commonly known as: CYMBALTA   20 mg, Oral, Daily      FREESTYLE TEST STRIPS test strip  Generic drug: glucose blood   No dose, route, or frequency recorded.      Insulin Glargine 100 UNIT/ML injection pen  Commonly known as: BASAGLAR KWIKPEN   30 Units, Subcutaneous, Nightly      insulin lispro 100 UNIT/ML injection  Commonly known as: humaLOG   8-12 Units, Subcutaneous, 3 Times Daily PRN, As needed per sliding scale      oxyCODONE-acetaminophen 7.5-325 MG per tablet  Commonly known as: PERCOCET   1 tablet, Oral, 5 Times Daily             Discharge Diet:   Diet Instructions     Advance Diet As Tolerated            Activity at Discharge:   Activity Instructions     Activity as Tolerated             Discharge Care Plan/Instructions: follow up with PCP     Follow-up Appointments:   Future Appointments   Date Time Provider Department Center   2/22/2021 10:45 AM Luis Brooks MD [facility] None   3/4/2021 10:15 AM Daria Do APRN [facility] None       Test Results Pending at Discharge:     Time: 32 min         This document has been electronically signed by Madison Ridley MD on February 19, 2021 15:01 CST

## 2021-02-19 NOTE — PROGRESS NOTES
[facility] Medicine Services  INPATIENT PROGRESS NOTE    Length of Stay: 0  Date of Admission: 2/16/2021  Primary Care Physician: Provider, No Known    Subjective   Chief Complaint: abdominal pain   HPI:      Had a BM last night. Still complains of severe abdominal pain on his left side. Very tender to palpation. Worse when he moves. Tolerating a diet.    Review of Systems     All pertinent negatives and positives are as above. All other systems have been reviewed and are negative unless otherwise stated.     Objective    Temp:  [96.8 °F (36 °C)-98.9 °F (37.2 °C)] 97.5 °F (36.4 °C)  Heart Rate:  [] 78  Resp:  [18] 18  BP: (103-133)/(55-78) 119/78    Physical Exam  Vitals signs reviewed.   Constitutional:       General: He is not in acute distress.     Appearance: He is well-developed.   HENT:      Head: Normocephalic and atraumatic.      Nose: Nose normal.   Eyes:      Conjunctiva/sclera: Conjunctivae normal.   Neck:      Musculoskeletal: Normal range of motion and neck supple.   Cardiovascular:      Rate and Rhythm: Normal rate and regular rhythm.   Pulmonary:      Effort: Pulmonary effort is normal. No respiratory distress.      Breath sounds: Normal breath sounds. No wheezing or rales.   Abdominal:      Tenderness: There is abdominal tenderness (LLQ - very tender to palpation ).   Musculoskeletal: Normal range of motion.   Skin:     General: Skin is warm and dry.   Neurological:      Mental Status: He is alert and oriented to person, place, and time.   Psychiatric:         Behavior: Behavior normal.             Results Review:  I have reviewed the labs, radiology results, and diagnostic studies.    Laboratory Data:   Results from last 7 days   Lab Units 02/19/21  0550 02/18/21  2132 02/18/21  0714 02/17/21  0547 02/16/21  0916   SODIUM mmol/L 135*  --  138 138 132*   POTASSIUM mmol/L 3.9 4.0 3.5 3.4* 3.7   CHLORIDE mmol/L 102  --  99 97* 92*   CO2 mmol/L 26.0  --   27.0 29.0 28.0   BUN mg/dL 8  --  15 15 10   CREATININE mg/dL 0.63*  --  0.75* 0.77 0.87   GLUCOSE mg/dL 172*  --  223* 229* 441*   CALCIUM mg/dL 9.0  --  9.4 10.1 10.6*   BILIRUBIN mg/dL  --   --   --   --  0.8   ALK PHOS U/L  --   --   --   --  110   ALT (SGPT) U/L  --   --   --   --  14   AST (SGOT) U/L  --   --   --   --  19   ANION GAP mmol/L 7.0  --  12.0 12.0 12.0     Estimated Creatinine Clearance: 135.4 mL/min (A) (by C-G formula based on SCr of 0.63 mg/dL (L)).  Results from last 7 days   Lab Units 02/18/21  0714   MAGNESIUM mg/dL 1.8         Results from last 7 days   Lab Units 02/19/21  0550 02/18/21  0714 02/17/21  0547 02/16/21  0916   WBC 10*3/mm3 6.24 10.22 9.76 11.11*   HEMOGLOBIN g/dL 13.4 14.6 15.9 16.9   HEMATOCRIT % 37.7 41.1 44.2 47.4   PLATELETS 10*3/mm3 330 349 417 464*           Culture Data:   No results found for: BLOODCX  No results found for: URINECX  No results found for: RESPCX  No results found for: WOUNDCX  No results found for: STOOLCX  No components found for: BODYFLD    Radiology Data:   Imaging Results (Last 24 Hours)     ** No results found for the last 24 hours. **          I have reviewed the patient current medications.     Assessment/Plan     Active Hospital Problems    Diagnosis   • SBO (small bowel obstruction) (CMS/HCC)   • Chronic pain syndrome   • Uncontrolled type 2 diabetes mellitus with hyperglycemia (CMS/HCC)       Plan:      1.  Small bowel obstruction: resolved, tolerating a diet. General surgery has signed off.   2.  Diabetes mellitus: continue SSI   3.  Chronic pain syndrome: Continue Percocet and Cymbalta  4.  Abdominal pain: Will obtain MRI today as pain is persistent and not improving   5.  DVT prophylaxis as needed.              Discharge Planning: I expect patient to be discharged in 1-2 days         This document has been electronically signed by Madison Ridley MD on February 19, 2021 08:07 CST

## 2021-07-14 ENCOUNTER — APPOINTMENT (OUTPATIENT)
Dept: CT IMAGING | Facility: HOSPITAL | Age: 61
End: 2021-07-14

## 2021-07-14 ENCOUNTER — APPOINTMENT (OUTPATIENT)
Dept: MRI IMAGING | Facility: HOSPITAL | Age: 61
End: 2021-07-14

## 2021-07-14 ENCOUNTER — HOSPITAL ENCOUNTER (OUTPATIENT)
Facility: HOSPITAL | Age: 61
Setting detail: OBSERVATION
LOS: 1 days | Discharge: HOME HEALTH | End: 2021-07-16
Attending: FAMILY MEDICINE | Admitting: INTERNAL MEDICINE

## 2021-07-14 DIAGNOSIS — B02.9 HERPES ZOSTER WITHOUT COMPLICATION: ICD-10-CM

## 2021-07-14 DIAGNOSIS — E11.65 TYPE 2 DIABETES MELLITUS WITH HYPERGLYCEMIA, WITH LONG-TERM CURRENT USE OF INSULIN (HCC): ICD-10-CM

## 2021-07-14 DIAGNOSIS — B02.8 HERPES ZOSTER WITH COMPLICATION: ICD-10-CM

## 2021-07-14 DIAGNOSIS — Z79.4 TYPE 2 DIABETES MELLITUS WITH HYPERGLYCEMIA, WITH LONG-TERM CURRENT USE OF INSULIN (HCC): ICD-10-CM

## 2021-07-14 DIAGNOSIS — R29.898 WEAKNESS OF LEFT LEG: ICD-10-CM

## 2021-07-14 DIAGNOSIS — R29.898 WEAKNESS OF LEFT ARM: Primary | ICD-10-CM

## 2021-07-14 DIAGNOSIS — G89.4 CHRONIC PAIN SYNDROME: ICD-10-CM

## 2021-07-14 PROBLEM — R21 RASH AND NONSPECIFIC SKIN ERUPTION: Status: ACTIVE | Noted: 2021-07-14

## 2021-07-14 PROBLEM — E86.0 DEHYDRATION: Status: ACTIVE | Noted: 2021-07-14

## 2021-07-14 PROBLEM — R63.4 WEIGHT LOSS: Status: ACTIVE | Noted: 2021-07-14

## 2021-07-14 PROBLEM — M54.50 LOW BACK PAIN: Status: ACTIVE | Noted: 2021-07-14

## 2021-07-14 PROBLEM — IMO0002 DM (DIABETES MELLITUS) TYPE II UNCONTROLLED, PERIPH VASCULAR DISORDER: Status: ACTIVE | Noted: 2021-07-14

## 2021-07-14 PROBLEM — B02.23 SHINGLES (HERPES ZOSTER) POLYNEUROPATHY: Status: ACTIVE | Noted: 2021-07-14

## 2021-07-14 LAB
ACETONE BLD QL: NEGATIVE
ALBUMIN SERPL-MCNC: 4.6 G/DL (ref 3.5–5.2)
ALBUMIN/GLOB SERPL: 1.2 G/DL
ALP SERPL-CCNC: 119 U/L (ref 39–117)
ALT SERPL W P-5'-P-CCNC: 11 U/L (ref 1–41)
ANION GAP SERPL CALCULATED.3IONS-SCNC: 14 MMOL/L (ref 5–15)
ANION GAP SERPL CALCULATED.3IONS-SCNC: 8 MMOL/L (ref 5–15)
AST SERPL-CCNC: 16 U/L (ref 1–40)
BASOPHILS # BLD AUTO: 0.09 10*3/MM3 (ref 0–0.2)
BASOPHILS NFR BLD AUTO: 1 % (ref 0–1.5)
BILIRUB SERPL-MCNC: 0.4 MG/DL (ref 0–1.2)
BUN SERPL-MCNC: 4 MG/DL (ref 6–20)
BUN SERPL-MCNC: 5 MG/DL (ref 6–20)
BUN/CREAT SERPL: 4.8 (ref 7–25)
BUN/CREAT SERPL: 5.7 (ref 7–25)
CALCIUM SPEC-SCNC: 9.2 MG/DL (ref 8.6–10.5)
CALCIUM SPEC-SCNC: 9.9 MG/DL (ref 8.6–10.5)
CHLORIDE SERPL-SCNC: 89 MMOL/L (ref 98–107)
CHLORIDE SERPL-SCNC: 92 MMOL/L (ref 98–107)
CK SERPL-CCNC: 27 U/L (ref 20–200)
CO2 SERPL-SCNC: 26 MMOL/L (ref 22–29)
CO2 SERPL-SCNC: 31 MMOL/L (ref 22–29)
CREAT SERPL-MCNC: 0.83 MG/DL (ref 0.76–1.27)
CREAT SERPL-MCNC: 0.87 MG/DL (ref 0.76–1.27)
DEPRECATED RDW RBC AUTO: 40.4 FL (ref 37–54)
EOSINOPHIL # BLD AUTO: 0.13 10*3/MM3 (ref 0–0.4)
EOSINOPHIL NFR BLD AUTO: 1.5 % (ref 0.3–6.2)
ERYTHROCYTE [DISTWIDTH] IN BLOOD BY AUTOMATED COUNT: 12.7 % (ref 12.3–15.4)
FLUAV SUBTYP SPEC NAA+PROBE: NOT DETECTED
FLUBV RNA ISLT QL NAA+PROBE: NOT DETECTED
GFR SERPL CREATININE-BSD FRML MDRD: 90 ML/MIN/1.73
GFR SERPL CREATININE-BSD FRML MDRD: 95 ML/MIN/1.73
GLOBULIN UR ELPH-MCNC: 3.7 GM/DL
GLUCOSE BLDC GLUCOMTR-MCNC: 273 MG/DL (ref 70–130)
GLUCOSE BLDC GLUCOMTR-MCNC: 454 MG/DL (ref 70–130)
GLUCOSE SERPL-MCNC: 313 MG/DL (ref 65–99)
GLUCOSE SERPL-MCNC: 467 MG/DL (ref 65–99)
HCT VFR BLD AUTO: 43.8 % (ref 37.5–51)
HGB BLD-MCNC: 15.5 G/DL (ref 13–17.7)
HOLD SPECIMEN: NORMAL
IMM GRANULOCYTES # BLD AUTO: 0.05 10*3/MM3 (ref 0–0.05)
IMM GRANULOCYTES NFR BLD AUTO: 0.6 % (ref 0–0.5)
INR PPP: 1.02 (ref 0.8–1.2)
LYMPHOCYTES # BLD AUTO: 1.46 10*3/MM3 (ref 0.7–3.1)
LYMPHOCYTES NFR BLD AUTO: 16.8 % (ref 19.6–45.3)
MAGNESIUM SERPL-MCNC: 1.9 MG/DL (ref 1.6–2.6)
MCH RBC QN AUTO: 31.4 PG (ref 26.6–33)
MCHC RBC AUTO-ENTMCNC: 35.4 G/DL (ref 31.5–35.7)
MCV RBC AUTO: 88.7 FL (ref 79–97)
MONOCYTES # BLD AUTO: 1.09 10*3/MM3 (ref 0.1–0.9)
MONOCYTES NFR BLD AUTO: 12.6 % (ref 5–12)
NEUTROPHILS NFR BLD AUTO: 5.85 10*3/MM3 (ref 1.7–7)
NEUTROPHILS NFR BLD AUTO: 67.5 % (ref 42.7–76)
NRBC BLD AUTO-RTO: 0 /100 WBC (ref 0–0.2)
PLATELET # BLD AUTO: 423 10*3/MM3 (ref 140–450)
PMV BLD AUTO: 11.2 FL (ref 6–12)
POTASSIUM SERPL-SCNC: 3.8 MMOL/L (ref 3.5–5.2)
POTASSIUM SERPL-SCNC: 4 MMOL/L (ref 3.5–5.2)
PROT SERPL-MCNC: 8.3 G/DL (ref 6–8.5)
PROTHROMBIN TIME: 13.3 SECONDS (ref 11.1–15.3)
QT INTERVAL: 342 MS
QTC INTERVAL: 445 MS
RBC # BLD AUTO: 4.94 10*6/MM3 (ref 4.14–5.8)
SARS-COV-2 RNA PNL SPEC NAA+PROBE: NOT DETECTED
SODIUM SERPL-SCNC: 128 MMOL/L (ref 136–145)
SODIUM SERPL-SCNC: 132 MMOL/L (ref 136–145)
TROPONIN T SERPL-MCNC: <0.01 NG/ML (ref 0–0.03)
WBC # BLD AUTO: 8.67 10*3/MM3 (ref 3.4–10.8)
WHOLE BLOOD HOLD SPECIMEN: NORMAL

## 2021-07-14 PROCEDURE — 96376 TX/PRO/DX INJ SAME DRUG ADON: CPT

## 2021-07-14 PROCEDURE — 99204 OFFICE O/P NEW MOD 45 MIN: CPT | Performed by: STUDENT IN AN ORGANIZED HEALTH CARE EDUCATION/TRAINING PROGRAM

## 2021-07-14 PROCEDURE — 82962 GLUCOSE BLOOD TEST: CPT

## 2021-07-14 PROCEDURE — 25010000002 HYDROMORPHONE 1 MG/ML SOLUTION: Performed by: INTERNAL MEDICINE

## 2021-07-14 PROCEDURE — 83735 ASSAY OF MAGNESIUM: CPT | Performed by: FAMILY MEDICINE

## 2021-07-14 PROCEDURE — G0378 HOSPITAL OBSERVATION PER HR: HCPCS

## 2021-07-14 PROCEDURE — 63710000001 INSULIN ASPART PER 5 UNITS: Performed by: INTERNAL MEDICINE

## 2021-07-14 PROCEDURE — 99285 EMERGENCY DEPT VISIT HI MDM: CPT

## 2021-07-14 PROCEDURE — 70498 CT ANGIOGRAPHY NECK: CPT

## 2021-07-14 PROCEDURE — 63710000001 INSULIN REGULAR HUMAN PER 5 UNITS: Performed by: INTERNAL MEDICINE

## 2021-07-14 PROCEDURE — 96366 THER/PROPH/DIAG IV INF ADDON: CPT

## 2021-07-14 PROCEDURE — 63710000001 INSULIN DETEMIR PER 5 UNITS: Performed by: INTERNAL MEDICINE

## 2021-07-14 PROCEDURE — 93005 ELECTROCARDIOGRAM TRACING: CPT | Performed by: FAMILY MEDICINE

## 2021-07-14 PROCEDURE — 25010000002 ENOXAPARIN PER 10 MG: Performed by: INTERNAL MEDICINE

## 2021-07-14 PROCEDURE — 84484 ASSAY OF TROPONIN QUANT: CPT | Performed by: FAMILY MEDICINE

## 2021-07-14 PROCEDURE — 96365 THER/PROPH/DIAG IV INF INIT: CPT

## 2021-07-14 PROCEDURE — 87636 SARSCOV2 & INF A&B AMP PRB: CPT | Performed by: FAMILY MEDICINE

## 2021-07-14 PROCEDURE — 80053 COMPREHEN METABOLIC PANEL: CPT | Performed by: FAMILY MEDICINE

## 2021-07-14 PROCEDURE — 0 IOPAMIDOL PER 1 ML: Performed by: FAMILY MEDICINE

## 2021-07-14 PROCEDURE — 93010 ELECTROCARDIOGRAM REPORT: CPT | Performed by: INTERNAL MEDICINE

## 2021-07-14 PROCEDURE — 85025 COMPLETE CBC W/AUTO DIFF WBC: CPT | Performed by: FAMILY MEDICINE

## 2021-07-14 PROCEDURE — 70450 CT HEAD/BRAIN W/O DYE: CPT

## 2021-07-14 PROCEDURE — C9803 HOPD COVID-19 SPEC COLLECT: HCPCS

## 2021-07-14 PROCEDURE — 96372 THER/PROPH/DIAG INJ SC/IM: CPT

## 2021-07-14 PROCEDURE — 25010000002 MORPHINE PER 10 MG: Performed by: FAMILY MEDICINE

## 2021-07-14 PROCEDURE — 25010000002 ONDANSETRON PER 1 MG: Performed by: INTERNAL MEDICINE

## 2021-07-14 PROCEDURE — 82550 ASSAY OF CK (CPK): CPT | Performed by: FAMILY MEDICINE

## 2021-07-14 PROCEDURE — 85610 PROTHROMBIN TIME: CPT | Performed by: FAMILY MEDICINE

## 2021-07-14 PROCEDURE — 25010000002 ACYCLOVIR PER 5 MG: Performed by: INTERNAL MEDICINE

## 2021-07-14 PROCEDURE — 96375 TX/PRO/DX INJ NEW DRUG ADDON: CPT

## 2021-07-14 PROCEDURE — 70551 MRI BRAIN STEM W/O DYE: CPT

## 2021-07-14 PROCEDURE — 0042T HC CT CEREBRAL PERFUSION W/WO CONTRAST: CPT

## 2021-07-14 PROCEDURE — 82009 KETONE BODYS QUAL: CPT | Performed by: INTERNAL MEDICINE

## 2021-07-14 PROCEDURE — 70496 CT ANGIOGRAPHY HEAD: CPT

## 2021-07-14 RX ORDER — FAMOTIDINE 10 MG/ML
20 INJECTION, SOLUTION INTRAVENOUS EVERY 12 HOURS SCHEDULED
Status: DISCONTINUED | OUTPATIENT
Start: 2021-07-14 | End: 2021-07-15

## 2021-07-14 RX ORDER — ACYCLOVIR 200 MG/1
800 CAPSULE ORAL
Status: DISCONTINUED | OUTPATIENT
Start: 2021-07-14 | End: 2021-07-14

## 2021-07-14 RX ORDER — DULOXETIN HYDROCHLORIDE 20 MG/1
20 CAPSULE, DELAYED RELEASE ORAL NIGHTLY
Status: DISCONTINUED | OUTPATIENT
Start: 2021-07-14 | End: 2021-07-16 | Stop reason: HOSPADM

## 2021-07-14 RX ORDER — AMOXICILLIN 250 MG
2 CAPSULE ORAL 2 TIMES DAILY
Status: DISCONTINUED | OUTPATIENT
Start: 2021-07-14 | End: 2021-07-16 | Stop reason: HOSPADM

## 2021-07-14 RX ORDER — BISACODYL 5 MG/1
5 TABLET, DELAYED RELEASE ORAL DAILY PRN
Status: DISCONTINUED | OUTPATIENT
Start: 2021-07-14 | End: 2021-07-16 | Stop reason: HOSPADM

## 2021-07-14 RX ORDER — SODIUM CHLORIDE 0.9 % (FLUSH) 0.9 %
10 SYRINGE (ML) INJECTION AS NEEDED
Status: DISCONTINUED | OUTPATIENT
Start: 2021-07-14 | End: 2021-07-16 | Stop reason: HOSPADM

## 2021-07-14 RX ORDER — BISACODYL 10 MG
10 SUPPOSITORY, RECTAL RECTAL DAILY PRN
Status: DISCONTINUED | OUTPATIENT
Start: 2021-07-14 | End: 2021-07-16 | Stop reason: HOSPADM

## 2021-07-14 RX ORDER — AMITRIPTYLINE HYDROCHLORIDE 10 MG/1
10 TABLET, FILM COATED ORAL NIGHTLY
Status: DISCONTINUED | OUTPATIENT
Start: 2021-07-14 | End: 2021-07-14 | Stop reason: ALTCHOICE

## 2021-07-14 RX ORDER — DULOXETIN HYDROCHLORIDE 20 MG/1
20 CAPSULE, DELAYED RELEASE ORAL DAILY
Status: DISCONTINUED | OUTPATIENT
Start: 2021-07-15 | End: 2021-07-14

## 2021-07-14 RX ORDER — ONDANSETRON 2 MG/ML
4 INJECTION INTRAMUSCULAR; INTRAVENOUS EVERY 6 HOURS PRN
Status: DISCONTINUED | OUTPATIENT
Start: 2021-07-14 | End: 2021-07-16 | Stop reason: HOSPADM

## 2021-07-14 RX ORDER — ASPIRIN 81 MG/1
81 TABLET ORAL DAILY
Status: DISCONTINUED | OUTPATIENT
Start: 2021-07-14 | End: 2021-07-16 | Stop reason: HOSPADM

## 2021-07-14 RX ORDER — OXYCODONE AND ACETAMINOPHEN 7.5; 325 MG/1; MG/1
1 TABLET ORAL
Status: DISCONTINUED | OUTPATIENT
Start: 2021-07-14 | End: 2021-07-15

## 2021-07-14 RX ORDER — SODIUM CHLORIDE 0.9 % (FLUSH) 0.9 %
10 SYRINGE (ML) INJECTION EVERY 12 HOURS SCHEDULED
Status: DISCONTINUED | OUTPATIENT
Start: 2021-07-14 | End: 2021-07-16 | Stop reason: HOSPADM

## 2021-07-14 RX ORDER — LIDOCAINE 50 MG/G
2 PATCH TOPICAL
Status: DISCONTINUED | OUTPATIENT
Start: 2021-07-14 | End: 2021-07-16 | Stop reason: HOSPADM

## 2021-07-14 RX ADMIN — MORPHINE SULFATE 4 MG: 4 INJECTION INTRAVENOUS at 11:51

## 2021-07-14 RX ADMIN — ONDANSETRON 4 MG: 2 INJECTION INTRAMUSCULAR; INTRAVENOUS at 15:43

## 2021-07-14 RX ADMIN — HUMAN INSULIN 13 UNITS: 100 INJECTION, SOLUTION SUBCUTANEOUS at 15:44

## 2021-07-14 RX ADMIN — OXYCODONE HYDROCHLORIDE AND ACETAMINOPHEN 1 TABLET: 7.5; 325 TABLET ORAL at 21:43

## 2021-07-14 RX ADMIN — SODIUM CHLORIDE, PRESERVATIVE FREE 10 ML: 5 INJECTION INTRAVENOUS at 15:32

## 2021-07-14 RX ADMIN — FAMOTIDINE 20 MG: 10 INJECTION INTRAVENOUS at 21:46

## 2021-07-14 RX ADMIN — ASPIRIN 81 MG: 81 TABLET, FILM COATED ORAL at 15:23

## 2021-07-14 RX ADMIN — ACYCLOVIR SODIUM 710 MG: 50 INJECTION, SOLUTION INTRAVENOUS at 15:30

## 2021-07-14 RX ADMIN — OXYCODONE HYDROCHLORIDE AND ACETAMINOPHEN 1 TABLET: 7.5; 325 TABLET ORAL at 19:25

## 2021-07-14 RX ADMIN — INSULIN DETEMIR 30 UNITS: 100 INJECTION, SOLUTION SUBCUTANEOUS at 22:48

## 2021-07-14 RX ADMIN — HYDROMORPHONE HYDROCHLORIDE 1 MG: 1 INJECTION, SOLUTION INTRAMUSCULAR; INTRAVENOUS; SUBCUTANEOUS at 15:43

## 2021-07-14 RX ADMIN — HYDROMORPHONE HYDROCHLORIDE 1 MG: 1 INJECTION, SOLUTION INTRAMUSCULAR; INTRAVENOUS; SUBCUTANEOUS at 22:53

## 2021-07-14 RX ADMIN — LIDOCAINE 2 PATCH: 50 PATCH CUTANEOUS at 15:47

## 2021-07-14 RX ADMIN — SODIUM CHLORIDE, PRESERVATIVE FREE 10 ML: 5 INJECTION INTRAVENOUS at 19:26

## 2021-07-14 RX ADMIN — IOPAMIDOL 90 ML: 755 INJECTION, SOLUTION INTRAVENOUS at 12:04

## 2021-07-14 RX ADMIN — ACYCLOVIR SODIUM 710 MG: 50 INJECTION, SOLUTION INTRAVENOUS at 22:50

## 2021-07-14 RX ADMIN — INSULIN ASPART 10 UNITS: 100 INJECTION, SOLUTION INTRAVENOUS; SUBCUTANEOUS at 22:47

## 2021-07-14 RX ADMIN — SODIUM CHLORIDE 1000 ML: 9 INJECTION, SOLUTION INTRAVENOUS at 15:24

## 2021-07-14 RX ADMIN — DULOXETINE 20 MG: 20 CAPSULE, DELAYED RELEASE ORAL at 22:49

## 2021-07-14 RX ADMIN — HYDROMORPHONE HYDROCHLORIDE 1 MG: 1 INJECTION, SOLUTION INTRAMUSCULAR; INTRAVENOUS; SUBCUTANEOUS at 19:26

## 2021-07-14 RX ADMIN — FAMOTIDINE 20 MG: 10 INJECTION INTRAVENOUS at 15:27

## 2021-07-14 RX ADMIN — SODIUM CHLORIDE, PRESERVATIVE FREE 10 ML: 5 INJECTION INTRAVENOUS at 21:44

## 2021-07-14 RX ADMIN — ACYCLOVIR 800 MG: 200 CAPSULE ORAL at 14:23

## 2021-07-14 RX ADMIN — OXYCODONE HYDROCHLORIDE AND ACETAMINOPHEN 1 TABLET: 7.5; 325 TABLET ORAL at 16:51

## 2021-07-14 RX ADMIN — MORPHINE SULFATE 4 MG: 4 INJECTION INTRAVENOUS at 10:30

## 2021-07-14 RX ADMIN — ENOXAPARIN SODIUM 40 MG: 100 INJECTION SUBCUTANEOUS at 15:25

## 2021-07-14 RX ADMIN — IOPAMIDOL 50 ML: 755 INJECTION, SOLUTION INTRAVENOUS at 12:07

## 2021-07-14 NOTE — CONSULTS
Stroke Consult Note    Patient Name: Ventura Boggs   MRN: [id number]  Age: 60 y.o.  Sex: male  : 1960    Primary Care Physician: Provider, No Known  Referring Physician:  Blaine Coley MD    TIME STROKE TEAM CALLED: 1132 EST     TIME PATIENT SEEN: 1230  EST    Handedness:R  Race: W    Chief Complaint/Reason for Consultation: left side weakness     Subjective .  HPI:   59 y/o h/o , ? hemorraghic stroke, recent stroke one year ago, affected left side, with residual deficits, walks with a knee brace now,   noticed worsening left sided weakness yesterday on 2021.   + palpitations   + shingles on his back right to abdomen. Onset day before yesterday.       Last Known Normal Date/Time:  2021 1200 EST     Review of Systems   Constitutional: No fatigue  HENT: Negative for nosebleeds and rhinorrhea.    Eyes: Negative for redness.   Respiratory: Negative for cough.    Gastrointestinal: Negative for anal bleeding.   Endocrine: Negative for polydipsia.   Genitourinary: Negative for enuresis and urgency.   Musculoskeletal: Negative for joint swelling.   Neurological: Negative for tremors.   Psychiatric/Behavioral: Negative for hallucinations.     Temp:  [98.2 °F (36.8 °C)] 98.2 °F (36.8 °C)  Heart Rate:  [106-127] 106  Resp:  [20] 20  BP: (132-145)/() 132/82        GEN: NAD, pleasant, cooperative  Eyes-show anicteric sclera, moist conjunctiva with no lid lag, no redness  Neck-trachea midline.  There is no thyromegaly.  ENMT-oropharynx clear with moist mucous membranes and good dentition.  Skin-papular painful lesions in the right back and lower abdomen- in a dermatomal pattern   Cardiovascular exam-no pedal edema, regular rate and rhythm.  CHEST: No signs of resp distress, on room air  Abdomen-no abdominal distention, nontender.  Psychiatric exam-alert oriented x3 with intact judgment and insight      NEURO    MENTAL STATUS: AAOx3, memory intact, fund of knowledge appropriate    LANG/SPEECH:  Naming and repetition intact, fluent, follows 3-step commands    CRANIAL NERVES:      II: Pupils equal and reactive, no RAPD, no VF deficits, fundus(not done)      III, IV, VI: EOM intact, no gaze preference or deviation, no nystagmus.      V: normal sensation in V1, V2, and V3 segments bilaterally      VII: no asymmetry, no nasolabial fold flattening      VIII: normal hearing to speech      IX, X: normal palatal elevation, no uvular deviation      XI: 5/5 head turn and 5/5 shoulder shrug bilaterally      XII: midline tongue protrusion    MOTOR:  LLE 3/5     REFLEXES:  Not done     SENSORY:    Decreased sensation on the left arm and leg     COORDINATION: abnormal finger to nose on the left     STATION: Not assessed due to patient condition    GAIT: Not assessed due to patient condition    Acute Stroke Data    Alteplase (tPA) Inclusion / Exclusion Criteria    Time: 12:43 CDT  Person Administering Scale: Dony Roy MD    Inclusion Criteria  [x]   18 years of age or greater   []   Onset of symptoms < 4.5 hours before beginning treatment (stroke onset = time patient was last seen well or without symptoms).   []   Diagnosis of acute ischemic stroke causing measurable disabling deficit (Complete Hemianopia, Any Aphasia, Visual or Sensory Extinction, Any weakness limiting sustained effort against gravity)   []   Any remaining deficit considered potentially disabling in view of patient and practitioner   Exclusion criteria (Do not proceed with Alteplase if any are checked under exclusion criteria)  [x]   Onset unknown or GREATER than 4.5 hours   []   ICH on CT/MRI   []   CT demonstrates hypodensity representing acute or subacute infarct   []   Significant head trauma or prior stroke in the previous 3 months   []   Symptoms suggestive of subarachnoid hemorrhage   []   History of un-ruptured intracranial aneurysm GREATER than 10 mm   []   Recent intracranial or intraspinal surgery within the last 3 months   []    Arterial puncture at a non-compressible site in the previous 7 days   []   Active internal bleeding   []   Acute bleeding tendency   []   Platelet count LESS than 100,000 for known hematological diseases such as leukemia, thrombocytopenia or chronic cirrhosis   []   Current use of anticoagulant with INR GREATER than 1.7 or PT GREATER than 15 seconds, aPTT GREATER than 40 seconds   []   Heparin received within 48 hours, resulting in abnormally elevated aPTT GREATER than upper limit of normal   []   Current use of direct thrombin inhibitors or direct factor Xa inhibitors in the past 48 hours   []   Elevated blood pressure refractory to treatment (systolic GREATER than 185 mm/Hg or diastolic  GREATER than 110 mm/Hg   []   Suspected infective endocarditis and aortic arch dissection   []   Current use of therapeutic treatment dose of low-molecular-weight heparin (LMWH) within the previous 24 hours   []   Structural GI malignancy or bleed   Relative exclusion for all patients  []   Only minor non-disabling symptoms   []   Pregnancy   []   Seizure at onset with postictal residual neurological impairments   []   Major surgery or previous trauma within past 14 days   []   History of previous spontaneous ICH, intracranial neoplasm, or AV malformation   []   Postpartum (within previous 14 days)   []   Recent GI or urinary tract hemorrhage (within previous 21 days)   []   Recent acute MI (within previous 3 months)   []   History of un-ruptured intracranial aneurysm LESS than 10 mm   []   History of ruptured intracranial aneurysm   []   Blood glucose LESS than 50 mg/dL (2.7 mmol/L)   []   Dural puncture within the last 7 days   []   Known GREATER than 10 cerebral microbleeds   Additional exclusions for patients with symptoms onset between 3 and 4.5 hours.  []   Age > 80.   []   On any anticoagulants regardless of INR  >>> Warfarin (Coumadin), Heparin, Enoxaparin (Lovenox), fondaparinux (Arixtra), bivalirudin (Angiomax),  Argatroban, dabigatran (Pradaxa), rivaroxaban (Xarelto), or apixaban (Eliquis)   []   Severe stroke (NIHSS > 25).   []   History of BOTH diabetes and previous ischemic stroke.   []   The risks and benefits have been discussed with the patient or family related to the administration of IV Alteplase for stroke symptoms.   []   I have discussed and reviewed the patient's case and imaging with the attending prior to IV Alteplase.    Time Alteplase administered       Past Medical History:   Diagnosis Date   • Arthritis    • Carpal tunnel syndrome    • Chronic pain syndrome    • Depressive disorder    • GERD (gastroesophageal reflux disease)    • Hernia    • History of closed head injury     with ICH and left weakness   • History of urinary system disease    • Hypertension    • Migraine    • Right shoulder pain    • Rotator cuff syndrome    • Type 2 diabetes mellitus (CMS/HCC)    • Ureteric stone      Past Surgical History:   Procedure Laterality Date   • ABDOMINAL SURGERY  08/2016   • APPENDECTOMY N/A 1/27/2018    Procedure: APPENDECTOMY;  Surgeon: Rusty Palacio MD;  Location: [facility];  Service:    • CHOLECYSTECTOMY     • CIRCUMCISION     • CYSTOSCOPY  08/13/2003    Cystoscopy and removal of J stent. Right ureteral stent.   • CYSTOSCOPY  08/13/2003    Cystoscopy and right stone extraction and placement of 26x 6 J-stent.   • INCISION AND DRAINAGE ABSCESS N/A 7/26/2017    Procedure: INCISION AND DRAINAGE ABD.ABSCESS;  Surgeon: Rui Shaver MD;  Location: [facility];  Service:    • VENTRAL/INCISIONAL HERNIA REPAIR N/A 5/31/2017    Procedure: LAPAROSCOPIC POSSIBLE OPEN ADHESIOLYSIS AND VENTRAL/INCISIONAL HERNIA REPAIR ;  Surgeon: Rui Shaver MD;  Location: [facility];  Service:      No family history on file.  Social History     Socioeconomic History   • Marital status:      Spouse name: Not on file   • Number of children: Not on file   • Years of education: Not on file   • Highest education level: Not on  file   Tobacco Use   • Smoking status: Never Smoker   • Smokeless tobacco: Never Used   Substance and Sexual Activity   • Alcohol use: No   • Drug use: No   • Sexual activity: Defer     Allergies   Allergen Reactions   • Naproxen Other (See Comments) and Rash     Blisters in mouth   • Tramadol Rash     Patient states he gets a rash in his mouth.      Prior to Admission medications    Medication Sig Start Date End Date Taking? Authorizing Provider   amitriptyline (ELAVIL) 10 MG tablet Take 1 tablet by mouth Every Night. 2/15/19   Richard Galvez MD   DULoxetine (CYMBALTA) 20 MG capsule Take 20 mg by mouth Daily.    John Resendiz MD   FREESTYLE TEST STRIPS test strip  8/24/17   John Resendiz MD   Insulin Glargine (BASAGLAR KWIKPEN) 100 UNIT/ML injection pen Inject 30 Units under the skin into the appropriate area as directed Every Night.    John Resendiz MD   insulin lispro (humaLOG) 100 UNIT/ML injection Inject 8-12 Units under the skin 3 (Three) Times a Day As Needed (sliding scale). As needed per sliding scale    John Resendiz MD   lidocaine (LIDODERM) 5 % Place 1 patch on the skin as directed by provider Daily. Remove & Discard patch within 12 hours or as directed by MD 2/19/21   Madison Ridley MD   oxyCODONE-acetaminophen (PERCOCET) 7.5-325 MG per tablet Take 1 tablet by mouth 5 (Five) Times a Day. 2/19/21   Madison Ridley MD       Hospital Meds:  Scheduled-   Infusions- No current facility-administered medications for this encounter.     PRNs-     Functional Status Prior to Current Stroke/[place] Score: 1    NIH Stroke Scale  Time: 12:43 CDT  Person Administering Scale: Dony Roy MD    1a  Level of consciousness: 0=alert; keenly responsive   1b. LOC questions:  0=Performs both tasks correctly   1c. LOC commands: 0=Performs both tasks correctly   2.  Best Gaze: 0=normal   3.  Visual: 0=No visual loss   4. Facial Palsy: 0=Normal symmetric movement   5a.   Motor left arm: 1=Drift, limb holds 90 (or 45) degrees but drifts down before full 10 seconds: does not hit bed   5b.  Motor right arm: 0=No drift, limb holds 90 (or 45) degrees for full 10 seconds   6a. motor left le=Some effort against gravity, limb cannot get to or maintain (if cured) 90 (or 45) degrees, drifts down to bed, but has some effort against gravity   6b  Motor right le=No drift, limb holds 90 (or 45) degrees for full 10 seconds   7. Limb Ataxia: 0=Absent   8.  Sensory: 1=Mild to moderate sensory loss; patient feels pinprick is less sharp or is dull on the affected side; there is a loss of superficial pain with pinprick but patient is aware He is being touched   9. Best Language:  0=No aphasia, normal   10. Dysarthria: 0=Normal   11. Extinction and Inattention: 0=No abnormality    Total:    4       Results Reviewed:  I have personally reviewed current lab, radiology, and data and agree with results.      Assessment/Plan:  This is 59 y/o with h/o stroke with residual left sided deficits, presented with worsening of left sided weakness.   On exam, patient has decreased sensation to light touch on left arm and leg, left left weakness which is new from his baseline.   CTA head and Neck did not evidence any acute abnormality. CT perfusion is normal.     1. Left sided weakness- Recrudescence from old stroke deficits in the setting of active infection( shingles) vs new stroke  -Recommend MRI brain WO  -Observation status  - continue aspirin 325  -TTE, 1AC, lipid panel   -systolic blood pressure - normal goals.   -PT/OT speech can work with the patient.     2. Herpes Zoster- right abdomen- antiviral management as per the medicine team           Dony Roy MD  2021  12:43 CDT    Verbal consent taken.  Patient agreeable to be seen via telemedicine.    This was an audio and video enabled telemedicine encounter.

## 2021-07-14 NOTE — ED NOTES
Patient report given to JOVANNA Berry. He advised to wait to bring the patient over for several minutes as he has to find a air scrubber.      Kamala Hawkins RN  07/14/21 2819

## 2021-07-14 NOTE — ED PROVIDER NOTES
"Subjective   Patient presents emergency department with left arm left leg weakness that began yesterday around 1:00 in the afternoon.  Patient states that he has been having difficulty swallowing for the past 2 days.  He also mentions that 2 years ago he had some type of cerebral hemorrhage after hitting his head.  Patient is unsure if he had a stroke at that time or if the bleed was secondary to a head injury, but he does state that \"there is no way hit my head that hard.\"  Last year patient had a stroke leaving him with some residual, but mild, left upper and lower extremity weakness.  Patient states that the left-sided weakness he is having today is significantly different from his baseline.  He also presents to the emergency department with a skin rash on his back and right abdomen consistent with shingles.  Patient denies ever smoking.  He is an insulin-dependent diabetic on insulin and Metformin, he also states that he used to take medications for blood pressure and cholesterol but secondary to weight loss, he no longer takes or \"needs\" them.      Rash  Location:  Torso  Torso rash location:  Upper back and R chest  Quality: blistering, painful and weeping    Pain details:     Quality:  Aching    Severity:  Moderate    Onset quality:  Unable to specify  Associated symptoms: no abdominal pain, no diarrhea, no fatigue, no fever, no headaches, no myalgias, no nausea, no shortness of breath, no sore throat, not vomiting and not wheezing    Stroke  Presenting symptoms: weakness    Presenting symptoms: no confusion and no headaches    Onset quality:  Sudden  Last known well:  1 pm  Duration:  21 hours  Timing:  Constant  Progression:  Waxing and waning  Similar to previous episodes: yes    Associated symptoms: no chest pain, no difficulty swallowing, no dizziness, no fever, no nausea, no neck pain, no seizures and no vomiting        Review of Systems   Constitutional: Positive for activity change. Negative for " appetite change, chills, diaphoresis, fatigue and fever.   HENT: Negative for congestion, ear discharge, ear pain, nosebleeds, rhinorrhea, sinus pressure, sore throat and trouble swallowing.    Eyes: Negative for discharge and redness.   Respiratory: Negative for apnea, cough, chest tightness, shortness of breath and wheezing.    Cardiovascular: Negative for chest pain.   Gastrointestinal: Negative for abdominal pain, diarrhea, nausea and vomiting.   Endocrine: Negative for polyuria.   Genitourinary: Negative for dysuria, frequency and urgency.   Musculoskeletal: Negative for myalgias and neck pain.   Skin: Positive for rash. Negative for color change.   Allergic/Immunologic: Negative for immunocompromised state.   Neurological: Positive for weakness. Negative for dizziness, seizures, syncope, light-headedness and headaches.   Hematological: Negative for adenopathy. Does not bruise/bleed easily.   Psychiatric/Behavioral: Negative for behavioral problems and confusion.   All other systems reviewed and are negative.      Past Medical History:   Diagnosis Date   • Arthritis    • Carpal tunnel syndrome    • Chronic pain syndrome    • Depressive disorder    • GERD (gastroesophageal reflux disease)    • Hernia    • History of closed head injury     with ICH and left weakness   • History of urinary system disease    • Hypertension    • Migraine    • Right shoulder pain    • Rotator cuff syndrome    • Type 2 diabetes mellitus (CMS/HCC)    • Ureteric stone        Allergies   Allergen Reactions   • Naproxen Other (See Comments) and Rash     Blisters in mouth   • Tramadol Rash     Patient states he gets a rash in his mouth.        Past Surgical History:   Procedure Laterality Date   • ABDOMINAL SURGERY  08/2016   • APPENDECTOMY N/A 1/27/2018    Procedure: APPENDECTOMY;  Surgeon: Rusty Palacio MD;  Location: [facility];  Service:    • CHOLECYSTECTOMY     • CIRCUMCISION     • CYSTOSCOPY  08/13/2003    Cystoscopy and  removal of J stent. Right ureteral stent.   • CYSTOSCOPY  08/13/2003    Cystoscopy and right stone extraction and placement of 26x 6 J-stent.   • INCISION AND DRAINAGE ABSCESS N/A 7/26/2017    Procedure: INCISION AND DRAINAGE ABD.ABSCESS;  Surgeon: Rui Shaver MD;  Location: [facility];  Service:    • VENTRAL/INCISIONAL HERNIA REPAIR N/A 5/31/2017    Procedure: LAPAROSCOPIC POSSIBLE OPEN ADHESIOLYSIS AND VENTRAL/INCISIONAL HERNIA REPAIR ;  Surgeon: Rui Shaver MD;  Location: [facility];  Service:        History reviewed. No pertinent family history.    Social History     Socioeconomic History   • Marital status:      Spouse name: Not on file   • Number of children: Not on file   • Years of education: Not on file   • Highest education level: Not on file   Tobacco Use   • Smoking status: Never Smoker   • Smokeless tobacco: Never Used   Substance and Sexual Activity   • Alcohol use: No   • Drug use: No   • Sexual activity: Defer           Objective   Physical Exam  Vitals and nursing note reviewed.   Constitutional:       Appearance: He is well-developed.   HENT:      Head: Normocephalic and atraumatic.      Nose: Nose normal.   Eyes:      General: No scleral icterus.        Right eye: No discharge.         Left eye: No discharge.      Conjunctiva/sclera: Conjunctivae normal.      Pupils: Pupils are equal, round, and reactive to light.   Neck:      Trachea: No tracheal deviation.   Cardiovascular:      Rate and Rhythm: Normal rate and regular rhythm.      Heart sounds: Normal heart sounds. No murmur heard.     Pulmonary:      Effort: Pulmonary effort is normal. No respiratory distress.      Breath sounds: Normal breath sounds. No stridor. No wheezing or rales.   Abdominal:      General: Bowel sounds are normal. There is no distension.      Palpations: Abdomen is soft. There is no mass.      Tenderness: There is no abdominal tenderness. There is no guarding or rebound.   Musculoskeletal:      Cervical back:  Normal range of motion and neck supple.   Skin:     General: Skin is warm and dry.      Findings: Rash present. No erythema. Rash is papular and vesicular.          Neurological:      Mental Status: He is alert and oriented to person, place, and time.      Coordination: Coordination normal.   Psychiatric:         Behavior: Behavior normal.         Thought Content: Thought content normal.         ECG 12 Lead      Date/Time: 7/14/2021 1:39 PM  Performed by: Blaine Coley MD  Authorized by: Blaine Coley MD   Interpreted by physician  Rhythm: sinus tachycardia  Rate: tachycardic  BPM: 102  ST Segments: ST segments normal                   ED Course             Labs Reviewed   COMPREHENSIVE METABOLIC PANEL - Abnormal; Notable for the following components:       Result Value    Glucose 467 (*)     BUN 5 (*)     Sodium 132 (*)     Chloride 92 (*)     Alkaline Phosphatase 119 (*)     BUN/Creatinine Ratio 5.7 (*)     All other components within normal limits    Narrative:     GFR Normal >60  Chronic Kidney Disease <60  Kidney Failure <15     CBC WITH AUTO DIFFERENTIAL - Abnormal; Notable for the following components:    Lymphocyte % 16.8 (*)     Monocyte % 12.6 (*)     Immature Grans % 0.6 (*)     Monocytes, Absolute 1.09 (*)     All other components within normal limits   PROTIME-INR - Normal    Narrative:     Therapeutic range for most indications is 2.0-3.0 INR,  or 2.5-3.5 for mechanical heart valves.   TROPONIN (IN-HOUSE) - Normal    Narrative:     Troponin T Reference Range:  <= 0.03 ng/mL-   Negative for AMI  >0.03 ng/mL-     Abnormal for myocardial necrosis.  Clinicians would have to utilize clinical acumen, EKG, Troponin and serial changes to determine if it is an Acute Myocardial Infarction or myocardial injury due to an underlying chronic condition.       Results may be falsely decreased if patient taking Biotin.     CK - Normal   MAGNESIUM - Normal   COVID-19 AND FLU A/B, NP SWAB IN TRANSPORT  MEDIA 8-12 HR TAT   CBC AND DIFFERENTIAL    Narrative:     The following orders were created for panel order CBC & Differential.  Procedure                               Abnormality         Status                     ---------                               -----------         ------                     CBC Auto Differential[[id number]]        Abnormal            Final result                 Please view results for these tests on the individual orders.   EXTRA TUBES    Narrative:     The following orders were created for panel order Extra Tubes.  Procedure                               Abnormality         Status                     ---------                               -----------         ------                     Lavender Top[[id number]]                                     Final result               Gold Top - SST[[id number]]                                   Final result                 Please view results for these tests on the individual orders.   LAVENDER TOP   GOLD TOP - SST       CT CEREBRAL PERFUSION WITH & WITHOUT CONTRAST   Final Result   No CT brain perfusion evidence of acute infarct.      Electronically signed by:  Kirt Galvez MD  7/14/2021 1:18 PM CDT   Workstation: [id number]      CT Angiogram Head w AI Analysis of LVO   Final Result   No evidence of stenosis, aneurysm, or dissection.      Electronically signed by:  Kirt Galvez MD  7/14/2021 1:34 PM CDT   Workstation: [id number]      CT Angiogram Neck   Final Result   No evidence of stenosis, aneurysm, or dissection.      Electronically signed by:  Kirt Galvez MD  7/14/2021 1:34 PM CDT   Workstation: [id number]      CT Head Without Contrast   Final Result   No acute intracranial process.      Unchanged sequela of chronic ischemic microvascular disease and   cerebral volume loss.      Electronically signed by:  Yuniel España MD  7/14/2021   10:48 AM CDT Workstation: [id number]                                          [facility]    Final  diagnoses:   Weakness of left arm   Weakness of left leg   Herpes zoster without complication       ED Disposition  ED Disposition     ED Disposition Condition Comment    Decision to Admit  Level of Care: Stepdown [25]   Diagnosis: Weakness of left arm [[id number]]   Admitting Physician: ESTEFANIA REDDY [[id number]]   Attending Physician: ESTEFANIA REDDY [[id number]]            No follow-up provider specified.       Medication List      No changes were made to your prescriptions during this visit.          Blaine Coley MD  07/14/21 2035

## 2021-07-14 NOTE — H&P
"      [facility] Medicine Admission      Date of Admission: 2021      Primary Care Physician: Provider, No Known      Chief Complaint: acute onset of left sided weakness, worse than his baseline.     HPI: Sx started all of a sudden.  He now relates it back to sx of back pain that came on all of a sudden.  He could not remember any trauma, no heavy lifting, no insect bite, no travel anywhere.  All of a sudden after a few days, developed rash that follow a dermatomal distribution.  Rash was on right flank to lumbosacral area.   The rash made the back hurt even more.  He was dx with shingles upon arrival to er.  He came to ER due to pain and rash.  Also while this was occurring for the last at least one day, he has noted worsening left sided weakness.  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     No fever, no chills.     Blood sugars have been uncontrolled for sometime and again are elevated today.  He states, \"his girlfriend works at a nursing home, and he states, that he is going to meet with the dietician there to help him control blood sugars. \"      He is otherwise unchanged from his baseline.     He is miserable with back pain     He did not get the shingles vaccine.     Concurrent Medical History:  has a past medical history of Arthritis, Carpal tunnel syndrome, Chronic pain syndrome, Depressive disorder, GERD (gastroesophageal reflux disease), Hernia, History of closed head injury, History of urinary system disease, Hypertension, Migraine, Right shoulder pain, Rotator cuff syndrome, Type 2 diabetes mellitus (CMS/[facility], located within [facility]), and Ureteric stone.    Past Surgical History:  has a past surgical history that includes Cystoscopy (2003); Cystoscopy (2003); Abdominal surgery (2016); Cholecystectomy; Circumcision, non-; ventral/incisional hernia repair (N/A, 2017); Incision and Drainage " Abscess (N/A, 7/26/2017); and Appendectomy (N/A, 1/27/2018).    Family History:  Family history of hypertension   No cva     Social History:  reports that he has never smoked. He has never used smokeless tobacco. He reports that he does not drink alcohol and does not use drugs.    Allergies:   Allergies   Allergen Reactions   • Naproxen Other (See Comments) and Rash     Blisters in mouth   • Tramadol Rash     Patient states he gets a rash in his mouth.        Medications:   Prior to Admission medications    Medication Sig Start Date End Date Taking? Authorizing Provider   amitriptyline (ELAVIL) 10 MG tablet Take 1 tablet by mouth Every Night. 2/15/19   Richard Galvez MD   DULoxetine (CYMBALTA) 20 MG capsule Take 20 mg by mouth Daily.    John Resendiz MD   Insulin Glargine (BASAGLAR KWIKPEN) 100 UNIT/ML injection pen Inject 30 Units under the skin into the appropriate area as directed Every Night.    John Resendiz MD   insulin lispro (humaLOG) 100 UNIT/ML injection Inject 8-12 Units under the skin 3 (Three) Times a Day As Needed (sliding scale). As needed per sliding scale    John Resendiz MD   lidocaine (LIDODERM) 5 % Place 1 patch on the skin as directed by provider Daily. Remove & Discard patch within 12 hours or as directed by MD 2/19/21   Madison Ridley MD   oxyCODONE-acetaminophen (PERCOCET) 7.5-325 MG per tablet Take 1 tablet by mouth 5 (Five) Times a Day. 2/19/21   Madison Ridley MD   FREESTYLE TEST STRIPS test strip  8/24/17 7/14/21  ProviderJohn MD       Review of Systems:  Review of Systems   Constitutional: Positive for fatigue.   HENT: Negative.    Eyes: Negative.    Respiratory: Negative.    Cardiovascular: Negative.    Gastrointestinal:        Flank pain along right side of lumbosacral area, and along right flank.    Endocrine: Positive for cold intolerance.   Genitourinary: Negative.    Musculoskeletal: Positive for arthralgias, back pain, joint  "swelling and myalgias.        Diffuse left arm and left leg to lesser extent weakness and has chronic hemiplegia and this has over the last day worsened as soon as the back pain started and worsened as rash (consistent with shingles appeared).    Skin: Positive for rash.        Diffuse erythematous rash  Erythematous angry appearing plaque like lesions   Coalesced   Appeared in along a dermatome   Right side only along back and flank.   Painful to touch and manipulate.    Neurological: Positive for numbness.        Left arm and left leg numbness worse than his baseline.   States. \"whenever I feel anything different than the usual, I get very anxious and nervuous\"   Hematological: Negative.    Psychiatric/Behavioral: Negative.    All other systems reviewed and are negative.     Otherwise complete ROS is negative except as mentioned above.    Physical Exam:   Temp:  [98.2 °F (36.8 °C)] 98.2 °F (36.8 °C)  Heart Rate:  [100-127] 104  Resp:  [18-20] 18  BP: (115-145)/() 115/74  Physical Exam  Vitals and nursing note reviewed.   Constitutional:       Appearance: Normal appearance. He is ill-appearing.      Comments: Ill appearing and splinting right side   Anxious whenever shirt is lifted to evaluate rash.   Screams out in pain    HENT:      Head: Normocephalic.      Nose: Nose normal.      Mouth/Throat:      Mouth: Mucous membranes are moist.   Eyes:      Extraocular Movements: Extraocular movements intact.      Conjunctiva/sclera: Conjunctivae normal.   Cardiovascular:      Rate and Rhythm: Regular rhythm. Tachycardia present.      Pulses: Normal pulses.   Pulmonary:      Effort: Pulmonary effort is normal.      Breath sounds: Normal breath sounds.   Abdominal:      Comments: Abdomen  Flat bowel sounds normal  No rebound   No guarding   No rigidity   Rash along lumbosacral area and minimally involving right flank.    Musculoskeletal:      Cervical back: Normal range of motion.      Comments: Holding left hand and " arm stiffly and holds on to jeans to stabilize his hand.      Strength decreased on left arm   3+ / 5    Left leg strength decreased 3 / 5    Feels it is numb and some loss of sensation    Skin:     General: Skin is warm.   Neurological:      Comments: As noted above  No change in mentation  Alert and oriented        Results Reviewed:  I have personally reviewed current lab, radiology, and data and agree with results.  Lab Results (last 24 hours)     Procedure Component Value Units Date/Time    COVID-19 and FLU A/B PCR - Swab, Nasopharynx [[id number]]  (Normal) Collected: 07/14/21 1342    Specimen: Swab from Nasopharynx Updated: 07/14/21 1411     COVID19 Not Detected     Influenza A PCR Not Detected     Influenza B PCR Not Detected    Narrative:      Fact sheet for providers: https://www.fda.gov/media/976562/download    Fact sheet for patients: https://www.fda.gov/media/726948/download    Test performed by PCR.    Extra Tubes [[id number]] Collected: 07/14/21 1022    Specimen: Blood, Venous Line Updated: 07/14/21 1131    Narrative:      The following orders were created for panel order Extra Tubes.  Procedure                               Abnormality         Status                     ---------                               -----------         ------                     Lavender Top[[id number]]                                     Final result               Gold Top - SST[[id number]]                                   Final result                 Please view results for these tests on the individual orders.    Lavender Top [[id number]] Collected: 07/14/21 1022    Specimen: Blood Updated: 07/14/21 1131     Extra Tube hold for add-on     Comment: Auto resulted       Gold Top - SST [[id number]] Collected: 07/14/21 1022    Specimen: Blood Updated: 07/14/21 1131     Extra Tube Hold for add-ons.     Comment: Auto resulted.       CBC & Differential [[id number]]  (Abnormal) Collected: 07/14/21 1022    Specimen: Blood Updated:  07/14/21 1103    Narrative:      The following orders were created for panel order CBC & Differential.  Procedure                               Abnormality         Status                     ---------                               -----------         ------                     CBC Auto Differential[[id number]]        Abnormal            Final result                 Please view results for these tests on the individual orders.    CBC Auto Differential [[id number]]  (Abnormal) Collected: 07/14/21 1022    Specimen: Blood Updated: 07/14/21 1103     WBC 8.67 10*3/mm3      RBC 4.94 10*6/mm3      Hemoglobin 15.5 g/dL      Hematocrit 43.8 %      MCV 88.7 fL      MCH 31.4 pg      MCHC 35.4 g/dL      RDW 12.7 %      RDW-SD 40.4 fl      MPV 11.2 fL      Platelets 423 10*3/mm3      Neutrophil % 67.5 %      Lymphocyte % 16.8 %      Monocyte % 12.6 %      Eosinophil % 1.5 %      Basophil % 1.0 %      Immature Grans % 0.6 %      Neutrophils, Absolute 5.85 10*3/mm3      Lymphocytes, Absolute 1.46 10*3/mm3      Monocytes, Absolute 1.09 10*3/mm3      Eosinophils, Absolute 0.13 10*3/mm3      Basophils, Absolute 0.09 10*3/mm3      Immature Grans, Absolute 0.05 10*3/mm3      nRBC 0.0 /100 WBC     Comprehensive Metabolic Panel [[id number]]  (Abnormal) Collected: 07/14/21 1022    Specimen: Blood Updated: 07/14/21 1053     Glucose 467 mg/dL      BUN 5 mg/dL      Creatinine 0.87 mg/dL      Sodium 132 mmol/L      Potassium 4.0 mmol/L      Chloride 92 mmol/L      CO2 26.0 mmol/L      Calcium 9.9 mg/dL      Total Protein 8.3 g/dL      Albumin 4.60 g/dL      ALT (SGPT) 11 U/L      AST (SGOT) 16 U/L      Alkaline Phosphatase 119 U/L      Total Bilirubin 0.4 mg/dL      eGFR Non African Amer 90 mL/min/1.73      Globulin 3.7 gm/dL      A/G Ratio 1.2 g/dL      BUN/Creatinine Ratio 5.7     Anion Gap 14.0 mmol/L     Narrative:      GFR Normal >60  Chronic Kidney Disease <60  Kidney Failure <15      Troponin [[id number]]  (Normal) Collected: 07/14/21 1022     Specimen: Blood Updated: 07/14/21 1052     Troponin T <0.010 ng/mL     Narrative:      Troponin T Reference Range:  <= 0.03 ng/mL-   Negative for AMI  >0.03 ng/mL-     Abnormal for myocardial necrosis.  Clinicians would have to utilize clinical acumen, EKG, Troponin and serial changes to determine if it is an Acute Myocardial Infarction or myocardial injury due to an underlying chronic condition.       Results may be falsely decreased if patient taking Biotin.      CK [[id number]]  (Normal) Collected: 07/14/21 1022    Specimen: Blood Updated: 07/14/21 1052     Creatine Kinase 27 U/L     Magnesium [[id number]]  (Normal) Collected: 07/14/21 1022    Specimen: Blood Updated: 07/14/21 1052     Magnesium 1.9 mg/dL     Protime-INR [[id number]]  (Normal) Collected: 07/14/21 1022    Specimen: Blood Updated: 07/14/21 1046     Protime 13.3 Seconds      INR 1.02    Narrative:      Therapeutic range for most indications is 2.0-3.0 INR,  or 2.5-3.5 for mechanical heart valves.        Imaging Results (Last 24 Hours)     Procedure Component Value Units Date/Time    CT Angiogram Head w AI Analysis of LVO [[id number]] Collected: 07/14/21 1200     Updated: 07/14/21 1335    Narrative:      PROCEDURE: CT ANGIOGRAM HEAD W AI ANALYSIS OF LVO, CT NECK  ANGIOGRAPHY WITHOUT THEN WITH IV CONTRAST    CLINICAL HISTORY: left sided weakness.    COMPARISON: CT head August 20, 2019. CTA head 7/14/2021. CT  cerebral perfusion scan 7/14/2021.    TECHNIQUE:   CT angiography of the head and neck was performed with  intravenous contrast on the level of the aortic arch to the  vertex of the brain in orthogonal planes, along with 3D  reconstruction of the arterial vasculature of the head and neck  from the source images.     Measurement of carotid stenosis is based on NASCET criteria which  calculates the percentage of stenosis relative to the luminal  diameter of the normal carotid artery distal to the stenosis.    RAPID AI large vessel occlusion (LVO)  software analysis was  utilized during this exam.    CONTRAST: 90 mL IV Isovue 370    This exam was performed using radiation doses that are as low as  reasonably achievable (ALARA).  This exam was performed according to our departmental dose  optimization program, which includes automated exposure control,  adjustment of the mA and/or KV according to patient size and/or  use of iterative reconstruction technique.    FINDINGS:   There is no hemodynamically significant stenosis or dissection in  the bilateral common carotid, bilateral proximal internal carotid  or the visualized portions of the bilateral external carotid  arteries.     The carotid bulbs are without significant stenosis.    There is normal takeoff of the great vessels from the aortic  arch.    The bilateral vertebral arteries appear patent with no evidence  of dissection on either side.  The basilar tip appears normal.     There is no hemodynamically significant stenosis or a focal  aneurysm formation, in the intracranial portions of the bilateral  internal carotid arteries, bilateral carotid siphons, bilateral  anterior, middle and posterior cerebral arteries and their  visualized branches.     Limited evaluation of the dural sinuses and the intracranial and  venous system shows a widely patent superior sagittal sinus,  straight sinus and bilateral internal cerebral veins.  There is a hypoplastic left A1 segment in the large anterior  communicating artery, likely congenital normal variant.  RAPID AI large vessel occlusion (LVO) software analysis shows no  evidence of large vessel occlusion in the brain.    The lung apices appear unremarkable.  The cervical spine appeared unremarkable.      Impression:      No evidence of stenosis, aneurysm, or dissection.    Electronically signed by:  Kirt Galvez MD  7/14/2021 1:34 PM CDT  Workstation: [id number]    CT Angiogram Neck [[id number]] Collected: 07/14/21 1200     Updated: 07/14/21 1335    Narrative:       PROCEDURE: CT ANGIOGRAM HEAD W AI ANALYSIS OF LVO, CT NECK  ANGIOGRAPHY WITHOUT THEN WITH IV CONTRAST    CLINICAL HISTORY: left sided weakness.    COMPARISON: CT head August 20, 2019. CTA head 7/14/2021. CT  cerebral perfusion scan 7/14/2021.    TECHNIQUE:   CT angiography of the head and neck was performed with  intravenous contrast on the level of the aortic arch to the  vertex of the brain in orthogonal planes, along with 3D  reconstruction of the arterial vasculature of the head and neck  from the source images.     Measurement of carotid stenosis is based on NASCET criteria which  calculates the percentage of stenosis relative to the luminal  diameter of the normal carotid artery distal to the stenosis.    RAPID AI large vessel occlusion (LVO) software analysis was  utilized during this exam.    CONTRAST: 90 mL IV Isovue 370    This exam was performed using radiation doses that are as low as  reasonably achievable (ALARA).  This exam was performed according to our departmental dose  optimization program, which includes automated exposure control,  adjustment of the mA and/or KV according to patient size and/or  use of iterative reconstruction technique.    FINDINGS:   There is no hemodynamically significant stenosis or dissection in  the bilateral common carotid, bilateral proximal internal carotid  or the visualized portions of the bilateral external carotid  arteries.     The carotid bulbs are without significant stenosis.    There is normal takeoff of the great vessels from the aortic  arch.    The bilateral vertebral arteries appear patent with no evidence  of dissection on either side.  The basilar tip appears normal.     There is no hemodynamically significant stenosis or a focal  aneurysm formation, in the intracranial portions of the bilateral  internal carotid arteries, bilateral carotid siphons, bilateral  anterior, middle and posterior cerebral arteries and their  visualized branches.     Limited  evaluation of the dural sinuses and the intracranial and  venous system shows a widely patent superior sagittal sinus,  straight sinus and bilateral internal cerebral veins.  There is a hypoplastic left A1 segment in the large anterior  communicating artery, likely congenital normal variant.  RAPID AI large vessel occlusion (LVO) software analysis shows no  evidence of large vessel occlusion in the brain.    The lung apices appear unremarkable.  The cervical spine appeared unremarkable.      Impression:      No evidence of stenosis, aneurysm, or dissection.    Electronically signed by:  Kirt Galvez MD  7/14/2021 1:34 PM CDT  Workstation: [id number]    CT CEREBRAL PERFUSION WITH & WITHOUT CONTRAST [[id number]] Collected: 07/14/21 1155     Updated: 07/14/21 1319    Narrative:      PROCEDURE: CT HEAD WITHOUT THEN WITH IV CONTRAST     DATE: 7/14/2021 11:55 AM CDT    INDICATION: 60 year old Male. Neuro deficit, acute stroke  suspected     COMPARISON: None.    TECHNIQUE: Computed tomographic imaging of the head was performed  utilizing dynamic imaging at multiple time points following  administration of 50 mL of Isovue-370.  The images were processed  using automated RapidAI software.  Standard parametric maps were  generated from the source images including time to maximum  (Tmax), mean transit time (MTT), cerebral blood flow (CBF), and  cerebral blood volume (CBV). This exam was performed according to  our departmental dose-optimization program which includes  automated exposure control, adjustment of the mA and/or kV  according to patient size and/or use of iterative reconstruction  technique.    FINDINGS:  Image quality: Adequate  Arterial input function STEVE selected: Right MCA.  Venous outflow function STEVE: Torcula.    TOTAL HYPOPERFUSION: Using the Tmax threshold of greater than 6  seconds, there no hypoperfusion identified.    CORE INFARCT: Using a threshold of CBF less than 30%, no areas of  ischemic core are  identified.    PENUMBRA: No penumbra volume (mismatch volume), is identified.  Mismatch volume: 0 mL  Mismatch ratio: None    TMAX (time to the peak of the residual function): Symmetric.  MEAN TRANSIT TIME (MTT): Symmetric.  CEREBRAL BLOOD FLOW (CBF): Symmetric.  CEREBRAL BLOOD VOLUME (CBV): Symmetric.      Impression:      No CT brain perfusion evidence of acute infarct.    Electronically signed by:  Kirt Galvez MD  7/14/2021 1:18 PM CDT  Workstation: [id number]    CT Head Without Contrast [[id number]] Collected: 07/14/21 1030     Updated: 07/14/21 1049    Narrative:      EXAM: CT HEAD WITHOUT IV CONTRAST    COMPARISONS: CT head 8/20/2019    INDICATION: left sided weakness    TECHNIQUE: Noncontrast CT images were obtained of the brain.  Reformats were provided. All CT scans at this facility uses dose  modulation, iterative reconstruction, and/or weight-based dosing  when appropriate to achieve a radiation dose as low as reasonably  achievable.    FINDINGS:    No intracranial hemorrhage, appreciable mass, mass effect or  midline shift.     There is preservation of the gray-white matter differentiation.  No dense MCA or insular ribbon sign.    There is cerebral volume loss with ex vacuo ventricular  dilatation. Confluent periventricular hypodensities are likely  sequela of chronic ischemic microvascular disease. Faint right  basal ganglia calcification is unchanged.    Calvarium is intact. Mucosal thickening in the right sphenoid  sinus. Mastoid air cells are clear. Orbits are unremarkable.      Impression:      No acute intracranial process.    Unchanged sequela of chronic ischemic microvascular disease and  cerebral volume loss.    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  Specifically the left leg much  weaker than it was prior to this episode     • Medically complex patient    • Uncontrolled type 2 diabetes mellitus with hyperglycemia (CMS/[facility])              Plan:    1.  Patient being admitted due to acute worsening of neurological sx.   Previous history of cva and tia, with left hemiplegia, arm greater than leg.     All of a sudden acute worsening     Patient is outside the time parameters for acute cva   No code stroke.     Etiology of worsening sx could be several.    A.  Acute onset of rash and back pain that triggered overall anxiety and uncontrolled pain and irritability with then perception of worsening neurological sx.   Above radiological data indicates no acute or new event   MRI ordered for completeness of care.   Neurology to see and assist and help direct course of treatment.     B. Rash dx and notation consistent with shingles.   Appears \"angry and red' and extremely painful.  Findings of neuropathy noted.   And pain meds for pain control.   Acylovir. Iv started. And pain controlled for now and use topical lidocaine patch as well.     C.  Instructed him to get shingles vaccine.  He will need to wait six months after acute rash and lesions heal and then get shingles vaccine.     D.  Uncontrolled type ii dm has been chronic and worsening and unclear, if the blood sugars were elevated that " caused shingles, or shingles caused blood sugars to become elevated worse than her baseline.     E.  Type ii dm with complications with nephropathy, retinopathy, vascular disease and contributing to cva   And uncontrolled  Type ii dm long term   Discussed options. About control     F.  Dehydration and hyponatremia related to hyperglycemia     G.  Volume contraction metabolic alkalosis and mild metabolic acidosis and appropriate respiratory alkalosis compensation noted.     H.  Accuchecks.     I. Weight loss due to abdominal surgery in recent past and since then bp stabilized and no longer needs bp meds.     J.  dvt and gi prophylaxis     K. Neurology evaluation noted.     Mri of brain without contrast ordered.     I confirmed that the patient's Advance Care Plan is present, code status is documented, or surrogate decision maker is listed in the patient's medical record.     I have utilized all available immediate resources to obtain, update, or review the patient's current medications.     I discussed the patient's findings and my recommendations with: patient and discussed etiology of medical problems while in er .  Admit to floor.     Thalia Easley MD

## 2021-07-15 LAB
ACETONE BLD QL: NEGATIVE
ALBUMIN SERPL-MCNC: 4.1 G/DL (ref 3.5–5.2)
ALBUMIN/GLOB SERPL: 1.5 G/DL
ALP SERPL-CCNC: 182 U/L (ref 39–117)
ALT SERPL W P-5'-P-CCNC: 41 U/L (ref 1–41)
ANION GAP SERPL CALCULATED.3IONS-SCNC: 9 MMOL/L (ref 5–15)
AST SERPL-CCNC: 70 U/L (ref 1–40)
BILIRUB SERPL-MCNC: 0.5 MG/DL (ref 0–1.2)
BUN SERPL-MCNC: 6 MG/DL (ref 6–20)
BUN/CREAT SERPL: 8.6 (ref 7–25)
CALCIUM SPEC-SCNC: 8.6 MG/DL (ref 8.6–10.5)
CHLORIDE SERPL-SCNC: 100 MMOL/L (ref 98–107)
CO2 SERPL-SCNC: 28 MMOL/L (ref 22–29)
CREAT SERPL-MCNC: 0.7 MG/DL (ref 0.76–1.27)
DEPRECATED RDW RBC AUTO: 40.9 FL (ref 37–54)
ERYTHROCYTE [DISTWIDTH] IN BLOOD BY AUTOMATED COUNT: 12.6 % (ref 12.3–15.4)
GFR SERPL CREATININE-BSD FRML MDRD: 116 ML/MIN/1.73
GLOBULIN UR ELPH-MCNC: 2.7 GM/DL
GLUCOSE BLDC GLUCOMTR-MCNC: 118 MG/DL (ref 70–130)
GLUCOSE BLDC GLUCOMTR-MCNC: 162 MG/DL (ref 70–130)
GLUCOSE BLDC GLUCOMTR-MCNC: 219 MG/DL (ref 70–130)
GLUCOSE BLDC GLUCOMTR-MCNC: 97 MG/DL (ref 70–130)
GLUCOSE BLDC GLUCOMTR-MCNC: 99 MG/DL (ref 70–130)
GLUCOSE SERPL-MCNC: 96 MG/DL (ref 65–99)
HBA1C MFR BLD: 13.1 % (ref 4.8–5.6)
HCT VFR BLD AUTO: 40.6 % (ref 37.5–51)
HGB BLD-MCNC: 13.8 G/DL (ref 13–17.7)
MCH RBC QN AUTO: 30.5 PG (ref 26.6–33)
MCHC RBC AUTO-ENTMCNC: 34 G/DL (ref 31.5–35.7)
MCV RBC AUTO: 89.8 FL (ref 79–97)
PLATELET # BLD AUTO: 341 10*3/MM3 (ref 140–450)
PMV BLD AUTO: 11 FL (ref 6–12)
POTASSIUM SERPL-SCNC: 3.5 MMOL/L (ref 3.5–5.2)
PROT SERPL-MCNC: 6.8 G/DL (ref 6–8.5)
RBC # BLD AUTO: 4.52 10*6/MM3 (ref 4.14–5.8)
SODIUM SERPL-SCNC: 137 MMOL/L (ref 136–145)
WBC # BLD AUTO: 6.77 10*3/MM3 (ref 3.4–10.8)

## 2021-07-15 PROCEDURE — 25010000002 ONDANSETRON PER 1 MG: Performed by: INTERNAL MEDICINE

## 2021-07-15 PROCEDURE — 96372 THER/PROPH/DIAG INJ SC/IM: CPT

## 2021-07-15 PROCEDURE — 63710000001 INSULIN ASPART PER 5 UNITS: Performed by: INTERNAL MEDICINE

## 2021-07-15 PROCEDURE — G0378 HOSPITAL OBSERVATION PER HR: HCPCS

## 2021-07-15 PROCEDURE — 82962 GLUCOSE BLOOD TEST: CPT

## 2021-07-15 PROCEDURE — 25010000002 ENOXAPARIN PER 10 MG: Performed by: INTERNAL MEDICINE

## 2021-07-15 PROCEDURE — 36415 COLL VENOUS BLD VENIPUNCTURE: CPT | Performed by: INTERNAL MEDICINE

## 2021-07-15 PROCEDURE — 83036 HEMOGLOBIN GLYCOSYLATED A1C: CPT | Performed by: INTERNAL MEDICINE

## 2021-07-15 PROCEDURE — 63710000001 INSULIN DETEMIR PER 5 UNITS: Performed by: INTERNAL MEDICINE

## 2021-07-15 PROCEDURE — 99213 OFFICE O/P EST LOW 20 MIN: CPT | Performed by: NURSE PRACTITIONER

## 2021-07-15 PROCEDURE — 96366 THER/PROPH/DIAG IV INF ADDON: CPT

## 2021-07-15 PROCEDURE — 82009 KETONE BODYS QUAL: CPT | Performed by: INTERNAL MEDICINE

## 2021-07-15 PROCEDURE — 25010000002 ACYCLOVIR PER 5 MG: Performed by: INTERNAL MEDICINE

## 2021-07-15 PROCEDURE — 85027 COMPLETE CBC AUTOMATED: CPT | Performed by: INTERNAL MEDICINE

## 2021-07-15 PROCEDURE — 80053 COMPREHEN METABOLIC PANEL: CPT | Performed by: INTERNAL MEDICINE

## 2021-07-15 PROCEDURE — 96376 TX/PRO/DX INJ SAME DRUG ADON: CPT

## 2021-07-15 PROCEDURE — 25010000002 HYDROMORPHONE 1 MG/ML SOLUTION: Performed by: INTERNAL MEDICINE

## 2021-07-15 RX ORDER — GABAPENTIN 300 MG/1
300 CAPSULE ORAL EVERY 8 HOURS SCHEDULED
Status: DISCONTINUED | OUTPATIENT
Start: 2021-07-15 | End: 2021-07-16 | Stop reason: HOSPADM

## 2021-07-15 RX ORDER — FAMOTIDINE 20 MG/1
20 TABLET, FILM COATED ORAL
Status: DISCONTINUED | OUTPATIENT
Start: 2021-07-15 | End: 2021-07-16 | Stop reason: HOSPADM

## 2021-07-15 RX ORDER — CYCLOBENZAPRINE HCL 10 MG
10 TABLET ORAL 3 TIMES DAILY PRN
Status: DISCONTINUED | OUTPATIENT
Start: 2021-07-15 | End: 2021-07-16 | Stop reason: HOSPADM

## 2021-07-15 RX ORDER — OXYCODONE AND ACETAMINOPHEN 7.5; 325 MG/1; MG/1
2 TABLET ORAL
Status: DISCONTINUED | OUTPATIENT
Start: 2021-07-15 | End: 2021-07-16 | Stop reason: HOSPADM

## 2021-07-15 RX ADMIN — ONDANSETRON 4 MG: 2 INJECTION INTRAMUSCULAR; INTRAVENOUS at 19:30

## 2021-07-15 RX ADMIN — ONDANSETRON 4 MG: 2 INJECTION INTRAMUSCULAR; INTRAVENOUS at 10:47

## 2021-07-15 RX ADMIN — FAMOTIDINE 20 MG: 20 TABLET ORAL at 19:30

## 2021-07-15 RX ADMIN — OXYCODONE HYDROCHLORIDE AND ACETAMINOPHEN 1 TABLET: 7.5; 325 TABLET ORAL at 14:30

## 2021-07-15 RX ADMIN — HYDROMORPHONE HYDROCHLORIDE 1 MG: 1 INJECTION, SOLUTION INTRAMUSCULAR; INTRAVENOUS; SUBCUTANEOUS at 08:47

## 2021-07-15 RX ADMIN — ONDANSETRON 4 MG: 2 INJECTION INTRAMUSCULAR; INTRAVENOUS at 02:19

## 2021-07-15 RX ADMIN — HYDROMORPHONE HYDROCHLORIDE 1 MG: 1 INJECTION, SOLUTION INTRAMUSCULAR; INTRAVENOUS; SUBCUTANEOUS at 12:42

## 2021-07-15 RX ADMIN — HYDROMORPHONE HYDROCHLORIDE 1 MG: 1 INJECTION, SOLUTION INTRAMUSCULAR; INTRAVENOUS; SUBCUTANEOUS at 19:30

## 2021-07-15 RX ADMIN — OXYCODONE HYDROCHLORIDE AND ACETAMINOPHEN 2 TABLET: 7.5; 325 TABLET ORAL at 22:40

## 2021-07-15 RX ADMIN — ACYCLOVIR SODIUM 710 MG: 50 INJECTION, SOLUTION INTRAVENOUS at 22:40

## 2021-07-15 RX ADMIN — FAMOTIDINE 20 MG: 10 INJECTION INTRAVENOUS at 08:38

## 2021-07-15 RX ADMIN — SODIUM CHLORIDE, PRESERVATIVE FREE 10 ML: 5 INJECTION INTRAVENOUS at 20:20

## 2021-07-15 RX ADMIN — OXYCODONE HYDROCHLORIDE AND ACETAMINOPHEN 1 TABLET: 7.5; 325 TABLET ORAL at 17:27

## 2021-07-15 RX ADMIN — DOCUSATE SODIUM 50 MG AND SENNOSIDES 8.6 MG 2 TABLET: 8.6; 5 TABLET, FILM COATED ORAL at 08:38

## 2021-07-15 RX ADMIN — HYDROMORPHONE HYDROCHLORIDE 1 MG: 1 INJECTION, SOLUTION INTRAMUSCULAR; INTRAVENOUS; SUBCUTANEOUS at 05:18

## 2021-07-15 RX ADMIN — HYDROMORPHONE HYDROCHLORIDE 1 MG: 1 INJECTION, SOLUTION INTRAMUSCULAR; INTRAVENOUS; SUBCUTANEOUS at 22:48

## 2021-07-15 RX ADMIN — INSULIN DETEMIR 30 UNITS: 100 INJECTION, SOLUTION SUBCUTANEOUS at 08:40

## 2021-07-15 RX ADMIN — ACYCLOVIR SODIUM 710 MG: 50 INJECTION, SOLUTION INTRAVENOUS at 14:27

## 2021-07-15 RX ADMIN — OXYCODONE HYDROCHLORIDE AND ACETAMINOPHEN 1 TABLET: 7.5; 325 TABLET ORAL at 10:50

## 2021-07-15 RX ADMIN — HYDROMORPHONE HYDROCHLORIDE 1 MG: 1 INJECTION, SOLUTION INTRAMUSCULAR; INTRAVENOUS; SUBCUTANEOUS at 01:59

## 2021-07-15 RX ADMIN — GABAPENTIN 300 MG: 300 CAPSULE ORAL at 19:30

## 2021-07-15 RX ADMIN — HYDROMORPHONE HYDROCHLORIDE 1 MG: 1 INJECTION, SOLUTION INTRAMUSCULAR; INTRAVENOUS; SUBCUTANEOUS at 16:28

## 2021-07-15 RX ADMIN — ENOXAPARIN SODIUM 40 MG: 100 INJECTION SUBCUTANEOUS at 14:30

## 2021-07-15 RX ADMIN — LIDOCAINE 2 PATCH: 50 PATCH CUTANEOUS at 08:39

## 2021-07-15 RX ADMIN — INSULIN ASPART 15 UNITS: 100 INJECTION, SOLUTION INTRAVENOUS; SUBCUTANEOUS at 12:45

## 2021-07-15 RX ADMIN — DULOXETINE 20 MG: 20 CAPSULE, DELAYED RELEASE ORAL at 20:20

## 2021-07-15 RX ADMIN — ACYCLOVIR SODIUM 710 MG: 50 INJECTION, SOLUTION INTRAVENOUS at 06:29

## 2021-07-15 RX ADMIN — INSULIN ASPART 15 UNITS: 100 INJECTION, SOLUTION INTRAVENOUS; SUBCUTANEOUS at 08:44

## 2021-07-15 RX ADMIN — OXYCODONE HYDROCHLORIDE AND ACETAMINOPHEN 1 TABLET: 7.5; 325 TABLET ORAL at 06:29

## 2021-07-15 RX ADMIN — ASPIRIN 81 MG: 81 TABLET, FILM COATED ORAL at 08:38

## 2021-07-15 NOTE — CONSULTS
Adult Nutrition  Assessment    Patient Name:  Ventura Boggs  YOB: 1960  MRN: [id number]  Admit Date:  7/14/2021    Assessment Date:  7/15/2021    Comments:  Pt admitted with neurological symptoms with hx of CVA.  Stroke has been ruled out.  He was dxed with herpes Zoster, on medication.  HgBa1c 13.10 indicating uncontrolled DM.  RD discussed ADA guidelines and carb counting with pt.  I encouraged him to follow up with MD as his insulin may need to adjusted, he is only on SSI and a long acting insulin.  He agrees.  RD provided diet education and diet copies with contact number.  He voices understanding.  Some occasionaly difficulty swallowing most likely related to his hx of CVAs.  Will modify diet order and continue to monitor.     Reason for Assessment     Row Name 07/15/21 2108          Reason for Assessment    Reason For Assessment  nurse/nurse practitioner consult;identified at risk by screening criteria     Diagnosis  diabetes diagnosis/complications;neurologic conditions     Identified At Risk by Screening Criteria  difficulty chewing/swallowing;need for education         Nutrition/Diet History     Row Name 07/15/21 9527          Nutrition/Diet History    Typical Food/Fluid Intake  Pt claims that his blood sugar is high quite often.  He has weaned himself down to one regular soda a day and then he drinks water.  He hasn't seen a doctor in some time. Persistent vomiting due to a hx of abd surgery.  +nausea.  He also claims that occasionaly he has diffcult swallowing --bu not very often.  Agrees to chopped meats.         Anthropometrics     Row Name 07/15/21 0650          Anthropometrics    Weight  83 kg (183 lb)         Labs/Tests/Procedures/Meds     Row Name 07/15/21 1406          Labs/Procedures/Meds    Lab Results Reviewed  reviewed, pertinent     Lab Results Comments  HgbA1c 13.10; Gluc 313/219/96/162        Diagnostic Tests/Procedures    Diagnostic Test/Procedure Reviewed  reviewed,  pertinent     Diagnostic Test/Procedures Comments  Pain medication; SSI; Levemir; Zovirax        Medications    Pertinent Medications Reviewed  reviewed, pertinent     Pertinent Medications Comments  SSI; Levemir; Zovirax         Physical Findings     Row Name 07/15/21 1408          Physical Findings    Gastrointestinal  vomiting;nausea This is a hx and is intermittent         Estimated/Assessed Needs     Row Name 07/15/21 1408          Calculation Measurements    Weight Used For Calculations  83 kg (183 lb)        Estimated/Assessed Needs    Additional Documentation  Additional Requirements (Group);Fluid Requirements (Group);Onslow-St. Jeor Equation (Group);Protein Requirements (Group);Calorie Requirements (Group);KCAL/KG (Group)        Calorie Requirements    Estimated Calorie Need Method  kcal/kg        KCAL/KG    KCAL/KG  25 Kcal/Kg (kcal)     25 Kcal/Kg (kcal)  2075.2        Onslow-St. Jeor Equation    RMR (Onslow-St. Jeor Equation)  1640.455        Protein Requirements    Weight Used For Protein Calculations  72.6 kg (160 lb)     Est Protein Requirement Amount (gms/kg)  1.2 gm protein     Estimated Protein Requirements (gms/day)  87.09        Fluid Requirements    Fluid Requirements (mL/day)  2100     Estimated Fluid Requirement Method  RDA Method     RDA Method (mL)  2100         Nutrition Prescription Ordered     Row Name 07/15/21 1410          Nutrition Prescription PO    Current PO Diet  Clear Liquid                 Electronically signed by:  Jenna Olivo RD  07/15/21 14:14 CDT

## 2021-07-15 NOTE — PROGRESS NOTES
"Stroke Progress Note       Chief Complaint: Left-sided weakness    Subjective     HPI: Pt is a 60-yr-old right-handed white male with known diagnosis of previous hemorrhagic stroke one year ago, with left-sided residual, who presented when he stated his weakness had worsened on 7/13. Shingles onset was 7/12 and on his  Back to right abdomen. This morning he states improvement in his weakness and states \"It comes and goes.\" He denies any numbness or vision changes.      Review of Systems   HENT: Negative.    Eyes: Negative.    Respiratory: Negative.    Cardiovascular: Negative.    Gastrointestinal: Negative.    Genitourinary: Negative.    Musculoskeletal: Negative.    Skin: Positive for rash.        Shingles   Neurological: Positive for weakness.   Psychiatric/Behavioral: Negative.         Objective      Temp:  [97.4 °F (36.3 °C)-98.5 °F (36.9 °C)] 97.4 °F (36.3 °C)  Heart Rate:  [] 98  Resp:  [14-20] 16  BP: ()/() 137/88    Neurological Exam  Mental Status  Awake, alert and oriented to person, place and time.Alert. Recent and remote memory are intact. Speech is normal. Language is fluent with no aphasia. Attention and concentration are normal.    Cranial Nerves  CN II: Visual acuity is normal. Visual fields full to confrontation.  CN III, IV, VI: Extraocular movements intact bilaterally. Normal lids and orbits bilaterally. Pupils equal round and reactive to light bilaterally.  CN V: Facial sensation is normal.  CN VII: Full and symmetric facial movement.  CN IX, X: Palate elevates symmetrically. Normal gag reflex.  CN XI: Shoulder shrug strength is normal.  CN XII: Tongue midline without atrophy or fasciculations.    Motor  Normal muscle bulk throughout. No fasciculations present. Left pronator drift.    Sensory  Sensation is intact to light touch, pinprick, vibration and proprioception in all four extremities.    Reflexes  Not tested.    Coordination  Finger-to-nose, rapid alternating movements " and heel-to-shin normal bilaterally without dysmetria.    Gait  Not tested.      Physical Exam  Vitals and nursing note reviewed.   Constitutional:       Appearance: Normal appearance.   HENT:      Head: Normocephalic and atraumatic.   Eyes:      General: Lids are normal.      Extraocular Movements: Extraocular movements intact.      Pupils: Pupils are equal, round, and reactive to light.   Cardiovascular:      Rate and Rhythm: Normal rate and regular rhythm.   Pulmonary:      Effort: Pulmonary effort is normal.   Musculoskeletal:         General: Normal range of motion.      Cervical back: Normal range of motion.   Skin:     Findings: Lesion present.      Comments: Right back and abdomen shingles.   Neurological:      General: No focal deficit present.      Mental Status: He is alert.      Coordination: Coordination is intact.   Psychiatric:         Speech: Speech normal.         Results Review:    I reviewed the patient's new clinical results.    Lab Results (last 24 hours)     Procedure Component Value Units Date/Time    Comprehensive Metabolic Panel [[id number]]  (Abnormal) Collected: 07/15/21 0353    Specimen: Blood Updated: 07/15/21 0550     Glucose 96 mg/dL      BUN 6 mg/dL      Creatinine 0.70 mg/dL      Sodium 137 mmol/L      Potassium 3.5 mmol/L      Comment: Slight hemolysis detected by analyzer. Results may be affected.        Chloride 100 mmol/L      CO2 28.0 mmol/L      Calcium 8.6 mg/dL      Total Protein 6.8 g/dL      Albumin 4.10 g/dL      ALT (SGPT) 41 U/L      AST (SGOT) 70 U/L      Alkaline Phosphatase 182 U/L      Total Bilirubin 0.5 mg/dL      eGFR Non African Amer 116 mL/min/1.73      Globulin 2.7 gm/dL      A/G Ratio 1.5 g/dL      BUN/Creatinine Ratio 8.6     Anion Gap 9.0 mmol/L     Narrative:      GFR Normal >60  Chronic Kidney Disease <60  Kidney Failure <15      Acetone [[id number]]  (Normal) Collected: 07/15/21 0353    Specimen: Blood Updated: 07/15/21 0519     Acetone Negative     Hemoglobin A1c [[id number]]  (Abnormal) Collected: 07/15/21 0353    Specimen: Blood Updated: 07/15/21 0515     Hemoglobin A1C 13.10 %     Narrative:      Hemoglobin A1C Ranges:    Increased Risk for Diabetes  5.7% to 6.4%  Diabetes                     >= 6.5%  Diabetic Goal                < 7.0%    CBC (No Diff) [[id number]]  (Normal) Collected: 07/15/21 0353    Specimen: Blood Updated: 07/15/21 0506     WBC 6.77 10*3/mm3      RBC 4.52 10*6/mm3      Hemoglobin 13.8 g/dL      Hematocrit 40.6 %      MCV 89.8 fL      MCH 30.5 pg      MCHC 34.0 g/dL      RDW 12.6 %      RDW-SD 40.9 fl      MPV 11.0 fL      Platelets 341 10*3/mm3     POC Glucose Once [[id number]]  (Abnormal) Collected: 07/14/21 2246    Specimen: Blood Updated: 07/15/21 0403     Glucose 219 mg/dL      Comment: Sliding Scale AdminOperator: [id number] SYDNEY AMYMeter ID: [id number]       POC Glucose Once [[id number]]  (Abnormal) Collected: 07/14/21 1557    Specimen: Blood Updated: 07/14/21 1614     Glucose 273 mg/dL      Comment: RN NotifiedOperator: [id number] TU CALLIEMeter ID: [id number]       Basic Metabolic Panel [[id number]]  (Abnormal) Collected: 07/14/21 1446    Specimen: Blood Updated: 07/14/21 1515     Glucose 313 mg/dL      BUN 4 mg/dL      Creatinine 0.83 mg/dL      Sodium 128 mmol/L      Potassium 3.8 mmol/L      Chloride 89 mmol/L      CO2 31.0 mmol/L      Calcium 9.2 mg/dL      eGFR Non African Amer 95 mL/min/1.73      BUN/Creatinine Ratio 4.8     Anion Gap 8.0 mmol/L     Narrative:      GFR Normal >60  Chronic Kidney Disease <60  Kidney Failure <15      Acetone [[id number]]  (Normal) Collected: 07/14/21 1446    Specimen: Blood Updated: 07/14/21 1506     Acetone Negative    POC Glucose Once [[id number]]  (Abnormal) Collected: 07/14/21 1006    Specimen: Blood Updated: 07/14/21 1503     Glucose 454 mg/dL      Comment: Result Not ConfirmedOperator: [id number] ASHA Ruvalcaba ID: [id number]       COVID-19 and FLU A/B PCR - Swab,  Nasopharynx [[id number]]  (Normal) Collected: 07/14/21 1342    Specimen: Swab from Nasopharynx Updated: 07/14/21 1411     COVID19 Not Detected     Influenza A PCR Not Detected     Influenza B PCR Not Detected    Narrative:      Fact sheet for providers: https://www.fda.gov/media/517258/download    Fact sheet for patients: https://www.fda.gov/media/572297/download    Test performed by PCR.        CT Head Without Contrast    Result Date: 7/14/2021  No acute intracranial process. Unchanged sequela of chronic ischemic microvascular disease and cerebral volume loss. Electronically signed by:  Yuniel España MD  7/14/2021 10:48 AM CDT Workstation: [id number]    CT Angiogram Neck    Result Date: 7/14/2021  No evidence of stenosis, aneurysm, or dissection. Electronically signed by:  Kirt Galvez MD  7/14/2021 1:34 PM CDT Workstation: [id number]    MRI Brain Without Contrast    Result Date: 7/14/2021  No evidence of intracranial hemorrhage, mass effect, or acute infarction.  Mild volume loss and sequela of chronic microangiopathic change. Mucosal thickening with subtotal opacification of the right sphenoid sinus. Electronically signed by:  Wm Bell MD  7/14/2021 4:01 PM CDT Workstation: [id number]    CT Angiogram Head w AI Analysis of LVO    Result Date: 7/14/2021  No evidence of stenosis, aneurysm, or dissection. Electronically signed by:  Kirt Galvez MD  7/14/2021 1:34 PM CDT Workstation: [id number]    CT CEREBRAL PERFUSION WITH & WITHOUT CONTRAST    Result Date: 7/14/2021  No CT brain perfusion evidence of acute infarct. Electronically signed by:  Kirt Galvez MD  7/14/2021 1:18 PM CDT Workstation: [id number]          Assessment/Plan     Assessment/Plan: Pt is a 60-yr-old right-handed white male with known diagnosis of previous hemorrhagic stroke one year ago, with left-sided residual, and DM, who presented when he stated his weakness had worsened on 7/13. Shingles onset was 7/12 and on his  Back to  "right abdomen. This morning he states improvement in his weakness and states \"It comes and goes.\" He denies any numbness or vision changes.  1. Left-sided weakness- MRI neg, likely recrudescence from old stroke d/t active shingles infection. Continue aspirin 325 mg/day for secondary stroke prevention.  2. Herpes zoster- right abdomen, hospitalist managing anti-viral treatment.  3. DM- A1C is 13, will consult inpatient nutrition for diabetes teaching. Instructed on the importance of glucose control to reduce stroke risk.   4. Activity- Okay to work with PT/OT.  5. Diet- ADA heart-healthy diet.   Case was discussed with pt, Dr. Roy, Hospitalist team, and nursing. Pt was seen through A/V interface. Neurology will sign off.          Patient Active Problem List   Diagnosis   • Uncontrolled type 2 diabetes mellitus with hyperglycemia (CMS/HCC)   • Postoperative urinary retention   • Incisional hernia, without obstruction or gangrene   • Acute appendicitis with localized peritonitis   • Closed head injury   • Hyponatremia   • History of recurrent TIAs   • Ataxia   • Chronic pain syndrome   • Right shoulder pain   • Left-sided muscle weakness   • Medically complex patient   • Stroke-like symptoms   • SBO (small bowel obstruction) (CMS/HCC)   • Weakness of left arm   • Shingles (herpes zoster) polyneuropathy   • Shingles rash   • DM (diabetes mellitus) type II uncontrolled, periph vascular disorder (CMS/HCC)   • Type 2 diabetes mellitus with hyperglycemia (CMS/HCC)   • Dehydration   • Weight loss   • Low back pain   • Rash and nonspecific skin eruption           GIA Simon  07/15/21  07:20 CDT      "

## 2021-07-15 NOTE — PLAN OF CARE
Goal Outcome Evaluation:  Plan of Care Reviewed With: caregiver, patient        Progress: no change  Outcome Summary: Pt admitted with Neurological symptoms, CVA ruled out.  Hx of DM with HgbA1c 13.10 indicating uncontrolled DM.  Educaiton provided.

## 2021-07-15 NOTE — PROGRESS NOTES
"    [facility] Medicine Services  INPATIENT PROGRESS NOTE    Length of Stay: 1  Date of Admission: 7/14/2021  Primary Care Physician: Provider, No Known    Subjective   Chief Complaint: lightning pain along right flank and abdominal area and rash is \"weeping now ' and keeping it covered reduces pain    He has uncontrolled type ii dm   And he with \"pride states, well it has gone down 2 points, was hgb 15 in the past. \"   He is improving   And seems to be feeling some better.       HPI:  He is in icu and isolation for shingles.   And pain is controlled   And blood sugars are somewhat controlled    Chronic pain syndrome,     Review of Systems   Constitutional: Positive for activity change.   HENT: Negative.    Eyes: Negative.    Respiratory: Positive for cough and shortness of breath.    Cardiovascular: Positive for palpitations.   Gastrointestinal: Positive for abdominal pain.        Rash all thru abdominal area, anterior lower and near lumbar and sacral lesions.    Endocrine: Negative.    Genitourinary: Negative.    Musculoskeletal: Positive for arthralgias and back pain.   Skin: Negative.    Neurological: Positive for dizziness and light-headedness.   Hematological: Negative.    Psychiatric/Behavioral: Negative.    All other systems reviewed and are negative.       All pertinent negatives and positives are as above. All other systems have been reviewed and are negative unless otherwise stated.     Objective    Temp:  [97.4 °F (36.3 °C)-98.5 °F (36.9 °C)] 97.4 °F (36.3 °C)  Heart Rate:  [] 98  Resp:  [14-20] 16  BP: ()/() 137/88    Physical Exam  Vitals and nursing note reviewed.   Constitutional:       General: He is in acute distress.      Appearance: He is obese. He is ill-appearing.      Comments: Lightning electric pain   And pain with any movement.    HENT:      Head: Normocephalic.      Nose: Nose normal.      Mouth/Throat:      Mouth: Mucous membranes " are moist.   Eyes:      Extraocular Movements: Extraocular movements intact.      Conjunctiva/sclera: Conjunctivae normal.   Cardiovascular:      Rate and Rhythm: Regular rhythm. Tachycardia present.      Heart sounds: Normal heart sounds.   Pulmonary:      Breath sounds: Wheezing present.   Abdominal:      General: Abdomen is flat. Bowel sounds are normal.      Palpations: Abdomen is soft.   Musculoskeletal:         General: Tenderness present.      Cervical back: Normal range of motion.   Skin:     General: Skin is warm.      Comments: Rash across anterior abdominal area  Weeping lesions vesiculated lesion with surrounding erythema and improving   Neurological:      Comments: Neuropathy      Psychiatric:         Mood and Affect: Mood normal.       Results Review:  I have reviewed the labs, radiology results, and diagnostic studies.    Laboratory Data:   Results from last 7 days   Lab Units 07/15/21  0353 07/14/21  1446 07/14/21  1022   SODIUM mmol/L 137 128* 132*   POTASSIUM mmol/L 3.5 3.8 4.0   CHLORIDE mmol/L 100 89* 92*   CO2 mmol/L 28.0 31.0* 26.0   BUN mg/dL 6 4* 5*   CREATININE mg/dL 0.70* 0.83 0.87   GLUCOSE mg/dL 96 313* 467*   CALCIUM mg/dL 8.6 9.2 9.9   BILIRUBIN mg/dL 0.5  --  0.4   ALK PHOS U/L 182*  --  119*   ALT (SGPT) U/L 41  --  11   AST (SGOT) U/L 70*  --  16   ANION GAP mmol/L 9.0 8.0 14.0     Estimated Creatinine Clearance: 135 mL/min (A) (by C-G formula based on SCr of 0.7 mg/dL (L)).  Results from last 7 days   Lab Units 07/14/21  1022   MAGNESIUM mg/dL 1.9         Results from last 7 days   Lab Units 07/15/21  0353 07/14/21  1022   WBC 10*3/mm3 6.77 8.67   HEMOGLOBIN g/dL 13.8 15.5   HEMATOCRIT % 40.6 43.8   PLATELETS 10*3/mm3 341 423     Results from last 7 days   Lab Units 07/14/21  1022   INR  1.02       Culture Data:   No results found for: BLOODCX  No results found for: URINECX  No results found for: RESPCX  No results found for: WOUNDCX  No results found for: STOOLCX  No components  found for: BODYFLD    Radiology Data:   Imaging Results (Last 24 Hours)     Procedure Component Value Units Date/Time    MRI Brain Without Contrast [[id number]] Collected: 07/14/21 1452     Updated: 07/14/21 1602    Narrative:      EXAM DESCRIPTION: MRI BRAIN WO CONTRAST    CLINICAL HISTORY: 60-year-old male for stroke follow-up. CVA x3  in past with left hemiplegia. Follow-up on CVA and recurrent  neurological symptoms.    COMPARISON: CT/CT Perfusion 7/14/2021.    TECHNIQUE: Multisequence multiplanar imaging of the brain is  performed without contrast.      FINDINGS:   Brain Parenchyma:  The craniovertebral junction and the included  cervical cord are unremarkable. No Chiari malformation.  Unremarkable appearance of the pituitary and stalk. The midline  structures are intact.    No evidence of intracranial hemorrhage or suspicious extra-axial  fluid collections. Small focus of gradient susceptibility within  the right basal ganglia/capsule region representing  mineralization and/or nonspecific sequela of prior insult with  hemosiderin deposition. No edema, midline shift, or mass effect.    The cerebellopontine angle regions are unremarkable. There is  mild prominence of the perivascular spaces bilaterally.    Diffusion: No evidence of restricted diffusion supportive of  acute/subacute infarct.    Atrophy: Mild volume loss    Microvascular Disease: Mild sequela.    Vessels: There is signal void within major intracranial vessels.    Extra-axial Spaces: Unremarkable allowing for the degree of  volume loss.    Ventricles: No hydrocephalus.    Bones: No suspicious marrow signal alteration identified.    Sinuses and Mastoids: Mucosal thickening with subtotal  opacification of the right sphenoid sinus. No significant mastoid  effusion.    Scalp and Soft Tissues: Unremarkable.     Orbits: Unremarkable.        Impression:      No evidence of intracranial hemorrhage, mass effect, or acute  infarction.      Mild volume loss  and sequela of chronic microangiopathic change.    Mucosal thickening with subtotal opacification of the right  sphenoid sinus.    Electronically signed by:  Wm Bell MD  7/14/2021 4:01 PM  CDT Workstation: [id number]          I have reviewed the patient's current medications.     Assessment/Plan     Active Hospital Problems    Diagnosis    • **Weakness of left arm    • Shingles (herpes zoster) polyneuropathy    • Shingles rash    • DM (diabetes mellitus) type II uncontrolled, periph vascular disorder (CMS/HCC)    • Type 2 diabetes mellitus with hyperglycemia (CMS/HCC)    • Dehydration    • Weight loss    • Low back pain    • Rash and nonspecific skin eruption    • Closed head injury      Intracranial hemorrhage with residual left sided weakness     • Hyponatremia    • History of recurrent TIAs    • Ataxia    • Chronic pain syndrome    • Left-sided muscle weakness      Due to prior Intracranial hemorrhage with closed head injury     • Stroke-like symptoms        He has chronic left-sided weakness but it is much worse after this episode.  Specifically the left leg much  weaker than it was prior to this episode     • Medically complex patient    • Uncontrolled type 2 diabetes mellitus with hyperglycemia (CMS/HCC)        Plan:   1.  Acute weakness that he Is perceiving to my evaluation is related to shingles infection and pain and tremors related to that.  Then SEVERLY uncontrolled type ii dm and agree with diabetic educator to see him.  Blood sugar elevation and uncontrolled levels can lead to neurological squelae and likely severe hyperglycemia caused him to be susceptible to shingles as well and he is not vaccinated.  Thankfully he did get the covid vaccine done.     Shingles vaccine and rash across anterior abdomen and radiates to the flank and lumbosacral area on acylovir iv and change to po in am   And then consider discharge  In am if improved.       2.  Neuropathy   And will add in gabapentin   And muscle  relaxant and   Po pain meds transition to tomorrow  And discharge home in am.     3.  Type ii dm uncontrolled   Diabetic educator has seen him and   Increased meds.     Needs to check blood sugars more frequently    And monitor response to therapy    Low carbohydrate and high protein diet.     4.  Chronic pain syndrome.   He will need us to give him a copy of prescription to take to his pain clinic appt.     5.  Dehydration     6.  Discharge planning for tomorrow am   On po meds   And percocet for pain relief   And wants it q 4h as he cannot make it six hours without pain meds    Will continue acylovir for total of 7 to 10 days.     Shingles vaccine six months after rash and vesicles have resolved.     7.  Elevated liver tests monitor               Discharge Planning: I expect patient to be discharged in next 24 to 48 hours     Thalia Easley MD

## 2021-07-15 NOTE — NURSING NOTE
Alert & oriented, VSS, room air, afebrile, voids per urinal.    NIHSS 1 for left-sided residual.     Pt continues to c/o right abdomen / right lower back pain, relieved by oxycodone and prn dilaudid, on acyclovir IV. Reddened, blistered areas have spread slightly overnight.

## 2021-07-16 VITALS
RESPIRATION RATE: 16 BRPM | BODY MASS INDEX: 26.97 KG/M2 | DIASTOLIC BLOOD PRESSURE: 87 MMHG | WEIGHT: 182.1 LBS | OXYGEN SATURATION: 92 % | TEMPERATURE: 97.7 F | SYSTOLIC BLOOD PRESSURE: 151 MMHG | HEIGHT: 69 IN | HEART RATE: 99 BPM

## 2021-07-16 LAB
ALBUMIN SERPL-MCNC: 3.6 G/DL (ref 3.5–5.2)
ALBUMIN/GLOB SERPL: 1.3 G/DL
ALP SERPL-CCNC: 154 U/L (ref 39–117)
ALT SERPL W P-5'-P-CCNC: 33 U/L (ref 1–41)
ANION GAP SERPL CALCULATED.3IONS-SCNC: 2 MMOL/L (ref 5–15)
AST SERPL-CCNC: 36 U/L (ref 1–40)
BILIRUB SERPL-MCNC: 0.3 MG/DL (ref 0–1.2)
BUN SERPL-MCNC: 5 MG/DL (ref 6–20)
BUN/CREAT SERPL: 7.8 (ref 7–25)
CALCIUM SPEC-SCNC: 8.7 MG/DL (ref 8.6–10.5)
CHLORIDE SERPL-SCNC: 99 MMOL/L (ref 98–107)
CO2 SERPL-SCNC: 33 MMOL/L (ref 22–29)
CREAT SERPL-MCNC: 0.64 MG/DL (ref 0.76–1.27)
DEPRECATED RDW RBC AUTO: 40.3 FL (ref 37–54)
ERYTHROCYTE [DISTWIDTH] IN BLOOD BY AUTOMATED COUNT: 12.5 % (ref 12.3–15.4)
GFR SERPL CREATININE-BSD FRML MDRD: 128 ML/MIN/1.73
GLOBULIN UR ELPH-MCNC: 2.7 GM/DL
GLUCOSE BLDC GLUCOMTR-MCNC: 101 MG/DL (ref 70–130)
GLUCOSE BLDC GLUCOMTR-MCNC: 115 MG/DL (ref 70–130)
GLUCOSE BLDC GLUCOMTR-MCNC: 117 MG/DL (ref 70–130)
GLUCOSE BLDC GLUCOMTR-MCNC: 135 MG/DL (ref 70–130)
GLUCOSE SERPL-MCNC: 128 MG/DL (ref 65–99)
HCT VFR BLD AUTO: 36.2 % (ref 37.5–51)
HGB BLD-MCNC: 12.5 G/DL (ref 13–17.7)
MCH RBC QN AUTO: 30.5 PG (ref 26.6–33)
MCHC RBC AUTO-ENTMCNC: 34.5 G/DL (ref 31.5–35.7)
MCV RBC AUTO: 88.3 FL (ref 79–97)
PLATELET # BLD AUTO: 321 10*3/MM3 (ref 140–450)
PMV BLD AUTO: 10.5 FL (ref 6–12)
POTASSIUM SERPL-SCNC: 3.6 MMOL/L (ref 3.5–5.2)
PROT SERPL-MCNC: 6.3 G/DL (ref 6–8.5)
RBC # BLD AUTO: 4.1 10*6/MM3 (ref 4.14–5.8)
SODIUM SERPL-SCNC: 134 MMOL/L (ref 136–145)
WBC # BLD AUTO: 6.09 10*3/MM3 (ref 3.4–10.8)

## 2021-07-16 PROCEDURE — 63710000001 INSULIN ASPART PER 5 UNITS: Performed by: INTERNAL MEDICINE

## 2021-07-16 PROCEDURE — 63710000001 INSULIN DETEMIR PER 5 UNITS: Performed by: INTERNAL MEDICINE

## 2021-07-16 PROCEDURE — 25010000002 HYDROMORPHONE 1 MG/ML SOLUTION: Performed by: INTERNAL MEDICINE

## 2021-07-16 PROCEDURE — 85027 COMPLETE CBC AUTOMATED: CPT | Performed by: INTERNAL MEDICINE

## 2021-07-16 PROCEDURE — 25010000002 ONDANSETRON PER 1 MG: Performed by: INTERNAL MEDICINE

## 2021-07-16 PROCEDURE — 96366 THER/PROPH/DIAG IV INF ADDON: CPT

## 2021-07-16 PROCEDURE — 25010000002 ACYCLOVIR PER 5 MG: Performed by: INTERNAL MEDICINE

## 2021-07-16 PROCEDURE — 96376 TX/PRO/DX INJ SAME DRUG ADON: CPT

## 2021-07-16 PROCEDURE — 82962 GLUCOSE BLOOD TEST: CPT

## 2021-07-16 PROCEDURE — G0378 HOSPITAL OBSERVATION PER HR: HCPCS

## 2021-07-16 PROCEDURE — 80053 COMPREHEN METABOLIC PANEL: CPT | Performed by: INTERNAL MEDICINE

## 2021-07-16 RX ORDER — ACYCLOVIR 400 MG/1
400 TABLET ORAL
Qty: 34 TABLET | Refills: 0 | Status: SHIPPED | OUTPATIENT
Start: 2021-07-16 | End: 2021-07-23

## 2021-07-16 RX ORDER — ACYCLOVIR 400 MG/1
400 TABLET ORAL
Status: DISCONTINUED | OUTPATIENT
Start: 2021-07-16 | End: 2021-07-16 | Stop reason: HOSPADM

## 2021-07-16 RX ORDER — ASPIRIN 81 MG/1
81 TABLET ORAL DAILY
Qty: 30 TABLET | Refills: 4 | Status: SHIPPED | OUTPATIENT
Start: 2021-07-17 | End: 2021-07-27

## 2021-07-16 RX ORDER — CYCLOBENZAPRINE HCL 10 MG
10 TABLET ORAL 3 TIMES DAILY PRN
Qty: 60 TABLET | Refills: 2 | Status: SHIPPED | OUTPATIENT
Start: 2021-07-16 | End: 2021-07-27

## 2021-07-16 RX ORDER — GABAPENTIN 300 MG/1
300 CAPSULE ORAL 3 TIMES DAILY
Qty: 90 CAPSULE | Refills: 3 | Status: ON HOLD | OUTPATIENT
Start: 2021-07-16 | End: 2021-07-29 | Stop reason: SDUPTHER

## 2021-07-16 RX ADMIN — ONDANSETRON 4 MG: 2 INJECTION INTRAMUSCULAR; INTRAVENOUS at 03:58

## 2021-07-16 RX ADMIN — HYDROMORPHONE HYDROCHLORIDE 1 MG: 1 INJECTION, SOLUTION INTRAMUSCULAR; INTRAVENOUS; SUBCUTANEOUS at 11:02

## 2021-07-16 RX ADMIN — FAMOTIDINE 20 MG: 20 TABLET ORAL at 06:34

## 2021-07-16 RX ADMIN — OXYCODONE HYDROCHLORIDE AND ACETAMINOPHEN 2 TABLET: 7.5; 325 TABLET ORAL at 14:31

## 2021-07-16 RX ADMIN — GABAPENTIN 300 MG: 300 CAPSULE ORAL at 14:31

## 2021-07-16 RX ADMIN — OXYCODONE HYDROCHLORIDE AND ACETAMINOPHEN 2 TABLET: 7.5; 325 TABLET ORAL at 06:06

## 2021-07-16 RX ADMIN — INSULIN ASPART 15 UNITS: 100 INJECTION, SOLUTION INTRAVENOUS; SUBCUTANEOUS at 12:42

## 2021-07-16 RX ADMIN — INSULIN ASPART 15 UNITS: 100 INJECTION, SOLUTION INTRAVENOUS; SUBCUTANEOUS at 09:50

## 2021-07-16 RX ADMIN — SODIUM CHLORIDE, PRESERVATIVE FREE 10 ML: 5 INJECTION INTRAVENOUS at 09:51

## 2021-07-16 RX ADMIN — HYDROMORPHONE HYDROCHLORIDE 1 MG: 1 INJECTION, SOLUTION INTRAMUSCULAR; INTRAVENOUS; SUBCUTANEOUS at 07:29

## 2021-07-16 RX ADMIN — GABAPENTIN 300 MG: 300 CAPSULE ORAL at 06:06

## 2021-07-16 RX ADMIN — OXYCODONE HYDROCHLORIDE AND ACETAMINOPHEN 2 TABLET: 7.5; 325 TABLET ORAL at 11:01

## 2021-07-16 RX ADMIN — LIDOCAINE 2 PATCH: 50 PATCH CUTANEOUS at 09:51

## 2021-07-16 RX ADMIN — ASPIRIN 81 MG: 81 TABLET, FILM COATED ORAL at 09:50

## 2021-07-16 RX ADMIN — HYDROMORPHONE HYDROCHLORIDE 1 MG: 1 INJECTION, SOLUTION INTRAMUSCULAR; INTRAVENOUS; SUBCUTANEOUS at 03:57

## 2021-07-16 RX ADMIN — INSULIN DETEMIR 30 UNITS: 100 INJECTION, SOLUTION SUBCUTANEOUS at 09:53

## 2021-07-16 RX ADMIN — ONDANSETRON 4 MG: 2 INJECTION INTRAMUSCULAR; INTRAVENOUS at 11:02

## 2021-07-16 RX ADMIN — ACYCLOVIR SODIUM 710 MG: 50 INJECTION, SOLUTION INTRAVENOUS at 06:06

## 2021-07-16 RX ADMIN — ACYCLOVIR 400 MG: 400 TABLET ORAL at 12:42

## 2021-07-16 NOTE — PLAN OF CARE
Goal Outcome Evaluation:      Pt A/O. UOP adequate. V/S baseline. Shingle spots remain covered. Will cont to monitor.

## 2021-07-16 NOTE — DISCHARGE INSTRUCTIONS
Follow up with pcp regarding blood sugars, uncontrolled type ii dm   And hypertension and weakness and hemiplegia left (chronic )  There was and is no evidence of any new cva     Low back pain and opoid dependence to some extent   Sees pain management  Please give him a copy of all of these notes to take with him and copy of prescriptions to take with him to pain management doctor.   He has an appt next week     He has been instructed in a low carb high protein, low fat diet  Dm type ii is uncontrolled with glycohgb of 13.5    Shingles new onset and rash with neuropathic sx present   In addition to percocet   Put him on flexeril and gababpentin as well     It is imperative that you see you pcp within two weeks.     acylovir for shingles is given to you for 7 days and this medication can adversely affect your liver and kidneys and close follow up is indicated.

## 2021-07-16 NOTE — DISCHARGE SUMMARY
[facility] Medicine Services  DISCHARGE SUMMARY       Date of Admission: 7/14/2021  Date of Discharge:  7/16/2021  Primary Care Physician: Provider, No Known    Presenting Problem/History of Present Illness:  Weakness of left leg [R29.898]  Weakness of left arm [R29.898]  Herpes zoster without complication [B02.9]   Hypertension controlled without meds  Severely uncontrolled type ii dm and glychogb of 16    Final Discharge Diagnoses:  Active Hospital Problems    Diagnosis     **Weakness of left arm     Shingles (herpes zoster) polyneuropathy     Shingles rash     DM (diabetes mellitus) type II uncontrolled, periph vascular disorder (CMS/HCC)     Type 2 diabetes mellitus with hyperglycemia (CMS/HCC)     Dehydration     Weight loss     Low back pain     Rash and nonspecific skin eruption     Closed head injury      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  Specifically the left leg much  weaker than it was prior to this episode      Medically complex patient     Uncontrolled type 2 diabetes mellitus with hyperglycemia (CMS/HCC)        Consults:   Consults       Date and Time Order Name Status Description    7/14/2021  1:42 PM Hospitalist (on-call MD unless specified)      7/14/2021  1:34 PM Hospitalist (on-call MD unless specified)      7/14/2021 11:32 AM Inpatient Neurology Consult Stroke Completed             Procedures Performed:   Radiological studies and neuro exam and   Neurology consult   Diabetic education   Control of pain   He Is on chronic pain meds   Pain management seeing him   Some narcotic seeking behavior   Neuropathy chronic   Did not get shingles vaccine.                      Pertinent Test Results:   Lab Results (most recent)       Procedure Component Value Units Date/Time    POC Glucose Once [[id number]]  (Normal) Collected: 07/16/21 1132    Specimen: Blood Updated: 07/16/21 1144     Glucose 117 mg/dL      Comment: : [id number] JOHN DECKERMeter ID: [id number]       POC Glucose Once [[id number]]  (Abnormal) Collected: 07/16/21 0613    Specimen: Blood Updated: 07/16/21 0641     Glucose 135 mg/dL      Comment: : [id number] MARQUIS AGUILARMeter ID: [id number]       Comprehensive Metabolic Panel [[id number]]  (Abnormal) Collected: 07/16/21 0354    Specimen: Blood Updated: 07/16/21 0457     Glucose 128 mg/dL      BUN 5 mg/dL      Creatinine 0.64 mg/dL      Sodium 134 mmol/L      Potassium 3.6 mmol/L      Chloride 99 mmol/L      CO2 33.0 mmol/L      Calcium 8.7 mg/dL      Total Protein 6.3 g/dL      Albumin 3.60 g/dL      ALT (SGPT) 33 U/L      AST (SGOT) 36 U/L      Alkaline Phosphatase 154 U/L      Total Bilirubin 0.3 mg/dL      eGFR Non African Amer 128 mL/min/1.73      Globulin 2.7 gm/dL      A/G Ratio 1.3 g/dL      BUN/Creatinine Ratio 7.8     Anion Gap 2.0 mmol/L     Narrative:      GFR Normal >60  Chronic Kidney Disease <60  Kidney Failure <15      CBC (No Diff) [[id number]]  (Abnormal) Collected: 07/16/21 0354    Specimen: Blood Updated: 07/16/21 0423     WBC 6.09 10*3/mm3      RBC 4.10 10*6/mm3      Hemoglobin 12.5 g/dL      Hematocrit 36.2 %      MCV 88.3 fL      MCH 30.5 pg      MCHC 34.5 g/dL      RDW 12.5 %      RDW-SD 40.3 fl      MPV 10.5 fL      Platelets 321 10*3/mm3     Comprehensive Metabolic Panel [[id number]]  (Abnormal) Collected: 07/15/21 0353    Specimen: Blood Updated: 07/15/21 0550     Glucose 96 mg/dL      BUN 6 mg/dL      Creatinine 0.70 mg/dL      Sodium 137 mmol/L      Potassium 3.5 mmol/L      Comment: Slight hemolysis detected by analyzer. Results may be affected.        Chloride 100 mmol/L      CO2 28.0 mmol/L      Calcium  8.6 mg/dL      Total Protein 6.8 g/dL      Albumin 4.10 g/dL      ALT (SGPT) 41 U/L      AST (SGOT) 70 U/L      Alkaline Phosphatase 182 U/L      Total Bilirubin 0.5 mg/dL      eGFR Non African Amer 116 mL/min/1.73      Globulin 2.7 gm/dL      A/G Ratio 1.5 g/dL      BUN/Creatinine Ratio 8.6     Anion Gap 9.0 mmol/L     Narrative:      GFR Normal >60  Chronic Kidney Disease <60  Kidney Failure <15      Acetone [[id number]]  (Normal) Collected: 07/15/21 0353    Specimen: Blood Updated: 07/15/21 0519     Acetone Negative    Hemoglobin A1c [[id number]]  (Abnormal) Collected: 07/15/21 0353    Specimen: Blood Updated: 07/15/21 0515     Hemoglobin A1C 13.10 %     Narrative:      Hemoglobin A1C Ranges:    Increased Risk for Diabetes  5.7% to 6.4%  Diabetes                     >= 6.5%  Diabetic Goal                < 7.0%    CBC (No Diff) [[id number]]  (Normal) Collected: 07/15/21 0353    Specimen: Blood Updated: 07/15/21 0506     WBC 6.77 10*3/mm3      RBC 4.52 10*6/mm3      Hemoglobin 13.8 g/dL      Hematocrit 40.6 %      MCV 89.8 fL      MCH 30.5 pg      MCHC 34.0 g/dL      RDW 12.6 %      RDW-SD 40.9 fl      MPV 11.0 fL      Platelets 341 10*3/mm3     Basic Metabolic Panel [[id number]]  (Abnormal) Collected: 07/14/21 1446    Specimen: Blood Updated: 07/14/21 1515     Glucose 313 mg/dL      BUN 4 mg/dL      Creatinine 0.83 mg/dL      Sodium 128 mmol/L      Potassium 3.8 mmol/L      Chloride 89 mmol/L      CO2 31.0 mmol/L      Calcium 9.2 mg/dL      eGFR Non African Amer 95 mL/min/1.73      BUN/Creatinine Ratio 4.8     Anion Gap 8.0 mmol/L     Narrative:      GFR Normal >60  Chronic Kidney Disease <60  Kidney Failure <15      Acetone [[id number]]  (Normal) Collected: 07/14/21 1446    Specimen: Blood Updated: 07/14/21 1506     Acetone Negative    COVID-19 and FLU A/B PCR - Swab, Nasopharynx [[id number]]  (Normal) Collected: 07/14/21 1342    Specimen: Swab from Nasopharynx Updated: 07/14/21 1411     COVID19 Not Detected      Influenza A PCR Not Detected     Influenza B PCR Not Detected    Narrative:      Fact sheet for providers: https://www.fda.gov/media/302274/download    Fact sheet for patients: https://www.fda.gov/media/141441/download    Test performed by PCR.    Extra Tubes [[id number]] Collected: 07/14/21 1022    Specimen: Blood, Venous Line Updated: 07/14/21 1131    Narrative:      The following orders were created for panel order Extra Tubes.  Procedure                               Abnormality         Status                     ---------                               -----------         ------                     Lavender Top[[id number]]                                     Final result               Gold Top - SST[[id number]]                                   Final result                 Please view results for these tests on the individual orders.    Lavender Top [[id number]] Collected: 07/14/21 1022    Specimen: Blood Updated: 07/14/21 1131     Extra Tube hold for add-on     Comment: Auto resulted       Gold Top - SST [[id number]] Collected: 07/14/21 1022    Specimen: Blood Updated: 07/14/21 1131     Extra Tube Hold for add-ons.     Comment: Auto resulted.       CBC & Differential [[id number]]  (Abnormal) Collected: 07/14/21 1022    Specimen: Blood Updated: 07/14/21 1103    Narrative:      The following orders were created for panel order CBC & Differential.  Procedure                               Abnormality         Status                     ---------                               -----------         ------                     CBC Auto Differential[[id number]]        Abnormal            Final result                 Please view results for these tests on the individual orders.    CBC Auto Differential [[id number]]  (Abnormal) Collected: 07/14/21 1022    Specimen: Blood Updated: 07/14/21 1103     WBC 8.67 10*3/mm3      RBC 4.94 10*6/mm3      Hemoglobin 15.5 g/dL      Hematocrit 43.8 %      MCV 88.7 fL      MCH 31.4 pg      MCHC  35.4 g/dL      RDW 12.7 %      RDW-SD 40.4 fl      MPV 11.2 fL      Platelets 423 10*3/mm3      Neutrophil % 67.5 %      Lymphocyte % 16.8 %      Monocyte % 12.6 %      Eosinophil % 1.5 %      Basophil % 1.0 %      Immature Grans % 0.6 %      Neutrophils, Absolute 5.85 10*3/mm3      Lymphocytes, Absolute 1.46 10*3/mm3      Monocytes, Absolute 1.09 10*3/mm3      Eosinophils, Absolute 0.13 10*3/mm3      Basophils, Absolute 0.09 10*3/mm3      Immature Grans, Absolute 0.05 10*3/mm3      nRBC 0.0 /100 WBC     Troponin [[id number]]  (Normal) Collected: 07/14/21 1022    Specimen: Blood Updated: 07/14/21 1052     Troponin T <0.010 ng/mL     Narrative:      Troponin T Reference Range:  <= 0.03 ng/mL-   Negative for AMI  >0.03 ng/mL-     Abnormal for myocardial necrosis.  Clinicians would have to utilize clinical acumen, EKG, Troponin and serial changes to determine if it is an Acute Myocardial Infarction or myocardial injury due to an underlying chronic condition.       Results may be falsely decreased if patient taking Biotin.      CK [[id number]]  (Normal) Collected: 07/14/21 1022    Specimen: Blood Updated: 07/14/21 1052     Creatine Kinase 27 U/L     Magnesium [[id number]]  (Normal) Collected: 07/14/21 1022    Specimen: Blood Updated: 07/14/21 1052     Magnesium 1.9 mg/dL     Protime-INR [[id number]]  (Normal) Collected: 07/14/21 1022    Specimen: Blood Updated: 07/14/21 1046     Protime 13.3 Seconds      INR 1.02    Narrative:      Therapeutic range for most indications is 2.0-3.0 INR,  or 2.5-3.5 for mechanical heart valves.          Imaging Results (Most Recent)       Procedure Component Value Units Date/Time    MRI Brain Without Contrast [[id number]] Collected: 07/14/21 1452     Updated: 07/14/21 1602    Narrative:      EXAM DESCRIPTION: MRI BRAIN WO CONTRAST    CLINICAL HISTORY: 60-year-old male for stroke follow-up. CVA x3  in past with left hemiplegia. Follow-up on CVA and recurrent  neurological  symptoms.    COMPARISON: CT/CT Perfusion 7/14/2021.    TECHNIQUE: Multisequence multiplanar imaging of the brain is  performed without contrast.      FINDINGS:   Brain Parenchyma:  The craniovertebral junction and the included  cervical cord are unremarkable. No Chiari malformation.  Unremarkable appearance of the pituitary and stalk. The midline  structures are intact.    No evidence of intracranial hemorrhage or suspicious extra-axial  fluid collections. Small focus of gradient susceptibility within  the right basal ganglia/capsule region representing  mineralization and/or nonspecific sequela of prior insult with  hemosiderin deposition. No edema, midline shift, or mass effect.    The cerebellopontine angle regions are unremarkable. There is  mild prominence of the perivascular spaces bilaterally.    Diffusion: No evidence of restricted diffusion supportive of  acute/subacute infarct.    Atrophy: Mild volume loss    Microvascular Disease: Mild sequela.    Vessels: There is signal void within major intracranial vessels.    Extra-axial Spaces: Unremarkable allowing for the degree of  volume loss.    Ventricles: No hydrocephalus.    Bones: No suspicious marrow signal alteration identified.    Sinuses and Mastoids: Mucosal thickening with subtotal  opacification of the right sphenoid sinus. No significant mastoid  effusion.    Scalp and Soft Tissues: Unremarkable.     Orbits: Unremarkable.        Impression:      No evidence of intracranial hemorrhage, mass effect, or acute  infarction.      Mild volume loss and sequela of chronic microangiopathic change.    Mucosal thickening with subtotal opacification of the right  sphenoid sinus.    Electronically signed by:  Wm Bell MD  7/14/2021 4:01 PM  CDT Workstation: [id number]    CT Angiogram Head w AI Analysis of LVO [[id number]] Collected: 07/14/21 1200     Updated: 07/14/21 1335    Narrative:      PROCEDURE: CT ANGIOGRAM HEAD W AI ANALYSIS OF LVO, CT  NECK  ANGIOGRAPHY WITHOUT THEN WITH IV CONTRAST    CLINICAL HISTORY: left sided weakness.    COMPARISON: CT head August 20, 2019. CTA head 7/14/2021. CT  cerebral perfusion scan 7/14/2021.    TECHNIQUE:   CT angiography of the head and neck was performed with  intravenous contrast on the level of the aortic arch to the  vertex of the brain in orthogonal planes, along with 3D  reconstruction of the arterial vasculature of the head and neck  from the source images.     Measurement of carotid stenosis is based on NASCET criteria which  calculates the percentage of stenosis relative to the luminal  diameter of the normal carotid artery distal to the stenosis.    RAPID AI large vessel occlusion (LVO) software analysis was  utilized during this exam.    CONTRAST: 90 mL IV Isovue 370    This exam was performed using radiation doses that are as low as  reasonably achievable (ALARA).  This exam was performed according to our departmental dose  optimization program, which includes automated exposure control,  adjustment of the mA and/or KV according to patient size and/or  use of iterative reconstruction technique.    FINDINGS:   There is no hemodynamically significant stenosis or dissection in  the bilateral common carotid, bilateral proximal internal carotid  or the visualized portions of the bilateral external carotid  arteries.     The carotid bulbs are without significant stenosis.    There is normal takeoff of the great vessels from the aortic  arch.    The bilateral vertebral arteries appear patent with no evidence  of dissection on either side.  The basilar tip appears normal.     There is no hemodynamically significant stenosis or a focal  aneurysm formation, in the intracranial portions of the bilateral  internal carotid arteries, bilateral carotid siphons, bilateral  anterior, middle and posterior cerebral arteries and their  visualized branches.     Limited evaluation of the dural sinuses and the intracranial  and  venous system shows a widely patent superior sagittal sinus,  straight sinus and bilateral internal cerebral veins.  There is a hypoplastic left A1 segment in the large anterior  communicating artery, likely congenital normal variant.  RAPID AI large vessel occlusion (LVO) software analysis shows no  evidence of large vessel occlusion in the brain.    The lung apices appear unremarkable.  The cervical spine appeared unremarkable.      Impression:      No evidence of stenosis, aneurysm, or dissection.    Electronically signed by:  Kirt Galvez MD  7/14/2021 1:34 PM CDT  Workstation: [id number]    CT Angiogram Neck [[id number]] Collected: 07/14/21 1200     Updated: 07/14/21 1335    Narrative:      PROCEDURE: CT ANGIOGRAM HEAD W AI ANALYSIS OF LVO, CT NECK  ANGIOGRAPHY WITHOUT THEN WITH IV CONTRAST    CLINICAL HISTORY: left sided weakness.    COMPARISON: CT head August 20, 2019. CTA head 7/14/2021. CT  cerebral perfusion scan 7/14/2021.    TECHNIQUE:   CT angiography of the head and neck was performed with  intravenous contrast on the level of the aortic arch to the  vertex of the brain in orthogonal planes, along with 3D  reconstruction of the arterial vasculature of the head and neck  from the source images.     Measurement of carotid stenosis is based on NASCET criteria which  calculates the percentage of stenosis relative to the luminal  diameter of the normal carotid artery distal to the stenosis.    RAPID AI large vessel occlusion (LVO) software analysis was  utilized during this exam.    CONTRAST: 90 mL IV Isovue 370    This exam was performed using radiation doses that are as low as  reasonably achievable (ALARA).  This exam was performed according to our departmental dose  optimization program, which includes automated exposure control,  adjustment of the mA and/or KV according to patient size and/or  use of iterative reconstruction technique.    FINDINGS:   There is no hemodynamically significant  stenosis or dissection in  the bilateral common carotid, bilateral proximal internal carotid  or the visualized portions of the bilateral external carotid  arteries.     The carotid bulbs are without significant stenosis.    There is normal takeoff of the great vessels from the aortic  arch.    The bilateral vertebral arteries appear patent with no evidence  of dissection on either side.  The basilar tip appears normal.     There is no hemodynamically significant stenosis or a focal  aneurysm formation, in the intracranial portions of the bilateral  internal carotid arteries, bilateral carotid siphons, bilateral  anterior, middle and posterior cerebral arteries and their  visualized branches.     Limited evaluation of the dural sinuses and the intracranial and  venous system shows a widely patent superior sagittal sinus,  straight sinus and bilateral internal cerebral veins.  There is a hypoplastic left A1 segment in the large anterior  communicating artery, likely congenital normal variant.  RAPID AI large vessel occlusion (LVO) software analysis shows no  evidence of large vessel occlusion in the brain.    The lung apices appear unremarkable.  The cervical spine appeared unremarkable.      Impression:      No evidence of stenosis, aneurysm, or dissection.    Electronically signed by:  Kirt Galvez MD  7/14/2021 1:34 PM CDT  Workstation: [id number]    CT CEREBRAL PERFUSION WITH & WITHOUT CONTRAST [[id number]] Collected: 07/14/21 1155     Updated: 07/14/21 1319    Narrative:      PROCEDURE: CT HEAD WITHOUT THEN WITH IV CONTRAST     DATE: 7/14/2021 11:55 AM CDT    INDICATION: 60 year old Male. Neuro deficit, acute stroke  suspected     COMPARISON: None.    TECHNIQUE: Computed tomographic imaging of the head was performed  utilizing dynamic imaging at multiple time points following  administration of 50 mL of Isovue-370.  The images were processed  using automated RapidAI software.  Standard parametric maps  were  generated from the source images including time to maximum  (Tmax), mean transit time (MTT), cerebral blood flow (CBF), and  cerebral blood volume (CBV). This exam was performed according to  our departmental dose-optimization program which includes  automated exposure control, adjustment of the mA and/or kV  according to patient size and/or use of iterative reconstruction  technique.    FINDINGS:  Image quality: Adequate  Arterial input function STEVE selected: Right MCA.  Venous outflow function STEVE: Torcula.    TOTAL HYPOPERFUSION: Using the Tmax threshold of greater than 6  seconds, there no hypoperfusion identified.    CORE INFARCT: Using a threshold of CBF less than 30%, no areas of  ischemic core are identified.    PENUMBRA: No penumbra volume (mismatch volume), is identified.  Mismatch volume: 0 mL  Mismatch ratio: None    TMAX (time to the peak of the residual function): Symmetric.  MEAN TRANSIT TIME (MTT): Symmetric.  CEREBRAL BLOOD FLOW (CBF): Symmetric.  CEREBRAL BLOOD VOLUME (CBV): Symmetric.      Impression:      No CT brain perfusion evidence of acute infarct.    Electronically signed by:  Kirt Galvez MD  7/14/2021 1:18 PM CDT  Workstation: [id number]    CT Head Without Contrast [[id number]] Collected: 07/14/21 1030     Updated: 07/14/21 1049    Narrative:      EXAM: CT HEAD WITHOUT IV CONTRAST    COMPARISONS: CT head 8/20/2019    INDICATION: left sided weakness    TECHNIQUE: Noncontrast CT images were obtained of the brain.  Reformats were provided. All CT scans at this facility uses dose  modulation, iterative reconstruction, and/or weight-based dosing  when appropriate to achieve a radiation dose as low as reasonably  achievable.    FINDINGS:    No intracranial hemorrhage, appreciable mass, mass effect or  midline shift.     There is preservation of the gray-white matter differentiation.  No dense MCA or insular ribbon sign.    There is cerebral volume loss with ex vacuo  "ventricular  dilatation. Confluent periventricular hypodensities are likely  sequela of chronic ischemic microvascular disease. Faint right  basal ganglia calcification is unchanged.    Calvarium is intact. Mucosal thickening in the right sphenoid  sinus. Mastoid air cells are clear. Orbits are unremarkable.      Impression:      No acute intracranial process.    Unchanged sequela of chronic ischemic microvascular disease and  cerebral volume loss.    Electronically signed by:  Yuniel España MD  7/14/2021  10:48 AM CDT Workstation: [id number]            Chief Complaint on Day of Discharge: feeling much better, and left sided weakness back to baseline and right sided flank and abdominal pain due to neuropathic pain from shingles improved. Type ii dm somewhat better controlled.     Hospital Course:  The patient is a 60 y.o. male who presented to [facility] with severely uncontrolled type ii dm and hyperglycemia.  He has had recurrent strokes and left sided weakness which he perceived as worsening.  Was admitted for worsening neurological sx. (he states, each time, he feels weaker on left, he gets anxious and scared due to cva in the past.).  additionally, \"incidental pain as she describes to along right lower abdomen and flank with vesicular erythematous angry appearing rash\"  which appears consistent with hepes zoster outbreak and shingles rash, did not get vaccine done.      Admitted for pain control, neurology evaluation, uncontrolled type ii dm contributing to the above.  And dehydration .  Weakness and not feeling well.  He is being admitted to step down unit and for evaluation   Diabetes needs aggressive control.     Neurology evaluation ordered.     Determination no new cva   Perhaps tia   Sx resolved.   Blood sugars addressed.   Hypertension controlled.   Zoster rash add in acylovir and pain meds     Address pain management appt and defer pain meds to pain clinic   And go over " "narcotic dependence issues    Condition on Discharge: improved greatly and ready for discharge.   Glucometer   Chemstrips   Instructed in diabetic diet.   Bp controlled  Neuropathic pain   Shingles rash  Cva in the past stable.     Physical Exam on Discharge:  /82   Pulse 103   Temp 97.7 °F (36.5 °C) (Oral)   Resp 16   Ht 175.3 cm (69\")   Wt 82.6 kg (182 lb 1.6 oz)   SpO2 92%   BMI 26.89 kg/m²   Physical Exam  Oral dry and clear   Cv rrr   No murmur audible   Lungs coarse and clear, no wheezes   Abd rash along right flank and right anterior abdomen above umbilicus that is vesiculated and erythematous and lesions are drying and healing.   Ext no edema noted   Peripheral pulses palpable.  Neuro left sided weakness chornic and no change in neuro status.       Discharge Disposition:  Home-Health Care Svc  And discharge home     Discharge Medications:     Discharge Medications        New Medications        Instructions Start Date   acyclovir 400 MG tablet  Commonly known as: ZOVIRAX   400 mg, Oral, 5 Times Daily, Take no more than 5 doses a day.      aspirin 81 MG EC tablet   81 mg, Oral, Daily   Start Date: July 17, 2021     cyclobenzaprine 10 MG tablet  Commonly known as: FLEXERIL   10 mg, Oral, 3 Times Daily PRN      gabapentin 300 MG capsule  Commonly known as: NEURONTIN   300 mg, Oral, 3 Times Daily      insulin aspart 100 UNIT/ML injection  Commonly known as: novoLOG  Replaces: insulin lispro 100 UNIT/ML injection   15 Units, Subcutaneous, 3 Times Daily Before Meals      insulin detemir 100 UNIT/ML injection  Commonly known as: LEVEMIR  Replaces: BASAGLAR KWIKPEN 100 UNIT/ML injection pen   30 Units, Subcutaneous, 2 Times Daily             Continue These Medications        Instructions Start Date   DULoxetine 20 MG capsule  Commonly known as: CYMBALTA   20 mg, Oral, Nightly      lidocaine 5 %  Commonly known as: LIDODERM   1 patch, Transdermal, Every 24 Hours Scheduled, Remove & Discard patch within 12 " hours or as directed by MD      metFORMIN 1000 MG tablet  Commonly known as: GLUCOPHAGE   2,000 mg, Oral, 2 Times Daily With Meals      oxyCODONE-acetaminophen 7.5-325 MG per tablet  Commonly known as: PERCOCET   1 tablet, Oral, 5 Times Daily             Stop These Medications      BASAGLAR KWIKPEN 100 UNIT/ML injection pen  Replaced by: insulin detemir 100 UNIT/ML injection     insulin lispro 100 UNIT/ML injection  Commonly known as: humaLOG  Replaced by: insulin aspart 100 UNIT/ML injection              Discharge Diet:  diabetic and heart healthy diet     Activity at Discharge:  as tolerated.     Discharge Care Plan/Instructions: with pcp and cardiology and neurology and pain management.     Follow-up Appointments:       Test Results Pending at Discharge:      The patient has current or prior documentation of LVEF less than 40%, or moderate to severely depressed left ventricular systolic function. The patent was prescribed or already taking an ACE inhibitor or ARB.     The patient has current or prior documentation of LVEF less than 40%, or moderate to severely depressed left ventricular systolic function. The patient was prescribed or already taking a beta-blocker.       Thalia Easley MD    Time:  Time greater than  30 min spent in going over discharge instructions and prescriptions and emphasing the importance of close follow up     Time spent greater than 30 min on discharge.

## 2021-07-17 ENCOUNTER — READMISSION MANAGEMENT (OUTPATIENT)
Dept: CALL CENTER | Facility: HOSPITAL | Age: 61
End: 2021-07-17

## 2021-07-17 NOTE — OUTREACH NOTE
Prep Survey      Responses   [facility] patient discharged from?  [facility]   Is LACE score < 7 ?  No   Emergency Room discharge w/ pulse ox?  No   Eligibility  Readm Mgmt   Discharge diagnosis  acute onset of left sided weakness, worse than his baseline.    Does the patient have one of the following disease processes/diagnoses(primary or secondary)?  Stroke (TIA)   Does the patient have Home health ordered?  No   Is there a DME ordered?  No   Prep survey completed?  Yes          Norma Oh RN

## 2021-07-20 ENCOUNTER — READMISSION MANAGEMENT (OUTPATIENT)
Dept: CALL CENTER | Facility: HOSPITAL | Age: 61
End: 2021-07-20

## 2021-07-20 NOTE — OUTREACH NOTE
Stroke Week 1 Survey      Responses   [facility] patient discharged from?  [facility]   Does the patient have one of the following disease processes/diagnoses(primary or secondary)?  Stroke (TIA)   Week 1 attempt successful?  No   Unsuccessful attempts  Attempt 1          Fartun Licea RN

## 2021-07-23 ENCOUNTER — APPOINTMENT (OUTPATIENT)
Dept: GENERAL RADIOLOGY | Facility: HOSPITAL | Age: 61
End: 2021-07-23

## 2021-07-23 ENCOUNTER — APPOINTMENT (OUTPATIENT)
Dept: CT IMAGING | Facility: HOSPITAL | Age: 61
End: 2021-07-23

## 2021-07-23 ENCOUNTER — HOSPITAL ENCOUNTER (EMERGENCY)
Facility: HOSPITAL | Age: 61
Discharge: HOME | End: 2021-07-23
Attending: EMERGENCY MEDICINE | Admitting: EMERGENCY MEDICINE

## 2021-07-23 VITALS
HEART RATE: 110 BPM | BODY MASS INDEX: 26.81 KG/M2 | RESPIRATION RATE: 18 BRPM | WEIGHT: 181 LBS | HEIGHT: 69 IN | OXYGEN SATURATION: 98 % | SYSTOLIC BLOOD PRESSURE: 157 MMHG | DIASTOLIC BLOOD PRESSURE: 96 MMHG | TEMPERATURE: 98.4 F

## 2021-07-23 DIAGNOSIS — J18.9 PNEUMONIA OF RIGHT LUNG DUE TO INFECTIOUS ORGANISM, UNSPECIFIED PART OF LUNG: Primary | ICD-10-CM

## 2021-07-23 LAB
ALBUMIN SERPL-MCNC: 4.5 G/DL (ref 3.5–5.2)
ALBUMIN/GLOB SERPL: 1.2 G/DL
ALP SERPL-CCNC: 143 U/L (ref 39–117)
ALT SERPL W P-5'-P-CCNC: 6 U/L (ref 1–41)
ANION GAP SERPL CALCULATED.3IONS-SCNC: 17 MMOL/L (ref 5–15)
AST SERPL-CCNC: 9 U/L (ref 1–40)
BASOPHILS # BLD AUTO: 0.09 10*3/MM3 (ref 0–0.2)
BASOPHILS NFR BLD AUTO: 0.7 % (ref 0–1.5)
BILIRUB SERPL-MCNC: 0.8 MG/DL (ref 0–1.2)
BUN SERPL-MCNC: 9 MG/DL (ref 6–20)
BUN/CREAT SERPL: 10.8 (ref 7–25)
CALCIUM SPEC-SCNC: 9.5 MG/DL (ref 8.6–10.5)
CHLORIDE SERPL-SCNC: 92 MMOL/L (ref 98–107)
CO2 SERPL-SCNC: 24 MMOL/L (ref 22–29)
CREAT SERPL-MCNC: 0.83 MG/DL (ref 0.76–1.27)
DEPRECATED RDW RBC AUTO: 39.9 FL (ref 37–54)
EOSINOPHIL # BLD AUTO: 0.04 10*3/MM3 (ref 0–0.4)
EOSINOPHIL NFR BLD AUTO: 0.3 % (ref 0.3–6.2)
ERYTHROCYTE [DISTWIDTH] IN BLOOD BY AUTOMATED COUNT: 13 % (ref 12.3–15.4)
GFR SERPL CREATININE-BSD FRML MDRD: 95 ML/MIN/1.73
GLOBULIN UR ELPH-MCNC: 3.8 GM/DL
GLUCOSE SERPL-MCNC: 398 MG/DL (ref 65–99)
HCT VFR BLD AUTO: 44.8 % (ref 37.5–51)
HGB BLD-MCNC: 16 G/DL (ref 13–17.7)
HOLD SPECIMEN: NORMAL
IMM GRANULOCYTES # BLD AUTO: 0.07 10*3/MM3 (ref 0–0.05)
IMM GRANULOCYTES NFR BLD AUTO: 0.5 % (ref 0–0.5)
LIPASE SERPL-CCNC: 34 U/L (ref 13–60)
LYMPHOCYTES # BLD AUTO: 2.75 10*3/MM3 (ref 0.7–3.1)
LYMPHOCYTES NFR BLD AUTO: 20.8 % (ref 19.6–45.3)
MCH RBC QN AUTO: 30.8 PG (ref 26.6–33)
MCHC RBC AUTO-ENTMCNC: 35.7 G/DL (ref 31.5–35.7)
MCV RBC AUTO: 86.3 FL (ref 79–97)
MONOCYTES # BLD AUTO: 1.46 10*3/MM3 (ref 0.1–0.9)
MONOCYTES NFR BLD AUTO: 11.1 % (ref 5–12)
NEUTROPHILS NFR BLD AUTO: 66.6 % (ref 42.7–76)
NEUTROPHILS NFR BLD AUTO: 8.78 10*3/MM3 (ref 1.7–7)
NRBC BLD AUTO-RTO: 0 /100 WBC (ref 0–0.2)
NT-PROBNP SERPL-MCNC: 220.5 PG/ML (ref 0–900)
PLATELET # BLD AUTO: 421 10*3/MM3 (ref 140–450)
PMV BLD AUTO: 10.8 FL (ref 6–12)
POTASSIUM SERPL-SCNC: 3.8 MMOL/L (ref 3.5–5.2)
PROT SERPL-MCNC: 8.3 G/DL (ref 6–8.5)
QT INTERVAL: 296 MS
QTC INTERVAL: 427 MS
RBC # BLD AUTO: 5.19 10*6/MM3 (ref 4.14–5.8)
SODIUM SERPL-SCNC: 133 MMOL/L (ref 136–145)
TROPONIN T SERPL-MCNC: <0.01 NG/ML (ref 0–0.03)
TROPONIN T SERPL-MCNC: <0.01 NG/ML (ref 0–0.03)
WBC # BLD AUTO: 13.19 10*3/MM3 (ref 3.4–10.8)
WHOLE BLOOD HOLD SPECIMEN: NORMAL

## 2021-07-23 PROCEDURE — 99284 EMERGENCY DEPT VISIT MOD MDM: CPT

## 2021-07-23 PROCEDURE — 83690 ASSAY OF LIPASE: CPT | Performed by: EMERGENCY MEDICINE

## 2021-07-23 PROCEDURE — 83880 ASSAY OF NATRIURETIC PEPTIDE: CPT | Performed by: STUDENT IN AN ORGANIZED HEALTH CARE EDUCATION/TRAINING PROGRAM

## 2021-07-23 PROCEDURE — 93010 ELECTROCARDIOGRAM REPORT: CPT | Performed by: INTERNAL MEDICINE

## 2021-07-23 PROCEDURE — 36415 COLL VENOUS BLD VENIPUNCTURE: CPT

## 2021-07-23 PROCEDURE — 96376 TX/PRO/DX INJ SAME DRUG ADON: CPT

## 2021-07-23 PROCEDURE — 0 IOPAMIDOL PER 1 ML: Performed by: EMERGENCY MEDICINE

## 2021-07-23 PROCEDURE — 25010000002 ONDANSETRON PER 1 MG: Performed by: EMERGENCY MEDICINE

## 2021-07-23 PROCEDURE — 93005 ELECTROCARDIOGRAM TRACING: CPT | Performed by: STUDENT IN AN ORGANIZED HEALTH CARE EDUCATION/TRAINING PROGRAM

## 2021-07-23 PROCEDURE — 84484 ASSAY OF TROPONIN QUANT: CPT | Performed by: STUDENT IN AN ORGANIZED HEALTH CARE EDUCATION/TRAINING PROGRAM

## 2021-07-23 PROCEDURE — 25010000002 MORPHINE PER 10 MG: Performed by: EMERGENCY MEDICINE

## 2021-07-23 PROCEDURE — 25010000002 PROCHLORPERAZINE 10 MG/2ML SOLUTION: Performed by: EMERGENCY MEDICINE

## 2021-07-23 PROCEDURE — 84484 ASSAY OF TROPONIN QUANT: CPT | Performed by: EMERGENCY MEDICINE

## 2021-07-23 PROCEDURE — 70450 CT HEAD/BRAIN W/O DYE: CPT

## 2021-07-23 PROCEDURE — 80053 COMPREHEN METABOLIC PANEL: CPT | Performed by: STUDENT IN AN ORGANIZED HEALTH CARE EDUCATION/TRAINING PROGRAM

## 2021-07-23 PROCEDURE — 71045 X-RAY EXAM CHEST 1 VIEW: CPT

## 2021-07-23 PROCEDURE — 93005 ELECTROCARDIOGRAM TRACING: CPT | Performed by: EMERGENCY MEDICINE

## 2021-07-23 PROCEDURE — 85025 COMPLETE CBC W/AUTO DIFF WBC: CPT | Performed by: STUDENT IN AN ORGANIZED HEALTH CARE EDUCATION/TRAINING PROGRAM

## 2021-07-23 PROCEDURE — 96374 THER/PROPH/DIAG INJ IV PUSH: CPT

## 2021-07-23 PROCEDURE — 96375 TX/PRO/DX INJ NEW DRUG ADDON: CPT

## 2021-07-23 PROCEDURE — 74177 CT ABD & PELVIS W/CONTRAST: CPT

## 2021-07-23 PROCEDURE — 71275 CT ANGIOGRAPHY CHEST: CPT

## 2021-07-23 RX ORDER — SODIUM CHLORIDE 0.9 % (FLUSH) 0.9 %
10 SYRINGE (ML) INJECTION AS NEEDED
Status: DISCONTINUED | OUTPATIENT
Start: 2021-07-23 | End: 2021-07-24 | Stop reason: HOSPADM

## 2021-07-23 RX ORDER — AMOXICILLIN AND CLAVULANATE POTASSIUM 875; 125 MG/1; MG/1
1 TABLET, FILM COATED ORAL 2 TIMES DAILY
Qty: 14 TABLET | Refills: 0 | Status: SHIPPED | OUTPATIENT
Start: 2021-07-23 | End: 2021-07-23 | Stop reason: SDUPTHER

## 2021-07-23 RX ORDER — AMOXICILLIN AND CLAVULANATE POTASSIUM 875; 125 MG/1; MG/1
1 TABLET, FILM COATED ORAL 2 TIMES DAILY
Qty: 14 TABLET | Refills: 0 | Status: SHIPPED | OUTPATIENT
Start: 2021-07-23 | End: 2021-07-29 | Stop reason: HOSPADM

## 2021-07-23 RX ORDER — DOXYCYCLINE 100 MG/1
100 CAPSULE ORAL ONCE
Status: COMPLETED | OUTPATIENT
Start: 2021-07-23 | End: 2021-07-23

## 2021-07-23 RX ORDER — PROCHLORPERAZINE EDISYLATE 5 MG/ML
10 INJECTION INTRAMUSCULAR; INTRAVENOUS ONCE
Status: COMPLETED | OUTPATIENT
Start: 2021-07-23 | End: 2021-07-23

## 2021-07-23 RX ORDER — DOXYCYCLINE 100 MG/1
100 CAPSULE ORAL 2 TIMES DAILY
Qty: 14 CAPSULE | Refills: 0 | Status: SHIPPED | OUTPATIENT
Start: 2021-07-23 | End: 2021-07-23 | Stop reason: SDUPTHER

## 2021-07-23 RX ORDER — AMOXICILLIN AND CLAVULANATE POTASSIUM 875; 125 MG/1; MG/1
1 TABLET, FILM COATED ORAL ONCE
Status: COMPLETED | OUTPATIENT
Start: 2021-07-23 | End: 2021-07-23

## 2021-07-23 RX ORDER — DOXYCYCLINE 100 MG/1
100 CAPSULE ORAL 2 TIMES DAILY
Qty: 14 CAPSULE | Refills: 0 | Status: SHIPPED | OUTPATIENT
Start: 2021-07-23 | End: 2021-07-28

## 2021-07-23 RX ORDER — ONDANSETRON 2 MG/ML
4 INJECTION INTRAMUSCULAR; INTRAVENOUS ONCE
Status: COMPLETED | OUTPATIENT
Start: 2021-07-23 | End: 2021-07-23

## 2021-07-23 RX ADMIN — MORPHINE SULFATE 4 MG: 4 INJECTION INTRAVENOUS at 22:14

## 2021-07-23 RX ADMIN — ONDANSETRON 4 MG: 2 INJECTION INTRAMUSCULAR; INTRAVENOUS at 19:36

## 2021-07-23 RX ADMIN — PROCHLORPERAZINE EDISYLATE 10 MG: 5 INJECTION INTRAMUSCULAR; INTRAVENOUS at 22:14

## 2021-07-23 RX ADMIN — IOPAMIDOL 90 ML: 755 INJECTION, SOLUTION INTRAVENOUS at 19:56

## 2021-07-23 RX ADMIN — SODIUM CHLORIDE 1000 ML: 9 INJECTION, SOLUTION INTRAVENOUS at 19:36

## 2021-07-23 RX ADMIN — AMOXICILLIN AND CLAVULANATE POTASSIUM 1 TABLET: 875; 125 TABLET, FILM COATED ORAL at 22:14

## 2021-07-23 RX ADMIN — DOXYCYCLINE 100 MG: 100 CAPSULE ORAL at 22:14

## 2021-07-23 RX ADMIN — MORPHINE SULFATE 4 MG: 4 INJECTION INTRAVENOUS at 19:37

## 2021-07-23 NOTE — ED PROVIDER NOTES
Subjective   60 year old male presents to the ED with a myriad of complaints. Chest pain, shortness of breath, palpitations, abdominal pain, headache, and continued pain at the area of shingles. Reports he was recently admitted for shingles. He was actually admitted for work-up of focal weakness of his left arm and shingles was diagnosed during that visit. No history of CAD. Reports he has just not felt well since his admission.     Family history, surgical history, social history, current medications and allergies are reviewed with the patient and triage documentation and vitals are reviewed.        History provided by:  Patient   used: No        Review of Systems   Constitutional: Negative for chills and fever.   HENT: Negative for congestion and sore throat.    Eyes: Negative for photophobia and visual disturbance.   Respiratory: Positive for shortness of breath. Negative for wheezing.    Cardiovascular: Positive for chest pain and palpitations.   Gastrointestinal: Positive for abdominal pain and nausea. Negative for diarrhea and vomiting.   Endocrine: Negative.    Genitourinary: Negative for dysuria, frequency and urgency.   Musculoskeletal: Positive for arthralgias, back pain and myalgias. Negative for neck pain.   Skin: Positive for rash.   Allergic/Immunologic: Negative.    Neurological: Positive for weakness (chronic left leg) and headaches. Negative for facial asymmetry, speech difficulty, light-headedness and numbness.   Hematological: Negative.    Psychiatric/Behavioral: Positive for sleep disturbance. The patient is nervous/anxious.        Past Medical History:   Diagnosis Date   • Arthritis    • Carpal tunnel syndrome    • Chronic pain syndrome    • Depressive disorder    • GERD (gastroesophageal reflux disease)    • Hernia    • History of closed head injury     with ICH and left weakness   • History of urinary system disease    • Hypertension    • Migraine    • Right shoulder pain     • Rotator cuff syndrome    • Type 2 diabetes mellitus (CMS/HCC)    • Ureteric stone        Allergies   Allergen Reactions   • Naproxen Other (See Comments) and Rash     Blisters in mouth   • Tramadol Rash     Patient states he gets a rash in his mouth.        Past Surgical History:   Procedure Laterality Date   • ABDOMINAL SURGERY  08/2016   • APPENDECTOMY N/A 1/27/2018    Procedure: APPENDECTOMY;  Surgeon: Rusty Palacio MD;  Location: [facility];  Service:    • CHOLECYSTECTOMY     • CIRCUMCISION     • CYSTOSCOPY  08/13/2003    Cystoscopy and removal of J stent. Right ureteral stent.   • CYSTOSCOPY  08/13/2003    Cystoscopy and right stone extraction and placement of 26x 6 J-stent.   • INCISION AND DRAINAGE ABSCESS N/A 7/26/2017    Procedure: INCISION AND DRAINAGE ABD.ABSCESS;  Surgeon: Rui Shaver MD;  Location: [facility];  Service:    • VENTRAL/INCISIONAL HERNIA REPAIR N/A 5/31/2017    Procedure: LAPAROSCOPIC POSSIBLE OPEN ADHESIOLYSIS AND VENTRAL/INCISIONAL HERNIA REPAIR ;  Surgeon: Rui Shaver MD;  Location: [facility];  Service:        Family History   Problem Relation Age of Onset   • No Known Problems Mother    • No Known Problems Father    • No Known Problems Sister    • No Known Problems Brother        Social History     Socioeconomic History   • Marital status:      Spouse name: Not on file   • Number of children: Not on file   • Years of education: Not on file   • Highest education level: Not on file   Tobacco Use   • Smoking status: Never Smoker   • Smokeless tobacco: Never Used   Substance and Sexual Activity   • Alcohol use: No   • Drug use: No   • Sexual activity: Defer           Objective   Physical Exam  Vitals and nursing note reviewed.   Constitutional:       General: He is not in acute distress.     Appearance: He is well-developed and overweight. He is not ill-appearing, toxic-appearing or diaphoretic.   HENT:      Head: Normocephalic.   Eyes:      Pupils: Pupils are equal,  round, and reactive to light.   Neck:      Vascular: No JVD.   Cardiovascular:      Rate and Rhythm: Normal rate and regular rhythm.  No extrasystoles are present.     Pulses:           Radial pulses are 2+ on the right side and 2+ on the left side.        Dorsalis pedis pulses are 2+ on the right side and 2+ on the left side.      Heart sounds: Normal heart sounds. No murmur heard.     Pulmonary:      Effort: Pulmonary effort is normal. No tachypnea or accessory muscle usage.      Breath sounds: No decreased breath sounds, wheezing, rhonchi or rales.   Chest:      Chest wall: No tenderness or crepitus.   Abdominal:      General: Bowel sounds are normal.      Palpations: Abdomen is soft. There is no hepatomegaly or splenomegaly.      Tenderness: There is no abdominal tenderness.   Musculoskeletal:         General: Normal range of motion.      Cervical back: Normal range of motion and neck supple.      Right lower leg: No tenderness. No edema.      Left lower leg: No tenderness. No edema.   Skin:     General: Skin is warm and dry.      Capillary Refill: Capillary refill takes less than 2 seconds.      Findings: Rash (right flank shingles) present.   Neurological:      General: No focal deficit present.      Mental Status: He is alert and oriented to person, place, and time.   Psychiatric:         Mood and Affect: Mood is anxious.         Procedures  none         ED Course      Labs Reviewed   COMPREHENSIVE METABOLIC PANEL - Abnormal; Notable for the following components:       Result Value    Glucose 398 (*)     Sodium 133 (*)     Chloride 92 (*)     Alkaline Phosphatase 143 (*)     Anion Gap 17.0 (*)     All other components within normal limits    Narrative:     GFR Normal >60  Chronic Kidney Disease <60  Kidney Failure <15     CBC WITH AUTO DIFFERENTIAL - Abnormal; Notable for the following components:    WBC 13.19 (*)     Neutrophils, Absolute 8.78 (*)     Monocytes, Absolute 1.46 (*)     Immature Grans, Absolute  0.07 (*)     All other components within normal limits   TROPONIN (IN-HOUSE) - Normal    Narrative:     Troponin T Reference Range:  <= 0.03 ng/mL-   Negative for AMI  >0.03 ng/mL-     Abnormal for myocardial necrosis.  Clinicians would have to utilize clinical acumen, EKG, Troponin and serial changes to determine if it is an Acute Myocardial Infarction or myocardial injury due to an underlying chronic condition.       Results may be falsely decreased if patient taking Biotin.     TROPONIN (IN-HOUSE) - Normal    Narrative:     Troponin T Reference Range:  <= 0.03 ng/mL-   Negative for AMI  >0.03 ng/mL-     Abnormal for myocardial necrosis.  Clinicians would have to utilize clinical acumen, EKG, Troponin and serial changes to determine if it is an Acute Myocardial Infarction or myocardial injury due to an underlying chronic condition.       Results may be falsely decreased if patient taking Biotin.     BNP (IN-HOUSE) - Normal    Narrative:     Among patients with dyspnea, NT-proBNP is highly sensitive for the detection of acute congestive heart failure. In addition NT-proBNP of <300 pg/ml effectively rules out acute congestive heart failure with 99% negative predictive value.    Results may be falsely decreased if patient taking Biotin.     LIPASE - Normal   RAINBOW DRAW    Narrative:     The following orders were created for panel order [place] Draw.  Procedure                               Abnormality         Status                     ---------                               -----------         ------                     Green Top (Gel)[[id number]]                                  Final result               Lavender Top[[id number]]                                     Final result               Gold Top - SST[[id number]]                                                                Please view results for these tests on the individual orders.   CBC AND DIFFERENTIAL    Narrative:     The following orders were created  for panel order CBC & Differential.  Procedure                               Abnormality         Status                     ---------                               -----------         ------                     CBC Auto Differential[[id number]]        Abnormal            Final result                 Please view results for these tests on the individual orders.   GREEN TOP   LAVENDER TOP     CT Head Without Contrast    Result Date: 7/23/2021  Narrative: EXAM DESCRIPTION: CT HEAD WO CONTRAST RadLex: CT HEAD WITHOUT IV CONTRAST CLINICAL HISTORY: 60 years  Male;  prev stroke, weakness TECHNOLOGIST NOTES: COMPARISONS: None currently available TECHNIQUE: Axial CT images were obtained from the skull base to vertex. This exam was performed according to our departmental dose-optimization program, which includes automated exposure control, adjustment of the mA and/or kV according to patient size and/or use of iterative reconstruction techniques. FINDINGS: No acute intracranial hemorrhage. No mass effect, midline shift or hydrocephalus. The gray-white matter differentiation is grossly unremarkable. No evidence of acute large territory infarct.   Within the broad range of normal, brain volume is age appropriate. Nonspecific low attenuation in the white matter bilaterally. This is most commonly related to age-indeterminate small vessel ischemic disease. The visualized paranasal sinuses are grossly unremarkable. The mastoid air cells are clear. There is mild calcification in the right basal ganglia.     Impression: 1.  No acute intracranial process is identified.  2.  Nonspecific low attenuation in the white matter bilaterally. This is most commonly related to age-indeterminate small vessel ischemic disease, 3.  If symptoms persist or further imaging is clinically indicated, consider follow-up MRI or repeat CT imaging Electronically signed by:  Prince Vogel MD  7/23/2021 8:20 PM CDT Workstation: [id number]    CT Head Without  Contrast    Result Date: 7/14/2021  Narrative: EXAM: CT HEAD WITHOUT IV CONTRAST COMPARISONS: CT head 8/20/2019 INDICATION: left sided weakness TECHNIQUE: Noncontrast CT images were obtained of the brain. Reformats were provided. All CT scans at this facility uses dose modulation, iterative reconstruction, and/or weight-based dosing when appropriate to achieve a radiation dose as low as reasonably achievable. FINDINGS:  No intracranial hemorrhage, appreciable mass, mass effect or midline shift. There is preservation of the gray-white matter differentiation. No dense MCA or insular ribbon sign. There is cerebral volume loss with ex vacuo ventricular dilatation. Confluent periventricular hypodensities are likely sequela of chronic ischemic microvascular disease. Faint right basal ganglia calcification is unchanged. Calvarium is intact. Mucosal thickening in the right sphenoid sinus. Mastoid air cells are clear. Orbits are unremarkable.     Impression: No acute intracranial process. Unchanged sequela of chronic ischemic microvascular disease and cerebral volume loss. Electronically signed by:  Yuniel España MD  7/14/2021 10:48 AM CDT Workstation: [id number]    CT Angiogram Neck    Result Date: 7/14/2021  Narrative: PROCEDURE: CT ANGIOGRAM HEAD W AI ANALYSIS OF LVO, CT NECK ANGIOGRAPHY WITHOUT THEN WITH IV CONTRAST CLINICAL HISTORY: left sided weakness. COMPARISON: CT head August 20, 2019. CTA head 7/14/2021. CT cerebral perfusion scan 7/14/2021. TECHNIQUE: CT angiography of the head and neck was performed with intravenous contrast on the level of the aortic arch to the vertex of the brain in orthogonal planes, along with 3D reconstruction of the arterial vasculature of the head and neck from the source images. Measurement of carotid stenosis is based on NASCET criteria which calculates the percentage of stenosis relative to the luminal diameter of the normal carotid artery distal to the stenosis. RAPID AI  large vessel occlusion (LVO) software analysis was utilized during this exam. CONTRAST: 90 mL IV Isovue 370 This exam was performed using radiation doses that are as low as reasonably achievable (ALARA). This exam was performed according to our departmental dose optimization program, which includes automated exposure control, adjustment of the mA and/or KV according to patient size and/or use of iterative reconstruction technique. FINDINGS: There is no hemodynamically significant stenosis or dissection in the bilateral common carotid, bilateral proximal internal carotid or the visualized portions of the bilateral external carotid arteries. The carotid bulbs are without significant stenosis. There is normal takeoff of the great vessels from the aortic arch. The bilateral vertebral arteries appear patent with no evidence of dissection on either side.  The basilar tip appears normal. There is no hemodynamically significant stenosis or a focal aneurysm formation, in the intracranial portions of the bilateral internal carotid arteries, bilateral carotid siphons, bilateral anterior, middle and posterior cerebral arteries and their visualized branches. Limited evaluation of the dural sinuses and the intracranial and venous system shows a widely patent superior sagittal sinus, straight sinus and bilateral internal cerebral veins. 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 RAPID AI large vessel occlusion (LVO) software analysis shows no evidence of large vessel occlusion in the brain. The lung apices appear unremarkable. The cervical spine appeared unremarkable.     Impression: No evidence of stenosis, aneurysm, or dissection. Electronically signed by:  Kirt Galvez MD  7/14/2021 1:34 PM CDT Workstation: [id number]    MRI Brain Without Contrast    Result Date: 7/14/2021  Narrative: EXAM DESCRIPTION: MRI BRAIN WO CONTRAST CLINICAL HISTORY:  60-year-old male for stroke follow-up. 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 Follow-up on CVA and recurrent neurological symptoms. COMPARISON: CT/CT Perfusion 7/14/2021. TECHNIQUE: Multisequence multiplanar imaging of the brain is performed without contrast. FINDINGS: Brain Parenchyma:  The craniovertebral junction and the included cervical cord are unremarkable. No Chiari malformation. Unremarkable appearance of the pituitary and stalk. The midline structures are intact. No evidence of intracranial hemorrhage or suspicious extra-axial fluid collections. Small focus of gradient susceptibility within the right basal ganglia/capsule region representing mineralization and/or nonspecific sequela of prior insult with hemosiderin deposition. No edema, midline shift, or mass effect. The cerebellopontine angle regions are unremarkable. There is mild prominence of the perivascular spaces bilaterally. Diffusion: No evidence of restricted diffusion supportive of acute/subacute infarct. Atrophy: Mild volume loss Microvascular Disease: Mild sequela. Vessels: There is signal void within major intracranial vessels. Extra-axial Spaces: Unremarkable allowing for the degree of volume loss. Ventricles: No hydrocephalus. Bones: No suspicious marrow signal alteration identified. Sinuses and Mastoids: Mucosal thickening with subtotal opacification of the right sphenoid sinus. No significant mastoid effusion. Scalp and Soft Tissues: Unremarkable. Orbits: Unremarkable.     Impression: No evidence of intracranial hemorrhage, mass effect, or acute infarction.  Mild volume loss and sequela of chronic microangiopathic change. Mucosal thickening with subtotal opacification of the right sphenoid sinus. Electronically signed by:  Wm Bell MD  7/14/2021 4:01 PM CDT Workstation: [id number]    CT Abdomen Pelvis With Contrast    Result Date: 7/23/2021  Narrative: CMS MANDATED QUALITY DATA - CT RADIATION 436. CT scans at this facility utilize dose  modulation, iterative reconstruction, and/or weight based dosing when appropriate to reduce radiation dose to as low as reasonably achievable. PROCEDURE:    CT CHEST ANGIOGRAPHY WITH IV CONTRAST, CT ABDOMEN PELVIS WITH IV CONTRAST  dated  7/23/2021 7:46 PM CDT CLINICAL HISTORY:   Male 60 years of age.   tachycardia, chest pain, recent hospitalization, vomiting  COMPARISON: None CTA chest TECHNIQUE:  CT angiogram of the chest with contrast, PE protocol, utilizing dynamic arterial phase thin slice imaging. MIP reconstructed images were reviewed.  FINDINGS:  This study is high quality in assessing pulmonary arteries for filling defect.  There is no pulmonary embolus. Pulmonary arteries, aorta and heart are not dilated. There is no pericardial effusion.  The esophagus is not dilated or thickened. There is no lymphadenopathy. Partially visualized thyroid gland is normal. There are centrilobular consolidative and groundglass capacities within the posterior right upper lobe. There are subtle groundglass opacities within the right middle lobe and right lower lobe. Airways are patent and not thickened. There is no pleural effusion or pneumothorax. There is no acute fracture or aggressive osseous lesion. There is callus of a healing fracture lateral left rib 10. Vertebral body height and spinal alignment are normal.     Impression:  IMPRESSION: 1.  No pulmonary embolus. 2.  Pneumonia/pneumonitis of the right upper lobe more so than the right middle and lower lobes. Question aspiration related. ___________________________________________ CT abdomen/pelvis TECHNIQUE:  CT of the abdomen and pelvis was performed. Intravenous contrast was administered.  FINDINGS: Partially included lower chest:  No acute pulmonary infiltrate.  No pleural effusion. Liver:  Not enlarged.   No focal liver mass. Gallbladder and bile duct:  Prior cholecystectomy.   Nondilated common bile duct. Pancreas: Normal. Spleen:  Not enlarged. Adrenal Glands:   Normal. Kidneys:  Normal.   Stomach:  Grossly normal. Bowel:  Not dilated or thickened. Patent small bowel anastomosis in the anterior left abdomen Appendix: No findings of appendicitis. Peritoneum/retroperitoneum:  No abnormal fluid or air. Aorta: Nondilated. Widely patent celiac trunk and superior mesenteric artery. Lymph nodes: Not enlarged. Bladder: Not abnormally distended or thickened. Reproductive:  Within normal limits.  Bones: No aggressive lesion.  No acute fracture. Normal vertebral body height and spinal alignment. Body wall soft tissues:  Healed midline abdominal surgical scar. IMPRESSION: 1.  No acute finding in the abdomen or pelvis. Electronically signed by:  Pratima Avalos MD  7/23/2021 9:17 PM CDT Workstation: [id number]    XR Chest 1 View    Result Date: 7/23/2021  Narrative: PORTABLE CHEST HISTORY: Chest pain Portable AP film of the chest was obtained at 5:58 PM. COMPARISON: February 19, 2021 FINDINGS: EKG leads. Subsegmental infiltrate inferiorly and peripherally in the right upper lobe, compatible with pneumonia. Linear atelectasis or scar lateral left lung base. The heart is not enlarged. The pulmonary vasculature is not increased. No pleural effusion. No pneumothorax. No acute osseous abnormality.     Impression: CONCLUSION: Subsegmental infiltrate inferiorly and peripherally in the right upper lobe, compatible with pneumonia. Linear atelectasis or scar lateral left lung base. [id number] Electronically signed by:  Travon Do MD  7/23/2021 6:27 PM CDT Workstation: [email or web address]    CT Angiogram Chest    Result Date: 7/23/2021  Narrative: CMS MANDATED QUALITY DATA - CT RADIATION 436. CT scans at this facility utilize dose modulation, iterative reconstruction, and/or weight based dosing when appropriate to reduce radiation dose to as low as reasonably achievable. PROCEDURE:    CT CHEST ANGIOGRAPHY WITH IV CONTRAST, CT ABDOMEN PELVIS WITH IV CONTRAST  dated  7/23/2021 7:46 PM CDT CLINICAL HISTORY:    Male 60 years of age.   tachycardia, chest pain, recent hospitalization, vomiting  COMPARISON: None CTA chest TECHNIQUE:  CT angiogram of the chest with contrast, PE protocol, utilizing dynamic arterial phase thin slice imaging. MIP reconstructed images were reviewed.  FINDINGS:  This study is high quality in assessing pulmonary arteries for filling defect.  There is no pulmonary embolus. Pulmonary arteries, aorta and heart are not dilated. There is no pericardial effusion.  The esophagus is not dilated or thickened. There is no lymphadenopathy. Partially visualized thyroid gland is normal. There are centrilobular consolidative and groundglass capacities within the posterior right upper lobe. There are subtle groundglass opacities within the right middle lobe and right lower lobe. Airways are patent and not thickened. There is no pleural effusion or pneumothorax. There is no acute fracture or aggressive osseous lesion. There is callus of a healing fracture lateral left rib 10. Vertebral body height and spinal alignment are normal.     Impression:  IMPRESSION: 1.  No pulmonary embolus. 2.  Pneumonia/pneumonitis of the right upper lobe more so than the right middle and lower lobes. Question aspiration related. ___________________________________________ CT abdomen/pelvis TECHNIQUE:  CT of the abdomen and pelvis was performed. Intravenous contrast was administered.  FINDINGS: Partially included lower chest:  No acute pulmonary infiltrate.  No pleural effusion. Liver:  Not enlarged.   No focal liver mass. Gallbladder and bile duct:  Prior cholecystectomy.   Nondilated common bile duct. Pancreas: Normal. Spleen:  Not enlarged. Adrenal Glands:  Normal. Kidneys:  Normal.   Stomach:  Grossly normal. Bowel:  Not dilated or thickened. Patent small bowel anastomosis in the anterior left abdomen Appendix: No findings of appendicitis. Peritoneum/retroperitoneum:  No abnormal fluid or air. Aorta: Nondilated. Widely patent celiac  trunk and superior mesenteric artery. Lymph nodes: Not enlarged. Bladder: Not abnormally distended or thickened. Reproductive:  Within normal limits.  Bones: No aggressive lesion.  No acute fracture. Normal vertebral body height and spinal alignment. Body wall soft tissues:  Healed midline abdominal surgical scar. IMPRESSION: 1.  No acute finding in the abdomen or pelvis. Electronically signed by:  Pratima Avalos MD  7/23/2021 9:17 PM CDT Workstation: [id number]    CT Angiogram Head w AI Analysis of LVO    Result Date: 7/14/2021  Narrative: PROCEDURE: CT ANGIOGRAM HEAD W AI ANALYSIS OF LVO, CT NECK ANGIOGRAPHY WITHOUT THEN WITH IV CONTRAST CLINICAL HISTORY: left sided weakness. COMPARISON: CT head August 20, 2019. CTA head 7/14/2021. CT cerebral perfusion scan 7/14/2021. TECHNIQUE: CT angiography of the head and neck was performed with intravenous contrast on the level of the aortic arch to the vertex of the brain in orthogonal planes, along with 3D reconstruction of the arterial vasculature of the head and neck from the source images. Measurement of carotid stenosis is based on NASCET criteria which calculates the percentage of stenosis relative to the luminal diameter of the normal carotid artery distal to the stenosis. RAPID AI large vessel occlusion (LVO) software analysis was utilized during this exam. CONTRAST: 90 mL IV Isovue 370 This exam was performed using radiation doses that are as low as reasonably achievable (ALARA). This exam was performed according to our departmental dose optimization program, which includes automated exposure control, adjustment of the mA and/or KV according to patient size and/or use of iterative reconstruction technique. FINDINGS: There is no hemodynamically significant stenosis or dissection in the bilateral common carotid, bilateral proximal internal carotid or the visualized portions of the bilateral external carotid arteries. The carotid bulbs are without significant stenosis.  There is normal takeoff of the great vessels from the aortic arch. The bilateral vertebral arteries appear patent with no evidence of dissection on either side.  The basilar tip appears normal. There is no hemodynamically significant stenosis or a focal aneurysm formation, in the intracranial portions of the bilateral internal carotid arteries, bilateral carotid siphons, bilateral anterior, middle and posterior cerebral arteries and their visualized branches. Limited evaluation of the dural sinuses and the intracranial and venous system shows a widely patent superior sagittal sinus, straight sinus and bilateral internal cerebral veins. 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 RAPID AI large vessel occlusion (LVO) software analysis shows no evidence of large vessel occlusion in the brain. The lung apices appear unremarkable. The cervical spine appeared unremarkable.     Impression: No evidence of stenosis, aneurysm, or dissection. Electronically signed by:  Kirt Galvez MD  7/14/2021 1:34 PM CDT Workstation: [id number]    CT CEREBRAL PERFUSION WITH & WITHOUT CONTRAST    Result Date: 7/14/2021  Narrative: PROCEDURE: CT HEAD WITHOUT THEN WITH IV CONTRAST DATE: 7/14/2021 11:55 AM CDT INDICATION: 60 year old Male. Neuro deficit, acute stroke suspected COMPARISON: None. TECHNIQUE: Computed tomographic imaging of the head was performed utilizing dynamic imaging at multiple time points following administration of 50 mL of Isovue-370.  The images were processed using automated RapidAI software.  Standard parametric maps were generated from the source images including time to maximum (Tmax), mean transit time (MTT), cerebral blood flow (CBF), and cerebral blood volume (CBV). This exam was performed according to our departmental dose-optimization program which includes automated exposure control, adjustment of the mA and/or kV according to patient  size and/or use of iterative reconstruction technique. FINDINGS: Image quality: Adequate Arterial input function STEVE selected: Right MCA. Venous outflow function STEVE: Torcula. TOTAL HYPOPERFUSION: Using the Tmax threshold of greater than 6 seconds, there no hypoperfusion identified. CORE INFARCT: Using a threshold of CBF less than 30%, no areas of ischemic core are identified. PENUMBRA: No penumbra volume (mismatch volume), is identified. Mismatch volume: 0 mL Mismatch ratio: None TMAX (time to the peak of the residual function): Symmetric. MEAN TRANSIT TIME (MTT): Symmetric. CEREBRAL BLOOD FLOW (CBF): Symmetric. CEREBRAL BLOOD VOLUME (CBV): Symmetric.     Impression: No CT brain perfusion evidence of acute infarct. Electronically signed by:  Kirt Galvez MD  7/14/2021 1:18 PM CDT Workstation: [id number]    EKG July 23, 2021 at 1747 reveals sinus tachycardia rate of 125 bpm.  Left axis deviation.  QTc 427.  No evidence of acute ischemia.          MDM  Number of Diagnoses or Management Options     Amount and/or Complexity of Data Reviewed  Clinical lab tests: reviewed  Tests in the radiology section of CPT®: reviewed  Tests in the medicine section of CPT®: reviewed    Patient Progress  Patient progress: stable    Found to have Right sided pneumonia. No hypoxia. Labs unremarkable. Repeatedly asks for pain medication and reports no improvement. Started on oral antibiotic and advised on close follow-up with PCP.    Final diagnoses:   Pneumonia of right lung due to infectious organism, unspecified part of lung       ED Disposition  ED Disposition     ED Disposition Condition Comment    Discharge Stable           [facility]  [facility] [address] [address] [phone]  Schedule an appointment as soon as possible for a visit            Medication List      New Prescriptions    amoxicillin-clavulanate 875-125 MG per tablet  Commonly known as: AUGMENTIN  Take 1 tablet by  mouth 2 (Two) Times a Day for 7 days.     doxycycline 100 MG capsule  Commonly known as: MONODOX  Take 1 capsule by mouth 2 (Two) Times a Day for 7 days.           Where to Get Your Medications      These medications were sent to [facility] - [place] - [address] - [phone]  - [phone]   [address] [place] [address]    Phone: [phone]   · amoxicillin-clavulanate 875-125 MG per tablet  · doxycycline 100 MG capsule          Ulysses Palacios,   07/27/21 0119

## 2021-07-23 NOTE — ED TRIAGE NOTES
Pt presents to ED with c/o palpitations and chest pain since yesterday. Pt was diagnosed with shingles last week and reports severe pain from that.

## 2021-07-24 NOTE — DISCHARGE INSTRUCTIONS
Please return with new or worsening symptoms.  Follow-up with primary care.  Get antibiotic prescription filled and take as directed for the complete course.

## 2021-07-24 NOTE — ED NOTES
"Pt again pushed call light to request \"a different pain medicine.\" MD informed.     Elizabeth Donahue, RN  07/23/21 3505    "

## 2021-07-27 ENCOUNTER — APPOINTMENT (OUTPATIENT)
Dept: GENERAL RADIOLOGY | Facility: HOSPITAL | Age: 61
End: 2021-07-27

## 2021-07-27 ENCOUNTER — HOSPITAL ENCOUNTER (OUTPATIENT)
Facility: HOSPITAL | Age: 61
Setting detail: OBSERVATION
LOS: 1 days | Discharge: HOME | End: 2021-07-29
Attending: EMERGENCY MEDICINE | Admitting: HOSPITALIST

## 2021-07-27 ENCOUNTER — READMISSION MANAGEMENT (OUTPATIENT)
Dept: CALL CENTER | Facility: HOSPITAL | Age: 61
End: 2021-07-27

## 2021-07-27 DIAGNOSIS — E11.00 HYPEROSMOLAR HYPERGLYCEMIC STATE (HHS) (HCC): Primary | ICD-10-CM

## 2021-07-27 DIAGNOSIS — A41.9 SEPSIS, DUE TO UNSPECIFIED ORGANISM, UNSPECIFIED WHETHER ACUTE ORGAN DYSFUNCTION PRESENT (HCC): ICD-10-CM

## 2021-07-27 DIAGNOSIS — E11.65 TYPE 2 DIABETES MELLITUS WITH HYPERGLYCEMIA, WITH LONG-TERM CURRENT USE OF INSULIN (HCC): ICD-10-CM

## 2021-07-27 DIAGNOSIS — B02.8 HERPES ZOSTER WITH COMPLICATION: ICD-10-CM

## 2021-07-27 DIAGNOSIS — J18.9 PNEUMONIA OF RIGHT LOWER LOBE DUE TO INFECTIOUS ORGANISM: ICD-10-CM

## 2021-07-27 DIAGNOSIS — Z79.4 TYPE 2 DIABETES MELLITUS WITH HYPERGLYCEMIA, WITH LONG-TERM CURRENT USE OF INSULIN (HCC): ICD-10-CM

## 2021-07-27 LAB
ACETONE BLD QL: NEGATIVE
ALBUMIN SERPL-MCNC: 4.3 G/DL (ref 3.5–5.2)
ALBUMIN/GLOB SERPL: 1.2 G/DL
ALP SERPL-CCNC: 149 U/L (ref 39–117)
ALT SERPL W P-5'-P-CCNC: <5 U/L (ref 1–41)
AMYLASE SERPL-CCNC: 29 U/L (ref 28–100)
ANION GAP SERPL CALCULATED.3IONS-SCNC: 15 MMOL/L (ref 5–15)
ARTERIAL PATENCY WRIST A: ABNORMAL
AST SERPL-CCNC: 9 U/L (ref 1–40)
ATMOSPHERIC PRESS: 747 MMHG
BASE EXCESS BLDA CALC-SCNC: 1.1 MMOL/L (ref 0–2)
BASOPHILS # BLD AUTO: 0.06 10*3/MM3 (ref 0–0.2)
BASOPHILS # BLD AUTO: 0.06 10*3/MM3 (ref 0–0.2)
BASOPHILS NFR BLD AUTO: 0.8 % (ref 0–1.5)
BASOPHILS NFR BLD AUTO: 0.8 % (ref 0–1.5)
BDY SITE: ABNORMAL
BILIRUB SERPL-MCNC: 0.2 MG/DL (ref 0–1.2)
BILIRUB UR QL STRIP: NEGATIVE
BUN SERPL-MCNC: 6 MG/DL (ref 6–20)
BUN/CREAT SERPL: 6.6 (ref 7–25)
CALCIUM SPEC-SCNC: 9.3 MG/DL (ref 8.6–10.5)
CHLORIDE SERPL-SCNC: 90 MMOL/L (ref 98–107)
CLARITY UR: CLEAR
CO2 SERPL-SCNC: 26 MMOL/L (ref 22–29)
COLOR UR: YELLOW
CREAT SERPL-MCNC: 0.91 MG/DL (ref 0.76–1.27)
D-DIMER, QUANTITATIVE (MAD,POW, STR): 457 NG/ML (FEU) (ref 0–470)
D-LACTATE SERPL-SCNC: 3.2 MMOL/L (ref 0.5–2)
D-LACTATE SERPL-SCNC: 5.3 MMOL/L (ref 0.5–2)
DEPRECATED RDW RBC AUTO: 40.3 FL (ref 37–54)
DEPRECATED RDW RBC AUTO: 40.9 FL (ref 37–54)
EOSINOPHIL # BLD AUTO: 0.23 10*3/MM3 (ref 0–0.4)
EOSINOPHIL # BLD AUTO: 0.23 10*3/MM3 (ref 0–0.4)
EOSINOPHIL NFR BLD AUTO: 2.9 % (ref 0.3–6.2)
EOSINOPHIL NFR BLD AUTO: 3.1 % (ref 0.3–6.2)
ERYTHROCYTE [DISTWIDTH] IN BLOOD BY AUTOMATED COUNT: 12.5 % (ref 12.3–15.4)
ERYTHROCYTE [DISTWIDTH] IN BLOOD BY AUTOMATED COUNT: 12.6 % (ref 12.3–15.4)
FLUAV RNA RESP QL NAA+PROBE: NOT DETECTED
FLUBV RNA RESP QL NAA+PROBE: NOT DETECTED
GFR SERPL CREATININE-BSD FRML MDRD: 86 ML/MIN/1.73
GLOBULIN UR ELPH-MCNC: 3.5 GM/DL
GLUCOSE BLDC GLUCOMTR-MCNC: 347 MG/DL (ref 70–130)
GLUCOSE SERPL-MCNC: 733 MG/DL (ref 65–99)
GLUCOSE UR STRIP-MCNC: ABNORMAL MG/DL
HBA1C MFR BLD: 12.2 % (ref 4.8–5.6)
HCO3 BLDA-SCNC: 26.4 MMOL/L (ref 20–26)
HCT VFR BLD AUTO: 39.4 % (ref 37.5–51)
HCT VFR BLD AUTO: 42.9 % (ref 37.5–51)
HGB BLD-MCNC: 13.7 G/DL (ref 13–17.7)
HGB BLD-MCNC: 14.8 G/DL (ref 13–17.7)
HGB UR QL STRIP.AUTO: NEGATIVE
IMM GRANULOCYTES # BLD AUTO: 0.03 10*3/MM3 (ref 0–0.05)
IMM GRANULOCYTES # BLD AUTO: 0.03 10*3/MM3 (ref 0–0.05)
IMM GRANULOCYTES NFR BLD AUTO: 0.4 % (ref 0–0.5)
IMM GRANULOCYTES NFR BLD AUTO: 0.4 % (ref 0–0.5)
KETONES UR QL STRIP: NEGATIVE
LEUKOCYTE ESTERASE UR QL STRIP.AUTO: NEGATIVE
LIPASE SERPL-CCNC: 17 U/L (ref 13–60)
LYMPHOCYTES # BLD AUTO: 1.62 10*3/MM3 (ref 0.7–3.1)
LYMPHOCYTES # BLD AUTO: 2.42 10*3/MM3 (ref 0.7–3.1)
LYMPHOCYTES NFR BLD AUTO: 21.7 % (ref 19.6–45.3)
LYMPHOCYTES NFR BLD AUTO: 30.4 % (ref 19.6–45.3)
Lab: ABNORMAL
MAGNESIUM SERPL-MCNC: 2.1 MG/DL (ref 1.6–2.6)
MCH RBC QN AUTO: 30.6 PG (ref 26.6–33)
MCH RBC QN AUTO: 30.6 PG (ref 26.6–33)
MCHC RBC AUTO-ENTMCNC: 34.5 G/DL (ref 31.5–35.7)
MCHC RBC AUTO-ENTMCNC: 34.8 G/DL (ref 31.5–35.7)
MCV RBC AUTO: 88.1 FL (ref 79–97)
MCV RBC AUTO: 88.6 FL (ref 79–97)
MODALITY: ABNORMAL
MONOCYTES # BLD AUTO: 0.81 10*3/MM3 (ref 0.1–0.9)
MONOCYTES # BLD AUTO: 0.9 10*3/MM3 (ref 0.1–0.9)
MONOCYTES NFR BLD AUTO: 10.8 % (ref 5–12)
MONOCYTES NFR BLD AUTO: 11.3 % (ref 5–12)
NEUTROPHILS NFR BLD AUTO: 4.32 10*3/MM3 (ref 1.7–7)
NEUTROPHILS NFR BLD AUTO: 4.73 10*3/MM3 (ref 1.7–7)
NEUTROPHILS NFR BLD AUTO: 54.2 % (ref 42.7–76)
NEUTROPHILS NFR BLD AUTO: 63.2 % (ref 42.7–76)
NITRITE UR QL STRIP: NEGATIVE
NRBC BLD AUTO-RTO: 0 /100 WBC (ref 0–0.2)
NRBC BLD AUTO-RTO: 0 /100 WBC (ref 0–0.2)
OSMOLALITY SERPL: 320 MOSM/KG (ref 275–295)
PCO2 BLDA: 43.2 MM HG (ref 35–45)
PH BLDA: 7.39 PH UNITS (ref 7.35–7.45)
PH UR STRIP.AUTO: 6 [PH] (ref 5–9)
PHOSPHATE SERPL-MCNC: 3.1 MG/DL (ref 2.5–4.5)
PLATELET # BLD AUTO: 338 10*3/MM3 (ref 140–450)
PLATELET # BLD AUTO: 369 10*3/MM3 (ref 140–450)
PMV BLD AUTO: 10.5 FL (ref 6–12)
PMV BLD AUTO: 10.9 FL (ref 6–12)
PO2 BLDA: 82.7 MM HG (ref 83–108)
POTASSIUM SERPL-SCNC: 4.3 MMOL/L (ref 3.5–5.2)
PROT SERPL-MCNC: 7.8 G/DL (ref 6–8.5)
PROT UR QL STRIP: NEGATIVE
RBC # BLD AUTO: 4.47 10*6/MM3 (ref 4.14–5.8)
RBC # BLD AUTO: 4.84 10*6/MM3 (ref 4.14–5.8)
SAO2 % BLDCOA: 97.2 % (ref 94–99)
SARS-COV-2 RNA RESP QL NAA+PROBE: NOT DETECTED
SODIUM SERPL-SCNC: 131 MMOL/L (ref 136–145)
SP GR UR STRIP: 1.04 (ref 1–1.03)
TROPONIN T SERPL-MCNC: <0.01 NG/ML (ref 0–0.03)
UROBILINOGEN UR QL STRIP: ABNORMAL
VENTILATOR MODE: ABNORMAL
WBC # BLD AUTO: 7.48 10*3/MM3 (ref 3.4–10.8)
WBC # BLD AUTO: 7.96 10*3/MM3 (ref 3.4–10.8)

## 2021-07-27 PROCEDURE — 82009 KETONE BODYS QUAL: CPT | Performed by: EMERGENCY MEDICINE

## 2021-07-27 PROCEDURE — 87633 RESP VIRUS 12-25 TARGETS: CPT | Performed by: HOSPITALIST

## 2021-07-27 PROCEDURE — 85379 FIBRIN DEGRADATION QUANT: CPT | Performed by: EMERGENCY MEDICINE

## 2021-07-27 PROCEDURE — C9803 HOPD COVID-19 SPEC COLLECT: HCPCS

## 2021-07-27 PROCEDURE — 96368 THER/DIAG CONCURRENT INF: CPT

## 2021-07-27 PROCEDURE — 82150 ASSAY OF AMYLASE: CPT | Performed by: EMERGENCY MEDICINE

## 2021-07-27 PROCEDURE — 87040 BLOOD CULTURE FOR BACTERIA: CPT | Performed by: EMERGENCY MEDICINE

## 2021-07-27 PROCEDURE — 82962 GLUCOSE BLOOD TEST: CPT

## 2021-07-27 PROCEDURE — 83036 HEMOGLOBIN GLYCOSYLATED A1C: CPT | Performed by: EMERGENCY MEDICINE

## 2021-07-27 PROCEDURE — 93005 ELECTROCARDIOGRAM TRACING: CPT | Performed by: EMERGENCY MEDICINE

## 2021-07-27 PROCEDURE — 25010000002 AZITHROMYCIN 500 MG/250 ML: Performed by: EMERGENCY MEDICINE

## 2021-07-27 PROCEDURE — 96367 TX/PROPH/DG ADDL SEQ IV INF: CPT

## 2021-07-27 PROCEDURE — 93010 ELECTROCARDIOGRAM REPORT: CPT | Performed by: INTERNAL MEDICINE

## 2021-07-27 PROCEDURE — G0378 HOSPITAL OBSERVATION PER HR: HCPCS

## 2021-07-27 PROCEDURE — 83690 ASSAY OF LIPASE: CPT | Performed by: EMERGENCY MEDICINE

## 2021-07-27 PROCEDURE — 83735 ASSAY OF MAGNESIUM: CPT | Performed by: EMERGENCY MEDICINE

## 2021-07-27 PROCEDURE — 99285 EMERGENCY DEPT VISIT HI MDM: CPT

## 2021-07-27 PROCEDURE — 83930 ASSAY OF BLOOD OSMOLALITY: CPT | Performed by: EMERGENCY MEDICINE

## 2021-07-27 PROCEDURE — 87636 SARSCOV2 & INF A&B AMP PRB: CPT | Performed by: EMERGENCY MEDICINE

## 2021-07-27 PROCEDURE — 36600 WITHDRAWAL OF ARTERIAL BLOOD: CPT

## 2021-07-27 PROCEDURE — 83605 ASSAY OF LACTIC ACID: CPT | Performed by: EMERGENCY MEDICINE

## 2021-07-27 PROCEDURE — 96366 THER/PROPH/DIAG IV INF ADDON: CPT

## 2021-07-27 PROCEDURE — 85025 COMPLETE CBC W/AUTO DIFF WBC: CPT | Performed by: EMERGENCY MEDICINE

## 2021-07-27 PROCEDURE — 71045 X-RAY EXAM CHEST 1 VIEW: CPT

## 2021-07-27 PROCEDURE — 81003 URINALYSIS AUTO W/O SCOPE: CPT | Performed by: EMERGENCY MEDICINE

## 2021-07-27 PROCEDURE — 96365 THER/PROPH/DIAG IV INF INIT: CPT

## 2021-07-27 PROCEDURE — 25010000003 INSULIN REGULAR HUMAN PER 5 UNITS: Performed by: EMERGENCY MEDICINE

## 2021-07-27 PROCEDURE — 84484 ASSAY OF TROPONIN QUANT: CPT | Performed by: EMERGENCY MEDICINE

## 2021-07-27 PROCEDURE — 36415 COLL VENOUS BLD VENIPUNCTURE: CPT | Performed by: EMERGENCY MEDICINE

## 2021-07-27 PROCEDURE — 84100 ASSAY OF PHOSPHORUS: CPT | Performed by: EMERGENCY MEDICINE

## 2021-07-27 PROCEDURE — 25010000002 CEFTRIAXONE PER 250 MG: Performed by: EMERGENCY MEDICINE

## 2021-07-27 PROCEDURE — 82803 BLOOD GASES ANY COMBINATION: CPT

## 2021-07-27 PROCEDURE — 80053 COMPREHEN METABOLIC PANEL: CPT | Performed by: EMERGENCY MEDICINE

## 2021-07-27 PROCEDURE — 25010000003 INSULIN REGULAR HUMAN PER 5 UNITS: Performed by: HOSPITALIST

## 2021-07-27 RX ORDER — SODIUM CHLORIDE 0.9 % (FLUSH) 0.9 %
10 SYRINGE (ML) INJECTION ONCE AS NEEDED
Status: DISCONTINUED | OUTPATIENT
Start: 2021-07-27 | End: 2021-07-29 | Stop reason: HOSPADM

## 2021-07-27 RX ORDER — SODIUM CHLORIDE 0.9 % (FLUSH) 0.9 %
10 SYRINGE (ML) INJECTION AS NEEDED
Status: DISCONTINUED | OUTPATIENT
Start: 2021-07-27 | End: 2021-07-29 | Stop reason: HOSPADM

## 2021-07-27 RX ORDER — DEXTROSE, SODIUM CHLORIDE, AND POTASSIUM CHLORIDE 5; .9; .15 G/100ML; G/100ML; G/100ML
150 INJECTION INTRAVENOUS CONTINUOUS PRN
Status: DISCONTINUED | OUTPATIENT
Start: 2021-07-27 | End: 2021-07-28

## 2021-07-27 RX ORDER — OXYCODONE AND ACETAMINOPHEN 10; 325 MG/1; MG/1
1 TABLET ORAL EVERY 6 HOURS PRN
COMMUNITY

## 2021-07-27 RX ORDER — SODIUM CHLORIDE 9 MG/ML
250 INJECTION, SOLUTION INTRAVENOUS CONTINUOUS PRN
Status: DISCONTINUED | OUTPATIENT
Start: 2021-07-27 | End: 2021-07-28

## 2021-07-27 RX ORDER — DEXTROSE, SODIUM CHLORIDE, AND POTASSIUM CHLORIDE 5; .45; .3 G/100ML; G/100ML; G/100ML
150 INJECTION INTRAVENOUS CONTINUOUS PRN
Status: DISCONTINUED | OUTPATIENT
Start: 2021-07-27 | End: 2021-07-28

## 2021-07-27 RX ORDER — SODIUM CHLORIDE 9 MG/ML
10 INJECTION, SOLUTION INTRAVENOUS CONTINUOUS PRN
Status: DISCONTINUED | OUTPATIENT
Start: 2021-07-27 | End: 2021-07-28

## 2021-07-27 RX ORDER — SODIUM CHLORIDE 9 MG/ML
125 INJECTION, SOLUTION INTRAVENOUS CONTINUOUS
Status: DISCONTINUED | OUTPATIENT
Start: 2021-07-27 | End: 2021-07-29 | Stop reason: HOSPADM

## 2021-07-27 RX ORDER — SODIUM CHLORIDE 450 MG/100ML
250 INJECTION, SOLUTION INTRAVENOUS CONTINUOUS PRN
Status: DISCONTINUED | OUTPATIENT
Start: 2021-07-27 | End: 2021-07-28

## 2021-07-27 RX ORDER — DEXTROSE MONOHYDRATE 25 G/50ML
12.5 INJECTION, SOLUTION INTRAVENOUS AS NEEDED
Status: DISCONTINUED | OUTPATIENT
Start: 2021-07-27 | End: 2021-07-28

## 2021-07-27 RX ORDER — SODIUM CHLORIDE 0.9 % (FLUSH) 0.9 %
3 SYRINGE (ML) INJECTION EVERY 12 HOURS SCHEDULED
Status: DISCONTINUED | OUTPATIENT
Start: 2021-07-27 | End: 2021-07-29 | Stop reason: HOSPADM

## 2021-07-27 RX ORDER — HYDROCODONE BITARTRATE AND ACETAMINOPHEN 5; 325 MG/1; MG/1
1 TABLET ORAL ONCE
Status: COMPLETED | OUTPATIENT
Start: 2021-07-27 | End: 2021-07-27

## 2021-07-27 RX ORDER — POTASSIUM CHLORIDE, DEXTROSE MONOHYDRATE AND SODIUM CHLORIDE 300; 5; 900 MG/100ML; G/100ML; MG/100ML
150 INJECTION, SOLUTION INTRAVENOUS CONTINUOUS PRN
Status: DISCONTINUED | OUTPATIENT
Start: 2021-07-27 | End: 2021-07-28

## 2021-07-27 RX ORDER — SODIUM CHLORIDE AND POTASSIUM CHLORIDE 150; 450 MG/100ML; MG/100ML
250 INJECTION, SOLUTION INTRAVENOUS CONTINUOUS PRN
Status: DISCONTINUED | OUTPATIENT
Start: 2021-07-27 | End: 2021-07-28

## 2021-07-27 RX ORDER — SODIUM CHLORIDE AND POTASSIUM CHLORIDE 300; 900 MG/100ML; MG/100ML
250 INJECTION, SOLUTION INTRAVENOUS CONTINUOUS PRN
Status: DISCONTINUED | OUTPATIENT
Start: 2021-07-27 | End: 2021-07-28

## 2021-07-27 RX ORDER — DEXTROSE, SODIUM CHLORIDE, AND POTASSIUM CHLORIDE 5; .45; .15 G/100ML; G/100ML; G/100ML
150 INJECTION INTRAVENOUS CONTINUOUS PRN
Status: DISCONTINUED | OUTPATIENT
Start: 2021-07-27 | End: 2021-07-28

## 2021-07-27 RX ORDER — SODIUM CHLORIDE AND POTASSIUM CHLORIDE 150; 900 MG/100ML; MG/100ML
250 INJECTION, SOLUTION INTRAVENOUS CONTINUOUS PRN
Status: DISCONTINUED | OUTPATIENT
Start: 2021-07-27 | End: 2021-07-28

## 2021-07-27 RX ORDER — DEXTROSE AND SODIUM CHLORIDE 5; .9 G/100ML; G/100ML
150 INJECTION, SOLUTION INTRAVENOUS CONTINUOUS PRN
Status: DISCONTINUED | OUTPATIENT
Start: 2021-07-27 | End: 2021-07-28

## 2021-07-27 RX ORDER — DEXTROSE AND SODIUM CHLORIDE 5; .45 G/100ML; G/100ML
150 INJECTION, SOLUTION INTRAVENOUS CONTINUOUS PRN
Status: DISCONTINUED | OUTPATIENT
Start: 2021-07-27 | End: 2021-07-28

## 2021-07-27 RX ADMIN — HYDROCODONE BITARTRATE AND ACETAMINOPHEN 1 TABLET: 5; 325 TABLET ORAL at 18:59

## 2021-07-27 RX ADMIN — AZITHROMYCIN 500 MG: 500 INJECTION, POWDER, LYOPHILIZED, FOR SOLUTION INTRAVENOUS at 20:45

## 2021-07-27 RX ADMIN — SODIUM CHLORIDE 125 ML/HR: 900 INJECTION, SOLUTION INTRAVENOUS at 18:23

## 2021-07-27 RX ADMIN — CEFTRIAXONE SODIUM 2 G: 2 INJECTION, POWDER, FOR SOLUTION INTRAMUSCULAR; INTRAVENOUS at 19:16

## 2021-07-27 RX ADMIN — SODIUM CHLORIDE 2490 ML: 900 INJECTION, SOLUTION INTRAVENOUS at 23:45

## 2021-07-27 RX ADMIN — SODIUM CHLORIDE 2000 ML: 900 INJECTION, SOLUTION INTRAVENOUS at 22:00

## 2021-07-27 RX ADMIN — SODIUM CHLORIDE 8 UNITS/HR: 9 INJECTION, SOLUTION INTRAVENOUS at 20:04

## 2021-07-27 RX ADMIN — SODIUM CHLORIDE 1000 ML: 900 INJECTION, SOLUTION INTRAVENOUS at 22:00

## 2021-07-27 RX ADMIN — HYDROCODONE BITARTRATE AND ACETAMINOPHEN 1 TABLET: 5; 325 TABLET ORAL at 18:22

## 2021-07-27 RX ADMIN — SODIUM CHLORIDE 1000 ML: 900 INJECTION, SOLUTION INTRAVENOUS at 18:23

## 2021-07-27 NOTE — OUTREACH NOTE
Stroke Week 2 Survey      Responses   [facility] patient discharged from?  [place]   Does the patient have one of the following disease processes/diagnoses(primary or secondary)?  Stroke (TIA)   Week 2 attempt successful?  Yes   Call start time  1156   Rescheduled  Rescheduled-pt requested [He is going to the ED feels sob and feels unwell. ]   Call end time  1203   Discharge diagnosis  acute onset of left sided weakness, worse than his baseline.           Darling Ramos RN

## 2021-07-28 ENCOUNTER — APPOINTMENT (OUTPATIENT)
Dept: INTERVENTIONAL RADIOLOGY/VASCULAR | Facility: HOSPITAL | Age: 61
End: 2021-07-28

## 2021-07-28 ENCOUNTER — APPOINTMENT (OUTPATIENT)
Dept: ULTRASOUND IMAGING | Facility: HOSPITAL | Age: 61
End: 2021-07-28

## 2021-07-28 ENCOUNTER — READMISSION MANAGEMENT (OUTPATIENT)
Dept: CALL CENTER | Facility: HOSPITAL | Age: 61
End: 2021-07-28

## 2021-07-28 PROBLEM — E11.00 HYPEROSMOLAR HYPERGLYCEMIC STATE (HHS) (HCC): Status: ACTIVE | Noted: 2021-07-28

## 2021-07-28 LAB
ANION GAP SERPL CALCULATED.3IONS-SCNC: 10 MMOL/L (ref 5–15)
ANION GAP SERPL CALCULATED.3IONS-SCNC: 11 MMOL/L (ref 5–15)
ANION GAP SERPL CALCULATED.3IONS-SCNC: 17 MMOL/L (ref 5–15)
ANION GAP SERPL CALCULATED.3IONS-SCNC: 9 MMOL/L (ref 5–15)
B PARAPERT DNA SPEC QL NAA+PROBE: NOT DETECTED
B PERT DNA SPEC QL NAA+PROBE: NOT DETECTED
BASOPHILS # BLD AUTO: 0.06 10*3/MM3 (ref 0–0.2)
BASOPHILS NFR BLD AUTO: 0.6 % (ref 0–1.5)
BUN SERPL-MCNC: 4 MG/DL (ref 6–20)
BUN SERPL-MCNC: 4 MG/DL (ref 6–20)
BUN SERPL-MCNC: 5 MG/DL (ref 6–20)
BUN SERPL-MCNC: 5 MG/DL (ref 6–20)
BUN/CREAT SERPL: 5.3 (ref 7–25)
BUN/CREAT SERPL: 5.6 (ref 7–25)
BUN/CREAT SERPL: 7.2 (ref 7–25)
BUN/CREAT SERPL: 7.4 (ref 7–25)
C PNEUM DNA NPH QL NAA+NON-PROBE: NOT DETECTED
CALCIUM SPEC-SCNC: 8.4 MG/DL (ref 8.6–10.5)
CALCIUM SPEC-SCNC: 8.5 MG/DL (ref 8.6–10.5)
CALCIUM SPEC-SCNC: 8.7 MG/DL (ref 8.6–10.5)
CALCIUM SPEC-SCNC: 9 MG/DL (ref 8.6–10.5)
CHLORIDE SERPL-SCNC: 103 MMOL/L (ref 98–107)
CHLORIDE SERPL-SCNC: 103 MMOL/L (ref 98–107)
CHLORIDE SERPL-SCNC: 105 MMOL/L (ref 98–107)
CHLORIDE SERPL-SCNC: 105 MMOL/L (ref 98–107)
CO2 SERPL-SCNC: 21 MMOL/L (ref 22–29)
CO2 SERPL-SCNC: 24 MMOL/L (ref 22–29)
CO2 SERPL-SCNC: 26 MMOL/L (ref 22–29)
CO2 SERPL-SCNC: 27 MMOL/L (ref 22–29)
CREAT SERPL-MCNC: 0.68 MG/DL (ref 0.76–1.27)
CREAT SERPL-MCNC: 0.69 MG/DL (ref 0.76–1.27)
CREAT SERPL-MCNC: 0.72 MG/DL (ref 0.76–1.27)
CREAT SERPL-MCNC: 0.76 MG/DL (ref 0.76–1.27)
DEPRECATED RDW RBC AUTO: 40.7 FL (ref 37–54)
EOSINOPHIL # BLD AUTO: 0.22 10*3/MM3 (ref 0–0.4)
EOSINOPHIL NFR BLD AUTO: 2 % (ref 0.3–6.2)
ERYTHROCYTE [DISTWIDTH] IN BLOOD BY AUTOMATED COUNT: 12.6 % (ref 12.3–15.4)
FLUAV SUBTYP SPEC NAA+PROBE: NOT DETECTED
FLUBV RNA ISLT QL NAA+PROBE: NOT DETECTED
GFR SERPL CREATININE-BSD FRML MDRD: 105 ML/MIN/1.73
GFR SERPL CREATININE-BSD FRML MDRD: 112 ML/MIN/1.73
GFR SERPL CREATININE-BSD FRML MDRD: 118 ML/MIN/1.73
GFR SERPL CREATININE-BSD FRML MDRD: 120 ML/MIN/1.73
GLUCOSE BLDC GLUCOMTR-MCNC: 102 MG/DL (ref 70–130)
GLUCOSE BLDC GLUCOMTR-MCNC: 107 MG/DL (ref 70–130)
GLUCOSE BLDC GLUCOMTR-MCNC: 116 MG/DL (ref 70–130)
GLUCOSE BLDC GLUCOMTR-MCNC: 123 MG/DL (ref 70–130)
GLUCOSE BLDC GLUCOMTR-MCNC: 131 MG/DL (ref 70–130)
GLUCOSE BLDC GLUCOMTR-MCNC: 140 MG/DL (ref 70–130)
GLUCOSE BLDC GLUCOMTR-MCNC: 140 MG/DL (ref 70–130)
GLUCOSE BLDC GLUCOMTR-MCNC: 147 MG/DL (ref 70–130)
GLUCOSE BLDC GLUCOMTR-MCNC: 149 MG/DL (ref 70–130)
GLUCOSE BLDC GLUCOMTR-MCNC: 179 MG/DL (ref 70–130)
GLUCOSE BLDC GLUCOMTR-MCNC: 191 MG/DL (ref 70–130)
GLUCOSE BLDC GLUCOMTR-MCNC: 238 MG/DL (ref 70–130)
GLUCOSE BLDC GLUCOMTR-MCNC: 484 MG/DL (ref 70–130)
GLUCOSE BLDC GLUCOMTR-MCNC: 96 MG/DL (ref 70–130)
GLUCOSE SERPL-MCNC: 105 MG/DL (ref 65–99)
GLUCOSE SERPL-MCNC: 205 MG/DL (ref 65–99)
GLUCOSE SERPL-MCNC: 83 MG/DL (ref 65–99)
GLUCOSE SERPL-MCNC: 92 MG/DL (ref 65–99)
HADV DNA SPEC NAA+PROBE: NOT DETECTED
HCOV 229E RNA SPEC QL NAA+PROBE: NOT DETECTED
HCOV HKU1 RNA SPEC QL NAA+PROBE: NOT DETECTED
HCOV NL63 RNA SPEC QL NAA+PROBE: NOT DETECTED
HCOV OC43 RNA SPEC QL NAA+PROBE: NOT DETECTED
HCT VFR BLD AUTO: 36 % (ref 37.5–51)
HGB BLD-MCNC: 12.7 G/DL (ref 13–17.7)
HMPV RNA NPH QL NAA+NON-PROBE: NOT DETECTED
HPIV1 RNA SPEC QL NAA+PROBE: NOT DETECTED
HPIV2 RNA SPEC QL NAA+PROBE: NOT DETECTED
HPIV3 RNA NPH QL NAA+PROBE: NOT DETECTED
HPIV4 P GENE NPH QL NAA+PROBE: NOT DETECTED
IMM GRANULOCYTES # BLD AUTO: 0.06 10*3/MM3 (ref 0–0.05)
IMM GRANULOCYTES NFR BLD AUTO: 0.6 % (ref 0–0.5)
LYMPHOCYTES # BLD AUTO: 2.13 10*3/MM3 (ref 0.7–3.1)
LYMPHOCYTES NFR BLD AUTO: 19.6 % (ref 19.6–45.3)
M PNEUMO IGG SER IA-ACNC: NOT DETECTED
MAGNESIUM SERPL-MCNC: 1.8 MG/DL (ref 1.6–2.6)
MAGNESIUM SERPL-MCNC: 1.8 MG/DL (ref 1.6–2.6)
MAGNESIUM SERPL-MCNC: 1.9 MG/DL (ref 1.6–2.6)
MCH RBC QN AUTO: 31.1 PG (ref 26.6–33)
MCHC RBC AUTO-ENTMCNC: 35.3 G/DL (ref 31.5–35.7)
MCV RBC AUTO: 88 FL (ref 79–97)
MONOCYTES # BLD AUTO: 0.99 10*3/MM3 (ref 0.1–0.9)
MONOCYTES NFR BLD AUTO: 9.1 % (ref 5–12)
NEUTROPHILS NFR BLD AUTO: 68.1 % (ref 42.7–76)
NEUTROPHILS NFR BLD AUTO: 7.4 10*3/MM3 (ref 1.7–7)
NRBC BLD AUTO-RTO: 0 /100 WBC (ref 0–0.2)
PHOSPHATE SERPL-MCNC: 2.6 MG/DL (ref 2.5–4.5)
PHOSPHATE SERPL-MCNC: 3 MG/DL (ref 2.5–4.5)
PHOSPHATE SERPL-MCNC: 3.5 MG/DL (ref 2.5–4.5)
PLATELET # BLD AUTO: 306 10*3/MM3 (ref 140–450)
PMV BLD AUTO: 11.6 FL (ref 6–12)
POTASSIUM SERPL-SCNC: 3.2 MMOL/L (ref 3.5–5.2)
POTASSIUM SERPL-SCNC: 3.5 MMOL/L (ref 3.5–5.2)
POTASSIUM SERPL-SCNC: 4.3 MMOL/L (ref 3.5–5.2)
POTASSIUM SERPL-SCNC: 4.4 MMOL/L (ref 3.5–5.2)
RBC # BLD AUTO: 4.09 10*6/MM3 (ref 4.14–5.8)
RHINOVIRUS RNA SPEC NAA+PROBE: NOT DETECTED
RSV RNA NPH QL NAA+NON-PROBE: NOT DETECTED
SODIUM SERPL-SCNC: 139 MMOL/L (ref 136–145)
SODIUM SERPL-SCNC: 140 MMOL/L (ref 136–145)
SODIUM SERPL-SCNC: 141 MMOL/L (ref 136–145)
SODIUM SERPL-SCNC: 141 MMOL/L (ref 136–145)
WBC # BLD AUTO: 10.86 10*3/MM3 (ref 3.4–10.8)

## 2021-07-28 PROCEDURE — 96372 THER/PROPH/DIAG INJ SC/IM: CPT

## 2021-07-28 PROCEDURE — 25010000002 PROCHLORPERAZINE 10 MG/2ML SOLUTION: Performed by: HOSPITALIST

## 2021-07-28 PROCEDURE — 96368 THER/DIAG CONCURRENT INF: CPT

## 2021-07-28 PROCEDURE — 96366 THER/PROPH/DIAG IV INF ADDON: CPT

## 2021-07-28 PROCEDURE — 80048 BASIC METABOLIC PNL TOTAL CA: CPT | Performed by: EMERGENCY MEDICINE

## 2021-07-28 PROCEDURE — 96361 HYDRATE IV INFUSION ADD-ON: CPT

## 2021-07-28 PROCEDURE — 25010000002 CEFTRIAXONE PER 250 MG: Performed by: HOSPITALIST

## 2021-07-28 PROCEDURE — 25010000002 MORPHINE PER 10 MG: Performed by: FAMILY MEDICINE

## 2021-07-28 PROCEDURE — 83735 ASSAY OF MAGNESIUM: CPT | Performed by: HOSPITALIST

## 2021-07-28 PROCEDURE — 82962 GLUCOSE BLOOD TEST: CPT

## 2021-07-28 PROCEDURE — 96375 TX/PRO/DX INJ NEW DRUG ADDON: CPT

## 2021-07-28 PROCEDURE — C1751 CATH, INF, PER/CENT/MIDLINE: HCPCS

## 2021-07-28 PROCEDURE — 36410 VNPNXR 3YR/> PHY/QHP DX/THER: CPT

## 2021-07-28 PROCEDURE — 63710000001 INSULIN DETEMIR PER 5 UNITS: Performed by: HOSPITALIST

## 2021-07-28 PROCEDURE — G0378 HOSPITAL OBSERVATION PER HR: HCPCS

## 2021-07-28 PROCEDURE — 25010000003 MAGNESIUM SULFATE 4 GM/100ML SOLUTION: Performed by: FAMILY MEDICINE

## 2021-07-28 PROCEDURE — 25010000002 HEPARIN (PORCINE) PER 1000 UNITS: Performed by: HOSPITALIST

## 2021-07-28 PROCEDURE — 84100 ASSAY OF PHOSPHORUS: CPT | Performed by: HOSPITALIST

## 2021-07-28 PROCEDURE — 85025 COMPLETE CBC W/AUTO DIFF WBC: CPT | Performed by: HOSPITALIST

## 2021-07-28 PROCEDURE — 76937 US GUIDE VASCULAR ACCESS: CPT

## 2021-07-28 PROCEDURE — 94799 UNLISTED PULMONARY SVC/PX: CPT

## 2021-07-28 PROCEDURE — 63710000001 INSULIN ASPART PER 5 UNITS: Performed by: HOSPITALIST

## 2021-07-28 PROCEDURE — 96376 TX/PRO/DX INJ SAME DRUG ADON: CPT

## 2021-07-28 PROCEDURE — 80048 BASIC METABOLIC PNL TOTAL CA: CPT | Performed by: HOSPITALIST

## 2021-07-28 RX ORDER — SODIUM CHLORIDE 0.9 % (FLUSH) 0.9 %
10 SYRINGE (ML) INJECTION EVERY 12 HOURS SCHEDULED
Status: DISCONTINUED | OUTPATIENT
Start: 2021-07-28 | End: 2021-07-29 | Stop reason: HOSPADM

## 2021-07-28 RX ORDER — PROCHLORPERAZINE EDISYLATE 5 MG/ML
5 INJECTION INTRAMUSCULAR; INTRAVENOUS EVERY 6 HOURS PRN
Status: DISCONTINUED | OUTPATIENT
Start: 2021-07-28 | End: 2021-07-29 | Stop reason: HOSPADM

## 2021-07-28 RX ORDER — POTASSIUM CHLORIDE 1.5 G/1.77G
40 POWDER, FOR SOLUTION ORAL AS NEEDED
Status: DISCONTINUED | OUTPATIENT
Start: 2021-07-28 | End: 2021-07-29 | Stop reason: HOSPADM

## 2021-07-28 RX ORDER — POTASSIUM CHLORIDE 750 MG/1
40 CAPSULE, EXTENDED RELEASE ORAL AS NEEDED
Status: DISCONTINUED | OUTPATIENT
Start: 2021-07-28 | End: 2021-07-29 | Stop reason: HOSPADM

## 2021-07-28 RX ORDER — POTASSIUM CHLORIDE 7.45 MG/ML
10 INJECTION INTRAVENOUS
Status: DISCONTINUED | OUTPATIENT
Start: 2021-07-28 | End: 2021-07-29 | Stop reason: HOSPADM

## 2021-07-28 RX ORDER — MAGNESIUM SULFATE HEPTAHYDRATE 40 MG/ML
4 INJECTION, SOLUTION INTRAVENOUS AS NEEDED
Status: DISCONTINUED | OUTPATIENT
Start: 2021-07-28 | End: 2021-07-29 | Stop reason: HOSPADM

## 2021-07-28 RX ORDER — DEXTROSE MONOHYDRATE 25 G/50ML
12.5 INJECTION, SOLUTION INTRAVENOUS AS NEEDED
Status: DISCONTINUED | OUTPATIENT
Start: 2021-07-28 | End: 2021-07-29 | Stop reason: HOSPADM

## 2021-07-28 RX ORDER — DULOXETIN HYDROCHLORIDE 20 MG/1
20 CAPSULE, DELAYED RELEASE ORAL NIGHTLY
Status: DISCONTINUED | OUTPATIENT
Start: 2021-07-28 | End: 2021-07-29 | Stop reason: HOSPADM

## 2021-07-28 RX ORDER — OXYCODONE AND ACETAMINOPHEN 10; 325 MG/1; MG/1
1 TABLET ORAL EVERY 6 HOURS PRN
Status: DISCONTINUED | OUTPATIENT
Start: 2021-07-28 | End: 2021-07-29 | Stop reason: HOSPADM

## 2021-07-28 RX ORDER — MAGNESIUM SULFATE HEPTAHYDRATE 40 MG/ML
2 INJECTION, SOLUTION INTRAVENOUS AS NEEDED
Status: DISCONTINUED | OUTPATIENT
Start: 2021-07-28 | End: 2021-07-29 | Stop reason: HOSPADM

## 2021-07-28 RX ORDER — SODIUM CHLORIDE 0.9 % (FLUSH) 0.9 %
10 SYRINGE (ML) INJECTION AS NEEDED
Status: DISCONTINUED | OUTPATIENT
Start: 2021-07-28 | End: 2021-07-29 | Stop reason: HOSPADM

## 2021-07-28 RX ORDER — MORPHINE SULFATE 2 MG/ML
2 INJECTION, SOLUTION INTRAMUSCULAR; INTRAVENOUS ONCE
Status: COMPLETED | OUTPATIENT
Start: 2021-07-28 | End: 2021-07-28

## 2021-07-28 RX ORDER — HEPARIN SODIUM 5000 [USP'U]/ML
5000 INJECTION, SOLUTION INTRAVENOUS; SUBCUTANEOUS EVERY 8 HOURS SCHEDULED
Status: DISCONTINUED | OUTPATIENT
Start: 2021-07-28 | End: 2021-07-29 | Stop reason: HOSPADM

## 2021-07-28 RX ORDER — GABAPENTIN 300 MG/1
300 CAPSULE ORAL 3 TIMES DAILY
Status: DISCONTINUED | OUTPATIENT
Start: 2021-07-28 | End: 2021-07-29 | Stop reason: HOSPADM

## 2021-07-28 RX ADMIN — GABAPENTIN 300 MG: 300 CAPSULE ORAL at 20:48

## 2021-07-28 RX ADMIN — DOXYCYCLINE 100 MG: 100 INJECTION, POWDER, LYOPHILIZED, FOR SOLUTION INTRAVENOUS at 04:49

## 2021-07-28 RX ADMIN — PROCHLORPERAZINE EDISYLATE 5 MG: 5 INJECTION INTRAMUSCULAR; INTRAVENOUS at 15:00

## 2021-07-28 RX ADMIN — SODIUM CHLORIDE, PRESERVATIVE FREE 10 ML: 5 INJECTION INTRAVENOUS at 20:48

## 2021-07-28 RX ADMIN — CEFTRIAXONE SODIUM 1 G: 1 INJECTION, POWDER, FOR SOLUTION INTRAMUSCULAR; INTRAVENOUS at 20:47

## 2021-07-28 RX ADMIN — SODIUM CHLORIDE 125 ML/HR: 900 INJECTION, SOLUTION INTRAVENOUS at 12:11

## 2021-07-28 RX ADMIN — HEPARIN SODIUM 5000 UNITS: 5000 INJECTION INTRAVENOUS; SUBCUTANEOUS at 13:57

## 2021-07-28 RX ADMIN — GABAPENTIN 300 MG: 300 CAPSULE ORAL at 08:03

## 2021-07-28 RX ADMIN — SODIUM CHLORIDE, PRESERVATIVE FREE 10 ML: 5 INJECTION INTRAVENOUS at 08:06

## 2021-07-28 RX ADMIN — PROCHLORPERAZINE EDISYLATE 5 MG: 5 INJECTION INTRAMUSCULAR; INTRAVENOUS at 01:53

## 2021-07-28 RX ADMIN — GABAPENTIN 300 MG: 300 CAPSULE ORAL at 15:00

## 2021-07-28 RX ADMIN — POTASSIUM CHLORIDE, DEXTROSE MONOHYDRATE AND SODIUM CHLORIDE 150 ML/HR: 300; 5; 450 INJECTION, SOLUTION INTRAVENOUS at 11:02

## 2021-07-28 RX ADMIN — INSULIN ASPART 15 UNITS: 100 INJECTION, SOLUTION INTRAVENOUS; SUBCUTANEOUS at 17:31

## 2021-07-28 RX ADMIN — HEPARIN SODIUM 5000 UNITS: 5000 INJECTION INTRAVENOUS; SUBCUTANEOUS at 21:35

## 2021-07-28 RX ADMIN — INSULIN DETEMIR 30 UNITS: 100 INJECTION, SOLUTION SUBCUTANEOUS at 21:37

## 2021-07-28 RX ADMIN — OXYCODONE HYDROCHLORIDE AND ACETAMINOPHEN 1 TABLET: 10; 325 TABLET ORAL at 21:35

## 2021-07-28 RX ADMIN — INSULIN DETEMIR 30 UNITS: 100 INJECTION, SOLUTION SUBCUTANEOUS at 08:05

## 2021-07-28 RX ADMIN — DOXYCYCLINE 100 MG: 100 INJECTION, POWDER, LYOPHILIZED, FOR SOLUTION INTRAVENOUS at 17:43

## 2021-07-28 RX ADMIN — INSULIN ASPART 15 UNITS: 100 INJECTION, SOLUTION INTRAVENOUS; SUBCUTANEOUS at 11:41

## 2021-07-28 RX ADMIN — OXYCODONE HYDROCHLORIDE AND ACETAMINOPHEN 1 TABLET: 10; 325 TABLET ORAL at 15:00

## 2021-07-28 RX ADMIN — PROCHLORPERAZINE EDISYLATE 5 MG: 5 INJECTION INTRAMUSCULAR; INTRAVENOUS at 08:05

## 2021-07-28 RX ADMIN — DULOXETINE HYDROCHLORIDE 20 MG: 20 CAPSULE, DELAYED RELEASE ORAL at 20:48

## 2021-07-28 RX ADMIN — MORPHINE SULFATE 2 MG: 2 INJECTION, SOLUTION INTRAMUSCULAR; INTRAVENOUS at 11:40

## 2021-07-28 RX ADMIN — MAGNESIUM SULFATE HEPTAHYDRATE 4 G: 40 INJECTION, SOLUTION INTRAVENOUS at 14:55

## 2021-07-28 RX ADMIN — SODIUM CHLORIDE 125 ML/HR: 900 INJECTION, SOLUTION INTRAVENOUS at 21:33

## 2021-07-28 RX ADMIN — HEPARIN SODIUM 5000 UNITS: 5000 INJECTION INTRAVENOUS; SUBCUTANEOUS at 06:04

## 2021-07-28 RX ADMIN — POTASSIUM CHLORIDE, DEXTROSE MONOHYDRATE AND SODIUM CHLORIDE 150 ML/HR: 150; 5; 450 INJECTION, SOLUTION INTRAVENOUS at 01:59

## 2021-07-28 RX ADMIN — POTASSIUM CHLORIDE 40 MEQ: 750 CAPSULE, EXTENDED RELEASE ORAL at 14:55

## 2021-07-28 RX ADMIN — OXYCODONE HYDROCHLORIDE AND ACETAMINOPHEN 1 TABLET: 10; 325 TABLET ORAL at 08:03

## 2021-07-28 RX ADMIN — INSULIN ASPART 15 UNITS: 100 INJECTION, SOLUTION INTRAVENOUS; SUBCUTANEOUS at 08:05

## 2021-07-28 RX ADMIN — OXYCODONE HYDROCHLORIDE AND ACETAMINOPHEN 1 TABLET: 10; 325 TABLET ORAL at 01:53

## 2021-07-28 NOTE — OUTREACH NOTE
Stroke Week 2 Survey      Responses   [facility] facility patient discharged from?  [place]   Does the patient have one of the following disease processes/diagnoses(primary or secondary)?  Stroke (TIA)   Week 2 attempt successful?  No   Revoke  Readmitted          Norma Oh RN

## 2021-07-29 VITALS
WEIGHT: 185 LBS | HEART RATE: 89 BPM | SYSTOLIC BLOOD PRESSURE: 161 MMHG | BODY MASS INDEX: 27.4 KG/M2 | OXYGEN SATURATION: 98 % | TEMPERATURE: 98.1 F | RESPIRATION RATE: 18 BRPM | DIASTOLIC BLOOD PRESSURE: 84 MMHG | HEIGHT: 69 IN

## 2021-07-29 LAB
ANION GAP SERPL CALCULATED.3IONS-SCNC: 11 MMOL/L (ref 5–15)
BASOPHILS # BLD AUTO: 0.09 10*3/MM3 (ref 0–0.2)
BASOPHILS NFR BLD AUTO: 0.8 % (ref 0–1.5)
BUN SERPL-MCNC: 5 MG/DL (ref 6–20)
BUN/CREAT SERPL: 7.6 (ref 7–25)
CALCIUM SPEC-SCNC: 8.5 MG/DL (ref 8.6–10.5)
CHLORIDE SERPL-SCNC: 103 MMOL/L (ref 98–107)
CO2 SERPL-SCNC: 25 MMOL/L (ref 22–29)
CREAT SERPL-MCNC: 0.66 MG/DL (ref 0.76–1.27)
DEPRECATED RDW RBC AUTO: 41.9 FL (ref 37–54)
EOSINOPHIL # BLD AUTO: 0.45 10*3/MM3 (ref 0–0.4)
EOSINOPHIL NFR BLD AUTO: 4.2 % (ref 0.3–6.2)
ERYTHROCYTE [DISTWIDTH] IN BLOOD BY AUTOMATED COUNT: 12.8 % (ref 12.3–15.4)
GFR SERPL CREATININE-BSD FRML MDRD: 124 ML/MIN/1.73
GLUCOSE BLDC GLUCOMTR-MCNC: 124 MG/DL (ref 70–130)
GLUCOSE BLDC GLUCOMTR-MCNC: 142 MG/DL (ref 70–130)
GLUCOSE SERPL-MCNC: 153 MG/DL (ref 65–99)
HCT VFR BLD AUTO: 37 % (ref 37.5–51)
HGB BLD-MCNC: 12.6 G/DL (ref 13–17.7)
IMM GRANULOCYTES # BLD AUTO: 0.03 10*3/MM3 (ref 0–0.05)
IMM GRANULOCYTES NFR BLD AUTO: 0.3 % (ref 0–0.5)
LYMPHOCYTES # BLD AUTO: 1.99 10*3/MM3 (ref 0.7–3.1)
LYMPHOCYTES NFR BLD AUTO: 18.8 % (ref 19.6–45.3)
MCH RBC QN AUTO: 31 PG (ref 26.6–33)
MCHC RBC AUTO-ENTMCNC: 34.1 G/DL (ref 31.5–35.7)
MCV RBC AUTO: 90.9 FL (ref 79–97)
MONOCYTES # BLD AUTO: 0.76 10*3/MM3 (ref 0.1–0.9)
MONOCYTES NFR BLD AUTO: 7.2 % (ref 5–12)
NEUTROPHILS NFR BLD AUTO: 68.7 % (ref 42.7–76)
NEUTROPHILS NFR BLD AUTO: 7.29 10*3/MM3 (ref 1.7–7)
NRBC BLD AUTO-RTO: 0 /100 WBC (ref 0–0.2)
PLATELET # BLD AUTO: 328 10*3/MM3 (ref 140–450)
PMV BLD AUTO: 11 FL (ref 6–12)
POTASSIUM SERPL-SCNC: 3.9 MMOL/L (ref 3.5–5.2)
RBC # BLD AUTO: 4.07 10*6/MM3 (ref 4.14–5.8)
SODIUM SERPL-SCNC: 139 MMOL/L (ref 136–145)
WBC # BLD AUTO: 10.61 10*3/MM3 (ref 3.4–10.8)

## 2021-07-29 PROCEDURE — G0378 HOSPITAL OBSERVATION PER HR: HCPCS

## 2021-07-29 PROCEDURE — 25010000002 MORPHINE PER 10 MG: Performed by: NURSE PRACTITIONER

## 2021-07-29 PROCEDURE — 96376 TX/PRO/DX INJ SAME DRUG ADON: CPT

## 2021-07-29 PROCEDURE — 96372 THER/PROPH/DIAG INJ SC/IM: CPT

## 2021-07-29 PROCEDURE — 96361 HYDRATE IV INFUSION ADD-ON: CPT

## 2021-07-29 PROCEDURE — 36415 COLL VENOUS BLD VENIPUNCTURE: CPT | Performed by: HOSPITALIST

## 2021-07-29 PROCEDURE — 25010000002 PROCHLORPERAZINE 10 MG/2ML SOLUTION: Performed by: HOSPITALIST

## 2021-07-29 PROCEDURE — 25010000002 HEPARIN (PORCINE) PER 1000 UNITS: Performed by: HOSPITALIST

## 2021-07-29 PROCEDURE — 80048 BASIC METABOLIC PNL TOTAL CA: CPT | Performed by: HOSPITALIST

## 2021-07-29 PROCEDURE — 82962 GLUCOSE BLOOD TEST: CPT

## 2021-07-29 PROCEDURE — 96367 TX/PROPH/DG ADDL SEQ IV INF: CPT

## 2021-07-29 PROCEDURE — 85025 COMPLETE CBC W/AUTO DIFF WBC: CPT | Performed by: HOSPITALIST

## 2021-07-29 RX ORDER — MORPHINE SULFATE 2 MG/ML
2 INJECTION, SOLUTION INTRAMUSCULAR; INTRAVENOUS ONCE
Status: COMPLETED | OUTPATIENT
Start: 2021-07-29 | End: 2021-07-29

## 2021-07-29 RX ORDER — GABAPENTIN 300 MG/1
300 CAPSULE ORAL 2 TIMES DAILY
Qty: 6 CAPSULE | Refills: 0 | Status: SHIPPED | OUTPATIENT
Start: 2021-07-29 | End: 2021-07-29 | Stop reason: SDUPTHER

## 2021-07-29 RX ORDER — DOXYCYCLINE HYCLATE 100 MG/1
100 CAPSULE ORAL 2 TIMES DAILY
Qty: 10 CAPSULE | Refills: 0 | Status: SHIPPED | OUTPATIENT
Start: 2021-07-29 | End: 2021-08-03

## 2021-07-29 RX ORDER — GABAPENTIN 300 MG/1
300 CAPSULE ORAL 2 TIMES DAILY
Qty: 6 CAPSULE | Refills: 0 | Status: SHIPPED | OUTPATIENT
Start: 2021-07-29 | End: 2021-08-01

## 2021-07-29 RX ORDER — CEFDINIR 300 MG/1
300 CAPSULE ORAL 2 TIMES DAILY
Qty: 10 CAPSULE | Refills: 0 | Status: SHIPPED | OUTPATIENT
Start: 2021-07-29 | End: 2021-08-03

## 2021-07-29 RX ADMIN — GABAPENTIN 300 MG: 300 CAPSULE ORAL at 09:48

## 2021-07-29 RX ADMIN — PROCHLORPERAZINE EDISYLATE 5 MG: 5 INJECTION INTRAMUSCULAR; INTRAVENOUS at 09:48

## 2021-07-29 RX ADMIN — MORPHINE SULFATE 2 MG: 2 INJECTION, SOLUTION INTRAMUSCULAR; INTRAVENOUS at 12:45

## 2021-07-29 RX ADMIN — HEPARIN SODIUM 5000 UNITS: 5000 INJECTION INTRAVENOUS; SUBCUTANEOUS at 05:38

## 2021-07-29 RX ADMIN — OXYCODONE HYDROCHLORIDE AND ACETAMINOPHEN 1 TABLET: 10; 325 TABLET ORAL at 04:13

## 2021-07-29 RX ADMIN — DOXYCYCLINE 100 MG: 100 INJECTION, POWDER, LYOPHILIZED, FOR SOLUTION INTRAVENOUS at 04:16

## 2021-07-29 RX ADMIN — SODIUM CHLORIDE 125 ML/HR: 900 INJECTION, SOLUTION INTRAVENOUS at 05:39

## 2021-07-29 RX ADMIN — OXYCODONE HYDROCHLORIDE AND ACETAMINOPHEN 1 TABLET: 10; 325 TABLET ORAL at 09:49

## 2021-07-30 ENCOUNTER — READMISSION MANAGEMENT (OUTPATIENT)
Dept: CALL CENTER | Facility: HOSPITAL | Age: 61
End: 2021-07-30

## 2021-07-30 NOTE — OUTREACH NOTE
Prep Survey      Responses   [facility] facility patient discharged from?  [place]   Is LACE score < 7 ?  No   Emergency Room discharge w/ pulse ox?  No   Eligibility  Readm Mgmt   Discharge diagnosis  Hyperosmolar hyperglycemic state    Does the patient have one of the following disease processes/diagnoses(primary or secondary)?  COPD/Pneumonia   Does the patient have Home health ordered?  No   Is there a DME ordered?  No   Prep survey completed?  Yes          Valerie Chairez RN

## 2021-08-01 LAB
BACTERIA SPEC AEROBE CULT: NORMAL
BACTERIA SPEC AEROBE CULT: NORMAL

## 2021-08-04 ENCOUNTER — READMISSION MANAGEMENT (OUTPATIENT)
Dept: CALL CENTER | Facility: HOSPITAL | Age: 61
End: 2021-08-04

## 2021-08-04 NOTE — OUTREACH NOTE
COPD/PN Week 1 Survey      Responses   [facility] patient discharged from?  [place]   Does the patient have one of the following disease processes/diagnoses(primary or secondary)?  COPD/Pneumonia   Was the primary reason for admission:  Pneumonia   Week 1 attempt successful?  No   Unsuccessful attempts  Attempt 1          Fartun Licea RN

## 2021-08-09 ENCOUNTER — READMISSION MANAGEMENT (OUTPATIENT)
Dept: CALL CENTER | Facility: HOSPITAL | Age: 61
End: 2021-08-09

## 2021-08-17 ENCOUNTER — READMISSION MANAGEMENT (OUTPATIENT)
Dept: CALL CENTER | Facility: HOSPITAL | Age: 61
End: 2021-08-17

## 2021-08-17 NOTE — OUTREACH NOTE
"COPD/PN Week 2 Survey      Responses   [facility] patient discharged from?  [place]   Does the patient have one of the following disease processes/diagnoses(primary or secondary)?  COPD/Pneumonia   Was the primary reason for admission:  Pneumonia   Week 2 attempt successful?  Yes   Call start time  1405   Call end time  1418   Discharge diagnosis  Hyperosmolar hyperglycemic state    Meds reviewed with patient/caregiver?  Yes   Prescription comments  finished medication   Is the patient taking all medications as directed (includes completed medication regime)?  Yes   Does the patient have a primary care provider?   Yes   Does the patient have an appointment with their PCP or specialist within 7 days of discharge?  Yes   Comments regarding PCP  PCP Francesca Fields, is out sick for two weeks.    Has the patient kept scheduled appointments due by today?  No   What is preventing the patient from keeping their appointments?  -- [MD canceled the appt]   Nursing Interventions  Advised to reschedule appointment   Pulse Ox monitoring  None   Psychosocial issues?  No   Comments  Pt c/o pain from shingles rash, going to pain clinic tomorrow, he reports. He feels SOA has improved. Pt c/o dry cough and night sweats but denies fever. Of note he has not checked temp with thermometer. Pt also c/o muscle pain/spasms in body, \"[place]orses all over my body\"   What is the patient's perception of their health status since discharge?  Improving   Nursing Interventions  Nurse provided patient education   Are the patient's immunizations up to date?   -- [does not believe he is up to date other than the Covid vaccine.]   If the patient is a current smoker, are they able to teach back resources for cessation?  Not a smoker   Is the patient/caregiver able to teach back the hierarchy of who to call/visit for symptoms/problems? PCP, Specialist, Home health nurse, Urgent Care, ED, 911  Yes   Is the patient/caregiver able to teach " back signs and symptoms of worsening condition:  Fever/chills, Chest pain   Is the patient/caregiver able to teach back importance of completing antibiotic course of treatment?  Yes   Week 2 call completed?  Yes   Wrap up additional comments  problems with shingles          Fartun Licea, RN

## 2021-08-25 ENCOUNTER — READMISSION MANAGEMENT (OUTPATIENT)
Dept: CALL CENTER | Facility: HOSPITAL | Age: 61
End: 2021-08-25

## 2021-08-25 NOTE — OUTREACH NOTE
COPD/PN Week 3 Survey      Responses   [facility] patient discharged from?  [place]   Does the patient have one of the following disease processes/diagnoses(primary or secondary)?  COPD/Pneumonia   Was the primary reason for admission:  Pneumonia   Week 3 attempt successful?  No   Unsuccessful attempts  Attempt 1          Elham Mckeon RN

## 2021-08-30 LAB
QT INTERVAL: 310 MS
QTC INTERVAL: 440 MS

## 2022-03-25 ENCOUNTER — HOSPITAL ENCOUNTER (EMERGENCY)
Facility: HOSPITAL | Age: 62
Discharge: HOME | End: 2022-03-25
Attending: EMERGENCY MEDICINE | Admitting: EMERGENCY MEDICINE

## 2022-03-25 ENCOUNTER — APPOINTMENT (OUTPATIENT)
Dept: GENERAL RADIOLOGY | Facility: HOSPITAL | Age: 62
End: 2022-03-25

## 2022-03-25 VITALS
OXYGEN SATURATION: 98 % | WEIGHT: 173 LBS | BODY MASS INDEX: 25.62 KG/M2 | HEART RATE: 82 BPM | TEMPERATURE: 98.2 F | HEIGHT: 69 IN | RESPIRATION RATE: 20 BRPM | DIASTOLIC BLOOD PRESSURE: 92 MMHG | SYSTOLIC BLOOD PRESSURE: 141 MMHG

## 2022-03-25 DIAGNOSIS — M94.0 COSTOCHONDRITIS: Primary | ICD-10-CM

## 2022-03-25 PROCEDURE — 96372 THER/PROPH/DIAG INJ SC/IM: CPT

## 2022-03-25 PROCEDURE — 71101 X-RAY EXAM UNILAT RIBS/CHEST: CPT

## 2022-03-25 PROCEDURE — 99283 EMERGENCY DEPT VISIT LOW MDM: CPT

## 2022-03-25 PROCEDURE — 25010000002 HYDROMORPHONE PER 4 MG: Performed by: EMERGENCY MEDICINE

## 2022-03-25 RX ORDER — HYDROMORPHONE HCL 110MG/55ML
2 PATIENT CONTROLLED ANALGESIA SYRINGE INTRAVENOUS ONCE
Status: COMPLETED | OUTPATIENT
Start: 2022-03-25 | End: 2022-03-25

## 2022-03-25 RX ORDER — HYDROCODONE BITARTRATE AND ACETAMINOPHEN 5; 325 MG/1; MG/1
1 TABLET ORAL ONCE
Status: COMPLETED | OUTPATIENT
Start: 2022-03-25 | End: 2022-03-25

## 2022-03-25 RX ADMIN — HYDROCODONE BITARTRATE AND ACETAMINOPHEN 1 TABLET: 5; 325 TABLET ORAL at 11:17

## 2022-03-25 RX ADMIN — HYDROMORPHONE HYDROCHLORIDE 2 MG: 2 INJECTION, SOLUTION INTRAMUSCULAR; INTRAVENOUS; SUBCUTANEOUS at 11:59

## 2022-03-25 NOTE — ED PROVIDER NOTES
Subjective   Presents to emergency department for left lateral rib pain x5 days.  States he was leaning over his recliner and felt sharp pain in his left ribs.  States it is painful to the touch and aggravated by movement or deep breathing.  This is happened to him in the past with a similar situation.      History provided by:  Patient   used: No        Review of Systems   Constitutional: Negative for chills and fever.   HENT: Negative for sore throat and trouble swallowing.    Eyes: Negative for visual disturbance.   Respiratory: Negative for cough, shortness of breath and wheezing.    Cardiovascular: Positive for chest pain (chest wall).   Gastrointestinal: Negative for abdominal pain, nausea and vomiting.   Genitourinary: Negative for dysuria and flank pain.   Musculoskeletal: Negative for back pain.   Skin: Negative for color change.   Allergic/Immunologic: Negative for immunocompromised state.   Neurological: Negative for syncope and weakness.   Hematological: Does not bruise/bleed easily.   Psychiatric/Behavioral: Negative for confusion.       Past Medical History:   Diagnosis Date   • Arthritis    • Carpal tunnel syndrome    • Chronic pain syndrome    • Depressive disorder    • GERD (gastroesophageal reflux disease)    • Hernia    • History of closed head injury     with ICH and left weakness   • History of urinary system disease    • Hypertension    • Migraine    • Right shoulder pain    • Rotator cuff syndrome    • Type 2 diabetes mellitus (HCC)    • Ureteric stone        Allergies   Allergen Reactions   • Naproxen Other (See Comments) and Rash     Blisters in mouth   • Tramadol Rash     Patient states he gets a rash in his mouth.    • Zofran [Ondansetron] Rash       Past Surgical History:   Procedure Laterality Date   • ABDOMINAL SURGERY  08/2016   • APPENDECTOMY N/A 1/27/2018    Procedure: APPENDECTOMY;  Surgeon: Rusty Palacio MD;  Location: [facility];  Service:    •  "CHOLECYSTECTOMY     • CIRCUMCISION     • CYSTOSCOPY  08/13/2003    Cystoscopy and removal of J stent. Right ureteral stent.   • CYSTOSCOPY  08/13/2003    Cystoscopy and right stone extraction and placement of 26x 6 J-stent.   • INCISION AND DRAINAGE ABSCESS N/A 7/26/2017    Procedure: INCISION AND DRAINAGE ABD.ABSCESS;  Surgeon: Rui Shaver MD;  Location: [facility];  Service:    • VENTRAL/INCISIONAL HERNIA REPAIR N/A 5/31/2017    Procedure: LAPAROSCOPIC POSSIBLE OPEN ADHESIOLYSIS AND VENTRAL/INCISIONAL HERNIA REPAIR ;  Surgeon: Rui Shaver MD;  Location: [facility];  Service:        Family History   Problem Relation Age of Onset   • No Known Problems Mother    • No Known Problems Father    • No Known Problems Sister    • No Known Problems Brother        Social History     Socioeconomic History   • Marital status:    Tobacco Use   • Smoking status: Never Smoker   • Smokeless tobacco: Never Used   Substance and Sexual Activity   • Alcohol use: No   • Drug use: No   • Sexual activity: Defer           Objective      /92 (BP Location: Right arm, Patient Position: Sitting)   Pulse 82   Temp 98.2 °F (36.8 °C) (Oral)   Resp 20   Ht 175.3 cm (69\")   Wt 78.5 kg (173 lb)   SpO2 98%   BMI 25.55 kg/m²     Physical Exam  Vitals and nursing note reviewed.   Constitutional:       General: He is not in acute distress.     Appearance: Normal appearance. He is normal weight. He is not ill-appearing.   HENT:      Head: Normocephalic and atraumatic.   Eyes:      Conjunctiva/sclera: Conjunctivae normal.   Cardiovascular:      Rate and Rhythm: Normal rate and regular rhythm.      Heart sounds: Normal heart sounds.   Pulmonary:      Effort: Pulmonary effort is normal. No respiratory distress.      Breath sounds: Normal breath sounds. No wheezing.   Chest:      Chest wall: Tenderness present. No mass, deformity, swelling, crepitus or edema.       Musculoskeletal:         General: Tenderness present.   Skin:     " General: Skin is warm.   Neurological:      Mental Status: He is alert. Mental status is at baseline.   Psychiatric:         Mood and Affect: Mood normal.         Behavior: Behavior normal.         Thought Content: Thought content normal.         Procedures           ED Course      XR Ribs Left With PA Chest    Result Date: 3/25/2022  Narrative: Procedure: XR RIBS UNILATERAL WITH CHEST. History: left rib pain. Comparison: None Findings: One frontal chest x-ray and four x-rays of the left ribs were obtained. Slightly elevated left hemidiaphragm noted. Surgical clips seen in the right upper abdomen. No overt pulmonary vascular congestion, focal pulmonary parenchymal opacity, pleural effusion or pneumothorax. Cardiac silhouette normal in size. There is no radiographic evidence of an acute displaced rib fracture, dislocation or suspicious bony abnormality.     Impression: Impression: No radiographic evidence of an acute displaced rib fracture. Electronically signed by:  Kirt Galvez MD  3/25/2022 11:58 AM CDT Workstation: [id number]                                               [facility]    Final diagnoses:   Costochondritis       ED Disposition  ED Disposition     ED Disposition   Discharge    Condition   Stable    Comment   --             [facility]  [address] [place] [address] [phone]  Call   As needed    [facility] EMERGENCY DEPARTMENT  [address]  [facility] [address] [phone]  Go to   if symptoms worsen.         Medication List      No changes were made to your prescriptions during this visit.          Guillaume Lai PA-C  03/25/22 4724

## 2022-03-25 NOTE — ED NOTES
Patient states he was leaning over his recliner chair arm to pick an item up off the floor and then experienced a sharp pain in his left rib area. Patient reports pain level of 8/10 and states the pain is worsened by taking a breath in and moving.

## 2022-05-13 NOTE — PLAN OF CARE
Problem: Patient Care Overview  Goal: Plan of Care Review  Outcome: Ongoing (interventions implemented as appropriate)   02/14/19 1300   Patient Care Overview   IRF Plan of Care Review progress ongoing, continue   Progress, Functional Goals demonstrating adequate progress   Coping/Psychosocial   Plan of Care Reviewed With patient   OTHER   Outcome Summary Pt able to amb with SBA for 122ft this pm and performed step training this pm 5 steps x 2 with CGA. Pt cont to require bed rails to assist with sup to sit but able to perform sit to sup without assistance.           fEMALE ANNUAL EXAM note      History    Chantell Zamora is a 22 year old female who presents for an annual exam.  She is accompanied by her boyfriend and her service dog today.     She has known anxiety and depression but is not on medication for either.  In the past she has been on Lexapro and bupropion but she stopped both approximately 6 months ago.  She was questioning if they were helping her and she has not felt much different since stopping them.  She does question if she has undiagnosed bipolar disorder and would like to see Psychiatry to clarify.    She has known POTS but is not currently seeing Cardiology.  She has been trying to increase her salt intake.  She has had frequent syncopal episodes over the past few months but feels that this may be weather related.  She is considering taking salt tablets as this was recommended to her previously.    She also reports that over the past 6 months she has had intermittent episodes of seizure-like activity.  These tend to be brought on by increased anxiety.  Typically they last approximately 45 seconds to 1 minute.  Sometime she is conscious for them, Other times not.  After these episodes are over she reports full body pain.      Recent Review Flowsheet Data     Date 5/13/2022    Adult PHQ 2 Score 2    Adult PHQ 2 Interpretation No further screening needed    Little interest or pleasure in activity? Several days    Feeling down, depressed or hopeless? Several days    Adult PHQ 9 Score 14    Adult PHQ 9 Interpretation Moderate Depression    Trouble falling or staying asleep or sleeping all the time? Nearly every day    Feeling tired or having little energy? Nearly every day    Poor appetite or overeating? Nearly every day    Feeling bad about yourself or that you are a failure or have let yourself or family down? More than half the days    Trouble concentrating on things such as reading the newspaper or watching TV? Not at all    Moving or speaking slowly  that other people have noticed or the opposite - being so fidgety or restless that you have been moving around a lot more than usual? Not at all    Thoughts that you would be better off dead or of hurting yourself in some way? Several days    If you reported any problems, how difficult have these problems made it to do your work, take care of things at home, or get along with other people? Very difficult        Generalized Anxiety Disorder (GAD7)    Over the last 2 weeks, how often have you been bothered by the following problems?   Score: 15    Score:  0-4 minimal symptoms 10-14 moderate symptoms   5-9 mild symptoms 15-21 severe symptoms      1.  Feeling nervous, anxious or on edge Nearly every day   2.  Not being able to stop or control worrying Several days   3.  Worrying too much about different things Nearly every day   4.  Trouble relaxing Nearly every day   5.  Being so restless that it's hard to sit still Not at all   6.  Becoming easily annoyed or irritable Nearly every day   7.  Feeling afraid as if something awful might happen More than half the days            If you checked off any problems, how difficult have these made it for you to do your work, take care of things at home, or get along with other people? Very difficult          DEPRESSION ASSESSMENT/PLAN:  Referred to Behavioral Health and/or Psychiatry    Mood Disorder Questionnaire  Has there every been a period of time when you were not your usual self and    You felt so good or hyper that other people thought you were not your normal self or you were so hyper that you got into trouble No  You were so irritable that you shouted at people or started fights or arguments Yes  You felt much more self confident than usual Yes  You got much less sleep than usual and found that you didn't really miss it Yes  You were more talkative or spoke much faster than usual No  Thoughts raced through your head or you couldn't slow your mind down Yes  You were so  easily distracted by things around you that you had trouble concentrating or staying on track Yes  You had more energy than usual No  You were much more active or did many more things than usual Yes  You were much more social or outgoing than usual, for example, you telephoned friends in the middle of the night *No  You were much more interested in sex than usual Yes  You did things that were unusual for you or that other people might have thought were excessive, foolish, or risky Yes  Spending money got you or your family in trouble No    If you checked yes to more than one above, have several of these ever happened during the same period of time?    Have much of a problem did any of these cause you - like being unable to work, having family, money or legal troubles, getting in arguments or fights?    No problems  Minor problems  Moderate problems XXXX  Serious problem      medical history    Past Medical History:   Diagnosis Date   • Anxiety    • Depression    • POTS (postural orthostatic tachycardia syndrome)        SURGICAL history    Past Surgical History:   Procedure Laterality Date   • Appendectomy         social history    Social History     Tobacco Use   • Smoking status: Never Smoker   • Smokeless tobacco: Never Used   Vaping Use   • Vaping Use: Some days   • Substances: Nicotine, CBD, Flavoring   • Devices: Disposable, Pre-filled or refillable cartridge   Substance Use Topics   • Alcohol use: No   • Drug use: Never       family history    Family History   Problem Relation Age of Onset   • ADHD/ADD Sister    • Anxiety disorder Sister        mEDICATIONS    Current Outpatient Medications   Medication Sig   • hydrOXYzine (ATARAX) 50 MG tablet Take 1 tablet by mouth 3 times daily as needed for Anxiety.     No current facility-administered medications for this visit.       aLLERGIES    ALLERGIES:   Allergen Reactions   • Amoxicillin      unknown   • Penicillins      rash       Review of systems     Constitutional:  Denies fevers, chills, weakness, fatigue, loss of appetite, abnormal weight gain or abnormal weight loss.    Eyes:  Denies blindness, blurred vision, double vision, pain, itching or burning.    HENT:  Denies facial pain, ear pain, hearing loss, tinnitus, nasal congestion, rhinorrhea, epistaxis, sinus pain, mouth lesions or sore throat.    Respiratory:  Denies SOB, cough, sputum production, hemoptysis or wheezing.    Cardiovascular:  Denies chest pains, palpitations, tachycardia, edema, cyanosis or vertigo.    Gastrointestinal:  Denies abdominal pain, heartburn, nausea, vomiting, diarrhea, constipation or blood in stool.    Musculoskeletal:  Denies back pain, joint pain, joint swelling or tenderness, muscle pains or spasms. Denies neck pain, stiffness or swelling.    Neurologic:  Denies numbness, tingling, other sensory changes, sudden weakness in arms or legs. Denies confusion, headache, dizziness, memory loss or tremors.  Positive for seizure like activity.   Genitourinary:  Denies urinary frequency, nocturia, urgency, incontinence, dysuria or hematuria.    Hematologic/Lymph:  Denies easy bruising or bleeding, swollen lymph glands.    Endocrine:  Denies heat or cold intolerance, polydipsia or polyduria.  Denies changes in hair or skin texture.    Integument:  Denies new rashes or lesions, pruritus or dryness of skin.    Psychiatric:  Denies hallucinations.  Positive for depression, anxiety,  irritability and sleeping problems.   Allergic/Immunologic:  Denies recurrent infections, hypersensitivity.      All other Review of Systems negative.      Physical Exam    Vital Signs:    Vitals:    05/13/22 1203   BP: 130/84   BP Location: [facility] - Right upper extremity   Patient Position: Sitting   Cuff Size: Regular   Pulse: 80   Weight: 68.9 kg (152 lb)   Height: 5' 8.25\" (1.734 m)   LMP: 04/22/2022     General:  Well developed, well nourished. In no apparent distress.    Eyes:  PERRL, EOMI. Conjunctivae  pink. Sclerae anicteric.    HENT:  Normocephalic, atraumatic. Bilateral external ears are normal. Mucosal membranes moist. External nose is normal. Oropharynx is clear.    Neck:  Supple. Nontender. Normal range of motion. No masses. No thyromegaly.  Trachea midline.  Respiratory:  Normal respiratory effort. No chest wall tenderness. Lungs clear to auscultation bilaterally. Symmetrical chest expansion.    Cardiovascular:  Regular rate and rhythm. No murmurs, rubs, or gallops. Normal S1 and S2. No S3 or S4. No peripheral edema.  Gastrointestinal:  Soft. Nontender. Nondistended. Normal bowel sounds. No pulsatile or other abdominal masses. No hepatosplenomegaly or splenomegaly.    Breasts:  Deferred per patient.   :  Deferred.   Musculoskeletal:  No clubbing or cyanosis. Full range of motion in all 4 extremities proximal and distal. No joint swelling or tenderness in right and left shoulders, elbows, wrists and fingers. No joint swelling or tenderness in left and right knees, ankles and toes.    Neurologic:  Alert and oriented x 3. Gait is normal. Normal sensory function. No motor deficits in all 4 extremities. Cranial nerves II-XII intact. Symmetrical bilateral knee DTR’s.    Integumentary:  Warm. Dry. Pink. No rashes or lesions. No wounds.    Lymphatic:  No lymphadenopathy in submental, submandibular or cervical chain. No supraclavicular or infraclavicular lymphadenopathy.   Psychiatric: Cooperative. Appropriate mood and affect. Normal judgment.        Assessment & Plan    Chantell was seen today for physical.    Diagnoses and all orders for this visit:    Annual physical exam    Major depressive disorder, recurrent episode, moderate (CMS/HCC)  -     SERVICE TO BEHAVIORAL HEALTH    Anxiety  -     SERVICE TO BEHAVIORAL HEALTH  -     hydrOXYzine (ATARAX) 50 MG tablet; Take 1 tablet by mouth 3 times daily as needed for Anxiety.    POTS (postural orthostatic tachycardia syndrome)    Seizure-like activity (CMS/HCC)  -      SERVICE TO NEUROLOGY    Mood disorder in conditions classified elsewhere      Patient is referred to Neurology for further evaluation of her seizure-like activity.  She reports that she is rarely alone and will continue this until her diagnosis is clarified.  I have also cautioned her with driving until her diagnosis is clarified.  Notify me of any change in symptoms.      She is referred to Behavioral Health for or further evaluation of her anxiety and depression.  Mood questionnaire supports mood disorder.  We discussed use of mood stabilizer but I would like patient to clarify her seizure diagnosis before starting on any medications.  She is in agreement with this.  She does request a refill of her hydroxyzine which she uses for anxiety attacks.  This is refilled today.    She declines referral or follow-up with Cardiology as she does not feel that they are doing much to help with her POTS.  She does plan to obtain salt tablets to see if this improves.  She is also hopeful that slightly cooler weather will also help improve things.    Return to clinic in 1 year for complete physical exam, sooner for any acute needs.  Patient states understanding of all of the above and is in agreement plan

## 2023-12-25 NOTE — THERAPY TREATMENT NOTE
Problem: Potential for Falls  Goal: Patient will remain free of falls  Description: INTERVENTIONS:  - Educate patient/family on patient safety including physical limitations  - Instruct patient to call for assistance with activity   - Consult OT/PT to assist with strengthening/mobility   - Keep Call bell within reach  - Keep bed low and locked with side rails adjusted as appropriate  - Keep care items and personal belongings within reach  - Initiate and maintain comfort rounds  - Make Fall Risk Sign visible to staff  - Offer Toileting every 2 Hours, in advance of need  - Initiate/Maintain bed alarm  - Obtain necessary fall risk management equipment: bed alarm  - Apply yellow socks and bracelet for high fall risk patients  - Consider moving patient to room near nurses station  Outcome: Progressing     Problem: PAIN - ADULT  Goal: Verbalizes/displays adequate comfort level or baseline comfort level  Description: Interventions:  - Encourage patient to monitor pain and request assistance  - Assess pain using appropriate pain scale  - Administer analgesics based on type and severity of pain and evaluate response  - Implement non-pharmacological measures as appropriate and evaluate response  - Consider cultural and social influences on pain and pain management  - Notify physician/advanced practitioner if interventions unsuccessful or patient reports new pain  Outcome: Progressing     Problem: INFECTION - ADULT  Goal: Absence or prevention of progression during hospitalization  Description: INTERVENTIONS:  - Assess and monitor for signs and symptoms of infection  - Monitor lab/diagnostic results  - Monitor all insertion sites, i.e. indwelling lines, tubes, and drains  - Monitor endotracheal if appropriate and nasal secretions for changes in amount and color  - [place] appropriate cooling/warming therapies per order  - Administer medications as ordered  - Instruct and encourage patient and family to use good hand  Inpatient Rehabilitation - Speech Language Pathology Treatment Note    [facility]       Patient Name: Ventura Boggs  : 1960  MRN: [id number]    Today's Date: 2019           Admit Date: 2019     Pt seen for cognitive therapy this date. Pt participated well in all therapy tasks and demonstrated strong performance w/mental flexibility, memory and selective attention. Pt continues to struggle w/math related problems.    Cognitive Goals:  1.  Pt will complete mental manipulation task w/90% acc: Pt alphabetized three words sentences based on the first letter given verbally w/100% acc.     3.  Pt will complete word problems involving time w/90% acc:  Pt was 60% acc w/time related word problems and 50% acc w/distance and money related words problems.      4.  Pt will complete selective and divided attention task w/90% acc: Pt completed selective attention tasks of identifying if target words were new or repetitive w/favorite music playing in background.  Pt was 83% acc w/1.8 sec reaction time.     5. Pt will listen to a paragraph and answer questions with 90% accuracy:  Pt was 70% acc w/3-4 sentence paragraph recall of nonfiction information.      Visit Dx:        ICD-10-CM ICD-9-CM   1. Uncontrolled type 2 diabetes mellitus with hyperglycemia (CMS/HCC) E11.65 250.02   2. Medically complex patient Z78.9 V49.9   3. Chronic intractable pain G89.29 338.29   4. Symbolic dysfunction R48.9 784.60   5. Impaired mobility and ADLs Z74.09 799.89   6. Impaired functional mobility, balance, gait, and endurance Z74.09 V49.89       Patient Active Problem List   Diagnosis   • Uncontrolled type 2 diabetes mellitus with hyperglycemia (CMS/HCC)   • Postoperative urinary retention   • Incisional hernia, without obstruction or gangrene   • Acute appendicitis with localized peritonitis   • Closed head injury   • Hyponatremia   • History of recurrent TIAs   • Ataxia   • Chronic pain syndrome   • Right shoulder pain   •  Left-sided muscle weakness   • Medically complex patient   • Stroke-like symptoms          Therapy Treatment    Evaluation/Coping    Evaluation/Treatment Time and Intent  Subjective Information: no complaints (02/11/19 0905 : Divya Abarca, MS CCC-SLP)  Existing Precautions/Restrictions: fall, lifting (02/11/19 0905 : Divya Abarca, MS CCC-SLP)  Document Type: therapy note (daily note) (02/11/19 0905 : Divya Abarca, MS CCC-SLP)  Mode of Treatment: speech-language pathology, individual therapy (02/11/19 0905 : Divya Abarca, MS CCC-SLP)  Patient/Family Observations: Pt sitting in w/c in room (02/11/19 0905 : Divya Abarca, MS CCC-SLP)  Start Time (Evaluation/Treatment): 0905 (02/11/19 0905 : Divya Abarca, MS CCC-SLP)  Stop Time (Evaluation/Treatment): 1005 (02/11/19 0905 : Divya Abarca, MS CCC-SLP)    Vitals/Pain/Safety    Pain Assessment  Additional Documentation: Pain Scale 2: Numbers Pre/Post-Treatment (Group) (02/11/19 0905 : Divya Abarca, MS CCC-SLP)  Pain Scale: Numbers Pre/Post-Treatment  Pain Scale: Numbers, Pretreatment: 8/10 (02/11/19 0905 : Divya Abarca, MS CCC-SLP)  Pain Scale: Numbers, Post-Treatment: 8/10 (02/11/19 0905 : Divya Abarca, MS CCC-SLP)  Pain Location - Side: Right (02/11/19 0905 : Divya Abarca, MS CCC-SLP)  Pain Location - Orientation: upper (02/11/19 0905 : Divya Abarca, MS CCC-SLP)  Pain Location: shoulder (02/11/19 0905 : Divya Abarca, MS CCC-SLP)  Pain Intervention(s): (meds d/t at 1030 per pt) (02/11/19 0905 : Divya Abarca, MS CCC-SLP)  Pain Scale: Word Pre/Post-Treatment  Pain Location - Side: Right (02/11/19 0905 : Divya Abarca, MS CCC-SLP)  Pain Location - Orientation: upper (02/11/19 0905 : Divya Abarca, MS CCC-SLP)  Pain Location: shoulder (02/11/19 0905 : Divya Abarca, MS CCC-SLP)  Pain Intervention(s): (meds d/t at 1030 per pt) (02/11/19 0905 : Divya Abarca, MS ARMENTA-RAINA)  Pain  hygiene technique  - Identify and instruct in appropriate isolation precautions for identified infection/condition  Outcome: Progressing     Problem: DISCHARGE PLANNING  Goal: Discharge to home or other facility with appropriate resources  Description: INTERVENTIONS:  - Identify barriers to discharge w/patient and caregiver  - Arrange for needed discharge resources and transportation as appropriate  - Identify discharge learning needs (meds, wound care, etc.)  - Arrange for interpretive services to assist at discharge as needed  - Refer to Case Management Department for coordinating discharge planning if the patient needs post-hospital services based on physician/advanced practitioner order or complex needs related to functional status, cognitive ability, or social support system  Outcome: Progressing     Problem: Knowledge Deficit  Goal: Patient/family/caregiver demonstrates understanding of disease process, treatment plan, medications, and discharge instructions  Description: Complete learning assessment and assess knowledge base.  Interventions:  - Provide teaching at level of understanding  - Provide teaching via preferred learning methods  Outcome: Progressing     Problem: CARDIOVASCULAR - ADULT  Goal: Maintains optimal cardiac output and hemodynamic stability  Description: INTERVENTIONS:  - Monitor I/O, vital signs and rhythm  - Monitor for S/S and trends of decreased cardiac output  - Administer and titrate ordered vasoactive medications to optimize hemodynamic stability  - Assess quality of pulses, skin color and temperature  - Assess for signs of decreased coronary artery perfusion  - Instruct patient to report change in severity of symptoms  Outcome: Progressing  Goal: Absence of cardiac dysrhythmias or at baseline rhythm  Description: INTERVENTIONS:  - Continuous cardiac monitoring, vital signs, obtain 12 lead EKG if ordered  - Administer antiarrhythmic and heart rate control medications as ordered  -  Monitor electrolytes and administer replacement therapy as ordered  Outcome: Progressing      Scale: FACES Pre/Post-Treatment  Pain Location - Side: Right (02/11/19 0905 : [place] Divya L, MS CCC-SLP)  Pain Location - Orientation: upper (02/11/19 0905 : [place] Divya L, MS CCC-SLP)  Pain Location: shoulder (02/11/19 0905 : Rima Divya L, MS CCC-SLP)  Pain Intervention(s): (meds d/t at 1030 per pt) (02/11/19 0905 : Rima Divya L, MS CCC-SLP)  Pain Scale 2: Numbers Pre/Post-Treatment  Pain Scale 2: Numbers, Pretreatment: 8/10 (02/11/19 0905 : Rima Divya L, MS CCC-SLP)  Pain Scale 2: Numbers, Post-Treatment: 8/10 (02/11/19 0905 : [place] Divya L, MS CCC-SLP)  Pain Location 2 - Side: Left (02/11/19 0905 : Rima Divya L, MS CCC-SLP)  Pain Location 2 - Orientation: lower (02/11/19 0905 : [place] Divya L, MS CCC-SLP)  Pain Location 2: extremity (02/11/19 0905 : [place] Divya L, MS CCC-SLP)  Pain Intervention(s) 2: (meds d/t at 1030 per pt) (02/11/19 0905 : Rima Divya L, MS CCC-SLP)  Pain Scale 2: FACES Pre/Post-Treatment  Pain Location 2 - Side: Left (02/11/19 0905 : [place] Divya L, MS CCC-SLP)  Pain Location 2 - Orientation: lower (02/11/19 0905 : [place], Divya L, MS CCC-SLP)  Pain Location 2: extremity (02/11/19 0905 : [place] Divya L, MS CCC-SLP)  Pain Intervention(s) 2: (meds d/t at 1030 per pt) (02/11/19 0905 : Rima Divya L, MS CCC-SLP)  Positioning and Restraints  Pre-Treatment Position: sitting in chair/recliner (02/11/19 0905 : [place] carringtone L, MS CCC-SLP)  Post Treatment Position: wheelchair (02/11/19 0905 : Divya Abarca, MS CCC-SLP)  In Wheelchair: sitting, call light within reach, encouraged to call for assist (02/11/19 0905 : Divya Abarca, MS CCC-SLP)    Cognition/Communication         Oral Motor/Eating         Mobility/Basic Activities/Instrumental Activities/Motor/Modality                   ROM/MMT                   Sensory/Myotome/Dermatome/Edema               Posture/Balance/Special Tests/Exercise/Transportation/Sexual  Function                   Orthotics/Residual Limb/Prosthetic Management              Outcome Summary    Daily Summary of Progress (SLP)  Functional Goal Overall Progress: Speech Language Pathology: progressing toward functional goals as expected (02/11/19 0905 : Divya Abarca, MS CCC-SLP)  Impairments Continuing to Limit Function: Speech Language Pathology: high level attention (02/11/19 0905 : Divya Abarca, MS CCC-SLP)  Recommendations: Speech Language Pathology: Continue POC (02/11/19 0905 : Divya Abarca, MS CCC-SLP)    EDUCATION    The patient has been educated in the following areas:     Cognitive Impairment.    SLP Recommendation and Plan    SLP Diagnosis: symbolic dysfunction    SLP Diagnosis: symbolic dysfunction    Rehab Potential/Prognosis: good         Anticipated Dischage Disposition: home with home health              Predicted Duration Therapy Intervention (Days): until discharge           Plan of Care Reviewed With: patient      SLP GOALS     Row Name 02/11/19 0905 02/09/19 1300 02/09/19 1057       Attention Goal 1 (SLP)    Improve Attention by Goal 1 (SLP)  complete selective attention task;complete divided attention task;90%  -CK  complete selective attention task;complete divided attention task;90%  -LW  complete selective attention task;complete divided attention task;90%  -EA    Time Frame (Attention Goal 1, SLP)  by discharge  -CK  by discharge  -LW  by discharge  -EA    Barriers (Attention Goal 1, SLP)  hx of CHI  -CK  hx of CHI  -LW  hx of CHI  -EA    Progress (Attention Goal 1, SLP)  80%  -CK  other (comment) new goal  -LW  other (comment) new goal  -EA    Progress/Outcomes (Attention Goal 1, SLP)  goal ongoing  -CK  goal ongoing  -LW  goal ongoing  -EA    Comment (Attention Goal 1, SLP)  --  Selective attention and visual attention search - 100% accuracy with minimal distractions average time 25 secs. Divided attention and speed using [employer] - 70% acuracy.  -LW  Selective  attention and visual attention search - 100% accuracy with minimal distractions average time 25 secs. Divided attention and speed using [employer] - 70% acuracy.  -EA       Memory Skills Goal 1 (SLP)    Improve Memory Skills Through Goal 1 (SLP)  recalling unrelated word lists with an imposed delay;listen to a paragraph and answer questions;90%  -CK  recalling unrelated word lists with an imposed delay;listen to a paragraph and answer questions;90%  -LW  recalling unrelated word lists with an imposed delay;listen to a paragraph and answer questions;90%  -EA    Time Frame (Memory Skills Goal 1, SLP)  by discharge  -CK  by discharge  -LW  by discharge  -EA    Barriers (Memory Skills Goal 1, SLP)  hx of CHI  -CK  hx of CHI  -LW  hx of CHI  -EA    Progress (Memory Skills Goal 1, SLP)  80%  -CK  other (comment) new goal  -LW  other (comment) new goal  -EA    Progress/Outcomes (Memory Skills Goal 1, SLP)  goal ongoing  -CK  goal ongoing  -LW  goal ongoing  -EA    Comment (Memory Skills Goal 1, SLP)  --  SLP reviewed memory strategies of visualization this date and modeled use. Pt increased recall when provided with visualization strategy and use.  -LW  SLP reviewed memory strategies of visualization this date and modeled use. Pt increased recall when provided with visualization strategy and use.  -EA       Organizational Skills Goal 1 (SLP)    Improve Thought Organization Through Goal 1 (SLP)  completing mental manipulation task;90%  -CK  completing mental manipulation task;90%  -LW  completing mental manipulation task;90%  -EA    Time Frame (Thought Organization Skills Goal 1, SLP)  by discharge  -CK  by discharge  -LW  by discharge  -EA    Barriers (Thought Organization Skills Goal 1, SLP)  Hx of CHI  -CK  Hx of CHI  -LW  Hx of CHI  -EA    Progress (Thought Organization Skills Goal 1, SLP)  100%  -CK  other (comment) new goal  -LW  other (comment) new goal  -EA    Progress/Outcomes (Thought Organization Skills Goal 1,  SLP)  goal ongoing  -CK  goal ongoing  -LW  goal ongoing  -EA    Comment (Thought Organization Skills Goal 1, SLP)  --  Abstraction and categorization completed this date - 80% accuracy   -LW  Abstraction and categorization completed this date - 80% accuracy   -EA       Functional Math Skills Goal 1 (SLP)    Improve Functional Math Skills Through Goal 1 (SLP)  complete functional math task;complete word problems involving time;90%  -CK  complete functional math task;complete word problems involving time;90%  -LW  complete functional math task;complete word problems involving time;90%  -EA    Time Frame (Functional Math Skills Goal 1, SLP)  by discharge  -CK  by discharge  -LW  by discharge  -EA    Barriers (Functional Math Skills Goal 1, SLP)  Hx of CHI  -CK  Hx of CHI  -LW  Hx of CHI  -EA    Progress (Functional Math Skills Goal 1, SLP)  60%  -CK  other (comment) new goal  -LW  other (comment) new goal  -EA    Progress/Outcomes (Functional Math Skills Goal 1, SLP)  goal ongoing  -CK  other (see comments) new goal  -LW  other (see comments) new goal  -EA    Comment (Functional Math Skills Goal 1, SLP)  --  Word problems with money and time 70% accuracy. Required repetition and review.  -LW  Word problems with money and time 70% accuracy. Required repetition and review.  -EA      User Key  (r) = Recorded By, (t) = Taken By, (c) = Cosigned By    Initials Name Provider Type    [place] Divya Abarca, MS CCC-SLP Speech and Language Pathologist    Madison Silverman, MS CCC-SLP Speech and Language Pathologist    Irina Lorenzo, MARYLIN/L Occupational Therapy Assistant                  Time Calculation:       Time Calculation- SLP     Row Name 02/11/19 1237             Time Calculation- SLP    SLP Start Time  0905  -CK      SLP Stop Time  1005  -CK      SLP Time Calculation (min)  60 min  -CK      Total Timed Code Minutes- SLP  60 minute(s)  -CK      SLP Received On  02/11/19  -      SLP Goal Re-Cert Due Date   02/20/19  -TIAN        User Key  (r) = Recorded By, (t) = Taken By, (c) = Cosigned By    Initials Name Provider Type    Divya Santana, MS CCC-SLP Speech and Language Pathologist            Therapy Charges for Today     Code Description Service Date Service Provider Modifiers Qty    [id number]  ST TREATMENT SPEECH 4 2/11/2019 Divya Abarca, MS GEOVANY-SLP GN 1                           Divya Abarca MS CCC-SLP  2/11/2019

## 2025-01-14 NOTE — PLAN OF CARE
Orders:    Vitamin D 25-Hydroxy,Total (for eval of Vitamin D levels)     Problem: Patient Care Overview  Goal: Plan of Care Review  Outcome: Ongoing (interventions implemented as appropriate)   02/14/19 0920   Patient Care Overview   IRF Plan of Care Review progress ongoing, continue   Progress, Functional Goals demonstrating adequate progress   Coping/Psychosocial   Plan of Care Reviewed With patient   OTHER   Outcome Summary Pt demonstrates good ability to perform step training on 5 steps x 2 trials initially with B HR's then with only using 1 HR. Pt amb 144ft with RW and CGA with no episode of knee buckling or LOB in 16 minute time frame.

## (undated) DEVICE — GLV SURG TRIUMPH LT PF LTX 7.5 STRL

## (undated) DEVICE — WOUND RETRACTOR AND PROTECTOR: Brand: ALEXIS O WOUND PROTECTOR-RETRACTOR

## (undated) DEVICE — SPNG DRN AMD EXCILON 6PLY 4X4IN PK/2

## (undated) DEVICE — GLV SURG TRIUMPH PF LTX 6.5 STRL

## (undated) DEVICE — STPLR SKIN VISISTAT WD 35CT

## (undated) DEVICE — DECANT BG O JET

## (undated) DEVICE — GLV SURG SENSICARE GREEN W/ALOE PF LF 8 STRL

## (undated) DEVICE — GEL ULTRASND AQUASONIC 100 20GM STRL

## (undated) DEVICE — PK LAP CHOLE LF 60

## (undated) DEVICE — GLV SURG TRIUMPH LT PF LTX 6 STRL

## (undated) DEVICE — 3M™ IOBAN™ 2 ANTIMICROBIAL INCISE DRAPE 6651EZ: Brand: IOBAN™ 2

## (undated) DEVICE — ANTIBACTERIAL UNDYED BRAIDED (POLYGLACTIN 910), SYNTHETIC ABSORBABLE SUTURE: Brand: COATED VICRYL

## (undated) DEVICE — SUT VICRYL 3-0 SH-1 PO 18IN J772D

## (undated) DEVICE — SOL IRR NACL 0.9PCT BT 1000ML

## (undated) DEVICE — HARMONIC ACE +7 LAPAROSCOPIC SHEARS ADVANCED HEMOSTASIS 5MM DIAMETER 36CM SHAFT LENGTH  FOR USE WITH GRAY HAND PIECE ONLY: Brand: HARMONIC ACE

## (undated) DEVICE — NDL SPINE 18G 31/2IN PNK

## (undated) DEVICE — MONOPOLAR METZENBAUM SCISSOR TIP, DISPOSABLE: Brand: MONOPOLAR METZENBAUM SCISSOR TIP, DISPOSABLE

## (undated) DEVICE — DRP WARMR MACH

## (undated) DEVICE — GOWN SURG PREVENTION PLUS IMPERV XLNG 2XL

## (undated) DEVICE — TRAP,MUCUS SPECIMEN,40CC: Brand: MEDLINE

## (undated) DEVICE — DRSNG SPNG GZ WOVN 8PLY 4X4IN 2PK LF STRL BX/50PK

## (undated) DEVICE — SPNG LAP 18X18IN LF STRL PK/5

## (undated) DEVICE — MARKR SKIN W/RULR AND LBL

## (undated) DEVICE — DRN WND JP RND W TROC SIL 19F 1/4IN

## (undated) DEVICE — RESERVOIR,SUCTION,100CC,SILICONE: Brand: MEDLINE

## (undated) DEVICE — 3M™ STERI-STRIP™ REINFORCED ADHESIVE SKIN CLOSURES, R1547, 1/2 IN X 4 IN (12 MM X 100 MM), 6 STRIPS/ENVELOPE: Brand: 3M™ STERI-STRIP™

## (undated) DEVICE — SUT SILK 2/0 FS BLK 18IN 685G

## (undated) DEVICE — UNDYED BRAIDED (POLYGLACTIN 910), SYNTHETIC ABSORBABLE SUTURE: Brand: COATED VICRYL

## (undated) DEVICE — SPNG GZ STRL 2S 4X4 12PLY

## (undated) DEVICE — SPNG GZ WOVN 4X4IN 12PLY 10/BX STRL

## (undated) DEVICE — GLV SURG SENSICARE ALOE LF PF SZ7.5 GRN

## (undated) DEVICE — CORE TRUMPET FOR SINGLE SOLUTION BAG: Brand: CORE DYNAMICS

## (undated) DEVICE — GLV SURG SENSICARE GREEN W/ALOE PF LF 7 STRL

## (undated) DEVICE — ADHS LIQ MASTISOL 2/3ML

## (undated) DEVICE — SUT PDS CLOSURE CT1 1/0 27IN Z341H

## (undated) DEVICE — GLV SURG SENSICARE GREEN W/ALOE PF LF 6.5 STRL

## (undated) DEVICE — GLV SURG TRIUMPH LT PF LTX 6.5 STRL

## (undated) DEVICE — SUT PDS LP 0 CTX VIO 60IN Z990G

## (undated) DEVICE — SUT VIC 2/0 SH 27IN

## (undated) DEVICE — ENDOPATH XCEL BLADELESS TROCARS WITH STABILITY SLEEVES: Brand: ENDOPATH XCEL

## (undated) DEVICE — TROCAR SITE CLOSURE DEVICE: Brand: ENDO CLOSE

## (undated) DEVICE — GLV SURG SENSICARE GREEN W/ALOE PF LF 6 STRL

## (undated) DEVICE — PK MAJ PROC LF 60

## (undated) DEVICE — SUT VIC 0 CT1 36IN J946H

## (undated) DEVICE — 9165 UNIVERSAL PATIENT PLATE: Brand: 3M™

## (undated) DEVICE — SUT PDS 0 CT2 27IN DYED Z334H

## (undated) DEVICE — GOWN,AURORA,NOREINF,RAGLAN,XL,STERILE: Brand: MEDLINE

## (undated) DEVICE — SUT PROLN 3/0 SH D/A 36IN 8522H

## (undated) DEVICE — SUT VIC 2/0 TIES 18IN J111T

## (undated) DEVICE — GLV SURG SENSICARE GREEN W/ALOE PF LF 8.5 STRL

## (undated) DEVICE — TOTAL TRAY, 16FR 10ML SIL FOLEY, URN: Brand: MEDLINE

## (undated) DEVICE — GAUZE,PACKING STRIP,IODOFORM,1/2"X5YD,ST: Brand: CURAD

## (undated) DEVICE — GLV SURG TRIUMPH NATURAL W/ALOE PF LTX 6 STRL

## (undated) DEVICE — GLV SURG TRIUMPH LT PF LTX 7 STRL

## (undated) DEVICE — SHEET,DRAPE,53X77,STERILE: Brand: MEDLINE

## (undated) DEVICE — SOL IRRIG NACL 1000ML

## (undated) DEVICE — SUT CV-0 GORE 1/2CIR 37MM 36TPR 36IN 0N07B

## (undated) DEVICE — PAD,ABDOMINAL,8"X10",ST,LF: Brand: MEDLINE

## (undated) DEVICE — GLV SURG TRIUMPH LT PF LTX 8 STRL